# Patient Record
Sex: FEMALE | Race: WHITE | NOT HISPANIC OR LATINO | Employment: OTHER | URBAN - METROPOLITAN AREA
[De-identification: names, ages, dates, MRNs, and addresses within clinical notes are randomized per-mention and may not be internally consistent; named-entity substitution may affect disease eponyms.]

---

## 2017-01-03 ENCOUNTER — OFFICE VISIT - HEALTHEAST (OUTPATIENT)
Dept: PHYSICAL THERAPY | Facility: REHABILITATION | Age: 72
End: 2017-01-03

## 2017-01-03 ENCOUNTER — COMMUNICATION - HEALTHEAST (OUTPATIENT)
Dept: FAMILY MEDICINE | Facility: CLINIC | Age: 72
End: 2017-01-03

## 2017-01-03 DIAGNOSIS — M79.18 MUSCLE PAIN, MYOFASCIAL: ICD-10-CM

## 2017-01-03 DIAGNOSIS — M79.671 RIGHT FOOT PAIN: ICD-10-CM

## 2017-01-04 ENCOUNTER — COMMUNICATION - HEALTHEAST (OUTPATIENT)
Dept: CARDIOLOGY | Facility: CLINIC | Age: 72
End: 2017-01-04

## 2017-01-04 DIAGNOSIS — I10 ESSENTIAL HYPERTENSION, BENIGN: ICD-10-CM

## 2017-01-04 DIAGNOSIS — I42.9 CARDIOMYOPATHY (H): ICD-10-CM

## 2017-01-06 ENCOUNTER — OFFICE VISIT - HEALTHEAST (OUTPATIENT)
Dept: FAMILY MEDICINE | Facility: CLINIC | Age: 72
End: 2017-01-06

## 2017-01-06 DIAGNOSIS — M75.81 RIGHT ROTATOR CUFF TENDONITIS: ICD-10-CM

## 2017-01-06 ASSESSMENT — MIFFLIN-ST. JEOR: SCORE: 1376.32

## 2017-01-09 ENCOUNTER — OFFICE VISIT - HEALTHEAST (OUTPATIENT)
Dept: PHYSICAL THERAPY | Facility: REHABILITATION | Age: 72
End: 2017-01-09

## 2017-01-09 DIAGNOSIS — M79.18 MUSCLE PAIN, MYOFASCIAL: ICD-10-CM

## 2017-01-09 DIAGNOSIS — M79.671 RIGHT FOOT PAIN: ICD-10-CM

## 2017-01-16 ENCOUNTER — OFFICE VISIT - HEALTHEAST (OUTPATIENT)
Dept: PHYSICAL THERAPY | Facility: REHABILITATION | Age: 72
End: 2017-01-16

## 2017-01-16 DIAGNOSIS — M79.18 MUSCLE PAIN, MYOFASCIAL: ICD-10-CM

## 2017-01-16 DIAGNOSIS — M79.671 RIGHT FOOT PAIN: ICD-10-CM

## 2017-01-18 ENCOUNTER — COMMUNICATION - HEALTHEAST (OUTPATIENT)
Dept: FAMILY MEDICINE | Facility: CLINIC | Age: 72
End: 2017-01-18

## 2017-01-18 DIAGNOSIS — G43.909 MIGRAINE: ICD-10-CM

## 2017-01-20 ENCOUNTER — AMBULATORY - HEALTHEAST (OUTPATIENT)
Dept: FAMILY MEDICINE | Facility: CLINIC | Age: 72
End: 2017-01-20

## 2017-01-20 DIAGNOSIS — G43.909 MIGRAINE: ICD-10-CM

## 2017-01-24 ENCOUNTER — OFFICE VISIT - HEALTHEAST (OUTPATIENT)
Dept: PHYSICAL THERAPY | Facility: REHABILITATION | Age: 72
End: 2017-01-24

## 2017-01-24 DIAGNOSIS — M79.671 RIGHT FOOT PAIN: ICD-10-CM

## 2017-01-24 DIAGNOSIS — M79.18 MUSCLE PAIN, MYOFASCIAL: ICD-10-CM

## 2017-01-30 ENCOUNTER — OFFICE VISIT - HEALTHEAST (OUTPATIENT)
Dept: PHYSICAL THERAPY | Facility: REHABILITATION | Age: 72
End: 2017-01-30

## 2017-01-30 DIAGNOSIS — M79.671 RIGHT FOOT PAIN: ICD-10-CM

## 2017-01-30 DIAGNOSIS — M79.18 MUSCLE PAIN, MYOFASCIAL: ICD-10-CM

## 2017-02-20 ENCOUNTER — AMBULATORY - HEALTHEAST (OUTPATIENT)
Dept: CARE COORDINATION | Facility: CLINIC | Age: 72
End: 2017-02-20

## 2017-03-03 ENCOUNTER — COMMUNICATION - HEALTHEAST (OUTPATIENT)
Dept: FAMILY MEDICINE | Facility: CLINIC | Age: 72
End: 2017-03-03

## 2017-03-03 DIAGNOSIS — R21 RASH: ICD-10-CM

## 2017-03-06 ENCOUNTER — OFFICE VISIT - HEALTHEAST (OUTPATIENT)
Dept: FAMILY MEDICINE | Facility: CLINIC | Age: 72
End: 2017-03-06

## 2017-03-06 DIAGNOSIS — M79.7 FIBROMYALGIA: ICD-10-CM

## 2017-03-06 DIAGNOSIS — S81.802S LEG WOUND, LEFT, SEQUELA: ICD-10-CM

## 2017-03-06 DIAGNOSIS — I42.8 OTHER PRIMARY CARDIOMYOPATHIES: ICD-10-CM

## 2017-03-06 DIAGNOSIS — I87.2 VENOUS (PERIPHERAL) INSUFFICIENCY: ICD-10-CM

## 2017-03-06 DIAGNOSIS — I10 ESSENTIAL HYPERTENSION WITH GOAL BLOOD PRESSURE LESS THAN 130/80: ICD-10-CM

## 2017-03-06 DIAGNOSIS — F34.1 DYSTHYMIC DISORDER: ICD-10-CM

## 2017-03-06 DIAGNOSIS — R53.83 TIRED: ICD-10-CM

## 2017-03-06 DIAGNOSIS — G43.909 MIGRAINE: ICD-10-CM

## 2017-03-06 ASSESSMENT — MIFFLIN-ST. JEOR: SCORE: 1371.79

## 2017-03-09 ENCOUNTER — RECORDS - HEALTHEAST (OUTPATIENT)
Dept: ADMINISTRATIVE | Facility: OTHER | Age: 72
End: 2017-03-09

## 2017-03-11 ENCOUNTER — COMMUNICATION - HEALTHEAST (OUTPATIENT)
Dept: FAMILY MEDICINE | Facility: CLINIC | Age: 72
End: 2017-03-11

## 2017-03-13 ENCOUNTER — AMBULATORY - HEALTHEAST (OUTPATIENT)
Dept: FAMILY MEDICINE | Facility: CLINIC | Age: 72
End: 2017-03-13

## 2017-03-13 DIAGNOSIS — G43.909 MIGRAINE: ICD-10-CM

## 2017-03-28 ENCOUNTER — COMMUNICATION - HEALTHEAST (OUTPATIENT)
Dept: FAMILY MEDICINE | Facility: CLINIC | Age: 72
End: 2017-03-28

## 2017-03-28 DIAGNOSIS — F34.1 DYSTHYMIC DISORDER: ICD-10-CM

## 2017-04-25 ENCOUNTER — RECORDS - HEALTHEAST (OUTPATIENT)
Dept: ADMINISTRATIVE | Facility: OTHER | Age: 72
End: 2017-04-25

## 2017-05-06 ENCOUNTER — COMMUNICATION - HEALTHEAST (OUTPATIENT)
Dept: FAMILY MEDICINE | Facility: CLINIC | Age: 72
End: 2017-05-06

## 2017-05-06 DIAGNOSIS — I10 ESSENTIAL HYPERTENSION: ICD-10-CM

## 2017-05-09 ENCOUNTER — OFFICE VISIT - HEALTHEAST (OUTPATIENT)
Dept: FAMILY MEDICINE | Facility: CLINIC | Age: 72
End: 2017-05-09

## 2017-05-09 DIAGNOSIS — M94.9 DISORDER OF BONE AND CARTILAGE: ICD-10-CM

## 2017-05-09 DIAGNOSIS — I83.813 VARICOSE VEINS OF BOTH LOWER EXTREMITIES WITH PAIN: ICD-10-CM

## 2017-05-09 DIAGNOSIS — F34.1 DYSTHYMIC DISORDER: ICD-10-CM

## 2017-05-09 DIAGNOSIS — R06.09 DYSPNEA ON EXERTION: ICD-10-CM

## 2017-05-09 DIAGNOSIS — M89.9 DISORDER OF BONE AND CARTILAGE: ICD-10-CM

## 2017-05-09 DIAGNOSIS — R07.89 CHEST PRESSURE: ICD-10-CM

## 2017-05-24 ENCOUNTER — HOSPITAL ENCOUNTER (OUTPATIENT)
Dept: CARDIOLOGY | Facility: CLINIC | Age: 72
Discharge: HOME | End: 2017-05-24
Attending: FAMILY MEDICINE

## 2017-05-24 ENCOUNTER — HOSPITAL ENCOUNTER (OUTPATIENT)
Dept: NUCLEAR MEDICINE | Facility: CLINIC | Age: 72
Discharge: HOME | End: 2017-05-24
Attending: FAMILY MEDICINE

## 2017-05-24 ENCOUNTER — COMMUNICATION - HEALTHEAST (OUTPATIENT)
Dept: FAMILY MEDICINE | Facility: CLINIC | Age: 72
End: 2017-05-24

## 2017-05-24 DIAGNOSIS — R06.09 OTHER FORMS OF DYSPNEA: ICD-10-CM

## 2017-05-24 DIAGNOSIS — R06.09 DYSPNEA ON EXERTION: ICD-10-CM

## 2017-05-24 DIAGNOSIS — R07.89 CHEST PRESSURE: ICD-10-CM

## 2017-05-24 LAB
CV STRESS CURRENT BP HE: NORMAL
CV STRESS CURRENT HR HE: 101
CV STRESS CURRENT HR HE: 102
CV STRESS CURRENT HR HE: 103
CV STRESS CURRENT HR HE: 106
CV STRESS CURRENT HR HE: 107
CV STRESS CURRENT HR HE: 116
CV STRESS CURRENT HR HE: 117
CV STRESS CURRENT HR HE: 120
CV STRESS CURRENT HR HE: 125
CV STRESS CURRENT HR HE: 128
CV STRESS CURRENT HR HE: 131
CV STRESS CURRENT HR HE: 131
CV STRESS CURRENT HR HE: 133
CV STRESS CURRENT HR HE: 138
CV STRESS CURRENT HR HE: 141
CV STRESS CURRENT HR HE: 142
CV STRESS CURRENT HR HE: 144
CV STRESS CURRENT HR HE: 144
CV STRESS CURRENT HR HE: 90
CV STRESS CURRENT HR HE: 91
CV STRESS CURRENT HR HE: 97
CV STRESS CURRENT HR HE: 99
CV STRESS DEVIATION TIME HE: NORMAL
CV STRESS ECHO PERCENT HR HE: NORMAL
CV STRESS EXERCISE STAGE HE: NORMAL
CV STRESS EXERCISE STAGE REACHED HE: NORMAL
CV STRESS FINAL RESTING BP HE: NORMAL
CV STRESS FINAL RESTING HR HE: 101
CV STRESS MAX HR HE: 144
CV STRESS MAX TREADMILL GRADE HE: 12
CV STRESS MAX TREADMILL SPEED HE: 2.5
CV STRESS PEAK DIA BP HE: NORMAL
CV STRESS PEAK SYS BP HE: NORMAL
CV STRESS PHASE HE: NORMAL
CV STRESS PROTOCOL HE: NORMAL
CV STRESS RESTING PT POSITION HE: NORMAL
CV STRESS RESTING PT POSITION HE: NORMAL
CV STRESS ST DEVIATION AMOUNT HE: NORMAL
CV STRESS ST DEVIATION ELEVATION HE: NORMAL
CV STRESS ST EVELATION AMOUNT HE: NORMAL
CV STRESS TEST TYPE HE: NORMAL
CV STRESS TOTAL STAGE TIME MIN 1 HE: NORMAL
NUC STRESS EJECTION FRACTION: 70 %
STRESS ECHO BASELINE BP: NORMAL
STRESS ECHO BASELINE HR: 92
STRESS ECHO CALCULATED PERCENT HR: 96 %
STRESS ECHO LAST STRESS BP: NORMAL
STRESS ECHO LAST STRESS HR: 142
STRESS ECHO POST ESTIMATED WORKLOAD: 6.4
STRESS ECHO POST EXERCISE DUR MIN: 4
STRESS ECHO POST EXERCISE DUR SEC: 38
STRESS ECHO TARGET HR: 128

## 2017-05-30 ENCOUNTER — COMMUNICATION - HEALTHEAST (OUTPATIENT)
Dept: CARDIOLOGY | Facility: CLINIC | Age: 72
End: 2017-05-30

## 2017-06-12 ENCOUNTER — RECORDS - HEALTHEAST (OUTPATIENT)
Dept: ADMINISTRATIVE | Facility: OTHER | Age: 72
End: 2017-06-12

## 2017-06-12 ENCOUNTER — RECORDS - HEALTHEAST (OUTPATIENT)
Dept: BONE DENSITY | Facility: CLINIC | Age: 72
End: 2017-06-12

## 2017-06-12 DIAGNOSIS — M89.9 DISORDER OF BONE, UNSPECIFIED: ICD-10-CM

## 2017-06-12 DIAGNOSIS — M94.9 DISORDER OF CARTILAGE, UNSPECIFIED: ICD-10-CM

## 2017-06-19 ENCOUNTER — COMMUNICATION - HEALTHEAST (OUTPATIENT)
Dept: FAMILY MEDICINE | Facility: CLINIC | Age: 72
End: 2017-06-19

## 2017-06-19 DIAGNOSIS — M43.10 SPONDYLOLISTHESIS: ICD-10-CM

## 2017-07-18 ENCOUNTER — AMBULATORY - HEALTHEAST (OUTPATIENT)
Dept: LAB | Facility: CLINIC | Age: 72
End: 2017-07-18

## 2017-07-18 ENCOUNTER — AMBULATORY - HEALTHEAST (OUTPATIENT)
Dept: FAMILY MEDICINE | Facility: CLINIC | Age: 72
End: 2017-07-18

## 2017-07-18 ENCOUNTER — COMMUNICATION - HEALTHEAST (OUTPATIENT)
Dept: LAB | Facility: CLINIC | Age: 72
End: 2017-07-18

## 2017-07-18 DIAGNOSIS — E55.9 VITAMIN D DEFICIENCY: ICD-10-CM

## 2017-07-18 DIAGNOSIS — I10 ESSENTIAL HYPERTENSION, MALIGNANT: ICD-10-CM

## 2017-07-18 LAB
CHOLEST SERPL-MCNC: 161 MG/DL
FASTING STATUS PATIENT QL REPORTED: YES
HDLC SERPL-MCNC: 63 MG/DL
LDLC SERPL CALC-MCNC: 82 MG/DL
TRIGL SERPL-MCNC: 78 MG/DL

## 2017-07-19 ENCOUNTER — COMMUNICATION - HEALTHEAST (OUTPATIENT)
Dept: FAMILY MEDICINE | Facility: CLINIC | Age: 72
End: 2017-07-19

## 2017-07-24 ENCOUNTER — OFFICE VISIT - HEALTHEAST (OUTPATIENT)
Dept: FAMILY MEDICINE | Facility: CLINIC | Age: 72
End: 2017-07-24

## 2017-07-24 DIAGNOSIS — I83.93 ASYMPTOMATIC VARICOSE VEINS, BILATERAL: ICD-10-CM

## 2017-07-24 DIAGNOSIS — F34.1 DYSTHYMIC DISORDER: ICD-10-CM

## 2017-07-24 DIAGNOSIS — I42.8 OTHER PRIMARY CARDIOMYOPATHIES: ICD-10-CM

## 2017-07-24 DIAGNOSIS — I87.2 CHRONIC VENOUS INSUFFICIENCY: ICD-10-CM

## 2017-07-24 DIAGNOSIS — G43.909 MIGRAINE: ICD-10-CM

## 2017-07-24 DIAGNOSIS — I10 ESSENTIAL HYPERTENSION WITH GOAL BLOOD PRESSURE LESS THAN 130/80: ICD-10-CM

## 2017-07-24 ASSESSMENT — MIFFLIN-ST. JEOR: SCORE: 1385.4

## 2017-07-28 ENCOUNTER — COMMUNICATION - HEALTHEAST (OUTPATIENT)
Dept: FAMILY MEDICINE | Facility: CLINIC | Age: 72
End: 2017-07-28

## 2017-07-28 DIAGNOSIS — G43.909 MIGRAINE: ICD-10-CM

## 2017-08-22 ENCOUNTER — OFFICE VISIT - HEALTHEAST (OUTPATIENT)
Dept: CARDIOLOGY | Facility: CLINIC | Age: 72
End: 2017-08-22

## 2017-08-22 DIAGNOSIS — R06.09 OTHER FORMS OF DYSPNEA: ICD-10-CM

## 2017-08-22 DIAGNOSIS — R06.09 DYSPNEA ON EXERTION: ICD-10-CM

## 2017-08-22 DIAGNOSIS — R94.39 ABNORMAL NUCLEAR STRESS TEST: ICD-10-CM

## 2017-08-22 ASSESSMENT — MIFFLIN-ST. JEOR: SCORE: 1403.54

## 2017-08-23 ENCOUNTER — OFFICE VISIT - HEALTHEAST (OUTPATIENT)
Dept: FAMILY MEDICINE | Facility: CLINIC | Age: 72
End: 2017-08-23

## 2017-08-23 DIAGNOSIS — R21 LOCALIZED MACULOPAPULAR RASH: ICD-10-CM

## 2017-08-23 DIAGNOSIS — L70.0 OPEN COMEDONE: ICD-10-CM

## 2017-08-23 DIAGNOSIS — R49.9 CHANGE IN VOICE: ICD-10-CM

## 2017-08-23 DIAGNOSIS — L94.0 CIRCUMSCRIBED SCLERODERMA: ICD-10-CM

## 2017-08-23 DIAGNOSIS — Z00.00 ROUTINE GENERAL MEDICAL EXAMINATION AT A HEALTH CARE FACILITY: ICD-10-CM

## 2017-08-23 DIAGNOSIS — M62.81 MUSCLE WEAKNESS OF LOWER EXTREMITY: ICD-10-CM

## 2017-08-23 ASSESSMENT — MIFFLIN-ST. JEOR: SCORE: 1381.99

## 2017-08-24 ENCOUNTER — COMMUNICATION - HEALTHEAST (OUTPATIENT)
Dept: CARDIOLOGY | Facility: CLINIC | Age: 72
End: 2017-08-24

## 2017-08-28 ENCOUNTER — AMBULATORY - HEALTHEAST (OUTPATIENT)
Dept: FAMILY MEDICINE | Facility: CLINIC | Age: 72
End: 2017-08-28

## 2017-08-29 ENCOUNTER — HOSPITAL ENCOUNTER (OUTPATIENT)
Dept: CT IMAGING | Facility: CLINIC | Age: 72
Discharge: HOME | End: 2017-08-29
Attending: INTERNAL MEDICINE

## 2017-08-29 DIAGNOSIS — R06.09 OTHER FORMS OF DYSPNEA: ICD-10-CM

## 2017-08-29 DIAGNOSIS — R94.39 ABNORMAL NUCLEAR STRESS TEST: ICD-10-CM

## 2017-08-29 DIAGNOSIS — R06.09 DYSPNEA ON EXERTION: ICD-10-CM

## 2017-08-29 LAB
BSA FOR ECHO PROCEDURE: 2.01 M2
CV CALCIUM SCORE AGATSTON LM: 0
CV CALCIUM SCORING AGATSON LAD: 14
CV CALCIUM SCORING AGATSTON CX: 0
CV CALCIUM SCORING AGATSTON RCA: 0
CV CALCIUM SCORING AGATSTON TOTAL: 14
LEFT VENTRICLE HEART RATE: 83 BPM

## 2017-08-29 ASSESSMENT — MIFFLIN-ST. JEOR: SCORE: 1378.02

## 2017-08-30 ENCOUNTER — COMMUNICATION - HEALTHEAST (OUTPATIENT)
Dept: FAMILY MEDICINE | Facility: CLINIC | Age: 72
End: 2017-08-30

## 2017-08-30 DIAGNOSIS — G43.909 MIGRAINE: ICD-10-CM

## 2017-09-12 ENCOUNTER — COMMUNICATION - HEALTHEAST (OUTPATIENT)
Dept: CARDIOLOGY | Facility: CLINIC | Age: 72
End: 2017-09-12

## 2017-09-12 ENCOUNTER — COMMUNICATION - HEALTHEAST (OUTPATIENT)
Dept: FAMILY MEDICINE | Facility: CLINIC | Age: 72
End: 2017-09-12

## 2017-09-12 DIAGNOSIS — I42.9 CARDIOMYOPATHY (H): ICD-10-CM

## 2017-09-12 DIAGNOSIS — M43.10 SPONDYLOLISTHESIS: ICD-10-CM

## 2017-09-12 DIAGNOSIS — I10 ESSENTIAL HYPERTENSION, BENIGN: ICD-10-CM

## 2017-09-25 ENCOUNTER — HOSPITAL ENCOUNTER (OUTPATIENT)
Dept: MAMMOGRAPHY | Facility: HOSPITAL | Age: 72
Discharge: HOME | End: 2017-09-25
Attending: FAMILY MEDICINE

## 2017-09-25 DIAGNOSIS — Z12.31 VISIT FOR SCREENING MAMMOGRAM: ICD-10-CM

## 2017-09-26 ENCOUNTER — COMMUNICATION - HEALTHEAST (OUTPATIENT)
Dept: FAMILY MEDICINE | Facility: CLINIC | Age: 72
End: 2017-09-26

## 2017-09-26 DIAGNOSIS — F34.1 DYSTHYMIC DISORDER: ICD-10-CM

## 2017-10-06 ENCOUNTER — COMMUNICATION - HEALTHEAST (OUTPATIENT)
Dept: FAMILY MEDICINE | Facility: CLINIC | Age: 72
End: 2017-10-06

## 2017-10-06 DIAGNOSIS — G43.909 MIGRAINE: ICD-10-CM

## 2017-10-16 ENCOUNTER — COMMUNICATION - HEALTHEAST (OUTPATIENT)
Dept: FAMILY MEDICINE | Facility: CLINIC | Age: 72
End: 2017-10-16

## 2017-10-23 ENCOUNTER — OFFICE VISIT - HEALTHEAST (OUTPATIENT)
Dept: FAMILY MEDICINE | Facility: CLINIC | Age: 72
End: 2017-10-23

## 2017-10-23 DIAGNOSIS — J20.9 ACUTE BRONCHITIS: ICD-10-CM

## 2017-10-23 DIAGNOSIS — J32.9 SINUSITIS: ICD-10-CM

## 2017-10-23 ASSESSMENT — MIFFLIN-ST. JEOR: SCORE: 1382.56

## 2017-10-24 ENCOUNTER — RECORDS - HEALTHEAST (OUTPATIENT)
Dept: ADMINISTRATIVE | Facility: OTHER | Age: 72
End: 2017-10-24

## 2017-11-01 ENCOUNTER — HOSPITAL ENCOUNTER (OUTPATIENT)
Dept: MRI IMAGING | Facility: CLINIC | Age: 72
Discharge: HOME | End: 2017-11-01
Attending: PSYCHIATRY & NEUROLOGY

## 2017-11-01 DIAGNOSIS — G95.9 SPINAL CORD LESION (H): ICD-10-CM

## 2017-11-03 ENCOUNTER — COMMUNICATION - HEALTHEAST (OUTPATIENT)
Dept: FAMILY MEDICINE | Facility: CLINIC | Age: 72
End: 2017-11-03

## 2017-11-04 ENCOUNTER — COMMUNICATION - HEALTHEAST (OUTPATIENT)
Dept: FAMILY MEDICINE | Facility: CLINIC | Age: 72
End: 2017-11-04

## 2017-11-07 ENCOUNTER — HOSPITAL ENCOUNTER (OUTPATIENT)
Dept: MRI IMAGING | Facility: CLINIC | Age: 72
Discharge: HOME | End: 2017-11-07
Attending: PSYCHIATRY & NEUROLOGY

## 2017-11-07 DIAGNOSIS — R93.89 ABNORMAL MRI: ICD-10-CM

## 2017-11-21 ENCOUNTER — COMMUNICATION - HEALTHEAST (OUTPATIENT)
Dept: FAMILY MEDICINE | Facility: CLINIC | Age: 72
End: 2017-11-21

## 2017-11-21 DIAGNOSIS — R49.9 CHANGE IN VOICE: ICD-10-CM

## 2017-12-05 ENCOUNTER — COMMUNICATION - HEALTHEAST (OUTPATIENT)
Dept: SCHEDULING | Facility: CLINIC | Age: 72
End: 2017-12-05

## 2017-12-06 ENCOUNTER — OFFICE VISIT - HEALTHEAST (OUTPATIENT)
Dept: FAMILY MEDICINE | Facility: CLINIC | Age: 72
End: 2017-12-06

## 2017-12-06 DIAGNOSIS — Z23 NEED FOR VACCINATION: ICD-10-CM

## 2017-12-06 DIAGNOSIS — E66.9 OBESITY: ICD-10-CM

## 2017-12-06 DIAGNOSIS — L29.9 ITCHING: ICD-10-CM

## 2017-12-06 ASSESSMENT — MIFFLIN-ST. JEOR: SCORE: 1373.49

## 2017-12-08 ENCOUNTER — COMMUNICATION - HEALTHEAST (OUTPATIENT)
Dept: FAMILY MEDICINE | Facility: CLINIC | Age: 72
End: 2017-12-08

## 2017-12-11 ENCOUNTER — COMMUNICATION - HEALTHEAST (OUTPATIENT)
Dept: FAMILY MEDICINE | Facility: CLINIC | Age: 72
End: 2017-12-11

## 2017-12-11 DIAGNOSIS — F34.1 DYSTHYMIC DISORDER: ICD-10-CM

## 2017-12-11 DIAGNOSIS — M43.10 SPONDYLOLISTHESIS: ICD-10-CM

## 2018-01-05 ENCOUNTER — COMMUNICATION - HEALTHEAST (OUTPATIENT)
Dept: FAMILY MEDICINE | Facility: CLINIC | Age: 73
End: 2018-01-05

## 2018-01-08 ENCOUNTER — COMMUNICATION - HEALTHEAST (OUTPATIENT)
Dept: FAMILY MEDICINE | Facility: CLINIC | Age: 73
End: 2018-01-08

## 2018-01-08 DIAGNOSIS — K21.9 ESOPHAGEAL REFLUX: ICD-10-CM

## 2018-01-15 ENCOUNTER — COMMUNICATION - HEALTHEAST (OUTPATIENT)
Dept: FAMILY MEDICINE | Facility: CLINIC | Age: 73
End: 2018-01-15

## 2018-01-15 DIAGNOSIS — J20.9 ACUTE BRONCHITIS: ICD-10-CM

## 2018-01-29 ENCOUNTER — COMMUNICATION - HEALTHEAST (OUTPATIENT)
Dept: FAMILY MEDICINE | Facility: CLINIC | Age: 73
End: 2018-01-29

## 2018-01-30 ENCOUNTER — OFFICE VISIT - HEALTHEAST (OUTPATIENT)
Dept: FAMILY MEDICINE | Facility: CLINIC | Age: 73
End: 2018-01-30

## 2018-01-30 DIAGNOSIS — J40 BRONCHITIS: ICD-10-CM

## 2018-01-30 ASSESSMENT — MIFFLIN-ST. JEOR: SCORE: 1394.67

## 2018-01-31 ENCOUNTER — COMMUNICATION - HEALTHEAST (OUTPATIENT)
Dept: FAMILY MEDICINE | Facility: CLINIC | Age: 73
End: 2018-01-31

## 2018-01-31 DIAGNOSIS — G43.909 MIGRAINE HEADACHE: ICD-10-CM

## 2018-02-08 ENCOUNTER — COMMUNICATION - HEALTHEAST (OUTPATIENT)
Dept: FAMILY MEDICINE | Facility: CLINIC | Age: 73
End: 2018-02-08

## 2018-02-15 ENCOUNTER — OFFICE VISIT - HEALTHEAST (OUTPATIENT)
Dept: PODIATRY | Age: 73
End: 2018-02-15

## 2018-02-15 ENCOUNTER — COMMUNICATION - HEALTHEAST (OUTPATIENT)
Dept: OTHER | Facility: CLINIC | Age: 73
End: 2018-02-15

## 2018-02-15 DIAGNOSIS — M72.2 PLANTAR FASCIITIS: ICD-10-CM

## 2018-02-15 DIAGNOSIS — M77.50 ANKLE ENTHESOPATHY: ICD-10-CM

## 2018-03-05 ENCOUNTER — RECORDS - HEALTHEAST (OUTPATIENT)
Dept: GENERAL RADIOLOGY | Facility: CLINIC | Age: 73
End: 2018-03-05

## 2018-03-05 ENCOUNTER — OFFICE VISIT - HEALTHEAST (OUTPATIENT)
Dept: FAMILY MEDICINE | Facility: CLINIC | Age: 73
End: 2018-03-05

## 2018-03-05 ENCOUNTER — COMMUNICATION - HEALTHEAST (OUTPATIENT)
Dept: SURGERY | Facility: CLINIC | Age: 73
End: 2018-03-05

## 2018-03-05 DIAGNOSIS — G43.909 MIGRAINE: ICD-10-CM

## 2018-03-05 DIAGNOSIS — S99.911A ANKLE INJURY, RIGHT, INITIAL ENCOUNTER: ICD-10-CM

## 2018-03-05 DIAGNOSIS — S99.911A UNSPECIFIED INJURY OF RIGHT ANKLE, INITIAL ENCOUNTER: ICD-10-CM

## 2018-03-05 DIAGNOSIS — E66.9 OBESITY: ICD-10-CM

## 2018-03-05 DIAGNOSIS — L29.9 ITCHING: ICD-10-CM

## 2018-03-05 DIAGNOSIS — M43.10 SPONDYLOLISTHESIS: ICD-10-CM

## 2018-03-05 ASSESSMENT — MIFFLIN-ST. JEOR: SCORE: 1391.63

## 2018-03-06 ENCOUNTER — AMBULATORY - HEALTHEAST (OUTPATIENT)
Dept: FAMILY MEDICINE | Facility: CLINIC | Age: 73
End: 2018-03-06

## 2018-03-08 ENCOUNTER — COMMUNICATION - HEALTHEAST (OUTPATIENT)
Dept: FAMILY MEDICINE | Facility: CLINIC | Age: 73
End: 2018-03-08

## 2018-03-09 ENCOUNTER — COMMUNICATION - HEALTHEAST (OUTPATIENT)
Dept: FAMILY MEDICINE | Facility: CLINIC | Age: 73
End: 2018-03-09

## 2018-03-09 ENCOUNTER — OFFICE VISIT - HEALTHEAST (OUTPATIENT)
Dept: FAMILY MEDICINE | Facility: CLINIC | Age: 73
End: 2018-03-09

## 2018-03-09 DIAGNOSIS — M25.571 ACUTE RIGHT ANKLE PAIN: ICD-10-CM

## 2018-03-09 DIAGNOSIS — S82.831D OTHER CLOSED FRACTURE OF DISTAL END OF RIGHT FIBULA WITH ROUTINE HEALING, SUBSEQUENT ENCOUNTER: ICD-10-CM

## 2018-03-09 ASSESSMENT — MIFFLIN-ST. JEOR: SCORE: 1391.63

## 2018-03-21 ENCOUNTER — COMMUNICATION - HEALTHEAST (OUTPATIENT)
Dept: SCHEDULING | Facility: CLINIC | Age: 73
End: 2018-03-21

## 2018-04-05 ENCOUNTER — OFFICE VISIT - HEALTHEAST (OUTPATIENT)
Dept: FAMILY MEDICINE | Facility: CLINIC | Age: 73
End: 2018-04-05

## 2018-04-05 DIAGNOSIS — S82.891S: ICD-10-CM

## 2018-04-05 DIAGNOSIS — J20.9 ACUTE BRONCHITIS: ICD-10-CM

## 2018-04-12 ENCOUNTER — RECORDS - HEALTHEAST (OUTPATIENT)
Dept: ADMINISTRATIVE | Facility: OTHER | Age: 73
End: 2018-04-12

## 2018-04-15 ENCOUNTER — COMMUNICATION - HEALTHEAST (OUTPATIENT)
Dept: FAMILY MEDICINE | Facility: CLINIC | Age: 73
End: 2018-04-15

## 2018-04-15 DIAGNOSIS — I10 ESSENTIAL HYPERTENSION: ICD-10-CM

## 2018-04-16 ENCOUNTER — OFFICE VISIT - HEALTHEAST (OUTPATIENT)
Dept: FAMILY MEDICINE | Facility: CLINIC | Age: 73
End: 2018-04-16

## 2018-04-16 ENCOUNTER — RECORDS - HEALTHEAST (OUTPATIENT)
Dept: GENERAL RADIOLOGY | Facility: CLINIC | Age: 73
End: 2018-04-16

## 2018-04-16 DIAGNOSIS — S82.891A CLOSED RIGHT ANKLE FRACTURE: ICD-10-CM

## 2018-04-16 DIAGNOSIS — S82.891A OTHER FRACTURE OF RIGHT LOWER LEG, INITIAL ENCOUNTER FOR CLOSED FRACTURE: ICD-10-CM

## 2018-04-16 ASSESSMENT — MIFFLIN-ST. JEOR: SCORE: 1391.63

## 2018-04-25 ENCOUNTER — OFFICE VISIT - HEALTHEAST (OUTPATIENT)
Dept: SURGERY | Facility: CLINIC | Age: 73
End: 2018-04-25

## 2018-04-25 DIAGNOSIS — M79.7 FIBROMYALGIA: ICD-10-CM

## 2018-04-25 DIAGNOSIS — I10 HYPERTENSION, UNSPECIFIED TYPE: ICD-10-CM

## 2018-04-25 DIAGNOSIS — E66.9 OBESITY (BMI 30-39.9): ICD-10-CM

## 2018-04-25 DIAGNOSIS — G43.909 MIGRAINE: ICD-10-CM

## 2018-04-25 ASSESSMENT — MIFFLIN-ST. JEOR: SCORE: 1357.95

## 2018-05-02 ENCOUNTER — OFFICE VISIT - HEALTHEAST (OUTPATIENT)
Dept: PHYSICAL THERAPY | Facility: REHABILITATION | Age: 73
End: 2018-05-02

## 2018-05-02 DIAGNOSIS — R53.83 DECREASED STAMINA: ICD-10-CM

## 2018-05-02 DIAGNOSIS — M79.7 FIBROMYALGIA: ICD-10-CM

## 2018-05-02 DIAGNOSIS — R53.1 GENERAL WEAKNESS: ICD-10-CM

## 2018-05-09 ENCOUNTER — OFFICE VISIT - HEALTHEAST (OUTPATIENT)
Dept: PHYSICAL THERAPY | Facility: REHABILITATION | Age: 73
End: 2018-05-09

## 2018-05-09 DIAGNOSIS — R53.1 GENERAL WEAKNESS: ICD-10-CM

## 2018-05-09 DIAGNOSIS — R53.83 DECREASED STAMINA: ICD-10-CM

## 2018-05-09 DIAGNOSIS — M79.7 FIBROMYALGIA: ICD-10-CM

## 2018-05-14 ENCOUNTER — OFFICE VISIT - HEALTHEAST (OUTPATIENT)
Dept: PHYSICAL THERAPY | Facility: REHABILITATION | Age: 73
End: 2018-05-14

## 2018-05-14 DIAGNOSIS — M79.7 FIBROMYALGIA: ICD-10-CM

## 2018-05-14 DIAGNOSIS — R53.83 DECREASED STAMINA: ICD-10-CM

## 2018-05-14 DIAGNOSIS — R53.1 GENERAL WEAKNESS: ICD-10-CM

## 2018-05-15 ENCOUNTER — OFFICE VISIT - HEALTHEAST (OUTPATIENT)
Dept: FAMILY MEDICINE | Facility: CLINIC | Age: 73
End: 2018-05-15

## 2018-05-15 DIAGNOSIS — R10.32 LEFT LOWER QUADRANT PAIN: ICD-10-CM

## 2018-05-15 ASSESSMENT — MIFFLIN-ST. JEOR: SCORE: 1348.54

## 2018-05-16 ENCOUNTER — OFFICE VISIT - HEALTHEAST (OUTPATIENT)
Dept: PHYSICAL THERAPY | Facility: REHABILITATION | Age: 73
End: 2018-05-16

## 2018-05-16 ENCOUNTER — COMMUNICATION - HEALTHEAST (OUTPATIENT)
Dept: UROLOGY | Facility: CLINIC | Age: 73
End: 2018-05-16

## 2018-05-16 DIAGNOSIS — M79.7 FIBROMYALGIA: ICD-10-CM

## 2018-05-16 DIAGNOSIS — R53.1 GENERAL WEAKNESS: ICD-10-CM

## 2018-05-16 DIAGNOSIS — R53.83 DECREASED STAMINA: ICD-10-CM

## 2018-05-21 ENCOUNTER — OFFICE VISIT - HEALTHEAST (OUTPATIENT)
Dept: UROLOGY | Facility: CLINIC | Age: 73
End: 2018-05-21

## 2018-05-21 ENCOUNTER — OFFICE VISIT - HEALTHEAST (OUTPATIENT)
Dept: PHYSICAL THERAPY | Facility: REHABILITATION | Age: 73
End: 2018-05-21

## 2018-05-21 DIAGNOSIS — N20.9 URINARY TRACT STONES: ICD-10-CM

## 2018-05-21 DIAGNOSIS — R53.83 DECREASED STAMINA: ICD-10-CM

## 2018-05-21 DIAGNOSIS — N13.2 HYDRONEPHROSIS WITH URINARY OBSTRUCTION DUE TO URETERAL CALCULUS: ICD-10-CM

## 2018-05-21 DIAGNOSIS — R53.1 GENERAL WEAKNESS: ICD-10-CM

## 2018-05-21 DIAGNOSIS — N20.1 CALCULUS OF URETER: ICD-10-CM

## 2018-05-21 DIAGNOSIS — M79.7 FIBROMYALGIA: ICD-10-CM

## 2018-05-21 LAB
ALBUMIN UR-MCNC: NEGATIVE MG/DL
APPEARANCE UR: CLEAR
BILIRUB UR QL STRIP: NEGATIVE
COLOR UR AUTO: YELLOW
GLUCOSE UR STRIP-MCNC: NEGATIVE MG/DL
HGB UR QL STRIP: NEGATIVE
KETONES UR STRIP-MCNC: NEGATIVE MG/DL
LEUKOCYTE ESTERASE UR QL STRIP: ABNORMAL
NITRATE UR QL: NEGATIVE
PH UR STRIP: 7 [PH] (ref 5–8)
SP GR UR STRIP: 1.01 (ref 1–1.03)
UROBILINOGEN UR STRIP-ACNC: ABNORMAL

## 2018-05-23 ENCOUNTER — COMMUNICATION - HEALTHEAST (OUTPATIENT)
Dept: CARDIOLOGY | Facility: CLINIC | Age: 73
End: 2018-05-23

## 2018-05-23 ENCOUNTER — OFFICE VISIT - HEALTHEAST (OUTPATIENT)
Dept: PHYSICAL THERAPY | Facility: REHABILITATION | Age: 73
End: 2018-05-23

## 2018-05-23 DIAGNOSIS — I42.9 CARDIOMYOPATHY (H): ICD-10-CM

## 2018-05-23 DIAGNOSIS — I10 ESSENTIAL HYPERTENSION, BENIGN: ICD-10-CM

## 2018-05-23 DIAGNOSIS — R53.1 GENERAL WEAKNESS: ICD-10-CM

## 2018-05-23 DIAGNOSIS — M79.7 FIBROMYALGIA: ICD-10-CM

## 2018-05-23 DIAGNOSIS — R53.83 DECREASED STAMINA: ICD-10-CM

## 2018-05-24 ENCOUNTER — COMMUNICATION - HEALTHEAST (OUTPATIENT)
Dept: UROLOGY | Facility: CLINIC | Age: 73
End: 2018-05-24

## 2018-05-29 ENCOUNTER — OFFICE VISIT - HEALTHEAST (OUTPATIENT)
Dept: PHYSICAL THERAPY | Facility: REHABILITATION | Age: 73
End: 2018-05-29

## 2018-05-29 DIAGNOSIS — M79.7 FIBROMYALGIA: ICD-10-CM

## 2018-05-29 DIAGNOSIS — R53.83 DECREASED STAMINA: ICD-10-CM

## 2018-05-29 DIAGNOSIS — R53.1 GENERAL WEAKNESS: ICD-10-CM

## 2018-05-31 ENCOUNTER — COMMUNICATION - HEALTHEAST (OUTPATIENT)
Dept: ADMINISTRATIVE | Facility: CLINIC | Age: 73
End: 2018-05-31

## 2018-06-01 ENCOUNTER — OFFICE VISIT - HEALTHEAST (OUTPATIENT)
Dept: PHYSICAL THERAPY | Facility: REHABILITATION | Age: 73
End: 2018-06-01

## 2018-06-01 DIAGNOSIS — R53.1 GENERAL WEAKNESS: ICD-10-CM

## 2018-06-01 DIAGNOSIS — M79.7 FIBROMYALGIA: ICD-10-CM

## 2018-06-01 DIAGNOSIS — R53.83 DECREASED STAMINA: ICD-10-CM

## 2018-06-04 ENCOUNTER — OFFICE VISIT - HEALTHEAST (OUTPATIENT)
Dept: PHYSICAL THERAPY | Facility: REHABILITATION | Age: 73
End: 2018-06-04

## 2018-06-04 ENCOUNTER — HOSPITAL ENCOUNTER (OUTPATIENT)
Dept: CT IMAGING | Facility: CLINIC | Age: 73
Discharge: HOME | End: 2018-06-04
Attending: PHYSICIAN ASSISTANT

## 2018-06-04 ENCOUNTER — OFFICE VISIT - HEALTHEAST (OUTPATIENT)
Dept: UROLOGY | Facility: CLINIC | Age: 73
End: 2018-06-04

## 2018-06-04 DIAGNOSIS — N20.9 URINARY TRACT STONES: ICD-10-CM

## 2018-06-04 DIAGNOSIS — R53.83 DECREASED STAMINA: ICD-10-CM

## 2018-06-04 DIAGNOSIS — N20.1 CALCULUS OF URETER: ICD-10-CM

## 2018-06-04 DIAGNOSIS — M79.7 FIBROMYALGIA: ICD-10-CM

## 2018-06-04 DIAGNOSIS — R53.1 GENERAL WEAKNESS: ICD-10-CM

## 2018-06-04 LAB
ALBUMIN UR-MCNC: NEGATIVE MG/DL
APPEARANCE UR: CLEAR
BILIRUB UR QL STRIP: NEGATIVE
COLOR UR AUTO: YELLOW
GLUCOSE UR STRIP-MCNC: NEGATIVE MG/DL
HGB UR QL STRIP: ABNORMAL
KETONES UR STRIP-MCNC: ABNORMAL MG/DL
LEUKOCYTE ESTERASE UR QL STRIP: NEGATIVE
NITRATE UR QL: NEGATIVE
PH UR STRIP: 5.5 [PH] (ref 5–8)
SP GR UR STRIP: 1.02 (ref 1–1.03)
UROBILINOGEN UR STRIP-ACNC: ABNORMAL

## 2018-06-06 ENCOUNTER — COMMUNICATION - HEALTHEAST (OUTPATIENT)
Dept: CARE COORDINATION | Facility: CLINIC | Age: 73
End: 2018-06-06

## 2018-06-06 ENCOUNTER — OFFICE VISIT - HEALTHEAST (OUTPATIENT)
Dept: SURGERY | Facility: CLINIC | Age: 73
End: 2018-06-06

## 2018-06-06 DIAGNOSIS — E66.9 OBESITY (BMI 30-39.9): ICD-10-CM

## 2018-06-06 DIAGNOSIS — N20.1 CALCULUS OF URETER: ICD-10-CM

## 2018-06-06 DIAGNOSIS — Z71.3 DIETARY COUNSELING: ICD-10-CM

## 2018-06-06 DIAGNOSIS — E55.9 VITAMIN D DEFICIENCY: ICD-10-CM

## 2018-06-06 DIAGNOSIS — I10 ESSENTIAL HYPERTENSION: ICD-10-CM

## 2018-06-06 ASSESSMENT — MIFFLIN-ST. JEOR: SCORE: 1329.82

## 2018-06-15 ENCOUNTER — OFFICE VISIT - HEALTHEAST (OUTPATIENT)
Dept: PHYSICAL THERAPY | Facility: REHABILITATION | Age: 73
End: 2018-06-15

## 2018-06-15 DIAGNOSIS — M79.7 FIBROMYALGIA: ICD-10-CM

## 2018-06-15 DIAGNOSIS — R53.83 DECREASED STAMINA: ICD-10-CM

## 2018-06-15 DIAGNOSIS — R53.1 GENERAL WEAKNESS: ICD-10-CM

## 2018-06-18 ENCOUNTER — HOSPITAL ENCOUNTER (OUTPATIENT)
Dept: CT IMAGING | Facility: CLINIC | Age: 73
Discharge: HOME | End: 2018-06-18
Attending: PHYSICIAN ASSISTANT

## 2018-06-18 ENCOUNTER — OFFICE VISIT - HEALTHEAST (OUTPATIENT)
Dept: UROLOGY | Facility: CLINIC | Age: 73
End: 2018-06-18

## 2018-06-18 ENCOUNTER — COMMUNICATION - HEALTHEAST (OUTPATIENT)
Dept: UROLOGY | Facility: CLINIC | Age: 73
End: 2018-06-18

## 2018-06-18 DIAGNOSIS — N20.9 URINARY TRACT STONES: ICD-10-CM

## 2018-06-18 DIAGNOSIS — N20.1 CALCULUS OF URETER: ICD-10-CM

## 2018-06-20 ENCOUNTER — OFFICE VISIT - HEALTHEAST (OUTPATIENT)
Dept: SURGERY | Facility: CLINIC | Age: 73
End: 2018-06-20

## 2018-06-20 DIAGNOSIS — G43.909 MIGRAINE: ICD-10-CM

## 2018-06-20 DIAGNOSIS — E66.9 OBESITY (BMI 30-39.9): ICD-10-CM

## 2018-06-20 ASSESSMENT — MIFFLIN-ST. JEOR: SCORE: 1334.81

## 2018-06-22 LAB
1ST CONSTITUENT:: NORMAL
2ND CONSTITUENT:: NORMAL
SOURCE: NORMAL

## 2018-07-03 ENCOUNTER — COMMUNICATION - HEALTHEAST (OUTPATIENT)
Dept: FAMILY MEDICINE | Facility: CLINIC | Age: 73
End: 2018-07-03

## 2018-07-19 ENCOUNTER — OFFICE VISIT - HEALTHEAST (OUTPATIENT)
Dept: FAMILY MEDICINE | Facility: CLINIC | Age: 73
End: 2018-07-19

## 2018-07-19 DIAGNOSIS — R35.0 FREQUENT URINATION: ICD-10-CM

## 2018-07-19 DIAGNOSIS — G43.909 MIGRAINE: ICD-10-CM

## 2018-07-19 DIAGNOSIS — R41.3 MEMORY LOSS: ICD-10-CM

## 2018-07-19 LAB
ALBUMIN UR-MCNC: NEGATIVE MG/DL
APPEARANCE UR: CLEAR
BILIRUB UR QL STRIP: NEGATIVE
COLOR UR AUTO: YELLOW
GLUCOSE UR STRIP-MCNC: NEGATIVE MG/DL
HGB UR QL STRIP: NEGATIVE
KETONES UR STRIP-MCNC: NEGATIVE MG/DL
LEUKOCYTE ESTERASE UR QL STRIP: ABNORMAL
NITRATE UR QL: NEGATIVE
PH UR STRIP: 6 [PH] (ref 5–8)
SP GR UR STRIP: 1.02 (ref 1–1.03)
UROBILINOGEN UR STRIP-ACNC: ABNORMAL

## 2018-07-19 ASSESSMENT — MIFFLIN-ST. JEOR: SCORE: 1343.43

## 2018-07-20 ENCOUNTER — COMMUNICATION - HEALTHEAST (OUTPATIENT)
Dept: FAMILY MEDICINE | Facility: CLINIC | Age: 73
End: 2018-07-20

## 2018-07-20 LAB — BACTERIA SPEC CULT: NO GROWTH

## 2018-08-09 ENCOUNTER — OFFICE VISIT - HEALTHEAST (OUTPATIENT)
Dept: CARDIOLOGY | Facility: CLINIC | Age: 73
End: 2018-08-09

## 2018-08-09 DIAGNOSIS — I25.10 CORONARY ARTERY DISEASE INVOLVING NATIVE CORONARY ARTERY OF NATIVE HEART WITHOUT ANGINA PECTORIS: ICD-10-CM

## 2018-08-09 ASSESSMENT — MIFFLIN-ST. JEOR: SCORE: 1334.36

## 2018-09-04 ENCOUNTER — OFFICE VISIT - HEALTHEAST (OUTPATIENT)
Dept: SURGERY | Facility: CLINIC | Age: 73
End: 2018-09-04

## 2018-09-04 DIAGNOSIS — I10 ESSENTIAL HYPERTENSION: ICD-10-CM

## 2018-09-04 DIAGNOSIS — E66.9 OBESITY (BMI 30-39.9): ICD-10-CM

## 2018-09-04 ASSESSMENT — MIFFLIN-ST. JEOR: SCORE: 1347.97

## 2018-10-11 ENCOUNTER — COMMUNICATION - HEALTHEAST (OUTPATIENT)
Dept: FAMILY MEDICINE | Facility: CLINIC | Age: 73
End: 2018-10-11

## 2018-10-11 DIAGNOSIS — F34.1 DYSTHYMIC DISORDER: ICD-10-CM

## 2018-10-17 ENCOUNTER — HOSPITAL ENCOUNTER (OUTPATIENT)
Dept: MAMMOGRAPHY | Facility: CLINIC | Age: 73
Discharge: HOME | End: 2018-10-17
Attending: FAMILY MEDICINE

## 2018-10-17 ENCOUNTER — COMMUNICATION - HEALTHEAST (OUTPATIENT)
Dept: CARDIOLOGY | Facility: CLINIC | Age: 73
End: 2018-10-17

## 2018-10-17 DIAGNOSIS — Z12.31 VISIT FOR SCREENING MAMMOGRAM: ICD-10-CM

## 2018-10-17 DIAGNOSIS — I42.9 CARDIOMYOPATHY (H): ICD-10-CM

## 2018-10-17 DIAGNOSIS — I10 ESSENTIAL HYPERTENSION, BENIGN: ICD-10-CM

## 2018-11-06 ENCOUNTER — OFFICE VISIT - HEALTHEAST (OUTPATIENT)
Dept: SURGERY | Facility: CLINIC | Age: 73
End: 2018-11-06

## 2018-11-06 DIAGNOSIS — I10 ESSENTIAL HYPERTENSION: ICD-10-CM

## 2018-11-06 DIAGNOSIS — E66.9 OBESITY (BMI 30-39.9): ICD-10-CM

## 2018-11-06 ASSESSMENT — MIFFLIN-ST. JEOR: SCORE: 1314.86

## 2018-11-19 ENCOUNTER — OFFICE VISIT - HEALTHEAST (OUTPATIENT)
Dept: FAMILY MEDICINE | Facility: CLINIC | Age: 73
End: 2018-11-19

## 2018-11-19 DIAGNOSIS — J06.9 UPPER RESPIRATORY TRACT INFECTION, UNSPECIFIED TYPE: ICD-10-CM

## 2018-11-19 DIAGNOSIS — N39.0 URINARY TRACT INFECTION: ICD-10-CM

## 2018-11-19 LAB
ALBUMIN UR-MCNC: NEGATIVE MG/DL
APPEARANCE UR: CLEAR
BACTERIA #/AREA URNS HPF: ABNORMAL HPF
BILIRUB UR QL STRIP: ABNORMAL
CAOX CRY #/AREA URNS HPF: PRESENT /[HPF]
COLOR UR AUTO: YELLOW
GLUCOSE UR STRIP-MCNC: NEGATIVE MG/DL
HGB UR QL STRIP: ABNORMAL
KETONES UR STRIP-MCNC: ABNORMAL MG/DL
LEUKOCYTE ESTERASE UR QL STRIP: NEGATIVE
NITRATE UR QL: NEGATIVE
PH UR STRIP: 5.5 [PH] (ref 5–8)
RBC #/AREA URNS AUTO: ABNORMAL HPF
SP GR UR STRIP: >=1.03 (ref 1–1.03)
SQUAMOUS #/AREA URNS AUTO: ABNORMAL LPF
UROBILINOGEN UR STRIP-ACNC: ABNORMAL
WBC #/AREA URNS AUTO: ABNORMAL HPF

## 2018-11-24 ENCOUNTER — COMMUNICATION - HEALTHEAST (OUTPATIENT)
Dept: FAMILY MEDICINE | Facility: CLINIC | Age: 73
End: 2018-11-24

## 2018-11-24 DIAGNOSIS — J06.9 UPPER RESPIRATORY TRACT INFECTION, UNSPECIFIED TYPE: ICD-10-CM

## 2018-12-03 ENCOUNTER — COMMUNICATION - HEALTHEAST (OUTPATIENT)
Dept: FAMILY MEDICINE | Facility: CLINIC | Age: 73
End: 2018-12-03

## 2018-12-03 DIAGNOSIS — K21.9 ESOPHAGEAL REFLUX: ICD-10-CM

## 2018-12-10 ENCOUNTER — OFFICE VISIT - HEALTHEAST (OUTPATIENT)
Dept: FAMILY MEDICINE | Facility: CLINIC | Age: 73
End: 2018-12-10

## 2018-12-10 DIAGNOSIS — J01.00 ACUTE NON-RECURRENT MAXILLARY SINUSITIS: ICD-10-CM

## 2018-12-10 DIAGNOSIS — Z23 INFLUENZA VACCINE NEEDED: ICD-10-CM

## 2018-12-10 ASSESSMENT — MIFFLIN-ST. JEOR: SCORE: 1311.23

## 2019-01-04 ENCOUNTER — OFFICE VISIT - HEALTHEAST (OUTPATIENT)
Dept: FAMILY MEDICINE | Facility: CLINIC | Age: 74
End: 2019-01-04

## 2019-01-04 DIAGNOSIS — N95.2 ATROPHIC VAGINITIS: ICD-10-CM

## 2019-01-04 DIAGNOSIS — M89.9 DISORDER OF BONE AND CARTILAGE: ICD-10-CM

## 2019-01-04 DIAGNOSIS — I10 ESSENTIAL HYPERTENSION: ICD-10-CM

## 2019-01-04 DIAGNOSIS — M79.7 FIBROMYALGIA: ICD-10-CM

## 2019-01-04 DIAGNOSIS — Z00.00 MEDICARE ANNUAL WELLNESS VISIT, SUBSEQUENT: ICD-10-CM

## 2019-01-04 DIAGNOSIS — I42.9 SECONDARY CARDIOMYOPATHY (H): ICD-10-CM

## 2019-01-04 DIAGNOSIS — R53.83 FATIGUE, UNSPECIFIED TYPE: ICD-10-CM

## 2019-01-04 DIAGNOSIS — J20.9 ACUTE BRONCHITIS: ICD-10-CM

## 2019-01-04 DIAGNOSIS — F34.1 DYSTHYMIC DISORDER: ICD-10-CM

## 2019-01-04 DIAGNOSIS — M94.9 DISORDER OF BONE AND CARTILAGE: ICD-10-CM

## 2019-01-04 LAB
ANION GAP SERPL CALCULATED.3IONS-SCNC: 12 MMOL/L (ref 5–18)
BUN SERPL-MCNC: 16 MG/DL (ref 8–28)
CALCIUM SERPL-MCNC: 9.6 MG/DL (ref 8.5–10.5)
CHLORIDE BLD-SCNC: 108 MMOL/L (ref 98–107)
CO2 SERPL-SCNC: 23 MMOL/L (ref 22–31)
CREAT SERPL-MCNC: 0.81 MG/DL (ref 0.6–1.1)
GFR SERPL CREATININE-BSD FRML MDRD: >60 ML/MIN/1.73M2
GLUCOSE BLD-MCNC: 97 MG/DL (ref 70–125)
POTASSIUM BLD-SCNC: 4.1 MMOL/L (ref 3.5–5)
SODIUM SERPL-SCNC: 143 MMOL/L (ref 136–145)
TSH SERPL DL<=0.005 MIU/L-ACNC: 0.92 UIU/ML (ref 0.3–5)

## 2019-01-04 ASSESSMENT — MIFFLIN-ST. JEOR: SCORE: 1307.83

## 2019-01-05 LAB — 25(OH)D3 SERPL-MCNC: 30.4 NG/ML (ref 30–80)

## 2019-01-07 ENCOUNTER — COMMUNICATION - HEALTHEAST (OUTPATIENT)
Dept: FAMILY MEDICINE | Facility: CLINIC | Age: 74
End: 2019-01-07

## 2019-02-12 ENCOUNTER — OFFICE VISIT - HEALTHEAST (OUTPATIENT)
Dept: SURGERY | Facility: CLINIC | Age: 74
End: 2019-02-12

## 2019-02-12 DIAGNOSIS — E66.9 OBESITY (BMI 30-39.9): ICD-10-CM

## 2019-02-12 DIAGNOSIS — G43.909 MIGRAINE WITHOUT STATUS MIGRAINOSUS, NOT INTRACTABLE, UNSPECIFIED MIGRAINE TYPE: ICD-10-CM

## 2019-02-12 ASSESSMENT — MIFFLIN-ST. JEOR: SCORE: 1287.87

## 2019-02-18 ENCOUNTER — COMMUNICATION - HEALTHEAST (OUTPATIENT)
Dept: FAMILY MEDICINE | Facility: CLINIC | Age: 74
End: 2019-02-18

## 2019-02-26 ENCOUNTER — OFFICE VISIT - HEALTHEAST (OUTPATIENT)
Dept: FAMILY MEDICINE | Facility: CLINIC | Age: 74
End: 2019-02-26

## 2019-02-26 DIAGNOSIS — R05.9 COUGH: ICD-10-CM

## 2019-02-26 DIAGNOSIS — H93.13 TINNITUS OF BOTH EARS: ICD-10-CM

## 2019-02-26 ASSESSMENT — MIFFLIN-ST. JEOR: SCORE: 1287.87

## 2019-02-27 ENCOUNTER — COMMUNICATION - HEALTHEAST (OUTPATIENT)
Dept: FAMILY MEDICINE | Facility: CLINIC | Age: 74
End: 2019-02-27

## 2019-02-27 DIAGNOSIS — N95.2 ATROPHIC VAGINITIS: ICD-10-CM

## 2019-03-26 ENCOUNTER — OFFICE VISIT - HEALTHEAST (OUTPATIENT)
Dept: AUDIOLOGY | Facility: CLINIC | Age: 74
End: 2019-03-26

## 2019-03-26 ENCOUNTER — OFFICE VISIT - HEALTHEAST (OUTPATIENT)
Dept: OTOLARYNGOLOGY | Facility: CLINIC | Age: 74
End: 2019-03-26

## 2019-03-26 DIAGNOSIS — H90.3 SENSORINEURAL HEARING LOSS, BILATERAL: ICD-10-CM

## 2019-03-26 DIAGNOSIS — H93.13 TINNITUS, BILATERAL: ICD-10-CM

## 2019-03-26 DIAGNOSIS — H93.13 TINNITUS OF BOTH EARS: ICD-10-CM

## 2019-03-26 DIAGNOSIS — H90.3 SENSORINEURAL HEARING LOSS (SNHL) OF BOTH EARS: ICD-10-CM

## 2019-03-27 ENCOUNTER — COMMUNICATION - HEALTHEAST (OUTPATIENT)
Dept: FAMILY MEDICINE | Facility: CLINIC | Age: 74
End: 2019-03-27

## 2019-03-27 DIAGNOSIS — R05.9 COUGH: ICD-10-CM

## 2019-04-11 ENCOUNTER — RECORDS - HEALTHEAST (OUTPATIENT)
Dept: ADMINISTRATIVE | Facility: OTHER | Age: 74
End: 2019-04-11

## 2019-04-29 ENCOUNTER — RECORDS - HEALTHEAST (OUTPATIENT)
Dept: ADMINISTRATIVE | Facility: OTHER | Age: 74
End: 2019-04-29

## 2019-05-01 ENCOUNTER — COMMUNICATION - HEALTHEAST (OUTPATIENT)
Dept: FAMILY MEDICINE | Facility: CLINIC | Age: 74
End: 2019-05-01

## 2019-05-01 DIAGNOSIS — I10 ESSENTIAL HYPERTENSION: ICD-10-CM

## 2019-05-02 ENCOUNTER — COMMUNICATION - HEALTHEAST (OUTPATIENT)
Dept: FAMILY MEDICINE | Facility: CLINIC | Age: 74
End: 2019-05-02

## 2019-05-02 DIAGNOSIS — I10 ESSENTIAL HYPERTENSION: ICD-10-CM

## 2019-05-02 DIAGNOSIS — M43.10 SPONDYLOLISTHESIS: ICD-10-CM

## 2019-05-02 DIAGNOSIS — G43.909 MIGRAINE: ICD-10-CM

## 2019-06-02 ENCOUNTER — COMMUNICATION - HEALTHEAST (OUTPATIENT)
Dept: FAMILY MEDICINE | Facility: CLINIC | Age: 74
End: 2019-06-02

## 2019-06-02 DIAGNOSIS — I42.9 CARDIOMYOPATHY (H): ICD-10-CM

## 2019-06-02 DIAGNOSIS — E66.9 OBESITY (BMI 30-39.9): ICD-10-CM

## 2019-06-02 DIAGNOSIS — G43.909 MIGRAINE HEADACHE: ICD-10-CM

## 2019-06-02 DIAGNOSIS — I10 ESSENTIAL HYPERTENSION, BENIGN: ICD-10-CM

## 2019-07-13 ENCOUNTER — OFFICE VISIT - HEALTHEAST (OUTPATIENT)
Dept: FAMILY MEDICINE | Facility: CLINIC | Age: 74
End: 2019-07-13

## 2019-07-13 DIAGNOSIS — H11.31 NON-TRAUMATIC SUBCONJUNCTIVAL HEMORRHAGE OF RIGHT EYE: ICD-10-CM

## 2019-07-15 ENCOUNTER — OFFICE VISIT - HEALTHEAST (OUTPATIENT)
Dept: FAMILY MEDICINE | Facility: CLINIC | Age: 74
End: 2019-07-15

## 2019-07-15 DIAGNOSIS — I10 ESSENTIAL HYPERTENSION: ICD-10-CM

## 2019-07-15 DIAGNOSIS — G43.909 MIGRAINE: ICD-10-CM

## 2019-07-15 ASSESSMENT — MIFFLIN-ST. JEOR: SCORE: 1301.48

## 2019-07-16 ENCOUNTER — AMBULATORY - HEALTHEAST (OUTPATIENT)
Dept: FAMILY MEDICINE | Facility: CLINIC | Age: 74
End: 2019-07-16

## 2019-08-02 ENCOUNTER — COMMUNICATION - HEALTHEAST (OUTPATIENT)
Dept: FAMILY MEDICINE | Facility: CLINIC | Age: 74
End: 2019-08-02

## 2019-08-02 DIAGNOSIS — M43.10 SPONDYLOLISTHESIS: ICD-10-CM

## 2019-08-06 ENCOUNTER — OFFICE VISIT - HEALTHEAST (OUTPATIENT)
Dept: SURGERY | Facility: CLINIC | Age: 74
End: 2019-08-06

## 2019-08-06 DIAGNOSIS — I10 ESSENTIAL HYPERTENSION: ICD-10-CM

## 2019-08-06 DIAGNOSIS — E66.9 OBESITY (BMI 30-39.9): ICD-10-CM

## 2019-08-06 ASSESSMENT — MIFFLIN-ST. JEOR: SCORE: 1291.95

## 2019-09-19 ENCOUNTER — COMMUNICATION - HEALTHEAST (OUTPATIENT)
Dept: FAMILY MEDICINE | Facility: CLINIC | Age: 74
End: 2019-09-19

## 2019-09-19 DIAGNOSIS — N95.2 ATROPHIC VAGINITIS: ICD-10-CM

## 2019-09-26 ENCOUNTER — OFFICE VISIT - HEALTHEAST (OUTPATIENT)
Dept: FAMILY MEDICINE | Facility: CLINIC | Age: 74
End: 2019-09-26

## 2019-09-26 DIAGNOSIS — G43.909 MIGRAINE: ICD-10-CM

## 2019-09-26 DIAGNOSIS — M54.2 NECK PAIN ON RIGHT SIDE: ICD-10-CM

## 2019-09-26 DIAGNOSIS — Z23 NEEDS FLU SHOT: ICD-10-CM

## 2019-09-26 ASSESSMENT — MIFFLIN-ST. JEOR: SCORE: 1301.48

## 2019-10-03 ENCOUNTER — OFFICE VISIT - HEALTHEAST (OUTPATIENT)
Dept: PHYSICAL MEDICINE AND REHAB | Facility: REHABILITATION | Age: 74
End: 2019-10-03

## 2019-10-03 DIAGNOSIS — M79.18 CERVICAL MYOFASCIAL PAIN SYNDROME: ICD-10-CM

## 2019-10-03 DIAGNOSIS — R29.898 WEAKNESS OF RIGHT UPPER EXTREMITY: ICD-10-CM

## 2019-10-03 DIAGNOSIS — M48.02 FORAMINAL STENOSIS OF CERVICAL REGION: ICD-10-CM

## 2019-10-03 DIAGNOSIS — M54.12 RIGHT CERVICAL RADICULOPATHY: ICD-10-CM

## 2019-10-04 ENCOUNTER — OFFICE VISIT - HEALTHEAST (OUTPATIENT)
Dept: CARDIOLOGY | Facility: CLINIC | Age: 74
End: 2019-10-04

## 2019-10-04 DIAGNOSIS — I42.9 SECONDARY CARDIOMYOPATHY (H): ICD-10-CM

## 2019-10-04 ASSESSMENT — MIFFLIN-ST. JEOR: SCORE: 1317.23

## 2019-10-07 ENCOUNTER — COMMUNICATION - HEALTHEAST (OUTPATIENT)
Dept: FAMILY MEDICINE | Facility: CLINIC | Age: 74
End: 2019-10-07

## 2019-10-11 ENCOUNTER — OFFICE VISIT - HEALTHEAST (OUTPATIENT)
Dept: FAMILY MEDICINE | Facility: CLINIC | Age: 74
End: 2019-10-11

## 2019-10-11 DIAGNOSIS — Z12.31 VISIT FOR SCREENING MAMMOGRAM: ICD-10-CM

## 2019-10-11 DIAGNOSIS — I10 ESSENTIAL HYPERTENSION: ICD-10-CM

## 2019-10-11 DIAGNOSIS — E78.5 HYPERLIPIDEMIA, UNSPECIFIED HYPERLIPIDEMIA TYPE: ICD-10-CM

## 2019-10-11 LAB
ANION GAP SERPL CALCULATED.3IONS-SCNC: 6 MMOL/L (ref 5–18)
BUN SERPL-MCNC: 18 MG/DL (ref 8–28)
CALCIUM SERPL-MCNC: 9.2 MG/DL (ref 8.5–10.5)
CHLORIDE BLD-SCNC: 107 MMOL/L (ref 98–107)
CHOLEST SERPL-MCNC: 169 MG/DL
CO2 SERPL-SCNC: 29 MMOL/L (ref 22–31)
CREAT SERPL-MCNC: 0.8 MG/DL (ref 0.6–1.1)
FASTING STATUS PATIENT QL REPORTED: NORMAL
GFR SERPL CREATININE-BSD FRML MDRD: >60 ML/MIN/1.73M2
GLUCOSE BLD-MCNC: 93 MG/DL (ref 70–125)
HDLC SERPL-MCNC: 63 MG/DL
LDLC SERPL CALC-MCNC: 86 MG/DL
POTASSIUM BLD-SCNC: 3.7 MMOL/L (ref 3.5–5)
SODIUM SERPL-SCNC: 142 MMOL/L (ref 136–145)
TRIGL SERPL-MCNC: 98 MG/DL

## 2019-10-11 ASSESSMENT — MIFFLIN-ST. JEOR: SCORE: 1298.36

## 2019-10-15 ENCOUNTER — HOSPITAL ENCOUNTER (OUTPATIENT)
Dept: CARDIOLOGY | Facility: CLINIC | Age: 74
Discharge: HOME | End: 2019-10-15
Attending: INTERNAL MEDICINE

## 2019-10-15 ENCOUNTER — HOSPITAL ENCOUNTER (OUTPATIENT)
Dept: MRI IMAGING | Facility: CLINIC | Age: 74
Discharge: HOME | End: 2019-10-15
Attending: INTERNAL MEDICINE

## 2019-10-15 DIAGNOSIS — M54.12 RIGHT CERVICAL RADICULOPATHY: ICD-10-CM

## 2019-10-15 DIAGNOSIS — R29.898 WEAKNESS OF RIGHT UPPER EXTREMITY: ICD-10-CM

## 2019-10-15 DIAGNOSIS — M48.02 FORAMINAL STENOSIS OF CERVICAL REGION: ICD-10-CM

## 2019-10-15 LAB
AORTIC ROOT: 2.9 CM
BSA FOR ECHO PROCEDURE: 1.92 M2
CV BLOOD PRESSURE: ABNORMAL %
CV ECHO HEIGHT: 62 IN
CV ECHO WEIGHT: 186 LBS
DOP CALC LVOT AREA: 2.54 CM2
DOP CALC LVOT DIAMETER: 1.8 CM
DOP CALC LVOT PEAK VEL: 85.7 CM/S
DOP CALC LVOT STROKE VOLUME: 37.6 CM3
DOP CALCLVOT PEAK VEL VTI: 14.8 CM
ECHO EJECTION FRACTION ESTIMATED: 50 %
EJECTION FRACTION: 63 % (ref 55–75)
FRACTIONAL SHORTENING: 38.6 % (ref 28–44)
INTERVENTRICULAR SEPTUM IN END DIASTOLE: 0.81 CM (ref 0.6–0.9)
IVS/PW RATIO: 0.8
LA AREA 1: 19 CM2
LEFT ATRIUM LENGTH: 5.31 CM
LEFT ATRIUM SIZE: 4.2 CM
LEFT ATRIUM TO AORTIC ROOT RATIO: 1.45 NO UNITS
LEFT VENTRICLE CARDIAC INDEX: 1.7 L/MIN/M2
LEFT VENTRICLE CARDIAC OUTPUT: 3.2 L/MIN
LEFT VENTRICLE DIASTOLIC VOLUME INDEX: 38 CM3/M2 (ref 29–61)
LEFT VENTRICLE DIASTOLIC VOLUME: 73 CM3 (ref 46–106)
LEFT VENTRICLE HEART RATE: 86 BPM
LEFT VENTRICLE MASS INDEX: 80.5 G/M2
LEFT VENTRICLE SYSTOLIC VOLUME INDEX: 14.1 CM3/M2 (ref 8–24)
LEFT VENTRICLE SYSTOLIC VOLUME: 27 CM3 (ref 14–42)
LEFT VENTRICULAR INTERNAL DIMENSION IN DIASTOLE: 5.02 CM (ref 3.8–5.2)
LEFT VENTRICULAR INTERNAL DIMENSION IN SYSTOLE: 3.08 CM (ref 2.2–3.5)
LEFT VENTRICULAR MASS: 154.7 G
LEFT VENTRICULAR OUTFLOW TRACT MEAN GRADIENT: 2 MMHG
LEFT VENTRICULAR OUTFLOW TRACT MEAN VELOCITY: 60.3 CM/S
LEFT VENTRICULAR OUTFLOW TRACT PEAK GRADIENT: 3 MMHG
LEFT VENTRICULAR POSTERIOR WALL IN END DIASTOLE: 0.95 CM (ref 0.6–0.9)
LV STROKE VOLUME INDEX: 19.6 ML/M2
MITRAL VALVE E/A RATIO: 0.6
MV AVERAGE E/E' RATIO: 11.6 CM/S
MV DECELERATION TIME: 278 MS
MV E'TISSUE VEL-LAT: 5.75 CM/S
MV E'TISSUE VEL-MED: 4.09 CM/S
MV LATERAL E/E' RATIO: 10
MV MEDIAL E/E' RATIO: 14
MV PEAK A VELOCITY: 101 CM/S
MV PEAK E VELOCITY: 57.3 CM/S
NUC REST DIASTOLIC VOLUME INDEX: 2981 LBS
NUC REST SYSTOLIC VOLUME INDEX: 62 IN
TRICUSPID REGURGITATION PEAK PRESSURE GRADIENT: 26.6 MMHG
TRICUSPID VALVE ANULAR PLANE SYSTOLIC EXCURSION: 2.4 CM
TRICUSPID VALVE PEAK REGURGITANT VELOCITY: 258 CM/S

## 2019-10-15 ASSESSMENT — MIFFLIN-ST. JEOR: SCORE: 1298.36

## 2019-10-16 ENCOUNTER — AMBULATORY - HEALTHEAST (OUTPATIENT)
Dept: PHYSICAL MEDICINE AND REHAB | Facility: CLINIC | Age: 74
End: 2019-10-16

## 2019-10-16 ENCOUNTER — COMMUNICATION - HEALTHEAST (OUTPATIENT)
Dept: PHYSICAL MEDICINE AND REHAB | Facility: CLINIC | Age: 74
End: 2019-10-16

## 2019-10-16 DIAGNOSIS — R29.898 WEAKNESS OF RIGHT UPPER EXTREMITY: ICD-10-CM

## 2019-10-16 DIAGNOSIS — M54.12 RIGHT CERVICAL RADICULOPATHY: ICD-10-CM

## 2019-10-16 DIAGNOSIS — M48.02 FORAMINAL STENOSIS OF CERVICAL REGION: ICD-10-CM

## 2019-10-28 ENCOUNTER — COMMUNICATION - HEALTHEAST (OUTPATIENT)
Dept: FAMILY MEDICINE | Facility: CLINIC | Age: 74
End: 2019-10-28

## 2019-10-28 DIAGNOSIS — F34.1 DYSTHYMIC DISORDER: ICD-10-CM

## 2019-11-21 ENCOUNTER — RECORDS - HEALTHEAST (OUTPATIENT)
Dept: GENERAL RADIOLOGY | Facility: CLINIC | Age: 74
End: 2019-11-21

## 2019-11-21 ENCOUNTER — OFFICE VISIT - HEALTHEAST (OUTPATIENT)
Dept: PHYSICAL MEDICINE AND REHAB | Facility: REHABILITATION | Age: 74
End: 2019-11-21

## 2019-11-21 DIAGNOSIS — G89.29 OTHER CHRONIC PAIN: ICD-10-CM

## 2019-11-21 DIAGNOSIS — M79.18 CERVICAL MYOFASCIAL PAIN SYNDROME: ICD-10-CM

## 2019-11-21 DIAGNOSIS — M25.511 PAIN IN RIGHT SHOULDER: ICD-10-CM

## 2019-11-21 DIAGNOSIS — G89.29 CHRONIC PAIN OF BOTH SHOULDERS: ICD-10-CM

## 2019-11-21 DIAGNOSIS — M54.12 CERVICAL RADICULOPATHY: ICD-10-CM

## 2019-11-21 DIAGNOSIS — M48.02 FORAMINAL STENOSIS OF CERVICAL REGION: ICD-10-CM

## 2019-11-21 DIAGNOSIS — M25.511 CHRONIC PAIN OF BOTH SHOULDERS: ICD-10-CM

## 2019-11-21 DIAGNOSIS — M25.512 CHRONIC PAIN OF BOTH SHOULDERS: ICD-10-CM

## 2019-11-21 DIAGNOSIS — M25.512 PAIN IN LEFT SHOULDER: ICD-10-CM

## 2019-11-21 DIAGNOSIS — R29.898 BILATERAL ARM WEAKNESS: ICD-10-CM

## 2019-11-22 ENCOUNTER — COMMUNICATION - HEALTHEAST (OUTPATIENT)
Dept: PHYSICAL MEDICINE AND REHAB | Facility: CLINIC | Age: 74
End: 2019-11-22

## 2019-12-02 ENCOUNTER — COMMUNICATION - HEALTHEAST (OUTPATIENT)
Dept: FAMILY MEDICINE | Facility: CLINIC | Age: 74
End: 2019-12-02

## 2019-12-02 DIAGNOSIS — I42.9 CARDIOMYOPATHY (H): ICD-10-CM

## 2019-12-02 DIAGNOSIS — I10 ESSENTIAL HYPERTENSION, BENIGN: ICD-10-CM

## 2019-12-05 ENCOUNTER — HOSPITAL ENCOUNTER (OUTPATIENT)
Dept: MAMMOGRAPHY | Facility: CLINIC | Age: 74
Discharge: HOME | End: 2019-12-05
Attending: NURSE PRACTITIONER

## 2019-12-05 DIAGNOSIS — Z12.31 VISIT FOR SCREENING MAMMOGRAM: ICD-10-CM

## 2019-12-06 ENCOUNTER — COMMUNICATION - HEALTHEAST (OUTPATIENT)
Dept: FAMILY MEDICINE | Facility: CLINIC | Age: 74
End: 2019-12-06

## 2019-12-09 ENCOUNTER — RECORDS - HEALTHEAST (OUTPATIENT)
Dept: ADMINISTRATIVE | Facility: OTHER | Age: 74
End: 2019-12-09

## 2019-12-11 ENCOUNTER — HOSPITAL ENCOUNTER (OUTPATIENT)
Dept: PHYSICAL MEDICINE AND REHAB | Facility: CLINIC | Age: 74
Discharge: HOME | End: 2019-12-11
Attending: PHYSICAL MEDICINE & REHABILITATION

## 2019-12-11 ENCOUNTER — HOSPITAL ENCOUNTER (OUTPATIENT)
Dept: PHYSICAL MEDICINE AND REHAB | Facility: CLINIC | Age: 74
Discharge: HOME | End: 2019-12-11
Attending: PAIN MEDICINE

## 2019-12-11 DIAGNOSIS — R29.898 BILATERAL ARM WEAKNESS: ICD-10-CM

## 2019-12-11 DIAGNOSIS — M54.12 RIGHT CERVICAL RADICULOPATHY: ICD-10-CM

## 2019-12-11 DIAGNOSIS — M48.02 FORAMINAL STENOSIS OF CERVICAL REGION: ICD-10-CM

## 2019-12-11 DIAGNOSIS — R29.898 WEAKNESS OF RIGHT UPPER EXTREMITY: ICD-10-CM

## 2019-12-11 DIAGNOSIS — M54.12 CERVICAL RADICULOPATHY: ICD-10-CM

## 2019-12-11 DIAGNOSIS — M79.18 CERVICAL MYOFASCIAL PAIN SYNDROME: ICD-10-CM

## 2019-12-11 ASSESSMENT — MIFFLIN-ST. JEOR: SCORE: 1315.08

## 2019-12-12 ENCOUNTER — COMMUNICATION - HEALTHEAST (OUTPATIENT)
Dept: PHYSICAL MEDICINE AND REHAB | Facility: CLINIC | Age: 74
End: 2019-12-12

## 2020-01-01 ENCOUNTER — COMMUNICATION - HEALTHEAST (OUTPATIENT)
Dept: FAMILY MEDICINE | Facility: CLINIC | Age: 75
End: 2020-01-01

## 2020-01-01 DIAGNOSIS — K21.9 ESOPHAGEAL REFLUX: ICD-10-CM

## 2020-01-02 ENCOUNTER — OFFICE VISIT - HEALTHEAST (OUTPATIENT)
Dept: PHYSICAL MEDICINE AND REHAB | Facility: REHABILITATION | Age: 75
End: 2020-01-02

## 2020-01-02 DIAGNOSIS — M54.12 CERVICAL RADICULOPATHY: ICD-10-CM

## 2020-01-02 DIAGNOSIS — R29.898 BILATERAL ARM WEAKNESS: ICD-10-CM

## 2020-01-02 DIAGNOSIS — M79.18 CERVICAL MYOFASCIAL PAIN SYNDROME: ICD-10-CM

## 2020-01-02 DIAGNOSIS — M48.02 FORAMINAL STENOSIS OF CERVICAL REGION: ICD-10-CM

## 2020-01-02 ASSESSMENT — MIFFLIN-ST. JEOR: SCORE: 1315.08

## 2020-01-27 ENCOUNTER — OFFICE VISIT - HEALTHEAST (OUTPATIENT)
Dept: FAMILY MEDICINE | Facility: CLINIC | Age: 75
End: 2020-01-27

## 2020-01-27 ENCOUNTER — AMBULATORY - HEALTHEAST (OUTPATIENT)
Dept: FAMILY MEDICINE | Facility: CLINIC | Age: 75
End: 2020-01-27

## 2020-01-27 DIAGNOSIS — F34.1 DYSTHYMIC DISORDER: ICD-10-CM

## 2020-01-27 DIAGNOSIS — Z76.0 ENCOUNTER FOR MEDICATION REFILL: ICD-10-CM

## 2020-01-27 RX ORDER — ALBUTEROL SULFATE 90 UG/1
2 AEROSOL, METERED RESPIRATORY (INHALATION) EVERY 6 HOURS PRN
Qty: 18 G | Refills: 1 | Status: SHIPPED | OUTPATIENT
Start: 2020-01-27 | End: 2021-11-18

## 2020-01-27 ASSESSMENT — ANXIETY QUESTIONNAIRES
IF YOU CHECKED OFF ANY PROBLEMS ON THIS QUESTIONNAIRE, HOW DIFFICULT HAVE THESE PROBLEMS MADE IT FOR YOU TO DO YOUR WORK, TAKE CARE OF THINGS AT HOME, OR GET ALONG WITH OTHER PEOPLE: SOMEWHAT DIFFICULT
4. TROUBLE RELAXING: NOT AT ALL
6. BECOMING EASILY ANNOYED OR IRRITABLE: SEVERAL DAYS
GAD7 TOTAL SCORE: 6
7. FEELING AFRAID AS IF SOMETHING AWFUL MIGHT HAPPEN: SEVERAL DAYS
5. BEING SO RESTLESS THAT IT IS HARD TO SIT STILL: NOT AT ALL
2. NOT BEING ABLE TO STOP OR CONTROL WORRYING: SEVERAL DAYS
3. WORRYING TOO MUCH ABOUT DIFFERENT THINGS: SEVERAL DAYS
1. FEELING NERVOUS, ANXIOUS, OR ON EDGE: MORE THAN HALF THE DAYS

## 2020-01-27 ASSESSMENT — PATIENT HEALTH QUESTIONNAIRE - PHQ9: SUM OF ALL RESPONSES TO PHQ QUESTIONS 1-9: 6

## 2020-01-27 ASSESSMENT — MIFFLIN-ST. JEOR: SCORE: 1307.15

## 2020-01-31 ENCOUNTER — COMMUNICATION - HEALTHEAST (OUTPATIENT)
Dept: SURGERY | Facility: CLINIC | Age: 75
End: 2020-01-31

## 2020-01-31 DIAGNOSIS — E66.9 OBESITY (BMI 30-39.9): ICD-10-CM

## 2020-02-11 ENCOUNTER — OFFICE VISIT - HEALTHEAST (OUTPATIENT)
Dept: SURGERY | Facility: CLINIC | Age: 75
End: 2020-02-11

## 2020-02-11 DIAGNOSIS — E66.9 OBESITY (BMI 30-39.9): ICD-10-CM

## 2020-02-11 DIAGNOSIS — G43.909 MIGRAINE WITHOUT STATUS MIGRAINOSUS, NOT INTRACTABLE, UNSPECIFIED MIGRAINE TYPE: ICD-10-CM

## 2020-02-11 DIAGNOSIS — I10 ESSENTIAL HYPERTENSION: ICD-10-CM

## 2020-02-11 ASSESSMENT — MIFFLIN-ST. JEOR: SCORE: 1287.87

## 2020-02-17 ENCOUNTER — COMMUNICATION - HEALTHEAST (OUTPATIENT)
Dept: SURGERY | Facility: CLINIC | Age: 75
End: 2020-02-17

## 2020-02-17 DIAGNOSIS — E66.9 OBESITY (BMI 30-39.9): ICD-10-CM

## 2020-05-16 ENCOUNTER — COMMUNICATION - HEALTHEAST (OUTPATIENT)
Dept: FAMILY MEDICINE | Facility: CLINIC | Age: 75
End: 2020-05-16

## 2020-05-16 DIAGNOSIS — F34.1 DYSTHYMIC DISORDER: ICD-10-CM

## 2020-05-29 ENCOUNTER — COMMUNICATION - HEALTHEAST (OUTPATIENT)
Dept: FAMILY MEDICINE | Facility: CLINIC | Age: 75
End: 2020-05-29

## 2020-05-29 DIAGNOSIS — I87.2 CHRONIC VENOUS INSUFFICIENCY: ICD-10-CM

## 2020-06-16 ENCOUNTER — OFFICE VISIT - HEALTHEAST (OUTPATIENT)
Dept: SURGERY | Facility: CLINIC | Age: 75
End: 2020-06-16

## 2020-06-16 DIAGNOSIS — E66.9 OBESITY (BMI 30-39.9): ICD-10-CM

## 2020-06-16 DIAGNOSIS — G43.909 MIGRAINE WITHOUT STATUS MIGRAINOSUS, NOT INTRACTABLE, UNSPECIFIED MIGRAINE TYPE: ICD-10-CM

## 2020-06-17 ENCOUNTER — COMMUNICATION - HEALTHEAST (OUTPATIENT)
Dept: FAMILY MEDICINE | Facility: CLINIC | Age: 75
End: 2020-06-17

## 2020-06-17 DIAGNOSIS — R05.9 COUGH: ICD-10-CM

## 2020-06-29 ENCOUNTER — COMMUNICATION - HEALTHEAST (OUTPATIENT)
Dept: FAMILY MEDICINE | Facility: CLINIC | Age: 75
End: 2020-06-29

## 2020-06-29 DIAGNOSIS — I10 ESSENTIAL HYPERTENSION: ICD-10-CM

## 2020-06-29 RX ORDER — METOPROLOL SUCCINATE 50 MG/1
TABLET, EXTENDED RELEASE ORAL
Qty: 90 TABLET | Refills: 2 | Status: SHIPPED | OUTPATIENT
Start: 2020-06-29 | End: 2021-08-03

## 2020-07-15 ENCOUNTER — COMMUNICATION - HEALTHEAST (OUTPATIENT)
Dept: FAMILY MEDICINE | Facility: CLINIC | Age: 75
End: 2020-07-15

## 2020-07-15 DIAGNOSIS — G43.909 MIGRAINE HEADACHE: ICD-10-CM

## 2020-07-15 RX ORDER — CEFUROXIME AXETIL 250 MG/1
TABLET ORAL
Qty: 2 SYRINGE | Refills: 4 | Status: SHIPPED | OUTPATIENT
Start: 2020-07-15 | End: 2021-11-23

## 2020-07-23 ENCOUNTER — OFFICE VISIT - HEALTHEAST (OUTPATIENT)
Dept: FAMILY MEDICINE | Facility: CLINIC | Age: 75
End: 2020-07-23

## 2020-07-23 DIAGNOSIS — M43.10 SPONDYLOLISTHESIS: ICD-10-CM

## 2020-07-23 DIAGNOSIS — I10 ESSENTIAL HYPERTENSION: ICD-10-CM

## 2020-07-23 DIAGNOSIS — G43.909 MIGRAINE WITHOUT STATUS MIGRAINOSUS, NOT INTRACTABLE, UNSPECIFIED MIGRAINE TYPE: ICD-10-CM

## 2020-07-23 DIAGNOSIS — Z13.6 SCREENING FOR CARDIOVASCULAR CONDITION: ICD-10-CM

## 2020-07-23 DIAGNOSIS — R53.83 FATIGUE, UNSPECIFIED TYPE: ICD-10-CM

## 2020-07-23 DIAGNOSIS — Z00.00 MEDICARE ANNUAL WELLNESS VISIT, SUBSEQUENT: ICD-10-CM

## 2020-07-23 DIAGNOSIS — M79.7 FIBROMYALGIA: ICD-10-CM

## 2020-07-23 LAB
ALBUMIN SERPL-MCNC: 3.7 G/DL (ref 3.5–5)
ALP SERPL-CCNC: 101 U/L (ref 45–120)
ALT SERPL W P-5'-P-CCNC: 15 U/L (ref 0–45)
ANION GAP SERPL CALCULATED.3IONS-SCNC: 10 MMOL/L (ref 5–18)
AST SERPL W P-5'-P-CCNC: 13 U/L (ref 0–40)
BILIRUB SERPL-MCNC: 0.5 MG/DL (ref 0–1)
BUN SERPL-MCNC: 20 MG/DL (ref 8–28)
CALCIUM SERPL-MCNC: 9 MG/DL (ref 8.5–10.5)
CHLORIDE BLD-SCNC: 106 MMOL/L (ref 98–107)
CHOLEST SERPL-MCNC: 156 MG/DL
CO2 SERPL-SCNC: 24 MMOL/L (ref 22–31)
CREAT SERPL-MCNC: 0.78 MG/DL (ref 0.6–1.1)
ERYTHROCYTE [DISTWIDTH] IN BLOOD BY AUTOMATED COUNT: 12.1 % (ref 11–14.5)
FASTING STATUS PATIENT QL REPORTED: YES
GFR SERPL CREATININE-BSD FRML MDRD: >60 ML/MIN/1.73M2
GLUCOSE BLD-MCNC: 98 MG/DL (ref 70–125)
HCT VFR BLD AUTO: 39.4 % (ref 35–47)
HDLC SERPL-MCNC: 59 MG/DL
HGB BLD-MCNC: 13.4 G/DL (ref 12–16)
LDLC SERPL CALC-MCNC: 82 MG/DL
MCH RBC QN AUTO: 30.5 PG (ref 27–34)
MCHC RBC AUTO-ENTMCNC: 34 G/DL (ref 32–36)
MCV RBC AUTO: 90 FL (ref 80–100)
PLATELET # BLD AUTO: 205 THOU/UL (ref 140–440)
PMV BLD AUTO: 8.5 FL (ref 7–10)
POTASSIUM BLD-SCNC: 3.5 MMOL/L (ref 3.5–5)
PROT SERPL-MCNC: 6.6 G/DL (ref 6–8)
RBC # BLD AUTO: 4.39 MILL/UL (ref 3.8–5.4)
SODIUM SERPL-SCNC: 140 MMOL/L (ref 136–145)
TRIGL SERPL-MCNC: 77 MG/DL
TSH SERPL DL<=0.005 MIU/L-ACNC: 0.93 UIU/ML (ref 0.3–5)
WBC: 7.3 THOU/UL (ref 4–11)

## 2020-07-23 RX ORDER — GABAPENTIN 300 MG/1
600 CAPSULE ORAL AT BEDTIME
Qty: 270 CAPSULE | Refills: 3 | Status: SHIPPED | OUTPATIENT
Start: 2020-07-23 | End: 2021-08-25

## 2020-07-23 ASSESSMENT — MIFFLIN-ST. JEOR: SCORE: 1301.76

## 2020-08-13 ENCOUNTER — COMMUNICATION - HEALTHEAST (OUTPATIENT)
Dept: FAMILY MEDICINE | Facility: CLINIC | Age: 75
End: 2020-08-13

## 2020-08-13 DIAGNOSIS — I87.2 CHRONIC VENOUS INSUFFICIENCY: ICD-10-CM

## 2020-08-13 RX ORDER — FUROSEMIDE 20 MG
20-40 TABLET ORAL
Qty: 60 TABLET | Refills: 11 | Status: SHIPPED | OUTPATIENT
Start: 2020-08-13 | End: 2021-08-25

## 2020-08-20 ENCOUNTER — COMMUNICATION - HEALTHEAST (OUTPATIENT)
Dept: FAMILY MEDICINE | Facility: CLINIC | Age: 75
End: 2020-08-20

## 2020-08-20 DIAGNOSIS — F34.1 DYSTHYMIC DISORDER: ICD-10-CM

## 2020-08-26 ENCOUNTER — COMMUNICATION - HEALTHEAST (OUTPATIENT)
Dept: FAMILY MEDICINE | Facility: CLINIC | Age: 75
End: 2020-08-26

## 2020-08-26 DIAGNOSIS — G43.909 MIGRAINE: ICD-10-CM

## 2020-09-09 ENCOUNTER — OFFICE VISIT - HEALTHEAST (OUTPATIENT)
Dept: SURGERY | Facility: CLINIC | Age: 75
End: 2020-09-09

## 2020-09-09 DIAGNOSIS — E66.9 OBESITY (BMI 30-39.9): ICD-10-CM

## 2020-09-09 DIAGNOSIS — G43.909 MIGRAINE WITHOUT STATUS MIGRAINOSUS, NOT INTRACTABLE, UNSPECIFIED MIGRAINE TYPE: ICD-10-CM

## 2020-09-09 NOTE — ASSESSMENT & PLAN NOTE
Patient is not sure if these decreased in frequency since starting the topamax. Her dose could be increased. She will discuss with her primary MD. Often these improve with weight loss.

## 2020-09-09 NOTE — ASSESSMENT & PLAN NOTE
Patient was congratulated on her success thus far. Healthy habits to assist with further weight loss were discussed. Written information was given. She will concentrate on getting adequate protein per meal. She will limit her nut intake to 3 TBSP per day. She will try to get out walking more. She will continue to take the topamax.

## 2020-09-10 ENCOUNTER — OFFICE VISIT - HEALTHEAST (OUTPATIENT)
Dept: FAMILY MEDICINE | Facility: CLINIC | Age: 75
End: 2020-09-10

## 2020-09-10 DIAGNOSIS — G43.909 MIGRAINE WITHOUT STATUS MIGRAINOSUS, NOT INTRACTABLE, UNSPECIFIED MIGRAINE TYPE: ICD-10-CM

## 2020-09-10 DIAGNOSIS — T14.8XXA BLISTER: ICD-10-CM

## 2020-09-10 DIAGNOSIS — Z23 NEEDS FLU SHOT: ICD-10-CM

## 2020-09-10 ASSESSMENT — ANXIETY QUESTIONNAIRES
1. FEELING NERVOUS, ANXIOUS, OR ON EDGE: SEVERAL DAYS
IF YOU CHECKED OFF ANY PROBLEMS ON THIS QUESTIONNAIRE, HOW DIFFICULT HAVE THESE PROBLEMS MADE IT FOR YOU TO DO YOUR WORK, TAKE CARE OF THINGS AT HOME, OR GET ALONG WITH OTHER PEOPLE: SOMEWHAT DIFFICULT
6. BECOMING EASILY ANNOYED OR IRRITABLE: NOT AT ALL
GAD7 TOTAL SCORE: 5
4. TROUBLE RELAXING: SEVERAL DAYS
2. NOT BEING ABLE TO STOP OR CONTROL WORRYING: NOT AT ALL
3. WORRYING TOO MUCH ABOUT DIFFERENT THINGS: SEVERAL DAYS
5. BEING SO RESTLESS THAT IT IS HARD TO SIT STILL: NOT AT ALL
7. FEELING AFRAID AS IF SOMETHING AWFUL MIGHT HAPPEN: MORE THAN HALF THE DAYS

## 2020-09-10 ASSESSMENT — PATIENT HEALTH QUESTIONNAIRE - PHQ9: SUM OF ALL RESPONSES TO PHQ QUESTIONS 1-9: 6

## 2020-09-10 ASSESSMENT — MIFFLIN-ST. JEOR: SCORE: 1287.87

## 2020-12-08 ENCOUNTER — OFFICE VISIT - HEALTHEAST (OUTPATIENT)
Dept: SURGERY | Facility: CLINIC | Age: 75
End: 2020-12-08

## 2020-12-08 DIAGNOSIS — E66.9 OBESITY (BMI 30-39.9): ICD-10-CM

## 2020-12-08 DIAGNOSIS — I10 ESSENTIAL HYPERTENSION: ICD-10-CM

## 2020-12-08 NOTE — ASSESSMENT & PLAN NOTE
Patient was congratulated on her success thus far. Healthy habits to assist with further weight loss were discussed. Written information was given. She will continue to work on getting adequate protein to help with hunger control She will continue to take the topamax.

## 2020-12-27 ENCOUNTER — COMMUNICATION - HEALTHEAST (OUTPATIENT)
Dept: FAMILY MEDICINE | Facility: CLINIC | Age: 75
End: 2020-12-27

## 2020-12-27 DIAGNOSIS — N95.2 ATROPHIC VAGINITIS: ICD-10-CM

## 2020-12-28 RX ORDER — TRIAMCINOLONE ACETONIDE 1 MG/G
OINTMENT TOPICAL
Qty: 30 G | Refills: 2 | Status: SHIPPED | OUTPATIENT
Start: 2020-12-28 | End: 2022-01-07

## 2020-12-30 ENCOUNTER — COMMUNICATION - HEALTHEAST (OUTPATIENT)
Dept: FAMILY MEDICINE | Facility: CLINIC | Age: 75
End: 2020-12-30

## 2020-12-30 DIAGNOSIS — I42.9 CARDIOMYOPATHY (H): ICD-10-CM

## 2020-12-30 DIAGNOSIS — I10 ESSENTIAL HYPERTENSION, BENIGN: ICD-10-CM

## 2021-01-20 ENCOUNTER — COMMUNICATION - HEALTHEAST (OUTPATIENT)
Dept: FAMILY MEDICINE | Facility: CLINIC | Age: 76
End: 2021-01-20

## 2021-01-28 ENCOUNTER — COMMUNICATION - HEALTHEAST (OUTPATIENT)
Dept: FAMILY MEDICINE | Facility: CLINIC | Age: 76
End: 2021-01-28

## 2021-02-02 ENCOUNTER — OFFICE VISIT - HEALTHEAST (OUTPATIENT)
Dept: CARDIOLOGY | Facility: CLINIC | Age: 76
End: 2021-02-02

## 2021-02-02 DIAGNOSIS — I10 ESSENTIAL HYPERTENSION, BENIGN: ICD-10-CM

## 2021-02-02 DIAGNOSIS — I42.9 CARDIOMYOPATHY (H): ICD-10-CM

## 2021-02-02 RX ORDER — LOSARTAN POTASSIUM 25 MG/1
25 TABLET ORAL DAILY
Qty: 90 TABLET | Refills: 2 | Status: SHIPPED | OUTPATIENT
Start: 2021-02-02 | End: 2022-02-07

## 2021-02-02 ASSESSMENT — MIFFLIN-ST. JEOR: SCORE: 1315.08

## 2021-02-17 ENCOUNTER — HOSPITAL ENCOUNTER (OUTPATIENT)
Dept: MAMMOGRAPHY | Facility: CLINIC | Age: 76
Discharge: HOME | End: 2021-02-17
Attending: NURSE PRACTITIONER

## 2021-02-17 DIAGNOSIS — Z12.31 VISIT FOR SCREENING MAMMOGRAM: ICD-10-CM

## 2021-02-22 ENCOUNTER — OFFICE VISIT - HEALTHEAST (OUTPATIENT)
Dept: FAMILY MEDICINE | Facility: CLINIC | Age: 76
End: 2021-02-22

## 2021-02-22 DIAGNOSIS — R53.82 CHRONIC FATIGUE: ICD-10-CM

## 2021-02-22 DIAGNOSIS — F34.1 DYSTHYMIC DISORDER: ICD-10-CM

## 2021-02-22 DIAGNOSIS — I10 ESSENTIAL HYPERTENSION: ICD-10-CM

## 2021-02-22 DIAGNOSIS — Z23 NEED FOR VACCINATION: ICD-10-CM

## 2021-02-22 DIAGNOSIS — I42.8 OTHER CARDIOMYOPATHY (H): ICD-10-CM

## 2021-02-22 DIAGNOSIS — M79.7 FIBROMYALGIA: ICD-10-CM

## 2021-02-22 DIAGNOSIS — G43.909 MIGRAINE WITHOUT STATUS MIGRAINOSUS, NOT INTRACTABLE, UNSPECIFIED MIGRAINE TYPE: ICD-10-CM

## 2021-02-22 LAB
ANION GAP SERPL CALCULATED.3IONS-SCNC: 5 MMOL/L (ref 5–18)
BUN SERPL-MCNC: 17 MG/DL (ref 8–28)
CALCIUM SERPL-MCNC: 9 MG/DL (ref 8.5–10.5)
CHLORIDE BLD-SCNC: 109 MMOL/L (ref 98–107)
CO2 SERPL-SCNC: 27 MMOL/L (ref 22–31)
CREAT SERPL-MCNC: 0.81 MG/DL (ref 0.6–1.1)
GFR SERPL CREATININE-BSD FRML MDRD: >60 ML/MIN/1.73M2
GLUCOSE BLD-MCNC: 94 MG/DL (ref 70–125)
POTASSIUM BLD-SCNC: 3.8 MMOL/L (ref 3.5–5)
SODIUM SERPL-SCNC: 141 MMOL/L (ref 136–145)

## 2021-02-22 ASSESSMENT — ANXIETY QUESTIONNAIRES
2. NOT BEING ABLE TO STOP OR CONTROL WORRYING: NEARLY EVERY DAY
GAD7 TOTAL SCORE: 13
4. TROUBLE RELAXING: SEVERAL DAYS
IF YOU CHECKED OFF ANY PROBLEMS ON THIS QUESTIONNAIRE, HOW DIFFICULT HAVE THESE PROBLEMS MADE IT FOR YOU TO DO YOUR WORK, TAKE CARE OF THINGS AT HOME, OR GET ALONG WITH OTHER PEOPLE: EXTREMELY DIFFICULT
5. BEING SO RESTLESS THAT IT IS HARD TO SIT STILL: NOT AT ALL
1. FEELING NERVOUS, ANXIOUS, OR ON EDGE: NEARLY EVERY DAY
7. FEELING AFRAID AS IF SOMETHING AWFUL MIGHT HAPPEN: NEARLY EVERY DAY
3. WORRYING TOO MUCH ABOUT DIFFERENT THINGS: NEARLY EVERY DAY
6. BECOMING EASILY ANNOYED OR IRRITABLE: NOT AT ALL

## 2021-02-22 ASSESSMENT — PATIENT HEALTH QUESTIONNAIRE - PHQ9: SUM OF ALL RESPONSES TO PHQ QUESTIONS 1-9: 9

## 2021-02-22 ASSESSMENT — MIFFLIN-ST. JEOR: SCORE: 1303.46

## 2021-02-23 ENCOUNTER — HOSPITAL ENCOUNTER (OUTPATIENT)
Dept: ULTRASOUND IMAGING | Facility: CLINIC | Age: 76
Discharge: HOME | End: 2021-02-23
Attending: NURSE PRACTITIONER

## 2021-02-23 DIAGNOSIS — N64.89 BREAST ASYMMETRY: ICD-10-CM

## 2021-02-23 LAB
25(OH)D3 SERPL-MCNC: 31.2 NG/ML (ref 30–80)
25(OH)D3 SERPL-MCNC: 31.2 NG/ML (ref 30–80)

## 2021-02-24 ENCOUNTER — COMMUNICATION - HEALTHEAST (OUTPATIENT)
Dept: FAMILY MEDICINE | Facility: CLINIC | Age: 76
End: 2021-02-24

## 2021-02-25 ENCOUNTER — COMMUNICATION - HEALTHEAST (OUTPATIENT)
Dept: SURGERY | Facility: CLINIC | Age: 76
End: 2021-02-25

## 2021-02-25 DIAGNOSIS — E66.9 OBESITY (BMI 30-39.9): ICD-10-CM

## 2021-03-01 ENCOUNTER — COMMUNICATION - HEALTHEAST (OUTPATIENT)
Dept: FAMILY MEDICINE | Facility: CLINIC | Age: 76
End: 2021-03-01

## 2021-03-01 DIAGNOSIS — K21.9 ESOPHAGEAL REFLUX: ICD-10-CM

## 2021-03-05 ENCOUNTER — COMMUNICATION - HEALTHEAST (OUTPATIENT)
Dept: SURGERY | Facility: CLINIC | Age: 76
End: 2021-03-05

## 2021-03-09 ENCOUNTER — AMBULATORY - HEALTHEAST (OUTPATIENT)
Dept: NURSING | Facility: CLINIC | Age: 76
End: 2021-03-09

## 2021-03-15 ENCOUNTER — OFFICE VISIT - HEALTHEAST (OUTPATIENT)
Dept: FAMILY MEDICINE | Facility: CLINIC | Age: 76
End: 2021-03-15

## 2021-03-15 DIAGNOSIS — G47.01 INSOMNIA DUE TO MEDICAL CONDITION: ICD-10-CM

## 2021-03-15 DIAGNOSIS — M79.7 FIBROMYALGIA: ICD-10-CM

## 2021-03-15 DIAGNOSIS — F41.9 ANXIETY: ICD-10-CM

## 2021-03-15 DIAGNOSIS — F33.1 MODERATE EPISODE OF RECURRENT MAJOR DEPRESSIVE DISORDER (H): ICD-10-CM

## 2021-03-15 ASSESSMENT — ANXIETY QUESTIONNAIRES
6. BECOMING EASILY ANNOYED OR IRRITABLE: SEVERAL DAYS
IF YOU CHECKED OFF ANY PROBLEMS ON THIS QUESTIONNAIRE, HOW DIFFICULT HAVE THESE PROBLEMS MADE IT FOR YOU TO DO YOUR WORK, TAKE CARE OF THINGS AT HOME, OR GET ALONG WITH OTHER PEOPLE: SOMEWHAT DIFFICULT
4. TROUBLE RELAXING: MORE THAN HALF THE DAYS
2. NOT BEING ABLE TO STOP OR CONTROL WORRYING: NEARLY EVERY DAY
1. FEELING NERVOUS, ANXIOUS, OR ON EDGE: MORE THAN HALF THE DAYS
7. FEELING AFRAID AS IF SOMETHING AWFUL MIGHT HAPPEN: SEVERAL DAYS
3. WORRYING TOO MUCH ABOUT DIFFERENT THINGS: NEARLY EVERY DAY
GAD7 TOTAL SCORE: 13
5. BEING SO RESTLESS THAT IT IS HARD TO SIT STILL: SEVERAL DAYS

## 2021-03-15 ASSESSMENT — PATIENT HEALTH QUESTIONNAIRE - PHQ9: SUM OF ALL RESPONSES TO PHQ QUESTIONS 1-9: 13

## 2021-03-17 ENCOUNTER — COMMUNICATION - HEALTHEAST (OUTPATIENT)
Dept: FAMILY MEDICINE | Facility: CLINIC | Age: 76
End: 2021-03-17

## 2021-03-17 DIAGNOSIS — F34.1 DYSTHYMIC DISORDER: ICD-10-CM

## 2021-03-17 DIAGNOSIS — M79.7 FIBROMYALGIA: ICD-10-CM

## 2021-03-30 ENCOUNTER — AMBULATORY - HEALTHEAST (OUTPATIENT)
Dept: NURSING | Facility: CLINIC | Age: 76
End: 2021-03-30

## 2021-03-30 ENCOUNTER — COMMUNICATION - HEALTHEAST (OUTPATIENT)
Dept: CARDIOLOGY | Facility: CLINIC | Age: 76
End: 2021-03-30

## 2021-03-30 ENCOUNTER — COMMUNICATION - HEALTHEAST (OUTPATIENT)
Dept: FAMILY MEDICINE | Facility: CLINIC | Age: 76
End: 2021-03-30

## 2021-03-30 DIAGNOSIS — F34.1 DYSTHYMIC DISORDER: ICD-10-CM

## 2021-03-31 RX ORDER — VENLAFAXINE HYDROCHLORIDE 75 MG/1
225 CAPSULE, EXTENDED RELEASE ORAL DAILY
Qty: 270 CAPSULE | Refills: 1 | Status: SHIPPED | OUTPATIENT
Start: 2021-03-31 | End: 2021-10-14

## 2021-04-06 ENCOUNTER — OFFICE VISIT - HEALTHEAST (OUTPATIENT)
Dept: SURGERY | Facility: CLINIC | Age: 76
End: 2021-04-06

## 2021-04-06 DIAGNOSIS — G43.909 MIGRAINE WITHOUT STATUS MIGRAINOSUS, NOT INTRACTABLE, UNSPECIFIED MIGRAINE TYPE: ICD-10-CM

## 2021-04-06 DIAGNOSIS — I10 ESSENTIAL HYPERTENSION: ICD-10-CM

## 2021-04-06 DIAGNOSIS — E66.9 OBESITY (BMI 30-39.9): ICD-10-CM

## 2021-04-06 RX ORDER — TOPIRAMATE 100 MG/1
TABLET, FILM COATED ORAL
Qty: 135 TABLET | Refills: 1 | Status: SHIPPED | OUTPATIENT
Start: 2021-04-06 | End: 2021-10-28

## 2021-04-06 ASSESSMENT — MIFFLIN-ST. JEOR: SCORE: 1287.87

## 2021-04-06 NOTE — ASSESSMENT & PLAN NOTE
Patient was congratulated on her success thus far. Healthy habits to assist with further weight loss were discussed. Written information was given. She will have her son show her some exercises to build her muscle. She declined a referral to PT. She will continue to take the topamax and we will add 50 mg in the am.

## 2021-04-09 ENCOUNTER — COMMUNICATION - HEALTHEAST (OUTPATIENT)
Dept: FAMILY MEDICINE | Facility: CLINIC | Age: 76
End: 2021-04-09

## 2021-04-09 DIAGNOSIS — G47.01 INSOMNIA DUE TO MEDICAL CONDITION: ICD-10-CM

## 2021-04-09 DIAGNOSIS — M79.7 FIBROMYALGIA: ICD-10-CM

## 2021-04-22 ENCOUNTER — COMMUNICATION - HEALTHEAST (OUTPATIENT)
Dept: FAMILY MEDICINE | Facility: CLINIC | Age: 76
End: 2021-04-22

## 2021-04-22 DIAGNOSIS — G47.01 INSOMNIA DUE TO MEDICAL CONDITION: ICD-10-CM

## 2021-04-22 RX ORDER — TRAZODONE HYDROCHLORIDE 50 MG/1
50 TABLET, FILM COATED ORAL AT BEDTIME
Qty: 30 TABLET | Refills: 5 | Status: SHIPPED | OUTPATIENT
Start: 2021-04-22 | End: 2021-08-04 | Stop reason: DRUGHIGH

## 2021-05-01 ENCOUNTER — HEALTH MAINTENANCE LETTER (OUTPATIENT)
Age: 76
End: 2021-05-01

## 2021-05-24 ENCOUNTER — RECORDS - HEALTHEAST (OUTPATIENT)
Dept: ADMINISTRATIVE | Facility: CLINIC | Age: 76
End: 2021-05-24

## 2021-05-24 ENCOUNTER — OFFICE VISIT - HEALTHEAST (OUTPATIENT)
Dept: RHEUMATOLOGY | Facility: CLINIC | Age: 76
End: 2021-05-24

## 2021-05-24 DIAGNOSIS — M25.50 POLYARTHRALGIA: ICD-10-CM

## 2021-05-25 ENCOUNTER — RECORDS - HEALTHEAST (OUTPATIENT)
Dept: ADMINISTRATIVE | Facility: CLINIC | Age: 76
End: 2021-05-25

## 2021-05-27 ENCOUNTER — RECORDS - HEALTHEAST (OUTPATIENT)
Dept: ADMINISTRATIVE | Facility: CLINIC | Age: 76
End: 2021-05-27

## 2021-05-27 ENCOUNTER — COMMUNICATION - HEALTHEAST (OUTPATIENT)
Dept: FAMILY MEDICINE | Facility: CLINIC | Age: 76
End: 2021-05-27

## 2021-05-27 DIAGNOSIS — M79.7 FIBROMYALGIA: ICD-10-CM

## 2021-05-27 RX ORDER — DULOXETIN HYDROCHLORIDE 20 MG/1
CAPSULE, DELAYED RELEASE ORAL
Qty: 60 CAPSULE | Refills: 2 | Status: SHIPPED | OUTPATIENT
Start: 2021-05-27 | End: 2021-08-24

## 2021-05-27 ASSESSMENT — PATIENT HEALTH QUESTIONNAIRE - PHQ9
SUM OF ALL RESPONSES TO PHQ QUESTIONS 1-9: 6
SUM OF ALL RESPONSES TO PHQ QUESTIONS 1-9: 13
SUM OF ALL RESPONSES TO PHQ QUESTIONS 1-9: 9
SUM OF ALL RESPONSES TO PHQ QUESTIONS 1-9: 6

## 2021-05-27 NOTE — TELEPHONE ENCOUNTER
Refill Approved    Rx renewed per Medication Renewal Policy. Medication was last renewed on 2/26/19.    Ov: 2/26/19    Sheeba Lang, [facility] Triage/Med Refill 3/28/2019     Requested Prescriptions   Pending Prescriptions Disp Refills     fluticasone propionate (FLOVENT HFA) 110 mcg/actuation inhaler 3 Inhaler 0     Sig: Inhale 2 puffs 2 (two) times a day.    Asthma Medications Refill Protocol Passed - 3/27/2019  9:33 PM       Passed - PCP or prescribing provider visit in last year    Last office visit with prescriber/PCP: 7/19/2018 Elba Monge MD OR same dept: 2/26/2019 Skyler Wiggins CNP OR same specialty: 2/26/2019 Skyler Wiggins CNP  Last physical: 8/23/2017 Last MTM visit: Visit date not found    Next appt within 3 mo: Visit date not found Next physical within 3 mo: Visit date not found  Prescriber OR PCP: Elba Monge MD  Last diagnosis associated with med order: 1. Cough  - fluticasone propionate (FLOVENT HFA) 110 mcg/actuation inhaler; Inhale 2 puffs 2 (two) times a day.  Dispense: 3 Inhaler; Refill: 0    If protocol passes may refill for 6 months if within 3 months of last provider visit (or a total of 9 months).

## 2021-05-27 NOTE — PROGRESS NOTES
Katy Hong is a 73 y.o. female seen in consultation at the request of Dr. Monge for tinnitus. Tinnitus is non-pulsatile in nature.  The patient had noted this for 12 months.  It is bilateral.  Patient has not noticed hearing loss.  Denies otologic history of infections or surgeries. Denies vertigo.  Noise exposure: no.  Family history of hearing loss: father hearing loss later in life.      ALLERGY:    Allergies   Allergen Reactions     Fluoxetine Cough and Rash     Pt thinks it was cough but not totally sure.     Latex Rash     Levothyroxine Rash     Lisinopril Cough and Rash     Sulfa (Sulfonamide Antibiotics) Itching and Rash       MEDICATIONS:     Current Outpatient Medications on File Prior to Visit   Medication Sig Dispense Refill     albuterol (VENTOLIN HFA) 90 mcg/actuation inhaler Inhale 2 puffs every 6 (six) hours as needed for wheezing. 18 g 5     aspirin 81 MG EC tablet Take 81 mg by mouth daily.       calcium carbonate (OS-MONIQUE) 500 mg calcium (1,250 mg) chewable tablet Chew 2 tablets daily.        cholecalciferol, vitamin D3, (VITAMIN D3) 2,000 unit Tab Take 1 tablet by mouth daily.        codeine-guaiFENesin (CODEINE-GUAIFENESIN)  mg/5 mL liquid Take 5 mL by mouth 3 (three) times a day as needed. 236 mL 0     fluticasone (FLONASE) 50 mcg/actuation nasal spray 1 spray into each nostril daily. 16 g 0     fluticasone (FLOVENT HFA) 110 mcg/actuation inhaler Inhale 2 puffs 2 (two) times a day. 1 Inhaler 2     furosemide (LASIX) 20 MG tablet Take 1 tablet (20 mg total) by mouth daily. (Patient taking differently: Take 20-40 mg by mouth every other day as needed. ) 90 tablet 1     gabapentin (NEURONTIN) 300 MG capsule Take 1 capsule (300 mg total) by mouth at bedtime. (Patient taking differently: Take 600 mg by mouth at bedtime as needed.       ) 30 capsule 0     HYDROcodone-acetaminophen 5-325 mg per tablet Take 1 tablet by mouth every 6 (six) hours as needed for pain. 60 tablet 0     losartan  (COZAAR) 25 MG tablet TAKE 1 TABLET (25 MG TOTAL) BY MOUTH DAILY. 90 tablet 1     omeprazole (PRILOSEC) 20 MG capsule TAKE 1 CAPSULE BY MOUTH 2 TIMES A DAY. (Patient taking differently: TAKE 1 CAPSULE BY MOUTH EVERY DAY) 180 capsule 3     SUMAtriptan succinate 6 mg/0.5 mL PnIj INJECT 1 INJECTION AS DIRECTED IMMEDIATELY AT ONSET OF MIGRAINE HEADACHE 2 Syringe 3     topiramate (TOPAMAX) 100 MG tablet 1 tab with dinner every day 90 tablet 1     triamcinolone (KENALOG) 0.1 % ointment APPLY TO AFFECTED AREA TWICE A DAY 30 g 1     venlafaxine (EFFEXOR) 50 MG tablet TAKE ONE TABLET BY MOUTH THREE TIMES DAILY 270 tablet 3     No current facility-administered medications on file prior to visit.        Past Medical/Surgical History, Family History and Social History reviewed in detail and documented separately in the medical record.    Complete Review of Systems:  A 10-point review was performed.  Pertinent positives are noted in the HPI and on a separate scanned document in the chart.    EXAM:  There were no vitals filed for this visit.    Nurse documentation reviewed  and documented separately.    General Appearance: Pleasant, alert, appropriate appearance for age. No acute distress    Head Exam: Normal. Normocephalic, atraumatic.    Eye Exam: Normal external eye, conjunctiva, lids, cornea. Extra-ocular movements are intact.    Left external ear: normal  Left otoscopic exam: Normal EAC. Normal TM     Right external ear: normal  Right otoscopic exam: Normal EAC. Normal TM    Nose Exam: Normal external nose. Septum midline. Nasal mucosa normal.  Inferior turbinates normal.    OroPharynx Exam: Dental hygiene adequate. Normal tongue. Normal buccal mucosa. Normal palate.  Normal pharynx. Normal tonsils.    Neck Exam: Supple, no masses or nodes. Trachea and larynx midline.    Thyroid Exam: No tenderness, nodules or enlargement.    Salivary Glands: nontender without masses    Neuro: Alert and oriented times 3, CN 2-12 grossly  intact, no nystagmus, PERRL, EOMI, normal speech and gait    Chest/Respiratory Exam: Normal chest wall motion and respiratory effort. No audible stridor or wheezing.    Cardiovascular Exam: Regular rate and rhythm.  No cyanosis, clubbing or edema.    Pulses: carotid pulses normal    Mood:  Calm, appropriate    Skin:  No conspicuous lesions, rash    ASSESSMENT:  1. Sensorineural hearing loss (SNHL) of both ears    2. Tinnitus, bilateral        PLAN: Findings, assessment, and management options were discussed.  Discussed pathophysiology, masking techniques and triggers for tinnitus.  We discussed current guidelines in regards to approach to tinnitus.  Unfortunately there are not many satisfying answers but working on triggers may be helpful.

## 2021-05-28 ENCOUNTER — RECORDS - HEALTHEAST (OUTPATIENT)
Dept: ADMINISTRATIVE | Facility: CLINIC | Age: 76
End: 2021-05-28

## 2021-05-28 ASSESSMENT — ANXIETY QUESTIONNAIRES
GAD7 TOTAL SCORE: 6
GAD7 TOTAL SCORE: 13
GAD7 TOTAL SCORE: 13
GAD7 TOTAL SCORE: 5

## 2021-05-28 NOTE — TELEPHONE ENCOUNTER
RN cannot approve Refill Request    RN can NOT refill this medication pt stated takes 600 mg at bedtime.as needed    Kianna Dorantes, [facility] Triage/Med Refill 5/3/2019    Requested Prescriptions   Pending Prescriptions Disp Refills     gabapentin (NEURONTIN) 300 MG capsule [Pharmacy Med Name: GABAPENTIN 300 MG CAPSULE] 450 capsule 3     Sig: TAKE 2 CAPSULES BY MOUTH IN THE MORNING AND 3 CAPSULES IN THE EVENING       Gabapentin/Levetiracetam/Tiagabine Refill Protocol  Passed - 5/2/2019  4:15 PM        Passed - PCP or prescribing provider visit in past 12 months or next 3 months     Last office visit with prescriber/PCP: 7/19/2018 Elba Monge MD OR same dept: 2/26/2019 Skyler Wiggins CNP OR same specialty: 2/26/2019 Skyler Wiggins CNP  Last physical: 8/23/2017 Last MTM visit: Visit date not found   Next visit within 3 mo: Visit date not found  Next physical within 3 mo: Visit date not found  Prescriber OR PCP: Elba Monge MD  Last diagnosis associated with med order: 1. Spondylolisthesis  - gabapentin (NEURONTIN) 300 MG capsule [Pharmacy Med Name: GABAPENTIN 300 MG CAPSULE]; TAKE 2 CAPSULES BY MOUTH IN THE MORNING AND 3 CAPSULES IN THE EVENING  Dispense: 450 capsule; Refill: 3    If protocol passes may refill for 12 months if within 3 months of last provider visit (or a total of 15 months).

## 2021-05-28 NOTE — TELEPHONE ENCOUNTER
metoprolol succinate (TOPROL-XL) 50 MG 24 hr tablet [[id number]]     Electronically signed by: Kianna Dorantes RN on 04/17/18 1441 Status: Discontinued   Ordering user: Kianna Dorantes RN 04/17/18 1441 Ordering provider: Elba Monge MD   Authorized by: Elba Monge MD   Frequency:  04/17/18 - 01/04/19  Discontinued by: Syeda Kidd MD 01/04/19 6594 [Therapy completed]     Kianna Dorantes RN [facility] Triage/Medication Refill

## 2021-05-28 NOTE — TELEPHONE ENCOUNTER
metoprolol succinate (TOPROL-XL) 50 MG 24 hr tablet [[id number]]     Electronically signed by: Kianna Dorantes RN on 04/17/18 1441 Status: Discontinued   Ordering user: Kianna Dorantes RN 04/17/18 1441 Ordering provider: Elba Monge MD   Authorized by: Elba Monge MD   Frequency:  04/17/18 - 01/04/19  Discontinued by: Syeda Kidd MD 01/04/19 3961 [Therapy completed]     Kianna Dorantes RN [facility] Triage/Medication Refill

## 2021-05-29 ENCOUNTER — RECORDS - HEALTHEAST (OUTPATIENT)
Dept: ADMINISTRATIVE | Facility: CLINIC | Age: 76
End: 2021-05-29

## 2021-05-29 NOTE — TELEPHONE ENCOUNTER
Refill Approved    Rx renewed per Medication Renewal Policy. Medication was last renewed on 10/18/18.2/2/18.11/6/18    Kianna Dorantes, [facility] [place] Connection Triage/Med Refill 6/3/2019     Requested Prescriptions   Pending Prescriptions Disp Refills     SUMAtriptan succinate 6 mg/0.5 mL PnIj [Pharmacy Med Name: SUMATRIPTAN 6 MG/0.5 ML INJECT] 2 Syringe 2     Sig: INJECT 1 INJECTION AS DIRECTED IMMEDIATELY AT ONSET OF MIGRAINE HEADACHE       Triptans Refill Protocol Passed - 6/2/2019 12:03 PM        Passed - PCP or prescribing provider visit in past 12 months       Last office visit with prescriber/PCP: 7/19/2018 Elba Monge MD OR same dept: 2/26/2019 Skyler Wiggins CNP OR same specialty: 2/26/2019 Skyler Wiggins CNP  Last physical: 8/23/2017 Last MTM visit: Visit date not found   Next visit within 3 mo: Visit date not found  Next physical within 3 mo: Visit date not found  Prescriber OR PCP: Elba Monge MD  Last diagnosis associated with med order: 1. Migraine headache  - SUMAtriptan succinate 6 mg/0.5 mL PnIj [Pharmacy Med Name: SUMATRIPTAN 6 MG/0.5 ML INJECT]; INJECT 1 INJECTION AS DIRECTED IMMEDIATELY AT ONSET OF MIGRAINE HEADACHE  Dispense: 2 Syringe; Refill: 2    2. Obesity (BMI 30-39.9)  - topiramate (TOPAMAX) 100 MG tablet [Pharmacy Med Name: TOPIRAMATE 100 MG TABLET]; TAKE ONE TABLET BY MOUTH DAILY WITH DINNER  Dispense: 90 tablet; Refill: 1    3. Cardiomyopathy (H)  - losartan (COZAAR) 25 MG tablet [Pharmacy Med Name: LOSARTAN POTASSIUM 25 MG TAB]; TAKE 1 TABLET (25 MG TOTAL) BY MOUTH DAILY.  Dispense: 90 tablet; Refill: 1    4. Essential hypertension, benign  - losartan (COZAAR) 25 MG tablet [Pharmacy Med Name: LOSARTAN POTASSIUM 25 MG TAB]; TAKE 1 TABLET (25 MG TOTAL) BY MOUTH DAILY.  Dispense: 90 tablet; Refill: 1    If protocol passes may refill for 12 months if within 3 months of last provider visit (or a total of 15 months).             topiramate (TOPAMAX) 100 MG tablet [Pharmacy Med Name:  TOPIRAMATE 100 MG TABLET] 90 tablet 1     Sig: TAKE ONE TABLET BY MOUTH DAILY WITH DINNER       Topiramate Refill Protocol  Passed - 6/2/2019 12:03 PM        Passed - Bicarbonate/Electrolyte panel in last 12 months     Sodium   Date Value Ref Range Status   01/04/2019 143 136 - 145 mmol/L Final     Potassium   Date Value Ref Range Status   01/04/2019 4.1 3.5 - 5.0 mmol/L Final     Chloride   Date Value Ref Range Status   01/04/2019 108 (H) 98 - 107 mmol/L Final     CO2   Date Value Ref Range Status   01/04/2019 23 22 - 31 mmol/L Final                Passed - PCP or prescribing provider visit in last 12 months or next 3 months     Last office visit with prescriber/PCP: 7/19/2018 Elba Monge MD OR same dept: 2/26/2019 Skyler Wiggins CNP OR same specialty: 2/26/2019 Skyler Wiggins CNP  Last physical: 8/23/2017 Last MTM visit: Visit date not found   Next visit within 3 mo: Visit date not found  Next physical within 3 mo: Visit date not found  Prescriber OR PCP: Elba Monge MD  Last diagnosis associated with med order: 1. Migraine headache  - SUMAtriptan succinate 6 mg/0.5 mL PnIj [Pharmacy Med Name: SUMATRIPTAN 6 MG/0.5 ML INJECT]; INJECT 1 INJECTION AS DIRECTED IMMEDIATELY AT ONSET OF MIGRAINE HEADACHE  Dispense: 2 Syringe; Refill: 2    2. Obesity (BMI 30-39.9)  - topiramate (TOPAMAX) 100 MG tablet [Pharmacy Med Name: TOPIRAMATE 100 MG TABLET]; TAKE ONE TABLET BY MOUTH DAILY WITH DINNER  Dispense: 90 tablet; Refill: 1    3. Cardiomyopathy (H)  - losartan (COZAAR) 25 MG tablet [Pharmacy Med Name: LOSARTAN POTASSIUM 25 MG TAB]; TAKE 1 TABLET (25 MG TOTAL) BY MOUTH DAILY.  Dispense: 90 tablet; Refill: 1    4. Essential hypertension, benign  - losartan (COZAAR) 25 MG tablet [Pharmacy Med Name: LOSARTAN POTASSIUM 25 MG TAB]; TAKE 1 TABLET (25 MG TOTAL) BY MOUTH DAILY.  Dispense: 90 tablet; Refill: 1    If protocol passes may refill for 12 months if within 3 months of last provider visit (or a total of 15  months).             Passed - Serum creatinine in last 12 months     Creatinine   Date Value Ref Range Status   01/04/2019 0.81 0.60 - 1.10 mg/dL Final             losartan (COZAAR) 25 MG tablet [Pharmacy Med Name: LOSARTAN POTASSIUM 25 MG TAB] 90 tablet 1     Sig: TAKE 1 TABLET (25 MG TOTAL) BY MOUTH DAILY.       Angiotensin Receptor Blocker Protocol Passed - 6/2/2019 12:03 PM        Passed - PCP or prescribing provider visit in past 12 months       Last office visit with prescriber/PCP: 7/19/2018 Elba Monge MD OR same dept: 2/26/2019 Skyler Wiggins CNP OR same specialty: 2/26/2019 Skyler Wiggins CNP  Last physical: 8/23/2017 Last MTM visit: Visit date not found   Next visit within 3 mo: Visit date not found  Next physical within 3 mo: Visit date not found  Prescriber OR PCP: Elba Monge MD  Last diagnosis associated with med order: 1. Migraine headache  - SUMAtriptan succinate 6 mg/0.5 mL PnIj [Pharmacy Med Name: SUMATRIPTAN 6 MG/0.5 ML INJECT]; INJECT 1 INJECTION AS DIRECTED IMMEDIATELY AT ONSET OF MIGRAINE HEADACHE  Dispense: 2 Syringe; Refill: 2    2. Obesity (BMI 30-39.9)  - topiramate (TOPAMAX) 100 MG tablet [Pharmacy Med Name: TOPIRAMATE 100 MG TABLET]; TAKE ONE TABLET BY MOUTH DAILY WITH DINNER  Dispense: 90 tablet; Refill: 1    3. Cardiomyopathy (H)  - losartan (COZAAR) 25 MG tablet [Pharmacy Med Name: LOSARTAN POTASSIUM 25 MG TAB]; TAKE 1 TABLET (25 MG TOTAL) BY MOUTH DAILY.  Dispense: 90 tablet; Refill: 1    4. Essential hypertension, benign  - losartan (COZAAR) 25 MG tablet [Pharmacy Med Name: LOSARTAN POTASSIUM 25 MG TAB]; TAKE 1 TABLET (25 MG TOTAL) BY MOUTH DAILY.  Dispense: 90 tablet; Refill: 1    If protocol passes may refill for 12 months if within 3 months of last provider visit (or a total of 15 months).             Passed - Serum potassium within the past 12 months     Lab Results   Component Value Date    Potassium 4.1 01/04/2019             Passed - Blood pressure filed in  past 12 months     BP Readings from Last 1 Encounters:   02/26/19 104/70             Passed - Serum creatinine within the past 12 months     Creatinine   Date Value Ref Range Status   01/04/2019 0.81 0.60 - 1.10 mg/dL Final

## 2021-05-30 ENCOUNTER — RECORDS - HEALTHEAST (OUTPATIENT)
Dept: ADMINISTRATIVE | Facility: CLINIC | Age: 76
End: 2021-05-30

## 2021-05-30 VITALS — WEIGHT: 199 LBS | HEIGHT: 63 IN | BODY MASS INDEX: 35.26 KG/M2

## 2021-05-30 VITALS — BODY MASS INDEX: 35.44 KG/M2 | WEIGHT: 200 LBS | HEIGHT: 63 IN

## 2021-05-30 NOTE — PROGRESS NOTES
PROGRESS NOTE       SUBJECTIVE:  Katy Hong is a 73 y.o. female   Chief Complaint   Patient presents with     Medication Refill     med check ,      Headache     pt was told to stop BP meds  1.5 months ago ?      Patient states that the pharmacist called her and told her to stop the metoprolol about a month and a half ago.  It was not clear to her why she should do this but she did stop as she was directed.  She was not experiencing any problems with her medication and her blood pressure was under good control.  Now she has been having headaches since she stopped the medication and she thinks that is because of the blood pressure being elevated.  Her blood pressure now is in the 140s over 90s.  This patient has a history of true migraines and the metoprolol was indicated for management of this condition.  I explained to her that being off the metoprolol probably did cause her migraines to return.  She is requesting a refill of her Vicodin which she does use for migraines if the shot does not work.    Otherwise patient states that she has been doing fine.    Patient Active Problem List   Diagnosis     Osteopenia     Insomnia     Vitamin D Deficiency     Lower Back Pain     Fibromyalgia     Essential Hypertension     Seborrheic Keratosis     Obesity     Lichen Sclerosus Et Atrophicus     Depression With Anxiety     Migraine Headache     Esophageal Reflux     Joint Pain, Localized In The Knee     Varicose Veins     Shortness Of Breath     Nonvenomous Insect Bite Of Shoulder     Cellulitis     Mammogram - Abnormal     Chronic Cutaneous Ulcer Venous Stasis     Chronic venous insufficiency     Foot pain (Soft Tissue)     Female stress incontinence     Calculus of ureter     Hydronephrosis with urinary obstruction due to ureteral calculus     Secondary cardiomyopathy (H)       Current Outpatient Medications   Medication Sig Dispense Refill     albuterol (VENTOLIN HFA) 90 mcg/actuation inhaler Inhale 2 puffs every  6 (six) hours as needed for wheezing. 18 g 5     aspirin 81 MG EC tablet Take 81 mg by mouth daily.       calcium carbonate (OS-MONIQUE) 500 mg calcium (1,250 mg) chewable tablet Chew 2 tablets daily.        cholecalciferol, vitamin D3, (VITAMIN D3) 2,000 unit Tab Take 1 tablet by mouth daily.        fluticasone (FLONASE) 50 mcg/actuation nasal spray 1 spray into each nostril daily. 16 g 0     fluticasone propionate (FLOVENT HFA) 110 mcg/actuation inhaler Inhale 2 puffs 2 (two) times a day. 3 Inhaler 1     furosemide (LASIX) 20 MG tablet Take 1 tablet (20 mg total) by mouth daily. (Patient taking differently: Take 20-40 mg by mouth every other day as needed. ) 90 tablet 1     gabapentin (NEURONTIN) 300 MG capsule Take 2 capsules (600 mg total) by mouth at bedtime. 180 capsule 0     HYDROcodone-acetaminophen 5-325 mg per tablet Take 1 tablet by mouth every 6 (six) hours as needed for pain. 60 tablet 0     losartan (COZAAR) 25 MG tablet TAKE 1 TABLET (25 MG TOTAL) BY MOUTH DAILY. 90 tablet 1     omeprazole (PRILOSEC) 20 MG capsule TAKE 1 CAPSULE BY MOUTH 2 TIMES A DAY. (Patient taking differently: TAKE 1 CAPSULE BY MOUTH EVERY DAY) 180 capsule 3     SUMAtriptan succinate 6 mg/0.5 mL PnIj INJECT 1 INJECTION AS DIRECTED IMMEDIATELY AT ONSET OF MIGRAINE HEADACHE 2 Syringe 4     topiramate (TOPAMAX) 100 MG tablet TAKE ONE TABLET BY MOUTH DAILY WITH DINNER 90 tablet 1     venlafaxine (EFFEXOR) 50 MG tablet TAKE ONE TABLET BY MOUTH THREE TIMES DAILY 270 tablet 3     metoprolol succinate (TOPROL-XL) 50 MG 24 hr tablet Take 1 tablet (50 mg total) by mouth daily. 90 tablet 3     triamcinolone (KENALOG) 0.1 % ointment APPLY TO AFFECTED AREA TWICE A DAY 30 g 1     No current facility-administered medications for this visit.        Social History     Tobacco Use   Smoking Status Never Smoker   Smokeless Tobacco Never Used       REVIEW OF SYSTEMS:  Patient denies fever, chills, dizziness, visual change, ear pain, cough, chest pain,  shortness of breath, abdominal pain, extremity pain or swelling, rash,  depression or anxiety.    POSITIVES: Migraine headache as discussed above.    OBJECTIVE:       Vitals:    07/15/19 0941   BP: 138/82   Pulse: 94   SpO2: 97%     Weight: 187 lb (84.8 kg)    Wt Readings from Last 3 Encounters:   07/15/19 187 lb (84.8 kg)   07/13/19 198 lb 2 oz (89.9 kg)   02/26/19 184 lb (83.5 kg)     Body mass index is 34.2 kg/m .        Physical Exam:  GENERAL APPEARANCE: A&A, NAD, well hydrated, well nourished  SKIN:  Normal skin turgor, no lesions/rashes   EARS: TM's normal, gray with nl light reflex  OROPHARYNX: without erythema, no post nasal drainage or thrush  NECK: Supple, without lymphadenopathy, no thyroid mass  CV: RRR, no M/G/R   LUNGS: CTAB, normal respiratory effort  EXTREMITY: Extremities normal, atraumatic, no swelling  NEURO: no gross deficits   PSYCHIATRIC:  Mood appropriate, memory intact        ASSESSMENT/PLAN:     1. Essential hypertension  Blood pressure is elevated secondary to stopping metoprolol.  I explained that this blood pressure is not immediately dangerous to her, but we do want her pressure below 120/80.  I recommend she restart the metoprolol 50 mg daily.  Patient agrees with this plan.  - metoprolol succinate (TOPROL-XL) 50 MG 24 hr tablet; Take 1 tablet (50 mg total) by mouth daily.  Dispense: 90 tablet; Refill: 3    2. Migraine  I refilled the Vicodin for use if her other medications are ineffective.  I suspect she will have fewer headaches once she gets back on the beta-blocker.  - HYDROcodone-acetaminophen 5-325 mg per tablet; Take 1 tablet by mouth every 6 (six) hours as needed for pain.  Dispense: 60 tablet; Refill: 0      There are no Patient Instructions on file for this visit.  Medications Discontinued During This Encounter   Medication Reason     codeine-guaiFENesin (CODEINE-GUAIFENESIN)  mg/5 mL liquid Therapy completed     gabapentin (NEURONTIN) 300 MG capsule Duplicate order      metoprolol succinate (TOPROL-XL) 50 MG 24 hr tablet Reorder     HYDROcodone-acetaminophen 5-325 mg per tablet Reorder     Return in about 3 months (around 10/15/2019) for Recheck, hypertension.    I spent a total of 30 minutes face to face with the patient.  Over 50% of the time spent counseling and educating the patient about all of the above.      Elba Monge MD

## 2021-05-31 VITALS — BODY MASS INDEX: 36.5 KG/M2 | HEIGHT: 63 IN | WEIGHT: 206 LBS

## 2021-05-31 VITALS — WEIGHT: 203 LBS | HEIGHT: 62 IN | BODY MASS INDEX: 37.36 KG/M2

## 2021-05-31 VITALS — WEIGHT: 202 LBS | BODY MASS INDEX: 35.79 KG/M2 | HEIGHT: 63 IN

## 2021-05-31 VITALS — HEIGHT: 63 IN | BODY MASS INDEX: 35.97 KG/M2 | WEIGHT: 203 LBS

## 2021-05-31 VITALS — HEIGHT: 62 IN | WEIGHT: 202 LBS | BODY MASS INDEX: 37.17 KG/M2

## 2021-05-31 VITALS — BODY MASS INDEX: 37.54 KG/M2 | WEIGHT: 204 LBS | HEIGHT: 62 IN

## 2021-05-31 VITALS — HEIGHT: 62 IN | WEIGHT: 206.67 LBS | BODY MASS INDEX: 38.03 KG/M2

## 2021-05-31 NOTE — TELEPHONE ENCOUNTER
Refill Approved    Rx renewed per Medication Renewal Policy. Medication was last renewed on 5/7/19.    Kianna Dorantes, [facility] Triage/Med Refill 8/2/2019     Requested Prescriptions   Pending Prescriptions Disp Refills     gabapentin (NEURONTIN) 300 MG capsule [Pharmacy Med Name: GABAPENTIN 300 MG CAPSULE] 180 capsule 0     Sig: TAKE 2 CAPSULES (600 MG TOTAL) BY MOUTH AT BEDTIME.       Gabapentin/Levetiracetam/Tiagabine Refill Protocol  Passed - 8/2/2019 11:05 AM        Passed - PCP or prescribing provider visit in past 12 months or next 3 months     Last office visit with prescriber/PCP: 2/26/2019 Skyler Wiggins CNP OR same dept: 7/15/2019 Elba Monge MD OR same specialty: 7/15/2019 Elba Monge MD  Last physical: Visit date not found Last MTM visit: Visit date not found   Next visit within 3 mo: Visit date not found  Next physical within 3 mo: Visit date not found  Prescriber OR PCP: Skyler Wiggins CNP  Last diagnosis associated with med order: 1. Spondylolisthesis  - gabapentin (NEURONTIN) 300 MG capsule [Pharmacy Med Name: GABAPENTIN 300 MG CAPSULE]; Take 2 capsules (600 mg total) by mouth at bedtime.  Dispense: 180 capsule; Refill: 0    If protocol passes may refill for 12 months if within 3 months of last provider visit (or a total of 15 months).

## 2021-05-31 NOTE — PROGRESS NOTES
[facility] Non Surgical Follow up Visit   Date of visit: 8/6/2019  Physician: Alexsandra Rossi MD  Primary Care is Elba Monge MD.  Katy Hong   73 y.o.  female    Initial Weight: 201 pounds  Initial BMI: 37.4  Today's Weight:   Wt Readings from Last 1 Encounters:   08/06/19 184 lb 14.4 oz (83.9 kg)     Body mass index is 33.82 kg/m .  Weight: 184 lb 14.4 oz (83.9 kg)       Assessment and Plan   Assessment: Katy is a 73 y.o. year old female who presents for medical weight management.      Plan:    1. Obesity (BMI 30-39.9)  Patient was congratulated on her success thus far. Healthy habits to assist with further weight loss were discussed. Written information was given. She will continue to work on increasing her veggie intake and making sure she is getting adequate protein. I suggested trying whole grain sandwich thins with lunch meat instead of regular bread. She will continue to take the topamax.    2. Essential hypertension  This should improve with healthy habits and weight loss.      Follow up in 3 months with myself.  She declined a visit with dietitian as it is not covered by her insurance.           INTERIM HISTORY  She and her  are back in their house.  She has been able to get outside much more now that the weather is nice.    DIETARY HISTORY  Meals Per Day: 3  Eating Protein First?:  Usually  Food Diary: B:egg and toast and coffee L:fruit and sandwich with lunch meat or jelly on bread D:meat and vegetable, sometimes starch  Snacks Per Day: afternoons  Typical Snack: fruit  Fluid Intake: not enough- better if she is home- she has bladder issues  Portion Control: Yes  Calorie Containing Beverages: premiere protein  Typical Protein Food Choices: Lunch meat, eggs, Premier protein  Choosing Whole Grains: yes  Meals at Restaurant per week:1      Positive Changes Since Last Visit: increased walking, focusing on protein and veggies  Struggling With: fibromyalgia limits her  activity    Knowledgeable in Reading Food Labels: yes  Getting Adequate Protein: usually  Sleeping 7-8 hours/day 7-8  Stress management not discussed    PHYSICAL ACTIVITY PATTERNS:  Cardiovascular: walking outside  Strength Training: none    REVIEW OF SYSTEMS  GENERAL/CONSTITUTIONAL:  Fatigue: yes  HEENT:  Vision changes, glaucoma: no  CARDIOVASCULAR:  Chest Pain with Exertion: no  PULMONARY:  Dyspnea on exertion: yes  NEUROLOGIC:  Paresthesias: no  PSYCHIATRIC:  Moods: stable  MUSCULOSKELETAL/RHEUMATOLOGIC  Arthralgias: yes  Myalgias: yes  ENDOCRINE:  Monitoring Blood Sugars: na  Sugars Well Controlled: na       Patient Profile   Social History     Social History Narrative    5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          Past Medical History   Past Medical History:   Diagnosis Date     Acute Sinusitis     Created by Conversion      Breast cyst      Cardiomyopathy     Created by Conversion      Cellulitis     Created by Conversion      Chronic Cutaneous Ulcer Venous Stasis     Created by Conversion      Contusion With Intact Skin Surface     Created by Conversion      Depression With Anxiety     Created by Conversion      Epidermal Inclusion Cyst     Created by Conversion      Esophageal reflux     Created by Conversion      Essential Hypertension     Created by Conversion      Fibromyalgia     Created by Conversion      Insomnia     Created by Conversion      Joint Pain, Localized In The Knee     Created by Conversion      Lichen Sclerosus Et Atrophicus     Created by Conversion      Limb Pain     Created by Conversion      Lower Back Pain     Created by Conversion      Mammogram - Abnormal     Created by Conversion      Migraine Headache     Created by Conversion      Myocardial infarction (H)     per EKG's since      Nonvenomous Insect Bite Of Shoulder     Created by Conversion      Obesity     Created by Conversion      Open Wound Of The Lip     Created by Conversion      Osteopenia     Created by Conversion       Sebaceous Hyperplasia     Created by Conversion      Seborrheic Keratosis     Created by Conversion      Shortness of breath     Created by Conversion      Varicose Veins     Created by Conversion      Vitamin D Deficiency     Created by Conversion      Patient Active Problem List   Diagnosis     Osteopenia     Insomnia     Vitamin D Deficiency     Lower Back Pain     Fibromyalgia     Essential Hypertension     Seborrheic Keratosis     Obesity     Lichen Sclerosus Et Atrophicus     Depression With Anxiety     Migraine Headache     Esophageal Reflux     Joint Pain, Localized In The Knee     Varicose Veins     Shortness Of Breath     Nonvenomous Insect Bite Of Shoulder     Cellulitis     Mammogram - Abnormal     Chronic Cutaneous Ulcer Venous Stasis     Chronic venous insufficiency     Foot pain (Soft Tissue)     Female stress incontinence     Calculus of ureter     Hydronephrosis with urinary obstruction due to ureteral calculus     Secondary cardiomyopathy (H)     Current Outpatient Medications   Medication Sig     albuterol (VENTOLIN HFA) 90 mcg/actuation inhaler Inhale 2 puffs every 6 (six) hours as needed for wheezing.     aspirin 81 MG EC tablet Take 81 mg by mouth daily.     calcium carbonate (OS-MONIQUE) 500 mg calcium (1,250 mg) chewable tablet Chew 2 tablets daily.      cholecalciferol, vitamin D3, (VITAMIN D3) 2,000 unit Tab Take 1 tablet by mouth daily.      furosemide (LASIX) 20 MG tablet Take 1 tablet (20 mg total) by mouth daily. (Patient taking differently: Take 20-40 mg by mouth every other day as needed. )     gabapentin (NEURONTIN) 300 MG capsule TAKE 2 CAPSULES (600 MG TOTAL) BY MOUTH AT BEDTIME.     HYDROcodone-acetaminophen 5-325 mg per tablet Take 1 tablet by mouth every 6 (six) hours as needed for pain.     losartan (COZAAR) 25 MG tablet TAKE 1 TABLET (25 MG TOTAL) BY MOUTH DAILY.     metoprolol succinate (TOPROL-XL) 50 MG 24 hr tablet Take 1 tablet (50 mg total) by mouth daily.     omeprazole  "(PRILOSEC) 20 MG capsule TAKE 1 CAPSULE BY MOUTH 2 TIMES A DAY. (Patient taking differently: TAKE 1 CAPSULE BY MOUTH EVERY DAY)     SUMAtriptan succinate 6 mg/0.5 mL PnIj INJECT 1 INJECTION AS DIRECTED IMMEDIATELY AT ONSET OF MIGRAINE HEADACHE     topiramate (TOPAMAX) 100 MG tablet TAKE ONE TABLET BY MOUTH DAILY WITH DINNER     triamcinolone (KENALOG) 0.1 % ointment APPLY TO AFFECTED AREA TWICE A DAY     venlafaxine (EFFEXOR) 50 MG tablet TAKE ONE TABLET BY MOUTH THREE TIMES DAILY       Past Surgical History  She has a past surgical history that includes pr total abdom hysterectomy; Breast cyst aspiration; Breast biopsy; Tubal ligation; Appendectomy; Temporomandibular joint surgery (Bilateral); and Cataract extraction, bilateral.     Examination   /82   Pulse 78   Resp 16   Ht 5' 2\" (1.575 m)   Wt 184 lb 14.4 oz (83.9 kg)   LMP 09/29/1993   SpO2 98%   BMI 33.82 kg/m    Height: 5' 2\" (1.575 m) (8/6/2019  3:05 PM)  Weight: 184 lb 14.4 oz (83.9 kg) (8/6/2019  3:05 PM)  BMI (Calculated): 33.8 (8/6/2019  3:05 PM)  SpO2: 98 % (8/6/2019  3:05 PM)    General:  Alert and ambulatory, NAD         Counseling:   We reviewed the important post op bariatric recommendations:  -eating 3 meals daily  -eating protein first, getting >60gm protein daily  -eating slowly, chewing food well  -avoiding/limiting calorie containing beverages  -limiting starchy vegetables and carbohydrates, choosing wheat, not white with breads,   crackers, pastas, bear, bagels, tortillas, rice  -limiting restaurant or cafeteria eating to twice a week or less    We discussed the importance of restorative sleep and stress management in maintaining a healthy weight.  We discussed the National Weight Control Registry healthy weight maintenance strategies and ways to optimize metabolism.  We discussed the importance of physical activity including cardiovascular and strength training in maintaining a healthier weight.    > 15 min spent with patient, > " 50% spent in counseling         LATISHA Rossi MD  [facility].    Much or all of the text in this note was generated through the use of Dragon Dictate voice-to-text software. Errors in spelling or words which seem out of context are unintentional. Sound alike errors, in particular, may have escaped editing.

## 2021-05-31 NOTE — PATIENT INSTRUCTIONS - HE

## 2021-06-01 ENCOUNTER — COMMUNICATION - HEALTHEAST (OUTPATIENT)
Dept: ADMINISTRATIVE | Facility: CLINIC | Age: 76
End: 2021-06-01

## 2021-06-01 ENCOUNTER — RECORDS - HEALTHEAST (OUTPATIENT)
Dept: ADMINISTRATIVE | Facility: CLINIC | Age: 76
End: 2021-06-01

## 2021-06-01 VITALS — BODY MASS INDEX: 37.91 KG/M2 | WEIGHT: 206 LBS | HEIGHT: 62 IN

## 2021-06-01 VITALS — BODY MASS INDEX: 36.64 KG/M2 | WEIGHT: 199.13 LBS | HEIGHT: 62 IN

## 2021-06-01 VITALS — HEIGHT: 62 IN | WEIGHT: 198 LBS | BODY MASS INDEX: 36.44 KG/M2

## 2021-06-01 VITALS — HEIGHT: 62 IN | BODY MASS INDEX: 37.5 KG/M2

## 2021-06-01 VITALS — HEIGHT: 62 IN | BODY MASS INDEX: 36.09 KG/M2 | WEIGHT: 196.1 LBS

## 2021-06-01 VITALS — BODY MASS INDEX: 37.91 KG/M2 | HEIGHT: 62 IN | WEIGHT: 206 LBS

## 2021-06-01 VITALS — HEIGHT: 62 IN | WEIGHT: 196 LBS | BODY MASS INDEX: 36.07 KG/M2

## 2021-06-01 VITALS — HEIGHT: 62 IN | WEIGHT: 201.2 LBS | BODY MASS INDEX: 37.02 KG/M2

## 2021-06-01 VITALS — BODY MASS INDEX: 36.62 KG/M2 | HEIGHT: 62 IN | WEIGHT: 199 LBS

## 2021-06-01 VITALS — HEIGHT: 62 IN | WEIGHT: 195 LBS | BODY MASS INDEX: 35.88 KG/M2

## 2021-06-01 NOTE — PROGRESS NOTES
Chief Complaint   Patient presents with     Neck Pain     Right side of neck. For about a week now. Pain goes from neck down to about her elbow. Constant dull pain. Doesn't remember doing anything to it.      HPI: Patient presents today with right neck pain that radiates down the top of the shoulder and into her arm terminating near her elbow.  She has intermittent numbness and tingling in the fingertips, but this is not new for her.  She has tried ice and heat with no improvement along with over-the-counter naproxen.  The hydrocodone which she has on hand for migraines does provide a mild amount of relief.  No worsening of her headaches.  Atraumatic.  Nothing seemed to have caused it.  It is exacerbated with certain movements of the neck and position changes.  She continues with gabapentin for her lower back pain at bedtime, but it does not seem to be helping much with the neck.    Previous cervical spine imaging performed in 2017 demonstrated:    Cervical spine:  1.  Progression of spondylitic changes as compared to 2005 examination, most conspicuous at the C5-C6 and C6-C7 levels. Resultant moderate spinal canal compromise at C5-C6.  2.  Varying degrees of neural foraminal compromise, high-grade bilaterally at C5-C6 and C6-C7.  3.  Normal caliber and signal intensity of the cervical spinal cord, without focal lesion or abnormal enhancement.    ROS:Review of Systems - negative except for what's listed in the HPI    SH: The Patient's  reports that she has never smoked. She has never used smokeless tobacco. She reports current alcohol use. She reports that she does not use drugs.      FH: The Patient's family history includes Alzheimer's disease in her father; Arthritis in her maternal grandmother and mother; Breast cancer in her cousin; Breast cancer (age of onset: 60) in her maternal aunt; Diabetes in her father; Hypertension in her father, mother, and sister; Obesity in her brother, maternal aunt, and maternal  "uncle; Osteopenia in her sister; Varicose Veins in her brother.     Meds:    Current Outpatient Medications on File Prior to Visit   Medication Sig Dispense Refill     aspirin 81 MG EC tablet Take 81 mg by mouth daily.       calcium carbonate (OS-MONIQUE) 500 mg calcium (1,250 mg) chewable tablet Chew 2 tablets daily.        cholecalciferol, vitamin D3, 5,000 unit Tab Take by mouth.       furosemide (LASIX) 20 MG tablet Take 1 tablet (20 mg total) by mouth daily. (Patient taking differently: Take 20-40 mg by mouth every other day as needed. ) 90 tablet 1     gabapentin (NEURONTIN) 300 MG capsule TAKE 2 CAPSULES (600 MG TOTAL) BY MOUTH AT BEDTIME. 180 capsule 3     losartan (COZAAR) 25 MG tablet TAKE 1 TABLET (25 MG TOTAL) BY MOUTH DAILY. 90 tablet 1     metoprolol succinate (TOPROL-XL) 50 MG 24 hr tablet Take 1 tablet (50 mg total) by mouth daily. 90 tablet 3     omeprazole (PRILOSEC) 20 MG capsule TAKE 1 CAPSULE BY MOUTH 2 TIMES A DAY. (Patient taking differently: TAKE 1 CAPSULE BY MOUTH EVERY DAY) 180 capsule 3     topiramate (TOPAMAX) 100 MG tablet TAKE ONE TABLET BY MOUTH DAILY WITH DINNER 90 tablet 1     triamcinolone (KENALOG) 0.1 % ointment APPLY TO AFFECTED AREA TWICE A DAY 30 g 2     venlafaxine (EFFEXOR) 50 MG tablet TAKE ONE TABLET BY MOUTH THREE TIMES DAILY 270 tablet 3     albuterol (VENTOLIN HFA) 90 mcg/actuation inhaler Inhale 2 puffs every 6 (six) hours as needed for wheezing. 18 g 5     cholecalciferol, vitamin D3, (VITAMIN D3) 2,000 unit Tab Take 1 tablet by mouth daily.              SUMAtriptan succinate 6 mg/0.5 mL PnIj INJECT 1 INJECTION AS DIRECTED IMMEDIATELY AT ONSET OF MIGRAINE HEADACHE 2 Syringe 4     No current facility-administered medications on file prior to visit.        O:  /80   Pulse 68   Temp 98.2  F (36.8  C) (Oral)   Ht 5' 2\" (1.575 m)   Wt 187 lb (84.8 kg)   LMP 09/29/1993   SpO2 98%   BMI 34.20 kg/m      Physical Examination:   General appearance - alert, well " appearing, and in no distress  Mental status - alert, oriented to person, place, and time  Eyes - pupils equal and reactive, extraocular eye movements intact  Mouth - mucous membranes moist, pharynx normal without lesions  Neck - supple, no significant adenopathy  Lymphatics - no palpable lymphadenopathy, no hepatosplenomegaly  Chest - clear to auscultation, no wheezes, rales or rhonchi, symmetric air entry  Heart - normal rate and regular rhythm, S1 and S2 normal, no murmurs noted  Neurological - alert, oriented, normal speech, no focal findings or movement disorder noted, neck supple without rigidity, cranial nerves II through XII intact, motor and sensory grossly normal bilaterally, normal muscle tone, no tremors, strength 5/5  Musculoskeletal -decreased range of motion with rotation of the neck secondary to pain.  Decreased range of motion in all fields with the shoulder secondary to pain.  Negative empty can test.  Discomfort with palpation along the cervical spine supraspinous muscles.  Extremities - peripheral pulses normal, no pedal edema, no clubbing or cyanosis  Skin - normal coloration and turgor, no rashes, no suspicious skin lesions noted      A/P:     Problem List Items Addressed This Visit        ENT/CARD/PULM/ENDO Problems    Migraine Headache    Relevant Medications    HYDROcodone-acetaminophen 5-325 mg per tablet      Other Visit Diagnoses     Neck pain on right side    -  Primary    Relevant Medications    methylPREDNISolone (MEDROL DOSEPACK) 4 mg tablet    Other Relevant Orders    Ambulatory referral to Spine Care    Needs flu shot        Relevant Orders    Influenza High Dose, Seasonal 65+ yrs (Completed)        1. Migraine  Patient asked for refill.  Sent to the pharmacy.  If continuing to need hydrocodone for migraines, will need to come from PCP.    - HYDROcodone-acetaminophen 5-325 mg per tablet; Take 1 tablet by mouth every 6 (six) hours as needed for pain.  Dispense: 60 tablet; Refill:  0    2. Neck pain on right side  Concerns for progression of arthritic changes with impingement.  Trial of oral steroids and referral to spine care.    - methylPREDNISolone (MEDROL DOSEPACK) 4 mg tablet; Take 1 tablet (4 mg total) by mouth daily for 6 days. Follow package directions  Dispense: 21 tablet; Refill: 0  - Ambulatory referral to Spine Care    3. Needs flu shot  - Influenza High Dose, Seasonal 65+ yrs    Total time spent with patient was at least 25 minutes, all of which was spent in counseling and coordination of care regarding their current medical problems.      Skyler Wiggins, CNP

## 2021-06-01 NOTE — TELEPHONE ENCOUNTER
RN cannot approve Refill Request    RN can NOT refill this medication med is not covered by policy/route to provider     . Last office visit: 2/26/2019 Skyler Wiggins CNP Last Physical: Visit date not found Last MTM visit: Visit date not found Last visit same specialty: 7/15/2019 Elba Monge MD.  Next visit within 3 mo: Visit date not found  Next physical within 3 mo: Visit date not found      Kianna Dorantes, [facility] Connection Triage/Med Refill 9/19/2019    Requested Prescriptions   Pending Prescriptions Disp Refills     triamcinolone (KENALOG) 0.1 % ointment [Pharmacy Med Name: TRIAMCINOLONE 0.1% OINTMENT] 30 g 1     Sig: APPLY TO AFFECTED AREA TWICE A DAY       There is no refill protocol information for this order

## 2021-06-01 NOTE — PATIENT INSTRUCTIONS - HE
I sent in a prescription for the Medrol Dosepak steroid.  Start this tomorrow morning.  Hopefully will help with your pain.    I also sent in a prescription for your usual hydrocodone-acetaminophen prescription that Dr. Monge uses.    Lastly, a new referral to the spine clinic has been placed.  If you are not getting better like I am hoping, this is the next step.    Thank you for coming in today!    If you receive a survey from [employer] about your experience today, it would be very helpful if you could fill it out to let us know what went well and what we can improve!    General Information:    Today you had your appointment with Skyler Wiggins NP    My hours are:    Monday : Out of clinic  Tuesday : 8:00AM - 5:00 PM  Wednesday: 8:00AM - 5:00 PM  Thursday: 8:00AM - 5:00 PM  Friday: 8:00AM - 5:00 PM    I am not in the office Mondays. Therefore non-urgent calls and medical messages received on Monday will be addressed when I am back in the office on Tuesday. Urgent matters will be reviewed and addressed by one of my partners in the office as needed.    If lab work was done today as part of your evaluation you will generally be contacted via [employer]hart, mail, or phone with the results within 1-5 days. If there is an alarming result we will contact you by phone. Lab results come back at varying times, I generally wait until all lab results are available before making comments on the results.     If you need refills please contact your pharmacy. They will send a refill request to me to review. Please allow 3-5 business days for us to process all refill requests.     My Clinical Assistant is Yazmin. Please call us at [phone] or send a medical message with any questions or concerns.

## 2021-06-02 ENCOUNTER — RECORDS - HEALTHEAST (OUTPATIENT)
Dept: ADMINISTRATIVE | Facility: CLINIC | Age: 76
End: 2021-06-02

## 2021-06-02 VITALS — WEIGHT: 191 LBS | BODY MASS INDEX: 35.5 KG/M2

## 2021-06-02 VITALS — WEIGHT: 190.9 LBS | HEIGHT: 62 IN | BODY MASS INDEX: 35.13 KG/M2

## 2021-06-02 VITALS — HEIGHT: 62 IN | BODY MASS INDEX: 35.28 KG/M2 | WEIGHT: 191.7 LBS

## 2021-06-02 VITALS — BODY MASS INDEX: 33.86 KG/M2 | HEIGHT: 62 IN | WEIGHT: 184 LBS

## 2021-06-02 VITALS — WEIGHT: 188.4 LBS | BODY MASS INDEX: 34.67 KG/M2 | HEIGHT: 62 IN

## 2021-06-02 NOTE — TELEPHONE ENCOUNTER
Call to pt with provider's results and recommendations. Pt stated understanding. She just started PT today through [facility]. She would like to continue with this. She will call the nurse navigation line if she decides she would like to move forward with the recommended injection. Would need to go over injection requirements with her.

## 2021-06-02 NOTE — PATIENT INSTRUCTIONS - HE
[facility]  [address] [place] [id number] [phone] Fax    [facility] Radiology Locations    Please call [phone] to schedule your image(s) (select option #1 and then #2). There are 3 different locations, see below. You can do walk-in visits for xray only images if you want.     [facility]  [address] [place] [id number]    [address] [address]    [address] [address]      ~Please call Nurse Navigation line [phone] with any questions or concerns about your treatment plan, if symptoms worsen and you would like to be seen urgently, or if you have problems controlling bladder and bowel function.  ~Follow Up Appointment time slots with Teresa Mas, CNP with the [facility], are also available at the [facility] location near [facility] on the first and third THURSDAY afternoons of each month.

## 2021-06-02 NOTE — PROGRESS NOTES
ASSESSMENT: Katy Hong is a 74 y.o. female presents for consultation at the request of HE PCP Elba Monge MD, with past medical history significant for hypertension, cardiomyopathy,, kidney stone, fibromyalgia, depression with anxiety, migraine headache, reflux, cellulitis, chronic venous insufficiency with venous stasis ulcer, female stress incontinence, who presents today for new patient evaluation of :     -Acute significant right cervical radiculopathy nonspecific pattern x2 weeks with weakness on exam.  2017 MRI does show moderate spinal with severe right foraminal stenosis at C5-6 due to disc osteophyte complex and mild to moderate spinal canal and moderate to severe right foraminal stenosis C6-7.    Patient is neurologically intact on exam. No myelopathic or red flag symptoms.      NDI Score: 38    WHO 5: 8       Diagnoses and all orders for this visit:    Right cervical radiculopathy  -     Ambulatory referral to PT/OT  -     MR Cervical Spine Without Contrast; Future; Expected date: 10/03/2019  -     meloxicam (MOBIC) 7.5 MG tablet; Take 1 tablet (7.5 mg total) by mouth 2 (two) times a day as needed for pain. Take with food  Dispense: 30 tablet; Refill: 1    Weakness of right upper extremity  -     Ambulatory referral to PT/OT  -     MR Cervical Spine Without Contrast; Future; Expected date: 10/03/2019    Foraminal stenosis of cervical region  -     Ambulatory referral to PT/OT  -     MR Cervical Spine Without Contrast; Future; Expected date: 10/03/2019    Cervical myofascial pain syndrome  -     Ambulatory referral to PT/OT       PLAN:  Reviewed spine anatomy and disease process. Discussed diagnosis and treatment options with the patient today. A shared decision making model was used. The patient's values and choices were respected. The following represents what was discussed and decided upon by the provider and the patient.     -DIAGNOSTIC TESTS:  Images were personally reviewed and  interpreted and explained to patient today using spine model.   --Consider right upper extremity EMG if symptoms are not improving in the next 4 weeks, patient deferred on this today.  --Ordered updated cervical spine MRI to evaluate acute right cervical radiculopathy, history of chronic cervicalgia.  --Cervical spine MRI 11/1/2017 again shows C5-6 disc osteophyte complex with moderate central canal and severe bilateral foraminal stenosis.  C6-7, mild to moderate spinal canal stenosis with moderate to severe bilateral foraminal stenosis.  No cord signal changes noted.  --Thoracic MRI 2017 shows T10-11 right 8 mm enhancement dorsal lateral margin of the spinal cord.    -PHYSICAL THERAPY: Referral to physical therapy placed to [facility] in [place] per patient request as this is more convenient for her, recommended establishing cervical core strengthening and nerve glide exercises.  Discussed the importance of core strengthening, ROM, stretching exercises with the patient and how each of these entities is important in decreasing pain.  Explained to the patient that the purpose of physical therapy is to teach the patient a home exercise program.  These exercises need to be performed every day in order to decrease pain and prevent future occurrences of pain.  Likened it to brushing one's teeth.      -MEDICATIONS: Prescribed meloxicam 7.5 mg 1 tablet twice daily for pain and inflammation at this time.  Advised patient to not take this medication with ibuprofen, Advil, Aleve, naproxen while taking this medication and to take it with food and full glass water.  -Advised patient to increase gabapentin which she currently takes 300 mg 2 tablets at nighttime to titrate up to 2 tablets 3 times daily as tolerated for radicular pain.  Discussed multiple medication options today with patient. Discussed risks, side effects, and proper use of medications. Patient verbalized understanding.    -INTERVENTIONS: Consider right  cervical JOANNA versus C7-T1 IL JOANNA pending cervical MRI imaging, if symptoms are not improving with PT.  Discussed risks and benefits of injections with patient today.    -PATIENT EDUCATION: 45 minutes of total visit time was spent face to face with the patient today, greater than 50% of total time spent with patient was spent on counseling, education, and coordinating care.   -10 minutes spent outside of visit time, non-face-to-face time, reviewing chart.    -FOLLOW-UP:   Advised patient follow-up after obtaining cervical spine MRI otherwise we can call her with results  and next steps in the plan.    Advised patient to call the [facility] if symptoms worsen or you have problems controlling bladder and bowel function.   ______________________________________________________________________    SUBJECTIVE:   Katy Hong  is a 74 y.o. female who presents today for new patient evaluation of neck pain that is been chronic in nature however typically tolerable, however in the last 2 weeks she has had new pain into the right neck that radiates to the right subscapular and deltoid region and into the forearm with pain and numbness and tingling into the right hand nonspecific global symptoms that is significant currently a 5/10 up to a 10 at its worst, a 4 at its best.  She does report that turning her head is most bothersome for her and its more of a sharp/dull pain.  She does report very mild symptoms on the left more of a dull aching however the right hand and arm is most severe and debilitating.  She does report weakness in her right upper extremity and she is otherwise right-hand dominant.  Patient denies bowel or bladder loss control, denies balance changes or recent trips or falls, denies current low back pain.  Does have a history of chronic low back pain however.    -Treatment to Date: No prior spinal surgery.    Right L4-5 JOANNA in the past with Dr. Sipple.    -Medications:  Gabapentin 300 mg 2 tablets at  nighttime which is long-term for fibromyalgia  Medrol Dosepak with minimal but some benefit.  OTC Aleve with no benefit  Hydrocodone 5/325 mg at nighttime with some benefit recently.    Current Outpatient Medications on File Prior to Visit   Medication Sig Dispense Refill     gabapentin (NEURONTIN) 300 MG capsule TAKE 2 CAPSULES (600 MG TOTAL) BY MOUTH AT BEDTIME. 180 capsule 3     HYDROcodone-acetaminophen 5-325 mg per tablet Take 1 tablet by mouth every 6 (six) hours as needed for pain. 60 tablet 0     albuterol (VENTOLIN HFA) 90 mcg/actuation inhaler Inhale 2 puffs every 6 (six) hours as needed for wheezing. 18 g 5     aspirin 81 MG EC tablet Take 81 mg by mouth daily.       calcium carbonate (OS-MONIQUE) 500 mg calcium (1,250 mg) chewable tablet Chew 2 tablets daily.        cholecalciferol, vitamin D3, 5,000 unit Tab Take by mouth.       furosemide (LASIX) 20 MG tablet Take 1 tablet (20 mg total) by mouth daily. (Patient taking differently: Take 20-40 mg by mouth every other day as needed. ) 90 tablet 1     losartan (COZAAR) 25 MG tablet TAKE 1 TABLET (25 MG TOTAL) BY MOUTH DAILY. 90 tablet 1     [] methylPREDNISolone (MEDROL DOSEPACK) 4 mg tablet Take 1 tablet (4 mg total) by mouth daily for 6 days. Follow package directions 21 tablet 0     metoprolol succinate (TOPROL-XL) 50 MG 24 hr tablet Take 1 tablet (50 mg total) by mouth daily. 90 tablet 3     omeprazole (PRILOSEC) 20 MG capsule TAKE 1 CAPSULE BY MOUTH 2 TIMES A DAY. (Patient taking differently: TAKE 1 CAPSULE BY MOUTH EVERY DAY) 180 capsule 3     SUMAtriptan succinate 6 mg/0.5 mL PnIj INJECT 1 INJECTION AS DIRECTED IMMEDIATELY AT ONSET OF MIGRAINE HEADACHE 2 Syringe 4     topiramate (TOPAMAX) 100 MG tablet TAKE ONE TABLET BY MOUTH DAILY WITH DINNER 90 tablet 1     triamcinolone (KENALOG) 0.1 % ointment APPLY TO AFFECTED AREA TWICE A DAY 30 g 2     venlafaxine (EFFEXOR) 50 MG tablet TAKE ONE TABLET BY MOUTH THREE TIMES DAILY 270 tablet 3     No  current facility-administered medications on file prior to visit.        Allergies   Allergen Reactions     Fluoxetine Cough and Rash     Pt thinks it was cough but not totally sure.     Latex Rash     Levothyroxine Rash     Lisinopril Cough and Rash     Sulfa (Sulfonamide Antibiotics) Itching and Rash       Past Medical History:   Diagnosis Date     Acute Sinusitis     Created by Conversion      Breast cyst      Cardiomyopathy     Created by Conversion      Cellulitis     Created by Conversion      Chronic Cutaneous Ulcer Venous Stasis     Created by Conversion      Contusion With Intact Skin Surface     Created by Conversion      Depression With Anxiety     Created by Conversion      Epidermal Inclusion Cyst     Created by Conversion      Esophageal reflux     Created by Conversion      Essential Hypertension     Created by Conversion      Fibromyalgia     Created by Conversion      Insomnia     Created by Conversion      Joint Pain, Localized In The Knee     Created by Conversion      Lichen Sclerosus Et Atrophicus     Created by Conversion      Limb Pain     Created by Conversion      Lower Back Pain     Created by Conversion      Mammogram - Abnormal     Created by Conversion      Migraine Headache     Created by Conversion      Myocardial infarction (H)     per EKG's since 2015     Nonvenomous Insect Bite Of Shoulder     Created by Conversion      Obesity     Created by Conversion      Open Wound Of The Lip     Created by Conversion      Osteopenia     Created by Conversion      Sebaceous Hyperplasia     Created by Conversion      Seborrheic Keratosis     Created by Conversion      Shortness of breath     Created by Conversion      Varicose Veins     Created by Conversion      Vitamin D Deficiency     Created by Conversion         Patient Active Problem List   Diagnosis     Osteopenia     Insomnia     Vitamin D Deficiency     Lower Back Pain     Fibromyalgia     Essential Hypertension     Seborrheic Keratosis      Obesity     Lichen Sclerosus Et Atrophicus     Depression With Anxiety     Migraine Headache     Esophageal Reflux     Joint Pain, Localized In The Knee     Varicose Veins     Shortness Of Breath     Nonvenomous Insect Bite Of Shoulder     Cellulitis     Mammogram - Abnormal     Chronic Cutaneous Ulcer Venous Stasis     Chronic venous insufficiency     Foot pain (Soft Tissue)     Female stress incontinence     Calculus of ureter     Hydronephrosis with urinary obstruction due to ureteral calculus     Secondary cardiomyopathy (H)       Past Surgical History:   Procedure Laterality Date     APPENDECTOMY       BREAST BIOPSY       BREAST CYST ASPIRATION       CATARACT EXTRACTION, BILATERAL       CO TOTAL ABDOM HYSTERECTOMY       still has both ovaries     TEMPOROMANDIBULAR JOINT SURGERY Bilateral      TUBAL LIGATION         Family History   Problem Relation Age of Onset     Hypertension Mother      Arthritis Mother      Alzheimer's disease Father         d. 76     Diabetes Father      Hypertension Father      Breast cancer Maternal Aunt 60     Obesity Maternal Aunt      Hypertension Sister      Osteopenia Sister      Varicose Veins Brother      Obesity Brother      Breast cancer Cousin      Obesity Maternal Uncle      Arthritis Maternal Grandmother        Reviewed past medical, surgical, and family history with patient found on new patient intake packet located in EMR Media tab.     SOCIAL HX: Patient is  and retired.  Patient denies smoking/tobacco use, does drink a glass of wine monthly but denies history being heavy drinker, denies recreational drug use.    ROS: Positive for chronic bladder incontinence, joint pain, muscle pain, muscle fatigue, insomnia, anxiety/depression, shortness of breath, cough, constipation, reflux, headache.  Specifically negative for bowel/bladder dysfunction, balance changes, dizziness, foot drop, fevers, chills, appetite changes, nausea/vomiting, unexplained weight loss.  Otherwise 13 systems reviewed are negative. Please see the patient's intake questionnaire from today for details.    OBJECTIVE:  Wt 185 lb (83.9 kg)   LMP 09/29/1993   BMI 33.84 kg/m      PHYSICAL EXAMINATION:  --CONSTITUTIONAL: Vital signs as above. No acute distress. The patient is well nourished and well groomed.  --PSYCHIATRIC: The patient is awake, alert, oriented to person, place, time and answering questions appropriately with clear speech. Appropriate mood and affect   --HEENT: Sclera are non-injected. Extraocular muscles are intact. Thyroid moves easily upon swallowing.  Moist oral mucosa.  --SKIN: Skin over the face, bilateral upper extremities, and posterior torso is clean, dry, intact without rashes.  --RESPIRATORY: Normal rhythm and effort. No abnormal accessory muscle breathing patterns noted.   --GROSS MOTOR: Easily arises from a seated position.     --CERVICAL SPINE: Inspection reveals no evidence of deformity. Range of motion is not limited in cervical flexion, extension, lateral rotation. No tenderness to palpation cervical spine.  Spurling maneuver negative bilaterally.  --SHOULDERS: Full range of motion bilaterally. Negative empty can.  --UPPER EXTREMITY MOTOR TESTING: Question give way to pain.  Wrist flexion left 5/5, right 4/5  Wrist extension left 5/5, right 4/5  Biceps left 5/5, right 4/5   Triceps left 5/5, right 4/5   Shoulder abduction left 5/5, right 3/5   left 5/5, right 4/5  --NEUROLOGIC: CN III-XII are grossly intact. 2/4 symmetric biceps, brachioradialis, triceps reflexes bilaterally. Sensation to upper extremities is intact.  Negative Meyer's bilaterally.    --VASCULAR: 2/4 radial pulses bilaterally. Warm upper limbs bilaterally. Capillary refill in the upper extremities is less than 1 second.    RESULTS: Prior medical records from [facility] 2014 to current and care everywhere were reviewed today.     Imaging:  Cervical spine Imaging was personally reviewed and interpreted  today. The images were shown to the patient and the findings were explained using a spine model.      MR CERVICAL SPINE W WO CONTRAST  MR THORACIC SPINE W WO CONTRAST  11/1/2017   INDICATION: Bilateral leg and arm weakness x 2 years, T5 level.  TECHNIQUE: Without and with IV contrast.  CONTRAST: 9 mL Gadavist.  COMPARISON: Cervical spine MRI examination dated 06/30/2005.  FINDINGS:   Cervical spine MRI:   Mildly motion degraded examination.  Stable alignment compared to 2005 examination, with reversal of the normal cervical lordosis centered at C4. Degenerative grade 1 anterolisthesis of C2 on C3 and C3 on C4, as well as mild retrolisthesis of C5 on C6. Vertebral body height is preserved,   allowing for degenerative changes. Bone marrow signal intensity is homogeneous and within normal limits. Mild degenerative changes of the craniocervical junction and C1-C2. No abnormal cord signal. No abnormal enhancement. The visualized intracranial contents are unremarkable. Grossly normal paraspinal soft tissues. The vertebral artery flow voids are preserved.  C2-C3: Normal disc height. No herniation. Degenerative ankylosis of the left facet joint, with mild right facet arthropathy. No spinal canal stenosis. No right neural foraminal stenosis. Mild left neural foraminal stenosis.  C3-C4: Mild intervertebral disc height loss, with shallow posterior disc osteophyte complex. Mild right and moderate to severe left facet arthropathy. No spinal canal stenosis. Mild right neural foraminal stenosis. Mild left neural foraminal stenosis.  C4-C5: Mild intervertebral disc height loss, with shallow posterior disc osteophyte complex. Moderate to severe bilateral facet arthropathy. No spinal canal stenosis. Mild to moderate right neural foraminal stenosis. Mild to moderate left neural foraminal stenosis.    C5-C6: Moderate to severe intervertebral disc height loss, with posterior disc osteophyte complex. Moderate bilateral facet arthropathy.  Moderate spinal canal stenosis. Severe right neural foraminal stenosis. Severe left neural foraminal stenosis, progressed.  C6-C7: Moderate to severe intervertebral height loss, progressed, with posterior disc osteophyte complex. Mild to moderate bilateral facet arthropathy. Mild to moderate spinal canal stenosis. Moderate to severe right neural foraminal stenosis, progressed. Moderate to severe left neural foraminal stenosis, progressed.  C7-T1: Normal disc height. No herniation. Mild bilateral facet arthropathy. No spinal canal stenosis. Mild right neural foraminal stenosis. Mild left neural foraminal stenosis.  Thoracic spine:  Mildly motion degraded examination.  Normal vertebral body heights, alignment and marrow signal, allowing for prominent T8 vertebral body hemangioma and type II degenerative endplate marrow changes surrounding the anterior aspect of the T10-T11 intervertebral disc. Mild to moderate degenerative disc disease and facet arthropathy throughout the mid to lower thoracic spine, without resultant significant spinal canal or neural foraminal stenosis.   No focal lesion or abnormal enhancement is demonstrated in the region at the T5 level. 8mm linear focus of enhancement is demonstrated on sagittal postcontrast T1-weighted image #7, the level of the T10 and T11 interspace, correlating to the right dorsolateral margin of the spinal cord. This finding is not characterized by axial images.  Unremarkable paraspinal soft tissues.  IMPRESSION:   CONCLUSION:  Cervical spine:  1.  Progression of spondylitic changes as compared to 2005 examination, most conspicuous at the C5-C6 and C6-C7 levels. Resultant moderate spinal canal compromise at C5-C6.  2.  Varying degrees of neural foraminal compromise, high-grade bilaterally at C5-C6 and C6-C7.  3.  Normal caliber and signal intensity of the cervical spinal cord, without focal lesion or abnormal enhancement.  Thoracic spine:  1.  No focal lesion or abnormal  enhancement is demonstrated in the region at the T5 level.   2.  8 mm linear focus of enhancement along the right dorsolateral margin of the spinal cord at the level of T10-T11 on sagittal postcontrast T1-weighted sequence. The finding is not characterized by axial imaging. Differential considerations include normal vascular structure. Patient call back for thin section axial and sagittal T2 and postcontrast T1-weighted imaging through this region is recommended.

## 2021-06-02 NOTE — TELEPHONE ENCOUNTER
----- Message from Teresa Mas CNP sent at 10/16/2019  2:02 PM CDT -----  Please call patient an notify her that I reviewed her cervical MRI, there is chronic nerve compression due to bone spurring at C5-6 bilaterally and moderate to severe right at C6-7.   Recommend trialing C7-T1 IL JOANNA for potential relief initially and PT [facility] as ordered as well.  Order for injection placed as well.

## 2021-06-02 NOTE — TELEPHONE ENCOUNTER
RN cannot approve Refill Request    RN can NOT refill this medication Protocol failed and NO refill given.         Kianna Dorantes, [facility] Triage/Med Refill 10/28/2019    Requested Prescriptions   Pending Prescriptions Disp Refills     venlafaxine (EFFEXOR) 50 MG tablet [Pharmacy Med Name: VENLAFAXINE HCL 50 MG TABLET] 270 tablet 3     Sig: TAKE ONE TABLET BY MOUTH THREE TIMES DAILY       Venlafaxine/Desvenlafaxine Refill Protocol Failed - 10/28/2019  2:25 AM        Failed - LFT or AST or ALT in last year     Albumin   Date Value Ref Range Status   05/15/2018 3.3 (L) 3.5 - 5.0 g/dL Final     Bilirubin, Total   Date Value Ref Range Status   05/15/2018 0.4 0.0 - 1.0 mg/dL Final     Bilirubin, Direct   Date Value Ref Range Status   07/18/2017 0.2 <=0.5 mg/dL Final     Alkaline Phosphatase   Date Value Ref Range Status   05/15/2018 90 45 - 120 U/L Final     AST   Date Value Ref Range Status   05/15/2018 11 0 - 40 U/L Final     ALT   Date Value Ref Range Status   05/15/2018 <9 0 - 45 U/L Final     Protein, Total   Date Value Ref Range Status   05/15/2018 6.5 6.0 - 8.0 g/dL Final                Passed - Fasting lipid cascade in last year     Cholesterol   Date Value Ref Range Status   10/11/2019 169 <=199 mg/dL Final     Triglycerides   Date Value Ref Range Status   10/11/2019 98 <=149 mg/dL Final     HDL Cholesterol   Date Value Ref Range Status   10/11/2019 63 >=50 mg/dL Final     LDL Calculated   Date Value Ref Range Status   10/11/2019 86 <=129 mg/dL Final     Patient Fasting > 8hrs?   Date Value Ref Range Status   10/11/2019 Unknown  Final             Passed - PCP or prescribing provider visit in last year     Last office visit with prescriber/PCP: 7/15/2019 Elba Monge MD OR same dept: 10/11/2019 Leonora Kerr NP OR same specialty: 10/11/2019 Leonora Kerr NP  Last physical: 8/23/2017 Last MTM visit: Visit date not found   Next visit within 3 mo: Visit date not found  Next physical within  3 mo: Visit date not found  Prescriber OR PCP: Elba Monge MD  Last diagnosis associated with med order: 1. Depression With Anxiety  - venlafaxine (EFFEXOR) 50 MG tablet [Pharmacy Med Name: VENLAFAXINE HCL 50 MG TABLET]; TAKE ONE TABLET BY MOUTH THREE TIMES DAILY  Dispense: 270 tablet; Refill: 3    If protocol passes may refill for 12 months if within 3 months of last provider visit (or a total of 15 months).             Passed - Blood Pressure in last year     BP Readings from Last 1 Encounters:   10/15/19 120/80

## 2021-06-03 ENCOUNTER — RECORDS - HEALTHEAST (OUTPATIENT)
Dept: ADMINISTRATIVE | Facility: CLINIC | Age: 76
End: 2021-06-03

## 2021-06-03 ENCOUNTER — AMBULATORY - HEALTHEAST (OUTPATIENT)
Dept: LAB | Facility: CLINIC | Age: 76
End: 2021-06-03

## 2021-06-03 VITALS — WEIGHT: 186.31 LBS | HEIGHT: 62 IN | BODY MASS INDEX: 34.29 KG/M2

## 2021-06-03 VITALS
DIASTOLIC BLOOD PRESSURE: 84 MMHG | BODY MASS INDEX: 34.29 KG/M2 | RESPIRATION RATE: 18 BRPM | TEMPERATURE: 97.7 F | HEART RATE: 81 BPM | OXYGEN SATURATION: 98 % | SYSTOLIC BLOOD PRESSURE: 122 MMHG | WEIGHT: 186.31 LBS | HEIGHT: 62 IN

## 2021-06-03 VITALS — WEIGHT: 185 LBS | BODY MASS INDEX: 33.84 KG/M2

## 2021-06-03 VITALS
WEIGHT: 187 LBS | BODY MASS INDEX: 33.13 KG/M2 | RESPIRATION RATE: 16 BRPM | HEART RATE: 80 BPM | HEIGHT: 63 IN | DIASTOLIC BLOOD PRESSURE: 76 MMHG | SYSTOLIC BLOOD PRESSURE: 124 MMHG

## 2021-06-03 VITALS
HEIGHT: 62 IN | DIASTOLIC BLOOD PRESSURE: 80 MMHG | OXYGEN SATURATION: 98 % | WEIGHT: 187 LBS | BODY MASS INDEX: 34.41 KG/M2 | TEMPERATURE: 98.2 F | SYSTOLIC BLOOD PRESSURE: 146 MMHG | HEART RATE: 68 BPM

## 2021-06-03 VITALS — BODY MASS INDEX: 36.24 KG/M2 | WEIGHT: 198.13 LBS

## 2021-06-03 VITALS — BODY MASS INDEX: 34.41 KG/M2 | WEIGHT: 187 LBS | HEIGHT: 62 IN

## 2021-06-03 VITALS — BODY MASS INDEX: 34.03 KG/M2 | WEIGHT: 184.9 LBS | HEIGHT: 62 IN

## 2021-06-03 DIAGNOSIS — M25.50 POLYARTHRALGIA: ICD-10-CM

## 2021-06-03 LAB
C REACTIVE PROTEIN LHE: 0.2 MG/DL (ref 0–0.8)
ERYTHROCYTE [SEDIMENTATION RATE] IN BLOOD BY WESTERGREN METHOD: 5 MM/HR (ref 0–20)
RHEUMATOID FACT SERPL-ACNC: <15 IU/ML (ref 0–30)

## 2021-06-03 NOTE — TELEPHONE ENCOUNTER
Second attempt made. Able to reach pt. Discussed results and recommendations. Injection requirements reviewed. She will call if she would like to schedule the injection. She is aware she will be scheduled out to allow for insurance authorization.

## 2021-06-03 NOTE — PATIENT INSTRUCTIONS - HE
~Please call Nurse Navigation line [phone] with any questions or concerns about your treatment plan, if symptoms worsen and you would like to be seen urgently, or if you have problems controlling bladder and bowel function.  ~Follow Up Appointment time slots with Teresa Mas CNP with the [facility], are also available at the [facility] location near [facility] on the first and third THURSDAY afternoons of each month.    Discussed the importance of core strengthening, ROM, stretching exercises with the patient and how each of these entities is important in decreasing pain.  Explained to the patient that the purpose of physical therapy is to teach the patient a home exercise program.  These exercises need to be performed every day in order to decrease pain and prevent future occurrences of pain.

## 2021-06-03 NOTE — TELEPHONE ENCOUNTER
----- Message from Teresa Mas CNP sent at 11/21/2019  4:40 PM CST -----  Please call patient and notify her that her shoulder x-rays look normal therefore her pain is more likely related to her cervical spine then the shoulder.  Recommend moving forward with EMG with Dr. Chou.  It is an option at any time to trial the cervical interlaminar epidural steroid injection that was ordered previously, she can call and schedule this with Dr. Lay at any time to see if we can get some relief with her upper extremity pain as well.

## 2021-06-03 NOTE — TELEPHONE ENCOUNTER
Pt called back. She would like to schedule the injection. Transferred her to scheduling to make appt.

## 2021-06-03 NOTE — TELEPHONE ENCOUNTER
Call to pt with provider's results and recommendations. Unable to reach pt and leave message. Will try again later.

## 2021-06-03 NOTE — TELEPHONE ENCOUNTER
Refill Approved    Rx renewed per Medication Renewal Policy. Medication was last renewed on 6/3/19.    Rosamaria Roth, [facility] Connection Triage/Med Refill 12/3/2019     Requested Prescriptions   Pending Prescriptions Disp Refills     losartan (COZAAR) 25 MG tablet [Pharmacy Med Name: LOSARTAN POTASSIUM 25 MG TAB] 90 tablet 1     Sig: TAKE 1 TABLET (25 MG TOTAL) BY MOUTH DAILY.       Angiotensin Receptor Blocker Protocol Passed - 12/2/2019  7:06 PM        Passed - PCP or prescribing provider visit in past 12 months       Last office visit with prescriber/PCP: 7/15/2019 Elba Monge MD OR same dept: 10/11/2019 Leonora Kerr NP OR same specialty: 10/11/2019 Leonora Kerr NP  Last physical: 8/23/2017 Last MTM visit: Visit date not found   Next visit within 3 mo: Visit date not found  Next physical within 3 mo: Visit date not found  Prescriber OR PCP: Elba Monge MD  Last diagnosis associated with med order: 1. Cardiomyopathy (H)  - losartan (COZAAR) 25 MG tablet [Pharmacy Med Name: LOSARTAN POTASSIUM 25 MG TAB]; TAKE 1 TABLET (25 MG TOTAL) BY MOUTH DAILY.  Dispense: 90 tablet; Refill: 1    2. Essential hypertension, benign  - losartan (COZAAR) 25 MG tablet [Pharmacy Med Name: LOSARTAN POTASSIUM 25 MG TAB]; TAKE 1 TABLET (25 MG TOTAL) BY MOUTH DAILY.  Dispense: 90 tablet; Refill: 1    If protocol passes may refill for 12 months if within 3 months of last provider visit (or a total of 15 months).             Passed - Serum potassium within the past 12 months     Lab Results   Component Value Date    Potassium 3.7 10/11/2019             Passed - Blood pressure filed in past 12 months     BP Readings from Last 1 Encounters:   11/21/19 (!) 147/97             Passed - Serum creatinine within the past 12 months     Creatinine   Date Value Ref Range Status   10/11/2019 0.80 0.60 - 1.10 mg/dL Final

## 2021-06-03 NOTE — PROGRESS NOTES
Assessment:     Diagnoses and all orders for this visit:    Cervical radiculopathy  -     EMG; Future; Expected date: 11/21/2019    Foraminal stenosis of cervical region  -     EMG; Future; Expected date: 11/21/2019    Cervical myofascial pain syndrome  -     EMG; Future; Expected date: 11/21/2019    Chronic pain of both shoulders  -     XR Shoulders Bilateral 2 Or More Views; Future; Expected date: 11/21/2019    Bilateral arm weakness  -     EMG; Future; Expected date: 11/21/2019       Katy Hong is a 74 y.o. y.o. female with past medical history significant for hypertension, cardiomyopathy, kidney stone, fibromyalgia, depression with anxiety, migraine headache, reflux, cellulitis, chronic venous insufficiency with venous stasis ulcer, female stress incontinence who presents today for follow-up regarding:     -Initial right now bilateral right greater than left cervical radiculopathy nonspecific pattern with pain paresthesias and perceived upper extremity weakness x2 months, minimal benefit with PT.  MRI does show severe foraminal stenosis bilaterally at C5-6 and moderate left moderate to severe right at C6-7.    -Patient does have ongoing shoulder pain as well however this is more likely radiculopathy, she does have pain with all movements neck and shoulders.  Negative empty can test.    Plan:     A shared decision making plan was used.  The patient's values and choices were respected. Prior medical records from 10/3/19 were reviewed today. The following represents what was discussed and decided upon by the provider and the patient.     -DIAGNOSTIC TESTS: Images were personally reviewed and interpreted.   --Ordered upper extremity EMG to evaluate cervical radiculopathy.  --Cervical spine MRI 10/15/2019 shows severe foraminal stenosis on the right at C5-6, moderate to severe in the right at C6-7.  --Cervical spine MRI 11/1/2017 again shows C5-6 disc osteophyte complex with moderate central canal and  severe bilateral foraminal stenosis.  C6-7, mild to moderate spinal canal stenosis with moderate to severe bilateral foraminal stenosis.  No cord signal changes noted.  --Thoracic MRI 2017 shows T10-11 right 8 mm enhancement dorsal lateral margin of the spinal cord.    -INTERVENTIONS: C7-T1 IL JOANNA injection was ordered 10/16/2019, patient wants to obtain EMG and shoulder x-ray prior to considering injections.  A significant shoulder changes noted we could consider a right shoulder intra-articular joint steroid injection as well.    -MEDICATIONS: No change in medications advised patient to continue gabapentin as well as meloxicam and hydrocodone only as needed for severe breakthrough pain.  Discussed side effects of medications and proper use. Patient verbalized understanding.    -PHYSICAL THERAPY: Encourage patient continue with home exercises on a regular basis at this time as well as further sessions.  Discussed the importance of core strengthening, ROM, stretching exercises with the patient and how each of these entities is important in decreasing pain.  Explained to the patient that the purpose of physical therapy is to teach the patient a home exercise program.  These exercises need to be performed every day in order to decrease pain and prevent future occurrences of pain.        -PATIENT EDUCATION: 20 minutes of total visit time was spent face to face with the patient today, 60 % of the visit was spent on counseling, education, and coordinating care.   -5 minutes spent outside of visit time, non-face-to-face time, reviewing chart.    -FOLLOW UP: Follow-up for EMG Dr. Chou.  Advised to contact clinic if symptoms worsen or change.    Subjective:     Katy Hong is a 74 y.o. female who presents today for follow-up regarding ongoing neck pain initially started on the right however currently symptoms are relatively equal bilaterally, only minimally worse on the right that radiates to bilateral shoulders  and biceps that is a burning sensation with associated numbness and tingling that is intermittent in bilateral hands as well as progressive upper extremity weakness per patient.  Currently her pain is constant 5/10, 10 at its worst, a 4 at its best more bothersome with reaching overhead with ADLs or lifting anything.  The pain is constant without movement however as well.  Patient denies recent trips or falls or balance changes, denies bowel or bladder loss control.  Does have a history of chronic low back pain however that is tolerable.    -Treatment to Date: No prior spinal surgery.     Right L4-5 JOANNA in the past with Dr. Sipple.     -Medications:  Gabapentin 300 mg 2 tablets at nighttime which is long-term for fibromyalgia  Medrol Dosepak with minimal but some benefit.  OTC Aleve with no benefit  Hydrocodone 5/325 mg at nighttime with some benefit recently.    Patient Active Problem List   Diagnosis     Osteopenia     Insomnia     Vitamin D Deficiency     Lower Back Pain     Fibromyalgia     Essential Hypertension     Seborrheic Keratosis     Obesity     Lichen Sclerosus Et Atrophicus     Depression With Anxiety     Migraine Headache     Esophageal Reflux     Joint Pain, Localized In The Knee     Varicose Veins     Shortness Of Breath     Nonvenomous Insect Bite Of Shoulder     Cellulitis     Mammogram - Abnormal     Chronic Cutaneous Ulcer Venous Stasis     Chronic venous insufficiency     Foot pain (Soft Tissue)     Female stress incontinence     Calculus of ureter     Hydronephrosis with urinary obstruction due to ureteral calculus     Secondary cardiomyopathy (H)       Current Outpatient Medications on File Prior to Visit   Medication Sig Dispense Refill     gabapentin (NEURONTIN) 300 MG capsule TAKE 2 CAPSULES (600 MG TOTAL) BY MOUTH AT BEDTIME. (Patient taking differently: Take 600 mg by mouth 2 (two) times a day.       ) 180 capsule 3     HYDROcodone-acetaminophen 5-325 mg per tablet Take 1 tablet by mouth  every 6 (six) hours as needed for pain. 60 tablet 0     meloxicam (MOBIC) 7.5 MG tablet Take 1 tablet (7.5 mg total) by mouth 2 (two) times a day as needed for pain. Take with food 30 tablet 1     albuterol (VENTOLIN HFA) 90 mcg/actuation inhaler Inhale 2 puffs every 6 (six) hours as needed for wheezing. 18 g 5     aspirin 81 MG EC tablet Take 81 mg by mouth daily.       calcium carbonate (OS-MONIQUE) 500 mg calcium (1,250 mg) chewable tablet Chew 2 tablets daily.        cholecalciferol, vitamin D3, 5,000 unit Tab Take by mouth.       furosemide (LASIX) 20 MG tablet Take 1 tablet (20 mg total) by mouth daily. (Patient taking differently: Take 20-40 mg by mouth every other day as needed. ) 90 tablet 1     losartan (COZAAR) 25 MG tablet TAKE 1 TABLET (25 MG TOTAL) BY MOUTH DAILY. 90 tablet 1     metoprolol succinate (TOPROL-XL) 50 MG 24 hr tablet Take 1 tablet (50 mg total) by mouth daily. 90 tablet 3     omeprazole (PRILOSEC) 20 MG capsule TAKE 1 CAPSULE BY MOUTH 2 TIMES A DAY. (Patient taking differently: TAKE 1 CAPSULE BY MOUTH EVERY DAY) 180 capsule 3     SUMAtriptan succinate 6 mg/0.5 mL PnIj INJECT 1 INJECTION AS DIRECTED IMMEDIATELY AT ONSET OF MIGRAINE HEADACHE 2 Syringe 4     topiramate (TOPAMAX) 100 MG tablet TAKE ONE TABLET BY MOUTH DAILY WITH DINNER 90 tablet 1     triamcinolone (KENALOG) 0.1 % ointment APPLY TO AFFECTED AREA TWICE A DAY 30 g 2     venlafaxine (EFFEXOR) 50 MG tablet Take 1 tablet (50 mg total) by mouth 4 (four) times a day. 360 tablet 1     No current facility-administered medications on file prior to visit.        Allergies   Allergen Reactions     Fluoxetine Cough and Rash     Pt thinks it was cough but not totally sure.     Latex Rash     Levothyroxine Rash     Lisinopril Cough and Rash     Sulfa (Sulfonamide Antibiotics) Itching and Rash       Past Medical History:   Diagnosis Date     Acute Sinusitis     Created by Conversion      Breast cyst      Cardiomyopathy     Created by Conversion       Cellulitis     Created by Conversion      Chronic Cutaneous Ulcer Venous Stasis     Created by Conversion      Contusion With Intact Skin Surface     Created by Conversion      Depression With Anxiety     Created by Conversion      Epidermal Inclusion Cyst     Created by Conversion      Esophageal reflux     Created by Conversion      Essential Hypertension     Created by Conversion      Fibromyalgia     Created by Conversion      Insomnia     Created by Conversion      Joint Pain, Localized In The Knee     Created by Conversion      Lichen Sclerosus Et Atrophicus     Created by Conversion      Limb Pain     Created by Conversion      Lower Back Pain     Created by Conversion      Mammogram - Abnormal     Created by Conversion      Migraine Headache     Created by Conversion      Myocardial infarction (H)     per EKG's since 2015     Nonvenomous Insect Bite Of Shoulder     Created by Conversion      Obesity     Created by Conversion      Open Wound Of The Lip     Created by Conversion      Osteopenia     Created by Conversion      Sebaceous Hyperplasia     Created by Conversion      Seborrheic Keratosis     Created by Conversion      Shortness of breath     Created by Conversion      Varicose Veins     Created by Conversion      Vitamin D Deficiency     Created by Conversion         Review of Systems  ROS: Positive for numbness and tingling, weakness, headache, increased pain at nighttime, difficulty with hand skills.  Specifically negative for bowel/bladder dysfunction, balance changes, headache, dizziness, foot drop, fevers, chills, appetite changes, nausea/vomiting, unexplained weight loss. Otherwise 13 systems reviewed are negative. Please see the patient's intake questionnaire from today for details.    Reviewed Social, Family, Past Medical and Past Surgical history with patient, no significant changes noted since prior visit.     Objective:     BP (!) 147/97 (Patient Site: Right Arm, Patient Position:  Sitting)   Pulse 87   Wt 189 lb (85.7 kg)   LMP 09/29/1993   BMI 34.57 kg/m      PHYSICAL EXAMINATION:   --CONSTITUTIONAL: Vital signs as above. No acute distress. The patient is well nourished and well groomed.  --PSYCHIATRIC:  The patient is awake, alert, oriented to person, place, time and answering questions appropriately with clear speech. Appropriate mood and affect   --HEENT: Sclera are non-injected. Extraocular muscles are intact.   --SKIN: Skin over the face, bilateral upper extremities, and posterior torso is clean, dry, intact without rashes.  --RESPIRATORY: Normal rhythm and effort. No abnormal accessory muscle breathing patterns noted.   --GROSS MOTOR: Easily arises from a seated position.   --CERVICAL SPINE: Inspection reveals no evidence of deformity. Range of motion is not limited in cervical flexion, extension, lateral rotation.  Generalized tenderness to palpation cervical spine worse at C4-5 C5-6 C6-7 as well as bilateral shoulders and trapezius region.  --SHOULDERS: Full range of motion bilaterally: abduction, flexion, cross chest movement: Increased pain with all movements however. Negative empty can.  --UPPER EXTREMITY MOTOR TESTING:  Wrist flexion left 5/5, right 5/5  Wrist extension left 5/5, right 5/5  Biceps left 5/5, right 4/5   Triceps left 5/5, right 4/5   Shoulder abduction left 5/5, right 4/5   left 5/5, right 5/5  --NEUROLOGIC: CN III-XII are grossly intact. 2/4 symmetric biceps, brachioradialis, triceps reflexes bilaterally. Sensation to upper extremities is intact. Negative Meyer's bilaterally.   --VASCULAR: Warm upper limbs bilaterally.     Imaging of the cervical spine: Cervical spine imaging was reviewed today. The images were shown to the patient and the findings were explained using a spine model.      MR CERVICAL SPINE WO CONTRAST  LOCATION: [facility]  DATE/TIME: 10/15/2019   INDICATION: Right cervical radiculopathy with weakness on exam.  COMPARISON:  None.  TECHNIQUE: Without IV contrast.  FINDINGS:   Cervical lordosis is straightened. Moderate loss of disc height at C5-C6 and C6-C7. Small osteophytes and degenerative endplate changes at these levels and also at C3-C4 and C4-C5. Marrow signal and spinal cord signal otherwise normal. No extraspinal abnormality.  Craniovertebral junction and C1-C2: Normal.  C2-C3: Mild facet hypertrophy in the left side. Spinal canal and neural foramina are patent.   C3-C4: Shallow annular bulge. Mild facet hypertrophy on the left side. Spinal canal is patent. Neural foramina are mildly to moderately narrowed on the left and mildly narrowed on the right.   C4-C5: Shallow disc bulge and osteophytes. Spinal canal is patent. The neural foramina are mild to moderately narrowed on the left and mildly narrowed on the right.   C5-C6: Shallow disc bulge and osteophytes. Spinal canal is patent. The neural foramina are severely narrowed bilaterally.   C6-C7: Bulging disc and osteophytes. Spinal canal is patent. The neural foramina are moderately narrowed on the left and moderately to severely narrowed on the right.   C7-T1: Spinal canal neuroforamina patent.  IMPRESSION:   1.  Degenerative disc disease most pronounced in the mid and lower cervical spine.   2.  No central spinal stenosis.  3.  No cord signal changes.  4.  Chronic/osteophyte complexes cause severe neural foraminal stenosis on the right side at C5-C6 and moderate to severe foraminal stenosis on the right at C6-C7. This may be affecting the C6 or C7 nerve roots.  5.  There is less significant foraminal stenosis as described above.      MR CERVICAL SPINE W WO CONTRAST  MR THORACIC SPINE W WO CONTRAST  11/1/2017   INDICATION: Bilateral leg and arm weakness x 2 years, T5 level.  TECHNIQUE: Without and with IV contrast.  CONTRAST: 9 mL Gadavist.  COMPARISON: Cervical spine MRI examination dated 06/30/2005.  FINDINGS:   Cervical spine MRI:   Mildly motion degraded  examination.  Stable alignment compared to 2005 examination, with reversal of the normal cervical lordosis centered at C4. Degenerative grade 1 anterolisthesis of C2 on C3 and C3 on C4, as well as mild retrolisthesis of C5 on C6. Vertebral body height is preserved,   allowing for degenerative changes. Bone marrow signal intensity is homogeneous and within normal limits. Mild degenerative changes of the craniocervical junction and C1-C2. No abnormal cord signal. No abnormal enhancement. The visualized intracranial contents are unremarkable. Grossly normal paraspinal soft tissues. The vertebral artery flow voids are preserved.  C2-C3: Normal disc height. No herniation. Degenerative ankylosis of the left facet joint, with mild right facet arthropathy. No spinal canal stenosis. No right neural foraminal stenosis. Mild left neural foraminal stenosis.  C3-C4: Mild intervertebral disc height loss, with shallow posterior disc osteophyte complex. Mild right and moderate to severe left facet arthropathy. No spinal canal stenosis. Mild right neural foraminal stenosis. Mild left neural foraminal stenosis.  C4-C5: Mild intervertebral disc height loss, with shallow posterior disc osteophyte complex. Moderate to severe bilateral facet arthropathy. No spinal canal stenosis. Mild to moderate right neural foraminal stenosis. Mild to moderate left neural foraminal stenosis.    C5-C6: Moderate to severe intervertebral disc height loss, with posterior disc osteophyte complex. Moderate bilateral facet arthropathy. Moderate spinal canal stenosis. Severe right neural foraminal stenosis. Severe left neural foraminal stenosis, progressed.  C6-C7: Moderate to severe intervertebral height loss, progressed, with posterior disc osteophyte complex. Mild to moderate bilateral facet arthropathy. Mild to moderate spinal canal stenosis. Moderate to severe right neural foraminal stenosis, progressed. Moderate to severe left neural foraminal stenosis,  progressed.  C7-T1: Normal disc height. No herniation. Mild bilateral facet arthropathy. No spinal canal stenosis. Mild right neural foraminal stenosis. Mild left neural foraminal stenosis.  Thoracic spine:  Mildly motion degraded examination.  Normal vertebral body heights, alignment and marrow signal, allowing for prominent T8 vertebral body hemangioma and type II degenerative endplate marrow changes surrounding the anterior aspect of the T10-T11 intervertebral disc. Mild to moderate degenerative disc disease and facet arthropathy throughout the mid to lower thoracic spine, without resultant significant spinal canal or neural foraminal stenosis.   No focal lesion or abnormal enhancement is demonstrated in the region at the T5 level. 8mm linear focus of enhancement is demonstrated on sagittal postcontrast T1-weighted image #7, the level of the T10 and T11 interspace, correlating to the right dorsolateral margin of the spinal cord. This finding is not characterized by axial images.  Unremarkable paraspinal soft tissues.  IMPRESSION:   CONCLUSION:  Cervical spine:  1.  Progression of spondylitic changes as compared to 2005 examination, most conspicuous at the C5-C6 and C6-C7 levels. Resultant moderate spinal canal compromise at C5-C6.  2.  Varying degrees of neural foraminal compromise, high-grade bilaterally at C5-C6 and C6-C7.  3.  Normal caliber and signal intensity of the cervical spinal cord, without focal lesion or abnormal enhancement.  Thoracic spine:  1.  No focal lesion or abnormal enhancement is demonstrated in the region at the T5 level.   2.  8 mm linear focus of enhancement along the right dorsolateral margin of the spinal cord at the level of T10-T11 on sagittal postcontrast T1-weighted sequence. The finding is not characterized by axial imaging. Differential considerations include normal vascular structure. Patient call back for thin section axial and sagittal T2 and postcontrast T1-weighted imaging  through this region is recommended.

## 2021-06-04 VITALS
DIASTOLIC BLOOD PRESSURE: 86 MMHG | HEIGHT: 62 IN | BODY MASS INDEX: 34.96 KG/M2 | HEART RATE: 72 BPM | WEIGHT: 190 LBS | SYSTOLIC BLOOD PRESSURE: 142 MMHG

## 2021-06-04 VITALS
HEART RATE: 83 BPM | WEIGHT: 187.06 LBS | BODY MASS INDEX: 34.42 KG/M2 | HEIGHT: 62 IN | DIASTOLIC BLOOD PRESSURE: 64 MMHG | SYSTOLIC BLOOD PRESSURE: 122 MMHG | OXYGEN SATURATION: 97 %

## 2021-06-04 VITALS
SYSTOLIC BLOOD PRESSURE: 128 MMHG | DIASTOLIC BLOOD PRESSURE: 84 MMHG | OXYGEN SATURATION: 97 % | WEIGHT: 184 LBS | HEIGHT: 62 IN | BODY MASS INDEX: 33.86 KG/M2 | HEART RATE: 77 BPM

## 2021-06-04 VITALS
BODY MASS INDEX: 33.86 KG/M2 | HEART RATE: 96 BPM | SYSTOLIC BLOOD PRESSURE: 112 MMHG | OXYGEN SATURATION: 99 % | HEIGHT: 62 IN | WEIGHT: 184 LBS | DIASTOLIC BLOOD PRESSURE: 68 MMHG

## 2021-06-04 VITALS
WEIGHT: 188.25 LBS | TEMPERATURE: 97.7 F | OXYGEN SATURATION: 98 % | DIASTOLIC BLOOD PRESSURE: 80 MMHG | RESPIRATION RATE: 18 BRPM | HEIGHT: 62 IN | SYSTOLIC BLOOD PRESSURE: 126 MMHG | HEART RATE: 78 BPM | BODY MASS INDEX: 34.64 KG/M2

## 2021-06-04 VITALS — HEIGHT: 62 IN | BODY MASS INDEX: 34.21 KG/M2

## 2021-06-04 VITALS
DIASTOLIC BLOOD PRESSURE: 97 MMHG | WEIGHT: 189 LBS | BODY MASS INDEX: 34.57 KG/M2 | SYSTOLIC BLOOD PRESSURE: 147 MMHG | HEART RATE: 87 BPM

## 2021-06-04 VITALS — WEIGHT: 190 LBS | HEIGHT: 62 IN | BODY MASS INDEX: 34.96 KG/M2

## 2021-06-04 VITALS — BODY MASS INDEX: 33.65 KG/M2 | HEIGHT: 62 IN

## 2021-06-04 LAB
ANA SER QL: 0.2 U
CCP AB SER IA-ACNC: <0.5 U/ML

## 2021-06-04 NOTE — PATIENT INSTRUCTIONS - HE
~Please call Nurse Navigation line [phone] with any questions or concerns about your treatment plan, if symptoms worsen and you would like to be seen urgently, or if you have problems controlling bladder and bowel function.  ~Follow Up Appointment time slots with Teresa Mas CNP with [facility], are also available at the [facility] location near [facility] on the first and third THURSDAY afternoons of each month.    Discussed the importance of core strengthening, ROM, stretching exercises with the patient and how each of these entities is important in decreasing pain.  Explained to the patient that the purpose of physical therapy is to teach the patient a home exercise program.  These exercises need to be performed every day in order to decrease pain and prevent future occurrences of pain.

## 2021-06-04 NOTE — PROGRESS NOTES
Patient presents at the request of Teresa Mas for bilateral upper extremity EMG.  She has cervical spine and bilateral arm pain to the upper arms with intermittent numbness and tingling mostly to digits 4 and 5 right is worse than left.  She is right-handed.  No muscle atrophy or weakness on exam.    EMG/NCS  results: Please see scanned full report    Comment NCS: Abnormal study  1.  Borderline/mildly prolonged bilateral ulnar SNAP distal peak latencies with normal amplitudes.  2.  Prolonged peak latency right median versus ulnar sensory studies to digit 4.  Normal right median SNAP, bilateral median and ulnar transcarpal studies, bilateral median and ulnar CMAPs.    Comment EMG: Normal study  1.  Normal needle EMG bilateral upper extremities.    Interpretation: Abnormal study: There is electrodiagnostic evidence of:    1.  Electrodiagnostic findings are consistent with a very mild bilateral non-localizable ulnar neuropathy.  This is characterized by mildly prolonged distal peak latencies of the ulnar sensory studies with normal mixed studies and motor studies.     2.  There is no electrodiagnostic evidence of cervical radiculopathy or median neuropathy in the bilateral upper extremities.    The testing was completed in its entirety by the physician.       It was our pleasure caring for your patient today, if there any questions or concerns please do not hesitate to contact us.

## 2021-06-04 NOTE — TELEPHONE ENCOUNTER
Refill Approved    Rx renewed per Medication Renewal Policy. Medication was last renewed on 12/5/18.    Kianna Dorantes, [facility] Triage/Med Refill 1/3/2020     Requested Prescriptions   Pending Prescriptions Disp Refills     omeprazole (PRILOSEC) 20 MG capsule [Pharmacy Med Name: OMEPRAZOLE DR 20 MG CAPSULE] 180 capsule 3     Sig: TAKE 1 CAPSULE BY MOUTH 2 TIMES A DAY.       GI Medications Refill Protocol Passed - 1/1/2020  1:05 PM        Passed - PCP or prescribing provider visit in last 12 or next 3 months.     Last office visit with prescriber/PCP: 7/15/2019 Elba Monge MD OR same dept: 10/11/2019 Leonora Kerr NP OR same specialty: 10/11/2019 Leonora Kerr NP  Last physical: 8/23/2017 Last MTM visit: Visit date not found   Next visit within 3 mo: Visit date not found  Next physical within 3 mo: Visit date not found  Prescriber OR PCP: Elba Monge MD  Last diagnosis associated with med order: 1. Esophageal reflux  - omeprazole (PRILOSEC) 20 MG capsule [Pharmacy Med Name: OMEPRAZOLE DR 20 MG CAPSULE]; TAKE 1 CAPSULE BY MOUTH 2 TIMES A DAY.  Dispense: 180 capsule; Refill: 3    If protocol passes may refill for 12 months if within 3 months of last provider visit (or a total of 15 months).

## 2021-06-04 NOTE — TELEPHONE ENCOUNTER
Second attempt to reach patient. Results given and explained. Explained provider would like her to return for a follow up 2 weeks post injection. Dues to holidays and patient/provider schedules and location where patient wants to be seen, patient is scheduled 3 weeks post injection.

## 2021-06-04 NOTE — PROGRESS NOTES
Assessment:     Diagnoses and all orders for this visit:    Cervical radiculopathy    Bilateral arm weakness    Foraminal stenosis of cervical region    Cervical myofascial pain syndrome       Katy Hong is a 74 y.o. y.o. female with past medical history significant for hypertension, cardiomyopathy, kidney stone, fibromyalgia, depression with anxiety, migraine headache, reflux, cellulitis, chronic venous insufficiency with venous stasis ulcer, female stress incontinence who presents today for follow-up regarding:     -Cervical radiculopathy bilateral nonspecific pattern with upper extremity paresthesias x3 to 4 months, minimal relief with physical therapy, 40% relief with C7-T1 IL JOANNA.  MRI does show severe bilateral foraminal stenosis C5-6 and moderate left, moderate to severe right foraminal stenosis C6-7.    Plan:     A shared decision making plan was used.  The patient's values and choices were respected. Prior medical records from 11/21/19 were reviewed today. The following represents what was discussed and decided upon by the provider and the patient.     -DIAGNOSTIC TESTS: Images were personally reviewed and interpreted.   --Bilateral upper extremity EMG 12/11/2019 shows mild ulnar neuropathy, no cervical radiculopathy or median neuropathy noted.   --Cervical spine MRI 10/15/2019 shows severe foraminal stenosis on the right at C5-6, moderate to severe in the right at C6-7.  --Cervical spine MRI 11/1/2017 again shows C5-6 disc osteophyte complex with moderate central canal and severe bilateral foraminal stenosis.  C6-7, mild to moderate spinal canal stenosis with moderate to severe bilateral foraminal stenosis.  No cord signal changes noted.  --Thoracic MRI 2017 shows T10-11 right 8 mm enhancement dorsal lateral margin of the spinal cord.    -INTERVENTIONS: Discussed with patient that we can repeat C7-T1 interlaminar epidural steroid injection down the road if symptoms are worsening at any  time.  -Did discuss that if symptoms progress she would be a surgical candidate however she is not interested in surgery and wants to avoid this if at all possible.  No cervical radiculopathy noted on bilateral upper extremity EMG and no severe central stenosis noted, moderate spinal stenosis noted however C5-6.    -MEDICATIONS: Encourage patient to continue gabapentin which she is currently only taking at nighttime as it does make her sedated, continue Aleve as needed, she found no benefit with meloxicam previously.  Discussed side effects of medications and proper use. Patient verbalized understanding.    -PHYSICAL THERAPY: Encourage patient continue with home exercises from prior physical therapy sessions which she is stating she does.  Discussed the importance of core strengthening, ROM, stretching exercises with the patient and how each of these entities is important in decreasing pain.  Explained to the patient that the purpose of physical therapy is to teach the patient a home exercise program.  These exercises need to be performed every day in order to decrease pain and prevent future occurrences of pain.        -PATIENT EDUCATION: 20 minutes of total visit time was spent face to face with the patient today, 60 % of the visit was spent on counseling, education, and coordinating care.   -5 minutes spent outside of visit time, non-face-to-face time, reviewing chart.    -FOLLOW UP: Follow-up as needed  Advised to contact clinic if symptoms worsen or change.    Subjective:     Katy Hong is a 74 y.o. female who presents today for follow-up regarding post C7-T1 interlaminar epidural steroid injection which she received overall 40% relief of her neck and upper extremity pain.  She does still have constant pain currently a 2/10 that is much more tolerable in the cervical spine generalized rating to bilateral shoulders and bilateral upper extremities with numbness and tingling in the second third fingers  bilaterally, her pain does get up to 6/10 typically worse with reaching, lifting heavy objects or turning her head.  She does report that if she is careful on how she moves her pain can be very manageable.  Patient does still feel weakness in her upper extremities that is generalized, denies any recent trips or falls or balance changes, denies bowel or bladder loss control.     -Treatment to Date: No prior spinal surgery.     Right L4-5 JOANNA in the past with Dr. Sipple.     -Medications:  Gabapentin 300 mg 2 tablets at nighttime which is long-term for fibromyalgia  Medrol Dosepak with minimal but some benefit.  OTC Aleve with no benefit  Hydrocodone 5/325 mg at nighttime with some benefit recently.    Patient Active Problem List   Diagnosis     Osteopenia     Insomnia     Vitamin D Deficiency     Lower Back Pain     Fibromyalgia     Essential Hypertension     Seborrheic Keratosis     Obesity     Lichen Sclerosus Et Atrophicus     Depression With Anxiety     Migraine Headache     Esophageal Reflux     Joint Pain, Localized In The Knee     Varicose Veins     Shortness Of Breath     Nonvenomous Insect Bite Of Shoulder     Cellulitis     Mammogram - Abnormal     Chronic Cutaneous Ulcer Venous Stasis     Chronic venous insufficiency     Foot pain (Soft Tissue)     Female stress incontinence     Calculus of ureter     Hydronephrosis with urinary obstruction due to ureteral calculus     Secondary cardiomyopathy (H)       Current Outpatient Medications on File Prior to Visit   Medication Sig Dispense Refill     albuterol (VENTOLIN HFA) 90 mcg/actuation inhaler Inhale 2 puffs every 6 (six) hours as needed for wheezing. 18 g 5     aspirin 81 MG EC tablet Take 81 mg by mouth daily.       calcium carbonate (OS-MONIQUE) 500 mg calcium (1,250 mg) chewable tablet Chew 2 tablets daily.        cholecalciferol, vitamin D3, 5,000 unit Tab Take by mouth.       furosemide (LASIX) 20 MG tablet Take 1 tablet (20 mg total) by mouth daily.  (Patient taking differently: Take 20-40 mg by mouth every other day as needed. ) 90 tablet 1     gabapentin (NEURONTIN) 300 MG capsule TAKE 2 CAPSULES (600 MG TOTAL) BY MOUTH AT BEDTIME. (Patient taking differently: Take 600 mg by mouth 3 (three) times a day. three times a day 1/2/2020) 180 capsule 3     losartan (COZAAR) 25 MG tablet Take 1 tablet (25 mg total) by mouth daily. 90 tablet 3     meloxicam (MOBIC) 7.5 MG tablet Take 1 tablet (7.5 mg total) by mouth 2 (two) times a day as needed for pain. Take with food 30 tablet 1     metoprolol succinate (TOPROL-XL) 50 MG 24 hr tablet Take 1 tablet (50 mg total) by mouth daily. 90 tablet 3     omeprazole (PRILOSEC) 20 MG capsule TAKE 1 CAPSULE BY MOUTH 2 TIMES A DAY. (Patient taking differently: TAKE 1 CAPSULE BY MOUTH EVERY DAY) 180 capsule 3     SUMAtriptan succinate 6 mg/0.5 mL PnIj INJECT 1 INJECTION AS DIRECTED IMMEDIATELY AT ONSET OF MIGRAINE HEADACHE 2 Syringe 4     topiramate (TOPAMAX) 100 MG tablet TAKE ONE TABLET BY MOUTH DAILY WITH DINNER 90 tablet 1     triamcinolone (KENALOG) 0.1 % ointment APPLY TO AFFECTED AREA TWICE A DAY 30 g 2     venlafaxine (EFFEXOR) 50 MG tablet Take 1 tablet (50 mg total) by mouth 4 (four) times a day. 360 tablet 1     HYDROcodone-acetaminophen 5-325 mg per tablet Take 1 tablet by mouth every 6 (six) hours as needed for pain. 60 tablet 0     No current facility-administered medications on file prior to visit.        Allergies   Allergen Reactions     Fluoxetine Cough and Rash     Pt thinks it was cough but not totally sure.     Latex Rash     Levothyroxine Rash     Lisinopril Cough and Rash     Sulfa (Sulfonamide Antibiotics) Itching and Rash       Past Medical History:   Diagnosis Date     Acute Sinusitis     Created by Conversion      Breast cyst      Cardiomyopathy     Created by Conversion      Cellulitis     Created by Conversion      Chronic Cutaneous Ulcer Venous Stasis     Created by Conversion      Contusion With Intact  "Skin Surface     Created by Conversion      Depression With Anxiety     Created by Conversion      Epidermal Inclusion Cyst     Created by Conversion      Esophageal reflux     Created by Conversion      Essential Hypertension     Created by Conversion      Fibromyalgia     Created by Conversion      Insomnia     Created by Conversion      Joint Pain, Localized In The Knee     Created by Conversion      Lichen Sclerosus Et Atrophicus     Created by Conversion      Limb Pain     Created by Conversion      Lower Back Pain     Created by Conversion      Mammogram - Abnormal     Created by Conversion      Migraine Headache     Created by Conversion      Myocardial infarction (H)     per EKG's since 2015     Nonvenomous Insect Bite Of Shoulder     Created by Conversion      Obesity     Created by Conversion      Open Wound Of The Lip     Created by Conversion      Osteopenia     Created by Conversion      Sebaceous Hyperplasia     Created by Conversion      Seborrheic Keratosis     Created by Conversion      Shortness of breath     Created by Conversion      Varicose Veins     Created by Conversion      Vitamin D Deficiency     Created by Conversion         Review of Systems  ROS: Positive for numbness and tingling, upper extremity weakness.  Specifically negative for bowel/bladder dysfunction, balance changes, headache, dizziness, foot drop, fevers, chills, appetite changes, nausea/vomiting, unexplained weight loss. Otherwise 13 systems reviewed are negative. Please see the patient's intake questionnaire from today for details.    Reviewed Social, Family, Past Medical and Past Surgical history with patient, no significant changes noted since prior visit.     Objective:     /86 (Patient Site: Right Arm, Patient Position: Sitting, Cuff Size: Adult Regular)   Pulse 72   Ht 5' 2\" (1.575 m)   Wt 190 lb (86.2 kg)   LMP 09/29/1993   BMI 34.75 kg/m      PHYSICAL EXAMINATION:   --CONSTITUTIONAL: Vital signs as above. " No acute distress. The patient is well nourished and well groomed.  --PSYCHIATRIC:  The patient is awake, alert, oriented to person, place, time and answering questions appropriately with clear speech. Appropriate mood and affect   --HEENT: Sclera are non-injected. Extraocular muscles are intact.   --SKIN: Skin over the face, bilateral upper extremities, and posterior torso is clean, dry, intact without rashes.  --RESPIRATORY: Normal rhythm and effort. No abnormal accessory muscle breathing patterns noted.   --GROSS MOTOR: Easily arises from a seated position.   --CERVICAL SPINE: Inspection reveals no evidence of deformity.   --NEUROLOGIC: CN III-XII are grossly intact. 2/4 symmetric biceps, brachioradialis, triceps reflexes bilaterally. Sensation to upper extremities is intact. Negative Meyer's bilaterally.   --VASCULAR: Warm upper limbs bilaterally.     Imaging of the cervical spine: Cervical spine imaging was reviewed today. The images were shown to the patient and the findings were explained using a spine model.      MR CERVICAL SPINE WO CONTRAST  LOCATION: [facility]  DATE/TIME: 10/15/2019   INDICATION: Right cervical radiculopathy with weakness on exam.  COMPARISON: None.  TECHNIQUE: Without IV contrast.  FINDINGS:   Cervical lordosis is straightened. Moderate loss of disc height at C5-C6 and C6-C7. Small osteophytes and degenerative endplate changes at these levels and also at C3-C4 and C4-C5. Marrow signal and spinal cord signal otherwise normal. No extraspinal abnormality.  Craniovertebral junction and C1-C2: Normal.  C2-C3: Mild facet hypertrophy in the left side. Spinal canal and neural foramina are patent.   C3-C4: Shallow annular bulge. Mild facet hypertrophy on the left side. Spinal canal is patent. Neural foramina are mildly to moderately narrowed on the left and mildly narrowed on the right.   C4-C5: Shallow disc bulge and osteophytes. Spinal canal is patent. The neural foramina are mild to  moderately narrowed on the left and mildly narrowed on the right.   C5-C6: Shallow disc bulge and osteophytes. Spinal canal is patent. The neural foramina are severely narrowed bilaterally.   C6-C7: Bulging disc and osteophytes. Spinal canal is patent. The neural foramina are moderately narrowed on the left and moderately to severely narrowed on the right.   C7-T1: Spinal canal neuroforamina patent.  IMPRESSION:   1.  Degenerative disc disease most pronounced in the mid and lower cervical spine.   2.  No central spinal stenosis.  3.  No cord signal changes.  4.  Chronic/osteophyte complexes cause severe neural foraminal stenosis on the right side at C5-C6 and moderate to severe foraminal stenosis on the right at C6-C7. This may be affecting the C6 or C7 nerve roots.  5.  There is less significant foraminal stenosis as described above.        MR CERVICAL SPINE W WO CONTRAST  MR THORACIC SPINE W WO CONTRAST  11/1/2017   INDICATION: Bilateral leg and arm weakness x 2 years, T5 level.  TECHNIQUE: Without and with IV contrast.  CONTRAST: 9 mL Gadavist.  COMPARISON: Cervical spine MRI examination dated 06/30/2005.  FINDINGS:   Cervical spine MRI:   Mildly motion degraded examination.  Stable alignment compared to 2005 examination, with reversal of the normal cervical lordosis centered at C4. Degenerative grade 1 anterolisthesis of C2 on C3 and C3 on C4, as well as mild retrolisthesis of C5 on C6. Vertebral body height is preserved,   allowing for degenerative changes. Bone marrow signal intensity is homogeneous and within normal limits. Mild degenerative changes of the craniocervical junction and C1-C2. No abnormal cord signal. No abnormal enhancement. The visualized intracranial contents are unremarkable. Grossly normal paraspinal soft tissues. The vertebral artery flow voids are preserved.  C2-C3: Normal disc height. No herniation. Degenerative ankylosis of the left facet joint, with mild right facet arthropathy. No  spinal canal stenosis. No right neural foraminal stenosis. Mild left neural foraminal stenosis.  C3-C4: Mild intervertebral disc height loss, with shallow posterior disc osteophyte complex. Mild right and moderate to severe left facet arthropathy. No spinal canal stenosis. Mild right neural foraminal stenosis. Mild left neural foraminal stenosis.  C4-C5: Mild intervertebral disc height loss, with shallow posterior disc osteophyte complex. Moderate to severe bilateral facet arthropathy. No spinal canal stenosis. Mild to moderate right neural foraminal stenosis. Mild to moderate left neural foraminal stenosis.    C5-C6: Moderate to severe intervertebral disc height loss, with posterior disc osteophyte complex. Moderate bilateral facet arthropathy. Moderate spinal canal stenosis. Severe right neural foraminal stenosis. Severe left neural foraminal stenosis, progressed.  C6-C7: Moderate to severe intervertebral height loss, progressed, with posterior disc osteophyte complex. Mild to moderate bilateral facet arthropathy. Mild to moderate spinal canal stenosis. Moderate to severe right neural foraminal stenosis, progressed. Moderate to severe left neural foraminal stenosis, progressed.  C7-T1: Normal disc height. No herniation. Mild bilateral facet arthropathy. No spinal canal stenosis. Mild right neural foraminal stenosis. Mild left neural foraminal stenosis.  Thoracic spine:  Mildly motion degraded examination.  Normal vertebral body heights, alignment and marrow signal, allowing for prominent T8 vertebral body hemangioma and type II degenerative endplate marrow changes surrounding the anterior aspect of the T10-T11 intervertebral disc. Mild to moderate degenerative disc disease and facet arthropathy throughout the mid to lower thoracic spine, without resultant significant spinal canal or neural foraminal stenosis.   No focal lesion or abnormal enhancement is demonstrated in the region at the T5 level. 8mm linear focus  of enhancement is demonstrated on sagittal postcontrast T1-weighted image #7, the level of the T10 and T11 interspace, correlating to the right dorsolateral margin of the spinal cord. This finding is not characterized by axial images.  Unremarkable paraspinal soft tissues.  IMPRESSION:   CONCLUSION:  Cervical spine:  1.  Progression of spondylitic changes as compared to 2005 examination, most conspicuous at the C5-C6 and C6-C7 levels. Resultant moderate spinal canal compromise at C5-C6.  2.  Varying degrees of neural foraminal compromise, high-grade bilaterally at C5-C6 and C6-C7.  3.  Normal caliber and signal intensity of the cervical spinal cord, without focal lesion or abnormal enhancement.  Thoracic spine:  1.  No focal lesion or abnormal enhancement is demonstrated in the region at the T5 level.   2.  8 mm linear focus of enhancement along the right dorsolateral margin of the spinal cord at the level of T10-T11 on sagittal postcontrast T1-weighted sequence. The finding is not characterized by axial imaging. Differential considerations include normal vascular structure. Patient call back for thin section axial and sagittal T2 and postcontrast T1-weighted imaging through this region is recommended.

## 2021-06-04 NOTE — PATIENT INSTRUCTIONS - HE
Please follow up two weeks post procedure with Teresa to evaluate y our plan of care.       DISCHARGE INSTRUCTIONS    During office hours (8:00 a.m.- 4:00 p.m.) questions or concerns may be answered  by calling [facility] Navigation Nurses at  [phone].  Messages received after hours will be returned the following business day.      In the case of an emergency, please dial 911 or seek assistance at the nearest Emergency Room/Urgent Care facility.     All Patients:    ? You may experience an increase in your symptoms for the first 2 days (It may take anywhere between 2 days- 2 weeks for the steroid to have maximum effect).    ? You may use ice on the injection site, as frequently as 20 minutes each hour if needed.    ? You may take your pain medicine.    ? You may continue taking your regular medication after your injection. If you have had a Medial Branch Block you may resume pain medication once your pain diary is completed.    ? You may shower. No swimming, tub bath or hot tub for 48 hours.  You may remove your bandaid/bandage as soon as you are home.    ? You may resume light activities, as tolerated.    ? Resume your usual diet as tolerated.    ? It is strongly advised that you do not drive for 1-3 hours post injection.      POSSIBLE STEROID SIDE EFFECTS (If steroid/cortisone was used for your procedure)    -If you experience these symptoms, it should only last for a short period      Swelling of the legs                Skin redness (flushing)       Mouth (oral) irritation     Blood sugar (glucose) levels              Sweats                      Mood changes    Headache    Sleeplessness         POSSIBLE PROCEDURE SIDE EFFECTS  -Call the Spine Center if you are concerned    Increased Pain             Increased numbness/tingling        Nausea/Vomiting            Bruising/bleeding at site        Redness or swelling                                                Difficulty walking        Weakness              Fever greater than 100.5    *In the event of a severe headache after an epidural steroid injection that is relieved by lying down, please call the [facility] to speak with a clinical staff member*

## 2021-06-04 NOTE — TELEPHONE ENCOUNTER
----- Message from Teresa Mas CNP sent at 12/12/2019  9:45 AM CST -----  Regarding: EMG results  Please call patient and notify her that her upper extremity EMG result shows very mild bilateral ulnar neuropathy, no concerning findings from the cervical spine.  Recommend following up 2 weeks post cervical spine injection which she had completed yesterday I believe.  Thanks, Teresa

## 2021-06-05 ENCOUNTER — RECORDS - HEALTHEAST (OUTPATIENT)
Dept: PHYSICAL MEDICINE AND REHAB | Facility: CLINIC | Age: 76
End: 2021-06-05

## 2021-06-05 ENCOUNTER — RECORDS - HEALTHEAST (OUTPATIENT)
Dept: FAMILY MEDICINE | Facility: CLINIC | Age: 76
End: 2021-06-05

## 2021-06-05 ENCOUNTER — RECORDS - HEALTHEAST (OUTPATIENT)
Dept: SURGERY | Facility: CLINIC | Age: 76
End: 2021-06-05

## 2021-06-05 VITALS — WEIGHT: 184 LBS | HEIGHT: 62 IN | BODY MASS INDEX: 33.86 KG/M2

## 2021-06-05 VITALS
BODY MASS INDEX: 34.96 KG/M2 | HEART RATE: 84 BPM | HEIGHT: 62 IN | RESPIRATION RATE: 14 BRPM | DIASTOLIC BLOOD PRESSURE: 80 MMHG | WEIGHT: 190 LBS | SYSTOLIC BLOOD PRESSURE: 130 MMHG

## 2021-06-05 VITALS
WEIGHT: 187.44 LBS | BODY MASS INDEX: 34.49 KG/M2 | TEMPERATURE: 97.9 F | HEART RATE: 90 BPM | RESPIRATION RATE: 18 BRPM | OXYGEN SATURATION: 96 % | HEIGHT: 62 IN | SYSTOLIC BLOOD PRESSURE: 126 MMHG | DIASTOLIC BLOOD PRESSURE: 78 MMHG

## 2021-06-05 DIAGNOSIS — D24.9 BENIGN NEOPLASM OF BREAST: ICD-10-CM

## 2021-06-05 DIAGNOSIS — N63.0 LUMP OR MASS IN BREAST: ICD-10-CM

## 2021-06-05 NOTE — PROGRESS NOTES
1. Depression With Anxiety  PHQ9 Depression Screen    PHQ9 Depression Screen   2. Encounter for medication refill  albuterol (VENTOLIN HFA) 90 mcg/actuation inhaler     PHQ-9:6  MARGARETTE-7: 6    ASSESSMENT/PLAN:     The following are part of a depression follow up plan for the patient:  coping support assessment and emotional support assessment  The following high BMI interventions were performed this visit: encouragement to exercise and weight monitoring    1. Depression With Anxiety    - PHQ9 Depression Screen  - PHQ9 Depression Screen  -She is doing well on the current dose of Effexor, no medication changes were made today  -She is not seeing a mental health counselor at this time, she feels she is coping well  -depression action plan updated today    2. Encounter for medication refill    - albuterol (VENTOLIN HFA) 90 mcg/actuation inhaler; Inhale 2 puffs every 6 (six) hours as needed for wheezing.  Dispense: 18 g; Refill: 1  -She has continued to use the inhaler during the cold winter months and when she has a viral illness or when she tries to walk outside.      There are no Patient Instructions on file for this visit.  Medications Discontinued During This Encounter   Medication Reason     HYDROcodone-acetaminophen 5-325 mg per tablet Therapy completed     albuterol (VENTOLIN HFA) 90 mcg/actuation inhaler Reorder     Return in about 3 months (around 4/27/2020) for hyperlipidemia, hypertension, annual physical.        Leonora Kerr NP          SUBJECTIVE:  Katy Hong is a 74 y.o. female who presents for follow-up for anxiety and depression.  Current medication: Effexor 50 mg 4 times a day.  Current stressors include weight gain, low energy and her son recently had to put his dog to sleep which she states was part of the family.  She is sleeping well but she continues to struggle with weight gain and is seeing her gastric bypass specialist in February for follow-up to discuss her weight and fatigue  concerns.  Current coping strategies include working on her greeting cards and volunteering at [employer] when she feels down in the dumps.  She is not seeing a therapist at this time, she has no thoughts self-harm or plans for suicide.     She has been using an albuterol inhaler since she was diagnosed with bronchitis previously.  She does have a history of seasonal allergies and finds that she does wheeze at times when she is exercising.  She has not been exercising recently but she plans on doing so in the upcoming spring months.  She would like a refill of her albuterol to have on hand as needed in case she becomes ill in the next few months.   Chief Complaint   Patient presents with     Depression     Anxiety         Patient Active Problem List   Diagnosis     Osteopenia     Insomnia     Vitamin D Deficiency     Lower Back Pain     Fibromyalgia     Essential Hypertension     Seborrheic Keratosis     Obesity     Lichen Sclerosus Et Atrophicus     Depression With Anxiety     Migraine Headache     Esophageal Reflux     Joint Pain, Localized In The Knee     Varicose Veins     Shortness Of Breath     Nonvenomous Insect Bite Of Shoulder     Cellulitis     Mammogram - Abnormal     Chronic Cutaneous Ulcer Venous Stasis     Chronic venous insufficiency     Foot pain (Soft Tissue)     Female stress incontinence     Calculus of ureter     Hydronephrosis with urinary obstruction due to ureteral calculus     Secondary cardiomyopathy (H)       Current Outpatient Medications   Medication Sig Dispense Refill     albuterol (VENTOLIN HFA) 90 mcg/actuation inhaler Inhale 2 puffs every 6 (six) hours as needed for wheezing. 18 g 1     aspirin 81 MG EC tablet Take 81 mg by mouth daily.       calcium carbonate (OS-MONIQUE) 500 mg calcium (1,250 mg) chewable tablet Chew 2 tablets daily.        cholecalciferol, vitamin D3, 5,000 unit Tab Take by mouth.       furosemide (LASIX) 20 MG tablet Take 1 tablet (20 mg total) by mouth daily. (Patient  taking differently: Take 20-40 mg by mouth every other day as needed. ) 90 tablet 1     gabapentin (NEURONTIN) 300 MG capsule TAKE 2 CAPSULES (600 MG TOTAL) BY MOUTH AT BEDTIME. (Patient taking differently: Take 600 mg by mouth 3 (three) times a day. three times a day 1/2/2020) 180 capsule 3     losartan (COZAAR) 25 MG tablet Take 1 tablet (25 mg total) by mouth daily. 90 tablet 3     meloxicam (MOBIC) 7.5 MG tablet Take 1 tablet (7.5 mg total) by mouth 2 (two) times a day as needed for pain. Take with food 30 tablet 1     metoprolol succinate (TOPROL-XL) 50 MG 24 hr tablet Take 1 tablet (50 mg total) by mouth daily. 90 tablet 3     omeprazole (PRILOSEC) 20 MG capsule TAKE 1 CAPSULE BY MOUTH 2 TIMES A DAY. 180 capsule 2     SUMAtriptan succinate 6 mg/0.5 mL PnIj INJECT 1 INJECTION AS DIRECTED IMMEDIATELY AT ONSET OF MIGRAINE HEADACHE 2 Syringe 4     topiramate (TOPAMAX) 100 MG tablet TAKE ONE TABLET BY MOUTH DAILY WITH DINNER 90 tablet 1     triamcinolone (KENALOG) 0.1 % ointment APPLY TO AFFECTED AREA TWICE A DAY 30 g 2     venlafaxine (EFFEXOR) 50 MG tablet Take 1 tablet (50 mg total) by mouth 4 (four) times a day. 360 tablet 1     No current facility-administered medications for this visit.        Social History     Tobacco Use   Smoking Status Never Smoker   Smokeless Tobacco Never Used       REVIEW OF SYSTEMS: Denies mood swings, excessive sadness, crying episodes,  insomnia, anhedonia, irritability, feelings of extreme energy, suicidal/homicidal ideations.          TOBACCO USE:  Social History     Tobacco Use   Smoking Status Never Smoker   Smokeless Tobacco Never Used     Social History     Socioeconomic History     Marital status:      Spouse name: Anjel     Number of children: 2     Years of education: Not on file     Highest education level: Not on file   Occupational History     Occupation: Retired   Social Needs     Financial resource strain: Not on file     Food insecurity:     Worry: Not on file      Inability: Not on file     Transportation needs:     Medical: Not on file     Non-medical: Not on file   Tobacco Use     Smoking status: Never Smoker     Smokeless tobacco: Never Used   Substance and Sexual Activity     Alcohol use: Yes     Alcohol/week: 0.0 standard drinks     Comment: occasional     Drug use: No     Sexual activity: Not on file   Lifestyle     Physical activity:     Days per week: Not on file     Minutes per session: Not on file     Stress: Not on file   Relationships     Social connections:     Talks on phone: Not on file     Gets together: Not on file     Attends Baptism service: Not on file     Active member of club or organization: Not on file     Attends meetings of clubs or organizations: Not on file     Relationship status: Not on file     Intimate partner violence:     Fear of current or ex partner: Not on file     Emotionally abused: Not on file     Physically abused: Not on file     Forced sexual activity: Not on file   Other Topics Concern     Not on file   Social History Narrative    5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           OBJECTIVE:            Vitals:    20 1021   BP: 126/80   Pulse: 78   Resp: 18   Temp: 97.7  F (36.5  C)   SpO2: 98%     Weight: 188 lb 4 oz (85.4 kg)    Wt Readings from Last 3 Encounters:   20 188 lb 4 oz (85.4 kg)   20 190 lb (86.2 kg)   19 190 lb (86.2 kg)     Body mass index is 34.43 kg/m .        Physical Exam:  GENERAL APPEARANCE: A&A, NAD, well hydrated, well nourished  CV: RRR, no M/G/R   LUNGS: CTAB, normal respiratory effort  SKIN:  Normal skin turgor, no lesions/rashes, warm and dry   PSYCHIATRIC;  Mood appropriate, memory intact, good eye contact, engaged in conversation

## 2021-06-06 NOTE — PROGRESS NOTES
[facility] Follow up Visit   Date of visit: 2/11/2020  Physician: Alexsandra Rossi MD  Primary Care is Leonora Kerr, ARLENE.  Katy Hong   74 y.o.  female    Initial Weight: 201#  Initial BMI: 37.4  Today's Weight:   Wt Readings from Last 1 Encounters:   02/11/20 184 lb (83.5 kg)     Body mass index is 33.65 kg/m .  Weight: 184 lb (83.5 kg)       Assessment and Plan   Assessment: Katy is a 74 y.o. year old female who presents for medical weight management.      Plan:    1. Obesity (BMI 30-39.9)  Patient was congratulated on her success thus far. Healthy habits to assist with further weight loss were discussed. Written information was given. She will continue to eat bread sparingly. She will try to move as much as possible. She will continue to take the topamax.    2. Essential hypertension  This may improve with healthy habits and weight loss.    3. Migraine without status migrainosus, not intractable, unspecified migraine type  This may improve with healthy habits and weight loss. Topamax may help prevent these.      Follow up in 5-6 months with myself.           INTERIM HISTORY  She is doing well. She tends to hibernate a bit during the winter. She has tried to decrease her bread.     DIETARY HISTORY  Meals Per Day: 3  Eating Protein First?: usually  Food Diary: B:eggs and occasional toast L:fruit and cheese and sometimes premiere protein D:meat and fruit and vegetable  Snacks Per Day: 0-2  Typical Snack: occasional fruit or string cheese  Fluid Intake: working on it, has incontinence issues  Portion Control: yes  Calorie Containing Beverages: premiere protein, 1-2 sodas over the holidays  Typical Protein Food Choices: protein shake, meat, egg  Choosing Whole Grains: yes  Meals at Restaurant per week:0-1    Positive Changes Since Last Visit: less bread  Struggling With: exercise    Knowledgeable in Reading Food Labels: yes  Getting Adequate Protein: sometimes  Sleeping 7-8  hours/day 8-9 hours  Stress management makes cards for [employer]  Or reads    PHYSICAL ACTIVITY PATTERNS:  Cardiovascular: not much due to weather, sometimes will goes to stores  Strength Training: PT exercises    REVIEW OF SYSTEMS  GENERAL/CONSTITUTIONAL:  Fatigue: yes  HEENT:  Vision changes, glaucoma: no  CARDIOVASCULAR:  Chest Pain with Exertion: no  PULMONARY:  Dyspnea on exertion: sometimes  NEUROLOGIC:  Paresthesias: no  PSYCHIATRIC:  Moods: stable       Patient Profile   Social History     Social History Narrative    5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          Past Medical History   Past Medical History:   Diagnosis Date     Acute Sinusitis     Created by Conversion      Breast cyst      Cardiomyopathy     Created by Conversion      Cellulitis     Created by Conversion      Chronic Cutaneous Ulcer Venous Stasis     Created by Conversion      Contusion With Intact Skin Surface     Created by Conversion      Depression With Anxiety     Created by Conversion      Epidermal Inclusion Cyst     Created by Conversion      Esophageal reflux     Created by Conversion      Essential Hypertension     Created by Conversion      Fibromyalgia     Created by Conversion      Insomnia     Created by Conversion      Joint Pain, Localized In The Knee     Created by Conversion      Lichen Sclerosus Et Atrophicus     Created by Conversion      Limb Pain     Created by Conversion      Lower Back Pain     Created by Conversion      Mammogram - Abnormal     Created by Conversion      Migraine Headache     Created by Conversion      Myocardial infarction (H)     per EKG's since      Nonvenomous Insect Bite Of Shoulder     Created by Conversion      Obesity     Created by Conversion      Open Wound Of The Lip     Created by Conversion      Osteopenia     Created by Conversion      Sebaceous Hyperplasia     Created by Conversion      Seborrheic Keratosis     Created by Conversion      Shortness of breath     Created by Conversion       Varicose Veins     Created by Conversion      Vitamin D Deficiency     Created by Conversion      Patient Active Problem List   Diagnosis     Osteopenia     Insomnia     Vitamin D Deficiency     Lower Back Pain     Fibromyalgia     Essential Hypertension     Seborrheic Keratosis     Obesity     Lichen Sclerosus Et Atrophicus     Depression With Anxiety     Migraine Headache     Esophageal Reflux     Joint Pain, Localized In The Knee     Varicose Veins     Shortness Of Breath     Nonvenomous Insect Bite Of Shoulder     Cellulitis     Mammogram - Abnormal     Chronic Cutaneous Ulcer Venous Stasis     Chronic venous insufficiency     Foot pain (Soft Tissue)     Female stress incontinence     Calculus of ureter     Hydronephrosis with urinary obstruction due to ureteral calculus     Secondary cardiomyopathy (H)     Current Outpatient Medications   Medication Sig Note     albuterol (VENTOLIN HFA) 90 mcg/actuation inhaler Inhale 2 puffs every 6 (six) hours as needed for wheezing.      aspirin 81 MG EC tablet Take 81 mg by mouth daily.      calcium carbonate (OS-MONIQUE) 500 mg calcium (1,250 mg) chewable tablet Chew 2 tablets daily.       cholecalciferol, vitamin D3, 5,000 unit Tab Take by mouth.      furosemide (LASIX) 20 MG tablet Take 1 tablet (20 mg total) by mouth daily. (Patient taking differently: Take 20-40 mg by mouth every other day as needed. )      gabapentin (NEURONTIN) 300 MG capsule TAKE 2 CAPSULES (600 MG TOTAL) BY MOUTH AT BEDTIME. (Patient taking differently: Take 600 mg by mouth 3 (three) times a day. three times a day 1/2/2020) 11/21/2019: Increased to 2 cap two times a day      losartan (COZAAR) 25 MG tablet Take 1 tablet (25 mg total) by mouth daily.      meloxicam (MOBIC) 7.5 MG tablet Take 1 tablet (7.5 mg total) by mouth 2 (two) times a day as needed for pain. Take with food      metoprolol succinate (TOPROL-XL) 50 MG 24 hr tablet Take 1 tablet (50 mg total) by mouth daily.      omeprazole (PRILOSEC)  "20 MG capsule TAKE 1 CAPSULE BY MOUTH 2 TIMES A DAY.      SUMAtriptan succinate 6 mg/0.5 mL PnIj INJECT 1 INJECTION AS DIRECTED IMMEDIATELY AT ONSET OF MIGRAINE HEADACHE      topiramate (TOPAMAX) 100 MG tablet TAKE ONE TABLET BY MOUTH DAILY WITH DINNER      triamcinolone (KENALOG) 0.1 % ointment APPLY TO AFFECTED AREA TWICE A DAY      venlafaxine (EFFEXOR) 50 MG tablet Take 1 tablet (50 mg total) by mouth 4 (four) times a day.        Past Surgical History  She has a past surgical history that includes pr total abdom hysterectomy; Breast cyst aspiration; Breast biopsy; Tubal ligation; Appendectomy; Temporomandibular joint surgery (Bilateral); and Cataract extraction, bilateral.     Examination   /84   Pulse 77   Ht 5' 2\" (1.575 m)   Wt 184 lb (83.5 kg)   LMP 09/29/1993   SpO2 97%   BMI 33.65 kg/m    Height: 5' 2\" (1.575 m) (2/11/2020  2:20 PM)  Weight: 184 lb (83.5 kg) (2/11/2020  2:20 PM)  BMI (Calculated): 33.6 (2/11/2020  2:20 PM)  SpO2: 97 % (2/11/2020  2:20 PM)    General:  Alert and ambulatory, NAD  Pscyh/Mood: stable         Counseling:   We reviewed the important post op bariatric recommendations:  -eating 3 meals daily  -eating protein first, getting >60gm protein daily  -eating slowly, chewing food well  -avoiding/limiting calorie containing beverages  -limiting starchy vegetables and carbohydrates, choosing wheat, not white with breads,   crackers, pastas, bear, bagels, tortillas, rice  -limiting restaurant or cafeteria eating to twice a week or less    We discussed the importance of restorative sleep and stress management in maintaining a healthy weight.  We discussed the National Weight Control Registry healthy weight maintenance strategies and ways to optimize metabolism.  We discussed the importance of physical activity including cardiovascular and strength training in maintaining a healthier weight.    > 25 min spent with patient, > 50% spent in counseling         LATISHA Rossi " MD  [facility].    Much or all of the text in this note was generated through the use of Dragon Dictate voice-to-text software. Errors in spelling or words which seem out of context are unintentional. Sound alike errors, in particular, may have escaped editing.

## 2021-06-06 NOTE — TELEPHONE ENCOUNTER
Pt called in requesting a refill of her Topamax.  Her and  discussed at her visit last week but pt thought she had a refill waiting but didn't.    Tita Franco RN, CBN  [facility]  P [phone]  F [phone]

## 2021-06-07 ENCOUNTER — OFFICE VISIT - HEALTHEAST (OUTPATIENT)
Dept: FAMILY MEDICINE | Facility: CLINIC | Age: 76
End: 2021-06-07

## 2021-06-07 DIAGNOSIS — M79.7 FIBROMYALGIA: ICD-10-CM

## 2021-06-07 DIAGNOSIS — K21.9 GASTROESOPHAGEAL REFLUX DISEASE WITHOUT ESOPHAGITIS: ICD-10-CM

## 2021-06-07 DIAGNOSIS — G47.01 INSOMNIA DUE TO MEDICAL CONDITION: ICD-10-CM

## 2021-06-07 RX ORDER — FAMOTIDINE 20 MG/1
20 TABLET, FILM COATED ORAL 2 TIMES DAILY
Qty: 60 TABLET | Refills: 5 | Status: SHIPPED | OUTPATIENT
Start: 2021-06-07 | End: 2021-11-23

## 2021-06-07 ASSESSMENT — MIFFLIN-ST. JEOR: SCORE: 1315.08

## 2021-06-08 NOTE — TELEPHONE ENCOUNTER
RN cannot approve Refill Request    RN can NOT refill this medication pt reports is taking 20-40 mg every 48 hours as needed.       Kianna Dorantes, [facility] Triage/Med Refill 6/2/2020    Requested Prescriptions   Pending Prescriptions Disp Refills     furosemide (LASIX) 20 MG tablet 60 tablet 0     Sig: Take 1-2 tablets (20-40 mg total) by mouth every other day as needed.       Diuretics/Combination Diuretics Refill Protocol  Passed - 5/29/2020  4:24 PM        Passed - Visit with PCP or prescribing provider visit in past 12 months     Last office visit with prescriber/PCP: 1/27/2020 Leonora Kerr NP OR same dept: 1/27/2020 Leonora Kerr NP OR same specialty: 1/27/2020 Leonora Kerr NP  Last physical: Visit date not found Last MTM visit: Visit date not found   Next visit within 3 mo: Visit date not found  Next physical within 3 mo: Visit date not found  Prescriber OR PCP: Leonora Kerr NP  Last diagnosis associated with med order: There are no diagnoses linked to this encounter.  If protocol passes may refill for 12 months if within 3 months of last provider visit (or a total of 15 months).             Passed - Serum Potassium in past 12 months      Lab Results   Component Value Date    Potassium 3.7 10/11/2019             Passed - Serum Sodium in past 12 months      Lab Results   Component Value Date    Sodium 142 10/11/2019             Passed - Blood pressure on file in past 12 months     BP Readings from Last 1 Encounters:   02/11/20 128/84             Passed - Serum Creatinine in past 12 months      Creatinine   Date Value Ref Range Status   10/11/2019 0.80 0.60 - 1.10 mg/dL Final

## 2021-06-08 NOTE — TELEPHONE ENCOUNTER
fluticasone propionate (FLOVENT HFA) 110 mcg/actuation inhaler [[id number]]     Electronically signed by: Sheeba Lang RN on 03/28/19 1415  Status: Discontinued    Ordering user: Sheeba Lang RN 03/28/19 1415  Ordering provider: Elba Monge MD    Authorized by: Elba Monge MD    Frequency: BID 03/28/19 - 08/06/19  Released by: Sheeba Lang RN 03/28/19 1415

## 2021-06-08 NOTE — PROGRESS NOTES
"Katy Hong is a 74 y.o. female who is being evaluated via a billable telephone visit.      The patient has been notified of following:     \"This telephone visit will be conducted via a call between you and your physician/provider. We have found that certain health care needs can be provided without the need for a physical exam.  This service lets us provide the care you need with a short phone conversation.  If a prescription is necessary we can send it directly to your pharmacy.  If lab work is needed we can place an order for that and you can then stop by our lab to have the test done at a later time.    Telephone visits are billed at different rates depending on your insurance coverage. During this emergency period, for some insurers they may be billed the same as an in-person visit.  Please reach out to your insurance provider with any questions.    If during the course of the call the physician/provider feels a telephone visit is not appropriate, you will not be charged for this service.\"    Patient has given verbal consent to a Telephone visit? Yes    What phone number would you like to be contacted at? [phone]    Patient would like to receive their AVS by AVS Preference: [employer].    Additional provider notes:     [facility] Surgical Follow up Visit   Date of visit: 6/16/2020  Physician: Alexsandra Rossi MD  Primary Care is Leonora Kerr, NP.  Katy Hong   74 y.o.  female    Initial Weight: 201#  Initial BMI: 37.4  Today's Weight:   Wt Readings from Last 1 Encounters:   02/11/20 184 lb (83.5 kg)     Body mass index is 33.65 kg/m .  No data recorded     Assessment and Plan   Assessment: Katy is a 74 y.o. year old female who presents for medical weight management.      Plan:    1. Obesity (BMI 30-39.9)  Patient was congratulated on her success thus far. Healthy habits to assist with further weight loss were discussed. Written information was given. She will work " on getting adequate protein with breakfast. She will increase her activity as tolerated. She will continue to take the topamax.    2. Migraine without status migrainosus, not intractable, unspecified migraine type  This may improve with healthy habits and weight loss. She will continue topamax      Follow up in 3 months with myself           INTERIM HISTORY  Patient has gained some weight with Covid Pandemic. She has been pretty isolated and has not been as active. She is doing some eating out of boredom. She is taking the topamax and thinks that it is helpful.    DIETARY HISTORY  Meals Per Day: 3  Eating Protein First?: sometimes  Food Diary: B:toast or fruit or cereal L:fruit or protein drink or cheese stick D:meat and starch and vegetable  Snacks Per Day: more problematic in the past 3 months  Typical Snack: dried cranberries or fruit or ocaisonally chips  Fluid Intake: not enough (bladder issues)  Portion Control: yes  Calorie Containing Beverages: protein drink, 12 oz soda lasts 2 days  Typical Protein Food Choices: meat  Choosing Whole Grains: sometimes  Meals at Restaurant per week:0-2      Positive Changes Since Last Visit: fruit and vegetables  Struggling With: boredom eating, not getting enough protein    Knowledgeable in Reading Food Labels: yes  Getting Adequate Protein: sometimes  Sleeping 7-8 hours/day sometimes  Stress management sewing face masks. Making cards    PHYSICAL ACTIVITY PATTERNS:  Cardiovascular: ADLs  Strength Training: PT exercises    REVIEW OF SYSTEMS  GENERAL/CONSTITUTIONAL:  Fatigue: sometimes  HEENT:  Vision changes, glaucoma: no  CARDIOVASCULAR:  Chest Pain with Exertion: no  PSYCHIATRIC:  Moods: stable  ENDOCRINE:  Monitoring Blood Sugars: na  Sugars Well Controlled: na       Patient Profile   Social History     Social History Narrative    5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          Past Medical History   Past Medical History:   Diagnosis Date     Acute Sinusitis     Created by Conversion       Breast cyst      Cardiomyopathy     Created by Conversion      Cellulitis     Created by Conversion      Chronic Cutaneous Ulcer Venous Stasis     Created by Conversion      Contusion With Intact Skin Surface     Created by Conversion      Depression With Anxiety     Created by Conversion      Epidermal Inclusion Cyst     Created by Conversion      Esophageal reflux     Created by Conversion      Essential Hypertension     Created by Conversion      Fibromyalgia     Created by Conversion      Insomnia     Created by Conversion      Joint Pain, Localized In The Knee     Created by Conversion      Lichen Sclerosus Et Atrophicus     Created by Conversion      Limb Pain     Created by Conversion      Lower Back Pain     Created by Conversion      Mammogram - Abnormal     Created by Conversion      Migraine Headache     Created by Conversion      Myocardial infarction (H)     per EKG's since 2015     Nonvenomous Insect Bite Of Shoulder     Created by Conversion      Obesity     Created by Conversion      Open Wound Of The Lip     Created by Conversion      Osteopenia     Created by Conversion      Sebaceous Hyperplasia     Created by Conversion      Seborrheic Keratosis     Created by Conversion      Shortness of breath     Created by Conversion      Varicose Veins     Created by Conversion      Vitamin D Deficiency     Created by Conversion      Patient Active Problem List   Diagnosis     Osteopenia     Insomnia     Vitamin D Deficiency     Lower Back Pain     Fibromyalgia     Essential Hypertension     Seborrheic Keratosis     Obesity     Lichen Sclerosus Et Atrophicus     Depression With Anxiety     Migraine Headache     Esophageal Reflux     Joint Pain, Localized In The Knee     Varicose Veins     Shortness Of Breath     Nonvenomous Insect Bite Of Shoulder     Cellulitis     Mammogram - Abnormal     Chronic Cutaneous Ulcer Venous Stasis     Chronic venous insufficiency     Foot pain (Soft Tissue)     Female  stress incontinence     Calculus of ureter     Hydronephrosis with urinary obstruction due to ureteral calculus     Secondary cardiomyopathy (H)     Obesity (BMI 30-39.9)     Current Outpatient Medications   Medication Sig Note     albuterol (VENTOLIN HFA) 90 mcg/actuation inhaler Inhale 2 puffs every 6 (six) hours as needed for wheezing.      aspirin 81 MG EC tablet Take 81 mg by mouth daily.      calcium carbonate (OS-MONIQUE) 500 mg calcium (1,250 mg) chewable tablet Chew 2 tablets daily.       cholecalciferol, vitamin D3, 5,000 unit Tab Take by mouth.      furosemide (LASIX) 20 MG tablet Take 1-2 tablets (20-40 mg total) by mouth every other day as needed.      gabapentin (NEURONTIN) 300 MG capsule TAKE 2 CAPSULES (600 MG TOTAL) BY MOUTH AT BEDTIME. (Patient taking differently: Take 600 mg by mouth 3 (three) times a day. three times a day 1/2/2020) 11/21/2019: Increased to 2 cap two times a day      losartan (COZAAR) 25 MG tablet Take 1 tablet (25 mg total) by mouth daily.      meloxicam (MOBIC) 7.5 MG tablet Take 1 tablet (7.5 mg total) by mouth 2 (two) times a day as needed for pain. Take with food      metoprolol succinate (TOPROL-XL) 50 MG 24 hr tablet Take 1 tablet (50 mg total) by mouth daily.      omeprazole (PRILOSEC) 20 MG capsule TAKE 1 CAPSULE BY MOUTH 2 TIMES A DAY.      SUMAtriptan succinate 6 mg/0.5 mL PnIj INJECT 1 INJECTION AS DIRECTED IMMEDIATELY AT ONSET OF MIGRAINE HEADACHE      topiramate (TOPAMAX) 100 MG tablet TAKE ONE TABLET BY MOUTH DAILY WITH DINNER      triamcinolone (KENALOG) 0.1 % ointment APPLY TO AFFECTED AREA TWICE A DAY      venlafaxine (EFFEXOR) 50 MG tablet TAKE 1 TABLET (50 MG TOTAL) BY MOUTH 4 (FOUR) TIMES A DAY.        Past Surgical History  She has a past surgical history that includes pr total abdom hysterectomy; Breast cyst aspiration; Breast biopsy; Tubal ligation; Appendectomy; Temporomandibular joint surgery (Bilateral); and Cataract extraction, bilateral.     Examination  "  Ht 5' 2\" (1.575 m)   LMP 09/29/1993   Breastfeeding No   BMI 33.65 kg/m    Height: 5' 2\" (1.575 m) (6/16/2020  1:47 PM)  Weight: 184 lb (83.5 kg) (2/11/2020  2:20 PM)  BMI (Calculated): 33.6 (2/11/2020  2:20 PM)  SpO2: 97 % (2/11/2020  2:20 PM)    General:  Alert and ambulatory, NAD  Pscyh/Mood: stable         Counseling:   We reviewed the important post op bariatric recommendations:  -eating 3 meals daily  -eating protein first, getting >60gm protein daily  -eating slowly, chewing food well  -avoiding/limiting calorie containing beverages  -limiting starchy vegetables and carbohydrates, choosing wheat, not white with breads,   crackers, pastas, bear, bagels, tortillas, rice  -limiting restaurant or cafeteria eating to twice a week or less    We discussed the importance of restorative sleep and stress management in maintaining a healthy weight.  We discussed the National Weight Control Registry healthy weight maintenance strategies and ways to optimize metabolism.  We discussed the importance of physical activity including cardiovascular and strength training in maintaining a healthier weight.            LATISHA Rossi MD  [facility].    Much or all of the text in this note was generated through the use of Dragon Dictate voice-to-text software. Errors in spelling or words which seem out of context are unintentional. Sound alike errors, in particular, may have escaped editing.          No images are attached to the encounter.    Phone call duration: 13 minutes    Casper Brito"

## 2021-06-08 NOTE — PROGRESS NOTES
Joint Aspiration/Injection  Date/Time: 1/6/2017 9:20 AM  Performed by: SHEEBA WALLER  Authorized by: SHEEBA WALLER   Indications: pain   Body area: shoulder  Joint: right shoulder  Local anesthesia used: no    Anesthesia:  Local anesthesia used: no  Sedation:  Patient sedated: no    Preparation: Patient was prepped and draped in the usual sterile fashion.  Needle size: 22 G  Ultrasound guidance: no  Approach: posterior  Triamcinolone amount: 40 mg  Bupivacaine 0.25% amount: 3 mL  Patient tolerance: Patient tolerated the procedure well with no immediate complications        ASSESSMENT/PLAN:       1. Right rotator cuff tendonitis  -injected today without difficulty. Return to clinic for repeat injection no sooner than 3 months. If not getting a lot better consider PT vs MRI of the neck.   - triamcinolone acetonide 40 mg/mL injection 40 mg (KENALOG-40); Inject 1 mL (40 mg total) into the joint once.  - bupivacaine 0.25 % (2.5 mg/mL) injection 7.5 mg (MARCAINE); Inject 3 mL (7.5 mg total) into the joint once.        Sheeba Waller MD      PROGRESS NOTE   1/6/2017    SUBJECTIVE:  Katy Hong is a 71 y.o. female  who presents for right shoulder pain.     She has had pain for many years, is always there, is not worsening. She does have some numbness down the back of her arm at times, but the pain is in her sholder. The numbness moves around. Sometimes withmovements the pain can worsen. She has done PT before. She did have an MRI done showing some mild tendonitis, but otherwise nothing remarkable.       Chief Complaint   Patient presents with     Injections     right shoulder pain, wanting cortisone injection          Patient Active Problem List   Diagnosis     Osteopenia     Insomnia     Limb Pain     Epidermal Inclusion Cyst     Sebaceous Hyperplasia     Vitamin D Deficiency     Lower Back Pain     Fibromyalgia     Open Wound Of The Lip     Contusion With Intact Skin Surface      Essential Hypertension     Seborrheic Keratosis     Obesity     Lichen Sclerosus Et Atrophicus     Depression With Anxiety     Migraine Headache     Cardiomyopathy     Acute Sinusitis     Esophageal Reflux     Joint Pain, Localized In The Knee     Varicose Veins     Shortness Of Breath     Nonvenomous Insect Bite Of Shoulder     Cellulitis     Mammogram - Abnormal     Chronic Cutaneous Ulcer Venous Stasis     Chronic venous insufficiency     Foot pain (Soft Tissue)     Female stress incontinence       Current Outpatient Prescriptions   Medication Sig Dispense Refill     aspirin 81 MG EC tablet Take 81 mg by mouth daily.       benzonatate (TESSALON) 200 MG capsule TAKE 1 CAPSULE (200 MG TOTAL) BY MOUTH 3 (THREE) TIMES A DAY AS NEEDED FOR COUGH. 30 capsule 0     calcium carbonate (OS-MONIQUE) 500 mg calcium (1,250 mg) chewable tablet Chew 2 tablets daily.        cholecalciferol, vitamin D3, (VITAMIN D3) 2,000 unit Tab Take 1 tablet by mouth daily.        clobetasol (TEMOVATE) 0.05 % Gel Apply to red rash daily until gone but no longer than two weeks 30 each 1     codeine-guaiFENesin (GUAIFENESIN AC)  mg/5 mL liquid Take 5 mL by mouth every 4 (four) hours as needed for cough. 120 mL 0     furosemide (LASIX) 20 MG tablet Take 1 tablet (20 mg total) by mouth every other day. 90 tablet 1     gabapentin (NEURONTIN) 300 MG capsule TAKE TWO CAPSULES BY MOUTH TWICE DAILY 360 capsule 1     guaiFENesin ER (MUCINEX) 600 mg 12 hr tablet Take 1 tablet (600 mg total) by mouth 2 (two) times a day. 60 tablet 2     HYDROcodone-acetaminophen 5-325 mg per tablet Take 1 tablet by mouth every 6 (six) hours as needed for pain. 60 tablet 0     losartan (COZAAR) 25 MG tablet Take 1 tablet (25 mg total) by mouth daily. 90 tablet 2     metoprolol succinate (TOPROL-XL) 50 MG 24 hr tablet TAKE ONE TABLET BY MOUTH ONE TIME DAILY 90 tablet 1     nortriptyline (PAMELOR) 10 MG capsule Take up to 4 capsules qhs 120 capsule 3     nortriptyline  "(PAMELOR) 50 MG capsule TAKE 1CAPSULE BY MOUTH NIGHTLY AT BEDTIME 180 capsule 1     nystatin (MYCOSTATIN) ointment Apply topically 2 (two) times a day.       omeprazole (PRILOSEC) 20 MG capsule Take 1 capsule (20 mg total) by mouth 2 (two) times a day. 180 capsule 2     SUMAtriptan succinate (IMITREX STATDOSE) 6 mg/0.5 mL kit INJECT1 INJECTION AS DIRECTED IMMEDIATELY AT ONSET OF MIGRAINE HEADACHE 2 each 3     SUMAtriptan succinate 6 mg/0.5 mL PnIj Inject 1 injection as directed immediately at onset of migraine headache 2 mL 5     triamcinolone (KENALOG) 0.1 % ointment Apply topically 2 (two) times a day.       venlafaxine (EFFEXOR) 50 MG tablet TAKE ONE TABLET BY MOUTH THREE TIMES DAILY 270 tablet 1     zolpidem (AMBIEN CR) 12.5 MG CR tablet Take 12.5 mg by mouth bedtime as needed for sleep.       No current facility-administered medications for this visit.        History   Smoking Status     Never Smoker   Smokeless Tobacco     Never Used           OBJECTIVE:        No results found for this or any previous visit (from the past 240 hour(s)).    Vitals:    01/06/17 0905   BP: 128/70   Pulse: 82   Weight: 200 lb (90.7 kg)   Height: 5' 3\" (1.6 m)     Weight: 200 lb (90.7 kg)        Physical Exam:  GENERAL APPEARANCE: A&A, NAD, well hydrated, well nourished  SKIN:  Normal skin turgor, no lesions/rashes   Right shoulder: Abduction limited to 90 degrees, FF to 115, pos lift off testing. 4+/5 int/ext rotation  EXTREMITY: no edema   NEURO: no gross deficits     Shoulder Injection Procedure Note    Pre-operative Diagnosis: right shoulder tendonitis    Post-operative Diagnosis: same    Indications: pain    Anesthesia: not required    Procedure Details   Verbal consent was obtained for the procedure. The shoulder was prepped with iodine and the skin was anesthetized. Using a 22 gauge needle the glenohumeral joint is injected with 3 mL 0.25% bupivacaine and 1 mL of triamcinolone (KENALOG) 40mg/ml under the posterior aspect of " the acromion. The injection site was cleansed with topical isopropyl alcohol and a dressing was applied.      Complications:  None; patient tolerated the procedure well.

## 2021-06-08 NOTE — PATIENT INSTRUCTIONS - HE

## 2021-06-08 NOTE — TELEPHONE ENCOUNTER
RN cannot approve Refill Request    RN can NOT refill this medication Protocol failed and NO refill given.       Kianna Dorantes, [facility] Triage/Med Refill 5/19/2020    Requested Prescriptions   Pending Prescriptions Disp Refills     venlafaxine (EFFEXOR) 50 MG tablet [Pharmacy Med Name: VENLAFAXINE HCL 50 MG TABLET] 360 tablet 3     Sig: TAKE 1 TABLET (50 MG TOTAL) BY MOUTH 4 (FOUR) TIMES A DAY.       Venlafaxine/Desvenlafaxine Refill Protocol Failed - 5/16/2020  9:29 AM        Failed - LFT or AST or ALT in last year     Albumin   Date Value Ref Range Status   05/15/2018 3.3 (L) 3.5 - 5.0 g/dL Final     Bilirubin, Total   Date Value Ref Range Status   05/15/2018 0.4 0.0 - 1.0 mg/dL Final     Bilirubin, Direct   Date Value Ref Range Status   07/18/2017 0.2 <=0.5 mg/dL Final     Alkaline Phosphatase   Date Value Ref Range Status   05/15/2018 90 45 - 120 U/L Final     AST   Date Value Ref Range Status   05/15/2018 11 0 - 40 U/L Final     ALT   Date Value Ref Range Status   05/15/2018 <9 0 - 45 U/L Final     Protein, Total   Date Value Ref Range Status   05/15/2018 6.5 6.0 - 8.0 g/dL Final                Passed - Fasting lipid cascade in last year     Cholesterol   Date Value Ref Range Status   10/11/2019 169 <=199 mg/dL Final     Triglycerides   Date Value Ref Range Status   10/11/2019 98 <=149 mg/dL Final     HDL Cholesterol   Date Value Ref Range Status   10/11/2019 63 >=50 mg/dL Final     LDL Calculated   Date Value Ref Range Status   10/11/2019 86 <=129 mg/dL Final     Patient Fasting > 8hrs?   Date Value Ref Range Status   10/11/2019 Unknown  Final             Passed - PCP or prescribing provider visit in last year     Last office visit with prescriber/PCP: 7/15/2019 Elab Monge MD OR same dept: 1/27/2020 Leonora Kerr NP OR same specialty: 1/27/2020 Leonora Kerr NP  Last physical: 8/23/2017 Last MTM visit: Visit date not found   Next visit within 3 mo: Visit date not found  Next  physical within 3 mo: Visit date not found  Prescriber OR PCP: Elba Monge MD  Last diagnosis associated with med order: 1. Depression With Anxiety  - venlafaxine (EFFEXOR) 50 MG tablet [Pharmacy Med Name: VENLAFAXINE HCL 50 MG TABLET]; Take 1 tablet (50 mg total) by mouth 4 (four) times a day.  Dispense: 360 tablet; Refill: 1    If protocol passes may refill for 12 months if within 3 months of last provider visit (or a total of 15 months).             Passed - Blood Pressure in last year     BP Readings from Last 1 Encounters:   02/11/20 128/84

## 2021-06-08 NOTE — TELEPHONE ENCOUNTER
Please call the patient and have her make an appointment with Leonora irizarry when she returns from leave.

## 2021-06-09 NOTE — TELEPHONE ENCOUNTER
Refill Approved    Rx renewed per Medication Renewal Policy. Medication was last renewed on 6/38/.    Kianna Dorantes, [facility] Triage/Med Refill 7/15/2020     Requested Prescriptions   Pending Prescriptions Disp Refills     SUMAtriptan succinate 6 mg/0.5 mL PnIj [Pharmacy Med Name: SUMATRIPTAN 6 MG/0.5 ML INJECT] 2 Syringe 4     Sig: INJECT 1 INJECTION AS DIRECTED IMMEDIATELY AT ONSET OF MIGRAINE HEADACHE       Triptans Refill Protocol Passed - 7/15/2020  4:13 PM        Passed - PCP or prescribing provider visit in past 12 months       Last office visit with prescriber/PCP: 7/15/2019 Elba Monge MD OR same dept: 1/27/2020 Leonora Kerr NP OR same specialty: 1/27/2020 Leonora Kerr NP  Last physical: 8/23/2017 Last MTM visit: Visit date not found   Next visit within 3 mo: Visit date not found  Next physical within 3 mo: Visit date not found  Prescriber OR PCP: Elba Monge MD  Last diagnosis associated with med order: 1. Migraine headache  - SUMAtriptan succinate 6 mg/0.5 mL PnIj [Pharmacy Med Name: SUMATRIPTAN 6 MG/0.5 ML INJECT]; INJECT 1 INJECTION AS DIRECTED IMMEDIATELY AT ONSET OF MIGRAINE HEADACHE  Dispense: 2 Syringe; Refill: 4    If protocol passes may refill for 12 months if within 3 months of last provider visit (or a total of 15 months).

## 2021-06-09 NOTE — TELEPHONE ENCOUNTER
Refill Approved    Rx renewed per Medication Renewal Policy. Medication was last renewed on 07/15/2019.  Last office visit was 01/27/2020 with PCP.    Elba Nicole, [facility] Triage/Med Refill 6/29/2020     Requested Prescriptions   Pending Prescriptions Disp Refills     metoprolol succinate (TOPROL-XL) 50 MG 24 hr tablet [Pharmacy Med Name: METOPROLOL SUCC ER 50 MG TAB] 90 tablet 3     Sig: TAKE 1 TABLET BY MOUTH EVERY DAY       Beta-Blockers Refill Protocol Passed - 6/29/2020 12:34 AM        Passed - PCP or prescribing provider visit in past 12 months or next 3 months     Last office visit with prescriber/PCP: 7/15/2019 Elba Monge MD OR same dept: 1/27/2020 Leonora Kerr NP OR same specialty: 1/27/2020 Leonora Kerr NP  Last physical: 8/23/2017 Last MTM visit: Visit date not found   Next visit within 3 mo: Visit date not found  Next physical within 3 mo: Visit date not found  Prescriber OR PCP: Elba Monge MD  Last diagnosis associated with med order: 1. Essential hypertension  - metoprolol succinate (TOPROL-XL) 50 MG 24 hr tablet [Pharmacy Med Name: METOPROLOL SUCC ER 50 MG TAB]; TAKE 1 TABLET BY MOUTH EVERY DAY  Dispense: 90 tablet; Refill: 3    If protocol passes may refill for 12 months if within 3 months of last provider visit (or a total of 15 months).             Passed - Blood pressure filed in past 12 months     BP Readings from Last 1 Encounters:   02/11/20 128/84

## 2021-06-09 NOTE — PROGRESS NOTES
"Katy Hong is a 71-year-old with a history of cardiomyopathy hypertension anxiety/depression with chronic venous stasis with right leg that has been persistent over several years.  She has seen dermatology for a number of years as well as to wound care clinic.  No one seems to have had a great handle on how to care for this.  She is currently doing cleansing with hydrogen peroxide and then putting cream on it to moisturize her skin.  This seems to be doing about as well as anything.    Her migraines are overall stable she believes her breathing is overall stable she is doing some walking and exercising although not consistently.  Her energy remains down and I encouraged her on doing some fasting labs as she is here today nonfasting.  We reviewed heart disease prevention and cancer detection.    Objective:  Visit Vitals     /70     Pulse 82     Resp 16     Ht 5' 3\" (1.6 m)     Wt 199 lb (90.3 kg)     LMP 09/29/1993     Breastfeeding No     BMI 35.25 kg/m2     Neck supple without lymphadenopathy thyromegaly or bruits lungs are clear heart with a regular rate and rhythm without murmur rub gallop normal S1-S2 lower extremities she has trace edema right greater than left with an anterior shin skin lesion group that is about 5 x 5 cm none of these look particularly deep there is more just some superficial crusting with minimal redness and no tenderness  Her neurological examinations nonfocal    Labs are ordered    Assessment: Left leg wound with chronic venous stasis cardiomyopathy depression/anxiety hypertension migraine headaches, tiredness probably multifactorial    Plan: I encouraged her on diet exercise and weight loss we been discussed bariatrics and she will consider that continue with current wound care follow-up for fasting labs she is given referral to dermatology and will try for Dr. Chase and see if he has a different approach and she will follow-up here otherwise in 6 months or as " needed

## 2021-06-09 NOTE — PROGRESS NOTES
Assessment and Plan:     1. Medicare annual wellness visit, subsequent    2. Essential hypertension     Labs were drawn as noted. She will continue losartan and metoprolol. We reviewed dietary recommendations, including low salt and high fiber diet, and  recommendations for regular exercise/activity. We discussed limiting saturated and trans fats in her diet and using more of the good fats such as olive oil, canola oil, flax seed and peanut oil, etc.   - Comprehensive Metabolic Panel    3. Spondylolisthesis     She will continue the gabapentin and otc tylenol prn.  - gabapentin (NEURONTIN) 300 MG capsule; Take 2 capsules (600 mg total) by mouth at bedtime.  Dispense: 270 capsule; Refill: 3    4. Fibromyalgia      She will continue the gabapentin for her back pain and . We stressed the importance of good sleep and regular exercise and she will follow up with     5. Fatigue, unspecified type     We will check labs.  - HM2(CBC w/o Differential)  - Thyroid [place]    6. Migraine without status migrainosus, not intractable, unspecified migraine type     We reviewed treatment options and she will continue the topamax. We discussed a trial of nurtec and we reviewed potential side effects at length, including nausea, rash and dyspnea, and she will call or return to clinic with any significant difficulties. We discussed the potential need for PA and she will have the pharmacy contact us. We will continue the imitrex SQ prn in the meantime.    - rimegepant 75 mg TbDL; Take 1 tablet by mouth as needed (Migraine).  Dispense: 6 tablet; Refill: 5    7. Screening for cardiovascular condition  - Lipid [place] FASTING    The patient's current medical problems were reviewed.    I have had an Advance Directives discussion with the patient.  The following health maintenance schedule was reviewed with the patient and provided in printed form in the after visit summary:   Health Maintenance   Topic Date Due     HEPATITIS C SCREENING   1945     ZOSTER VACCINES (2 of 3) 11/17/2009     DXA SCAN  06/12/2019     MEDICARE ANNUAL WELLNESS VISIT  01/04/2020     FALL RISK ASSESSMENT  01/04/2020     INFLUENZA VACCINE RULE BASED (1) 08/01/2020     MAMMOGRAM  12/05/2021     TD 18+ HE  10/17/2023     ADVANCE CARE PLANNING  01/04/2024     COLORECTAL CANCER SCREENING  04/22/2024     LIPID  10/11/2024     DEPRESSION ACTION PLAN  Completed     PNEUMOCOCCAL IMMUNIZATION 65+ LOW/MEDIUM RISK  Completed     HEPATITIS B VACCINES  Aged Out        Subjective:   Chief Complaint: Katy Hong is an 74 y.o. female here for an Annual Wellness visit.   HPI:      Fasting today? Yes  Hypertension     Patient is also here for follow-up of elevated blood pressure. Associated signs and symptoms: chronic dyspnea, peripheral edema and tiredness/fatigue. Patient denies chest pain, exertional chest pressure/discomfort, near-syncope and palpitations. The patient exercises intermittently. Weight trend: fluctuating a bit.            Review of Systems:    Fatigue, ongoing  Some SWEENEY, chronic swelling   Migraines 3-4x monthly  imitrex injections  No relief with oral imitrex or maxalt - wonders about nurtec      Please see above.  The rest of the review of systems are negative for all systems.    Patient Care Team:  Leonora Kerr NP as PCP - General (Nurse Practitioner)  Leonora Kerr NP as Assigned PCP     Patient Active Problem List   Diagnosis     Osteopenia     Insomnia     Vitamin D Deficiency     Lower Back Pain     Fibromyalgia     Essential Hypertension     Seborrheic Keratosis     Obesity     Lichen Sclerosus Et Atrophicus     Depression With Anxiety     Migraine Headache     Esophageal Reflux     Joint Pain, Localized In The Knee     Varicose Veins     Shortness Of Breath     Nonvenomous Insect Bite Of Shoulder     Cellulitis     Mammogram - Abnormal     Chronic Cutaneous Ulcer Venous Stasis     Chronic venous insufficiency     Foot pain (Soft Tissue)      Female stress incontinence     Calculus of ureter     Hydronephrosis with urinary obstruction due to ureteral calculus     Secondary cardiomyopathy (H)     Obesity (BMI 30-39.9)     Past Medical History:   Diagnosis Date     Acute Sinusitis     Created by Conversion      Breast cyst      Cardiomyopathy     Created by Conversion      Cellulitis     Created by Conversion      Chronic Cutaneous Ulcer Venous Stasis     Created by Conversion      Contusion With Intact Skin Surface     Created by Conversion      Depression With Anxiety     Created by Conversion      Epidermal Inclusion Cyst     Created by Conversion      Esophageal reflux     Created by Conversion      Essential Hypertension     Created by Conversion      Fibromyalgia     Created by Conversion      Insomnia     Created by Conversion      Joint Pain, Localized In The Knee     Created by Conversion      Lichen Sclerosus Et Atrophicus     Created by Conversion      Limb Pain     Created by Conversion      Lower Back Pain     Created by Conversion      Mammogram - Abnormal     Created by Conversion      Migraine Headache     Created by Conversion      Myocardial infarction (H)     per EKG's since 2015     Nonvenomous Insect Bite Of Shoulder     Created by Conversion      Obesity     Created by Conversion      Open Wound Of The Lip     Created by Conversion      Osteopenia     Created by Conversion      Sebaceous Hyperplasia     Created by Conversion      Seborrheic Keratosis     Created by Conversion      Shortness of breath     Created by Conversion      Varicose Veins     Created by Conversion      Vitamin D Deficiency     Created by Conversion       Past Surgical History:   Procedure Laterality Date     APPENDECTOMY       BREAST BIOPSY       BREAST CYST ASPIRATION       CATARACT EXTRACTION, BILATERAL       IN TOTAL ABDOM HYSTERECTOMY       still has both ovaries     TEMPOROMANDIBULAR JOINT SURGERY Bilateral      TUBAL LIGATION        Family History    Problem Relation Age of Onset     Hypertension Mother      Arthritis Mother      Alzheimer's disease Father         d. 76     Diabetes Father      Hypertension Father      Breast cancer Maternal Aunt 60     Obesity Maternal Aunt      Hypertension Sister      Osteopenia Sister      Varicose Veins Brother      Obesity Brother      Breast cancer Cousin      Obesity Maternal Uncle      Arthritis Maternal Grandmother       Social History     Socioeconomic History     Marital status:      Spouse name: Anjel     Number of children: 2     Years of education: Not on file     Highest education level: Not on file   Occupational History     Occupation: Retired   Social Needs     Financial resource strain: Not on file     Food insecurity     Worry: Not on file     Inability: Not on file     Transportation needs     Medical: Not on file     Non-medical: Not on file   Tobacco Use     Smoking status: Never Smoker     Smokeless tobacco: Never Used   Substance and Sexual Activity     Alcohol use: Yes     Alcohol/week: 0.0 standard drinks     Comment: occasional     Drug use: No     Sexual activity: Not on file   Lifestyle     Physical activity     Days per week: Not on file     Minutes per session: Not on file     Stress: Not on file   Relationships     Social connections     Talks on phone: Not on file     Gets together: Not on file     Attends Yazidism service: Not on file     Active member of club or organization: Not on file     Attends meetings of clubs or organizations: Not on file     Relationship status: Not on file     Intimate partner violence     Fear of current or ex partner: Not on file     Emotionally abused: Not on file     Physically abused: Not on file     Forced sexual activity: Not on file   Other Topics Concern     Not on file   Social History Narrative    7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        Current Outpatient Medications   Medication Sig Dispense Refill     albuterol (VENTOLIN HFA) 90 mcg/actuation inhaler  "Inhale 2 puffs every 6 (six) hours as needed for wheezing. 18 g 1     aspirin 81 MG EC tablet Take 81 mg by mouth daily.       calcium carbonate (OS-MONIQUE) 500 mg calcium (1,250 mg) chewable tablet Chew 2 tablets daily.        cholecalciferol, vitamin D3, 5,000 unit Tab Take by mouth.       furosemide (LASIX) 20 MG tablet Take 1-2 tablets (20-40 mg total) by mouth every other day as needed. 60 tablet 0     gabapentin (NEURONTIN) 300 MG capsule TAKE 2 CAPSULES (600 MG TOTAL) BY MOUTH AT BEDTIME. (Patient taking differently: Take 600 mg by mouth 3 (three) times a day. three times a day 1/2/2020) 180 capsule 3     losartan (COZAAR) 25 MG tablet Take 1 tablet (25 mg total) by mouth daily. 90 tablet 3     metoprolol succinate (TOPROL-XL) 50 MG 24 hr tablet TAKE 1 TABLET BY MOUTH EVERY DAY 90 tablet 2     omeprazole (PRILOSEC) 20 MG capsule TAKE 1 CAPSULE BY MOUTH 2 TIMES A DAY. 180 capsule 2     SUMAtriptan succinate 6 mg/0.5 mL PnIj INJECT 1 INJECTION AS DIRECTED IMMEDIATELY AT ONSET OF MIGRAINE HEADACHE 2 Syringe 4     topiramate (TOPAMAX) 100 MG tablet TAKE ONE TABLET BY MOUTH DAILY WITH DINNER 90 tablet 1     triamcinolone (KENALOG) 0.1 % ointment APPLY TO AFFECTED AREA TWICE A DAY 30 g 2     venlafaxine (EFFEXOR) 50 MG tablet TAKE 1 TABLET (50 MG TOTAL) BY MOUTH 4 (FOUR) TIMES A DAY. (Patient taking differently: Take 50 mg by mouth 5 (five) times a day. ) 360 tablet 0     meloxicam (MOBIC) 7.5 MG tablet Take 1 tablet (7.5 mg total) by mouth 2 (two) times a day as needed for pain. Take with food 30 tablet 1     No current facility-administered medications for this visit.       Objective:   Vital Signs:   Visit Vitals  /64 (Patient Position: Sitting, Cuff Size: Adult Large)   Pulse 83   Ht 5' 2\" (1.575 m)   Wt 187 lb 1 oz (84.9 kg)   LMP 09/29/1993   SpO2 97%   BMI 34.21 kg/m           VisionScreening:  No exam data present     PHYSICAL EXAM  General: alert, pleasant, and no distress  Head: Normocephalic, without " obvious abnormality, atraumatic  Eyes: conjunctivae and sclerae normal and pupils equal, round, reactive to   light and accomodation  Ears: normal TM's and external ear canals both ears  Nose: no discharge, no sinus tenderness  Throat: lips, mucosa, and tongue normal; teeth and gums normal  Neck: no adenopathy, supple, symmetrical, trachea midline and thyroid normal  Back: symmetric, ROM normal. No CVA tenderness.  Lungs: clear to auscultation bilaterally  Breasts: exam deferred  Heart : regular rate and rhythm and no murmurs  Abdomen:  bowel sounds normal, no masses palpable and soft, non-tender  Pelvic: exam deferered   Extremities: extremities normal, atraumatic, no cyanosis or edema  Pulses: 2+ and symmetric  Skin: Skin color, texture, turgor normal. No rashes or lesions  Lymph nodes: Cervical, supraclavicular, and axillary nodes normal.  Neurologic: Grossly normal          Results for orders placed or performed in visit on 07/23/20   Lipid [place] FASTING   Result Value Ref Range    Cholesterol 156 <=199 mg/dL    Triglycerides 77 <=149 mg/dL    HDL Cholesterol 59 >=50 mg/dL    LDL Calculated 82 <=129 mg/dL    Patient Fasting > 8hrs? Yes    Comprehensive Metabolic Panel   Result Value Ref Range    Sodium 140 136 - 145 mmol/L    Potassium 3.5 3.5 - 5.0 mmol/L    Chloride 106 98 - 107 mmol/L    CO2 24 22 - 31 mmol/L    Anion Gap, Calculation 10 5 - 18 mmol/L    Glucose 98 70 - 125 mg/dL    BUN 20 8 - 28 mg/dL    Creatinine 0.78 0.60 - 1.10 mg/dL    GFR MDRD Af Amer >60 >60 mL/min/1.73m2    GFR MDRD Non Af Amer >60 >60 mL/min/1.73m2    Bilirubin, Total 0.5 0.0 - 1.0 mg/dL    Calcium 9.0 8.5 - 10.5 mg/dL    Protein, Total 6.6 6.0 - 8.0 g/dL    Albumin 3.7 3.5 - 5.0 g/dL    Alkaline Phosphatase 101 45 - 120 U/L    AST 13 0 - 40 U/L    ALT 15 0 - 45 U/L   HM2(CBC w/o Differential)   Result Value Ref Range    WBC 7.3 4.0 - 11.0 thou/uL    RBC 4.39 3.80 - 5.40 mill/uL    Hemoglobin 13.4 12.0 - 16.0 g/dL    Hematocrit  39.4 35.0 - 47.0 %    MCV 90 80 - 100 fL    MCH 30.5 27.0 - 34.0 pg    MCHC 34.0 32.0 - 36.0 g/dL    RDW 12.1 11.0 - 14.5 %    Platelets 205 140 - 440 thou/uL    MPV 8.5 7.0 - 10.0 fL   Thyroid Cascade   Result Value Ref Range    TSH 0.93 0.30 - 5.00 uIU/mL        Assessment Results 7/23/2020   Activities of Daily Living No help needed   Instrumental Activities of Daily Living No help needed   Get Up and Go Score -   Mini Cog Total Score 4   Some recent data might be hidden     A Mini-Cog score of 0-2 suggests the possibility of dementia, score of 3-5 suggests no dementia      Identified Health Risks:     The patient was counseled and encouraged to consider modifying their diet and eating habits. She was provided with information on recommended healthy diet options.  Information on urinary incontinence and treatment options given to patient.  The patient was provided with suggestions to help her develop a healthy emotional lifestyle.   The patient's PHQ-9 score is consistent with mild depression. She was provided with information regarding depression and was advised to schedule a follow up appointment in 6-8 weeks to further address this issue.  Patient's advanced directive was discussed and I am comfortable with the patient's wishes.

## 2021-06-09 NOTE — PROGRESS NOTES
Documentation only encounter to record results of depression screening.  Depression screening completed via [employer] [facility] (formally, [employer]).  See flow sheet for screening.

## 2021-06-10 NOTE — TELEPHONE ENCOUNTER
Refill Approved    Rx renewed per Medication Renewal Policy. Medication was last renewed on 5/19/20, last OV 7/23/20.    Naty Cagle, [facility] Triage/Med Refill 8/23/2020     Requested Prescriptions   Pending Prescriptions Disp Refills     venlafaxine (EFFEXOR) 50 MG tablet [Pharmacy Med Name: VENLAFAXINE HCL 50 MG TABLET] 360 tablet 0     Sig: TAKE 1 TABLET (50 MG TOTAL) BY MOUTH 4 (FOUR) TIMES A DAY. NEEDS TO BE SEEN       Venlafaxine/Desvenlafaxine Refill Protocol Passed - 8/20/2020  1:17 AM        Passed - LFT or AST or ALT in last year     Albumin   Date Value Ref Range Status   07/23/2020 3.7 3.5 - 5.0 g/dL Final     Bilirubin, Total   Date Value Ref Range Status   07/23/2020 0.5 0.0 - 1.0 mg/dL Final     Bilirubin, Direct   Date Value Ref Range Status   07/18/2017 0.2 <=0.5 mg/dL Final     Alkaline Phosphatase   Date Value Ref Range Status   07/23/2020 101 45 - 120 U/L Final     AST   Date Value Ref Range Status   07/23/2020 13 0 - 40 U/L Final     ALT   Date Value Ref Range Status   07/23/2020 15 0 - 45 U/L Final     Protein, Total   Date Value Ref Range Status   07/23/2020 6.6 6.0 - 8.0 g/dL Final                Passed - Fasting lipid cascade in last year     Cholesterol   Date Value Ref Range Status   07/23/2020 156 <=199 mg/dL Final     Triglycerides   Date Value Ref Range Status   07/23/2020 77 <=149 mg/dL Final     HDL Cholesterol   Date Value Ref Range Status   07/23/2020 59 >=50 mg/dL Final     LDL Calculated   Date Value Ref Range Status   07/23/2020 82 <=129 mg/dL Final     Patient Fasting > 8hrs?   Date Value Ref Range Status   07/23/2020 Yes  Final             Passed - PCP or prescribing provider visit in last year     Last office visit with prescriber/PCP: 5/9/2017 Syeda Kidd MD OR same dept: 1/27/2020 Leonora Kerr NP OR same specialty: 1/27/2020 Leonora Kerr NP  Last physical: 1/4/2019 Last MTM visit: Visit date not found   Next visit within 3 mo: Visit  date not found  Next physical within 3 mo: Visit date not found  Prescriber OR PCP: Syeda Kidd MD ([facility])  Last diagnosis associated with med order: 1. Depression With Anxiety  - venlafaxine (EFFEXOR) 50 MG tablet [Pharmacy Med Name: VENLAFAXINE HCL 50 MG TABLET]; TAKE 1 TABLET (50 MG TOTAL) BY MOUTH 4 (FOUR) TIMES A DAY. NEEDS TO BE SEEN  Dispense: 360 tablet; Refill: 0    If protocol passes may refill for 12 months if within 3 months of last provider visit (or a total of 15 months).             Passed - Blood Pressure in last year     BP Readings from Last 1 Encounters:   07/23/20 122/64

## 2021-06-10 NOTE — TELEPHONE ENCOUNTER
Last refill 9/26/2019 #60  Last visit 7/23/20- pt started on Rimegepant 75mg tb for migraine relief.   No CSA

## 2021-06-10 NOTE — TELEPHONE ENCOUNTER
This patient has not established care with me, I am not her PCP, she is a [facility] patient. I do not prescribe opioids for migraines. She needs to establish care with someone that is willing to fill this for her.

## 2021-06-10 NOTE — PROGRESS NOTES
SUBJECTIVE: Katy Hong is a 71 y.o.   female who presents today thinking perhaps she needs an inhaler because she gets short of breath easily with any exertion. She tells me she gets very winded going upstairs and admits she has chest pressure and needs to rest for it to go away. She also complains of chronic fatigue. At one time she had a CPAP but tells me that she had a repeat sleep study and was told she never needed it. She is concerned about her breathing because she is scheduled to go on a mystery trip with her [employer] group on Monday. She believes she'll need to be able to move more easily. I asked her if she ever was a smoker and she tells me no. I ask her if she ever had asthma or other issues with breathing and she tells me no. We discussed that inhalers are for people who need bronchodilators and that what she is describing is not this type of situation. She does have a history of hypertension but not hyperlipidemia. Her last cholesterol labs were done in October 2016 with a total cholesterol of 169 and an LDL of 91. She does have some depression with anxiety and is on venlafaxine 50 mg TID. She was given a MARGARETTE seven and scored one. She scored five on a PHQ nine with a three in category of having little energy.    OBJECTIVE: /80  Pulse 80  LMP 09/29/1993  General:  Obese elderly female in no acute distress  Heart:  Regular rate and rhythm without murmur  Lungs:  Clear bilaterally  Abdomen:  soft  Extremities:  Warm, dry and without edema    ASSESSMENT & PLAN:    1. Dyspnea on exertion  NM Exercise Stress Test   2. Chest pressure  NM Exercise Stress Test   3. Osteopenia  DXA Bone Density Scan    Vitamin D, Total (25-Hydroxy)   4. Depression With Anxiety     5. Varicose veins of both lower extremities with pain  Basic Metabolic Panel      we discuss that it is a possibility that her issue is heart related not long related. I am suggesting we do a stress test which she is willing  to do it seems. I also told her that her last DEXA scan was done in 2014 and should be repeated. She has a history of vitamin D deficiency and so she needs that lab done as well as electrolytes and kidney function as she uses Lasix on a regular basis. I will get back to her on lab results by my chart and only call with grossly abnormal values. I will call her with the stress test results as well. She can follow up accordingly and in no longer than her usual interval every six months If all is well.    Patient Active Problem List   Diagnosis     Osteopenia     Insomnia     Vitamin D Deficiency     Lower Back Pain     Fibromyalgia     Essential Hypertension     Seborrheic Keratosis     Obesity     Lichen Sclerosus Et Atrophicus     Depression With Anxiety     Migraine Headache     Cardiomyopathy     Esophageal Reflux     Joint Pain, Localized In The Knee     Varicose Veins     Shortness Of Breath     Nonvenomous Insect Bite Of Shoulder     Cellulitis     Mammogram - Abnormal     Chronic Cutaneous Ulcer Venous Stasis     Chronic venous insufficiency     Foot pain (Soft Tissue)     Female stress incontinence       Current Outpatient Prescriptions on File Prior to Visit   Medication Sig Dispense Refill     aspirin 81 MG EC tablet Take 81 mg by mouth daily.       calcium carbonate (OS-MONIQUE) 500 mg calcium (1,250 mg) chewable tablet Chew 2 tablets daily.        cholecalciferol, vitamin D3, (VITAMIN D3) 2,000 unit Tab Take 1 tablet by mouth daily.        clobetasol (TEMOVATE) 0.05 % Gel Apply to red rash daily until gone but no longer than two weeks 30 each 1     furosemide (LASIX) 20 MG tablet Take 1 tablet (20 mg total) by mouth every other day. 90 tablet 1     gabapentin (NEURONTIN) 300 MG capsule TAKE TWO CAPSULES BY MOUTH TWICE DAILY 360 capsule 1     HYDROcodone-acetaminophen 5-325 mg per tablet Take 1 tablet by mouth every 6 (six) hours as needed for pain. 60 tablet 0     losartan (COZAAR) 25 MG tablet Take 1 tablet  (25 mg total) by mouth daily. 90 tablet 2     nortriptyline (PAMELOR) 10 MG capsule Take up to 4 capsules qhs 120 capsule 5     nortriptyline (PAMELOR) 50 MG capsule TAKE 1CAPSULE BY MOUTH NIGHTLY AT BEDTIME 180 capsule 1     omeprazole (PRILOSEC) 20 MG capsule Take 1 capsule (20 mg total) by mouth 2 (two) times a day. 180 capsule 2     SUMAtriptan succinate (IMITREX STATDOSE) 6 mg/0.5 mL kit INJECT1 INJECTION AS DIRECTED IMMEDIATELY AT ONSET OF MIGRAINE HEADACHE 2 each 3     SUMAtriptan succinate 6 mg/0.5 mL PnIj Inject 1 injection as directed immediately at onset of migraine headache 2 mL 5     venlafaxine (EFFEXOR) 50 MG tablet TAKE ONE TABLET BY MOUTH THREE TIMES DAILY 270 tablet 1     zolpidem (AMBIEN CR) 12.5 MG CR tablet Take 12.5 mg by mouth bedtime as needed for sleep.       [DISCONTINUED] codeine-guaiFENesin (GUAIFENESIN AC)  mg/5 mL liquid Take 5 mL by mouth every 4 (four) hours as needed for cough. 120 mL 0     [DISCONTINUED] guaiFENesin ER (MUCINEX) 600 mg 12 hr tablet Take 1 tablet (600 mg total) by mouth 2 (two) times a day. 60 tablet 2     metoprolol succinate (TOPROL-XL) 50 MG 24 hr tablet TAKE ONE TABLET BY MOUTH ONE TIME DAILY 90 tablet 3     [DISCONTINUED] benzonatate (TESSALON) 200 MG capsule TAKE 1 CAPSULE (200 MG TOTAL) BY MOUTH 3 (THREE) TIMES A DAY AS NEEDED FOR COUGH. 30 capsule 0     No current facility-administered medications on file prior to visit.

## 2021-06-11 NOTE — PROGRESS NOTES
"Katy Hong is a 75 y.o. female who is being evaluated via a billable telephone visit.      The patient has been notified of following:     \"This telephone visit will be conducted via a call between you and your physician/provider. We have found that certain health care needs can be provided without the need for a physical exam.  This service lets us provide the care you need with a short phone conversation.  If a prescription is necessary we can send it directly to your pharmacy.  If lab work is needed we can place an order for that and you can then stop by our lab to have the test done at a later time.    Telephone visits are billed at different rates depending on your insurance coverage. During this emergency period, for some insurers they may be billed the same as an in-person visit.  Please reach out to your insurance provider with any questions.    If during the course of the call the physician/provider feels a telephone visit is not appropriate, you will not be charged for this service.\"    Patient has given verbal consent to a Telephone visit? Yes    What phone number would you like to be contacted at? [phone]    Patient would like to receive their AVS by AVS Preference: [place].    Additional provider notes:     [facility] Follow up Visit   Date of visit: 9/9/2020  Physician: Alexsandra Rossi MD  Primary Care is Leonora Kerr, NP.  Katy Hong   75 y.o.  female    Initial Weight: 201#  Initial BMI: 37.4  Today's Weight:   Wt Readings from Last 1 Encounters:   07/23/20 187 lb 1 oz (84.9 kg)     Body mass index is 34.21 kg/m .  No data recorded     Assessment and Plan   Assessment: Katy is a 75 y.o. year old female who presents for medical weight management.      Plan:    Obesity (BMI 30-39.9)  Patient was congratulated on her success thus far. Healthy habits to assist with further weight loss were discussed. Written information was given. She will " concentrate on getting adequate protein per meal. She will limit her nut intake to 3 TBSP per day. She will try to get out walking more. She will continue to take the topamax.    Migraine Headache  Patient is not sure if these decreased in frequency since starting the topamax. Her dose could be increased. She will discuss with her primary MD. Often these improve with weight loss.          Follow up in 2-3 months with myself. She declined a dietician visit due to cost.            INTERIM HISTORY  Patient is taking the topamax and is tolerating it well. It helps prevent her from snacking    DIETARY HISTORY  Meals Per Day: 3  Eating Protein First?: sometimes  Food Diary: B:cereal or toast and fruit L:fruit and hard boiled egg or salad D:meat and vegetables, occasional bread  Snacks Per Day: 1  Typical Snack: walnuts  Fluid Intake: 32-48 oz  Portion Control: yes  Calorie Containing Beverages: ice tea, 1 glass every other day  Typical Protein Food Choices: fish, chicken, eggs  Choosing Whole Grains: usually  Meals at Restaurant per week:0-1      Positive Changes Since Last Visit: portion control  Struggling With: exercise (fatigue), snacking    Knowledgeable in Reading Food Labels: not sure  Getting Adequate Protein: no  Sleeping 7-8 hours/day : 6 hours- not sleeping well  Stress management: crafting    PHYSICAL ACTIVITY PATTERNS:  Cardiovascular: some walking  Strength Training: none    REVIEW OF SYSTEMS  GENERAL/CONSTITUTIONAL:  Fatigue: yes  HEENT:  Vision changes, glaucoma: no  CARDIOVASCULAR:  Chest Pain with Exertion: no  PULMONARY:  Dyspnea on exertion: yes  NEUROLOGIC:  Paresthesias: no  PSYCHIATRIC:  Moods: feeling stressed  MUSCULOSKELETAL/RHEUMATOLOGIC  Arthralgias: yes  Myalgias: yes  ENDOCRINE:  Monitoring Blood Sugars: na  Sugars Well Controlled: na       Patient Profile   Social History     Social History Narrative    5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          Past Medical History   Past Medical History:    Diagnosis Date     Acute Sinusitis     Created by Conversion      Breast cyst      Cardiomyopathy     Created by Conversion      Cellulitis     Created by Conversion      Chronic Cutaneous Ulcer Venous Stasis     Created by Conversion      Contusion With Intact Skin Surface     Created by Conversion      Depression With Anxiety     Created by Conversion      Epidermal Inclusion Cyst     Created by Conversion      Esophageal reflux     Created by Conversion      Essential Hypertension     Created by Conversion      Fibromyalgia     Created by Conversion      Insomnia     Created by Conversion      Joint Pain, Localized In The Knee     Created by Conversion      Lichen Sclerosus Et Atrophicus     Created by Conversion      Limb Pain     Created by Conversion      Lower Back Pain     Created by Conversion      Mammogram - Abnormal     Created by Conversion      Migraine Headache     Created by Conversion      Myocardial infarction (H)     per EKG's since 2015     Nonvenomous Insect Bite Of Shoulder     Created by Conversion      Obesity     Created by Conversion      Open Wound Of The Lip     Created by Conversion      Osteopenia     Created by Conversion      Sebaceous Hyperplasia     Created by Conversion      Seborrheic Keratosis     Created by Conversion      Shortness of breath     Created by Conversion      Varicose Veins     Created by Conversion      Vitamin D Deficiency     Created by Conversion      Patient Active Problem List   Diagnosis     Osteopenia     Insomnia     Vitamin D Deficiency     Lower Back Pain     Fibromyalgia     Essential Hypertension     Seborrheic Keratosis     Obesity     Lichen Sclerosus Et Atrophicus     Depression With Anxiety     Migraine Headache     Esophageal Reflux     Joint Pain, Localized In The Knee     Varicose Veins     Shortness Of Breath     Nonvenomous Insect Bite Of Shoulder     Cellulitis     Mammogram - Abnormal     Chronic Cutaneous Ulcer Venous Stasis     Chronic  venous insufficiency     Foot pain (Soft Tissue)     Female stress incontinence     Calculus of ureter     Hydronephrosis with urinary obstruction due to ureteral calculus     Secondary cardiomyopathy (H)     Obesity (BMI 30-39.9)     Current Outpatient Medications   Medication Sig     albuterol (VENTOLIN HFA) 90 mcg/actuation inhaler Inhale 2 puffs every 6 (six) hours as needed for wheezing.     aspirin 81 MG EC tablet Take 81 mg by mouth daily.     calcium carbonate (OS-MONIQUE) 500 mg calcium (1,250 mg) chewable tablet Chew 2 tablets daily.      cholecalciferol, vitamin D3, 5,000 unit Tab Take by mouth.     furosemide (LASIX) 20 MG tablet Take 1-2 tablets (20-40 mg total) by mouth every other day as needed.     gabapentin (NEURONTIN) 300 MG capsule Take 2 capsules (600 mg total) by mouth at bedtime.     HYDROcodone-acetaminophen 5-325 mg per tablet Take 1 tablet by mouth every 6 (six) hours as needed for pain.     losartan (COZAAR) 25 MG tablet Take 1 tablet (25 mg total) by mouth daily.     metoprolol succinate (TOPROL-XL) 50 MG 24 hr tablet TAKE 1 TABLET BY MOUTH EVERY DAY     omeprazole (PRILOSEC) 20 MG capsule TAKE 1 CAPSULE BY MOUTH 2 TIMES A DAY. (Patient taking differently: Take 20 mg by mouth daily before breakfast. )     rimegepant 75 mg TbDL Take 1 tablet by mouth as needed (Migraine).     SUMAtriptan succinate 6 mg/0.5 mL PnIj INJECT 1 INJECTION AS DIRECTED IMMEDIATELY AT ONSET OF MIGRAINE HEADACHE     topiramate (TOPAMAX) 100 MG tablet TAKE ONE TABLET BY MOUTH DAILY WITH DINNER     triamcinolone (KENALOG) 0.1 % ointment APPLY TO AFFECTED AREA TWICE A DAY     venlafaxine (EFFEXOR) 50 MG tablet TAKE 1 TABLET (50 MG TOTAL) BY MOUTH 4 (FOUR) TIMES A DAY. NEEDS TO BE SEEN       Past Surgical History  She has a past surgical history that includes pr total abdom hysterectomy; Breast cyst aspiration; Breast biopsy; Tubal ligation; Appendectomy; Temporomandibular joint surgery (Bilateral); and Cataract extraction,  "bilateral.     Examination   Ht 5' 2\" (1.575 m)   LMP 09/29/1993   Breastfeeding No   BMI 34.21 kg/m    Height: 5' 2\" (1.575 m) (9/9/2020  9:00 AM)  Weight: 187 lb 1 oz (84.9 kg) (7/23/2020  9:08 AM)  BMI (Calculated): 34.2 (7/23/2020  9:08 AM)  SpO2: 97 % (7/23/2020  9:08 AM)    General:  Alert and ambulatory, NAD  Pscyh/Mood: stable         Counseling:   We reviewed the important post op bariatric recommendations:  -eating 3 meals daily  -eating protein first, getting >60gm protein daily  -eating slowly, chewing food well  -avoiding/limiting calorie containing beverages  -limiting starchy vegetables and carbohydrates, choosing wheat, not white with breads,   crackers, pastas, bear, bagels, tortillas, rice  -limiting restaurant or cafeteria eating to twice a week or less    We discussed the importance of restorative sleep and stress management in maintaining a healthy weight.  We discussed the National Weight Control Registry healthy weight maintenance strategies and ways to optimize metabolism.  We discussed the importance of physical activity including cardiovascular and strength training in maintaining a healthier weight.    > 25 min spent with patient, > 50% spent in counseling         LATISHA Rossi MD  [facility].    Much or all of the text in this note was generated through the use of Dragon Dictate voice-to-text software. Errors in spelling or words which seem out of context are unintentional. Sound alike errors, in particular, may have escaped editing.         Phone call duration: 17 minutes    Yuniel Zaragoza LPN      "

## 2021-06-11 NOTE — PATIENT INSTRUCTIONS - HE
LEAN PROTEIN SOURCES      Protein Source Portion Calories Grams of Protein                           Nonfat, plain Greek yogurt    (10 grams sugar or less) 3/4 cup (6 oz)  12-17   Light Yogurt (10 grams sugar or less) 3/4 cup (6 oz)  6-8   Protein Shake 1 shake 110-180 15-30   Skim/1% Milk or lactose-free milk 1 cup ( 8 oz)  8   Plain or light, flavored soymilk 1 cup  7-8   Plain or light, hemp milk 1 cup 110 6   Fat Free or 1% Cottage Cheese 1/2 cup 90 15   Part skim ricotta cheese 1/2 cup 100 14   Part skim or reduced fat cheese slices 1 ounce 65-80 8     Mozzarella String Cheese 1 80 8   Canned tuna, chicken, crab or salmon  (canned in water)  1/2 cup 100 15-20   White fish (broiled, grilled, baked) 3 ounces 100 21   [place]/Tuna (broiled, grilled, baked) 3 ounces 150-180 21   Shrimp, Scallops, Lobster, Crab 3 ounces 100 21   Pork loin, Pork Tenderloin 3 ounces 150 21   Boneless, skinless chicken /turkey breast                          (broiled, grilled, baked) 3 ounces 120 21   [place], [place], [place], and Venison 3 ounces 120 21   Lean cuts of red meat and pork (sirloin,   round, tenderloin, flank, ground 93%-96%) 3 ounces 170 21   Lean or Extra Lean Ground Turkey 1/2 cup 150 20   90-95% Lean [place] Burger 1 shreya 140-180 21   Low-fat casserole with lean meat 3/4 cup 200 17   Luncheon Meats                                                        (turkey, lean ham, roast beef, chicken) 3 ounces 100 21   Egg (boiled, poached, scrambled) 1 Egg 60 7   Egg Substitute 1/2 cup 70 10   Nuts (limit to 1 serving per day)  3 Tbsp. 150 7   Nut [place] (peanut, almond)  Limit to 1 serving or less daily 1 Tbsp. 90 4   Soy Burger (varies) 1  15   Garbanzo, Black, [place] Beans 1/2 cup 110 7   Refried Beans 1/2 cup 100 7   Kidney and Lima beans 1/2 cup 110 7   Tempeh 3 oz 175 18   Vegan crumbles 1/2 cup 100 14   Tofu 1/2 cup 110 14   Chili (beans and extra lean beef or turkey) 1 cup 200 23   Lentil  Stew/Soup 1 cup 150 12   Black Bean Soup 1 cup 175 12

## 2021-06-11 NOTE — PROGRESS NOTES
Chief Complaint   Patient presents with     Medication Management     Needs CSA updated.      Blister     Has a blister on top of right foot that she would like looked at. No issues or pain.        HPI: Patient presents today for an updated CSA and also to discuss a lesion which developed along her right shin.  Patient has a longstanding history of low back and neck pain.  The neck pain will at times lead to headaches and migraines.  She has been evaluated by neurology for this.  She has tried multiple medications for headache prevention.  Although failed to produce significant side effects.  Currently has sumatriptan injections as needed, but has used hydrocodone acetaminophen for management of the neck pain as well which can help with headaches.  No neurological deficits with the headaches.  No recent brain trauma.  She reports a history of brain imaging done years ago, but those records are not readily available for review.    Patient also notes that about a week or so ago along her right ankle/lower shin she developed a raised lesion that she described as a blister.  It ruptured and a fair amount of white discharge was noted.  It is scabbed up now.  No pain.  Afebrile without chills.  No worsening redness surrounding it.  She does not think there is any trauma related to it but thinks that there may have been a bug bite.    ROS:Review of Systems - negative except for what's listed in the HPI    SH: The Patient's  reports that she has never smoked. She has never used smokeless tobacco. She reports current alcohol use. She reports that she does not use drugs.      FH: The Patient's family history includes Alzheimer's disease in her father; Arthritis in her maternal grandmother and mother; Breast cancer in her cousin; Breast cancer (age of onset: 60) in her maternal aunt; Diabetes in her father; Hypertension in her father, mother, and sister; Obesity in her brother, maternal aunt, and maternal uncle; Osteopenia in  "her sister; Varicose Veins in her brother.     Meds:    Current Outpatient Medications on File Prior to Visit   Medication Sig Dispense Refill     albuterol (VENTOLIN HFA) 90 mcg/actuation inhaler Inhale 2 puffs every 6 (six) hours as needed for wheezing. 18 g 1     aspirin 81 MG EC tablet Take 81 mg by mouth daily.       calcium carbonate (OS-MONIQUE) 500 mg calcium (1,250 mg) chewable tablet Chew 2 tablets daily.        cholecalciferol, vitamin D3, 5,000 unit Tab Take by mouth.       furosemide (LASIX) 20 MG tablet Take 1-2 tablets (20-40 mg total) by mouth every other day as needed. (Patient taking differently: Take 20-40 mg by mouth daily. ) 60 tablet 11     gabapentin (NEURONTIN) 300 MG capsule Take 2 capsules (600 mg total) by mouth at bedtime. 270 capsule 3     HYDROcodone-acetaminophen 5-325 mg per tablet Take 1 tablet by mouth every 6 (six) hours as needed for pain. 20 tablet 0     losartan (COZAAR) 25 MG tablet Take 1 tablet (25 mg total) by mouth daily. 90 tablet 3     metoprolol succinate (TOPROL-XL) 50 MG 24 hr tablet TAKE 1 TABLET BY MOUTH EVERY DAY 90 tablet 2     omeprazole (PRILOSEC) 20 MG capsule TAKE 1 CAPSULE BY MOUTH 2 TIMES A DAY. (Patient taking differently: Take 20 mg by mouth daily before breakfast. ) 180 capsule 2     topiramate (TOPAMAX) 100 MG tablet TAKE ONE TABLET BY MOUTH DAILY WITH DINNER 90 tablet 1     triamcinolone (KENALOG) 0.1 % ointment APPLY TO AFFECTED AREA TWICE A DAY 30 g 2     venlafaxine (EFFEXOR) 50 MG tablet TAKE 1 TABLET (50 MG TOTAL) BY MOUTH 4 (FOUR) TIMES A DAY. NEEDS TO BE SEEN 360 tablet 2     rimegepant 75 mg TbDL Take 1 tablet by mouth as needed (Migraine). 6 tablet 5     SUMAtriptan succinate 6 mg/0.5 mL PnIj INJECT 1 INJECTION AS DIRECTED IMMEDIATELY AT ONSET OF MIGRAINE HEADACHE 2 Syringe 4     No current facility-administered medications on file prior to visit.        O:  /68   Pulse 96   Ht 5' 2\" (1.575 m)   Wt 184 lb (83.5 kg)   LMP 09/29/1993   SpO2 " 99%   BMI 33.65 kg/m      Physical Examination:   General appearance - alert, well appearing, and in no distress  Mental status - alert, oriented to person, place, and time  Neurological -upper extremity strength grossly normal bilaterally.  No tremors.  Musculoskeletal -slow sit to stand.  Decreased range of motion in the neck.  Extremities - peripheral pulses normal, no pedal edema, no cyanosis  Skin -along the right ankle there is a small scabbed lesion measuring 3 mm in diameter.  No surrounding erythema.  No central clearing.  No evidence of embedded tick.  Not hot to touch.  No drainage.      A/P:     Problem List Items Addressed This Visit        [facility] Cardiac Problems    Migraine Headache      Other Visit Diagnoses     Blister    -  Primary    Needs flu shot        Relevant Orders    Influenza,Quad,High Dose,PF 65 YR+ (Completed)            1. Blister  Healing.  Keep an eye on it.    2. Migraine without status migrainosus, not intractable, unspecified migraine type  Uses hydrocodone several times a month.  No side effects from this.  Updated CSA. Plan follow up with pcp in 3 months.    3. Needs flu shot  - Influenza,Quad,High Dose,PF 65 YR+        Skyler Wiggins, CNP

## 2021-06-12 NOTE — PROGRESS NOTES
Katy Hong is a 71 y.o. female is here for a  Health Maintenance exam. Patient is overall doing well.       Healthy Habits:   Regular Exercise: Yes  Sunscreen Use: Yes  Healthy Diet: Yes  Dental Visits Regularly: Yes  Seat Belt: Yes  Sexually active: No  Self Breast Exam Monthly:Yes 9/25/2017  Hemoccults: No  Flex Sig: No  Colonoscopy: Yes  Lipid Profile: Yes  Glucose Screen: Yes  Prevention of Osteoporosis: Yes  Last Dexa: Yes  Guns at Home:  No  Domestic Violence:  No    Current Outpatient Medications Include:    Current Outpatient Prescriptions:      aspirin 81 MG EC tablet, Take 81 mg by mouth daily., Disp: , Rfl:      calcium carbonate (OS-MONIQUE) 500 mg calcium (1,250 mg) chewable tablet, Chew 2 tablets daily. , Disp: , Rfl:      cholecalciferol, vitamin D3, 5,000 unit Tab, Take 1 tablet by mouth., Disp: , Rfl:      clobetasol (TEMOVATE) 0.05 % Gel, Apply to red rash daily until gone but no longer than two weeks, Disp: 30 each, Rfl: 1     furosemide (LASIX) 20 MG tablet, Take 1 tablet (20 mg total) by mouth daily., Disp: 90 tablet, Rfl: 1     gabapentin (NEURONTIN) 300 MG capsule, TAKE TWO CAPSULES BY MOUTH TWICE DAILY, Disp: 360 capsule, Rfl: 0     HYDROcodone-acetaminophen 5-325 mg per tablet, Take 1 tablet by mouth every 6 (six) hours as needed for pain., Disp: 60 tablet, Rfl: 0     losartan (COZAAR) 25 MG tablet, Take 1 tablet (25 mg total) by mouth daily., Disp: 90 tablet, Rfl: 2     metoprolol succinate (TOPROL-XL) 50 MG 24 hr tablet, TAKE ONE TABLET BY MOUTH ONE TIME DAILY, Disp: 90 tablet, Rfl: 3     nortriptyline (PAMELOR) 10 MG capsule, Take up to 4 capsules qhs, Disp: 120 capsule, Rfl: 5     nortriptyline (PAMELOR) 50 MG capsule, TAKE 1CAPSULE BY MOUTH NIGHTLY AT BEDTIME, Disp: 180 capsule, Rfl: 1     omeprazole (PRILOSEC) 20 MG capsule, Take 1 capsule (20 mg total) by mouth 2 (two) times a day., Disp: 180 capsule, Rfl: 2     venlafaxine (EFFEXOR) 50 MG tablet, TAKE ONE TABLET BY MOUTH THREE  TIMES DAILY, Disp: 270 tablet, Rfl: 1     cholecalciferol, vitamin D3, (VITAMIN D3) 2,000 unit Tab, Take 1 tablet by mouth daily. , Disp: , Rfl:      fluticasone (FLONASE) 50 mcg/actuation nasal spray, 1 spray into each nostril daily., Disp: 16 g, Rfl: 2    Allergies:    Allergies   Allergen Reactions     Fluoxetine Cough     Pt thinks it was cough but not totally sure.     Latex Rash     Lisinopril Cough     Sulfa (Sulfonamide Antibiotics) Itching and Rash       Past Medical History:   Diagnosis Date     Acute Sinusitis     Created by Conversion      Breast cyst      Cardiomyopathy     Created by Conversion      Cellulitis     Created by Conversion      Chronic Cutaneous Ulcer Venous Stasis     Created by Conversion      Contusion With Intact Skin Surface     Created by Conversion      Depression With Anxiety     Created by Conversion      Epidermal Inclusion Cyst     Created by Conversion      Esophageal reflux     Created by Conversion      Essential Hypertension     Created by Conversion      Fibromyalgia     Created by Conversion      Insomnia     Created by Conversion      Joint Pain, Localized In The Knee     Created by Conversion      Lichen Sclerosus Et Atrophicus     Created by Conversion      Limb Pain     Created by Conversion      Lower Back Pain     Created by Conversion      Mammogram - Abnormal     Created by Conversion      Migraine Headache     Created by Conversion      Myocardial infarction     per EKG's since 2015     Nonvenomous Insect Bite Of Shoulder     Created by Conversion      Obesity     Created by Conversion      Open Wound Of The Lip     Created by Conversion      Osteopenia     Created by Conversion      Sebaceous Hyperplasia     Created by Conversion      Seborrheic Keratosis     Created by Conversion      Shortness of breath     Created by Conversion      Varicose Veins     Created by Conversion      Vitamin D Deficiency     Created by Conversion        Past Surgical History:    Procedure Laterality Date     BREAST BIOPSY       BREAST CYST ASPIRATION       [place] TOTAL ABDOM HYSTERECTOMY       still has both ovaries       OB History    Para Term  AB Living   3 3 3   3   SAB TAB Ectopic Multiple Live Births             # Outcome Date GA Lbr Shahab/2nd Weight Sex Delivery Anes PTL Lv   3 Term            2 Term            1 Term                   Immunization History   Administered Date(s) Administered     DT (pediatric) 1992, 2006     Influenza high dose, seasonal 10/27/2016     Influenza, inj, historic 2008, 2009, 10/29/2010     Influenza, seasonal,quad inj 6-35 mos 10/03/2012     Influenza,seasonal quad, PF 2013     Pneumo Conj 13-V (2010&after) 2015     Pneumo Polysac 23-V 2013     Td, historic 2006     Tdap 10/17/2013     ZOSTER 2009       Family History   Problem Relation Age of Onset     No Medical Problems Mother      d. 90     Alzheimer's disease Father      d. 76     Breast cancer Maternal Aunt 60     Hypertension Sister      Osteopenia Sister      Varicose Veins Brother      Breast cancer Cousin        Social History     Social History     Marital status:      Spouse name: Anjel     Number of children: 2     Years of education: N/A     Occupational History     Not on file.     Social History Main Topics     Smoking status: Never Smoker     Smokeless tobacco: Never Used     Alcohol use 0.0 oz/week      Comment: occasional     Drug use: No     Sexual activity: Not on file     Other Topics Concern     Not on file     Social History Narrative    7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         Last cholesterol:   Lab Results   Component Value Date    CHOL 161 2017    CHOL 169 10/27/2016    CHOL 160 2016     Lab Results   Component Value Date    HDL 63 2017    HDL 61 10/27/2016    HDL 58 2016     Lab Results   Component Value Date    LDLCALC 82 2017    LDLCALC 91 10/27/2016    LDLCALC 85 2016  "    Lab Results   Component Value Date    TRIG 78 07/18/2017    TRIG 87 10/27/2016    TRIG 87 06/07/2016     No components found for: CHOLHDL    Mammogram 9/21/2016  Colonoscopy:  4/22/2014, normal          LMP: Patient's last menstrual period was 09/29/1993.        Review of Systems:   General:  Denies fever, chills, HA, fatigue, myalgias, weight change    Eyes: Denies vision changes   Ears/Nose/Throat: Denies nasal congestion, rhinorrhea, ear pain or discharge, sore throat, swollen glands  Cardiovascular: Denies CP, palpitations  Respiratory:  Denies SOB, cough  Gastrointestinal:  Denies changes in bowel habits, melena, rectal bleeding,  Genitourinary: Denies changes in urine habits/frequency/dysuria, hematuria.  Has genital itching and burning from the lichen sclerosus.  Musculoskeletal:  Denies  joint pain or swelling or erythema, edema.  Skin: Chronic rash right lower leg.  Currently being treated by Dr. Chase.  Has failed many attempts of therapy.  Neurologic: Denies weakness, paresthesia  Psychiatric: Denies mood changes   Endocrine: Denies polyuria, polydipsia, polyphagia  Heme/Lymphatic: Denies problem with bleeding   Allergic/Immunologic: Denies problem     POSITIVES:  #1 patient feels a band of pressure around her entire body just under her breasts.  The band is may be 3 inches wide.  It has been present for a while now.  It is always there.  #2 legs always feel weak for the past couple years.  It seems to be getting worse to the point where she cannot use the tub anymore because she cannot get up out of it safely.  She has to be careful going up steps.   #3 her voice is gravelly.   #4 she is currently undergoing a cardiac workup with a slightly abnormal stress test.  A CT angiogram is planned.      PHYSICAL EXAM:    /82  Pulse 84  Temp 97.8  F (36.6  C)  Ht 5' 2.5\" (1.588 m)  Wt 203 lb (92.1 kg)  LMP 09/29/1993  Breastfeeding? No  BMI 36.54 kg/m2    Wt Readings from Last 3 Encounters: "   08/23/17 203 lb (92.1 kg)   08/22/17 206 lb (93.4 kg)   07/24/17 202 lb (91.6 kg)       Body mass index is 36.54 kg/(m^2).    Well developed, well nourished, no acute distress.  Patient had some difficulty and was very cautious stepping up onto the exam table.  Both her legs are significantly weak  HEENT: normocephalic/atraumatic, PERRLA/EOMI, TMs: Gray, normal light reflex, no nasal discharge.  Oral mucosa: no erythema/exudate  Neck: No LAD/masses/thyromegaly/bruits  Lungs: clear bilaterally  Heart: regular rate and rhythm, no murmurs/gallops/rubs  Breasts: symmetric, no masses/skin changes, nipple discharge, or axillary LAD.  BSE reviewed.  Abdomen: Normal bowel sounds, soft, non-tender, non-distended, no masses, neg Dave's/McBurney's, no rebound/guarding  Genital: Normal external genitalia, no discharge, no lesions,  no adnexal tenderness or fullness.  Thin prep not done.  Patient has thinning of the inner outer labia with a sore at the posterior introital opening.  Rectal: internal exam is deferred.  External exam is normal.  Lymphatics: no supraclavicular/axillary/epitrochlear/inguinal LAD. No edema.  Neuro: A&O x 3, CN II-XII intact, strength 5/5, reflexes symmetric, sensory intact to light touch.  Psych: Behavior appropriate, engaging.  Thought processes congruent, non-tangential.  Musculoskeletal: Marked leg weakness as noted above.  Skin: no rashes.  2 open comedones are appreciated on the mid back.  Patient states that they have been a nuisance and she was wondering if I could remove them.        ASSESSMENT/PLAN: 71 y.o. female physical exam and pap smear.    1. Routine general medical examination at a health care facility      2. Muscle weakness of lower extremity  I am concerned about possible neuromuscular disorder with this patient's symptoms.  Will ask neurology to evaluate and treat.  - Ambulatory referral to Neurology    3. Open comedone  See below    4. Lichen Sclerosus Et Atrophicus  I am also  wondering if the rash on her right lower leg is related at all to lichen sclerosus and the rash in the genitalia.    5. Localized maculopapular rash  She will continue to see Dr. Chase.      6. Change in voice  We discussed how this is usually related to postnasal drip from allergies or else reflux symptoms from her stomach.  She has both.  I recommended she use fluticasone nasal spray twice daily until it freezes.  She will let me know if this is ineffective.        Medications Discontinued During This Encounter   Medication Reason     zolpidem (AMBIEN CR) 12.5 MG CR tablet Ineffective     SUMAtriptan succinate (IMITREX STATDOSE) 6 mg/0.5 mL kit Therapy completed     SUMAtriptan succinate 6 mg/0.5 mL PnIj Therapy completed       Routine health maintenance discussion:  No smoking, limited alcohol (7 or less servings per week), 5 fruits/veg servings per day, 200 minutes of exercise per week.  Daily calcium/vitamin D guidelines, bone health, yearly mammogram after age 39/colon cancer screening beginning at age 50.  Accident avoidance, sun screen.  Patient does not need further Pap smears but should continue to have the ovaries checked yearly.      Will contact her with the results of the labs when available.      PROCEDURE:Curretage    After sterile prep with alcohol 1% lidocaine with epinephrine was instilled under 2, comedones on the back.  Then Betadine was applied and using a sterile sharp curette I was able to empty both simple common arms.  Bandages applied and patient will simply wash with soap and water daily.  MD Elba Cameron MD

## 2021-06-12 NOTE — PROGRESS NOTES
"Katy Hong is a 71-year-old with a history of cardiomyopathy depression/anxiety fibromyalgia and recurrent migraine headaches with varicose veins and chronic lower extremity dermatitis who presents today for follow-up.  She is recently been under more stress as her daughter-in-law was diagnosed as having breast cancer at 48.  It sounds like her daughter-in-law is doing hormone and chemotherapy prior to surgery.  Because of the increased stress she is been having more headaches and is using her Vicodin every other day.  We discussed stress management and I encouraged her and exercise.  We reviewed headache management and we discussed the role of seeing neurology for further evaluation and treatment.  She has been through extensive trials of different prophylactic medications in the past without much success.  She has been through Botox injections in the past without success.    She is overall sleeping well and is only using her sleeping pill once or twice a month  She recently saw her dermatologist and she is currently using support hose furosemide and the cream to help her with her swelling venous stasis and rash.    She denies any chest pains or shortness of breath.  She is scheduled for mammogram in September.    Objective:/76  Pulse 96  Ht 5' 3\" (1.6 m)  Wt 202 lb (91.6 kg)  LMP 09/29/1993  SpO2 96%  Breastfeeding? No  BMI 35.78 kg/m2  Neck supple without lymphadenopathy thyromegaly or bruits lungs are clear heart with a regular rate and rhythm without murmur rub gallop normal S1-S2 lower extremities she has 0 to trace edema with her support hose on skin otherwise appears normal although you can still see an area of skin inflammation beneath the support hose on the right her neurological examinations nonfocal    Recent labs reviewed    Assessment: Cardiomyopathy with depression/anxiety recurrent migraine headaches varicose veins with venous insufficiency hypertension    Plan: We will have her " continue on her current medications we discussed the role of seeing neurology for further evaluation of her migraines if they do not improve.  We reviewed stress management I encouraged her to follow through with mammogram in September and follow-up here otherwise in 6 months or as needed

## 2021-06-12 NOTE — PROGRESS NOTES
CTA done per order.  Rhythm SR 70-80's.  Total metoprolol given 10 mg IV.  Instructed to increase fluids throughout the day.  Interpretation pending.

## 2021-06-13 NOTE — PROGRESS NOTES
PROGRESS NOTE       SUBJECTIVE:  Katy Hong is a 72 y.o. female   Chief Complaint   Patient presents with     Cough     cough on going for 1  1/2 week, achey, low grade temps, little sinus pressure      Bleeding/Bruising     bruising on both legs and arms, pt does wear support stocking , was on bus trip last week    Katy is here today with a cough which she has had for a week and a half.  She feels a little achy with some low-grade fever and sinus pressure.  She was sick last year with similar stuff that lasted all fall, 9 weeks in total.  She is hoping to do better this time.  Just got back from his trip to [facility].  Had a fun time.    Patient has never been known to have reactive airway disease but has never been tested.  She was exposed to her father's smoking for 20 years.  We talked about how sometimes this can add up.  Formal testing can be done with pulmonary function tests with and without albuterol to see reversibility.  Now that she is sick, this is not a good time to test.    She has had her pneumonia shots.  I reviewed testing which is been done within the last year.  She had a chest CT for coronary artery calcification which showed clear lung fields.  Her calcium score was only 14 so this was good.    Patient Active Problem List   Diagnosis     Osteopenia     Insomnia     Vitamin D Deficiency     Lower Back Pain     Fibromyalgia     Essential Hypertension     Seborrheic Keratosis     Obesity     Lichen Sclerosus Et Atrophicus     Depression With Anxiety     Migraine Headache     Cardiomyopathy     Esophageal Reflux     Joint Pain, Localized In The Knee     Varicose Veins     Shortness Of Breath     Nonvenomous Insect Bite Of Shoulder     Cellulitis     Mammogram - Abnormal     Chronic Cutaneous Ulcer Venous Stasis     Chronic venous insufficiency     Foot pain (Soft Tissue)     Female stress incontinence       Current Outpatient Prescriptions   Medication Sig Dispense Refill      aspirin 81 MG EC tablet Take 81 mg by mouth daily.       calcium carbonate (OS-MONIQUE) 500 mg calcium (1,250 mg) chewable tablet Chew 2 tablets daily.        cholecalciferol, vitamin D3, (VITAMIN D3) 2,000 unit Tab Take 1 tablet by mouth daily.        cholecalciferol, vitamin D3, 5,000 unit Tab Take 1 tablet by mouth.       clobetasol (TEMOVATE) 0.05 % Gel Apply to red rash daily until gone but no longer than two weeks 30 each 1     fluticasone (FLONASE) 50 mcg/actuation nasal spray 1 spray into each nostril daily. 16 g 2     furosemide (LASIX) 20 MG tablet Take 1 tablet (20 mg total) by mouth daily. 90 tablet 1     gabapentin (NEURONTIN) 300 MG capsule TAKE TWO CAPSULES BY MOUTH TWICE DAILY 360 capsule 0     HYDROcodone-acetaminophen 5-325 mg per tablet Take 1 tablet by mouth every 6 (six) hours as needed for pain. 60 tablet 0     losartan (COZAAR) 25 MG tablet TAKE 1 TABLET (25 MG TOTAL) BY MOUTH DAILY. 90 tablet 2     metoprolol succinate (TOPROL-XL) 50 MG 24 hr tablet TAKE ONE TABLET BY MOUTH ONE TIME DAILY 90 tablet 3     nortriptyline (PAMELOR) 10 MG capsule TAKE UP TO 4 CAPSULES BY MOUTH AT BEDTIME 120 capsule 5     nortriptyline (PAMELOR) 50 MG capsule TAKE 1 CAPSULE BY MOUTH NIGHTLY AT BEDTIME 180 capsule 1     omeprazole (PRILOSEC) 20 MG capsule Take 1 capsule (20 mg total) by mouth 2 (two) times a day. 180 capsule 2     venlafaxine (EFFEXOR) 50 MG tablet TAKE ONE TABLET BY MOUTH THREE TIMES DAILY 270 tablet 0     albuterol (PROAIR HFA;PROVENTIL HFA;VENTOLIN HFA) 90 mcg/actuation inhaler Inhale 2 puffs every 6 (six) hours as needed for wheezing. 1 each 0     azithromycin (ZITHROMAX Z-KARISSA) 250 MG tablet Take 2 tablets (500 mg) on  Day 1,  followed by 1 tablet (250 mg) once daily on Days 2 through 5. 6 tablet 0     benzonatate (TESSALON PERLES) 100 MG capsule Take 1 capsule (100 mg total) by mouth 3 (three) times a day as needed for cough. 30 capsule 0     No current facility-administered medications for this  visit.        History   Smoking Status     Never Smoker   Smokeless Tobacco     Never Used       REVIEW OF SYSTEMS:  Patient denies  chills, dizziness, headache, visual change, chest pain, abdominal pain, edema.     Positives: Low-grade fever cough and exertional shortness of breath.      OBJECTIVE:       Vitals:    10/23/17 1514   BP: 122/82   Pulse: 91   Temp: 98.1  F (36.7  C)   SpO2: 97%     Weight: 204 lb (92.5 kg)  Wt Readings from Last 3 Encounters:   10/23/17 204 lb (92.5 kg)   08/29/17 203 lb (92.1 kg)   08/23/17 203 lb (92.1 kg)     Body mass index is 37.01 kg/(m^2).        Physical Exam:  GENERAL APPEARANCE: A&A, NAD, well hydrated, well nourished  SKIN:  Normal skin turgor, no lesions/rashes   EARS: TM's normal, gray with nl light reflex  OROPHARYNX: without erythema, no post nasal drainage or thrush  NECK: Supple, without lymphadenopathy, no thyroid mass  CV: RRR, no M/G/R   LUNGS: CTAB, lung sounds are diminished throughout without localized wheezing or crackles  EXTREMITY: no edema, she has bruising somewhat linear across the top of her calves.  This is after her bus trip.  I am wondering if it had something to do with the seat that she was on.  It appears to be resolving and is nontender.  There is no evidence of DVT.  Homans sign is negative.  NEURO: no gross deficits   PSYCHIATRIC:  Mood appropriate, memory intact        ASSESSMENT/PLAN:     1. Acute bronchitis  I instructed her on the proper use of an albuterol inhaler as a trial for her cough.  We talked about potential side effects of feeling jittery and more rapid heart rate.  It may help open her airway and reduce her coughing.  I suggested that she use it 3 times a day and then in the night if she wakes up coughing.  She will let me know how this works for her  - azithromycin (ZITHROMAX Z-KARISSA) 250 MG tablet; Take 2 tablets (500 mg) on  Day 1,  followed by 1 tablet (250 mg) once daily on Days 2 through 5.  Dispense: 6 tablet; Refill: 0  -  albuterol (PROAIR HFA;PROVENTIL HFA;VENTOLIN HFA) 90 mcg/actuation inhaler; Inhale 2 puffs every 6 (six) hours as needed for wheezing.  Dispense: 1 each; Refill: 0  - benzonatate (TESSALON PERLES) 100 MG capsule; Take 1 capsule (100 mg total) by mouth 3 (three) times a day as needed for cough.  Dispense: 30 capsule; Refill: 0    2. Sinusitis      There are no Patient Instructions on file for this visit.  There are no discontinued medications.  No Follow-up on file.    The visit lasted a total of 25 minutes face to face with the patient.  Over 50% of the time spent counseling and educating the patient about all of the above.      Elba Monge MD

## 2021-06-13 NOTE — PROGRESS NOTES
"Katy Hong is a 75 y.o. female who is being evaluated via a billable telephone visit.      The patient has been notified of following:     \"This telephone visit will be conducted via a call between you and your physician/provider. We have found that certain health care needs can be provided without the need for a physical exam.  This service lets us provide the care you need with a short phone conversation.  If a prescription is necessary we can send it directly to your pharmacy.  If lab work is needed we can place an order for that and you can then stop by our lab to have the test done at a later time.    Telephone visits are billed at different rates depending on your insurance coverage. During this emergency period, for some insurers they may be billed the same as an in-person visit.  Please reach out to your insurance provider with any questions.    If during the course of the call the physician/provider feels a telephone visit is not appropriate, you will not be charged for this service.\"    Patient has given verbal consent to a Telephone visit? Yes    What phone number would you like to be contacted at? [phone]    Patient would like to receive their AVS by AVS Preference: [employer].    Additional provider notes:     [facility] Follow up Visit   Date of visit: 12/8/2020  Physician: Alexsandra Rossi MD  Primary Care is Leonora Kerr, NP.  Katy Hong   75 y.o.  female    Initial Weight: 201#  Initial BMI: 37.4  Today's Weight:   Wt Readings from Last 1 Encounters:   09/10/20 184 lb (83.5 kg)     There is no height or weight on file to calculate BMI.  No data recorded     Assessment and Plan   Assessment: Katy is a 75 y.o. year old female who presents for medical weight management.      Plan:    Obesity (BMI 30-39.9)  Patient was congratulated on her success thus far. Healthy habits to assist with further weight loss were discussed. Written information was " given. She will continue to work on getting adequate protein to help with hunger control She will continue to take the topamax.    Essential Hypertension  This may improve with healthy habits and weight loss.      Follow up in 3 months with myself           INTERIM HISTORY  She has been pretty isolated with Covid. She is feeling somewhat anxious. She has noticed she is doing more boredom eating. She is trying not to snack. She is struggling with hunger. She is taking topamax and is tolerating it. She is unsure if she has lost weight.     DIETARY HISTORY  Meals Per Day: 3  Eating Protein First?: working on it  Food Diary: B:cereal or toast or egg and coffee L:fruit and sandwich and cheese sticks, occasional hard boiled egg with fruit D:meat and starch and vegetables most days  Snacks Per Day: trying not to   Typical Snack: nuts- a few Tbsps  Fluid Intake: doing well  Portion Control: yes  Calorie Containing Beverages: hot tea and honey  Typical Protein Food Choices: meat, eggs  Choosing Whole Grains: sometimes  Meals at Restaurant per week:none    Positive Changes Since Last Visit: less eating out, trying to get adequate protein  Struggling With: exercise    Knowledgeable in Reading Food Labels: not sure  Getting Adequate Protein: working on it  Sleeping 7-8 hours/day yes  Stress management : sewing    PHYSICAL ACTIVITY PATTERNS:  Cardiovascular: walking around the house-20 minutes 2 x per day, bad knees, vein surgery  Strength Training: none    REVIEW OF SYSTEMS  GENERAL/CONSTITUTIONAL:  Fatigue: sometimes  HEENT:  Vision changes, glaucoma: no  CARDIOVASCULAR:  Chest Pain with Exertion: no  PULMONARY:  Dyspnea on exertion: sometimes  NEUROLOGIC:  Paresthesias: no  PSYCHIATRIC:  Moods: somewhat anxious  MUSCULOSKELETAL/RHEUMATOLOGIC  Arthralgias: yes  Myalgias: yes  ENDOCRINE:  Monitoring Blood Sugars: na  Sugars Well Controlled: na       Patient Profile   Social History     Social History Narrative    7 y/o   from seizures.          Past Medical History   Past Medical History:   Diagnosis Date     Acute Sinusitis     Created by Conversion      Breast cyst      Cardiomyopathy     Created by Conversion      Cellulitis     Created by Conversion      Chronic Cutaneous Ulcer Venous Stasis     Created by Conversion      Contusion With Intact Skin Surface     Created by Conversion      Depression With Anxiety     Created by Conversion      Epidermal Inclusion Cyst     Created by Conversion      Esophageal reflux     Created by Conversion      Essential Hypertension     Created by Conversion      Fibromyalgia     Created by Conversion      Insomnia     Created by Conversion      Joint Pain, Localized In The Knee     Created by Conversion      Lichen Sclerosus Et Atrophicus     Created by Conversion      Limb Pain     Created by Conversion      Lower Back Pain     Created by Conversion      Mammogram - Abnormal     Created by Conversion      Migraine Headache     Created by Conversion      Myocardial infarction (H)     per EKG's since 2015     Nonvenomous Insect Bite Of Shoulder     Created by Conversion      Obesity     Created by Conversion      Open Wound Of The Lip     Created by Conversion      Osteopenia     Created by Conversion      Sebaceous Hyperplasia     Created by Conversion      Seborrheic Keratosis     Created by Conversion      Shortness of breath     Created by Conversion      Varicose Veins     Created by Conversion      Vitamin D Deficiency     Created by Conversion      Patient Active Problem List   Diagnosis     Osteopenia     Insomnia     Vitamin D Deficiency     Lower Back Pain     Fibromyalgia     Essential Hypertension     Seborrheic Keratosis     Obesity     Lichen Sclerosus Et Atrophicus     Depression With Anxiety     Migraine Headache     Esophageal Reflux     Joint Pain, Localized In The Knee     Varicose Veins     Shortness Of Breath     Nonvenomous Insect Bite Of Shoulder     Cellulitis      Mammogram - Abnormal     Chronic Cutaneous Ulcer Venous Stasis     Chronic venous insufficiency     Foot pain (Soft Tissue)     Female stress incontinence     Calculus of ureter     Hydronephrosis with urinary obstruction due to ureteral calculus     Secondary cardiomyopathy (H)     Obesity (BMI 30-39.9)     Current Outpatient Medications   Medication Sig Note     albuterol (VENTOLIN HFA) 90 mcg/actuation inhaler Inhale 2 puffs every 6 (six) hours as needed for wheezing. 9/10/2020: Prn     aspirin 81 MG EC tablet Take 81 mg by mouth daily.      calcium carbonate (OS-MONIQUE) 500 mg calcium (1,250 mg) chewable tablet Chew 2 tablets daily.       cholecalciferol, vitamin D3, 5,000 unit Tab Take by mouth.      furosemide (LASIX) 20 MG tablet Take 1-2 tablets (20-40 mg total) by mouth every other day as needed. (Patient taking differently: Take 20-40 mg by mouth daily. )      gabapentin (NEURONTIN) 300 MG capsule Take 2 capsules (600 mg total) by mouth at bedtime.      HYDROcodone-acetaminophen 5-325 mg per tablet Take 1 tablet by mouth every 6 (six) hours as needed for pain. 9/10/2020: Prn     losartan (COZAAR) 25 MG tablet Take 1 tablet (25 mg total) by mouth daily.      metoprolol succinate (TOPROL-XL) 50 MG 24 hr tablet TAKE 1 TABLET BY MOUTH EVERY DAY      omeprazole (PRILOSEC) 20 MG capsule TAKE 1 CAPSULE BY MOUTH 2 TIMES A DAY. (Patient taking differently: Take 20 mg by mouth daily before breakfast. )      SUMAtriptan succinate 6 mg/0.5 mL PnIj INJECT 1 INJECTION AS DIRECTED IMMEDIATELY AT ONSET OF MIGRAINE HEADACHE 9/10/2020: Prn     topiramate (TOPAMAX) 100 MG tablet TAKE ONE TABLET BY MOUTH DAILY WITH DINNER      triamcinolone (KENALOG) 0.1 % ointment APPLY TO AFFECTED AREA TWICE A DAY 9/10/2020: Prn     venlafaxine (EFFEXOR) 50 MG tablet TAKE 1 TABLET (50 MG TOTAL) BY MOUTH 4 (FOUR) TIMES A DAY. NEEDS TO BE SEEN 9/10/2020: Prn     rimegepant 75 mg TbDL Take 1 tablet by mouth as needed (Migraine). 9/10/2020: Prn  "      Past Surgical History  She has a past surgical history that includes pr total abdom hysterectomy; Breast cyst aspiration; Breast biopsy; Tubal ligation; Appendectomy; Temporomandibular joint surgery (Bilateral); and Cataract extraction, bilateral.     Examination   LMP 09/29/1993   Height: 5' 2\" (1.575 m) (9/10/2020 11:11 AM)  Weight: 184 lb (83.5 kg) (9/10/2020 11:11 AM)  BMI (Calculated): 33.6 (9/10/2020 11:11 AM)  SpO2: 99 % (9/10/2020 11:11 AM)    General:  Alert, NAD  Pscyh/Mood: stable         Counseling:   We reviewed the important post op bariatric recommendations:  -eating 3 meals daily  -eating protein first, getting >60gm protein daily  -eating slowly, chewing food well  -avoiding/limiting calorie containing beverages  -limiting starchy vegetables and carbohydrates, choosing wheat, not white with breads,   crackers, pastas, bear, bagels, tortillas, rice  -limiting restaurant or cafeteria eating to twice a week or less    We discussed the importance of restorative sleep and stress management in maintaining a healthy weight.  We discussed the National Weight Control Registry healthy weight maintenance strategies and ways to optimize metabolism.  We discussed the importance of physical activity including cardiovascular and strength training in maintaining a healthier weight.             LATISHA Rossi MD  [facility].    Much or all of the text in this note was generated through the use of Dragon Dictate voice-to-text software. Errors in spelling or words which seem out of context are unintentional. Sound alike errors, in particular, may have escaped editing.         Phone call duration: 14 minutes    Yuniel Zaragoza LPN      "

## 2021-06-14 NOTE — TELEPHONE ENCOUNTER
RN cannot approve Refill Request    RN can NOT refill this medication Protocol failed and NO refill given. Last office visit: 7/15/2019 Elba Monge MD Last Physical: 8/23/2017 Last MTM visit: Visit date not found Last visit same specialty: 9/10/2020 Skyler Wiggins CNP.  Next visit within 3 mo: Visit date not found  Next physical within 3 mo: Visit date not found      Naty Cagle, [facility] Triage/Med Refill 12/27/2020    Requested Prescriptions   Pending Prescriptions Disp Refills     triamcinolone (KENALOG) 0.1 % ointment [Pharmacy Med Name: TRIAMCINOLONE 0.1% OINTMENT] 30 g 2     Sig: APPLY TO AFFECTED AREA TWICE A DAY       There is no refill protocol information for this order

## 2021-06-14 NOTE — PROGRESS NOTES
1. Itching  predniSONE (DELTASONE) 10 mg tablet    hydrOXYzine (VISTARIL) 50 MG capsule    Comprehensive Metabolic Panel    HM1(CBC and Differential)    HM1 (CBC with Diff)   2. Need for vaccination  Influenza High Dose, Seasonal 65+ yrs   3. Obesity       Med list reconciled    ASSESSMENT/PLAN:     The following high BMI interventions were performed this visit: encouragement to exercise and weight monitoring    1. Itching    - predniSONE (DELTASONE) 10 mg tablet; Take 1 tablet (10 mg total) by mouth daily. 40 mg daily x 3 days, 30 mg daily x 3 days, 20 mg daily x 3 days, 10 mg daily x 3 days  Dispense: 30 tablet; Refill: 0  - hydrOXYzine (VISTARIL) 50 MG capsule; Take 1 capsule (50 mg total) by mouth 3 (three) times a day as needed for itching.  Dispense: 30 capsule; Refill: 0  - Comprehensive Metabolic Panel  - HM1(CBC and Differential)  - HM1 (CBC with Diff)    2. Need for vaccination    - Influenza High Dose, Seasonal 65+ yrs    3. Obesity      There are no Patient Instructions on file for this visit.  Medications Discontinued During This Encounter   Medication Reason     azithromycin (ZITHROMAX Z-KARISSA) 250 MG tablet Therapy completed     benzonatate (TESSALON PERLES) 100 MG capsule Therapy completed     Instructed patient to return to clinic if itching is not improved in the next 48-72 hours using the Vistaril and prednisone taper.  The visit lasted a total of 25 minutes face to face with the patient.  Over 50% of the time spent counseling and educating the patient about above content.      Leonora Kerr NP          SUBJECTIVE:  Katy Hong is a 72 y.o. female who presents for generalized itching that started over one week ago.  Itching occurs mainly on bilateral arms, lower legs and back of neck.  She states the itching is constant.  She describes it as a hot, stabbing sensation.  Aggravating factors: Heat.  Relieving factors: Nothing yet.  She has been applying topical Benadryl ointment to her  legs arms and neck with little reduction and itching.  She did see her dermatologist, Dr. Chase on November 28 for her psoriasis and was given a prescription for Fluocinonide to use topically on her areas of psoriasis.  She stopped taking this on December 1, 2017 because it was not helping.  She was placed on a special mixture of topical Doxepin 5% spray and Psorent topical given to her by Dr. Ch with yet again minimal reduction in her itching.  Patient is not asthmatic, she does not suffer from seasonal allergies and there are no pets in the home.  She denies any use of new soaps, detergents, hygiene products, recent travel or contact with any new plants.  Patient will be given prednisone burst and Vistaril to use 3 times per day for her itching.  Will check her complete metabolic profile and CBC today.  High-dose influenza vaccination administered today.  Chief Complaint   Patient presents with     Rash     rash all over body sx since last Wednesday. No enviroment or diet changes         Patient Active Problem List   Diagnosis     Osteopenia     Insomnia     Vitamin D Deficiency     Lower Back Pain     Fibromyalgia     Essential Hypertension     Seborrheic Keratosis     Obesity     Lichen Sclerosus Et Atrophicus     Depression With Anxiety     Migraine Headache     Cardiomyopathy     Esophageal Reflux     Joint Pain, Localized In The Knee     Varicose Veins     Shortness Of Breath     Nonvenomous Insect Bite Of Shoulder     Cellulitis     Mammogram - Abnormal     Chronic Cutaneous Ulcer Venous Stasis     Chronic venous insufficiency     Foot pain (Soft Tissue)     Female stress incontinence       Current Outpatient Prescriptions   Medication Sig Dispense Refill     albuterol (PROAIR HFA;PROVENTIL HFA;VENTOLIN HFA) 90 mcg/actuation inhaler Inhale 2 puffs every 6 (six) hours as needed for wheezing. 1 each 0     aspirin 81 MG EC tablet Take 81 mg by mouth daily.       calcium carbonate (OS-MONIQUE) 500 mg  calcium (1,250 mg) chewable tablet Chew 2 tablets daily.        cholecalciferol, vitamin D3, (VITAMIN D3) 2,000 unit Tab Take 1 tablet by mouth daily.        cholecalciferol, vitamin D3, 5,000 unit Tab Take 1 tablet by mouth.       clobetasol (TEMOVATE) 0.05 % Gel Apply to red rash daily until gone but no longer than two weeks 30 each 1     fluocinonide (LIDEX) 0.05 % cream APPLY TO THE RIGHT AND LEFT LOWER LEG ONCE DAILY, THEN APPLY JOBST STOCKINGS.  0     fluticasone (FLONASE) 50 mcg/actuation nasal spray 1 spray into each nostril daily. 16 g 0     furosemide (LASIX) 20 MG tablet Take 1 tablet (20 mg total) by mouth daily. 90 tablet 1     gabapentin (NEURONTIN) 300 MG capsule TAKE TWO CAPSULES BY MOUTH TWICE DAILY 360 capsule 0     HYDROcodone-acetaminophen 5-325 mg per tablet Take 1 tablet by mouth every 6 (six) hours as needed for pain. 60 tablet 0     losartan (COZAAR) 25 MG tablet TAKE 1 TABLET (25 MG TOTAL) BY MOUTH DAILY. 90 tablet 2     metoprolol succinate (TOPROL-XL) 50 MG 24 hr tablet TAKE ONE TABLET BY MOUTH ONE TIME DAILY 90 tablet 3     nortriptyline (PAMELOR) 10 MG capsule TAKE UP TO 4 CAPSULES BY MOUTH AT BEDTIME 120 capsule 5     nortriptyline (PAMELOR) 50 MG capsule TAKE 1 CAPSULE BY MOUTH NIGHTLY AT BEDTIME 180 capsule 1     omeprazole (PRILOSEC) 20 MG capsule Take 1 capsule (20 mg total) by mouth 2 (two) times a day. 180 capsule 2     venlafaxine (EFFEXOR) 50 MG tablet TAKE ONE TABLET BY MOUTH THREE TIMES DAILY 270 tablet 0     hydrOXYzine (VISTARIL) 50 MG capsule Take 1 capsule (50 mg total) by mouth 3 (three) times a day as needed for itching. 30 capsule 0     predniSONE (DELTASONE) 10 mg tablet Take 1 tablet (10 mg total) by mouth daily. 40 mg daily x 3 days, 30 mg daily x 3 days, 20 mg daily x 3 days, 10 mg daily x 3 days 30 tablet 0     No current facility-administered medications for this visit.        History   Smoking Status     Never Smoker   Smokeless Tobacco     Never Used        REVIEW OF SYSTEMS: Denies new soaps, detergents, lotions, makeup, foods, bedding, recent travel, sick contacts, fever or chills.         TOBACCO USE:  History   Smoking Status     Never Smoker   Smokeless Tobacco     Never Used     Social History     Social History     Marital status:      Spouse name: Anjel     Number of children: 2     Years of education: N/A     Occupational History     Not on file.     Social History Main Topics     Smoking status: Never Smoker     Smokeless tobacco: Never Used     Alcohol use 0.0 oz/week      Comment: occasional     Drug use: No     Sexual activity: Not on file     Other Topics Concern     Not on file     Social History Narrative    5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           OBJECTIVE:            Vitals:    17 0918   BP: 110/68   Pulse: 96   Resp: 18   Temp: 97.4  F (36.3  C)   SpO2: 97%     Weight: 202 lb (91.6 kg)  Wt Readings from Last 3 Encounters:   17 202 lb (91.6 kg)   10/23/17 204 lb (92.5 kg)   17 203 lb (92.1 kg)     Body mass index is 36.65 kg/(m^2).        Physical Exam:  GENERAL APPEARANCE: A&A, NAD, well hydrated, well nourished  HEAD: atraumatic, no deformity  NECK: Supple, without lymphadenopathy, no thyroid mass, no JVD, no bruit, neck and neck with several abrasions due to itching, surrounded by moderate erythema  CV: RRR, no M/G/R, no edema  LUNGS: CTAB, normal respiratory effort  ABDOMEN: S&NT, no masses, no organomegaly, BS present x4   SKIN:  Normal skin turgor, scattered, raised, severely erythematous rash scattered on anterior surfaces of bilateral forearms and lower legs, scabbing noted on healed sites, no pustular formation or drainage currently, warm and dry

## 2021-06-14 NOTE — TELEPHONE ENCOUNTER
Refill Approved    Rx renewed per Medication Renewal Policy. Medication was last renewed on 12/3/19, last OV 9/10/20.    Naty Cagle, [facility] Triage/Med Refill 1/2/2021     Requested Prescriptions   Pending Prescriptions Disp Refills     losartan (COZAAR) 25 MG tablet [Pharmacy Med Name: LOSARTAN POTASSIUM 25 MG TAB] 90 tablet 3     Sig: TAKE 1 TABLET BY MOUTH EVERY DAY       Angiotensin Receptor Blocker Protocol Passed - 12/30/2020  6:55 PM        Passed - PCP or prescribing provider visit in past 12 months       Last office visit with prescriber/PCP: 1/27/2020 Leonora Kerr NP OR same dept: 9/10/2020 Skyler Wiggins CNP OR same specialty: 9/10/2020 Skyler Wiggins CNP  Last physical: Visit date not found Last MTM visit: Visit date not found   Next visit within 3 mo: Visit date not found  Next physical within 3 mo: Visit date not found  Prescriber OR PCP: Leonora Kerr NP  Last diagnosis associated with med order: 1. Cardiomyopathy (H)  - losartan (COZAAR) 25 MG tablet [Pharmacy Med Name: LOSARTAN POTASSIUM 25 MG TAB]; TAKE 1 TABLET BY MOUTH EVERY DAY  Dispense: 90 tablet; Refill: 3    2. Essential hypertension, benign  - losartan (COZAAR) 25 MG tablet [Pharmacy Med Name: LOSARTAN POTASSIUM 25 MG TAB]; TAKE 1 TABLET BY MOUTH EVERY DAY  Dispense: 90 tablet; Refill: 3    If protocol passes may refill for 12 months if within 3 months of last provider visit (or a total of 15 months).             Passed - Serum potassium within the past 12 months     Lab Results   Component Value Date    Potassium 3.5 07/23/2020             Passed - Blood pressure filed in past 12 months     BP Readings from Last 1 Encounters:   09/10/20 112/68             Passed - Serum creatinine within the past 12 months     Creatinine   Date Value Ref Range Status   07/23/2020 0.78 0.60 - 1.10 mg/dL Final

## 2021-06-15 NOTE — TELEPHONE ENCOUNTER
Pt requests call today from a nurse.. She wanted to resched her 3/9 phone appt with Dr. Rossi (got a covid vacc appt) and no openings until early April. Just wants to see if any other options.    [phone]  **OK to leave detailed VM

## 2021-06-15 NOTE — PROGRESS NOTES
Katy Hong is a 75 y.o. female who is being evaluated via a billable telephone visit.      What phone number would you like to be contacted at? [phone]  How would you like to obtain your AVS? AVS Preference: [employer].     1. Anxiety  PHQ9 Depression Screen   2. Moderate episode of recurrent major depressive disorder (H)  PHQ9 Depression Screen   3. Insomnia due to medical condition  traZODone (DESYREL) 50 MG tablet   4. Fibromyalgia  DULoxetine (CYMBALTA) 20 MG capsule    Ambulatory referral to Rheumatology     PHQ-9: 13  MARGARETTE-7: 13    Assessment & Plan     Anxiety    - PHQ9 Depression Screen    Moderate episode of recurrent major depressive disorder (H)    - PHQ9 Depression Screen  Not much change noted with her anxiety and depression recently due to her ongoing issues with insomnia and worsening fibromyalgia pain.  I did adjust her Cymbalta dose to twice daily to hopefully reduce some of her depressive symptoms and improve her pain.    Insomnia due to medical condition    - traZODone (DESYREL) 50 MG tablet  Dispense: 30 tablet; Refill: 1  We discussed using the medication as needed and or every night as she sees fit to.  We also discussed avoiding any daytime napping, she is doing this right now along with being sedentary throughout most of the day.  She does perform some stretching exercises and does take walks as she is able to    Fibromyalgia    - DULoxetine (CYMBALTA) 20 MG capsule  Dispense: 60 capsule; Refill: 2  - Ambulatory referral to Rheumatology  Cymbalta dose increased to twice daily, she will continue with the same gabapentin dose that she takes at night as it does cause her to feel drowsy.  Since she has not been back to see her rheumatologist roughly 8 years, I do feel it is time for her to revisit them to discuss additional options for treating her fibromyalgia since her pain has become more persistent and more severe.        27 minutes spent on the date of the encounter doing chart  review, patient visit and documentation        Return in about 2 months (around 5/15/2021) for come see me after you meet with rheumatology to discuss plan for fibro. Insomnia f/u.    Leonora Kerr NP  [facility]    Benjamin Hong is 75 y.o. and presents today for the following health issues: anxiety, depression, insomnia and fibromyalgia  HPI   Fibromyalgia: Continues report fatigue with low energy, feeling foggy brain, achy muscles and joints with headache symptoms almost daily.  She has not seen a rheumatologist in well over 8 years, she states that she never went back because she felt that they could do nothing more for her.  She does feel now that she is getting older that her symptoms are becoming more severe, she does state that she has generalized discomfort constantly every day and describes it as a constant dull ache.  Overexerting herself and going up and down the stairs do tend aggravate her symptoms, she does get some relief with massage and heat packs.  She continues to take Gabapentin 600 mg at , she cannot take this during the day as it causes her to feel drowsy. She has not noticed a significant improvement since starting the Cymbalta 20 mg about 3 weeks ago. She tried going for a walk the other day and barely made it a full block because her legs were weak and her generalized pain became worse with each step. Denies any recent falls or trauma. She does not use an assistive device when ambulating.      Anxiety/depression: She has noticed increased depressive symptoms over the last several months. She continues to take Effexor 225 mg daily.  Her main stressors come from her chronic medical conditions related to her fibromyalgia and migraines. Coping strategies include working on cards and sending them out to others. She denies any thoughts of suicide or plans for self-harm. She is not seeing a therapist.     Insomnia: Ongoing, she typically goes to  bed around 11 PM and will wake at least 3 times during the night.  The first being around 130, the next at 430 and then 630.  She would then get up for the day around 8 AM.  She does have a prescription for trazodone that she uses as needed.  Because she does not sleep well during the night, she is fairly sedentary during the day, she does find herself dozing off and napping while she is sitting in her chair.  She is not very active due to her fibromyalgia pain, she denies excessive caffeine intake.    She did receive her first Covid vaccine on March 9, she is scheduled for her second dose on March 30.                    Review of Systems    Denies mood swings, excessive sadness, crying episodes,  irritability, feelings of extreme energy, suicidal/homicidal ideations.          Objective       Vitals:  No vitals were obtained today due to virtual visit.    Physical Exam    None due for phone visit            Phone call duration: 12 minutes

## 2021-06-15 NOTE — PROGRESS NOTES
PROGRESS NOTE   1/30/2018    SUBJECTIVE:  Katy Hong is a 72 y.o. female  who presents for   Chief Complaint   Patient presents with     Cough     Check productive cough, (clear/gray sputum) x 1 week     Patient comes in today because she has been sick for about the last 10 days or so.  She notes that originally this started as a laryngitis and she really did not think that much of it.  She thought she probably had a virus that would just get better however then she developed a cough and the cough has not gotten any better over these last 9 or 10 days and therefore she comes in for evaluation.  She notes that last year she had a cough for about 9 weeks and she does not want to persist that long.  She was placed on Tessalon Perles last year as well as multiple different antibiotics and nothing seemed to help until it finally resolved.  She does not want to have that happen again this year so she comes in today for evaluation.  She notes that she has had some low-grade fevers at the beginning of this course but now has not been having any fevers.  She has been coughing up some phlegm this kind of a grayish in color and she notes that it has a metallic taste to it.  She denies that she is having any ear pain or any runny nose or stuffy nose at all.  She does seem like she is getting worse but she also does not seem that she is getting any better.    Patient Active Problem List   Diagnosis     Osteopenia     Insomnia     Vitamin D Deficiency     Lower Back Pain     Fibromyalgia     Essential Hypertension     Seborrheic Keratosis     Obesity     Lichen Sclerosus Et Atrophicus     Depression With Anxiety     Migraine Headache     Cardiomyopathy     Esophageal Reflux     Joint Pain, Localized In The Knee     Varicose Veins     Shortness Of Breath     Nonvenomous Insect Bite Of Shoulder     Cellulitis     Mammogram - Abnormal     Chronic Cutaneous Ulcer Venous Stasis     Chronic venous insufficiency     Foot pain  (Soft Tissue)     Female stress incontinence       Current Outpatient Prescriptions   Medication Sig Dispense Refill     aspirin 81 MG EC tablet Take 81 mg by mouth daily.       calcium carbonate (OS-MONIQUE) 500 mg calcium (1,250 mg) chewable tablet Chew 2 tablets daily.        cholecalciferol, vitamin D3, (VITAMIN D3) 2,000 unit Tab Take 1 tablet by mouth daily.        clobetasol (TEMOVATE) 0.05 % Gel Apply to red rash daily until gone but no longer than two weeks 30 each 1     fluticasone (FLONASE) 50 mcg/actuation nasal spray 1 spray into each nostril daily. 16 g 0     furosemide (LASIX) 20 MG tablet Take 1 tablet (20 mg total) by mouth daily. 90 tablet 1     gabapentin (NEURONTIN) 300 MG capsule TAKE TWO CAPSULES BY MOUTH TWICE DAILY 360 capsule 3     HYDROcodone-acetaminophen 5-325 mg per tablet Take 1 tablet by mouth every 6 (six) hours as needed for pain. 60 tablet 0     hydrOXYzine (VISTARIL) 50 MG capsule Take 1 capsule (50 mg total) by mouth 3 (three) times a day as needed for itching. 30 capsule 0     losartan (COZAAR) 25 MG tablet TAKE 1 TABLET (25 MG TOTAL) BY MOUTH DAILY. 90 tablet 2     metoprolol succinate (TOPROL-XL) 50 MG 24 hr tablet TAKE ONE TABLET BY MOUTH ONE TIME DAILY 90 tablet 3     nortriptyline (PAMELOR) 10 MG capsule TAKE UP TO 4 CAPSULES BY MOUTH AT BEDTIME 120 capsule 5     nortriptyline (PAMELOR) 50 MG capsule TAKE 1 CAPSULE BY MOUTH NIGHTLY AT BEDTIME 180 capsule 1     omeprazole (PRILOSEC) 20 MG capsule TAKE 1 CAPSULE BY MOUTH 2 TIMES A DAY. 180 capsule 2     venlafaxine (EFFEXOR) 50 MG tablet TAKE ONE TABLET BY MOUTH THREE TIMES DAILY 270 tablet 3     VENTOLIN HFA 90 mcg/actuation inhaler INHALE 2 PUFFS EVERY 6 (SIX) HOURS AS NEEDED FOR WHEEZING. 18 Inhaler 0     azithromycin (ZITHROMAX Z-KARISSA) 250 MG tablet Take 2 tablets (500 mg) on  Day 1,  followed by 1 tablet (250 mg) once daily on Days 2 through 5. 6 tablet 0     benzonatate (TESSALON PERLES) 100 MG capsule Take 1 capsule (100 mg  total) by mouth 3 (three) times a day as needed for cough. 30 capsule 0     SUMAtriptan succinate 6 mg/0.5 mL PnIj INJECT 1 INJECTION AS DIRECTED IMMEDIATELY AT ONSET OF MIGRAINE HEADACHE 2 Syringe 3     No current facility-administered medications for this visit.        Allergies   Allergen Reactions     Fluoxetine Cough     Pt thinks it was cough but not totally sure.     Latex Rash     Lisinopril Cough     Sulfa (Sulfonamide Antibiotics) Itching and Rash       Past Medical History:   Diagnosis Date     Acute Sinusitis     Created by Conversion      Breast cyst      Cardiomyopathy     Created by Conversion      Cellulitis     Created by Conversion      Chronic Cutaneous Ulcer Venous Stasis     Created by Conversion      Contusion With Intact Skin Surface     Created by Conversion      Depression With Anxiety     Created by Conversion      Epidermal Inclusion Cyst     Created by Conversion      Esophageal reflux     Created by Conversion      Essential Hypertension     Created by Conversion      Fibromyalgia     Created by Conversion      Insomnia     Created by Conversion      Joint Pain, Localized In The Knee     Created by Conversion      Lichen Sclerosus Et Atrophicus     Created by Conversion      Limb Pain     Created by Conversion      Lower Back Pain     Created by Conversion      Mammogram - Abnormal     Created by Conversion      Migraine Headache     Created by Conversion      Myocardial infarction     per EKG's since 2015     Nonvenomous Insect Bite Of Shoulder     Created by Conversion      Obesity     Created by Conversion      Open Wound Of The Lip     Created by Conversion      Osteopenia     Created by Conversion      Sebaceous Hyperplasia     Created by Conversion      Seborrheic Keratosis     Created by Conversion      Shortness of breath     Created by Conversion      Varicose Veins     Created by Conversion      Vitamin D Deficiency     Created by Conversion        Past Surgical History:  "  Procedure Laterality Date     BREAST BIOPSY       BREAST CYST ASPIRATION       MO TOTAL ABDOM HYSTERECTOMY       still has both ovaries       History   Smoking Status     Never Smoker   Smokeless Tobacco     Never Used       OBJECTIVE:     /74 (Patient Site: Right Arm, Patient Position: Sitting, Cuff Size: Adult Large)  Pulse 70 Comment: regular  Temp 97.7  F (36.5  C) (Oral)   Resp 14 Comment: regular  Ht 5' 2.25\" (1.581 m)  Wt 206 lb 10.7 oz (93.7 kg)  LMP 09/29/1993  BMI 37.5 kg/m2    Physical Exam:  GENERAL APPEARANCE: A&A, NAD, well hydrated, well nourished  SKIN:  Normal skin turgor, no lesions/rashes   HEENT: moist mucous membranes, no rhinorrhea, PERRLA, TM's clear bilaterally, Throat without significant erythema or exudate.  NECK: Supple, full ROM, no significant lymphadenopathy or thyromegaly  CV: RRR, no M/G/R   LUNGS: CTAB  EXTREMITY: no swelling noted.  Full range of motion of all 4 extremities.   NEURO: no gross deficits   PSYCHIATRIC;  Mood appropriate, memory intact      ASSESSMENT/PLAN:     Bronchitis [J40]      1. Bronchitis  - azithromycin (ZITHROMAX Z-KARISSA) 250 MG tablet; Take 2 tablets (500 mg) on  Day 1,  followed by 1 tablet (250 mg) once daily on Days 2 through 5.  Dispense: 6 tablet; Refill: 0  - benzonatate (TESSALON PERLES) 100 MG capsule; Take 1 capsule (100 mg total) by mouth 3 (three) times a day as needed for cough.  Dispense: 30 capsule; Refill: 0    Patient overall seems to be doing okay.  I do not see any evidence for a pneumonia based on her exam today but I do think she has a bronchitis.  I am going to go ahead and start her on some Zithromax as this worked well for her in the past.  I also gave her prescription for Tessalon Perles as that seemed to have helped in the past as well.  I would suspect that she would have gradual improvement in her symptoms.  We discussed that Zithromax is a 5 day antibiotic so that she takes 2 pills today then a pill a day for 4 more " days.  The antibiotic works for another 5 days in her system and I would suspect again that she would have gradual improvement in her symptoms but certainly would not expect her to have immediate improvement after taking the first day of medication.  She understood that.  If she has increasing problems or concerns or does not seem to have gradual improvement in her symptoms to let us know.  We otherwise will see her on an as-needed basis.  Certainly if she has worsening of her symptoms she is to let us know as well.  Yesica Osuna MD

## 2021-06-15 NOTE — PROGRESS NOTES
"1. Essential hypertension  Basic Metabolic Panel   2. Other cardiomyopathy (H)     3. Depression With Anxiety  PHQ9 Depression Screen    PHQ9 Depression Screen    venlafaxine (EFFEXOR XR) 75 MG 24 hr capsule    Vitamin D, Total (25-Hydroxy)   4. Fibromyalgia  DULoxetine (CYMBALTA) 20 MG capsule    Vitamin D, Total (25-Hydroxy)   5. Chronic fatigue     6. Migraine without status migrainosus, not intractable, unspecified migraine type     7. Body mass index (BMI) 34.0-34.9, adult   Vitamin D, Total (25-Hydroxy)   8. Need for vaccination       PHQ-9: 9  MARGARETTE-7: 13    Assessment & Plan     Essential hypertension    - Basic Metabolic Panel  Well controlled on current medications    Other cardiomyopathy (H)    We reviewed the notes from her last cardiology appointment from earlier this month, next follow up 2/2022    Depression With Anxiety    - PHQ9 Depression Screen  - PHQ9 Depression Screen  - venlafaxine (EFFEXOR XR) 75 MG 24 hr capsule  Dispense: 90 capsule; Refill: 0, dose adjusted. She was previously taking 5 of the 50 mg immediate release tablets which her originally prescribed by her previous PCP Dr. Damico who is now retired  Changed her to the XR form today  - Vitamin D, Total (25-Hydroxy)    Fibromyalgia    - DULoxetine (CYMBALTA) 20 MG capsule  Dispense: 30 capsule; Refill: 2  - Vitamin D, Total (25-Hydroxy)  She has not followed up with rheumatology in over 8 years, she cannot tell my why she stopped going other than \"they told me I didn't have to go back anymore\"  She continues to struggle with neck pain issues, she has not followed back up with spine care. We reviewed her notes from her last spine care appointment, she continues to refuse surgery and feels that the injections did not help her  She will continue to take the Gabapentin at HS only, causes her to feel drowsy  We discussed the importance of regular exercise and getting adequate amounts of sleep, she is sleeping 8 to 10 hours per night. " "    Chronic fatigue    Ongoing due to fibromyalgia pain and depressive symptoms    Migraine without status migrainosus, not intractable, unspecified migraine type    Well controlled with use of Sumatriptin injections PRN and daily Topamax  I have asked her to stop taking the narcotic medication which was given to her by one of my colleagues while I was out of office  I do not recommend narcotic medication at this time due to her age, risk for falls and risk of dependence. Vicodin removed from med list.     Body mass index (BMI) 34.0-34.9, adult     - Vitamin D, Total (25-Hydroxy)    Need for vaccination    She will check with insurance regarding her coverage for her shingles vaccines  She is on wait list for COVID vaccine    Review of external notes as documented in note  53 minutes spent on the date of the encounter doing chart review, review of outside records, interpretation of tests, patient visit and documentation        BMI:   Estimated body mass index is 34.28 kg/m  as calculated from the following:    Height as of this encounter: 5' 2\" (1.575 m).    Weight as of this encounter: 187 lb 7 oz (85 kg).   The following are part of a depression follow up plan for the patient:  coping support assessment and emotional support assessment  The following high BMI interventions were performed this visit: encouragement to exercise, weight monitoring, exercise promotion: strength training, exercise promotion: stretching and exercise promotion: walking/step counting    Return in about 3 weeks (around 3/15/2021) for anxiety, depression, fibromyalgia/Cymbalta.    Leonora Kerr NP  [facility]   Katy Erwinrichardbronson is 75 y.o. and presents today for the following health issues: med check  HPI hypertension/ nonischemic cardiomyopathy:  Patient is following up with her cardiologist yearly, she recently saw her cardiologist earlier this month.  She was advised to continue her aspirin " and statin daily, continue with same Toprol and losartan doses and recommended that she increase her regular exercise.  She is a non-smoker, she denies any illicit drug use and minimal alcohol intake.  She does have a history of myocardial infarction back in 2015.  No history of stroke.  She states that she is doing some exercise 3 days/week from her videos that she had at home and will go up and down her stairs several times throughout the day.  Current BMI 34.2.    Chronic migraine: Well-controlled on her daily topiramate dose, she does use sumatriptan injections as needed when she has a flareup.  Her last flareup was 4 days ago, when she felt one coming on, she did take her injection, she went to lay down and her migraine was resolved in 2 hours.  She was given a small prescription of Vicodin by one of my colleagues back in August of 2020, we did discuss that use of narcotics for migraines can cause rebound headaches.     Neck pain: She was last seen by spine care in January 2020.  There was some discussion regarding her C7-T1 involvement, they did offer her epidural steroid injection if her symptoms had become more severe.  They also discussed that she would be a great surgical candidate however she was not interested in pursuing surgery and would like to avoid this altogether.  There is no cervical radiculopathy noted on her bilateral upper extremity EMG and no severe central stenosis noted but she did have moderate spinal stenosis in the C5-C6 region.  She was asked to continue on gabapentin which she is taking at nighttime as it does make her feel a bit sedated and she may take Aleve as needed.  She found no benefit with meloxicam.  She was told to follow-up as needed but she has not been back to see in spite care since.    Fibromyalgia: Continues report fatigue with low energy, feeling foggy brain, achy muscles and joints with headache symptoms almost daily.  She has not seen a rheumatologist in well over 8  "years, she states that she never went back because she felt that they could do nothing more for her.  She does feel now that she is getting older that her symptoms are becoming more severe, she does state that she has generalized discomfort constantly every day and describes it as a constant dull ache.  Overexerting herself and going up and down the stairs do tend aggravate her symptoms, she does get some relief with massage and heat packs.  She is rating her generalized pain today as 7 out of 10.    Anxiety/depression: She has noticed increased depressive symptoms over the last several months, she was previously prescribed Effexor 50 mg to take 1-4 times daily.  This was prescribed by her previous PCP Dr. Neil who is now retired.  She is now taking upwards of 5 tablets daily and wonders if there is an extended release dosage that she could take instead.  Her main stressors come from her chronic medical conditions related to her fibromyalgia and migraines, she is undergoing an ultrasound of the breast for some abnormalities that were noted recently which leaves her feeling concerned as well.  She denies any thoughts of suicide or plans for self-harm.             Review of Systems        Objective    /78   Pulse 90   Temp 97.9  F (36.6  C)   Resp 18   Ht 5' 2\" (1.575 m)   Wt 187 lb 7 oz (85 kg)   LMP 09/29/1993   SpO2 96%   Breastfeeding No   BMI 34.28 kg/m    Body mass index is 34.28 kg/m .  Physical Exam    Review of Systems     Denies fever, chills, visual changes,  nasal congestion, rhinorrhea, ear pain, or discharge, sore throat, swollen glands, breast mass, nipple discharge,  abdominal pain, cough, shortness of breath, chest pain, weight change, change in bowel habits, melena, rectal bleeding, dysuria, frequency, urgency, hematuria, polyuria, polydipsia, polyphagia, swelling or erythema, edema, rash,  paresthesias, vaginal discharge or bleeding        Objective:         /78   Pulse 90  " " Temp 97.9  F (36.6  C)   Resp 18   Ht 5' 2\" (1.575 m)   Wt 187 lb 7 oz (85 kg)   LMP 09/29/1993   SpO2 96%   Breastfeeding No   BMI 34.28 kg/m       Physical Exam:  General Appearance: Alert, cooperative, no distress, appears stated age  Head: Normocephalic, without obvious abnormality, atraumatic  Eyes: PERRL, conjunctiva/corneas clear, EOM's intact  Neck: Supple, symmetrical, trachea midline, no adenopathy. Limited ROM with looking upward, pain increased with direct palpation in the C5 to C6 region, no significant swelling present;  thyroid: not enlarged, symmetric, no tenderness/mass/nodules; no carotid bruit or JVD  Lungs: Clear to auscultation bilaterally, respirations unlabored  Heart: Regular rate and rhythm, S1 and S2 normal, no murmur, rub, or gallop  Abdomen: Soft, non-tender, bowel sounds active all four quadrants,  no masses, no organomegaly  Skin: Skin color, texture, turgor normal, no rashes or lesions  Neurologic: Normal, no gross deficits, strength is equal 5/5  Psych: appears down, flat affect, well groomed, engaged in conversation, taking notes during visit, well groomed, memory recall okay- relies on her notes and calendar                        "

## 2021-06-15 NOTE — TELEPHONE ENCOUNTER
Discussed with pt and ended up moving her appt to 4/6/2021.    Tita Franco RN, CBN  [facility]  P [phone]  F [phone]

## 2021-06-15 NOTE — TELEPHONE ENCOUNTER
RN cannot approve Refill Request    RN can NOT refill this medication patient reports taking medication differently. Last office visit: 7/15/2019 Elba Monge MD Last Physical: 8/23/2017 Last MTM visit: Visit date not found Last visit same specialty: 2/22/2021 Leonora Kerr NP.  Next visit within 3 mo: Visit date not found  Next physical within 3 mo: Visit date not found      [place], [facility] [place] Connection Triage/Med Refill 3/1/2021    Requested Prescriptions   Pending Prescriptions Disp Refills     omeprazole (PRILOSEC) 20 MG capsule [Pharmacy Med Name: OMEPRAZOLE DR 20 MG CAPSULE] 180 capsule 3     Sig: TAKE 1 CAPSULE BY MOUTH TWICE DAILY       GI Medications Refill Protocol Passed - 3/1/2021 12:31 AM        Passed - PCP or prescribing provider visit in last 12 or next 3 months.     Last office visit with prescriber/PCP: 7/15/2019 Elba Monge MD OR same dept: 2/22/2021 Leonora Kerr NP OR same specialty: 2/22/2021 Leonora Kerr NP  Last physical: 8/23/2017 Last MTM visit: Visit date not found   Next visit within 3 mo: Visit date not found  Next physical within 3 mo: Visit date not found  Prescriber OR PCP: Elba Monge MD  Last diagnosis associated with med order: 1. Esophageal reflux  - omeprazole (PRILOSEC) 20 MG capsule [Pharmacy Med Name: OMEPRAZOLE DR 20 MG CAPSULE]; TAKE 1 CAPSULE BY MOUTH TWICE DAILY  Dispense: 180 capsule; Refill: 3    If protocol passes may refill for 12 months if within 3 months of last provider visit (or a total of 15 months).

## 2021-06-16 PROBLEM — N20.1 CALCULUS OF URETER: Status: ACTIVE | Noted: 2018-05-21

## 2021-06-16 PROBLEM — N13.2 HYDRONEPHROSIS WITH URINARY OBSTRUCTION DUE TO URETERAL CALCULUS: Status: ACTIVE | Noted: 2018-05-21

## 2021-06-16 PROBLEM — F41.9 ANXIETY: Status: ACTIVE | Noted: 2021-03-15

## 2021-06-16 PROBLEM — I42.8 OTHER CARDIOMYOPATHY (H): Status: ACTIVE | Noted: 2019-01-04

## 2021-06-16 PROBLEM — R53.82 CHRONIC FATIGUE: Status: ACTIVE | Noted: 2021-02-22

## 2021-06-16 PROBLEM — F33.1 MODERATE EPISODE OF RECURRENT MAJOR DEPRESSIVE DISORDER (H): Status: ACTIVE | Noted: 2021-03-15

## 2021-06-16 PROBLEM — E66.9 OBESITY (BMI 30-39.9): Status: ACTIVE | Noted: 2020-06-16

## 2021-06-16 NOTE — PROGRESS NOTES
"DIAGNOSIS: Bilateral peroneus brevis tendinitis and plantar fasciitis    PLAN: I will order custom orthotics for her.  We might repeat physical therapy if symptoms persist.  Return to clinic as needed.  Fifteen minute visit, greater than half our time spent counseling and coordinating medical care.     SUBJECTIVE: The patient returns to the [facility] for follow up of bilateral pain around the fifth metatarsal bases.  She was diagnosed in October 2016 with peroneus brevis tendinitis.  Physical therapy was useful at the time, but symptoms have slowly returned.  She also endorses plantar heel pain.  Worse with increased activities.  Painful in most of her shoes.    PHYSICAL EXAM:  /70  Pulse 94  Resp 16  Ht 5' 2.25\" (1.581 m)  LMP 09/29/1993  SpO2 98%  General: Pleasant 72 y.o. female in no acute distress.  Vascular: DP pulses are diminished. PT pulses are diminished. Pedal hair is diminished. Feet are warm to the touch.  Cardiac: Pulse is regular.  Lymphatic: No edema at the ankles.  Neuro: Sensation in the feet is grossly intact to light touch.  Derm: No open lesions.  Musculoskeletal: Mild tenderness today at the bilateral fifth metatarsal bases and along the course of the peroneus brevis tendons.  No pain with palpation around the plantar fascia today.       "

## 2021-06-16 NOTE — PROGRESS NOTES
PROGRESS NOTE       SUBJECTIVE:  Katy Hong is a 72 y.o. female   Chief Complaint   Patient presents with     Medication Refill     med check      Ankle Pain     on friday pt fell on excelator , ankle pain    Patient was at an airport in [facility] and she tripped on an escalator.  She landed wrong on her right ankle and it has been swollen and a little sore.    Patient has been working very hard to lose weight by cutting down on portions and adjusting the food that she eats.  In spite of all her efforts her weight has remained almost exactly the same.  She is quite discouraged and would like some help with this.      Patient Active Problem List   Diagnosis     Osteopenia     Insomnia     Vitamin D Deficiency     Lower Back Pain     Fibromyalgia     Essential Hypertension     Seborrheic Keratosis     Obesity     Lichen Sclerosus Et Atrophicus     Depression With Anxiety     Migraine Headache     Cardiomyopathy     Esophageal Reflux     Joint Pain, Localized In The Knee     Varicose Veins     Shortness Of Breath     Nonvenomous Insect Bite Of Shoulder     Cellulitis     Mammogram - Abnormal     Chronic Cutaneous Ulcer Venous Stasis     Chronic venous insufficiency     Foot pain (Soft Tissue)     Female stress incontinence       Current Outpatient Prescriptions   Medication Sig Dispense Refill     aspirin 81 MG EC tablet Take 81 mg by mouth daily.       calcium carbonate (OS-MONIQUE) 500 mg calcium (1,250 mg) chewable tablet Chew 2 tablets daily.        cholecalciferol, vitamin D3, (VITAMIN D3) 2,000 unit Tab Take 1 tablet by mouth daily.        fluticasone (FLONASE) 50 mcg/actuation nasal spray 1 spray into each nostril daily. 16 g 0     furosemide (LASIX) 20 MG tablet Take 1 tablet (20 mg total) by mouth daily. 90 tablet 1     gabapentin (NEURONTIN) 300 MG capsule Take 2 capsules by mouth in AM and 3 capsules in  capsule 3     halobetasol (ULTRAVATE) 0.05 % cream AFTER USE OF PSSORENT, APPLY TO LOWER  LEGS DAILY X 3 WEEKS, THEN ONLY ON WED, SAT AND SUN  11     HYDROcodone-acetaminophen 5-325 mg per tablet Take 1 tablet by mouth every 6 (six) hours as needed for pain. 60 tablet 0     hydrOXYzine (VISTARIL) 50 MG capsule Take 1 capsule (50 mg total) by mouth 3 (three) times a day as needed for itching. 30 capsule 0     losartan (COZAAR) 25 MG tablet TAKE 1 TABLET (25 MG TOTAL) BY MOUTH DAILY. 90 tablet 2     metoprolol succinate (TOPROL-XL) 50 MG 24 hr tablet TAKE ONE TABLET BY MOUTH ONE TIME DAILY 90 tablet 3     omeprazole (PRILOSEC) 20 MG capsule TAKE 1 CAPSULE BY MOUTH 2 TIMES A DAY. 180 capsule 2     venlafaxine (EFFEXOR) 50 MG tablet TAKE ONE TABLET BY MOUTH THREE TIMES DAILY 270 tablet 3     VENTOLIN HFA 90 mcg/actuation inhaler INHALE 2 PUFFS EVERY 6 (SIX) HOURS AS NEEDED FOR WHEEZING. 18 Inhaler 0     clobetasol (TEMOVATE) 0.05 % Gel Apply to red rash daily until gone but no longer than two weeks 30 each 1     SUMAtriptan succinate 6 mg/0.5 mL PnIj INJECT 1 INJECTION AS DIRECTED IMMEDIATELY AT ONSET OF MIGRAINE HEADACHE 2 Syringe 3     No current facility-administered medications for this visit.        History   Smoking Status     Never Smoker   Smokeless Tobacco     Never Used       REVIEW OF SYSTEMS:  Patient denies fever, chills, dizziness, headache, visual change, cough, chest pain, shortness of breath, abdominal pain, extremity pain or swelling.          OBJECTIVE:       Vitals:    03/05/18 0905   BP: 138/80   Pulse: 84     Weight: 206 lb (93.4 kg)  Wt Readings from Last 3 Encounters:   03/05/18 206 lb (93.4 kg)   01/30/18 206 lb 10.7 oz (93.7 kg)   12/06/17 202 lb (91.6 kg)     Body mass index is 37.38 kg/(m^2).        Physical Exam:  GENERAL APPEARANCE: A&A, NAD, well hydrated, well nourished  SKIN:  Normal skin turgor, no lesions/rashes   EARS: TM's normal, gray with nl light reflex  OROPHARYNX: without erythema, no post nasal drainage or thrush  NECK: Supple, without lymphadenopathy, no thyroid  mass  CV: RRR, no M/G/R   LUNGS: CTAB, normal respiratory effort  ABDOMEN: S&NT, no masses, no organomegaly   EXTREMITY: Extremities normal, atraumatic, no swelling  NEURO: no gross deficits   PSYCHIATRIC:  Mood appropriate, memory intact        ASSESSMENT/PLAN:     1. Obesity  In reviewing her chart she has been on nortriptyline for quite a long time to prevent headaches.  It has done a very nice job in that she has not had migraines for a long time.  On the other hand it may be causing slow weight gain or at least getting in the way of her attempts to lose weight.  I recommend that she stop it and just use it if she cannot sleep.  We will increase her gabapentin.  Please see below.  - Ambulatory referral to Bariatric Care: Surgical and Non-Surgical    2. Migraine Headache  Patient's use of Vicodin has been very appropriate.  She has used 55 tablets since July.  See controlled substance agreement which is reviewed and signed today.  - HYDROcodone-acetaminophen 5-325 mg per tablet; Take 1 tablet by mouth every 6 (six) hours as needed for pain.  Dispense: 60 tablet; Refill: 0    3. Spondylolisthesis  Will increase the gabapentin to 3 capsules in the p.m. and continue to capsules in a.m.  Hopefully this will control her migraines so that she can stop the nortriptyline.  - gabapentin (NEURONTIN) 300 MG capsule; Take 2 capsules by mouth in AM and 3 capsules in PM  Dispense: 450 capsule; Refill: 3    4. Ankle injury, right, initial encounter  X-ray shows a fracture of the distal lateral malleolus.  I informed patient of this by phone.  We fitted her with a splint and it worked very well to support her ankle.  I would expect her to need to wear this for the next 4-6 weeks.  I instructed her to avoid extra steps and not to go up on her toes.  It is okay to bear weight because this is not a weightbearing bone.  After 6 weeks we can start doing exercises to strengthen her ankle.  If it any point she is not improving she  should be rechecked and she might require further stabilization  - XR Ankles Bilateral 2 Vws; Future    5. Itching      6. Migraine    - HYDROcodone-acetaminophen 5-325 mg per tablet; Take 1 tablet by mouth every 6 (six) hours as needed for pain.  Dispense: 60 tablet; Refill: 0      There are no Patient Instructions on file for this visit.  Medications Discontinued During This Encounter   Medication Reason     benzonatate (TESSALON PERLES) 100 MG capsule Therapy completed     gabapentin (NEURONTIN) 300 MG capsule Reorder     nortriptyline (PAMELOR) 50 MG capsule Therapy completed     nortriptyline (PAMELOR) 10 MG capsule Therapy completed     HYDROcodone-acetaminophen 5-325 mg per tablet Reorder     No Follow-up on file.    The visit lasted a total of 30 minutes face to face with the patient.  Over 50% of the time spent counseling and educating the patient about all of the above.      Elba Monge MD

## 2021-06-16 NOTE — TELEPHONE ENCOUNTER
Reason for Call:  Medication or medication refill:    Do you use a [facility]?  Name of the pharmacy and phone number for the current request: [facility]    Name of the medication requested: TRAZODONE    Other request: NONE    Can we leave a detailed message on this number? Yes    Phone number patient can be reached at: Home number on file [phone] (home)    Best Time: ANYTYIME    Call taken on 4/22/2021 at 9:57 AM by Sunday Arango

## 2021-06-16 NOTE — PROGRESS NOTES
"    Katy Hong is a 75 y.o. female who is being evaluated via a billable telephone visit.      The patient has been notified of following:     \"This telephone visit will be conducted via a call between you and your physician/provider. We have found that certain health care needs can be provided without the need for a physical exam.  This service lets us provide the care you need with a short phone conversation.  If a prescription is necessary we can send it directly to your pharmacy.  If lab work is needed we can place an order for that and you can then stop by our lab to have the test done at a later time.    If during the course of the call the physician/provider feels a telephone visit is not appropriate, you will not be charged for this service.\"     Provider notes:    Katy Hong presents for non surgical weight loss management.    MEDICATIONS were reviewed and updated in the chart    ALLERGIES  Fluoxetine, Latex, Levothyroxine, Lisinopril, and Sulfa (sulfonamide antibiotics)    Patient Profile   Social History     Social History Narrative    5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          Past Medical History   Past Medical History:   Diagnosis Date     Acute Sinusitis     Created by Conversion      Breast cyst      Cardiomyopathy     Created by Conversion      Cellulitis     Created by Conversion      Chronic Cutaneous Ulcer Venous Stasis     Created by Conversion      Contusion With Intact Skin Surface     Created by Conversion      Depression With Anxiety     Created by Conversion      Epidermal Inclusion Cyst     Created by Conversion      Esophageal reflux     Created by Conversion      Essential Hypertension     Created by Conversion      Fibromyalgia     Created by Conversion      Insomnia     Created by Conversion      Joint Pain, Localized In The Knee     Created by Conversion      Lichen Sclerosus Et Atrophicus     Created by Conversion      Limb Pain     Created by Conversion      Lower Back Pain  "    Created by Conversion      Mammogram - Abnormal     Created by Conversion      Migraine Headache     Created by Conversion      Myocardial infarction (H)     per EKG's since 2015     Nonvenomous Insect Bite Of Shoulder     Created by Conversion      Obesity     Created by Conversion      Open Wound Of The Lip     Created by Conversion      Osteopenia     Created by Conversion      Sebaceous Hyperplasia     Created by Conversion      Seborrheic Keratosis     Created by Conversion      Shortness of breath     Created by Conversion      Varicose Veins     Created by Conversion      Vitamin D Deficiency     Created by Conversion      Patient Active Problem List   Diagnosis     Osteopenia     Insomnia     Vitamin D Deficiency     Lower Back Pain     Fibromyalgia     Essential Hypertension     Seborrheic Keratosis     Obesity     Lichen Sclerosus Et Atrophicus     Depression With Anxiety     Migraine Headache     Esophageal Reflux     Joint Pain, Localized In The Knee     Varicose Veins     Nonvenomous Insect Bite Of Shoulder     Cellulitis     Mammogram - Abnormal     Chronic Cutaneous Ulcer Venous Stasis     Chronic venous insufficiency     Foot pain (Soft Tissue)     Female stress incontinence     Calculus of ureter     Hydronephrosis with urinary obstruction due to ureteral calculus     Other cardiomyopathy (H)     Obesity (BMI 30-39.9)     Chronic fatigue     Moderate episode of recurrent major depressive disorder (H)     Anxiety     Current Outpatient Medications   Medication Sig Note     albuterol (VENTOLIN HFA) 90 mcg/actuation inhaler Inhale 2 puffs every 6 (six) hours as needed for wheezing. 9/10/2020: Prn     aspirin 81 MG EC tablet Take 81 mg by mouth daily.      calcium carbonate (OS-MONIQUE) 500 mg calcium (1,250 mg) chewable tablet Chew 2 tablets daily.       cholecalciferol, vitamin D3, 5,000 unit Tab Take by mouth.      DULoxetine (CYMBALTA) 20 MG capsule Take 1 capsule (20 mg total) by mouth 2 (two) times  a day.      furosemide (LASIX) 20 MG tablet Take 1-2 tablets (20-40 mg total) by mouth every other day as needed. (Patient taking differently: Take 20-40 mg by mouth daily. )      gabapentin (NEURONTIN) 300 MG capsule Take 2 capsules (600 mg total) by mouth at bedtime.      losartan (COZAAR) 25 MG tablet Take 1 tablet (25 mg total) by mouth daily.      metoprolol succinate (TOPROL-XL) 50 MG 24 hr tablet TAKE 1 TABLET BY MOUTH EVERY DAY      omeprazole (PRILOSEC) 20 MG capsule TAKE 1 CAPSULE BY MOUTH TWICE DAILY      rimegepant 75 mg TbDL Take 1 tablet by mouth as needed (Migraine). 9/10/2020: Prn     SUMAtriptan succinate 6 mg/0.5 mL PnIj INJECT 1 INJECTION AS DIRECTED IMMEDIATELY AT ONSET OF MIGRAINE HEADACHE 9/10/2020: Prn     topiramate (TOPAMAX) 100 MG tablet TAKE ONE TABLET BY MOUTH DAILY WITH DINNER 4/6/2021: Refill needed     traZODone (DESYREL) 50 MG tablet Take 1 tablet (50 mg total) by mouth at bedtime.      triamcinolone (KENALOG) 0.1 % ointment APPLY TO AFFECTED AREA TWICE A DAY      venlafaxine (EFFEXOR-XR) 75 MG 24 hr capsule TAKE 3 CAPSULES (225 MG TOTAL) BY MOUTH DAILY.        Past Surgical History  She has a past surgical history that includes pr total abdom hysterectomy; Tubal ligation; Appendectomy; Temporomandibular joint surgery (Bilateral); Cataract extraction, bilateral; Hysterectomy; Oophorectomy; Breast biopsy (Left); and Breast cyst aspiration (Left).       INTERIM HISTORY  Patient is taking the topamax and is tolerating it well. She thinks it helps to control her appetite. She is feeling hungry more than previously.    PATIENT REPORTED CURRENT WEIGHT   184    DIETARY HISTORY  MEALS: 30 grams of protein  SNACKS: rare cheese stick or hard boiled egg or raw veggies  NUMBER OF MEALS/WEEK NOT PREPARED AT HOME: rare  CALORIE CONTAINING BEVERAGES/WEEK: none  Water intake: 6-7 cups per day    Positive Changes Since Last Visit: adequate protein, most days she gets her water in  Struggling With:  hunger, frustration    PHYSICAL ACTIVITY PATTERNS:  Cardiovascular: some walking (knees are bothering her)  Strength Training: none    REVIEW OF SYSTEMS  GENERAL/CONSTITUTIONAL:  Fatigue: sometimes  CARDIOVASCULAR:  Chest Pain with Exertion: no  NEUROLOGIC:  Paresthesias: no  PSYCHIATRIC:  Moods: stable  MUSCULOSKELETAL/RHEUMATOLOGIC  Arthralgias: yes  Myalgias: no  ENDOCRINE:  Monitoring Blood Sugars: na  Sugars Well Controlled: na       Patient Profile   Social History     Social History Narrative    5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          Past Medical History   Past Medical History:   Diagnosis Date     Acute Sinusitis     Created by Conversion      Breast cyst      Cardiomyopathy     Created by Conversion      Cellulitis     Created by Conversion      Chronic Cutaneous Ulcer Venous Stasis     Created by Conversion      Contusion With Intact Skin Surface     Created by Conversion      Depression With Anxiety     Created by Conversion      Epidermal Inclusion Cyst     Created by Conversion      Esophageal reflux     Created by Conversion      Essential Hypertension     Created by Conversion      Fibromyalgia     Created by Conversion      Insomnia     Created by Conversion      Joint Pain, Localized In The Knee     Created by Conversion      Lichen Sclerosus Et Atrophicus     Created by Conversion      Limb Pain     Created by Conversion      Lower Back Pain     Created by Conversion      Mammogram - Abnormal     Created by Conversion      Migraine Headache     Created by Conversion      Myocardial infarction (H)     per EKG's since      Nonvenomous Insect Bite Of Shoulder     Created by Conversion      Obesity     Created by Conversion      Open Wound Of The Lip     Created by Conversion      Osteopenia     Created by Conversion      Sebaceous Hyperplasia     Created by Conversion      Seborrheic Keratosis     Created by Conversion      Shortness of breath     Created by Conversion      Varicose Veins     Created  by Conversion      Vitamin D Deficiency     Created by Conversion      Patient Active Problem List   Diagnosis     Osteopenia     Insomnia     Vitamin D Deficiency     Lower Back Pain     Fibromyalgia     Essential Hypertension     Seborrheic Keratosis     Obesity     Lichen Sclerosus Et Atrophicus     Depression With Anxiety     Migraine Headache     Esophageal Reflux     Joint Pain, Localized In The Knee     Varicose Veins     Nonvenomous Insect Bite Of Shoulder     Cellulitis     Mammogram - Abnormal     Chronic Cutaneous Ulcer Venous Stasis     Chronic venous insufficiency     Foot pain (Soft Tissue)     Female stress incontinence     Calculus of ureter     Hydronephrosis with urinary obstruction due to ureteral calculus     Other cardiomyopathy (H)     Obesity (BMI 30-39.9)     Chronic fatigue     Moderate episode of recurrent major depressive disorder (H)     Anxiety     Current Outpatient Medications   Medication Sig Note     albuterol (VENTOLIN HFA) 90 mcg/actuation inhaler Inhale 2 puffs every 6 (six) hours as needed for wheezing. 9/10/2020: Prn     aspirin 81 MG EC tablet Take 81 mg by mouth daily.      calcium carbonate (OS-MONIQUE) 500 mg calcium (1,250 mg) chewable tablet Chew 2 tablets daily.       cholecalciferol, vitamin D3, 5,000 unit Tab Take by mouth.      DULoxetine (CYMBALTA) 20 MG capsule Take 1 capsule (20 mg total) by mouth 2 (two) times a day.      furosemide (LASIX) 20 MG tablet Take 1-2 tablets (20-40 mg total) by mouth every other day as needed. (Patient taking differently: Take 20-40 mg by mouth daily. )      gabapentin (NEURONTIN) 300 MG capsule Take 2 capsules (600 mg total) by mouth at bedtime.      losartan (COZAAR) 25 MG tablet Take 1 tablet (25 mg total) by mouth daily.      metoprolol succinate (TOPROL-XL) 50 MG 24 hr tablet TAKE 1 TABLET BY MOUTH EVERY DAY      omeprazole (PRILOSEC) 20 MG capsule TAKE 1 CAPSULE BY MOUTH TWICE DAILY      rimegepant 75 mg TbDL Take 1 tablet by mouth as  needed (Migraine). 9/10/2020: Prn     SUMAtriptan succinate 6 mg/0.5 mL PnIj INJECT 1 INJECTION AS DIRECTED IMMEDIATELY AT ONSET OF MIGRAINE HEADACHE 9/10/2020: Prn     topiramate (TOPAMAX) 100 MG tablet TAKE ONE TABLET BY MOUTH DAILY WITH DINNER 4/6/2021: Refill needed     traZODone (DESYREL) 50 MG tablet Take 1 tablet (50 mg total) by mouth at bedtime.      triamcinolone (KENALOG) 0.1 % ointment APPLY TO AFFECTED AREA TWICE A DAY      venlafaxine (EFFEXOR-XR) 75 MG 24 hr capsule TAKE 3 CAPSULES (225 MG TOTAL) BY MOUTH DAILY.        Past Surgical History  She has a past surgical history that includes pr total abdom hysterectomy; Tubal ligation; Appendectomy; Temporomandibular joint surgery (Bilateral); Cataract extraction, bilateral; Hysterectomy; Oophorectomy; Breast biopsy (Left); and Breast cyst aspiration (Left).         Counseling:   We reviewed the important healthy habits for weight loss:  -eating 3 meals daily  -eating protein first, getting >60gm protein daily  -eating slowly, chewing food well  -avoiding/limiting calorie containing beverages  -limiting starchy vegetables and carbohydrates, choosing wheat, not white with breads,   crackers, pastas, bear, bagels, tortillas, rice  -limiting restaurant or cafeteria eating to twice a week or less  -drinking adequate water    We discussed the importance of restorative sleep and stress management in maintaining a healthy weight.  We discussed the importance of physical activity including cardiovascular and strength training in maintaining a healthier weight.          Assessment/Plan:    Obesity (BMI 30-39.9)  Patient was congratulated on her success thus far. Healthy habits to assist with further weight loss were discussed. Written information was given. She will have her son show her some exercises to build her muscle. She declined a referral to PT. She will continue to take the topamax and we will add 50 mg in the am.    Migraine Headache  These may decrease  in frequency with topamax and weight loss.        I have reviewed the note as documented above.  This accurately captures the substance of my conversation with the patient.      Phone call contact time    Call Started at: 9:17 am  Call Ended at: 9:28 am      Signature:  LATISHA Rossi MD  [facility]

## 2021-06-16 NOTE — PROGRESS NOTES
"Assessment:        1. Other closed fracture of distal end of right fibula with routine healing, subsequent encounter     2. Acute right ankle pain           Plan:       Xray of the ankle was reviewed. We reviewed natural history and expected course. Her questions were answered. We reviewed rest, ice, compression, elevation (RICE) therapy, and she was given a pneumatic boot to be worn for the next 4-6 weeks. She will follow-up in 4-6 weeks for recheck to determine if the boot can be discontinued.      Subjective:      Katy Hong is a 72 y.o. female who presents with persistent right ankle pain and distal fibula fracture. She has been using a trilok ankle splint since the diagnosis on Monday, but feels like she has too many stairs in her trilevel home to traverse safely with the splint alone.  Onset of the symptoms was 1 week ago. Inciting event: injured while riding an escalator at the airport in [place]. Current symptoms include: ability to bear weight, but with some pain and swelling. Aggravating factors: going up and down stairs and weight bearing. Symptoms have stabilized. Patient has had no prior ankle problems. Evaluation to date: plain films: abnormal non-displaced distal fibular fracture. Treatment to date: elastic supporter which is not very effective.        Objective:      /88  Pulse 92  Ht 5' 2.25\" (1.581 m)  Wt 206 lb (93.4 kg)  LMP 09/29/1993  SpO2 96%  BMI 37.38 kg/m2  Right ankle:   1-2+ tenderness over the lateral malleolus with mild swelling noted.   Left ankle:   normal     Imaging:  Xr Ankles Bilateral 2 Vws Result Date: 3/5/2018  [facility] XR ANKLES BILATERAL 2 VWS 3/5/2018 10:07 AM INDICATION: Unspecified injury of right ankle, initial encounter COMPARISON: None. FINDINGS: Right ankle: Soft tissue swelling. Oblique fracture of the lateral malleolus. Achilles enthesopathy. Calcaneal spur plantar aspect of the calcaneus. Left ankle: Negative for fracture       "

## 2021-06-16 NOTE — TELEPHONE ENCOUNTER
Refill Approved    Rx renewed per Medication Renewal Policy. Medication was last renewed on 2/22/21.    [facility], [facility] Triage/Med Refill 3/31/2021     Requested Prescriptions   Pending Prescriptions Disp Refills     venlafaxine (EFFEXOR-XR) 75 MG 24 hr capsule [Pharmacy Med Name: VENLAFAXINE HCL ER 75 MG CAP] 90 capsule 0     Sig: TAKE 3 CAPSULES (225 MG TOTAL) BY MOUTH DAILY.       Venlafaxine/Desvenlafaxine Refill Protocol Passed - 3/30/2021 11:36 AM        Passed - LFT or AST or ALT in last year     Albumin   Date Value Ref Range Status   07/23/2020 3.7 3.5 - 5.0 g/dL Final     Bilirubin, Total   Date Value Ref Range Status   07/23/2020 0.5 0.0 - 1.0 mg/dL Final     Bilirubin, Direct   Date Value Ref Range Status   07/18/2017 0.2 <=0.5 mg/dL Final     Alkaline Phosphatase   Date Value Ref Range Status   07/23/2020 101 45 - 120 U/L Final     AST   Date Value Ref Range Status   07/23/2020 13 0 - 40 U/L Final     ALT   Date Value Ref Range Status   07/23/2020 15 0 - 45 U/L Final     Protein, Total   Date Value Ref Range Status   07/23/2020 6.6 6.0 - 8.0 g/dL Final                Passed - Fasting lipid cascade in last year     Cholesterol   Date Value Ref Range Status   07/23/2020 156 <=199 mg/dL Final     Triglycerides   Date Value Ref Range Status   07/23/2020 77 <=149 mg/dL Final     HDL Cholesterol   Date Value Ref Range Status   07/23/2020 59 >=50 mg/dL Final     LDL Calculated   Date Value Ref Range Status   07/23/2020 82 <=129 mg/dL Final     Patient Fasting > 8hrs?   Date Value Ref Range Status   07/23/2020 Yes  Final             Passed - PCP or prescribing provider visit in last year     Last office visit with prescriber/PCP: 2/22/2021 Leonora Kerr NP OR same dept: 2/22/2021 Leonora Kerr NP OR same specialty: 2/22/2021 Leonora Kerr NP  Last physical: Visit date not found Last MTM visit: Visit date not found   Next visit within 3 mo: Visit date not found  Next physical  within 3 mo: Visit date not found  Prescriber OR PCP: Leonora Kerr NP  Last diagnosis associated with med order: 1. Depression With Anxiety  - venlafaxine (EFFEXOR-XR) 75 MG 24 hr capsule [Pharmacy Med Name: VENLAFAXINE HCL ER 75 MG CAP]; Take 3 capsules (225 mg total) by mouth daily.  Dispense: 90 capsule; Refill: 0    If protocol passes may refill for 12 months if within 3 months of last provider visit (or a total of 15 months).             Passed - Blood Pressure in last year     BP Readings from Last 1 Encounters:   02/22/21 126/78

## 2021-06-17 NOTE — PROGRESS NOTES
[facility] Daily Progress     Patient Name: Katy Hong  Date: 2018  Visit #: [id number]  Referral Diagnosis:    Referring provider: Alexsandra Rossi,*  Visit Diagnosis:     ICD-10-CM    1. Fibromyalgia M79.7    2. General weakness R53.1    3. Decreased stamina R53.83          Assessment:     Patient is benefitting from skilled physical therapy and is making steady progress toward functional goals.  Patient is appropriate to continue with skilled physical therapy intervention, as indicated by initial plan of care.    Goal Status:  Pt. will be independent with home exercise program in : 12 weeks  Pt will: Able to walk outside or in the mall on even surfaces for 45-60 minutes within 12 weeks  Pt will: Able to ambulate up stairs with more ease as strength improves within 12 weeks  Pt will: Able to sit<>stand with more ease as leg strength improves within 12 weeks  Pt will: Able to get up from a squatting position, like from lower cupboards, with more ease within 12 weeks    Plan / Patient Education:                                     Strengthening for HEP                                Balance, proprioception, endurance, strengtheing      Subjective:     Pain Ratin, does have the usual FM pain    Not much different than when I was here last      Objective:   Barriers to Learning or Achieving Goals: Co-morbidities or other medical factors.  fibromyalgia, HTN, obesity, SOB    Functional limitations are described as occurring with: walking, up stairs, kneeling or squatting, housework, unable to carry laundry up stairs, sit<>stand    Exercises:  Exercise #1: Nu-step   seat 6, arms 9, workload 3    5'  Comment #1: stretches: sitting hamstring, standing calf stretch, SKC, LTR, supine piriformis   hold 30 seconds x 1-3 reps  Exercise #2: recumbent bike   3'  Comment #2: sit to stands in many different chairs     Treatment Today     TREATMENT MINUTES COMMENTS   Evaluation      Self-care/ Home management     Manual therapy     Neuromuscular Re-education     Therapeutic Activity     Therapeutic Exercises 30 See above or flow sheet   Gait training     Modality__________________                Total 30    Blank areas are intentional and mean the treatment did not include these items.       Ning Gotti, PT  5/9/2018

## 2021-06-17 NOTE — PROGRESS NOTES
Katy Hong is a 75 y.o. female who is being evaluated via a billable video visit.      How would you like to obtain your AVS? MyChart.  If dropped from the video visit, the video invitation should be resent by: [email or web address]  Will anyone else be joining your video visit? No      Distant Location (provider location):  [facility]     This appointment was originally made as a video call however we were unable to connect despite multiple attempts at hands this was conducted on the phone call.        Time of call 12 minutes.      Follow-up: Labs at [facility], follow-up in person [place] the next available appointment.      This document was created using a software with less than 100% fidelity, at times resulting in unintended, even erroneous syntax and grammar.  The reader is advised to keep this under consideration while reviewing, interpreting this note.    ASSESSMENT AND PLAN:  Katy Hong 75 y.o. female is seen here on 05/24/21 for evaluation of widespread polyarthralgias in the background of diagnosis of fibromyalgia that was made 30 years ago, she has several segments to suggest an additional osteoarthritis related pain, she is on duloxetine at appropriate dose as well as some gabapentin, she takes trazodone to help her sleep at night, she does get a good night sleep, she has taken Tylenol, and nonsteroidals with limited benefit.  Corticosteroid injections have provided her some relief and at times they have been of shorter durability.  One of the key questions would be if there is evidence of an inflammatory joint disease component.  To this and will meet in person for examination, and further work-up before that.  Diagnoses and all orders for this visit:    Polyarthralgia  -     Antinuclear Antibody (MALDONADO) Cascade; Future; Expected date: 05/28/2021  -     CCP Antibodies; Future; Expected date: 05/28/2021  -     Rheumatoid Factor Quant; Future; Expected  "date: 05/28/2021  -     C-Reactive Protein; Future; Expected date: 05/28/2021  -     Erythrocyte Sedimentation Rate; Future; Expected date: 05/28/2021      HISTORY OF PRESENTING ILLNESS:  Katy Hong, 75 y.o., female is here for evaluation of painful joints.  She reports that this is a pain that troubles her almost all the time this is \"all over\" it hurts in her back, shoulders, knees, hands.  That years ago she was told that she has fibromyalgia.  Over the years she has found the combination of her current regimen provides her some relief.  Most recently duloxetine was added in March and then recently increase the dose to 20 mg twice daily.  She is also on gabapentin 600 mg at nighttime.  She takes trazodone to help her sleep which does work.  She wakes up in the morning fairly rested.  She sees swelling on her hands but has been told that this is \"arthritis\".  She describes no rash.  There is no personal or family history of ulcerative colitis Crohn's disease.  No family history of rheumatoid arthritis or lupus.  Some of the best intervention she has had spine injections, that can last up to 6 months.  She lives with her ,'s son is in [place].  She does not smoke, does not take alcohol..           ALLERGIES:Fluoxetine, Latex, Levothyroxine, Lisinopril, and Sulfa (sulfonamide antibiotics)    PAST MEDICAL/ACTIVE PROBLEMS/MEDICATION/ FAMILY HISTORY/SOCIAL DATA:  The patient has a family history of  Past Medical History:   Diagnosis Date     Acute Sinusitis     Created by Conversion      Breast cyst      Cardiomyopathy     Created by Conversion      Cellulitis     Created by Conversion      Chronic Cutaneous Ulcer Venous Stasis     Created by Conversion      Contusion With Intact Skin Surface     Created by Conversion      Depression With Anxiety     Created by Conversion      Epidermal Inclusion Cyst     Created by Conversion      Esophageal reflux     Created by Conversion      Essential Hypertension  "    Created by Conversion      Fibromyalgia     Created by Conversion      Insomnia     Created by Conversion      Joint Pain, Localized In The Knee     Created by Conversion      Lichen Sclerosus Et Atrophicus     Created by Conversion      Limb Pain     Created by Conversion      Lower Back Pain     Created by Conversion      Mammogram - Abnormal     Created by Conversion      Migraine Headache     Created by Conversion      Myocardial infarction (H)     per EKG's since 2015     Nonvenomous Insect Bite Of Shoulder     Created by Conversion      Obesity     Created by Conversion      Open Wound Of The Lip     Created by Conversion      Osteopenia     Created by Conversion      Sebaceous Hyperplasia     Created by Conversion      Seborrheic Keratosis     Created by Conversion      Shortness of breath     Created by Conversion      Varicose Veins     Created by Conversion      Vitamin D Deficiency     Created by Conversion      Social History     Tobacco Use   Smoking Status Never Smoker   Smokeless Tobacco Never Used     Patient Active Problem List   Diagnosis     Osteopenia     Insomnia     Vitamin D Deficiency     Lower Back Pain     Fibromyalgia     Essential Hypertension     Seborrheic Keratosis     Obesity     Lichen Sclerosus Et Atrophicus     Depression With Anxiety     Migraine Headache     Esophageal Reflux     Joint Pain, Localized In The Knee     Varicose Veins     Nonvenomous Insect Bite Of Shoulder     Cellulitis     Mammogram - Abnormal     Chronic Cutaneous Ulcer Venous Stasis     Chronic venous insufficiency     Foot pain (Soft Tissue)     Female stress incontinence     Calculus of ureter     Hydronephrosis with urinary obstruction due to ureteral calculus     Other cardiomyopathy (H)     Obesity (BMI 30-39.9)     Chronic fatigue     Moderate episode of recurrent major depressive disorder (H)     Anxiety     Current Outpatient Medications   Medication Sig Dispense Refill     albuterol (VENTOLIN HFA)  90 mcg/actuation inhaler Inhale 2 puffs every 6 (six) hours as needed for wheezing. 18 g 1     aspirin 81 MG EC tablet Take 81 mg by mouth daily.       calcium carbonate (OS-MONIQUE) 500 mg calcium (1,250 mg) chewable tablet Chew 2 tablets daily.        cholecalciferol, vitamin D3, 5,000 unit Tab Take by mouth.       DULoxetine (CYMBALTA) 20 MG capsule Take 1 capsule (20 mg total) by mouth 2 (two) times a day. 60 capsule 2     furosemide (LASIX) 20 MG tablet Take 1-2 tablets (20-40 mg total) by mouth every other day as needed. (Patient taking differently: Take 20-40 mg by mouth daily. ) 60 tablet 11     gabapentin (NEURONTIN) 300 MG capsule Take 2 capsules (600 mg total) by mouth at bedtime. 270 capsule 3     losartan (COZAAR) 25 MG tablet Take 1 tablet (25 mg total) by mouth daily. 90 tablet 2     metoprolol succinate (TOPROL-XL) 50 MG 24 hr tablet TAKE 1 TABLET BY MOUTH EVERY DAY 90 tablet 2     omeprazole (PRILOSEC) 20 MG capsule TAKE 1 CAPSULE BY MOUTH TWICE DAILY 180 capsule 3     SUMAtriptan succinate 6 mg/0.5 mL PnIj INJECT 1 INJECTION AS DIRECTED IMMEDIATELY AT ONSET OF MIGRAINE HEADACHE 2 Syringe 4     topiramate (TOPAMAX) 100 MG tablet Take 1/2 tablet in the am and 1 tablet with dinner 135 tablet 1     traZODone (DESYREL) 50 MG tablet Take 1 tablet (50 mg total) by mouth at bedtime. 30 tablet 5     triamcinolone (KENALOG) 0.1 % ointment APPLY TO AFFECTED AREA TWICE A DAY 30 g 2     venlafaxine (EFFEXOR-XR) 75 MG 24 hr capsule TAKE 3 CAPSULES (225 MG TOTAL) BY MOUTH DAILY. 270 capsule 1     rimegepant 75 mg TbDL Take 1 tablet by mouth as needed (Migraine). 6 tablet 5     No current facility-administered medications for this visit.        COMPREHENSIVE EXAMINATION:  There were no vitals filed for this visit.      On the phone she sounded comfortable, alert, oriented, her speech was fluent.  Her memory recall appeared normal.  She did not sound like she was in pain or short of breath.      LAB / IMAGING  DATA:  ALT   Date Value Ref Range Status   07/23/2020 15 0 - 45 U/L Final   05/15/2018 <9 0 - 45 U/L Final   12/06/2017 11 0 - 45 U/L Final     Albumin   Date Value Ref Range Status   07/23/2020 3.7 3.5 - 5.0 g/dL Final   05/15/2018 3.3 (L) 3.5 - 5.0 g/dL Final   12/06/2017 3.3 (L) 3.5 - 5.0 g/dL Final     Creatinine   Date Value Ref Range Status   02/22/2021 0.81 0.60 - 1.10 mg/dL Final   07/23/2020 0.78 0.60 - 1.10 mg/dL Final   10/11/2019 0.80 0.60 - 1.10 mg/dL Final       WBC   Date Value Ref Range Status   07/23/2020 7.3 4.0 - 11.0 thou/uL Final   05/15/2018 8.7 4.0 - 11.0 thou/uL Final   10/31/2014 5.2 4.0 - 11.0 thou/uL Final     Hemoglobin   Date Value Ref Range Status   07/23/2020 13.4 12.0 - 16.0 g/dL Final   05/15/2018 12.4 12.0 - 16.0 g/dL Final   12/06/2017 13.2 12.0 - 16.0 g/dL Final     Platelets   Date Value Ref Range Status   07/23/2020 205 140 - 440 thou/uL Final   05/15/2018 226 140 - 440 thou/uL Final   12/06/2017 261 140 - 440 thou/uL Final       Lab Results   Component Value Date    SEDRATE 14 12/16/2016

## 2021-06-17 NOTE — PROGRESS NOTES
HPI:  Katy Hong is a 72 y.o. year old female with weight related co-morbidities of   Patient Active Problem List   Diagnosis     Osteopenia     Insomnia     Vitamin D Deficiency     Lower Back Pain     Fibromyalgia     Essential Hypertension     Seborrheic Keratosis     Obesity     Lichen Sclerosus Et Atrophicus     Depression With Anxiety     Migraine Headache     Cardiomyopathy     Esophageal Reflux     Joint Pain, Localized In The Knee     Varicose Veins     Shortness Of Breath     Nonvenomous Insect Bite Of Shoulder     Cellulitis     Mammogram - Abnormal     Chronic Cutaneous Ulcer Venous Stasis     Chronic venous insufficiency     Foot pain (Soft Tissue)     Female stress incontinence    who presents for medical bariatric consultation in the setting of weight related co-morbidities.  Her BMI is Body mass index is 37.4 kg/(m^2)..      Assessment: Katy is a 72 y.o. year old female who presents for medical weight management.      Plan:    1. Obesity (BMI 30-39.9)  We discussed healthy habits to assist with weight loss. We discussed medication that may assist with weight loss. With her very limited ability to exercise, she has low muscle mass and low metabolism. She is will trying to try PT for help with safely building her muscle strength given her fibromyalgia. She will try to eat adequate protein daily and decrease her carbohydrate intake. We will start topomax. Careful review of her chart showed a basically normal stress test and echocardiogram in the last couple of years. She may be a candidate for phentermine in the future.    2. Hypertension, unspecified type  Slightly elevated today. Continue current medication    3. Migraine  Continue imitrex prn. Addition of topomax for weight control may also decrease the frequency of migraines        Follow up: Next available with dietician and in 2 months with myself      >60 minutes spent with patient, > 50% spent in counseling          Counseling:  We  discussed [facility] Bariatric Basics including:  -eating 3 meals daily  -eating protein first  -eating slowly, chewing food well  -avoiding/limiting calorie containing beverages  -choosing wheat, not white with breads, crackers, pastas, bear, bagels, tortillas, rice  -limiting carbohydrates in general  -limiting restaurant or cafeteria eating to twice a week or less    We discussed the importance of restorative sleep and stress management in maintaining a healthy weight.    We reviewed medications associated with weight gain.    We discussed insulin resistance and glycemic index as it relates to appetite and weight control.     We discussed the National Weight Control Registry healthy weight maintenance strategies and ways to optimize metabolism.  We discussed the importance of physical activity including cardiovascular and strength training in maintaining a healthier weight and explored viable options.    We discussed medications available for weight loss including phentermine, phendimetrazine, topamax, qsymia, lorcaserin, contrave, diethylproprion, and orlistat. We discussed the risks and benefits of each. We discussed indications, contraindications, potential side effects, and estimated costs of each. Literature was offered.    History Surrouding Consultation:  Struggles with weight started at age 51 something  Her weight at age 19 was 135#  She has had several past supervised and unsupervised weight loss attempts  The most weight lost was: na  Unfortunately there was not durable weight maintenance.  History of bulimia, anorexia, or binge eating disorder? no  If Present has eating disorder been in remission at least 3 years? na    Dietary History  Meals per day: 3  Snacks: rare  Typical Snack: fruit  Who does the grocery shopping?   Who does the cooking? Patient and   A typical meal includes: B: toast or cereal and coffee  L: fruit or veggies, sometimes 1/2 sandwich  D: meat and veggies  Regular  Pop: 1 can once a week  Juice: rare  Caffeine: tea, coffee  Amount of restaurant eating per week: 2  Eating a the table with the TV off? no    Physical Activity Patterns  Current physical activity routine includes: some exercises on exercise ball    Limitations from being physically active on a regular basis includes: pain from fibromyalgia, DJD, low energy        PMH:  Past Medical History:   Diagnosis Date     Acute Sinusitis     Created by Conversion      Breast cyst      Cardiomyopathy     Created by Conversion      Cellulitis     Created by Conversion      Chronic Cutaneous Ulcer Venous Stasis     Created by Conversion      Contusion With Intact Skin Surface     Created by Conversion      Depression With Anxiety     Created by Conversion      Epidermal Inclusion Cyst     Created by Conversion      Esophageal reflux     Created by Conversion      Essential Hypertension     Created by Conversion      Fibromyalgia     Created by Conversion      Insomnia     Created by Conversion      Joint Pain, Localized In The Knee     Created by Conversion      Lichen Sclerosus Et Atrophicus     Created by Conversion      Limb Pain     Created by Conversion      Lower Back Pain     Created by Conversion      Mammogram - Abnormal     Created by Conversion      Migraine Headache     Created by Conversion      Myocardial infarction     per EKG's since 2015     Nonvenomous Insect Bite Of Shoulder     Created by Conversion      Obesity     Created by Conversion      Open Wound Of The Lip     Created by Conversion      Osteopenia     Created by Conversion      Sebaceous Hyperplasia     Created by Conversion      Seborrheic Keratosis     Created by Conversion      Shortness of breath     Created by Conversion      Varicose Veins     Created by Conversion      Vitamin D Deficiency     Created by Conversion        Past Surgical Hx:  Past Surgical History:   Procedure Laterality Date     APPENDECTOMY       BREAST BIOPSY       BREAST CYST  ASPIRATION       CATARACT EXTRACTION, BILATERAL       SD TOTAL ABDOM HYSTERECTOMY       still has both ovaries     TEMPOROMANDIBULAR JOINT SURGERY Bilateral      TUBAL LIGATION         Medication:  Current Outpatient Prescriptions on File Prior to Visit   Medication Sig Dispense Refill     albuterol (VENTOLIN HFA) 90 mcg/actuation inhaler Inhale 2 puffs every 6 (six) hours as needed for wheezing. 18 g 5     aspirin 81 MG EC tablet Take 81 mg by mouth daily.       calcium carbonate (OS-MONIQUE) 500 mg calcium (1,250 mg) chewable tablet Chew 2 tablets daily.        cholecalciferol, vitamin D3, (VITAMIN D3) 2,000 unit Tab Take 1 tablet by mouth daily.        clobetasol (TEMOVATE) 0.05 % Gel Apply to red rash daily until gone but no longer than two weeks 30 each 1     fluticasone (FLONASE) 50 mcg/actuation nasal spray 1 spray into each nostril daily. 16 g 0     furosemide (LASIX) 20 MG tablet Take 1 tablet (20 mg total) by mouth daily. (Patient taking differently: Take 20-40 mg by mouth every other day as needed. ) 90 tablet 1     gabapentin (NEURONTIN) 300 MG capsule Take 2 capsules by mouth in AM and 3 capsules in PM (Patient taking differently: Take 2 capsules by mouth in AM and 4 capsules in PM) 450 capsule 3     halobetasol (ULTRAVATE) 0.05 % cream AFTER USE OF PSSORENT, APPLY TO LOWER LEGS DAILY X 3 WEEKS, THEN ONLY ON WED, SAT AND SUN  11     HYDROcodone-acetaminophen 5-325 mg per tablet Take 1 tablet by mouth every 6 (six) hours as needed for pain. 60 tablet 0     losartan (COZAAR) 25 MG tablet TAKE 1 TABLET (25 MG TOTAL) BY MOUTH DAILY. 90 tablet 2     metoprolol succinate (TOPROL-XL) 50 MG 24 hr tablet TAKE ONE TABLET BY MOUTH ONE TIME DAILY 90 tablet 3     omeprazole (PRILOSEC) 20 MG capsule TAKE 1 CAPSULE BY MOUTH 2 TIMES A DAY. 180 capsule 2     SUMAtriptan succinate 6 mg/0.5 mL PnIj INJECT 1 INJECTION AS DIRECTED IMMEDIATELY AT ONSET OF MIGRAINE HEADACHE 2 Syringe 3     venlafaxine (EFFEXOR) 50 MG tablet TAKE  ONE TABLET BY MOUTH THREE TIMES DAILY 270 tablet 3     [DISCONTINUED] hydrOXYzine (VISTARIL) 50 MG capsule Take 1 capsule (50 mg total) by mouth 3 (three) times a day as needed for itching. 30 capsule 0     No current facility-administered medications on file prior to visit.        Allergies:Fluoxetine; Latex; Lisinopril; and Sulfa (sulfonamide antibiotics)    Family Hx:  Family History   Problem Relation Age of Onset     Hypertension Mother      Arthritis Mother      Alzheimer's disease Father      d. 76     Diabetes Father      Hypertension Father      Breast cancer Maternal Aunt 60     Obesity Maternal Aunt      Hypertension Sister      Osteopenia Sister      Varicose Veins Brother      Obesity Brother      Breast cancer Cousin      Obesity Maternal Uncle      Arthritis Maternal Grandmother        Social Hx:  Social History     Social History     Marital status:      Spouse name: Anjel     Number of children: 2     Years of education: N/A     Occupational History     Not on file.     Social History Main Topics     Smoking status: Never Smoker     Smokeless tobacco: Never Used     Alcohol use 0.0 oz/week      Comment: occasional     Drug use: No     Sexual activity: Not on file     Other Topics Concern     Not on file     Social History Narrative    7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         Tobacco Use Hx:  History   Smoking Status     Never Smoker   Smokeless Tobacco     Never Used       ROS  General  Fatigue: yes  Sleep Quality:poor  HEENT  Hx of glaucoma: no  Vision changes: no  Cardiovascular  Chest Pain with Exertion: no  Palpitations: no  Hx of heart disease: no  Pulmonary  Shortness of breath at rest: no  Shortness of breath with exertion: yes  Snoring: no  Stop-bang score: 4  [place] Score: not done  Gastrointestinal  Heartburn: no  Abdominal pain: no  Psychiatric  Moods Stable: no  Endocrine  Polydipsia: no  Polyuria: no  Neurologic:  Hx of seizures: no  Dermatologic  Rashes: yes      /77  Pulse 73  " Temp 97.7  F (36.5  C)  Resp 16  Ht 5' 1.5\" (1.562 m)  Wt 201 lb 3.2 oz (91.3 kg)  LMP 09/29/1993  SpO2 98%  BMI 37.4 kg/m2  Wt Readings from Last 2 Encounters:   04/25/18 201 lb 3.2 oz (91.3 kg)   04/16/18 206 lb (93.4 kg)     Body mass index is 37.4 kg/(m^2).  Neck circumference/Waist Circumference: Height: 5' 1.5\" (1.562 m) (4/25/2018  1:39 PM)  Weight: 201 lb 3.2 oz (91.3 kg) (4/25/2018  1:39 PM)  BMI (Calculated): 37.4 (4/25/2018  1:39 PM)  SpO2: 98 % (4/25/2018  1:39 PM)  Waist Circumference (In): 44 Inches (4/25/2018  1:39 PM)  Hip Circumference (In): 51 Inches (4/25/2018  1:39 PM)  Neck Circumference (In): 12.5 Inches (4/25/2018  1:39 PM)      Physical Exam:    GEN: Alert and oriented in no acute distress.   HEENT: PERRLA, mucous membranes moist. Airway adequate  NECK: Supple without LAD or thyromegaly. Carotid bruits absent.  LUNGS: CTA without wheezes or crackles, good air movement throughout  CV: RRR no MRG  ABDOMEN: moderate protuberance, BS normal, non tender to palpation, no rebound or guarding, no HSM  SKIN: no rashes, few skin tags, very mild acanthosis nigrans  EXTREMITIES: trace edema, dorsalis pedis palpable    Labs:    Lab Results   Component Value Date    WBC 6.5 12/06/2017    HGB 13.2 12/06/2017    HCT 40.2 12/06/2017    MCV 92 12/06/2017     12/06/2017     Lab Results   Component Value Date    CHOL 161 07/18/2017    CHOL 169 10/27/2016    CHOL 160 06/07/2016     Lab Results   Component Value Date    HDL 63 07/18/2017    HDL 61 10/27/2016    HDL 58 06/07/2016     Lab Results   Component Value Date    LDLCALC 82 07/18/2017    LDLCALC 91 10/27/2016    LDLCALC 85 06/07/2016     Lab Results   Component Value Date    TRIG 78 07/18/2017    TRIG 87 10/27/2016    TRIG 87 06/07/2016     No components found for: CHOLHDL  Lab Results   Component Value Date    ALT 11 12/06/2017    AST 13 12/06/2017    ALKPHOS 120 12/06/2017    BILITOT 0.4 12/06/2017     No results found for: HGBA1C  No " results found for: [id number]

## 2021-06-17 NOTE — PROGRESS NOTES
[facility]   Initial Evaluation        [facility] Certification Request    May 2, 2018      Patient: Katy Hong  MR Number: [id number]  YOB: 1945  Date of Visit: 5/2/2018      Dear Dr. Rossi    Thank you for this referral.   We are seeing Katy Hong for Physical Therapy of fibromyalgia.    Medicare and/or Medicaid requires physician review and approval of the treatment plan. Please review the plan of care and verify that you agree with the therapy plan of care by co-signing this note.      If you have any questions or concerns, please don't hesitate to call.    Sincerely,      Ning Gotti        Physician recommendation:     ___ Follow therapist's recommendation        ___ Modify therapy      *Physician co-signature indicates they certify the need for these services furnished within this plan and while under their care.        Patient Name: Katy Hnog  Date of evaluation: 5/2/2018  Referral Diagnosis: Fibromyalgia  Referring provider: Alexsandra Rossi,*  Visit Diagnosis:     ICD-10-CM    1. Fibromyalgia M79.7    2. General weakness R53.1    3. Decreased stamina R53.83        Assessment:      Pt. is appropriate for skilled PT intervention as outlined in the Plan of Care (POC).  Pt. is a good candidate for skilled PT services to improve pain levels and function.   Patient presents with fibromyalgia pain today and has specific things she hopes to achieve.  She is seeing her MD, and will be seeing a nutritionist to lose weight.  In part of getting healthier she would like to lose weight, be more active, and gain strength so she can do more activities.  Part of her changing nutrition needs is to be more active to help with the increase in protein to help her muscles be more healthy.  Functional limitations are described as occurring with: walking, up stairs, kneeling or squatting, housework, unable to carry laundry up stairs, sit<>stand.  Patient  will benefit from physical therapy to work on limitations, improve stamina and help her be more active with less pain from her FM.      Goals:  Pt. will be independent with home exercise program in : 12 weeks  Pt will: Able to walk outside or in the mall on even surfaces for 45-60 minutes within 12 weeks  Pt will: Able to ambulate up stairs with more ease as strength improves within 12 weeks  Pt will: Able to sit<>stand with more ease as leg strength improves within 12 weeks  Pt will: Able to get up from a squatting position, like from lower cupboards, with more ease within 12 weeks    Patient's expectations/goals are realistic.    Barriers to Learning or Achieving Goals:  Co-morbidities or other medical factors.  fibromyalgia, HTN, obesity, SOB       Plan / Patient Instructions:        Plan of Care:   Authorization / Certification Start Date: 05/02/18  Authorization / Certification End Date: 08/01/18  Authorization / Certification Number of Visits: 12  Communication with: Referral Source  Patient Related Instruction: Nature of Condition;Treatment plan and rationale;Self Care instruction;Basis of treatment;Body mechanics;Posture  Times per Week: 2  Number of Weeks: 12  Number of Visits: 12  Therapeutic Exercise: ROM;Stretching;Strengthening  Neuromuscular Reeducation: kinesio tape;posture;balance/proprioception;core    Plan for next visit: stretches for HEP                                Strengthening for HEP                                Balance, proprioception, endurance, strengtheing     Subjective:         Social information:   Living Situation:single family home, lives with others  and stairs  with railing   Occupation:retired   Equipment Available: None    History of Present Illness:    Katy is a 72 y.o. female who presents to therapy today with complaints of fatigue/FM pain. Date of onset/duration of symptoms is April 2018. Onset was gradual. Patient would like to lose weight so is seeing a doctor and  nutritionist and is in the process of changing her diet so wants to do more exercises so the increase in protein she is adding will make a difference. She was going to [employer] and felt those exercises increased her FM symptoms.   Symptoms are constant. Her main purpose in coming is to be more active, decreasing pain, changing her diet and losing weight.    Pain Ratin}  Pain rating at best: 4  Pain rating at worst: 9  Pain description: aching, sharp, soreness, weakness and stiffness    Functional limitations are described as occurring with:   walking, up stairs, kneeling or squatting, housework, unable to carry laundry up stairs, sit<>stand    Patient reports benefit from:  massage, ice and heat       Objective:      Patient Outcome Measures :    Lower Extremity Functional Scale (_/80): 28   Scores range from 0-80, where a score of 80 represents maximum function. The minimal clinically important difference is a positive change of 9 points.   APTA score: 11    Examination  1. Fibromyalgia     2. General weakness     3. Decreased stamina       Involved side: Bilateral  Posture Observation:      General standing posture is fair.    1 leg stance: L=6 seconds, R=4 seconds  Squatting: mini squat, stopped due to weakness in legs and pain  Gait: not deviations noted.  Pt wearing ankle support on right foot  LE flexibility:  Hamstrings, hips and heel cords good  Shoulder and UE AROM is within normal limits  Bilateral UE strength: 4 throughout  Back: good    Hip/Knee Strength  Date: 18     Hip/Knee Strength (/5) MMT MMT MMT    Right Left Right Left Right Left   Hip Flexion 4- 4       Hip Abduction 4 4       Hip Adduction 4- 4-       Hip Extension         Hip External Rotation         Hip Internal Rotation         Knee Extension 4 4       Knee Flexion 4 4           Exercises:  Exercise #1: Nu-step   seat 6, arms 9, workload 3    5'    Treatment Today     TREATMENT MINUTES COMMENTS   Evaluation 30 weakness    Self-care/ Home management     Manual therapy     Neuromuscular Re-education     Therapeutic Activity     Therapeutic Exercises 25 Edu on DX, POC, many questions  See above for start of exercises   Gait training     Modality__________________                Total 55    Blank areas are intentional and mean the treatment did not include these items.     PT Evaluation Code: (Please list factors)  Patient History/Comorbidities: obesity, HTN, fibromyalgia, SOB  Examination: decrease strength, decrease in functional abilities-see above  Clinical Presentation: evolving  Clinical Decision Making: moderate    Patient History/  Comorbidities Examination  (body structures and functions, activity limitations, and/or participation restrictions) Clinical Presentation Clinical Decision Making (Complexity)   No documented Comorbidities or personal factors 1-2 Elements Stable and/or uncomplicated Low   1-2 documented comorbidities or personal factor 3 Elements Evolving clinical presentation with changing characteristics Moderate   3-4 documented comorbidities or personal factors 4 or more Unstable and unpredictable High            Ning Gotti  5/2/2018  9:32 AM

## 2021-06-17 NOTE — PROGRESS NOTES
She is hopeful to be more active without breathing problems and also would like to get into a size 12 or lower.  Patient here for consult regarding non-surgical weight loss. Initial labs ordered today and patient instructed to do as soon as possible.  Helped to set up future appointments.  Measurements and photos also taken today.  Patient verbalized understanding at this time without any questions.      Ailyn Armstrong RN, CBN  [facility]  [phone]

## 2021-06-17 NOTE — PATIENT INSTRUCTIONS - HE
Patient Instructions by Syeda Kidd MD at 1/4/2019  9:20 AM     Author: Syeda Kidd MD Service: -- Author Type: Physician    Filed: 1/4/2019  9:52 AM Encounter Date: 1/4/2019 Status: Signed    : Syeda Kidd MD (Physician)         Patient Education     Your Health Risk Assessment indicates you feel you are not in good physical health.    A healthy lifestyle helps keep the body fit and the mind alert. It helps protect you from disease, helps you fight disease, and helps prevent chronic disease (disease that doesn't go away) from getting worse. This is important as you get older and begin to notice twinges in muscles and joints and a decline in the strength and stamina you once took for granted. A healthy lifestyle includes good healthcare, good nutrition, weight control, recreation, and regular exercise. Avoid harmful substances and do what you can to keep safe. Another part of a healthy lifestyle is stay mentally active and socially involved.    Good healthcare     Have a wellness visit every year.     If you have new symptoms, let us know right away. Don't wait until the next checkup.     Take medicines exactly as prescribed and keep your medicines in a safe place. Tell us if your medicine causes problems.   Healthy diet and weight control     Eat 3 or 4 small, nutritious, low-fat, high-fiber meals a day. Include a variety of fruits, vegetables, and whole-grain foods.     Make sure you get enough calcium in your diet. Calcium, vitamin D, and exercise help prevent osteoporosis (bone thinning).     If you live alone, try eating with others when you can. That way you get a good meal and have company while you eat it.     Try to keep a healthy weight. If you eat more calories than your body uses for energy, it will be stored as fat and you will gain weight.     Recreation   Recreation is not limited to sports and team events. It includes any activity that provides relaxation,  interest, enjoyment, and exercise. Recreation provides an outlet for physical, mental, and social energy. It can give a sense of worth and achievement. It can help you stay healthy.       Patient Education     Exercise for a Healthier Heart  You may wonder how you can improve the health of your heart. If youre thinking about exercise, youre on the right track. You dont need to become an athlete, but you do need a certain amount of brisk exercise to help strengthen your heart. If you have been diagnosed with a heart condition, your doctor may recommend exercise to help stabilize your condition. To help make exercise a habit, choose safe, fun activities.       Be sure to check with your health care provider before starting an exercise program.    Why exercise?  Exercising regularly offers many healthy rewards. It can help you do all of the following:    Improve your blood cholesterol levels to help prevent further heart trouble    Lower your blood pressure to help prevent a stroke or heart attack    Control diabetes, or reduce your risk of getting this disease    Improve your heart and lung function    Reach and maintain a healthy weight    Make your muscles stronger and more limber so you can stay active    Prevent falls and fractures by slowing the loss of bone mass (osteoporosis)    Manage stress better  Exercise tips  Ease into your routine. Set small goals. Then build on them.  Exercise on most days. Aim for a total of 150 or more minutes of moderate to  vigorous intensity activity each week. Consider 40 minutes, 3 to 4 times a week. For best results, activity should last for 40 minutes on average. It is OK to work up to the 40 minute period over time. Examples of moderate-intensity activity is walking one mile in 15 minutes or 30 to 45 minutes of yard work.  Step up your daily activity level. Along with your exercise program, try being more active throughout the day. Walk instead of drive. Do more household  tasks or yard work.  Choose one or more activities you enjoy. Walking is one of the easiest things you can do. You can also try swimming, riding a bike, or taking an exercise class.  Stop exercising and call your doctor if you:    Have chest pain or feel dizzy or lightheaded    Feel burning, tightness, pressure, or heaviness in your chest, neck, shoulders, back, or arms    Have unusual shortness of breath    Have increased joint or muscle pain    Have palpitations or an irregular heartbeat      3402-7396 [employer]. [address] [place] [address]. All rights reserved. This information is not intended as a substitute for professional medical care. Always follow your healthcare professional's instructions.         Patient Education   Urinary Incontinence, Female (Adult)  Urinary incontinence means loss of control of the bladder. This problem affects many women, especially as they get older. If you have incontinence, you may be embarrassed to ask for help. But know that this problem can be treated.  Types of Incontinence  There are different types of incontinence. Two of the main types are described here. You can have more than one type.    Stress incontinence. With this type, urine leaks when pressure (stress) is put on the bladder. This may happen when you cough, sneeze, or laugh. Stress incontinence most often occurs because the pelvic floor muscles that support the bladder and urethra are weak. This can happen after pregnancy and vaginal childbirth or a hysterectomy. It can also be due to excess body weight or hormone changes.    Urge incontinence (also called overactive bladder). With this type, a sudden urge to urinate is felt often. This may happen even though there may not be much urine in the bladder. The need to urinate often during the night is common. Urge incontinence most often occurs because of bladder spasms. This may be due to bladder irritation or infection. Damage to bladder  nerves or pelvic muscles, constipation, and certain medicines can also lead to urge incontinence.  Treatment of urinary incontinence depends on the cause. Further evaluation is needed to find the type you have. This will likely include an exam and certain tests. Based on the results, you and your healthcare provider can then plan treatment. Until a diagnosis is made, the home care tips below can help relieve symptoms.  Home care    Do pelvic floor muscle exercises, if they are prescribed. The pelvic floor muscles help support the bladder and urethra. Many women find that their symptoms improve when doing special exercises that strengthen these muscles. To do the exercises contract the muscles you would use to stop your stream of urine, but do this when youre not urinating. Hold for 10 seconds, then relax. Repeat 10 to 20 times in a row, at least 3 times a day. Your provider may give you other instructions for how to do the exercises and how often.    Keep a bladder diary. This helps track how often and how much you urinate over a set period of time. Bring this diary with you to your next visit with the provider. The information can help your provider learn more about your bladder problem.    Lose weight, if advised to by your provider. Excess weight puts pressure on the bladder. Your provider can help you create a weight-loss plan thats right for you. This may include exercising more and making certain diet changes.    Don't consume foods and drinks that may irritate the bladder. These can include alcohol and caffeinated drinks.    Quit smoking. Smoking and other tobacco use can lead to chronic cough that strains the pelvic floor muscles. Smoking may also damage the bladder and urethra. Talk with your provider about treatments or methods you can use to quit smoking.    If drinking large amounts of fluid causes you to have symptoms, you may be advised to limit your fluid intake. You may also be advised to drink most  of your fluids during the day and to limit fluids at night.    If youre worried about urine leakage or accidents, you may wear absorbent pads to catch urine. Change the pads often. This helps reduce discomfort. It may also reduce the risk of skin or bladder infections.  Follow-up care  Follow up with your healthcare provider, or as directed. It may take some to find the right treatment for your problem. Your treatment plan may include special therapies or medicines. Certain procedures or surgery may also be options. Be sure to discuss any questions you have with your provider.  When to seek medical advice  Call the healthcare provider right away if any of these occur:    Fever of 100.4 F (38 C) or higher, or as directed by your provider    Bladder pain or fullness    Abdominal swelling    Nausea or vomiting    Back pain    Weakness, dizziness or fainting  Date Last Reviewed: 10/1/2017    1226-0330 [employer]. [address] [place] [address]. All rights reserved. This information is not intended as a substitute for professional medical care. Always follow your healthcare professional's instructions.     Patient Education   Depression and Suicide in Older Adults  Nearly 2 million older Americans have some type of depression. Sadly, some of them even take their own lives. Yet depression among older adults is often ignored. Learn the warning signs. You may help spare a loved one needless pain. You may also save a life.       What Is Depression?  Depression is a mood disorder that affects the way you think and feel. The most common symptom is a feeling of deep sadness. People who are depressed also may seem tired and listless. And nothing seems to give them pleasure. Its normal to grieve or be sad sometimes. But sadness lessens or passes with time. Depression rarely goes away or improves on its own. Other symptoms of depression are:    Sleeping more or less than normal    Eating more or less than  normal    Having headaches, stomachaches, or other pains that dont go away    Feeling nervous, empty, or worthless    Crying a great deal    Thinking or talking about suicide or death    Feeling confused or forgetful  What Causes It?  The causes of depression arent fully known. Certain chemicals in the brain play a role. Depression does run in families. And life stresses can also trigger depression in some people. That may be the case with older adults. They often face great burdens, such as the death of friends or a spouse. They may have failing health. And they are more likely to be alone, lonely, or poor.  How You Can Help  Often, depressed people may not want to ask for help. When they do, they may be ignored. Or, they may receive the wrong treatment. You can help by showing parents and older friends love and support. If they seem depressed, help them find the right treatment. Talk to your doctor. Or contact a local mental health center, social service agency, or hospital. With modern treatment, no one has to suffer from depression.  Resources:    National Sutton of Mental Health  430.894.3247  www.nimh.nih.gov    National [place] on Mental Illness  491.143.5863  www.zee.org    [facility]  764.581.8391  www.[facility].org    National Suicide Hotline  902.416.2251 (800-SUICIDE)      1306-6580 [employer]. [address] [place], [place] [id number]. All rights reserved. This information is not intended as a substitute for professional medical care. Always follow your healthcare professional's instructions.         Patient Education   Preventing Falls in the Home  As you get older, falls are more likely. Thats because your reaction time slows. Your muscles and joints may also get stiffer, making them less flexible. Illness, medications, and vision changes can also affect your balance. A fall could leave you unable to live on your own. To make your home safer, follow these  tips:    Floors    Put nonskid pads under area rugs.    Remove throw rugs.    Replace worn floor coverings.    Tack carpets firmly to each step on carpeted stairs. Put nonskid strips on the edges of uncarpeted stairs.    Keep floors and stairs free of clutter and cords.    Arrange furniture so there are clear pathways.    Clean up any spills right away.    Bathrooms    Install grab bars in the tub or shower.    Apply nonskid strips or put a nonskid rubber mat in the tub or shower.    Sit on a bath chair to bathe.    Use bathmats with nonskid backing.    Lighting    Keep a flashlight in each room.    Put a nightlight along the pathway between the bedroom and the bathroom.    8829-5488 [employer]. [address] [place] [address]. All rights reserved. This information is not intended as a substitute for professional medical care. Always follow your healthcare professional's instructions.           Advance Directive  Patients advance directive was discussed and I am comfortable with the patients wishes.  Patient Education   Personalized Prevention Plan  You are due for the preventive services outlined below.  Your care team is available to assist you in scheduling these services.  If you have already completed any of these items, please share that information with your care team to update in your medical record.  Health Maintenance   Topic Date Due   ? ZOSTER VACCINES (2 of 3) 11/17/2009   ? DEPRESSION FOLLOW UP  06/10/2019   ? DXA SCAN  06/12/2019   ? FALL RISK ASSESSMENT  12/10/2019   ? MAMMOGRAM  10/17/2020   ? TD 18+ HE  10/17/2023   ? ADVANCE DIRECTIVES DISCUSSED WITH PATIENT  01/04/2024   ? COLONOSCOPY  04/22/2024   ? PNEUMOCOCCAL POLYSACCHARIDE VACCINE AGE 65 AND OVER  Completed   ? INFLUENZA VACCINE RULE BASED  Completed   ? PNEUMOCOCCAL CONJUGATE VACCINE FOR ADULTS (PCV13 OR PREVNAR)  Completed

## 2021-06-17 NOTE — PATIENT INSTRUCTIONS - HE
Patient Instructions by Gasper Matute MD at 7/13/2019  1:50 PM     Author: Gasper Matute MD Service: -- Author Type: Physician    Filed: 7/13/2019  3:13 PM Encounter Date: 7/13/2019 Status: Signed    : Gasper Matute MD (Physician)         Patient Education     Subconjunctival Hemorrhage    A subconjunctival hemorrhage is when a blood vessel breaks open in the white of the eye. It causes a bright red patch in the white of the eye. It is similar to a bruise on the skin. This type of hemorrhage is common. It can look quite alarming, but it is usually harmless.  Understanding the conjunctiva  The conjunctiva is the thin layer that covers the inside of the eyelids and the surface of the eye. It has many tiny blood vessels that bring oxygen and nutrients to the eye. The sclera is the white part of the eye that lies beneath the conjunctiva. Sometimes a blood vessel in the conjunctiva breaks open and bleeds. The blood then collects under the conjunctiva and turns part of the eye red. Over several weeks, your body then absorbs the blood.  What causes subconjunctival hemorrhage?  In many cases the cause isnt known. But some health conditions may make it more likely. These include:    Eye injury    Eye surgery    High blood pressure    Inflammation of the conjunctiva    Contact lens use    Diabetes    Arteriosclerosis    Tumor of the conjunctiva    Diseases that affect blood clotting    Violent sneezing, coughing, or vomiting    Certain medicines that can increase bleeding, such as aspirin    Pushing hard during childbirth    Straining during constipation  Symptoms of subconjunctival hemorrhage  The main symptom is a red patch on the eye. You may notice it after waking up in the morning. In most cases just one eye will have a hemorrhage. It usually happens once and then goes away. But some health conditions may cause repeat hemorrhages. You may feel like you have something in your eye, but this is not  common. The hemorrhage shouldnt affect your eyesight or cause any pain. If you do have pain, you may have another type of problem with your eye.  Diagnosing subconjunctival hemorrhage  Your healthcare provider will ask about your health history. You may have a physical exam. This includes a basic eye exam. Your provider will make sure you dont have other causes of red eye that may need other treatment.  You will need to see an eye doctor (ophthalmologist) if you have had an eye injury. This doctor might use a special lighted microscope to look closely at your eye. This helps show the doctor if the injury hurt the eye itself and not just its outer layer.  If this is not your first subconjunctival hemorrhage, your doctor may need to find the cause. For example, you may need blood tests to check for a blood clotting disorder.  Treatment for subconjunctival hemorrhage  In most cases you will not need treatment. The red patch will usually go away on its own in a few weeks. It will turn from red to brown and then yellow. There are no treatments to speed up this process. Your doctor may suggest you use a warm compress and artificial tears eye drops to help relieve some of the redness.  If your subconjunctival hemorrhage was caused by a health condition, that condition will be treated. For example, you may need a blood pressure medicine to treat high blood pressure.  When to call your healthcare provider  Call your healthcare provider right away if you have any of these:    Hemorrhage that doesnt go away in 2 to 3 weeks    Eye pain    Loss of eyesight    Another subconjunctival hemorrhage    Date Last Reviewed: 2/1/2017 2000-2017 [employer]. [address] [place] [address], [place] [address]. All rights reserved. This information is not intended as a substitute for professional medical care. Always follow your healthcare professional's instructions.           Patient Education     Subconjunctival Hemorrhage    A  subconjunctival hemorrhage is a result of a broken blood vessel in the white part of the eye. It is usually painless and may be caused by coughing, sneezing, or vomiting. An injury to the eye can cause this. It can also be a sign of high blood pressure (hypertension) or a bleeding disorder.  This can look frightening. But the presence of the blood is not serious. The blood will be reabsorbed without treatment within 2 to 3 weeks.  Home care  You may continue your usual activities.  Follow-up care  Follow up with your healthcare provider, or as advised.  When to seek medical advice  Contact your healthcare provider right away if any of these occur:    Pain in the eye    Change in vision    The blood does not go away within 3 weeks    Increasing redness or swelling of the eye    Severe headache or dizziness    Signs of bruising or bleeding from other parts of your body  Date Last Reviewed: 8/1/2017 2000-2017 [employer]. [address] [place] [address]. All rights reserved. This information is not intended as a substitute for professional medical care. Always follow your healthcare professional's instructions.

## 2021-06-17 NOTE — PROGRESS NOTES
PROGRESS NOTE       SUBJECTIVE:  Katy Hong is a 72 y.o. female   Chief Complaint   Patient presents with     Follow-up     right ankle injury , doing better still some pain    Date of injury: March 2, 2018, distal fibular fracture.  Patient has been in her walking cast now for 4 weeks.  It certainly is getting significantly better but there are times when it hurts.  The walking cast is quite bulky and in the way.  Especially at night.  She has been bruising her other leg with it.    Patient's been getting headaches not daily maybe 4-5 times per week and she wakes up with it in the back of her head.  She has been experiencing some mild congestion over the sinuses as well.  No fever chills.    Sometimes she notes wheezing with her breathing.  She was prescribed albuterol inhaler last October and it certainly has helped her.  She needs a refill of this.      Patient Active Problem List   Diagnosis     Osteopenia     Insomnia     Vitamin D Deficiency     Lower Back Pain     Fibromyalgia     Essential Hypertension     Seborrheic Keratosis     Obesity     Lichen Sclerosus Et Atrophicus     Depression With Anxiety     Migraine Headache     Cardiomyopathy     Esophageal Reflux     Joint Pain, Localized In The Knee     Varicose Veins     Shortness Of Breath     Nonvenomous Insect Bite Of Shoulder     Cellulitis     Mammogram - Abnormal     Chronic Cutaneous Ulcer Venous Stasis     Chronic venous insufficiency     Foot pain (Soft Tissue)     Female stress incontinence       Current Outpatient Prescriptions   Medication Sig Dispense Refill     albuterol (VENTOLIN HFA) 90 mcg/actuation inhaler Inhale 2 puffs every 6 (six) hours as needed for wheezing. 18 g 5     aspirin 81 MG EC tablet Take 81 mg by mouth daily.       calcium carbonate (OS-MONIQUE) 500 mg calcium (1,250 mg) chewable tablet Chew 2 tablets daily.        cholecalciferol, vitamin D3, (VITAMIN D3) 2,000 unit Tab Take 1 tablet by mouth daily.        clobetasol  (TEMOVATE) 0.05 % Gel Apply to red rash daily until gone but no longer than two weeks 30 each 1     fluticasone (FLONASE) 50 mcg/actuation nasal spray 1 spray into each nostril daily. 16 g 0     furosemide (LASIX) 20 MG tablet Take 1 tablet (20 mg total) by mouth daily. 90 tablet 1     gabapentin (NEURONTIN) 300 MG capsule Take 2 capsules by mouth in AM and 3 capsules in  capsule 3     halobetasol (ULTRAVATE) 0.05 % cream AFTER USE OF PSSORENT, APPLY TO LOWER LEGS DAILY X 3 WEEKS, THEN ONLY ON WED, SAT AND SUN  11     HYDROcodone-acetaminophen 5-325 mg per tablet Take 1 tablet by mouth every 6 (six) hours as needed for pain. 60 tablet 0     hydrOXYzine (VISTARIL) 50 MG capsule Take 1 capsule (50 mg total) by mouth 3 (three) times a day as needed for itching. 30 capsule 0     losartan (COZAAR) 25 MG tablet TAKE 1 TABLET (25 MG TOTAL) BY MOUTH DAILY. 90 tablet 2     metoprolol succinate (TOPROL-XL) 50 MG 24 hr tablet TAKE ONE TABLET BY MOUTH ONE TIME DAILY 90 tablet 3     omeprazole (PRILOSEC) 20 MG capsule TAKE 1 CAPSULE BY MOUTH 2 TIMES A DAY. 180 capsule 2     SUMAtriptan succinate 6 mg/0.5 mL PnIj INJECT 1 INJECTION AS DIRECTED IMMEDIATELY AT ONSET OF MIGRAINE HEADACHE 2 Syringe 3     venlafaxine (EFFEXOR) 50 MG tablet TAKE ONE TABLET BY MOUTH THREE TIMES DAILY 270 tablet 3     No current facility-administered medications for this visit.        History   Smoking Status     Never Smoker   Smokeless Tobacco     Never Used       REVIEW OF SYSTEMS:  Patient denies fever, chills, dizziness,  visual change, cough, chest pain, shortness of breath, abdominal pain, extremity pain or swelling.  Positives: Wheezes, ankle pain.  OBJECTIVE:       Vitals:    04/05/18 1039   BP: 130/88   Pulse: 85   SpO2: 96%     No Data Recorded  Wt Readings from Last 3 Encounters:   03/09/18 206 lb (93.4 kg)   03/05/18 206 lb (93.4 kg)   01/30/18 206 lb 10.7 oz (93.7 kg)     There is no height or weight on file to calculate  BMI.        Physical Exam:  GENERAL APPEARANCE: A&A, NAD, well hydrated, well nourished  SKIN:  Normal skin turgor, no lesions/rashes   EARS: TM's normal, gray with nl light reflex  OROPHARYNX: without erythema, no post nasal drainage or thrush  NECK: Supple, without lymphadenopathy, no thyroid mass  CV: RRR, no M/G/R   LUNGS: Mild wheeze and base, normal respiratory effort  ABDOMEN: S&NT, no masses, no organomegaly   EXTREMITY: Extremities normal, atraumatic, no swelling  NEURO: no gross deficits   PSYCHIATRIC:  Mood appropriate, memory intact        ASSESSMENT/PLAN:     2. Acute bronchitis  I reviewed inhaler use and she may use this 3 times per day and once in the night when symptomatic.  - albuterol (VENTOLIN HFA) 90 mcg/actuation inhaler; Inhale 2 puffs every 6 (six) hours as needed for wheezing.  Dispense: 18 g; Refill: 5    1. Closed malleolar fracture, right, sequela  Date of injury 3/2/2018.  Patient is doing quite well.  I fitted her with a stirrup brace that she can wear at night and during the daytime as long as her activity level is low.  She needs to continue the walking boot if she goes out of the house or is up been doing a lot for another 2 weeks.  She will recheck in 1 more month.  At that time she should be ready for strengthening exercises and possibly PT.          There are no Patient Instructions on file for this visit.  Medications Discontinued During This Encounter   Medication Reason     VENTOLIN HFA 90 mcg/actuation inhaler Reorder     Return in about 4 weeks (around 5/3/2018) for Recheck.    The visit lasted a total of 30 minutes face to face with the patient.  Over 50% of the time spent counseling and educating the patient about all of the above.      Elba Monge MD

## 2021-06-17 NOTE — PROGRESS NOTES
PROGRESS NOTE       SUBJECTIVE:  Katy Hong is a 72 y.o. female   Chief Complaint   Patient presents with     Follow-up     right foot injury      Katy is doing well.  She is not experiencing any discomfort with weightbearing and is very anxious to get rid of her cast boot.  The ankle strap has worked very nicely at night, but does not support her enough for daytime use.  The previous ankle support fits her well and she has no pain with weightbearing when she uses it.    Patient has had some wheezes in her upper airway that were quite embarrassing and noticeable even in Mandaeism.  She is noticed this at home as well.  She has not noticed any nasal drainage or heartburn symptoms.  She takes omeprazole once daily.  She used to take it twice daily.  She denies any shortness of breath.    Patient Active Problem List   Diagnosis     Osteopenia     Insomnia     Vitamin D Deficiency     Lower Back Pain     Fibromyalgia     Essential Hypertension     Seborrheic Keratosis     Obesity     Lichen Sclerosus Et Atrophicus     Depression With Anxiety     Migraine Headache     Cardiomyopathy     Esophageal Reflux     Joint Pain, Localized In The Knee     Varicose Veins     Shortness Of Breath     Nonvenomous Insect Bite Of Shoulder     Cellulitis     Mammogram - Abnormal     Chronic Cutaneous Ulcer Venous Stasis     Chronic venous insufficiency     Foot pain (Soft Tissue)     Female stress incontinence       Current Outpatient Prescriptions   Medication Sig Dispense Refill     albuterol (VENTOLIN HFA) 90 mcg/actuation inhaler Inhale 2 puffs every 6 (six) hours as needed for wheezing. 18 g 5     aspirin 81 MG EC tablet Take 81 mg by mouth daily.       calcium carbonate (OS-MONIQUE) 500 mg calcium (1,250 mg) chewable tablet Chew 2 tablets daily.        cholecalciferol, vitamin D3, (VITAMIN D3) 2,000 unit Tab Take 1 tablet by mouth daily.        clobetasol (TEMOVATE) 0.05 % Gel Apply to red rash daily until gone but no longer  than two weeks 30 each 1     fluticasone (FLONASE) 50 mcg/actuation nasal spray 1 spray into each nostril daily. 16 g 0     furosemide (LASIX) 20 MG tablet Take 1 tablet (20 mg total) by mouth daily. 90 tablet 1     gabapentin (NEURONTIN) 300 MG capsule Take 2 capsules by mouth in AM and 3 capsules in  capsule 3     halobetasol (ULTRAVATE) 0.05 % cream AFTER USE OF PSSORENT, APPLY TO LOWER LEGS DAILY X 3 WEEKS, THEN ONLY ON WED, SAT AND SUN  11     HYDROcodone-acetaminophen 5-325 mg per tablet Take 1 tablet by mouth every 6 (six) hours as needed for pain. 60 tablet 0     hydrOXYzine (VISTARIL) 50 MG capsule Take 1 capsule (50 mg total) by mouth 3 (three) times a day as needed for itching. 30 capsule 0     losartan (COZAAR) 25 MG tablet TAKE 1 TABLET (25 MG TOTAL) BY MOUTH DAILY. 90 tablet 2     metoprolol succinate (TOPROL-XL) 50 MG 24 hr tablet TAKE ONE TABLET BY MOUTH ONE TIME DAILY 90 tablet 3     omeprazole (PRILOSEC) 20 MG capsule TAKE 1 CAPSULE BY MOUTH 2 TIMES A DAY. 180 capsule 2     SUMAtriptan succinate 6 mg/0.5 mL PnIj INJECT 1 INJECTION AS DIRECTED IMMEDIATELY AT ONSET OF MIGRAINE HEADACHE 2 Syringe 3     venlafaxine (EFFEXOR) 50 MG tablet TAKE ONE TABLET BY MOUTH THREE TIMES DAILY 270 tablet 3     No current facility-administered medications for this visit.        History   Smoking Status     Never Smoker   Smokeless Tobacco     Never Used       REVIEW OF SYSTEMS:  Patient denies fever, chills, dizziness, headache, visual change, cough, chest pain, shortness of breath, abdominal pain, extremity pain or swelling.          OBJECTIVE:       Vitals:    04/16/18 0907   BP: 120/72   Pulse: 70     Weight: 206 lb (93.4 kg)  Wt Readings from Last 3 Encounters:   04/16/18 206 lb (93.4 kg)   03/09/18 206 lb (93.4 kg)   03/05/18 206 lb (93.4 kg)     Body mass index is 37.38 kg/(m^2).        Physical Exam:  GENERAL APPEARANCE: A&A, NAD, well hydrated, well nourished  SKIN:  Normal skin turgor, no  "lesions/rashes   NECK: Supple, without lymphadenopathy, no thyroid mass  CV: RRR, no M/G/R   LUNGS: CTAB, normal respiratory effort  Right ankle: No swelling is noted.  There is minimal tenderness on pointed pressure over the lateral malleolus.  The ankle joint appears stable.  She is able to do toe raises (slight discomfort)  and draw circles with her foot.  PSYCHIATRIC:  Mood appropriate, memory intact        ASSESSMENT/PLAN:     1. Closed right ankle fracture  Patient will continue doing circles and drawing the alphabet with her big toe.  Wait another week before doing toe raises and she will begin by doing sets of 10 as tolerated.  Talked about ways of increasing this is long as it is not painful.  She prefers to work on things herself and not do formal physical therapy.  She will wear her soft brace now daytime and the rigid support at night until she is completely pain-free.  I have cautioned her about walking on uneven ground.  - XR Ankle Right 3 or More VWS; Future    My reading of her x-ray does show some bony reaction and now appears \"old\".  She will follow-up as needed.    The visit lasted a total of 20 minutes face to face with the patient.  Over 50% of the time spent counseling and educating the patient about all of the above.      Elba Monge MD      "

## 2021-06-18 NOTE — PROGRESS NOTES
[facility] Daily Progress     Patient Name: Katy Hong  Date: 2018  Visit #: 18-18  Referral Diagnosis:  Fibromyalgia  Referring provider: Alexsandra Rossi,*  Visit Diagnosis:     ICD-10-CM    1. Fibromyalgia M79.7    2. General weakness R53.1    3. Decreased stamina R53.83          Assessment:     Patient is benefitting from skilled physical therapy and is making steady progress toward functional goals.  Patient is appropriate to continue with skilled physical therapy intervention, as indicated by initial plan of care.  Good technique with HEP during review today.    Patient struggled with the medium and large ashly-lifting left leg over.  She was able to do with support from therapist    Goal Status:  Pt. will be independent with home exercise program in : 12 weeks  Pt will: Able to walk outside or in the mall on even surfaces for 45-60 minutes within 12 weeks  Pt will: Able to ambulate up stairs with more ease as strength improves within 12 weeks  Pt will: Able to sit<>stand with more ease as leg strength improves within 12 weeks  Pt will: Able to get up from a squatting position, like from lower cupboards, with more ease within 12 weeks     Maybe can walk 40 minutes.  Still need to work on the endurance level.  She is not where she wants to be  Stairs-knees are more of an issue currently  Getting in and out of a chair may be a little easier    Plan / Patient Education:                                     Strengthening for HEP                                Balance, proprioception, endurance, strengthening    In 1 week: assess floor to stand transfers      Subjective:     Pain Ratin, does have the usual FM pain        Knees are still sore today but getting better  Getting out of a chair is getting easier      Objective:   Barriers to Learning or Achieving Goals: Co-morbidities or other medical factors.  fibromyalgia, HTN, obesity, SOB    Functional limitations are  "described as occurring with: walking, up stairs, kneeling or squatting, housework, unable to carry laundry up stairs, sit<>stand    Below is pt's ex program.  Some or all of the ex may be performed at any given session    Exercises:  Exercise #1: Nu-step   seat 7, arms 9, workload 6    5'  Comment #1: stretches: sitting hamstring, standing calf stretch, SKC, LTR, supine piriformis   hold 30 seconds x 1-3 reps  Exercise #2: recumbent bike   3'  Comment #2: sit to stands in many different chairs   Exercise #3: clamshells   X 20  Comment #3: bridges   10 seconds x 6-12 reps  Exercise #4: isometric hip add with pillow between knees;  hold 10 seconds X 10 reps  work up to 100% effort  Comment #4: UBE  3' forward and 3' backward  Exercise #5: Standing: hip abd, ext, flex, knee flex, mini squats   X 10-20 reps  Comment #5: toe raises:  10-20  Exercise #6: toe tap  8\" step  X 20, no hands   Comment #6: step up  8\" step  X 10 each leg  Exercise #7: step over and then back up the step  8\"  X 10 each  Comment #7: orange tband:  scapular row, shoulder ext, shoulder diagonal.  Work up to 5-20 reps  Exercise #8: TG  level 21, squats with ball between knees  X15  Comment #8: Hurdles: 4 small, 4 medium and 1 large  X 2 leading with each leg     Ambulate outside around building (1050' X 2), up and down inclines, uneven sidewalks;  Standby assist with one rest break      Treatment Today     TREATMENT MINUTES COMMENTS   Evaluation     Self-care/ Home management     Manual therapy     Neuromuscular Re-education     Therapeutic Activity     Therapeutic Exercises 40 See above and flow sheet   Gait training 20 See above   Modality__________________                Total 60    Blank areas are intentional and mean the treatment did not include these items.       Ning Gotti, PT  6/1/2018    "

## 2021-06-18 NOTE — PROGRESS NOTES
"Chief Complaint   Patient presents with     Abdominal Pain     L side naval to hip to groin, dull pain/ pain to her breath away, that lasted over an hour. Rates pain 5 out of 10 on pain scale. She also notes having pains in L leg behind and below the knee last week and wonders if its related       HPI: 72-year-old female with a past medical history of hypertension, migraine headache and anxiety, presents with new onset left lower quadrant abdominal pain.  She noted the pain started approximately 3 hours ago, was sudden in onset, and is moderate in intensity.  She has never had pain like this in the past.  Review of old records from 5/13/2018 reveal normal abdominal contents with right renal cortical lesions.    ROS: No hematuria nausea vomiting or diarrhea.  No fever.    SH:    reports that she has never smoked. She has never used smokeless tobacco. She reports that she drinks alcohol. She reports that she does not use illicit drugs.      FH: The Patient's family history includes Alzheimer's disease in her father; Arthritis in her maternal grandmother and mother; Breast cancer in her cousin; Breast cancer (age of onset: 60) in her maternal aunt; Diabetes in her father; Hypertension in her father, mother, and sister; Obesity in her brother, maternal aunt, and maternal uncle; Osteopenia in her sister; Varicose Veins in her brother.     Meds:  Katy has a current medication list which includes the following prescription(s): albuterol, aspirin, calcium carbonate, cholecalciferol (vitamin d3), clobetasol, fluticasone, furosemide, gabapentin, halobetasol, hydrocodone-acetaminophen, losartan, metoprolol succinate, omeprazole, sumatriptan succinate, topiramate, venlafaxine, and zolpidem.    O:  /68  Pulse 68  Temp 97.7  F (36.5  C)  Resp 16  Ht 5' 1.5\" (1.562 m)  Wt 199 lb 2 oz (90.3 kg)  LMP 09/29/1993  SpO2 94%  BMI 37.02 kg/m2     Lungs--Clear to Auscultation  Heart--Regular rate and rhythm  Abdomen--tender " to palpation in the left lower quadrant with guarding  Skin-Pink and dry     A/P:   1. Left lower quadrant pain  -Cannot exclude nephrolithiasis or diverticulitis although she is afebrile.  Given her pain, abrupt onset and comorbidities recommended further evaluation in the emergency department.  I called the emergency department and discussed the case with the physician on duty.  They will be expecting her.  Total time spent 25 minutes

## 2021-06-18 NOTE — PROGRESS NOTES
[facility] Daily Progress     Patient Name: Katy Hong  Date: 2018  Visit #: 18-18  Referral Diagnosis:  Fibromyalgia  Referring provider: Alexsandra Rossi,*  Visit Diagnosis:     ICD-10-CM    1. Fibromyalgia M79.7    2. General weakness R53.1    3. Decreased stamina R53.83          Assessment:     Patient is benefitting from skilled physical therapy and is making steady progress toward functional goals.  Patient is appropriate to continue with skilled physical therapy intervention, as indicated by initial plan of care.  Good technique with HEP during review today.    Patient struggled with the floor to stand transfers due to weakness in her legs but she felt it was helpful to review so she could get up if there was nothing to push off from  She was not as winded with the walking outside and inclines today    Goal Status:  Pt. will be independent with home exercise program in : 12 weeks  Pt will: Able to walk outside or in the mall on even surfaces for 45-60 minutes within 12 weeks  Pt will: Able to ambulate up stairs with more ease as strength improves within 12 weeks  Pt will: Able to sit<>stand with more ease as leg strength improves within 12 weeks  Pt will: Able to get up from a squatting position, like from lower cupboards, with more ease within 12 weeks     Maybe can walk 40 minutes.  Still need to work on the endurance level.  She is not where she wants to be  Stairs-knees are more of an issue currently  Getting in and out of a chair may be a little easier    Plan / Patient Education:                                     Strengthening for HEP                                Balance, proprioception, endurance, strengthening          Subjective:     Pain Ratin, does have the usual FM pain        Calves were very sore after Thursday.  I did too many squats!!  I feel much better today    Objective:   Barriers to Learning or Achieving Goals: Co-morbidities or other  "medical factors.  fibromyalgia, HTN, obesity, SOB    Functional limitations are described as occurring with: walking, up stairs, kneeling or squatting, housework, unable to carry laundry up stairs, sit<>stand    Below is pt's ex program.  Some or all of the ex may be performed at any given session    Exercises:  Exercise #1: Nu-step   seat 7, arms 9, workload 6    5'  Comment #1: stretches: sitting hamstring, standing calf stretch, SKC, LTR, supine piriformis   hold 30 seconds x 1-3 reps  Exercise #2: recumbent bike   3'  Comment #2: sit to stands in many different chairs   Exercise #3: clamshells   X 20  Comment #3: bridges   10 seconds x 6-12 reps  Exercise #4: isometric hip add with pillow between knees;  hold 10 seconds X 10 reps  work up to 100% effort  Comment #4: UBE  3' forward and 3' backward  Exercise #5: Standing: hip abd, ext, flex, knee flex, mini squats   X 10-20 reps  Comment #5: toe raises:  10-20  Exercise #6: toe tap  8\" step  X 20, no hands   Comment #6: step up  8\" step  X 10 each leg  Exercise #7: step over and then back up the step  8\"  X 10 each  Comment #7: orange tband:  scapular row, shoulder ext, shoulder diagonal.  Work up to 5-20 reps  Exercise #8: TG  level 21, squats with ball between knees  X15  Comment #8: Hurdles: 4 small, 4 medium and 1 large  X 2 leading with each leg     Ambulate outside around building (1050' X 2), up and down inclines, uneven sidewalks;  Standby assist with one rest break      Treatment Today     TREATMENT MINUTES COMMENTS   Evaluation     Self-care/ Home management     Manual therapy     Neuromuscular Re-education 15 Floor to stand transfers; different strategies   Therapeutic Activity     Therapeutic Exercises 25 See above and flow sheet   Gait training 20 See above   Modality__________________                Total 60    Blank areas are intentional and mean the treatment did not include these items.       Ning Gotti, PT  6/4/2018    "

## 2021-06-18 NOTE — PROGRESS NOTES
Assessment/Plan:        Diagnoses and all orders for this visit:    Calculus of ureter  -     Symptom Control While Passing a Stone Education  -     CT Abdomen Pelvis Without Oral Without IV Contrast; Future; Expected date: 6/4/18  -     Patient Stated Goal: Pass my stone    Hydronephrosis with urinary obstruction due to ureteral calculus    Urinary tract stones  -     Urinalysis Macroscopic      Stone Management Plan  [facility] 5/21/2018   Urinary Tract Infection No suspicion of infection   Renal Colic Asymptomatic at this time   Renal Failure No suspicion of renal failure   Current CT date 5/15/2018   Right sided stones? No   R Stone Event No current event   Left sided stones? Yes   L Number of ureteral stones 1   L GSD of ureteral stones 4   L Location of ureteral stone Proximal   L Number of kidney stones  No renal stones   L GSD of kidney stones N/A   L Hydronephrosis Mild   L Stone Event New event   Diagnosis date 5/15/2018   Initial location of primary symptomatic stone Proximal   Initial GSD of primary symptomatic stone 4   L MET Status Initiation   L Current Plan MET   MET 2 week F/U         Subjective:      HPI  Ms. Katy Hong is a 72 y.o.  female presenting to the [facility] following recent ED visit for urolithiasis.    She is a first time unidentified composition stone former who has not required stone clearance procedures. She has not previously participated in stone risk evaluation. She has no identified modifiable stone risk factors. She has no identified non-modifiable stone risk factors.    Primary symptom occurring 1 week ago was acute onset left lower abdominal pain x 1.5 hours. The pain was constant and severe, reaching 10/10 at its worst. No associated alleviating or aggravating factors. No similar pain events in the past. She had no associated symptoms. Evaluation in ED 5/15 was notable for CT reporting an obstructing left UPJ calculus. Labs  were unremarkable for acute infection or renal impairment. She was discharged home with urine strainer and medications.    She has had mild twinges of left abdominal pain in the interval, and is asymptomatic at present. She has been straining her urine but has not captured a stone. She denies symptoms of fever, chills, flank pain, nausea, vomiting, urinary frequency and dysuria.     CT scan is personally reviewed and demonstrates a 4 mm left proximal ureteral stone. There is an adjacent vascular phlebolith, confirmed to be such from prior imaging 2016.    Significant labs from presentation include severe hematuria, no pyuria, negative nitrite, no bacteria, no growth on urine culture, normal WBC, normal creatinine and normal potassium.    Patient has been on topiramate for 10 years.  She went off it for a period of time but is subsequently restarted it.        PLAN    73 yo F on chronic topiramate therapy, first time stone former with obstructing left proximal ureteral stone.    Will proceed with medical expulsive therapy. Risks and benefits were detailed of medical expulsive therapy including probability of stone passage, recurrent renal colic, and requirement of emergency medical and/or surgical care and further imaging. Patient verbalized understanding. Patient agrees with plan as discussed. She will return in 2 weeks with low dose CT scan.    Over the counter symptom control medications of ibuprofen, Dramamine and Tylenol were recommended. SHe has oxycodone available from ED.    Ongoing use of topiramate will be discussed upon resolution of her acute process.    Patient also seen and examined by CARLOS Birch   Review of Systems  A 12 point comprehensive review of systems is negative except for HPI    Past Medical History:   Diagnosis Date     Acute Sinusitis     Created by Conversion      Breast cyst      Cardiomyopathy     Created by Conversion      Cellulitis     Created by Conversion       Chronic Cutaneous Ulcer Venous Stasis     Created by Conversion      Contusion With Intact Skin Surface     Created by Conversion      Depression With Anxiety     Created by Conversion      Epidermal Inclusion Cyst     Created by Conversion      Esophageal reflux     Created by Conversion      Essential Hypertension     Created by Conversion      Fibromyalgia     Created by Conversion      Insomnia     Created by Conversion      Joint Pain, Localized In The Knee     Created by Conversion      Lichen Sclerosus Et Atrophicus     Created by Conversion      Limb Pain     Created by Conversion      Lower Back Pain     Created by Conversion      Mammogram - Abnormal     Created by Conversion      Migraine Headache     Created by Conversion      Myocardial infarction     per EKG's since 2015     Nonvenomous Insect Bite Of Shoulder     Created by Conversion      Obesity     Created by Conversion      Open Wound Of The Lip     Created by Conversion      Osteopenia     Created by Conversion      Sebaceous Hyperplasia     Created by Conversion      Seborrheic Keratosis     Created by Conversion      Shortness of breath     Created by Conversion      Varicose Veins     Created by Conversion      Vitamin D Deficiency     Created by Conversion      Past Surgical History:   Procedure Laterality Date     APPENDECTOMY       BREAST BIOPSY       BREAST CYST ASPIRATION       CATARACT EXTRACTION, BILATERAL       CA TOTAL ABDOM HYSTERECTOMY       still has both ovaries     TEMPOROMANDIBULAR JOINT SURGERY Bilateral      TUBAL LIGATION       Current Outpatient Prescriptions   Medication Sig Dispense Refill     acetaminophen (TYLENOL) 500 MG tablet Take 2 tablets (1,000 mg total) by mouth 4 (four) times a day for 7 days. 56 tablet 0     albuterol (VENTOLIN HFA) 90 mcg/actuation inhaler Inhale 2 puffs every 6 (six) hours as needed for wheezing. 18 g 5     aspirin 81 MG EC tablet Take 81 mg by mouth daily.       calcium carbonate (OS-MONIQUE) 500  mg calcium (1,250 mg) chewable tablet Chew 2 tablets daily.        cholecalciferol, vitamin D3, (VITAMIN D3) 2,000 unit Tab Take 1 tablet by mouth daily.        clobetasol (TEMOVATE) 0.05 % Gel Apply to red rash daily until gone but no longer than two weeks 30 each 1     dimenhyDRINATE (DRAMAMINE) 50 MG tablet Take 1 tablet (50 mg total) by mouth 4 (four) times a day as needed. 28 tablet 0     fluticasone (FLONASE) 50 mcg/actuation nasal spray 1 spray into each nostril daily. 16 g 0     furosemide (LASIX) 20 MG tablet Take 1 tablet (20 mg total) by mouth daily. (Patient taking differently: Take 20-40 mg by mouth every other day as needed. ) 90 tablet 1     gabapentin (NEURONTIN) 300 MG capsule Take 2 capsules by mouth in AM and 3 capsules in PM (Patient taking differently: Take 2 capsules by mouth in AM and 4 capsules in PM) 450 capsule 3     halobetasol (ULTRAVATE) 0.05 % cream AFTER USE OF PSSORENT, APPLY TO LOWER LEGS DAILY X 3 WEEKS, THEN ONLY ON WED, SAT AND SUN  11     ibuprofen (ADVIL,MOTRIN) 200 MG tablet Take 3 tablets (600 mg total) by mouth 4 (four) times a day for 7 days. 84 tablet 0     losartan (COZAAR) 25 MG tablet TAKE 1 TABLET (25 MG TOTAL) BY MOUTH DAILY. 90 tablet 2     metoprolol succinate (TOPROL-XL) 50 MG 24 hr tablet TAKE ONE TABLET BY MOUTH ONE TIME DAILY 90 tablet 3     omeprazole (PRILOSEC) 20 MG capsule TAKE 1 CAPSULE BY MOUTH 2 TIMES A DAY. 180 capsule 2     SUMAtriptan succinate 6 mg/0.5 mL PnIj INJECT 1 INJECTION AS DIRECTED IMMEDIATELY AT ONSET OF MIGRAINE HEADACHE 2 Syringe 3     topiramate (TOPAMAX) 50 MG tablet 1/2 tab with dinner x 1 week then 1 tab with dinner every day 90 tablet 1     venlafaxine (EFFEXOR) 50 MG tablet TAKE ONE TABLET BY MOUTH THREE TIMES DAILY 270 tablet 3     zolpidem (AMBIEN) 5 MG tablet Take 5 mg by mouth at bedtime as needed for sleep.       HYDROcodone-acetaminophen 5-325 mg per tablet Take 1 tablet by mouth every 6 (six) hours as needed for pain. 60 tablet  0     No current facility-administered medications for this visit.      Allergies   Allergen Reactions     Fluoxetine Cough     Pt thinks it was cough but not totally sure.     Latex Rash     Lisinopril Cough     Sulfa (Sulfonamide Antibiotics) Itching and Rash     Social History     Social History     Marital status:      Spouse name: Anjel     Number of children: 2     Years of education: N/A     Occupational History     Retired      Social History Main Topics     Smoking status: Never Smoker     Smokeless tobacco: Never Used     Alcohol use 0.0 oz/week      Comment: occasional     Drug use: No     Sexual activity: Not on file     Other Topics Concern     Not on file     Social History Narrative    7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       Family History   Problem Relation Age of Onset     Hypertension Mother      Arthritis Mother      Alzheimer's disease Father      d. 76     Diabetes Father      Hypertension Father      Breast cancer Maternal Aunt 60     Obesity Maternal Aunt      Hypertension Sister      Osteopenia Sister      Varicose Veins Brother      Obesity Brother      Breast cancer Cousin      Obesity Maternal Uncle      Arthritis Maternal Grandmother      Objective:      Physical Exam  Vitals:    18 0938   BP: (!) 163/94   Pulse: 94   Temp: 97.4  F (36.3  C)     General - well developed, well nourished, appropriate for age. Appears no distress at this time   Heart - regular rate and rhythm, no murmur  Respiratory - normal effort, clear to auscultation, good air entry without adventitious noises  Abdomen - moderately obese soft, non-tender, no hepatosplenomegaly, no masses.   - no flank tenderness, no suprapubic tenderness, kidney and bladder non-palpable  MSK - normal spinal curvature. no spinal tenderness. normal gait. muscular strength intact.  Neurology - cranial nerves II-XII grossly intact, normal sensation, no unsteadiness  Skin - intact, no bruising, no gouty tophi  Psych - oriented to time,  place, and person, normal mood and affect.    Labs  Urinalysis POC (Office):  Nitrite, UA   Date Value Ref Range Status   05/21/2018 Negative Negative Final   05/15/2018 Negative Negative Final       Lab Urinalysis:  Blood, UA   Date Value Ref Range Status   05/21/2018 Negative Negative Final   05/15/2018 Large (!) Negative Final     Nitrite, UA   Date Value Ref Range Status   05/21/2018 Negative Negative Final   05/15/2018 Negative Negative Final     Leukocytes, UA   Date Value Ref Range Status   05/21/2018 Trace (!) Negative Final   05/15/2018 Small (!) Negative Final     pH, UA   Date Value Ref Range Status   05/21/2018 7.0 5.0 - 8.0 Final   05/15/2018 6.0 4.5 - 8.0 Final    and Acute Labs   CBC   WBC   Date Value Ref Range Status   05/15/2018 8.7 4.0 - 11.0 thou/uL Final   12/06/2017 6.5 4.0 - 11.0 thou/uL Final   12/16/2016 5.9 4.0 - 11.0 thou/uL Final   10/31/2014 5.2 4.0 - 11.0 thou/uL Final     Hemoglobin   Date Value Ref Range Status   05/15/2018 12.4 12.0 - 16.0 g/dL Final   12/06/2017 13.2 12.0 - 16.0 g/dL Final   12/16/2016 12.4 12.0 - 16.0 g/dL Final     Platelets   Date Value Ref Range Status   05/15/2018 226 140 - 440 thou/uL Final   12/06/2017 261 140 - 440 thou/uL Final   12/16/2016 233 140 - 440 thou/uL Final   , Renal Panel  [facility]  Creatinine   Date Value Ref Range Status   05/15/2018 0.83 0.60 - 1.10 mg/dL Final   12/06/2017 0.85 0.60 - 1.10 mg/dL Final   07/18/2017 0.77 0.60 - 1.10 mg/dL Final     Potassium   Date Value Ref Range Status   05/15/2018 3.7 3.5 - 5.0 mmol/L Final   12/06/2017 3.8 3.5 - 5.0 mmol/L Final   07/18/2017 3.9 3.5 - 5.0 mmol/L Final     Calcium   Date Value Ref Range Status   05/15/2018 8.9 8.5 - 10.5 mg/dL Final   12/06/2017 9.1 8.5 - 10.5 mg/dL Final   07/18/2017 8.8 8.5 - 10.5 mg/dL Final    and Urine Culture    Culture   Date Value Ref Range Status   05/15/2018 Mixture of urogenital organisms  Final   01/13/2015 No Growth  Final   01/13/2015 No anaerobic growth  Final

## 2021-06-18 NOTE — PROGRESS NOTES
[facility] Surgical Follow up Visit   Date of visit: 6/20/2018  Physician: Alexsandra Rossi MD  Primary Care is Elba Monge MD.  Katy Hong   72 y.o.  female    Initial Weight: 201 pounds  Initial BMI: 37.4  Today's Weight:   Wt Readings from Last 1 Encounters:   06/20/18 196 lb 1.6 oz (89 kg)     Body mass index is 36.45 kg/(m^2).  Initial Weight: 201 lbs  Weight: 196 lb 1.6 oz (89 kg)  Weight loss from initial: 6  % Weight loss: 2.99 %     Assessment and Plan   Assessment: Katy is a 72 y.o. year old female who presents for medical weight management.      Plan:    1. Obesity (BMI 30-39.9)  Patient was congratulated on her success thus far.  Less than 3% of adults to take Topamax can develop kidney stones.  At this point she would like to continue the Topamax I reminded her she is on a fairly low dose.  She does feel it is helping control her appetite.  We reviewed healthy habits for continued weight loss.  At this point she is going to increase her water intake and try to get more exercise.    2. Migraine  The Topamax may also help prevent migraines      Lobe with the dietitian in 1 month and with myself in 2 months         INTERIM HISTORY  Patient developed a kidney stone.  She finally passed it almost a week ago.  This got her off of her routine.  She was initially told that the Topamax caused her kidney stone.  She feels that the Topamax is helping to control her appetite.    DIETARY HISTORY  Meals Per Day: 3  Eating Protein First?:  Yes  Food Diary: B:greek yogurt and fruit and coffee L:lunch meat, fruit or protein shake D:meat and veggies and sometimes starch  Snacks Per Day: 1 or 2  Typical Snack: protein shake  Fluid Intake: decreased because of kidney stone  Portion Control: yes  Calorie Containing Beverages: protein shakes  Typical Protein Food Choices: Protein shake, Greek yogurt, meats  Choosing Whole Grains: sometimes  Meals at Restaurant per week:2  Eating at the  Table: Sometimes  TV is Off During Meals: sometimes  Positive Changes Since Last Visit: Protein first, 3 meals a day, some exercise  Struggling With: Water intake, adequate exercise    Knowledgeable in Reading Food Labels: Yes  Getting Adequate Protein: Working on it  Sleeping 7-8 hours/day no  Stress management discussed    PHYSICAL ACTIVITY PATTERNS:  Cardiovascular: not since she had a kidney stone  Strength Training: leg strengthening at PT 2 x per week (it is now over) She is now doing the same exercises at home    REVIEW OF SYSTEMS  GENERAL/CONSTITUTIONAL:  Fatigue: No  HEENT:  Vision changes, glaucoma: No  CARDIOVASCULAR:  Chest Pain with Exertion: No  PULMONARY:  Dyspnea on exertion: Yes  NEUROLOGIC:  Paresthesias: No  PSYCHIATRIC:  Moods: Stable  MUSCULOSKELETAL/RHEUMATOLOGIC  Arthralgias: Yes  Myalgias: Yes  ENDOCRINE:  Monitoring Blood Sugars: Not applicable  Sugars Well Controlled: Not applicable       Patient Profile   Social History     Social History Narrative    5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          Past Medical History   Past Medical History:   Diagnosis Date     Acute Sinusitis     Created by Conversion      Breast cyst      Cardiomyopathy     Created by Conversion      Cellulitis     Created by Conversion      Chronic Cutaneous Ulcer Venous Stasis     Created by Conversion      Contusion With Intact Skin Surface     Created by Conversion      Depression With Anxiety     Created by Conversion      Epidermal Inclusion Cyst     Created by Conversion      Esophageal reflux     Created by Conversion      Essential Hypertension     Created by Conversion      Fibromyalgia     Created by Conversion      Insomnia     Created by Conversion      Joint Pain, Localized In The Knee     Created by Conversion      Lichen Sclerosus Et Atrophicus     Created by Conversion      Limb Pain     Created by Conversion      Lower Back Pain     Created by Conversion      Mammogram - Abnormal     Created by Conversion       Migraine Headache     Created by Conversion      Myocardial infarction     per EKG's since 2015     Nonvenomous Insect Bite Of Shoulder     Created by Conversion      Obesity     Created by Conversion      Open Wound Of The Lip     Created by Conversion      Osteopenia     Created by Conversion      Sebaceous Hyperplasia     Created by Conversion      Seborrheic Keratosis     Created by Conversion      Shortness of breath     Created by Conversion      Varicose Veins     Created by Conversion      Vitamin D Deficiency     Created by Conversion      Patient Active Problem List   Diagnosis     Osteopenia     Insomnia     Vitamin D Deficiency     Lower Back Pain     Fibromyalgia     Essential Hypertension     Seborrheic Keratosis     Obesity     Lichen Sclerosus Et Atrophicus     Depression With Anxiety     Migraine Headache     Esophageal Reflux     Joint Pain, Localized In The Knee     Varicose Veins     Shortness Of Breath     Nonvenomous Insect Bite Of Shoulder     Cellulitis     Mammogram - Abnormal     Chronic Cutaneous Ulcer Venous Stasis     Chronic venous insufficiency     Foot pain (Soft Tissue)     Female stress incontinence     Calculus of ureter     Hydronephrosis with urinary obstruction due to ureteral calculus     Current Outpatient Prescriptions   Medication Sig Note     albuterol (VENTOLIN HFA) 90 mcg/actuation inhaler Inhale 2 puffs every 6 (six) hours as needed for wheezing.      aspirin 81 MG EC tablet Take 81 mg by mouth daily.      calcium carbonate (OS-MONIQUE) 500 mg calcium (1,250 mg) chewable tablet Chew 2 tablets daily.       cholecalciferol, vitamin D3, (VITAMIN D3) 2,000 unit Tab Take 1 tablet by mouth daily.       clobetasol (TEMOVATE) 0.05 % Gel Apply to red rash daily until gone but no longer than two weeks      fluticasone (FLONASE) 50 mcg/actuation nasal spray 1 spray into each nostril daily.      furosemide (LASIX) 20 MG tablet Take 1 tablet (20 mg total) by mouth daily. (Patient taking  "differently: Take 20-40 mg by mouth every other day as needed. )      gabapentin (NEURONTIN) 300 MG capsule Take 2 capsules by mouth in AM and 3 capsules in PM (Patient taking differently: Take 2 capsules by mouth in AM and 4 capsules in PM)      halobetasol (ULTRAVATE) 0.05 % cream AFTER USE OF PSSORENT, APPLY TO LOWER LEGS DAILY X 3 WEEKS, THEN ONLY ON WED, SAT AND SUN 3/5/2018: Received from: External Pharmacy     HYDROcodone-acetaminophen 5-325 mg per tablet Take 1 tablet by mouth every 6 (six) hours as needed for pain.      losartan (COZAAR) 25 MG tablet TAKE 1 TABLET (25 MG TOTAL) BY MOUTH DAILY.      metoprolol succinate (TOPROL-XL) 50 MG 24 hr tablet TAKE ONE TABLET BY MOUTH ONE TIME DAILY      omeprazole (PRILOSEC) 20 MG capsule TAKE 1 CAPSULE BY MOUTH 2 TIMES A DAY.      SUMAtriptan succinate 6 mg/0.5 mL PnIj INJECT 1 INJECTION AS DIRECTED IMMEDIATELY AT ONSET OF MIGRAINE HEADACHE      topiramate (TOPAMAX) 50 MG tablet 1/2 tab with dinner x 1 week then 1 tab with dinner every day      venlafaxine (EFFEXOR) 50 MG tablet TAKE ONE TABLET BY MOUTH THREE TIMES DAILY      zolpidem (AMBIEN) 5 MG tablet Take 5 mg by mouth at bedtime as needed for sleep.        Past Surgical History  She has a past surgical history that includes pr total abdom hysterectomy; Breast cyst aspiration; Breast biopsy; Tubal ligation; Appendectomy; Temporomandibular joint surgery (Bilateral); and Cataract extraction, bilateral.     Examination   /75 (Patient Site: Right Arm, Patient Position: Sitting, Cuff Size: Adult Regular)  Pulse 70  Ht 5' 1.5\" (1.562 m)  Wt 196 lb 1.6 oz (89 kg)  LMP 09/29/1993  SpO2 98%  Breastfeeding? No  BMI 36.45 kg/m2  Height: 5' 1.5\" (1.562 m) (6/20/2018  1:59 PM)  Initial Weight: 201 lbs (6/6/2018  1:00 PM)  Weight: 196 lb 1.6 oz (89 kg) (6/20/2018  1:59 PM)  Weight loss from initial: 6 (6/6/2018  1:00 PM)  % Weight loss: 2.99 % (6/6/2018  1:00 PM)  BMI (Calculated): 36.5 (6/20/2018  1:59 " PM)  SpO2: 98 % (6/20/2018  1:59 PM)  Waist Circumference (In): 44 Inches (4/25/2018  1:39 PM)  Hip Circumference (In): 51 Inches (4/25/2018  1:39 PM)  Neck Circumference (In): 12.5 Inches (4/25/2018  1:39 PM)  General:  Alert and ambulatory, NAD  HEENT:  No conjunctival pallor, moist mucous Membranes, neck is without LAD  Pulmonary:  Normal respiratory effort, no cough, no audible wheezes/crackles.  CV:  Regular rate and Rhythm, no murmurs  Abdominal: BS normal,soft, NT without rebound or guarding  Pscyh/Mood: stable         Counseling:   We reviewed the important post op bariatric recommendations:  -eating 3 meals daily  -eating protein first, getting >60gm protein daily  -eating slowly, chewing food well  -avoiding/limiting calorie containing beverages  -limiting starchy vegetables and carbohydrates, choosing wheat, not white with breads,   crackers, pastas, bear, bagels, tortillas, rice  -limiting restaurant or cafeteria eating to twice a week or less    We discussed the importance of restorative sleep and stress management in maintaining a healthy weight.  We discussed the National Weight Control Registry healthy weight maintenance strategies and ways to optimize metabolism.  We discussed the importance of physical activity including cardiovascular and strength training in maintaining a healthier weight.    > 25 min spent with patient, > 50% spent in counseling         LATISHA Rossi MD  [facility].

## 2021-06-18 NOTE — PROGRESS NOTES
Assessment/Plan:        Diagnoses and all orders for this visit:    Calculus of ureter  -     Symptom Control While Passing a Stone Education  -     CT Abdomen Pelvis Without Oral Without IV Contrast; Future; Expected date: 6/18/18  -     Patient Stated Goal: Pass my stone    Urinary tract stones  -     Urinalysis Macroscopic      Stone Management Plan  [facility] 5/21/2018 6/4/2018   Urinary Tract Infection No suspicion of infection No suspicion of infection   Renal Colic Asymptomatic at this time Asymptomatic at this time   Renal Failure No suspicion of renal failure No suspicion of renal failure   Current CT date 5/15/2018 6/4/2018   Right sided stones? No No   R Stone Event No current event No current event   Left sided stones? Yes -   L Number of ureteral stones 1 -   L GSD of ureteral stones 4 -   L Location of ureteral stone Proximal -   L Number of kidney stones  No renal stones -   L GSD of kidney stones N/A -   L Hydronephrosis Mild -   L Stone Event New event -   Diagnosis date 5/15/2018 -   Initial location of primary symptomatic stone Proximal -   Initial GSD of primary symptomatic stone 4 -   L MET Status Initiation -   L Current Plan MET -   MET 2 week F/U -             Subjective:      HPI  Ms. Katy Hong is a 72 y.o.  female returning to the [facility] for medical expulsive therapy follow up.     On last encounter, her 4 mm stone was in left proximal ureter with mild hydronephrosis. She has had no unanticipated events.    Symptoms have been well controlled with medication and she is able to carry on normal activities. She is asymptomatic at present. She denies symptoms of fever, chills, flank pain, nausea, vomiting, urinary frequency and dysuria.     New CT scan was personally reviewed and demonstrates progression of stone to left distal ureter with resolution of previous hydronephrosis.     PLAN    73 yo F with progression of left ureteral stone now  within distal ureter, hydronephrosis resolved.     She will continue to attempt to pass stone and will return in 2 weeks with further imaging.    Patient also seen and examined by María Elena Rodríguez PA-C       ROS   Review of systems is negative except for HPI.    Past Medical History:   Diagnosis Date     Acute Sinusitis     Created by Conversion      Breast cyst      Cardiomyopathy     Created by Conversion      Cellulitis     Created by Conversion      Chronic Cutaneous Ulcer Venous Stasis     Created by Conversion      Contusion With Intact Skin Surface     Created by Conversion      Depression With Anxiety     Created by Conversion      Epidermal Inclusion Cyst     Created by Conversion      Esophageal reflux     Created by Conversion      Essential Hypertension     Created by Conversion      Fibromyalgia     Created by Conversion      Insomnia     Created by Conversion      Joint Pain, Localized In The Knee     Created by Conversion      Lichen Sclerosus Et Atrophicus     Created by Conversion      Limb Pain     Created by Conversion      Lower Back Pain     Created by Conversion      Mammogram - Abnormal     Created by Conversion      Migraine Headache     Created by Conversion      Myocardial infarction     per EKG's since 2015     Nonvenomous Insect Bite Of Shoulder     Created by Conversion      Obesity     Created by Conversion      Open Wound Of The Lip     Created by Conversion      Osteopenia     Created by Conversion      Sebaceous Hyperplasia     Created by Conversion      Seborrheic Keratosis     Created by Conversion      Shortness of breath     Created by Conversion      Varicose Veins     Created by Conversion      Vitamin D Deficiency     Created by Conversion        Past Surgical History:   Procedure Laterality Date     APPENDECTOMY       BREAST BIOPSY       BREAST CYST ASPIRATION       CATARACT EXTRACTION, BILATERAL       IL TOTAL ABDOM HYSTERECTOMY       still has both ovaries     TEMPOROMANDIBULAR  JOINT SURGERY Bilateral      TUBAL LIGATION         Current Outpatient Prescriptions   Medication Sig Dispense Refill     albuterol (VENTOLIN HFA) 90 mcg/actuation inhaler Inhale 2 puffs every 6 (six) hours as needed for wheezing. 18 g 5     aspirin 81 MG EC tablet Take 81 mg by mouth daily.       calcium carbonate (OS-MONIQUE) 500 mg calcium (1,250 mg) chewable tablet Chew 2 tablets daily.        cholecalciferol, vitamin D3, (VITAMIN D3) 2,000 unit Tab Take 1 tablet by mouth daily.        clobetasol (TEMOVATE) 0.05 % Gel Apply to red rash daily until gone but no longer than two weeks 30 each 1     fluticasone (FLONASE) 50 mcg/actuation nasal spray 1 spray into each nostril daily. 16 g 0     furosemide (LASIX) 20 MG tablet Take 1 tablet (20 mg total) by mouth daily. (Patient taking differently: Take 20-40 mg by mouth every other day as needed. ) 90 tablet 1     gabapentin (NEURONTIN) 300 MG capsule Take 2 capsules by mouth in AM and 3 capsules in PM (Patient taking differently: Take 2 capsules by mouth in AM and 4 capsules in PM) 450 capsule 3     halobetasol (ULTRAVATE) 0.05 % cream AFTER USE OF PSSORENT, APPLY TO LOWER LEGS DAILY X 3 WEEKS, THEN ONLY ON WED, SAT AND SUN  11     HYDROcodone-acetaminophen 5-325 mg per tablet Take 1 tablet by mouth every 6 (six) hours as needed for pain. 60 tablet 0     losartan (COZAAR) 25 MG tablet TAKE 1 TABLET (25 MG TOTAL) BY MOUTH DAILY. 90 tablet 1     metoprolol succinate (TOPROL-XL) 50 MG 24 hr tablet TAKE ONE TABLET BY MOUTH ONE TIME DAILY 90 tablet 3     omeprazole (PRILOSEC) 20 MG capsule TAKE 1 CAPSULE BY MOUTH 2 TIMES A DAY. 180 capsule 2     SUMAtriptan succinate 6 mg/0.5 mL PnIj INJECT 1 INJECTION AS DIRECTED IMMEDIATELY AT ONSET OF MIGRAINE HEADACHE 2 Syringe 3     topiramate (TOPAMAX) 50 MG tablet 1/2 tab with dinner x 1 week then 1 tab with dinner every day 90 tablet 1     venlafaxine (EFFEXOR) 50 MG tablet TAKE ONE TABLET BY MOUTH THREE TIMES DAILY 270 tablet 3      zolpidem (AMBIEN) 5 MG tablet Take 5 mg by mouth at bedtime as needed for sleep.       No current facility-administered medications for this visit.        Allergies   Allergen Reactions     Fluoxetine Cough     Pt thinks it was cough but not totally sure.     Latex Rash     Lisinopril Cough     Sulfa (Sulfonamide Antibiotics) Itching and Rash       Social History     Social History     Marital status:      Spouse name: Anjel     Number of children: 2     Years of education: N/A     Occupational History     Retired      Social History Main Topics     Smoking status: Never Smoker     Smokeless tobacco: Never Used     Alcohol use 0.0 oz/week      Comment: occasional     Drug use: No     Sexual activity: Not on file     Other Topics Concern     Not on file     Social History Narrative    7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         Family History   Problem Relation Age of Onset     Hypertension Mother      Arthritis Mother      Alzheimer's disease Father      d. 76     Diabetes Father      Hypertension Father      Breast cancer Maternal Aunt 60     Obesity Maternal Aunt      Hypertension Sister      Osteopenia Sister      Varicose Veins Brother      Obesity Brother      Breast cancer Cousin      Obesity Maternal Uncle      Arthritis Maternal Grandmother      Objective:      Physical Exam  There were no vitals filed for this visit.  General - well developed, well nourished, appropriate for age. Appears no distress at this time  Abdomen - soft, non-tender, no hepatosplenomegaly, no masses.   - no flank tenderness, no suprapubic tenderness, kidney and bladder non-palpable  MSK - normal spinal curvature. no spinal tenderness. normal gait. muscular strength intact.  Psych - oriented to time, place, and person, normal mood and affect.      Labs   Urinalysis POC (Office):  Nitrite, UA   Date Value Ref Range Status   2018 Negative Negative Final   05/15/2018 Negative Negative Final       Lab Urinalysis:  Blood, UA   Date  Value Ref Range Status   05/21/2018 Negative Negative Final   05/15/2018 Large (!) Negative Final     Nitrite, UA   Date Value Ref Range Status   05/21/2018 Negative Negative Final   05/15/2018 Negative Negative Final     Leukocytes, UA   Date Value Ref Range Status   05/21/2018 Trace (!) Negative Final   05/15/2018 Small (!) Negative Final     pH, UA   Date Value Ref Range Status   05/21/2018 7.0 5.0 - 8.0 Final   05/15/2018 6.0 4.5 - 8.0 Final

## 2021-06-18 NOTE — PROGRESS NOTES
[facility] Daily Progress     Patient Name: Katy Hong  Date: 2018  Visit #: 3/12  5/2/18-18  Referral Diagnosis:  Fibromyalgia  Referring provider: Alexsandra Rossi,*  Visit Diagnosis:     ICD-10-CM    1. Fibromyalgia M79.7    2. General weakness R53.1    3. Decreased stamina R53.83          Assessment:     Patient is benefitting from skilled physical therapy and is making steady progress toward functional goals.  Patient is appropriate to continue with skilled physical therapy intervention, as indicated by initial plan of care.    Patient did not experience any pain or increase in fatigue levels while in therapy today.  She will be mindful tonight and tomorrow to make sure she did not over-do the exercises and activity level today.  She was able to perform a squat with no increase in knee pain, once shown how to properly squat.  She will work on this at home to improve her leg strength.    Goal Status:  Pt. will be independent with home exercise program in : 12 weeks  Pt will: Able to walk outside or in the mall on even surfaces for 45-60 minutes within 12 weeks  Pt will: Able to ambulate up stairs with more ease as strength improves within 12 weeks  Pt will: Able to sit<>stand with more ease as leg strength improves within 12 weeks  Pt will: Able to get up from a squatting position, like from lower cupboards, with more ease within 12 weeks    Plan / Patient Education:                                     Strengthening for HEP                                Balance, proprioception, endurance, strengthening    In 1-2 weeks: assess floor to stand transfers      Subjective:     Pain Ratin, does have the usual FM pain    I am a little sore but I feel good  No problems with the exercises  Sit to stands are going ok.  No increase in soreness      Objective:   Barriers to Learning or Achieving Goals: Co-morbidities or other medical factors.  fibromyalgia, HTN, obesity,  SOB    Functional limitations are described as occurring with: walking, up stairs, kneeling or squatting, housework, unable to carry laundry up stairs, sit<>stand    Exercises:  Exercise #1: Nu-step   seat 6, arms 9, workload 4    5'  Comment #1: stretches: sitting hamstring, standing calf stretch, SKC, LTR, supine piriformis   hold 30 seconds x 1-3 reps  Exercise #2: recumbent bike   3'  Comment #2: sit to stands in many different chairs   Exercise #3: clamshells   X 20  Comment #3: bridges   10 seconds x 6-12 reps  Exercise #4: isometric hip add with pillow between knees;  hold 10 seconds X 10 reps  work up to 100% effort  Comment #4: UBE  3'  Exercise #5: Standing: hip abd, ext, flex, knee flex, mini squats   X 10-20 reps  Comment #5: toe raises:  10-20    Treatment Today     TREATMENT MINUTES COMMENTS   Evaluation     Self-care/ Home management     Manual therapy     Neuromuscular Re-education     Therapeutic Activity     Therapeutic Exercises 53 See above or flow sheet   Gait training     Modality__________________                Total 53    Blank areas are intentional and mean the treatment did not include these items.       Ning Gotti, PT  5/14/2018

## 2021-06-18 NOTE — PROGRESS NOTES
[facility] Daily Progress     [facility] Discharge Summary  Patient Name: Katy Hong  Date: 7/2/2018  Referral Diagnosis: Fibromyalgia  Referring provider: Alexsandra Rossi,*  Visit Diagnosis:   1. Fibromyalgia     2. General weakness     3. Decreased stamina         Goals:  Pt. will be independent with home exercise program in : 12 weeks  Pt will: Able to walk outside or in the mall on even surfaces for 45-60 minutes within 12 weeks  Pt will: Able to ambulate up stairs with more ease as strength improves within 12 weeks  Pt will: Able to sit<>stand with more ease as leg strength improves within 12 weeks  Pt will: Able to get up from a squatting position, like from lower cupboards, with more ease within 12 weeks    Patient was seen for 10 visits from 5/2/18 to 6/15/18 with 2 missed appointments.  Patient canceled her last two scheduled visits due to her kidney stones.  She will continue to work on her exercises at home.  She feels most of her goals had been met.    Therapy will be discontinued at this time.  The patient will need a new referral to resume.    Thank you for your referral.  Ning EASTMAN Shen  7/2/2018  10:43 AM    Patient Name: Katy Hong  Date: 6/15/2018  Visit #: 10/12  5/2/18-8/1/18  Referral Diagnosis:  Fibromyalgia  Referring provider: Alexsandra Rossi,*  Visit Diagnosis:     ICD-10-CM    1. Fibromyalgia M79.7    2. General weakness R53.1    3. Decreased stamina R53.83          Assessment:     Patient is benefitting from skilled physical therapy and is making steady progress toward functional goals.  Patient is appropriate to continue with skilled physical therapy intervention, as indicated by initial plan of care.  Good technique with HEP during review today.    Due to the kidney stones, she was not able to perform all exercises today with the intensity she had been.  She was able to do many of them but was slow and controlled with all    Goal  Status:  Pt. will be independent with home exercise program in : 12 weeks  Pt will: Able to walk outside or in the mall on even surfaces for 45-60 minutes within 12 weeks  Pt will: Able to ambulate up stairs with more ease as strength improves within 12 weeks  Pt will: Able to sit<>stand with more ease as leg strength improves within 12 weeks  Pt will: Able to get up from a squatting position, like from lower cupboards, with more ease within 12 weeks     Maybe can walk 40 minutes.  Still need to work on the endurance level.  She is not where she wants to be  Stairs-knees are more of an issue currently  Getting in and out of a chair may be a little easier    Plan / Patient Education:          She sees her MD for the kidney stones on Monday.  If she is having surgery she will call and update us with her POC-if she can continue PT or if we need to discharge.     Two visits remain      Subjective:     Pain Ratin, does have the usual FM pain        My kidney stones are really bad.  I am not sure what I can do.  I see the doctor for it Monday and they have to do something for me.  The pain is horrible    Objective:   Barriers to Learning or Achieving Goals: Co-morbidities or other medical factors.  fibromyalgia, HTN, obesity, SOB    Functional limitations are described as occurring with: walking, up stairs, kneeling or squatting, housework, unable to carry laundry up stairs, sit<>stand    Below is pt's ex program.  Some or all of the ex may be performed at any given session    Exercises:  Exercise #1: Nu-step   seat 7, arms 9, workload 6    5'  Comment #1: stretches: sitting hamstring, standing calf stretch, SKC, LTR, supine piriformis   hold 30 seconds x 1-3 reps  Exercise #2: recumbent bike   3'  Comment #2: sit to stands in many different chairs   Exercise #3: clamshells   X 20  Comment #3: bridges   10 seconds x 6-12 reps  Exercise #4: isometric hip add with pillow between knees;  hold 10 seconds X 10 reps  work  "up to 100% effort  Comment #4: UBE  3' forward and 3' backward  Exercise #5: Standing: hip abd, ext, flex, knee flex, mini squats   X 10-20 reps  Comment #5: toe raises:  10-20  Exercise #6: toe tap  8\" step  X 20, no hands   Comment #6: step up  8\" step  X 10 each leg  Exercise #7: step over and then back up the step  8\"  X 10 each  Comment #7: orange tband:  scapular row, shoulder ext, shoulder diagonal.  Work up to 5-20 reps  Exercise #8: TG  level 21, squats with ball between knees  X15  Comment #8: Hurdles: 4 small, 4 medium and 1 large  X 2 leading with each leg     Ambulate outside around building (1050' X 2), up and down inclines, uneven sidewalks;  Standby assist with one rest break      Treatment Today   Pt unable to stay full hour due to pain levels  TREATMENT MINUTES COMMENTS   Evaluation     Self-care/ Home management     Manual therapy     Neuromuscular Re-education     Therapeutic Activity     Therapeutic Exercises 40 See above and flow sheet   Gait training     Modality__________________                Total 40    Blank areas are intentional and mean the treatment did not include these items.       Ning Gotti, PT  6/15/2018    "

## 2021-06-18 NOTE — PROGRESS NOTES
[facility] Daily Progress     Patient Name: Katy Hong  Date: 2018  Visit #: 18-18  Referral Diagnosis:  Fibromyalgia  Referring provider: Alexsandra Rossi,*  Visit Diagnosis:     ICD-10-CM    1. Fibromyalgia M79.7    2. General weakness R53.1    3. Decreased stamina R53.83          Assessment:     Patient is benefitting from skilled physical therapy and is making steady progress toward functional goals.  Patient is appropriate to continue with skilled physical therapy intervention, as indicated by initial plan of care.  Good technique with HEP during review today.    Patient continues to get winded and fatigued and required 4 breaks.  Step up backward onto step was her hardest activity that challenged her the most.    Goal Status:  Pt. will be independent with home exercise program in : 12 weeks  Pt will: Able to walk outside or in the mall on even surfaces for 45-60 minutes within 12 weeks  Pt will: Able to ambulate up stairs with more ease as strength improves within 12 weeks  Pt will: Able to sit<>stand with more ease as leg strength improves within 12 weeks  Pt will: Able to get up from a squatting position, like from lower cupboards, with more ease within 12 weeks   Continues to make progress toward goals    Plan / Patient Education:                                     Strengthening for HEP                                Balance, proprioception, endurance, strengthening    In 1-2 weeks: assess floor to stand transfers      Subjective:     Pain Ratin, does have the usual FM pain    Not sure if the stone passed but I feel better.  I was tired after the last visit but unsure if it was the stone trying to pass or the activities we did in therapy.      Objective:   Barriers to Learning or Achieving Goals: Co-morbidities or other medical factors.  fibromyalgia, HTN, obesity, SOB    Functional limitations are described as occurring with: walking, up stairs, kneeling or  "squatting, housework, unable to carry laundry up stairs, sit<>stand    Exercises:  Exercise #1: Nu-step   seat 7, arms 9, workload 4    5'  Comment #1: stretches: sitting hamstring, standing calf stretch, SKC, LTR, supine piriformis   hold 30 seconds x 1-3 reps  Exercise #2: recumbent bike   3'  Comment #2: sit to stands in many different chairs   Exercise #3: clamshells   X 20  Comment #3: bridges   10 seconds x 6-12 reps  Exercise #4: isometric hip add with pillow between knees;  hold 10 seconds X 10 reps  work up to 100% effort  Comment #4: UBE  3'  Exercise #5: Standing: hip abd, ext, flex, knee flex, mini squats   X 10-20 reps  Comment #5: toe raises:  10-20  Exercise #6: toe tap  8\" step  X 20, no hands   Comment #6: step up  8\" step  X 10 each leg  Exercise #7: step over and then back up the step  8\"  X 10 each     Ambulate outside on sidewalks-uneven, inclines, declines, curbs, amb in grass, up a slight hill and down on the grass.  Stand by A   8 minutes (neuro re edu)    Pt requested to leave early due pain levels trying to pass her stone.    Treatment Today     TREATMENT MINUTES COMMENTS   Evaluation     Self-care/ Home management     Manual therapy     Neuromuscular Re-education     Therapeutic Activity     Therapeutic Exercises 55 See above or flow sheet   Gait training     Modality__________________                Total 55    Blank areas are intentional and mean the treatment did not include these items.       Ning Gotti, PT  5/21/2018    "

## 2021-06-18 NOTE — PROGRESS NOTES
[facility] Daily Progress     Patient Name: Katy Hong  Date: 2018  Visit #: 18-18  Referral Diagnosis:  Fibromyalgia  Referring provider: Alexsandra Rossi,*  Visit Diagnosis:     ICD-10-CM    1. Fibromyalgia M79.7    2. General weakness R53.1    3. Decreased stamina R53.83          Assessment:     Patient is benefitting from skilled physical therapy and is making steady progress toward functional goals.  Patient is appropriate to continue with skilled physical therapy intervention, as indicated by initial plan of care.  Good technique with HEP during review today.    Added scapular exercises today as pt wanted that for her posture and will add in to help with her endurance as she continues to try to be more active.  She feels her endurance is slowly improving but not fast enough for her.    Goal Status:  Pt. will be independent with home exercise program in : 12 weeks  Pt will: Able to walk outside or in the mall on even surfaces for 45-60 minutes within 12 weeks  Pt will: Able to ambulate up stairs with more ease as strength improves within 12 weeks  Pt will: Able to sit<>stand with more ease as leg strength improves within 12 weeks  Pt will: Able to get up from a squatting position, like from lower cupboards, with more ease within 12 weeks   Continues to make progress toward goals    Plan / Patient Education:                                     Strengthening for HEP                                Balance, proprioception, endurance, strengthening    In 1 week: assess floor to stand transfers      Subjective:     Pain Ratin, does have the usual FM pain    Yesterday was not a good day.    Knees are sore today and were yesterday as well.    Objective:   Barriers to Learning or Achieving Goals: Co-morbidities or other medical factors.  fibromyalgia, HTN, obesity, SOB    Functional limitations are described as occurring with: walking, up stairs, kneeling or squatting,  "housework, unable to carry laundry up stairs, sit<>stand    Exercises:  Exercise #1: Nu-step   seat 7, arms 9, workload 5    5'  Comment #1: stretches: sitting hamstring, standing calf stretch, SKC, LTR, supine piriformis   hold 30 seconds x 1-3 reps  Exercise #2: recumbent bike   3'  Comment #2: sit to stands in many different chairs   Exercise #3: clamshells   X 20  Comment #3: bridges   10 seconds x 6-12 reps  Exercise #4: isometric hip add with pillow between knees;  hold 10 seconds X 10 reps  work up to 100% effort  Comment #4: UBE  3' forward and 3' backward  Exercise #5: Standing: hip abd, ext, flex, knee flex, mini squats   X 10-20 reps  Comment #5: toe raises:  10-20  Exercise #6: toe tap  8\" step  X 20, no hands   Comment #6: step up  8\" step  X 10 each leg  Exercise #7: step over and then back up the step  8\"  X 10 each  Comment #7: orange tband:  scapular row, shoulder ext, shoulder diagonal.  Work up to 5-20 reps     Ambulate within clinic (due to humidity outside) for 8 minutes working on endurance-there ex        Treatment Today     TREATMENT MINUTES COMMENTS   Evaluation     Self-care/ Home management     Manual therapy     Neuromuscular Re-education     Therapeutic Activity     Therapeutic Exercises 55 See above or flow sheet  Edu on nutrition, protein, looked good sources up on internet and discussed   Gait training     Modality__________________                Total 55    Blank areas are intentional and mean the treatment did not include these items.       Ning Gotti, PT  5/23/2018    "

## 2021-06-18 NOTE — PROGRESS NOTES
Non-surgical Weight Loss Initial Diet Evaluation     Assessment:  Pt is a 72 y.o. female being seen today for non-surgical RD nutritional evaluation. Today we reviewed current eating habits and level of physical activity, and instructed on the changes that are required for successful weight loss outcomes.    +pt currently waiting to pass kidney stone - first one (restricted on water intake)   +pt presents with  Anjel    Personal Goals: weight loss for energy; pt is feeling tired and uncomfortable   Personal goal weight: 175lbs - weight longest as an adult 165lb     Pt Active Problem List Diagnosis:  Patient Active Problem List   Diagnosis     Osteopenia     Insomnia     Vitamin D Deficiency     Lower Back Pain     Fibromyalgia     Essential Hypertension     Seborrheic Keratosis     Obesity     Lichen Sclerosus Et Atrophicus     Depression With Anxiety     Migraine Headache     Esophageal Reflux     Joint Pain, Localized In The Knee     Varicose Veins     Shortness Of Breath     Nonvenomous Insect Bite Of Shoulder     Cellulitis     Mammogram - Abnormal     Chronic Cutaneous Ulcer Venous Stasis     Chronic venous insufficiency     Foot pain (Soft Tissue)     Female stress incontinence     Calculus of ureter     Hydronephrosis with urinary obstruction due to ureteral calculus     Topamax: developed kidney stones but still taking - feeling appetite suppression     Pt's Initial Weight: 201 lbs  Weight: 195 lb (88.5 kg)  Weight loss from initial: 6  % Weight loss: 2.99 %  BMI: Body mass index is 36.25 kg/(m^2).  IBW: 108 lbs    Estimated RMR (Patterson-St Jeor equation): 1369 calories  Protein requirements (.5grams to .9grams per pound IBW, 20-30% of calories, minimum of 60-80gm per day):   grams     Food allergies, intolerances, Jew customs:  none    Vitamins/Mineral Supplementation: in chart    Biggest struggle with weight loss: pt is unsure     Who does the grocery shopping for your household?    Who prepares your meals at home? Self and   -lives with domingo    Diet Recall/Time: wakes 730am  Breakfast: toast or cereal and coffee   Am Snack: none  Lunch: fruit and coffee ; now with 1-2 eggs  Pm snack:none   Dinner: Pro/Veg-hit and miss ?CHO  HS Snack: ice cream OR pop corn OR cheese and crackers    Typical Snacks: above    Fats used at home: olive oil     Fried Foods: 0-1 times per week    Meals per week away from home: 1X/week     Recommended limiting eating out to no more than 2x/week.  Patient and I reviewed the importance of eating three consistent meals per day; as well as meal timing to be spaced 4-5 hours apart.  Snack choices: 100-150 calories (1-2x/day if physically hungry), incorporating a fruit/vegetable w/ protein source.    Portion Sizes problematic? yes per patient/diet recall  Encouraged slowing meal times down, 20-30 minutes, chewing to applesauce consistency.   To aid in proper portion control and slow meal time down discussed consuming meals off smaller plates, use toddler/children utensils and set utensils down after each bite.    Protein, vegetables/fruits, carbohydrates:   Reviewed lean protein sources today. Recommended consuming 15-20gm protein at 3 meals daily.  The patient and I discussed the importance of including lean/low fat protein at each meal and limiting carbohydrate intake to less than 25% of plate volume.     Beverages (Type/Oz. per day)  Water: 24oz   Coffee: 2-3cups  Tea: iced tea occasional   Milk: none  Regular soda: none  Diet soda: 1X/week  Juice: none  Pineda-Aid/lemonade/etc: none  Alcohol: wine occasional     Discussed the importance of adequate hydration and the goal of 64+ oz of fluid daily.   The patient understands the importance of avoiding all alcoholic and sweetened drinks, and instead choosing 64 oz plain water.    Exercise  PT out and at home    Pt's understands that 45-60 minutes of daily activity is an important part of weight loss success.    Encouraged pt to incorporate upper body strength training exercise, even if its lifting soup cans while watching tv at night, doing push ups/sit-ups, and abdominal work.    PES statement:    1. (NI-1.3)Excessive energy intake related to Food and nutrition related knowledge deficit concerning excessive energy/oral intake as evidenced by Intake of high caloric density foods at meals and/or snacks; large portions; frequent grazing; Estimated intake that exceeds estimated daily energy intake and BMI 36.25    2. (NC-3.3.5) Obese, BMI ?35 related to physical inactivity as evidenced by Infrequent, low-duration and or low intensity physical activity; and Large amounts of sedentary activities; no structured physical activity regimen    Intervention  Discussion:  1. Educated pt on Eat Better, Move More, Live Well: Non-surgical Weight Loss Handout  2. Recommended to consume 15-20gm protein at 3 meals daily.  grams daily total.  Educated pt on food labels: keeping total sugar grams <10  Instructions/Goals:   1. Include 15-20gm protein at each meal.  2. Increase vegetable/fruit intake, by having a vegetable or fruit with each meal daily. Recommended pt to increase vegetable/fruit intake to 4-5 servings daily.  3. Increase fluid intake to 64oz daily: choose plain or calorie/alcohol-free beverages.  4. Incorporate daily structured activity, 45-60 minutes most days of the week  5. Read food labels more consistently: keeping total fat grams <10, total sugar grams <10, fiber >3gm per serving.  6. Practice plate method: 1/2 plate lean/low fat protein source, vegetable/fruit, <25% of plate complex carbohydrates.  7. Practice eating off of smaller plates/bowls, chewing to applesauce consistency, taking 20-30 minutes to eat in a calm/relaxed environment without distractions of tv/email/cell phone.    Handouts Provided:  Eat Better, Move More, Live Well: Non-surgical Weight Loss Handout  Snack Matrix    Monitor/Evaluation:    Pt  will f/u in one month with bariatrician, and f/u in two months with RD.    Plan for next visit with RD:  GOALS:  1) focus on consistency with bfast protein (greek yogurt or protein shake)     Time In: 1:50p  Time Out: 2:30p      ABN signed: Yes

## 2021-06-18 NOTE — LETTER
Letter by Syeda Kidd MD at      Author: Syeda Kidd MD Service: -- Author Type: --    Filed:  Encounter Date: 1/7/2019 Status: (Other)       Katy Hong  [address]  [place] [address]             January 7, 2019         Dear Ms. Hong,    Below are the results from your recent visit:    Resulted Orders   Basic Metabolic Panel   Result Value Ref Range    Sodium 143 136 - 145 mmol/L    Potassium 4.1 3.5 - 5.0 mmol/L    Chloride 108 (H) 98 - 107 mmol/L    CO2 23 22 - 31 mmol/L    Anion Gap, Calculation 12 5 - 18 mmol/L    Glucose 97 70 - 125 mg/dL    Calcium 9.6 8.5 - 10.5 mg/dL    BUN 16 8 - 28 mg/dL    Creatinine 0.81 0.60 - 1.10 mg/dL    GFR MDRD Af Amer >60 >60 mL/min/1.73m2    GFR MDRD Non Af Amer >60 >60 mL/min/1.73m2    Narrative    Fasting Glucose reference range is 70-99 mg/dL per  American Diabetes Association (ADA) guidelines.   Vitamin D, Total (25-Hydroxy)   Result Value Ref Range    Vitamin D, Total (25-Hydroxy) 30.4 30.0 - 80.0 ng/mL    Narrative    Deficiency <10.0 ng/mL  Insufficiency 10.0-29.9 ng/mL  Sufficiency 30.0-80.0 ng/mL  Toxicity (possible) >100.0 ng/mL   Thyroid Cascade   Result Value Ref Range    TSH 0.92 0.30 - 5.00 uIU/mL         Katy,    It is my opinion that your vitamin D level is too low.  I prefer a level between 40 and 60.  I am uncertain how much you are taking on a daily basis at this point.  I think you should speak to Dr. Monge about this.  Other labs look okay.  You should be seen in no longer than 6 months for follow-up med check.    Please call with questions or contact us using [facility].    Sincerely,        Electronically signed by Syeda Kidd MD (Betsy)

## 2021-06-18 NOTE — PROGRESS NOTES
[facility] Daily Progress     Patient Name: Katy Hong  Date: 2018  Visit #: 18-18  Referral Diagnosis:  Fibromyalgia  Referring provider: Alexsandra Rossi,*  Visit Diagnosis:     ICD-10-CM    1. Fibromyalgia M79.7    2. General weakness R53.1    3. Decreased stamina R53.83          Assessment:     Patient is benefitting from skilled physical therapy and is making steady progress toward functional goals.  Patient is appropriate to continue with skilled physical therapy intervention, as indicated by initial plan of care.  Good technique with HEP during review today.    Patient did not experience any increase in symptoms after last visit and performing exercises at home.    Patient got very fatigued after walking outside with the challenges of inclines etc.  Patient will benefit from continued exercise to work on endurance and strength     Goal Status:  Pt. will be independent with home exercise program in : 12 weeks  Pt will: Able to walk outside or in the mall on even surfaces for 45-60 minutes within 12 weeks  Pt will: Able to ambulate up stairs with more ease as strength improves within 12 weeks  Pt will: Able to sit<>stand with more ease as leg strength improves within 12 weeks  Pt will: Able to get up from a squatting position, like from lower cupboards, with more ease within 12 weeks    Plan / Patient Education:                                     Strengthening for HEP                                Balance, proprioception, endurance, strengthening    In 1-2 weeks: assess floor to stand transfers      Subjective:     Pain Ratin, does have the usual FM pain    I was in the ER yesterday-I have a stone that may be passing.  I am unsure what I can do today, but I will do as much as I can.      Objective:   Barriers to Learning or Achieving Goals: Co-morbidities or other medical factors.  fibromyalgia, HTN, obesity, SOB    Functional limitations are described as  occurring with: walking, up stairs, kneeling or squatting, housework, unable to carry laundry up stairs, sit<>stand    Exercises:  Exercise #1: Nu-step   seat 6, arms 9, workload 4    5'  Comment #1: stretches: sitting hamstring, standing calf stretch, SKC, LTR, supine piriformis   hold 30 seconds x 1-3 reps  Exercise #2: recumbent bike   3'  Comment #2: sit to stands in many different chairs   Exercise #3: clamshells   X 20  Comment #3: bridges   10 seconds x 6-12 reps  Exercise #4: isometric hip add with pillow between knees;  hold 10 seconds X 10 reps  work up to 100% effort  Comment #4: UBE  3'  Exercise #5: Standing: hip abd, ext, flex, knee flex, mini squats   X 10-20 reps  Comment #5: toe raises:  10-20     Ambulate outside on sidewalks-uneven, inclines, declines, curbs, amb in grass, up a slight hill and down on the grass.  Stand by A   8 minutes (neuro re edu)    Pt requested to leave early due pain levels trying to pass her stone.    Treatment Today     TREATMENT MINUTES COMMENTS   Evaluation     Self-care/ Home management     Manual therapy     Neuromuscular Re-education 8 See ambulation outside-above   Therapeutic Activity     Therapeutic Exercises 32 See above or flow sheet   Gait training     Modality__________________                Total 40    Blank areas are intentional and mean the treatment did not include these items.       Ning Gotti, PT  5/16/2018

## 2021-06-18 NOTE — PROGRESS NOTES
[facility] Daily Progress     Patient Name: Katy Hong  Date: 2018  Visit #: 18-18  Referral Diagnosis:  Fibromyalgia  Referring provider: Alexsandra Rossi,*  Visit Diagnosis:     ICD-10-CM    1. Fibromyalgia M79.7    2. General weakness R53.1    3. Decreased stamina R53.83          Assessment:     Patient is benefitting from skilled physical therapy and is making steady progress toward functional goals.  Patient is appropriate to continue with skilled physical therapy intervention, as indicated by initial plan of care.  Good technique with HEP during review today.    Knees are still a limiting factor for her activity level.  [place] and feels that may have contributed to the pain.  See goal review  Goal Status:  Pt. will be independent with home exercise program in : 12 weeks  Pt will: Able to walk outside or in the mall on even surfaces for 45-60 minutes within 12 weeks  Pt will: Able to ambulate up stairs with more ease as strength improves within 12 weeks  Pt will: Able to sit<>stand with more ease as leg strength improves within 12 weeks  Pt will: Able to get up from a squatting position, like from lower cupboards, with more ease within 12 weeks     Maybe can walk 40 minutes.  Still need to work on the endurance level.  She is not where she wants to be  Stairs-knees are more of an issue currently  Getting in and out of a chair may be a little easier    Plan / Patient Education:                                     Strengthening for HEP                                Balance, proprioception, endurance, strengthening    In 1 week: assess floor to stand transfers      Subjective:     Pain Ratin, does have the usual FM pain        Knees are still sore today and have been since I was here last.  I think I am just going to feel that pain for awhile    Almost passed out from the heat yesterday    Objective:   Barriers to Learning  "or Achieving Goals: Co-morbidities or other medical factors.  fibromyalgia, HTN, obesity, SOB    Functional limitations are described as occurring with: walking, up stairs, kneeling or squatting, housework, unable to carry laundry up stairs, sit<>stand    Below is pt's ex program.  Some or all of the ex may be performed at any given session    Exercises:  Exercise #1: Nu-step   seat 7, arms 9, workload 6    5'  Comment #1: stretches: sitting hamstring, standing calf stretch, SKC, LTR, supine piriformis   hold 30 seconds x 1-3 reps  Exercise #2: recumbent bike   3'  Comment #2: sit to stands in many different chairs   Exercise #3: clamshells   X 20  Comment #3: bridges   10 seconds x 6-12 reps  Exercise #4: isometric hip add with pillow between knees;  hold 10 seconds X 10 reps  work up to 100% effort  Comment #4: UBE  3' forward and 3' backward  Exercise #5: Standing: hip abd, ext, flex, knee flex, mini squats   X 10-20 reps  Comment #5: toe raises:  10-20  Exercise #6: toe tap  8\" step  X 20, no hands   Comment #6: step up  8\" step  X 10 each leg  Exercise #7: step over and then back up the step  8\"  X 10 each  Comment #7: orange tband:  scapular row, shoulder ext, shoulder diagonal.  Work up to 5-20 reps  Exercise #8: TG  level 20, squats with ball between knees  X15     Ambulate within clinic (due to humidity outside) for 12 minutes working on endurance-there ex      Did not do floor to stand transfers due to knee pain    Treatment Today     TREATMENT MINUTES COMMENTS   Evaluation     Self-care/ Home management     Manual therapy     Neuromuscular Re-education     Therapeutic Activity     Therapeutic Exercises 55 See above or flow sheet  Walking in clinic   Gait training     Modality__________________                Total 55    Blank areas are intentional and mean the treatment did not include these items.       Ning Gotti, PT  5/29/2018    "

## 2021-06-19 NOTE — PROGRESS NOTES
" PROGRESS NOTE       SUBJECTIVE:  Katy Hong is a 72 y.o. female   Chief Complaint   Patient presents with     Headache     more frequent headaches, memory loss      Urinary Frequency     no fever or back ache, some burning    Katy is here today to discuss her headaches and memory issues.  She has had migraine his history since she was young woman.  She has used the injectable triptan's fairly well to control them.  In April of this year she described having increased frequency of headaches in the back of her head.  Her exam was normal and I advised her to use a heated rolled towel in the back of her neck.  She states that that helped for a while but now her headaches are becoming more frequent.  Her vision is getting slightly worse but is okay.  She still uses sumatriptan injections for migraine.  She is getting headaches more days than not.  Mostly in the back of her head.    Patient also describes memory issues which she has shared with me before.  She seems to forget \"normal things\" even in the middle of the sentence she has to stop and think about what she was trying to say.  Sometimes she cannot spell a word.  She has never been lost although she does not have good direction.  She tries to pay attention to details so that she can find her way.  She does have difficulty with names and word finding but always has.    What is different is that she is not sleeping.  She finds that she is tossing and turning and wakes frequently often only sleeping a half hour at a time.  This is been going on for the last 3-4 months.    In February when traveling in [facility] she was on an escalator.  She lost her balance falling backwards and hit the floor.  She does not recall hitting the floor but remembers getting to the top of the escalator.  Her  had assisted her getting up and back on the escalator.  She states her eyesight is getting worse but has not changed.    This patient has a long history of " hypertension which is well controlled.  She takes Lasix 20 mg daily and sometimes 40 mg depending on fluid retention.  She takes metoprolol XL 50 mg daily and losartan 25 mg daily.  She has hydrocodone acetaminophen 5/325 mg that she takes as needed severe headache.  She has been on gabapentin for a very long time.  She currently takes 1500 mg total per day.  She also takes Topamax 50 mg daily prescribed by Dr. Rossi for weight loss.      Patient Active Problem List   Diagnosis     Osteopenia     Insomnia     Vitamin D Deficiency     Lower Back Pain     Fibromyalgia     Essential Hypertension     Seborrheic Keratosis     Obesity     Lichen Sclerosus Et Atrophicus     Depression With Anxiety     Migraine Headache     Esophageal Reflux     Joint Pain, Localized In The Knee     Varicose Veins     Shortness Of Breath     Nonvenomous Insect Bite Of Shoulder     Cellulitis     Mammogram - Abnormal     Chronic Cutaneous Ulcer Venous Stasis     Chronic venous insufficiency     Foot pain (Soft Tissue)     Female stress incontinence     Calculus of ureter     Hydronephrosis with urinary obstruction due to ureteral calculus       Current Outpatient Prescriptions   Medication Sig Dispense Refill     albuterol (VENTOLIN HFA) 90 mcg/actuation inhaler Inhale 2 puffs every 6 (six) hours as needed for wheezing. 18 g 5     aspirin 81 MG EC tablet Take 81 mg by mouth daily.       calcium carbonate (OS-MONIQUE) 500 mg calcium (1,250 mg) chewable tablet Chew 2 tablets daily.        cholecalciferol, vitamin D3, (VITAMIN D3) 2,000 unit Tab Take 1 tablet by mouth daily.        clobetasol (TEMOVATE) 0.05 % Gel Apply to red rash daily until gone but no longer than two weeks 30 each 1     dimenhyDRINATE (DRAMAMINE) 50 MG tablet Take 50 mg by mouth at bedtime as needed.       fluticasone (FLONASE) 50 mcg/actuation nasal spray 1 spray into each nostril daily. 16 g 0     furosemide (LASIX) 20 MG tablet Take 1 tablet (20 mg total) by mouth daily.  (Patient taking differently: Take 20-40 mg by mouth every other day as needed. ) 90 tablet 1     halobetasol (ULTRAVATE) 0.05 % cream AFTER USE OF PSSORENT, APPLY TO LOWER LEGS DAILY X 3 WEEKS, THEN ONLY ON WED, SAT AND SUN  11     HYDROcodone-acetaminophen 5-325 mg per tablet Take 1 tablet by mouth every 6 (six) hours as needed for pain. 60 tablet 0     losartan (COZAAR) 25 MG tablet TAKE 1 TABLET (25 MG TOTAL) BY MOUTH DAILY. 90 tablet 1     metoprolol succinate (TOPROL-XL) 50 MG 24 hr tablet TAKE ONE TABLET BY MOUTH ONE TIME DAILY 90 tablet 3     omeprazole (PRILOSEC) 20 MG capsule TAKE 1 CAPSULE BY MOUTH 2 TIMES A DAY. 180 capsule 2     SUMAtriptan succinate 6 mg/0.5 mL PnIj INJECT 1 INJECTION AS DIRECTED IMMEDIATELY AT ONSET OF MIGRAINE HEADACHE 2 Syringe 3     topiramate (TOPAMAX) 50 MG tablet 1/2 tab with dinner x 1 week then 1 tab with dinner every day 90 tablet 1     venlafaxine (EFFEXOR) 50 MG tablet TAKE ONE TABLET BY MOUTH THREE TIMES DAILY 270 tablet 3     gabapentin (NEURONTIN) 300 MG capsule Take 1 capsule (300 mg total) by mouth at bedtime. 30 capsule 0     No current facility-administered medications for this visit.        History   Smoking Status     Never Smoker   Smokeless Tobacco     Never Used       REVIEW OF SYSTEMS:  Patient denies fever, chills, dizziness, visual change, cough, chest pain, shortness of breath, abdominal pain, extremity pain or swelling.          OBJECTIVE:       Vitals:    07/19/18 1346   BP: 140/90   Pulse:    Temp:      Weight: 198 lb (89.8 kg)  Wt Readings from Last 3 Encounters:   07/19/18 198 lb (89.8 kg)   06/20/18 196 lb 1.6 oz (89 kg)   06/06/18 195 lb (88.5 kg)     Body mass index is 36.81 kg/(m^2).        Physical Exam:  GENERAL APPEARANCE: A&A, NAD, well hydrated, well nourished  SKIN:  Normal skin turgor, no lesions/rashes   EARS: TM's normal, gray with nl light reflex  OROPHARYNX: without erythema, no post nasal drainage or thrush  NECK: Supple, without  lymphadenopathy, no thyroid mass  CV: RRR, no M/G/R   LUNGS: CTAB, normal respiratory effort  EXTREMITY: Extremities normal, atraumatic, no swelling  NEURO: no gross deficits   PSYCHIATRIC:  Mood appropriate, memory intact        ASSESSMENT/PLAN:     1. Frequent urination  Urine tests are normal.  - Urinalysis Macroscopic  - Culture, Urine    2. Migraine  Increased frequency, now with posterior HA after trauma this spring.  - HYDROcodone-acetaminophen 5-325 mg per tablet; Take 1 tablet by mouth every 6 (six) hours as needed for pain.  Dispense: 60 tablet; Refill: 0  - gabapentin (NEURONTIN) 300 MG capsule; Take 1 capsule (300 mg total) by mouth at bedtime.  Dispense: 30 capsule; Refill: 0    3. Memory loss  I would like for her to see Dr Robles, neurology in consultation for follow up headaches and memory loss.  We discussed imaging and will defer to Dr Robles.  In the meantime, I recommended decreasing the Gabapentin to 300 mg at night to see if the memory issue improves.        Patient Instructions   Cut back on Omeprazole to one daily, then may just take as needed.    Stop AM dose of Gabapentin, continue the nighttime 3 at bedtime.  (See if this helps with memory issues.)         Medications Discontinued During This Encounter   Medication Reason     zolpidem (AMBIEN) 5 MG tablet Therapy completed     gabapentin (NEURONTIN) 300 MG capsule      HYDROcodone-acetaminophen 5-325 mg per tablet Reorder         The visit lasted a total of 35 minutes face to face with the patient.  Over 50% of the time spent counseling and educating the patient about all of the above.      Elba Monge MD

## 2021-06-20 NOTE — LETTER
Letter by Skyler Wiggins CNP at      Author: Skyler Wiggins CNP Service: -- Author Type: --    Filed:  Encounter Date: 9/10/2020 Status: (Other)         [facility] FAMILY MEDICINE/OB  09/10/20    Patient: Katy Hong  YOB: 1945  Medical Record Number: [id number]                                                                  Opioid / Opioid Plus Controlled Substance Agreement    I understand that my care provider has prescribed an opioid (narcotic) controlled substance to help manage my condition(s). I am taking this medicine to help me function or work. I know this is strong medicine, and that it can cause serious side effects. Opioid medicine can be sedating, addicting and may cause a dependency on the drug. They can affect my ability to drive or think, and cause depression. They need to be taken exactly as prescribed. Combining opioids with certain medicines or chemicals (such as cocaine, sedatives and tranquilizers, sleeping pills, meth) can be dangerous or even fatal. Also, if I stop opioids suddenly, I may have severe withdrawal symptoms. Last, I understand that opioids do not work for all types of pain nor for all patients. If not helpful, I may be asked to stop them.        The risks, benefits, and side effects of these medicine(s) were explained to me. I agree that:    1. I will take part in other treatments as advised by my care team. This may be psychiatry or counseling, physical therapy, behavioral therapy, group treatment or a referral to a pain clinic. I will reduce or stop my medicine when my care team tells me to do so.  2. I will take my medicines as prescribed. I will not change the dose or schedule unless my care team tells me to. There will be no refills if I run out early.  I may be contactedwithout warning and asked to complete a urine drug test or pill count at any time.   3. I will keep all my appointments, and understand this is part of the monitoring of  opioids. My care team may require an office visit for EVERY opioid/controlled substance refill. If I miss appointments or dont follow instructions, my care team may stop my medicine.  4. I will not ask other providers to prescribe controlled substances, and I will not accept controlled substances from other people. If I need another prescribed controlled substance for a new reason, I will tell my care team within 1 business day.  5. I will use one pharmacy to fill all of my controlled substance prescriptions, and it is up to me to make sure that I do not run out of my medicines on weekends or holidays. If my care team is willing to refill my opioid prescription without a visit, I must request refills only during office hours, refills may take up to 3 days to process, and it may take up to 5 to 7 days for my medicine to be mailed and ready at my pharmacy. Prescriptions will not be mailed anywhere except my pharmacy.        [id number]  Rev 12/18         Registration to scan to EHR                             Page 1 of 2               Controlled Substance Agreement Opioid        [facility] FAMILY MEDICINE/OB  09/10/20  Patient: Katy Hong  YOB: 1945  Medical Record Number: [id number]                                                                  6. I am responsible for my prescriptions. If the medicine/prescription is lost or stolen, it will not be replaced. I also agree not to share controlled substance medicines with anyone.  7. I agree to not use ANY illegal or recreational drugs. This includes marijuana, cocaine, bath salts or other drugs. I agree not to use alcohol unless my care team says I may.          I agree to give urine samples whenever asked. If I dont give a urine sample, the care team may stop my medicine.    8. If I enroll in the [place] Medical Marijuana program, I will tell my care team. I will also sign an agreement to share my medical records with my care team.   9. I  will bring in my list of medicines (or my medicine bottles) each time I come to the clinic.   10. I will tell my care team right away if I become pregnant or have a new medical problem treated outside of my regular clinic.  11. I understand that this medicine can affect my thinking and judgment. It may be unsafe for me to drive, use machinery and do dangerous tasks. I will not do any of these things until I know how the medicine affects me. If my dose changes, I will wait to see how it affects me. I will contact my care team if I have concerns about medicine side effects.    I understand that if I do not follow any of the conditions above, my prescriptions or treatment may be stopped.      I agree that my provider, clinic care team, and pharmacy may work with any city, state or federal law enforcement agency that investigates the misuse, sale, or other diversion of my controlled medicine. I will allow my provider to discuss my care with or share a copy of this agreement with any other treating provider, pharmacy or emergency room where I receive care. I agree to give up (waive) any right of privacy or confidentiality with respect to these consents.     I have read this agreement and have asked questions about anything I did not understand.      ________________________________________________________________________  Patient signature - Date/Time -  Katy Hong                                      ________________________________________________________________________  Witness signature                                                            ________________________________________________________________________  Provider signature - Skyler Wiggins, CNP      [id number]  Rev 12/18         Registration to scan to EHR                         Page 2 of 2                   Controlled Substance Agreement Opioid           Page 1 of 2  Opioid Pain Medicines (also known as Narcotics)  What You Need to Know    What  are opioids?   Opioids are pain medicines that must be prescribed by a doctor.  They are also known as narcotics.    Examples are:     morphine (MS Contin, Orly)    oxycodone (Oxycontin)    oxycodone and acetaminophen (Percocet)    hydrocodone and acetaminophen (Vicodin, Norco)     fentanyl patch (Duragesic)     hydromorphone (Dilaudid)     methadone     What do opioids do well?   Opioids are best for short-term pain after a surgery or injury. They also work well for cancer pain. Unlike other pain medicines, they do not cause liver or kidney failure or ulcers. They may help some people with long-lasting (chronic) pain.     What do opioids NOT do well?   Opioids never get rid of pain entirely, and they do not work well for most patients with chronic pain. Opioids do not reduce swelling, one of the causes of pain. They also dont work well for nerve pain.                           For informational purposes only.  Not to replace the advice of your care provider.  Copyright [facility]. All right reserved. [employer] [id number] 02/18.      Page 2 of 2    Risks and side effects   Talk to your doctor before you start or decide to keep taking one of these medicines. Side effects include:    Lowering your breathing rate enough to cause death    Overdose, including death, especially if taking higher than prescribed doses    Long-term opioid use    Worse depression symptoms; less pleasure in things you usually enjoy    Feeling tired or sluggish    Slower thoughts or cloudy thinking    Being more sensitive to pain over time; pain is harder to control    Trouble sleeping or restless sleep    Changes in hormone levels (for example, less testosterone)    Changes in sex drive or ability to have sex    Constipation    Unsafe driving    Itching and sweating    Feeling dizzy    Nausea, vomiting and dry mouth    What else should I know about opioids?  When someone takes opioids for too long or too often, they become  dependent. This means that if you stop or reduce the medicine too quickly, you will have withdrawal symptoms.    Dependence is not the same as addiction. Addiction is when people keep using a substance that harms their body, their mind or their relations with others. If you have a history of drug or alcohol abuse, taking opioids can cause a relapse.    Over time, opioids dont work as well. Most people will need higher and higher doses. The higher the dose, the more serious the side effects. We dont know the long-term effects of opioids.      Prescribed opioids aren't the best way to manage chronic pain    Other ways to manage pain include:      Ibuprofen or acetaminophen.  You should always try this first.      Treat health problems that may be causing pain.      acupuncture or massage, deep breathing, meditation, visual imagery, aromatherapy.      Use heat or ice at the pain site      Physical therapy and exercise      Stop smoking      See a counselor or therapist                                                  People who have used opioids for a long time may have a lower quality of life, worse depression, higher levels of pain and more visits to doctors.    Never share your opioids with others. Be sure to store opioids in a secure place, locked if possible.Young children can easily swallow them and overdose.     You can overdose on opioids.  Signs of overdose include decrease or loss of consciousness, slowed breathing, trouble waking and blue lips.  If someone is worried about overdose, they should call 911.    If you are at risk for overdose, you may get naloxone (Narcan, a medicine that reverses the effects of opioids.  If you overdose, a friend or family member can give you Narcan while waiting for the ambulance.  They need to know the signs of overdose and how to give Narcan.    While you're taking opioids:    Don't use alcohol or street drugs. Taking them together can cause death.    Don't take any of these  medicines unless your doctor says its okay.  Taking these with opioids can cause death.    Benzodiazepines (such as lorazepam         or diazepam)    Muscle relaxers (such as cyclobenzaprine)    sleeping pills    other opioids    Safe disposal of opioids  Find your area drug take-back program, your pharmacy mail-back program, buy a special disposal bag (such as Deterra) from your pharmacy or flush them down the toilet.  Use the guidelines at:  www.fda.gov/drugs/resourcesforyou

## 2021-06-20 NOTE — LETTER
Letter by Leonora Kerr NP at      Author: Leonora Kerr NP Service: -- Author Type: --    Filed:  Encounter Date: 1/27/2020 Status: (Other)                    My Depression Action Plan  Name: Katy Hong   Date of Birth 1945  Date: 1/27/2020    My Doctor: Leonora Kerr NP   My Clinic: [facility] FAMILY MEDICINE/OB  [address]  [place] [address]  [facility] [address]  [phone]          GREEN    ZONE   Good Control    What it looks like:     Things are going generally well. You have normal ups and downs. You may even feel depressed from time to time, but bad moods usually last less than a day.   What you need to do:  1. Continue to care for yourself (see self care plan)  2. Check your depression survival kit and update it as needed  3. Follow your physicians recommendations including any medication.  4. Do not stop taking medication unless you consult with your physician first.           YELLOW         ZONE Getting Worse    What it looks like:     Depression is starting to interfere with your life.     It may be hard to get out of bed; you may be starting to isolate yourself from others.    Symptoms of depression are starting to last most all day and this has happened for several days.     You may have suicidal thoughts but they are not constant.   What you need to do:     1. Call your care team. Your response to treatment will improve if you keep your care team informed of your progress. Yellow periods are signs an adjustment may need to be made.     2. Continue your self-care.  Just get dressed and ready for the day.  Don't give yourself time to talk yourself out of it.    3. Talk to someone in your support network.    4. Open up your depression Depression Self-Care Plan / Wellness kit.           RED    ZONE Medical Alert - Get Help    What it looks like:     Depression is seriously interfering with your life.     You may experience these or  other symptoms: You cant get out of bed most days, cant work or engage in other necessary activities, you have trouble taking care of basic hygiene, or basic responsibilities, thoughts of suicide or death that will not go away, self-injurious behavior.     What you need to do:  1. Call your care team and request a same-day appointment. If they are not available (weekends or after hours) call your local crisis line, emergency room or 911.            Self-Care Plan / Wellness Kit    Self-Care for Depression  Heres the deal. Your body and mind are really not as separate as most people think.  What you do and think affects how you feel and how you feel influences what you do and think. This means if you do things that people who feel good do, it will help you feel better.  Sometimes this is all it takes.  There is also a place for medication and therapy depending on how severe your depression is, so be sure to consult with your medical provider and/ or Behavioral Health Consultant if your symptoms are worsening or not improving.     In order to better manage my stress, I will:    Exercise  Get some form of exercise, every day. This will help reduce pain and release endorphins, the feel good chemicals in your brain. This is almost as good as taking antidepressants!  This is not the same as joining a gym and then never going! (they count on that by the way?) It can be as simple as just going for a walk or doing some gardening, anything that will get you moving.      Hygiene   Maintain good hygiene (get out of bed in the morning, make your bed, brush your teeth, take a shower, and get dressed like you were going to work, even if you are unemployed).  If your clothes don't fit try to get ones that do.    Diet  Strive to eat foods that are good for me, drink plenty of water, and avoid excessive sugar, caffeine, alcohol, and other mood-altering substances.  Some foods that are helpful in depression are: complex carbohydrates,  B vitamins, flaxseed, fish or fish oil, fresh fruits and vegetables.    Psychotherapy  Agree to participate in Individual Therapy (if recommended).    Medication  If prescribed medications, I agree to take them.  Missing doses can result in serious side effects.  I understand that drinking alcohol, or other illicit drug use, may cause potential side effects.  I will not stop my medication abruptly without first discussing it with my provider.    Staying Connected With Others  Stay in touch with my friends, family members, and my primary care provider/team.    Use your imagination  Be creative.  We all have a creative side; it doesnt matter if its oil painting, sand castles, or mud pies! This will also kick up the endorphins.    Witness Beauty  (AKA stop and smell the roses) Take a look outside, even in mid-winter. Notice colors, textures. Watch the squirrels and birds.     Service to others  Be of service to others.  There is always someone else in need.  By helping others we can get out of ourselves and remember the really important things.  This also provides opportunities for practicing all the other parts of the program.    Humor  Laugh and be silly!  Adjust your TV habits for less news and crime-drama and more comedy.    Control your stress  Try breathing deep, massage therapy, biofeedback, and meditation. Find time to relax each day.     Crisis Text Line  http://www.crisistextline.org    The Crisis Text Line serves anyone, in any type of crisis, providing access to free, 24/7 support and information via the medium people already use and trust:    Here's how it works:  1.  Text 587-624 from anywhere in the [place], anytime, about any type of crisis.  2.  A live, trained Crisis Counselor receives the text and responds quickly.  3.  The volunteer Crisis Counselor will help you move from a 'hot moment to a cool moment'.  My support system    Clinic Contact:  Phone number:    Contact 1:  Phone number:    Contact 2:   Phone number:    Confucianism/:  Phone number:    Therapist:  Phone number:    [facility] center:    Phone number:    Other community support:  Phone number:
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
## 2021-06-21 NOTE — PROGRESS NOTES
Chief Complaint   Patient presents with     Cough     Pt c/o coughing keeping her awake at night, chills and fever x 7 days     Urinary Tract Infection     Pt c/o burning when urination, when she coughs urine leaks out and burns.        HPI: Very pleasant 73-year-old female presents with a day history of cough and congestion with occasional sputum production.  This is in the context of also having mild burning with urination.  She had a past upper respiratory tract infection which was similar, in her view, which resulted in pneumonia.    She has a history of incontinence and has been seen by urology but is not willing to start medication yet.    ROS: No fever vomiting or diarrhea.    SH:    reports that  has never smoked. she has never used smokeless tobacco. She reports that she drinks alcohol. She reports that she does not use drugs.      FH: The Patient's family history includes Alzheimer's disease in her father; Arthritis in her maternal grandmother and mother; Breast cancer in her cousin; Breast cancer (age of onset: 60) in her maternal aunt; Diabetes in her father; Hypertension in her father, mother, and sister; Obesity in her brother, maternal aunt, and maternal uncle; Osteopenia in her sister; Varicose Veins in her brother.     Meds:  Katy has a current medication list which includes the following prescription(s): albuterol, aspirin, calcium carbonate, cholecalciferol (vitamin d3), clobetasol, dimenhydrinate, fluticasone, furosemide, gabapentin, halobetasol, hydrocodone-acetaminophen, losartan, metoprolol succinate, omeprazole, penicillin vk, sumatriptan succinate, topiramate, and venlafaxine.    O:  /64   Pulse 92   Temp 98.2  F (36.8  C)   Resp 16   Wt 191 lb (86.6 kg)   LMP 09/29/1993   SpO2 98%   BMI 35.50 kg/m     Constitutional:    --Vitals as above  --No acute distress  Eyes-  --Sclera noninjected  --Lids and conjunctiva normal  ENT-  --TMs clear  --Sclera noninjected  --Pharynx  erythematous  Neck-  --Neck supple    Lymph-  --No cervical lymphadenopathy  Lungs-  --Clear to Auscultation  Heart-  --Regular rate and rhythm  Skin-  --Pink and dry    UA positive      A/P:   1. Urinary tract infection  Her urine came back positive and patient is currently being treated with Pen-Vee K.  It only is trace blood so will monitor and continue on antibiotics.  Recommend follow-up with urology.  - Urinalysis    2. Upper respiratory tract infection, unspecified type  Given the patient's fragile nature and her past history of prolonged upper respiratory infections we will treat conservatively with Pen-Vee K, rest and return if not improving.  - penicillin VK (PEN VK) 500 MG tablet; Take 1 tablet (500 mg total) by mouth 4 (four) times a day for 10 days.  Dispense: 40 tablet; Refill: 0  - codeine-guaiFENesin (GUAIFENESIN AC)  mg/5 mL liquid; 2 TSP Q 6H PRN cough-advise pt not to drive while taking medication..  Dispense: 120 mL; Refill: 0

## 2021-06-21 NOTE — PROGRESS NOTES
[facility] Non Surgical Follow up Visit   Date of visit: 11/6/2018  Physician: Alexsandra Rossi MD  Primary Care is Elba Monge MD.  Katy Hong   73 y.o.  female    Initial Weight: 201#  Initial BMI: 37.4  Today's Weight:   Wt Readings from Last 1 Encounters:   11/06/18 191 lb 11.2 oz (87 kg)     Body mass index is 35.63 kg/(m^2).  Weight: 191 lb 11.2 oz (87 kg)     Assessment and Plan   Assessment: Katy is a 73 y.o. year old female who presents for medical weight management.      Plan:    1. Obesity, unspecified  Patient was congratulated on her success thus far. Healthy habits to assist with further weight loss were discussed. Written information was given. She will continue to take the topamax. We will try increasing the dose to 100 mg. She has made many healthy changes in her habits    2. Essential hypertension  This should improve with healthy habits and weight loss      Follow up with myself for 3 months.         INTERIM HISTORY  Patient is tolerating the topamax without side effects. Her house is under construction until April. She has a small kitchen but this has been challenging for her.     DIETARY HISTORY  Meals Per Day: 3  Eating Protein First?: yes  Food Diary: B:eggs and piece of toast L:fruit and sandwich with lunch meat or protein shake D:meat and veggies or potatoes  Snacks Per Day: occasionally  Typical Snack: fruit  Fluid Intake: not enough, bladder issues  Portion Control: good  Calorie Containing Beverages: protein shake, soda once a week  Typical Protein Food Choices: lunch meat, eggs, protein shake  Choosing Whole Grains: yes  Meals at Restaurant per week:2  Eating at the Table: not asked  TV is Off During Meals: not asked    Positive Changes Since Last Visit: protein first, some exercise, decreased snacking  Struggling With: water intake (bladder issues), some frustration    Knowledgeable in Reading Food Labels: yes  Getting Adequate Protein: es  Sleeping 7-8  hours/day : 7-8  Stress management : used to eat, now tries to read a book or crossword puzzle    PHYSICAL ACTIVITY PATTERNS:  Cardiovascular: walks on the treadmill a few minutes 2-3 times per week  Strength Training: physical therapy exercise    REVIEW OF SYSTEMS  GENERAL/CONSTITUTIONAL:  Fatigue: yes  HEENT:  Vision changes, glaucoma: no  CARDIOVASCULAR:  Chest Pain with Exertion: no  PULMONARY:  Dyspnea on exertion: yes  NEUROLOGIC:  Paresthesias: no  PSYCHIATRIC:  Moods: a little down  MUSCULOSKELETAL/RHEUMATOLOGIC  Arthralgias: yes  Myalgias: yes  ENDOCRINE:  Monitoring Blood Sugars: na  Sugars Well Controlled: na       Patient Profile   Social History     Social History Narrative    5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          Past Medical History   Past Medical History:   Diagnosis Date     Acute Sinusitis     Created by Conversion      Breast cyst      Cardiomyopathy     Created by Conversion      Cellulitis     Created by Conversion      Chronic Cutaneous Ulcer Venous Stasis     Created by Conversion      Contusion With Intact Skin Surface     Created by Conversion      Depression With Anxiety     Created by Conversion      Epidermal Inclusion Cyst     Created by Conversion      Esophageal reflux     Created by Conversion      Essential Hypertension     Created by Conversion      Fibromyalgia     Created by Conversion      Insomnia     Created by Conversion      Joint Pain, Localized In The Knee     Created by Conversion      Lichen Sclerosus Et Atrophicus     Created by Conversion      Limb Pain     Created by Conversion      Lower Back Pain     Created by Conversion      Mammogram - Abnormal     Created by Conversion      Migraine Headache     Created by Conversion      Myocardial infarction (H)     per EKG's since      Nonvenomous Insect Bite Of Shoulder     Created by Conversion      Obesity     Created by Conversion      Open Wound Of The Lip     Created by Conversion      Osteopenia     Created by  Conversion      Sebaceous Hyperplasia     Created by Conversion      Seborrheic Keratosis     Created by Conversion      Shortness of breath     Created by Conversion      Varicose Veins     Created by Conversion      Vitamin D Deficiency     Created by Conversion      Patient Active Problem List   Diagnosis     Osteopenia     Insomnia     Vitamin D Deficiency     Lower Back Pain     Fibromyalgia     Essential Hypertension     Seborrheic Keratosis     Obesity     Lichen Sclerosus Et Atrophicus     Depression With Anxiety     Migraine Headache     Esophageal Reflux     Joint Pain, Localized In The Knee     Varicose Veins     Shortness Of Breath     Nonvenomous Insect Bite Of Shoulder     Cellulitis     Mammogram - Abnormal     Chronic Cutaneous Ulcer Venous Stasis     Chronic venous insufficiency     Foot pain (Soft Tissue)     Female stress incontinence     Calculus of ureter     Hydronephrosis with urinary obstruction due to ureteral calculus     Current Outpatient Prescriptions   Medication Sig Note     albuterol (VENTOLIN HFA) 90 mcg/actuation inhaler Inhale 2 puffs every 6 (six) hours as needed for wheezing.      aspirin 81 MG EC tablet Take 81 mg by mouth daily.      calcium carbonate (OS-MONIQUE) 500 mg calcium (1,250 mg) chewable tablet Chew 2 tablets daily.       cholecalciferol, vitamin D3, (VITAMIN D3) 2,000 unit Tab Take 1 tablet by mouth daily.       clobetasol (TEMOVATE) 0.05 % Gel Apply to red rash daily until gone but no longer than two weeks      dimenhyDRINATE (DRAMAMINE) 50 MG tablet Take 50 mg by mouth at bedtime as needed.      fluticasone (FLONASE) 50 mcg/actuation nasal spray 1 spray into each nostril daily.      furosemide (LASIX) 20 MG tablet Take 1 tablet (20 mg total) by mouth daily. (Patient taking differently: Take 20-40 mg by mouth every other day as needed. )      gabapentin (NEURONTIN) 300 MG capsule Take 1 capsule (300 mg total) by mouth at bedtime.      halobetasol (ULTRAVATE) 0.05 %  "cream AFTER USE OF PSSORENT, APPLY TO LOWER LEGS DAILY X 3 WEEKS, THEN ONLY ON WED, SAT AND SUN 3/5/2018: Received from: External Pharmacy     HYDROcodone-acetaminophen 5-325 mg per tablet Take 1 tablet by mouth every 6 (six) hours as needed for pain.      losartan (COZAAR) 25 MG tablet TAKE 1 TABLET (25 MG TOTAL) BY MOUTH DAILY.      metoprolol succinate (TOPROL-XL) 50 MG 24 hr tablet TAKE ONE TABLET BY MOUTH ONE TIME DAILY      omeprazole (PRILOSEC) 20 MG capsule TAKE 1 CAPSULE BY MOUTH 2 TIMES A DAY.      SUMAtriptan succinate 6 mg/0.5 mL PnIj INJECT 1 INJECTION AS DIRECTED IMMEDIATELY AT ONSET OF MIGRAINE HEADACHE      topiramate (TOPAMAX) 50 MG tablet 1 tab with dinner every day      venlafaxine (EFFEXOR) 50 MG tablet TAKE ONE TABLET BY MOUTH THREE TIMES DAILY        Past Surgical History  She has a past surgical history that includes pr total abdom hysterectomy; Breast cyst aspiration; Breast biopsy; Tubal ligation; Appendectomy; Temporomandibular joint surgery (Bilateral); and Cataract extraction, bilateral.     Examination   /81 (Patient Site: Right Arm, Patient Position: Sitting, Cuff Size: Adult Regular)  Pulse 67  Ht 5' 1.5\" (1.562 m)  Wt 191 lb 11.2 oz (87 kg)  LMP 09/29/1993  SpO2 98%  Breastfeeding? No  BMI 35.63 kg/m2  Height: 5' 1.5\" (1.562 m) (11/6/2018  9:27 AM)  Initial Weight: 201 lbs (6/6/2018  1:00 PM)  Weight: 191 lb 11.2 oz (87 kg) (11/6/2018  9:27 AM)  Weight loss from initial: 6 (6/6/2018  1:00 PM)  % Weight loss: 2.99 % (6/6/2018  1:00 PM)  BMI (Calculated): 35.6 (11/6/2018  9:27 AM)  SpO2: 98 % (11/6/2018  9:27 AM)  Waist Circumference (In): 44 Inches (4/25/2018  1:39 PM)  Hip Circumference (In): 51 Inches (4/25/2018  1:39 PM)  Neck Circumference (In): 12.5 Inches (4/25/2018  1:39 PM)  General:  Alert and ambulatory, NAD  HEENT:  No conjunctival pallor, moist mucous Membranes, neck is without LAD  Pulmonary:  Normal respiratory effort, no cough, no audible " wheezes/crackles.  CV:  Regular rate and Rhythm, no murmurs  Abdominal: BS normal,soft, NT without rebound or guarding  Pscyh/Mood: stable         Counseling:   We reviewed the important post op bariatric recommendations:  -eating 3 meals daily  -eating protein first, getting >60gm protein daily  -eating slowly, chewing food well  -avoiding/limiting calorie containing beverages  -limiting starchy vegetables and carbohydrates, choosing wheat, not white with breads,   crackers, pastas, bear, bagels, tortillas, rice  -limiting restaurant or cafeteria eating to twice a week or less    We discussed the importance of restorative sleep and stress management in maintaining a healthy weight.  We discussed the National Weight Control Registry healthy weight maintenance strategies and ways to optimize metabolism.  We discussed the importance of physical activity including cardiovascular and strength training in maintaining a healthier weight.    > 25 min spent with patient, > 50% spent in counseling         LATISHA Rossi MD  [facility].    Much or all of the text in this note was generated through the use of Dragon Dictate voice-to-text software. Errors in spelling or words which seem out of context are unintentional. Sound alike errors, in particular, may have escaped editing.

## 2021-06-22 NOTE — PROGRESS NOTES
"Chief Complaint   Patient presents with     Fall     11/29/18 - still has nose, eye, and cheek pain     Sore Throat     sill has a sore throat       HPI: This very pleasant 73-year-old yet fragile female presents today with low-grade fever, bilateral maxillary sinus pain, and rhinorrhea for the past 2 weeks.  She was seen approximately 2 weeks ago and was given Pen-Vee K and felt that she improved but now symptoms are getting worse.    In addition she fell 2 weeks ago at [employer] after mechanical fall tripping on a rug.  She struck her face and broke her glasses.  She is almost completely recovered from that.    ROS: No vomiting diarrhea or rashes.  Minimal cough.    SH:    reports that  has never smoked. she has never used smokeless tobacco. She reports that she drinks alcohol. She reports that she does not use drugs.      FH: The Patient's family history includes Alzheimer's disease in her father; Arthritis in her maternal grandmother and mother; Breast cancer in her cousin; Breast cancer (age of onset: 60) in her maternal aunt; Diabetes in her father; Hypertension in her father, mother, and sister; Obesity in her brother, maternal aunt, and maternal uncle; Osteopenia in her sister; Varicose Veins in her brother.     Meds:  Katy has a current medication list which includes the following prescription(s): albuterol, aspirin, calcium carbonate, cholecalciferol (vitamin d3), fluticasone, furosemide, gabapentin, halobetasol, hydrocodone-acetaminophen, losartan, metoprolol succinate, omeprazole, sumatriptan succinate, topiramate, venlafaxine, amoxicillin-clavulanate, clobetasol, codeine-guaifenesin, and dimenhydrinate.    O:  /88 (Patient Site: Right Arm, Patient Position: Sitting, Cuff Size: Adult Regular)   Pulse 72   Ht 5' 1.5\" (1.562 m)   Wt 190 lb 14.4 oz (86.6 kg)   LMP 09/29/1993   BMI 35.49 kg/m     Constitutional:    --Vitals as above  --No acute distress  Eyes-  --Sclera noninjected  --Lids and " conjunctiva normal  ENT-  --TMs clear  --Sclera noninjected  --Pharynx mildly erythematous with PND  - Maxillary sinus pain bilaterally to palpation  Neck-  --Neck supple    Lymph-  --mild cervical lymphadenopathy  Lungs-  --Clear to Auscultation  Heart-  --Regular rate and rhythm  Skin-  --Pink and dry          A/P:   1. Acute non-recurrent maxillary sinusitis  Recommended increased sinus irrigation, rest and over-the-counter analgesics  - amoxicillin-clavulanate (AUGMENTIN) 875-125 mg per tablet; Take 1 tablet by mouth 2 (two) times a day for 14 days.  Dispense: 28 tablet; Refill: 0    2.  Facial pain  -Most likely sinusitis related and not trauma related given the fact that she has no other signs of ecchymosis.  Over-the-counter analgesics as needed.

## 2021-06-24 NOTE — PROGRESS NOTES
Chief Complaint   Patient presents with     Cough     This started about 1.5 weeks ago but has been going on all winter. Keeping her up at night. Non productive.      Nasal Congestion     Ear Problem     Buzzing and plugged for a while now.      HPI: Patient presents today with complaints of buzzing in her ears present for the past year.  No pain, drainage, or trauma.  She does take a baby aspirin a day and that is it.  She does note she will have occasional dizziness off and on but no severe vertigo symptoms causing her to fall down.  No vision changes.  She does have some mild sinus congestion getting out clear drainage for the past week.  Sometimes she will feel pressure in her ears but not severe.  She does use Q-tips only on the outside of her ears.  Recent testing of her thyroid function and metabolic panel all came back normal.    She also notes that she has been dealing with a persistent cough present for the past several months.  She says that she will get these coughing jags that occur for multiple weeks at a time and she has no identifiable triggers.  No diagnosis of asthma.  Non-smoker.  She does note a little bit of wheezing.  She continues using fluticasone nasal spray daily along with omeprazole daily.  No chest pain or sour taste in the mouth.  Afebrile.  Her albuterol does help a little bit.  She denies hemoptysis.    ROS:Review of Systems - History obtained from the patient  General ROS: negative  Ophthalmic ROS: negative  ENT ROS: positive for - hearing change  Hematological and Lymphatic ROS: negative  Respiratory ROS: positive for - cough  Cardiovascular ROS: negative  Neurological ROS: negative    SH: The Patient's  reports that  has never smoked. she has never used smokeless tobacco. She reports that she drinks alcohol. She reports that she does not use drugs.      FH: The Patient's family history includes Alzheimer's disease in her father; Arthritis in her maternal grandmother and mother;  Breast cancer in her cousin; Breast cancer (age of onset: 60) in her maternal aunt; Diabetes in her father; Hypertension in her father, mother, and sister; Obesity in her brother, maternal aunt, and maternal uncle; Osteopenia in her sister; Varicose Veins in her brother.     Meds:    Current Outpatient Medications on File Prior to Visit   Medication Sig Dispense Refill     albuterol (VENTOLIN HFA) 90 mcg/actuation inhaler Inhale 2 puffs every 6 (six) hours as needed for wheezing. 18 g 5     aspirin 81 MG EC tablet Take 81 mg by mouth daily.       calcium carbonate (OS-MONIQUE) 500 mg calcium (1,250 mg) chewable tablet Chew 2 tablets daily.        cholecalciferol, vitamin D3, (VITAMIN D3) 2,000 unit Tab Take 1 tablet by mouth daily.        fluticasone (FLONASE) 50 mcg/actuation nasal spray 1 spray into each nostril daily. 16 g 0     furosemide (LASIX) 20 MG tablet Take 1 tablet (20 mg total) by mouth daily. (Patient taking differently: Take 20-40 mg by mouth every other day as needed. ) 90 tablet 1     gabapentin (NEURONTIN) 300 MG capsule Take 1 capsule (300 mg total) by mouth at bedtime. (Patient taking differently: Take 600 mg by mouth at bedtime as needed.       ) 30 capsule 0     losartan (COZAAR) 25 MG tablet TAKE 1 TABLET (25 MG TOTAL) BY MOUTH DAILY. 90 tablet 1     omeprazole (PRILOSEC) 20 MG capsule TAKE 1 CAPSULE BY MOUTH 2 TIMES A DAY. (Patient taking differently: TAKE 1 CAPSULE BY MOUTH EVERY DAY) 180 capsule 3     topiramate (TOPAMAX) 100 MG tablet 1 tab with dinner every day 90 tablet 1     triamcinolone (KENALOG) 0.1 % ointment Apply topically 2 (two) times a day. 30 g 0     venlafaxine (EFFEXOR) 50 MG tablet TAKE ONE TABLET BY MOUTH THREE TIMES DAILY 270 tablet 3     HYDROcodone-acetaminophen 5-325 mg per tablet Take 1 tablet by mouth every 6 (six) hours as needed for pain. 60 tablet 0     SUMAtriptan succinate 6 mg/0.5 mL PnIj INJECT 1 INJECTION AS DIRECTED IMMEDIATELY AT ONSET OF MIGRAINE HEADACHE 2  "Syringe 3     No current facility-administered medications on file prior to visit.        O:  /70   Pulse 78   Temp 97.8  F (36.6  C) (Oral)   Ht 5' 2\" (1.575 m)   Wt 184 lb (83.5 kg)   LMP 09/29/1993   SpO2 97%   Breastfeeding? No   BMI 33.65 kg/m      Physical Examination:   General appearance - alert, well appearing, and in no distress  Mental status - alert, oriented to person, place, and time  Eyes - pupils equal and reactive, extraocular eye movements intact  Ears - bilateral TM's and external ear canals normal  Nose - normal and patent, no erythema, discharge or polyps  Mouth - mucous membranes moist, pharynx normal without lesions  Neck - supple, no significant adenopathy  Lymphatics - no palpable lymphadenopathy, no hepatosplenomegaly  Chest - clear to auscultation, no wheezes, rales or rhonchi, symmetric air entry  Heart - normal rate and regular rhythm, S1 and S2 normal, no murmurs noted  Neurological - alert, oriented, normal speech, no focal findings or movement disorder noted, neck supple without rigidity, cranial nerves II through XII intact, motor and sensory grossly normal bilaterally, normal muscle tone, no tremors, strength 5/5  Extremities - peripheral pulses normal, no pedal edema, no clubbing or cyanosis  Skin - normal coloration and turgor, no rashes, no suspicious skin lesions noted      A/P:     Problem List Items Addressed This Visit     None      Visit Diagnoses     Cough    -  Primary    Relevant Medications    codeine-guaiFENesin (CODEINE-GUAIFENESIN)  mg/5 mL liquid    fluticasone (FLOVENT HFA) 110 mcg/actuation inhaler    Tinnitus of both ears        Relevant Orders    Ambulatory referral to ENT            1. Cough  Responded well to cough medicine.  Refill sent to the pharmacy for use at night.  Reminded about risk of alcohol interaction and no driving.  Trial of Flovent sent to the pharmacy as well.  Reminded to rinse and spit after use.  If her symptoms fail to " improve with this, we will send her to pulmonary medicine.      - codeine-guaiFENesin (CODEINE-GUAIFENESIN)  mg/5 mL liquid; Take 5 mL by mouth 3 (three) times a day as needed.  Dispense: 236 mL; Refill: 0  - fluticasone (FLOVENT HFA) 110 mcg/actuation inhaler; Inhale 2 puffs 2 (two) times a day.  Dispense: 1 Inhaler; Refill: 2    2. Tinnitus of both ears  External ear structure normal.  Normal neuro exam.  Suspect Ménière's disease.  Referred to ENT for further testing and possible formal audiology testing.    - Ambulatory referral to ENT        Skyler Wiggins, CNP

## 2021-06-24 NOTE — PATIENT INSTRUCTIONS - HE
I've sent in a prescription for that flovent inhaler that you can take twice a day every day.  Hopefully this will help with your cough.  Rinse and spit after using it. I've also sent in a refill of the cough syrup.    If the cough won't let up despite this, we can connect you with pulmonary medicine.    Let's connect you with ENT to address this ear buzzing issue.    Thank you for coming in today!    If you receive a survey from [employer] about your experience today, it would be very helpful if you could fill it out to let us know what went well and what we can improve!    General Information:    Today you had your appointment with Skyler Wiggins NP    My hours are:    Monday : Out of clinic  Tuesday : 8:00AM - 5:00 PM  Wednesday: 8:00AM - 5:00 PM  Thursday: 8:00AM - 5:00 PM  Friday: 8:00AM - 5:00 PM    I am not in the office Mondays. Therefore non-urgent calls and medical messages received on Monday will be addressed when I am back in the office on Tuesday. Urgent matters will be reviewed and addressed by one of my partners in the office as needed.    If lab work was done today as part of your evaluation you will generally be contacted via [employer], mail, or phone with the results within 1-5 days. If there is an alarming result we will contact you by phone. Lab results come back at varying times, I generally wait until all lab results are available before making comments on the results.     If you need refills please contact your pharmacy. They will send a refill request to me to review. Please allow 3-5 business days for us to process all refill requests.     My Clinical Assistant is Yazmin. Please call us at [phone] or send a medical message with any questions or concerns.

## 2021-06-24 NOTE — TELEPHONE ENCOUNTER
RN cannot approve Refill Request    RN can NOT refill this medication med is not covered by policy/route to provider     . Last office visit: 2/26/2019 Skyler Wiggins CNP Last Physical: Visit date not found Last MTM visit: Visit date not found Last visit same specialty: 2/26/2019 Skyler Wiggins CNP.  Next visit within 3 mo: Visit date not found  Next physical within 3 mo: Visit date not found      Kianna Dorantes, [facility] Triage/Med Refill 2/27/2019    Requested Prescriptions   Pending Prescriptions Disp Refills     triamcinolone (KENALOG) 0.1 % ointment [Pharmacy Med Name: TRIAMCINOLONE 0.1% OINTMENT] 30 g 0     Sig: APPLY TO AFFECTED AREA TWICE A DAY    There is no refill protocol information for this order

## 2021-06-24 NOTE — PROGRESS NOTES
[facility] Follow up Visit   Date of visit: 2/12/2019  Physician: Alexsandra Rossi MD  Primary Care is Elba Monge MD.  Katy Hong   73 y.o.  female    Initial Weight: 201 pounds  Initial BMI: 37.4  Today's Weight:   Wt Readings from Last 1 Encounters:   02/12/19 184 lb (83.5 kg)     Body mass index is 33.65 kg/m .  Weight: 184 lb (83.5 kg)       Assessment and Plan   Assessment: Katy is a 73 y.o. year old female who presents for medical weight management.      Plan:    1. Obesity (BMI 30-39.9)  Patient was congratulated on her success thus far.  Since she feels that the Topamax is helping her and she is not having any side effects she will continue to take this.  She will continue to work on healthy habits.  I plan to find some healthy recipes for her and send them to her.  She is getting a little bored with her food intake.  She declined a visit with the dietitian as it is not covered by her insurance.    2. Migraine without status migrainosus, not intractable, unspecified migraine type  Topamax may help prevent these      Follow-up in 3 months with myself         INTERIM HISTORY  Patient has been a bit housebound with the extremely cold weather and snow.  She is getting a little bored with her food intake.  She is tolerating the Topamax.    DIETARY HISTORY  Meals Per Day: 3  Eating Protein First?:  Sometimes  Food Diary: B:cereal or toast L:sandwich with lunch meat or cheese and butter and lettuce or fruit or protein shake D:meat and veggies, sometimes potato  Snacks Per Day: Occasional  Typical Snack: nuts, popcorn  Fluid Intake: when she is home she gets 64 oz  Portion Control: Yes  Calorie Containing Beverages: occasional bottle of pop, once a week  Typical Protein Food Choices: meat  Choosing Whole Grains: usually  Meals at Restaurant per week:2  Eating at the Table: Usually  TV is Off During Meals: Usually    Positive Changes Since Last Visit: Eating 3 meals a  day, portion control  Struggling With: Exercise, protein at every meal    Knowledgeable in Reading Food Labels: Unsure  Getting Adequate Protein: No  Sleeping 7-8 hours/day usually  Stress management not discussed today    PHYSICAL ACTIVITY PATTERNS:  Cardiovascular: walking the halls of the apartment  Strength Training: None    REVIEW OF SYSTEMS  GENERAL/CONSTITUTIONAL:  Fatigue: No  HEENT:  Vision changes, glaucoma: No  CARDIOVASCULAR:  Chest Pain with Exertion: No  PULMONARY:  Dyspnea on exertion: Sometimes  NEUROLOGIC:  Paresthesias: Occasional  PSYCHIATRIC:  Moods: Stable  MUSCULOSKELETAL/RHEUMATOLOGIC  Arthralgias: yes  Myalgias: Yes  ENDOCRINE:  Monitoring Blood Sugars: Not applicable  Sugars Well Controlled: Not applicable       Patient Profile   Social History     Social History Narrative    7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          Past Medical History   Past Medical History:   Diagnosis Date     Acute Sinusitis     Created by Conversion      Breast cyst      Cardiomyopathy     Created by Conversion      Cellulitis     Created by Conversion      Chronic Cutaneous Ulcer Venous Stasis     Created by Conversion      Contusion With Intact Skin Surface     Created by Conversion      Depression With Anxiety     Created by Conversion      Epidermal Inclusion Cyst     Created by Conversion      Esophageal reflux     Created by Conversion      Essential Hypertension     Created by Conversion      Fibromyalgia     Created by Conversion      Insomnia     Created by Conversion      Joint Pain, Localized In The Knee     Created by Conversion      Lichen Sclerosus Et Atrophicus     Created by Conversion      Limb Pain     Created by Conversion      Lower Back Pain     Created by Conversion      Mammogram - Abnormal     Created by Conversion      Migraine Headache     Created by Conversion      Myocardial infarction (H)     per EKG's since      Nonvenomous Insect Bite Of Shoulder     Created by Conversion      Obesity      Created by Conversion      Open Wound Of The Lip     Created by Conversion      Osteopenia     Created by Conversion      Sebaceous Hyperplasia     Created by Conversion      Seborrheic Keratosis     Created by Conversion      Shortness of breath     Created by Conversion      Varicose Veins     Created by Conversion      Vitamin D Deficiency     Created by Conversion      Patient Active Problem List   Diagnosis     Osteopenia     Insomnia     Vitamin D Deficiency     Lower Back Pain     Fibromyalgia     Essential Hypertension     Seborrheic Keratosis     Obesity     Lichen Sclerosus Et Atrophicus     Depression With Anxiety     Migraine Headache     Esophageal Reflux     Joint Pain, Localized In The Knee     Varicose Veins     Shortness Of Breath     Nonvenomous Insect Bite Of Shoulder     Cellulitis     Mammogram - Abnormal     Chronic Cutaneous Ulcer Venous Stasis     Chronic venous insufficiency     Foot pain (Soft Tissue)     Female stress incontinence     Calculus of ureter     Hydronephrosis with urinary obstruction due to ureteral calculus     Secondary cardiomyopathy (H)     Current Outpatient Medications   Medication Sig     albuterol (VENTOLIN HFA) 90 mcg/actuation inhaler Inhale 2 puffs every 6 (six) hours as needed for wheezing.     aspirin 81 MG EC tablet Take 81 mg by mouth daily.     calcium carbonate (OS-MONIQUE) 500 mg calcium (1,250 mg) chewable tablet Chew 2 tablets daily.      cholecalciferol, vitamin D3, (VITAMIN D3) 2,000 unit Tab Take 1 tablet by mouth daily.      fluticasone (FLONASE) 50 mcg/actuation nasal spray 1 spray into each nostril daily.     furosemide (LASIX) 20 MG tablet Take 1 tablet (20 mg total) by mouth daily. (Patient taking differently: Take 20-40 mg by mouth every other day as needed. )     gabapentin (NEURONTIN) 300 MG capsule Take 1 capsule (300 mg total) by mouth at bedtime. (Patient taking differently: Take 600 mg by mouth at bedtime as needed.       )     losartan (COZAAR)  "25 MG tablet TAKE 1 TABLET (25 MG TOTAL) BY MOUTH DAILY.     omeprazole (PRILOSEC) 20 MG capsule TAKE 1 CAPSULE BY MOUTH 2 TIMES A DAY. (Patient taking differently: TAKE 1 CAPSULE BY MOUTH EVERY DAY)     topiramate (TOPAMAX) 100 MG tablet 1 tab with dinner every day     triamcinolone (KENALOG) 0.1 % ointment Apply topically 2 (two) times a day.     venlafaxine (EFFEXOR) 50 MG tablet TAKE ONE TABLET BY MOUTH THREE TIMES DAILY     HYDROcodone-acetaminophen 5-325 mg per tablet Take 1 tablet by mouth every 6 (six) hours as needed for pain.     SUMAtriptan succinate 6 mg/0.5 mL PnIj INJECT 1 INJECTION AS DIRECTED IMMEDIATELY AT ONSET OF MIGRAINE HEADACHE       Past Surgical History  She has a past surgical history that includes pr total abdom hysterectomy; Breast cyst aspiration; Breast biopsy; Tubal ligation; Appendectomy; Temporomandibular joint surgery (Bilateral); and Cataract extraction, bilateral.     Examination   /60 (Patient Site: Right Arm, Patient Position: Sitting, Cuff Size: Adult Large)   Pulse 82   Ht 5' 2\" (1.575 m)   Wt 184 lb (83.5 kg)   LMP 09/29/1993   SpO2 95%   Breastfeeding? No   BMI 33.65 kg/m    Height: 5' 2\" (1.575 m) (2/12/2019  9:14 AM)  Initial Weight: 201 lbs (6/6/2018  1:00 PM)  Weight: 184 lb (83.5 kg) (2/12/2019  9:14 AM)  Weight loss from initial: 6 (6/6/2018  1:00 PM)  % Weight loss: 2.99 % (6/6/2018  1:00 PM)  BMI (Calculated): 33.6 (2/12/2019  9:14 AM)  SpO2: 95 % (2/12/2019  9:14 AM)  Waist Circumference (In): 44 Inches (4/25/2018  1:39 PM)  Hip Circumference (In): 51 Inches (4/25/2018  1:39 PM)  Neck Circumference (In): 12.5 Inches (4/25/2018  1:39 PM)    General:  Alert and ambulatory, NAD  HEENT:  No conjunctival pallor, moist mucous Membranes, neck is without LAD  Pulmonary:  Normal respiratory effort, no cough, no audible wheezes/crackles.  CV:  Regular rate and Rhythm, no murmurs  Abdominal: BS normal,soft, NT without rebound or guarding  Pscyh/Mood: stable     "     Counseling:   We reviewed the important post op bariatric recommendations:  -eating 3 meals daily  -eating protein first, getting >60gm protein daily  -eating slowly, chewing food well  -avoiding/limiting calorie containing beverages  -limiting starchy vegetables and carbohydrates, choosing wheat, not white with breads,   crackers, pastas, bear, bagels, tortillas, rice  -limiting restaurant or cafeteria eating to twice a week or less    We discussed the importance of restorative sleep and stress management in maintaining a healthy weight.  We discussed the National Weight Control Registry healthy weight maintenance strategies and ways to optimize metabolism.  We discussed the importance of physical activity including cardiovascular and strength training in maintaining a healthier weight.    > 25 min spent with patient, > 50% spent in counseling         LATISHA Rossi MD  [facility].    Much or all of the text in this note was generated through the use of Dragon Dictate voice-to-text software. Errors in spelling or words which seem out of context are unintentional. Sound alike errors, in particular, may have escaped editing.

## 2021-06-25 NOTE — PROGRESS NOTES
Progress Notes by Tianna Moran PT at 1/24/2017  9:00 AM     Author: Tianna Moran PT Service: -- Author Type: Physical Therapist    Filed: 1/24/2017  2:42 PM Encounter Date: 1/24/2017 Status: Signed    : Tianna Moran PT (Physical Therapist)         [facility]   Foot/Ankle Follow up    Patient Name: Katy Hong  Date of evaluation: 1/24/2017  Referral Diagnosis: Ankle enthesopathy  Referring provider: Jose Nicholson DPM   Visit: 5/10  Visit Diagnosis:     ICD-10-CM    1. Muscle pain, myofascial M79.1    2. Right foot pain M79.671        Assessment:    R foot lateral pain that radiates to dorsum of foot. Myofascial restriction to lateral and lateral dorsal R foot at styloid process and slightly medial. Kinesiotape decreases pt's symptoms however does not relieve symptoms. Possible origin of problem is vascular in nature. Pt would benefit from skilled physical therapy in order to address myofascial restriction on R foot and DF weakness.     Pt. is appropriate for skilled PT intervention as outlined in the Plan of Care (POC).  Pt. is a good candidate for skilled PT services to improve pain levels and function.    Goals:  Pt. will demonstrate/verbalize independence in self-management of condition in : 6 weeks  Pt. will be able to walk : 60 minutes;on uneven/inclined surfaces;with less pain;for community mobility;in 6 weeks  Patient will ascend / descend: stairs;with recipricol gait;with less pain;in 6 weeks  No Data Recorded    Barriers to Learning or Achieving Goals:  No Barriers.    Patient's expectations/goals are realistic.       Plan / Patient Instructions:      Plan for next visit: Manual release, standing heel raises, gait training      Subjective:       Pain is 3-4/10. Exercises are consistent.  Had both knees injected yesterday. The tape stayed on really well through the shower and it felt a lot better this week.     Functional limitations are described as occurring  with:   - walking on uneven surfaces   - standing 30 min     Patient reports no benefit from  cold     Objective:      Treatment Today     TREATMENT MINUTES COMMENTS   Evaluation     Self-care/ Home management     Manual therapy 25 Seated MFR to lateral aspect and lateral dorsum of R foot from distal to proximal    Tested: QL stretching in sidelying- no change in R foot symptoms  Lateral R hip stretching -  no change in R foot symptoms   R fibular head glides- no change in foot symptoms.       Neuromuscular Re-education  K tape donned to lateral R foot    Therapeutic Activity     Therapeutic Exercises     Gait training     Modality__________________                Total 25    Blank areas are intentional and mean the treatment did not include these items.     HEP:                Tianna Moran  1/24/2017  11:11 AM

## 2021-06-25 NOTE — PROGRESS NOTES
Progress Notes by Tianna Moran PT at 1/16/2017  9:00 AM     Author: Tianna Moran PT Service: -- Author Type: Physical Therapist    Filed: 1/16/2017  1:27 PM Encounter Date: 1/16/2017 Status: Signed    : Tianna Moran PT (Physical Therapist)         [facility]   Foot/Ankle Follow up    Patient Name: Katy Hong  Date of evaluation: 1/16/2017  Referral Diagnosis: Ankle enthesopathy  Referring provider: Jose Nicholson DPM   Visit: 3/10  Visit Diagnosis:     ICD-10-CM    1. Muscle pain, myofascial M79.1    2. Right foot pain M79.671        Assessment:    R foot lateral pain that radiates to dorsum of foot. Myofascial restriction to lateral and lateral dorsal R foot at styloid process. Pt would benefit from skilled physical therapy in order to address myofascial restriction on R foot and DF weakness.     Pt. is appropriate for skilled PT intervention as outlined in the Plan of Care (POC).  Pt. is a good candidate for skilled PT services to improve pain levels and function.    Goals:  Pt. will demonstrate/verbalize independence in self-management of condition in : 6 weeks  Pt. will be able to walk : 60 minutes;on uneven/inclined surfaces;with less pain;for community mobility;in 6 weeks  Patient will ascend / descend: stairs;with recipricol gait;with less pain;in 6 weeks  No Data Recorded    Barriers to Learning or Achieving Goals:  No Barriers.    Patient's expectations/goals are realistic.       Plan / Patient Instructions:      Plan for next visit: Manual release, standing heel raises, gait training      Subjective:       Pain 5/10 on the outside of the foot on top of it. What you did last time lasted for about 2 days     Knees are painful on and off, but bilaterally; worst at night. - To see MD on the 23rd for injection    Functional limitations are described as occurring with:   - walking on uneven surfaces   - standing 30 min     Patient reports no benefit from  cold      Objective:      Treatment Today     TREATMENT MINUTES COMMENTS   Evaluation     Self-care/ Home management     Manual therapy 25 Seated MFR to lateral aspect and lateral dorsum of R foot from distal to proximal    Neuromuscular Re-education  K tape donned to lateral R foot    Therapeutic Activity     Therapeutic Exercises  Standing DF and PF x 10 reps each   Gait training     Modality__________________                Total 25    Blank areas are intentional and mean the treatment did not include these items.     HEP:                Tianna Moran  1/16/2017  11:11 AM

## 2021-06-25 NOTE — PROGRESS NOTES
Progress Notes by Tianna Moran PT at 1/30/2017  9:00 AM     Author: Tianna Moran PT Service: -- Author Type: Physical Therapist    Filed: 4/19/2017  8:28 AM Encounter Date: 1/30/2017 Status: Addendum    : Tianna Moran PT (Physical Therapist)    Related Notes: Original Note by Tianna Moran PT (Physical Therapist) filed at 1/30/2017  9:51 AM       [facility] Discharge Summary  Patient Name: Katy Hong  Date: 4/19/2017  Referral Diagnosis: Ankle enthesopathy  Referring provider: Jose Nicholson DPM  Visit Diagnosis:   1. Muscle pain, myofascial     2. Right foot pain         Goals:  Pt. will demonstrate/verbalize independence in self-management of condition in : 6 weeks Goal Met  Pt. will be able to walk : 60 minutes;on uneven/inclined surfaces;with less pain;for community mobility;in 6 weeks Goal Met  Patient will ascend / descend: stairs;with recipricol gait;with less pain;in 6 weeks goal Met    Patient was seen for 5 visits from 1/3/17 to 1/30/17 with 0 missed appointments.  The patient met goals and has demonstrated understanding of and independence in the home program for self-care, and progression to next steps.  She will initiate contact if questions or concerns arise.  Patient received a home program See below    Therapy will be discontinued at this time.  The patient will need a new referral to resume.    Thank you for your referral.  Tianna Moran  4/19/2017  8:26 AM  [facility]   Foot/Ankle Follow up    Patient Name: Katy Hong  Date of evaluation: 1/30/2017  Referral Diagnosis: Ankle enthesopathy  Referring provider: Joes Nicholson DPM   Visit: 6/10  Visit Diagnosis:     ICD-10-CM    1. Muscle pain, myofascial M79.1    2. Right foot pain M79.671        Assessment:    R foot lateral pain that radiates to dorsum of foot. Myofascial restriction to lateral and lateral dorsal R foot at styloid process and slightly medial. Episodic pain  when misstepping or quick foot movements. Pt able to amb over uneven surfaces this day without pain. Pt is appropriate for 30 day trial of independence at this time.     Goals:  Pt. will demonstrate/verbalize independence in self-management of condition in : 6 weeks Goal Met  Pt. will be able to walk : 60 minutes;on uneven/inclined surfaces;with less pain;for community mobility;in 6 weeks Goal Met  Patient will ascend / descend: stairs;with recipricol gait;with less pain;in 6 weeks goal Met  No Data Recorded    Barriers to Learning or Achieving Goals:  No Barriers.    Patient's expectations/goals are realistic.       Plan / Patient Instructions:      Plan for next visit: 30 day trial of independence     Subjective:       Pain is 1/10. Tape has stayed on pretty well. Episode on Saturday when I think i stepped wrong and that hurt like a 6/10 aleve helped. For the past few days its been like this.      Functional limitations are described as occurring with:   - walking on uneven surfaces   - standing 30 min     Patient reports no benefit from  cold     Objective:      Treatment Today     TREATMENT MINUTES COMMENTS   Evaluation     Self-care/ Home management     Manual therapy 13 Seated MFR to lateral aspect and lateral dorsum of R foot from distal to proximal    Neuromuscular Re-education 3 K tape donned to lateral R foot    Therapeutic Activity     Therapeutic Exercises 8 With orange theraband x 10 reps each:  - ankle inversion  - ankle eversion  - toe flexion  - toe extension   Gait training 3 amb over 3 pillliows with stocking feet with out UE support forwards and laterally each direction   Modality__________________                Total 27    Blank areas are intentional and mean the treatment did not include these items.     HEP:                Tianna Moran  1/30/2017  11:11 AM

## 2021-06-25 NOTE — TELEPHONE ENCOUNTER
Spoke to pt, scheduled lab appt on 6/3 at [facility] lab and pt already has f/u in person appt on 7/6 with Dr Isaac scheduled

## 2021-06-25 NOTE — PROGRESS NOTES
Progress Notes by Tianna Moran PT at 1/9/2017  9:00 AM     Author: Tianna Moran PT Service: -- Author Type: Physical Therapist    Filed: 1/9/2017  2:36 PM Encounter Date: 1/9/2017 Status: Signed    : Tianna Moran PT (Physical Therapist)         [facility]   Foot/Ankle Follow up    Patient Name: Katy Hong  Date of evaluation: 1/9/2017  Referral Diagnosis: Ankle enthesopathy  Referring provider: Neal Neil MD   Visit: 2/10  Visit Diagnosis:     ICD-10-CM    1. Right foot pain M79.671    2. Muscle pain, myofascial M79.1        Assessment:    R foot lateral pain that radiates to dorsum of foot. Myofascial restriction to lateral and lateral dorsal R foot at styloid process. Pt reports pain change from 6/10 to 3/10 after manual techniques. Pt would benefit from skilled physical therapy in order to address myofascial restriction on R foot and DF weakness.     Pt. is appropriate for skilled PT intervention as outlined in the Plan of Care (POC).  Pt. is a good candidate for skilled PT services to improve pain levels and function.    Goals:  Pt. will demonstrate/verbalize independence in self-management of condition in : 6 weeks  Pt. will be able to walk : 60 minutes;on uneven/inclined surfaces;with less pain;for community mobility;in 6 weeks  Patient will ascend / descend: stairs;with recipricol gait;with less pain;in 6 weeks  No Data Recorded    Barriers to Learning or Achieving Goals:  No Barriers.    Patient's expectations/goals are realistic.       Plan / Patient Instructions:      Plan for next visit: Manual release, standing heel raises, gait training      Subjective:       Pain 5-6/10 on the outside of the foot. Hasn't seen much of a change with the exercises. Bothers most at the end of the day.     Knees are painful on and off, but bilaterally; worst at night. - To see MD on the 23rd for injection    Functional limitations are described as occurring with:   -  walking on uneven surfaces   - standing 30 min     Patient reports no benefit from  cold     Objective:      Treatment Today     TREATMENT MINUTES COMMENTS   Evaluation     Self-care/ Home management     Manual therapy 20 Seated MFR to lateral aspect and lateral dorsum of R foot from distal to proximal    Neuromuscular Re-education     Therapeutic Activity     Therapeutic Exercises 10 Reviewed Exercises with pt  Heel raises x 10 reps with B LE x 2 sets   Toe raises x 10 reps x 3 sets    Both performed with UE support for balance  Seated DF with orange theraband x 10 reps with PT holding theraband   Gait training     Modality__________________                Total 30    Blank areas are intentional and mean the treatment did not include these items.     HEP:                Tianna Moran  1/9/2017  11:11 AM

## 2021-06-25 NOTE — PROGRESS NOTES
Progress Notes by Cyn Gay MD at 8/22/2017  2:10 PM     Author: Cyn Gay MD Service: -- Author Type: Physician    Filed: 8/22/2017  4:09 PM Encounter Date: 8/22/2017 Status: Signed    : Cyn Gay MD (Physician)           Click to link to [facility] HEART CARE NOTE    Thank you, Dr. Neil, for asking us to see Katy Hong at the [facility].      Assessment/Recommendations   Assessment:     This is a 70 y.o. female with history of mild nonischemic cardiomyopathy, mild obstructive sleep apnea, hypertension and dyspnea on exertion here for follow-up.    Due to her dyspnea on exertion she underwent a stress test this year that showed a small area of apical ischemia with preserved left ventricular systolic function.     Plan:  1.    Due to her abnormal stress test and dyspnea we discussed further options and decided to proceed with a CT coronary angiogram with calcium scoring for further evaluation.  2.  Blood pressure is well-controlled.  3.  Recommend increased exercise for better conditioning.  4.  Follow-up one year.       History of Present Illness    Ms. Katy Hong is a 71 y.o. female with history of mild nonischemic cardiomyopathy, mild obstructive sleep apnea, hypertension and dyspnea on exertion here for follow-up.  Her last echocardiogram showed preserved left ventricular systolic function.  She is found to have a mild nonischemic cardiomyopathy in 2010 and underwent CT coronary angiogram at that time that showed no obstructive coronary artery disease with a calcium score of 4.    She has chronic shortness of breath with exertion.  Due to her breathing difficulty her primary ordered a stress test that was done a few months ago and showed a small area of ischemia apical segment although they cannot exclude breast attenuation artifact.  She denies any problems with chest discomfort.  She reports that her  breathing difficulty has remained unchanged for years now.    Lexiscan nuclear stress test 5/24/2017    The exercise nuclear stress test is abnormal.    There is a small area of ischemia in the apical segment(s) of the left ventricle. Cannot exclude breast attenuation.    The left ventricular ejection fraction is 70%.    The patient is at a low risk of future cardiac ischemic events.    The images of 7/26/2010 were unavailable for comparison review.       Physical Examination Review of Systems   Vitals:    08/22/17 1412   BP: 110/60   Pulse: 88   Resp: 18     Body mass index is 36.49 kg/(m^2).  Wt Readings from Last 3 Encounters:   08/22/17 206 lb (93.4 kg)   07/24/17 202 lb (91.6 kg)   03/06/17 199 lb (90.3 kg)       General Appearance:   alert, no apparent distress   HEENT:  no scleral icterus; the mucous membranes are pink and moist                                  Neck: jugular venous pressure normal   Chest: the spine is straight and the chest is symmetric   Lungs:   respirations unlabored; the lungs are clear to auscultation   Cardiovascular:   regular rhythm with normal first and second heart sounds and no murmurs or gallops   Abdomen:  no organomegaly, masses, bruits, or tenderness; bowel sounds are present   Extremities: Mild bilateral lower extremity edema   Skin: no xanthelasma    General: Weight Gain  Eyes: WNL  Ears/Nose/Throat: WNL  Lungs: Shortness of Breath, Wheezing  Heart: Shortness of Breath with activity, Leg Swelling  Stomach: WNL  Bladder: WNL  Muscle/Joints: Muscle Pain  Skin: WNL  Nervous System: Falls  Mental Health: Depression     Blood: Easy Bruising     Medical History  Surgical History Family History Social History   Past Medical History:   Diagnosis Date   ? Acute Sinusitis     Created by Conversion    ? Breast cyst    ? Cardiomyopathy     Created by Conversion    ? Cellulitis     Created by Conversion    ? Chronic Cutaneous Ulcer Venous Stasis     Created by Conversion    ? Contusion  With Intact Skin Surface     Created by Conversion    ? Depression With Anxiety     Created by Conversion    ? Epidermal Inclusion Cyst     Created by Conversion    ? Esophageal reflux     Created by Conversion    ? Essential Hypertension     Created by Conversion    ? Fibromyalgia     Created by Conversion    ? Insomnia     Created by Conversion    ? Joint Pain, Localized In The Knee     Created by Conversion    ? Lichen Sclerosus Et Atrophicus     Created by Conversion    ? Limb Pain     Created by Conversion    ? Lower Back Pain     Created by Conversion    ? Mammogram - Abnormal     Created by Conversion    ? Migraine Headache     Created by Conversion    ? Myocardial infarction     per EKG's since 2015   ? Nonvenomous Insect Bite Of Shoulder     Created by Conversion    ? Obesity     Created by Conversion    ? Open Wound Of The Lip     Created by Conversion    ? Osteopenia     Created by Conversion    ? Sebaceous Hyperplasia     Created by Conversion    ? Seborrheic Keratosis     Created by Conversion    ? Shortness of breath     Created by Conversion    ? Varicose Veins     Created by Conversion    ? Vitamin D Deficiency     Created by Conversion     Past Surgical History:   Procedure Laterality Date   ? BREAST BIOPSY     ? BREAST CYST ASPIRATION     ? NH TOTAL ABDOM HYSTERECTOMY       still has both ovaries    Family History   Problem Relation Age of Onset   ? Breast cancer Maternal Aunt 60    Social History     Social History   ? Marital status:      Spouse name: Anjel   ? Number of children: 2   ? Years of education: N/A     Occupational History   ? Not on file.     Social History Main Topics   ? Smoking status: Never Smoker   ? Smokeless tobacco: Never Used   ? Alcohol use 0.0 oz/week      Comment: occasional   ? Drug use: No   ? Sexual activity: Not on file     Other Topics Concern   ? Not on file     Social History Narrative          Medications  Allergies   Current Outpatient Prescriptions    Medication Sig Dispense Refill   ? aspirin 81 MG EC tablet Take 81 mg by mouth daily.     ? calcium carbonate (OS-MONIQUE) 500 mg calcium (1,250 mg) chewable tablet Chew 2 tablets daily.      ? cholecalciferol, vitamin D3, 5,000 unit Tab Take 1 tablet by mouth.     ? clobetasol (TEMOVATE) 0.05 % Gel Apply to red rash daily until gone but no longer than two weeks 30 each 1   ? furosemide (LASIX) 20 MG tablet Take 1 tablet (20 mg total) by mouth daily. 90 tablet 1   ? gabapentin (NEURONTIN) 300 MG capsule TAKE TWO CAPSULES BY MOUTH TWICE DAILY 360 capsule 0   ? HYDROcodone-acetaminophen 5-325 mg per tablet Take 1 tablet by mouth every 6 (six) hours as needed for pain. 60 tablet 0   ? losartan (COZAAR) 25 MG tablet Take 1 tablet (25 mg total) by mouth daily. 90 tablet 2   ? metoprolol succinate (TOPROL-XL) 50 MG 24 hr tablet TAKE ONE TABLET BY MOUTH ONE TIME DAILY 90 tablet 3   ? nortriptyline (PAMELOR) 10 MG capsule Take up to 4 capsules qhs 120 capsule 5   ? nortriptyline (PAMELOR) 50 MG capsule TAKE 1CAPSULE BY MOUTH NIGHTLY AT BEDTIME 180 capsule 1   ? omeprazole (PRILOSEC) 20 MG capsule Take 1 capsule (20 mg total) by mouth 2 (two) times a day. 180 capsule 2   ? SUMAtriptan succinate 6 mg/0.5 mL PnIj Inject 1 injection as directed immediately at onset of migraine headache 2 mL 5   ? venlafaxine (EFFEXOR) 50 MG tablet TAKE ONE TABLET BY MOUTH THREE TIMES DAILY 270 tablet 1   ? zolpidem (AMBIEN CR) 12.5 MG CR tablet Take 12.5 mg by mouth bedtime as needed for sleep.     ? cholecalciferol, vitamin D3, (VITAMIN D3) 2,000 unit Tab Take 1 tablet by mouth daily.      ? SUMAtriptan succinate (IMITREX STATDOSE) 6 mg/0.5 mL kit INJECT1 INJECTION AS DIRECTED IMMEDIATELY AT ONSET OF MIGRAINE HEADACHE 2 each 3     No current facility-administered medications for this visit.       Allergies   Allergen Reactions   ? Fluoxetine Cough     Pt thinks it was cough but not totally sure.   ? Latex Rash   ? Lisinopril Cough   ? Sulfa  (Sulfonamide Antibiotics) Itching and Rash         Lab Results    Chemistry/lipid CBC Cardiac Enzymes/BNP/TSH/INR   Lab Results   Component Value Date    CHOL 161 07/18/2017    HDL 63 07/18/2017    LDLCALC 82 07/18/2017    TRIG 78 07/18/2017    CREATININE 0.77 07/18/2017    BUN 14 07/18/2017    K 3.9 07/18/2017     07/18/2017     07/18/2017    CO2 25 07/18/2017    Lab Results   Component Value Date    WBC 5.9 12/16/2016    HGB 12.4 12/16/2016    HCT 37.3 12/16/2016    MCV 90 12/16/2016     12/16/2016    Lab Results   Component Value Date    BNP 16 12/16/2016    TSH 1.11 10/27/2016    INR 1.0 12/15/2009

## 2021-06-25 NOTE — TELEPHONE ENCOUNTER
RN cannot approve Refill Request    RN can NOT refill this medication Small supply given last, routing to PCP to advise on. Last office visit: 2/22/2021 Leonora Kerr NP Last Physical: Visit date not found Last MTM visit: Visit date not found Last visit same specialty: 2/22/2021 Leonora Kerr NP.  Next visit within 3 mo: Visit date not found  Next physical within 3 mo: Visit date not found      Andree Arroyo, [facility] Triage/Med Refill 5/27/2021    Requested Prescriptions   Pending Prescriptions Disp Refills     DULoxetine (CYMBALTA) 20 MG capsule [Pharmacy Med Name: DULOXETINE HCL DR 20 MG CAP] 60 capsule 2     Sig: TAKE 1 CAPSULE BY MOUTH TWICE A DAY       Tricyclics/Misc Antidepressant/Antianxiety Meds Refill Protocol Passed - 5/27/2021  7:31 AM        Passed - PCP or prescribing provider visit in last year     Last office visit with prescriber/PCP: 2/22/2021 Leonora Kerr NP OR same dept: 2/22/2021 Leonora Kerr NP OR same specialty: 2/22/2021 Leonora Kerr NP  Last physical: Visit date not found Last MTM visit: Visit date not found   Next visit within 3 mo: Visit date not found  Next physical within 3 mo: Visit date not found  Prescriber OR PCP: Leonora Kerr NP  Last diagnosis associated with med order: 1. Fibromyalgia  - DULoxetine (CYMBALTA) 20 MG capsule [Pharmacy Med Name: DULOXETINE HCL DR 20 MG CAP]; TAKE 1 CAPSULE BY MOUTH TWICE A DAY  Dispense: 60 capsule; Refill: 2    If protocol passes may refill for 12 months if within 3 months of last provider visit (or a total of 15 months).

## 2021-06-25 NOTE — PROGRESS NOTES
Progress Notes by Tianna Moran PT at 1/3/2017 11:00 AM     Author: Tianna Moran PT Service: -- Author Type: Physical Therapist    Filed: 1/3/2017  1:36 PM Encounter Date: 1/3/2017 Status: Signed    : Tianna Moran PT (Physical Therapist)           [facility] Certification Request    January 3, 2017      Patient: Katy Hong  MR Number: [id number]  YOB: 1945  Date of Visit: 1/3/2017      Dear :    Thank you for this referral.   We are seeing Katy Hong for Physical Therapy of R foot.    Medicare and/or Medicaid requires physician review and approval of the treatment plan. Please review the plan of care and verify that you agree with the therapy plan of care by co-signing this note.      Plan of Care  Authorization / Certification Start Date: 01/03/17  Authorization / Certification End Date: 04/03/17  Authorization / Certification Number of Visits: 10  Communication with: Referral Source  Patient Related Instruction: Nature of Condition;Treatment plan and rationale;Self Care instruction;Body mechanics;Posture  Times per Week: 1  Number of Weeks: 10  Number of Visits: 10  Therapeutic Exercise: ROM;Stretching;Strengthening  Neuromuscular Reeducation: kinesio tape;posture;balance/proprioception;TNE;core;postural restoration  Manual Therapy: soft tissue mobilization;myofascial release;joint mobilization;muscle energy;strain counterstrain  Gait Training: as indicated     Goals:  Pt. will demonstrate/verbalize independence in self-management of condition in : 6 weeks  Pt. will be able to walk : 60 minutes;on uneven/inclined surfaces;with less pain;for community mobility;in 6 weeks  Patient will ascend / descend: stairs;with recipricol gait;with less pain;in 6 weeks  No Data Recorded      If you have any questions or concerns, please don't hesitate to call.    Sincerely,      Tianna Moran PT        Physician recommendation:     ___ Follow therapist's  recommendation        ___ Modify therapy      *Physician co-signature indicates they certify the need for these services furnished within this plan and while under their care.      [facility]   Foot/Ankle Initial Evaluation    Patient Name: Katy Hong  Date of evaluation: 1/3/2017  Referral Diagnosis: Ankle enthesopathy  Referring provider: Jose Nicholson DPM  Visit Diagnosis:     ICD-10-CM    1. Right foot pain M79.671    2. Muscle pain, myofascial M79.1        Assessment:    R foot lateral and plantar pain that radiates to dorsum of foot. Myofascial restriction to plantar surface of R>L foot in line with styloid process with pt reporting no tenderness with palpation.   Pt. is appropriate for skilled PT intervention as outlined in the Plan of Care (POC).  Pt. is a good candidate for skilled PT services to improve pain levels and function.    Goals:  Pt. will demonstrate/verbalize independence in self-management of condition in : 6 weeks  Pt. will be able to walk : 60 minutes;on uneven/inclined surfaces;with less pain;for community mobility;in 6 weeks  Patient will ascend / descend: stairs;with recipricol gait;with less pain;in 6 weeks  No Data Recorded    Barriers to Learning or Achieving Goals:  No Barriers.    Patient's expectations/goals are realistic.       Plan / Patient Instructions:      Plan of Care:   Authorization / Certification Start Date: 01/03/17  Authorization / Certification End Date: 04/03/17  Authorization / Certification Number of Visits: 10  Communication with: Referral Source  Patient Related Instruction: Nature of Condition;Treatment plan and rationale;Self Care instruction;Body mechanics;Posture  Times per Week: 1  Number of Weeks: 10  Number of Visits: 10  Therapeutic Exercise: ROM;Stretching;Strengthening  Neuromuscular Reeducation: kinesio tape;posture;balance/proprioception;TNE;core;postural restoration  Manual Therapy: soft tissue mobilization;myofascial  release;joint mobilization;muscle energy;strain counterstrain  Gait Training: as indicated     Plan for next visit: Manual release, standing heel raises, gait training      Subjective:         History of Present Illness:    Katy is a 71 y.o. female who presents to therapy today with complaints of R foot pain at lateral aspect of foot that can travel on top of the R foot to the 1st phillip.   Date of onset 1 year ago+.    - Same spot on the L that the bone pertrudes   Symptoms are intermittent and getting worse.     She denies history of similar symptoms. She describes their previous level of function as not limited    Knees are painful on and off, but bilaterally.     Pain Ratin  Pain rating at best: 0 when NWB  Pain rating at worst: 6 When WB  Pain description: pain and soreness    Functional limitations are described as occurring with:   - walking on uneven surfaces   - standing 30 min     Patient reports no benefit from  cold     Objective:      Note: Items left blank indicates the item was not performed or not indicated at the time of the evaluation.    Patient Outcome Measures :      Lower Extremity Functional Scale (_/80): 61   Scores range from 0-80, where a score of 80 represents maximum function. The minimal clinically important difference is a positive change of 9 points.    Ankle/Foot Examination  1. Right foot pain     2. Muscle pain, myofascial       Precautions: None  Involved Side: Right    Posture Observation:    Standing: L hip posterior, and R lateral lean with trunk, slight ER B,    Sitting: normal    Assistive Device: None  Gait Observation: no major abnormalities noted     Foot/Ankle ROM: Within normal limits unless otherwise indicated      Foot/Ankle Strength:           Ankle/Foot MMT (/5)   Right   Left   Right   Left   Right   Left   Dorsiflexion     4    4       Plantar flexion    3+   3+       Inversion    4    4       Eversion    4    4       Great Toe Extension             Palpation:   "Lateral plantar surface in line with styloid process across plantar surface of foot demonstrates restriction, pt notes pressure with palpation but not pain    Foot/Ankle Special Tests:     Ligament Tests (+/-)  Right  Left  Fracture Tests (+/-)  Right   Left     Anterior Drawer  -  -    [place] Ankle Rule          [place] Tilt   - -     [place] Foot Rule          Impingement Tests (+/-)  Right  Left   Heel Tap \"Bump\"      Impingement sign     Squeeze Test      Impingement sign cluster   1. Ant-lat ankle tenderness.   2. Ant-lat ankle swelling.   3. Pain with forced DF and Eversion.   4. Pain with SL squat.   5. Pain with activities.   6. Ankle instability.    (5 or more is positive)     [place] Test      Achilles Tests (+/-)  RIght   Left  Plantar Fasciitis Tests (+/-)  Right  Left    Kovacs's Calf Squeeze         [place] (NWB) for plantar fasciitis           Arc Sign         [place]lass (WB) for plantar fasciitis   - -       [place] Low Test        Other:          Other:        Other:          Other:        Other:               Treatment Today     TREATMENT MINUTES COMMENTS   Evaluation 25 R foot    Self-care/ Home management     Manual therapy     Neuromuscular Re-education     Therapeutic Activity     Therapeutic Exercises 20 See below; heavy VC for spatial awareness and proper performance of exercises. Pt took print out of exercises with her this day.    Gait training     Modality__________________                Total 45    Blank areas are intentional and mean the treatment did not include these items.     HEP:        PT Evaluation Code: (Please list factors)  Patient History/Comorbidities: Osteopenia, LBP, Stress Incontinence  Examination: 1 element- R foot   Clinical Presentation: Stable   Clinical Decision Making: low     Patient History/  Comorbidities Examination  (body structures and functions, activity limitations, and/or participation restrictions) Clinical Presentation Clinical Decision Making " (Complexity)   No documented Comorbidities or personal factors 1-2 Elements Stable and/or uncomplicated Low   1-2 documented comorbidities or personal factor 3 Elements Evolving clinical presentation with changing characteristics Moderate   3-4 documented comorbidities or personal factors 4 or more Unstable and unpredictable High           Tianna Moran  1/3/2017  11:11 AM

## 2021-06-25 NOTE — TELEPHONE ENCOUNTER
Patient is stating Dr. Isaac told her at their visit that his nurse would be calling her. She is calling to follow up on this.

## 2021-06-26 NOTE — PROGRESS NOTES
1. Insomnia due to medical condition     2. Fibromyalgia     3. Gastroesophageal reflux disease without esophagitis  famotidine (PEPCID) 20 MG tablet         Assessment & Plan     Insomnia due to medical condition    She is currently taking 50 mg of trazodone, she is only getting about 5 hours of sleep on the current dose, I did ask her to increase her dose to 75 mg or 800 mg at at bedtime and take this 1 hour before sleep  Currently, she is taking her medication 3 hours before she goes to bed and she is not feeling sleepy until around 11:30 at night.  I explained to her that the goal of the medication use is to take it 1 hour before bed so she feels sleepy within that 60-minute timeframe    Fibromyalgia    She will continue with the Cymbalta twice daily and the gabapentin dose that she is currently on along with her anti-inflammatory medication  She is scheduled to follow-up with rheumatology for an in person visit on July 6  We reviewed her recent lab work which was negative for any systemic inflammation or rheumatoid arthritis factor  We reviewed her office visit notes from her virtual appointment with rheumatology    Gastroesophageal reflux disease without esophagitis    - famotidine (PEPCID) 20 MG tablet  Dispense: 60 tablet; Refill: 5  She has been taking omeprazole for several years now, she would like to change to something different due to her bone health      Review of external notes as documented in note  28 minutes spent on the date of the encounter doing chart review, review of outside records, review of test results, interpretation of tests, patient visit and documentation        Return in about 29 days (around 7/6/2021) for with rheumatology.    Leonora Kerr NP  [facility]   Katy Hong is 75 y.o. and presents today for the following health issues: Discuss medication, rheumatology follow-up/ fibromyalgia, insomnia  HPI patient was recently  "evaluated by rheumatology at the end of May for her ongoing fibromyalgia symptoms and symptoms that would suggest additional osteoarthritic related pain.  She continues to take duloxetine twice daily along with her gabapentin.  The rheumatologist did order some additional lab work which included an MALDONADO, CCP antibodies, rheumatoid factor, C-reactive protein and sed rate.  All of those results came back normal.  She does use anti-inflammatory medication on an as-needed basis.  She has received steroid injections in the past due to her cervical spine pain, she states that she only got 3 to 5 weeks of relief after that injection so she is not interested in proceeding with any more injections at this time.  She is scheduled to meet with rheumatology again in July for an in person evaluation.  In the meantime, she feels that her pain is fairly well controlled on her current dose of Cymbalta and gabapentin.     Insomnia: She is currently using trazodone 50 mg, she takes the medication around 8 to 8:30 PM, she will not go to bed till about 11:30 PM.  She is able to sleep until 5 AM.  We discussed that the use of the medication should cause her to feel drowsy within 60 minutes of taking the medication.  Her normal bedtime is around 11:30 PM, she does take a nap during the day upwards of 30 minutes at a time.  She does not drink excessive amounts of caffeine.  She has not tried a higher dose on the trazodone medication.    GERD: Currently taking omeprazole, at her last annual exam, it was recommended to her that she stop the medication completely due to the effects that it can have on the bone density.  She would like to try something different in place of the omeprazole to see if she can continue to have control of her reflux symptoms.        Review of Systems        Objective    /72   Pulse 77   Temp 98.1  F (36.7  C)   Resp 18   Ht 5' 2\" (1.575 m)   Wt 190 lb (86.2 kg)   LMP 09/29/1993   SpO2 98%   " "Breastfeeding No   BMI 34.75 kg/m    Body mass index is 34.75 kg/m .  Physical Exam      Review of Systems     Denies fever, chills, visual changes,, nasal congestion, rhinorrhea, ear pain, or discharge, sore throat, swollen glands, breast mass, nipple discharge, breast changes, abdominal pain, cough, shortness of breath, chest pain, weight change, change in bowel habits, melena, rectal bleeding, dysuria, frequency, urgency, hematuria, polyuria, polydipsia, polyphagia,  rash, weakness, paresthesias, vaginal discharge or bleeding or mood changes.       Objective:         /72   Pulse 77   Temp 98.1  F (36.7  C)   Resp 18   Ht 5' 2\" (1.575 m)   Wt 190 lb (86.2 kg)   LMP 09/29/1993   SpO2 98%   Breastfeeding No   BMI 34.75 kg/m       Physical Exam:  General Appearance: Alert, cooperative, no distress, appears stated age  Lungs: Clear to auscultation bilaterally, respirations unlabored  Heart: Regular rate and rhythm, S1 and S2 normal, no murmur, rub, or gallop  Extremities: Mild deformity noted with some fingers on both hands, no swelling present today,  atraumatic, no cyanosis   Skin: Skin color, texture, turgor normal, no rashes or lesions, warm and dry                       "
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
## 2021-06-27 NOTE — PROGRESS NOTES
Progress Notes by Gasper Matute MD at 7/13/2019  1:50 PM     Author: Gasper Matute MD Service: -- Author Type: Physician    Filed: 7/13/2019  5:01 PM Encounter Date: 7/13/2019 Status: Signed    : Gasper Matute MD (Physician)       [facility] Note                                                                                 Date of Visit: 7/13/2019     Chief Complaint   Katy Hong is a(n) 73 y.o. White or  female who presents to [facility] with the following complaint(s):  Eye Problem (red right eye, noticed this morning, no pain or irritation. )       Assessment and Plan   1. Non-traumatic subconjunctival hemorrhage of right eye      Discussed benign nature of subconjunctival hemorrhage and typical timeline for resolution. Discussed indications for evaluation by Ophthalmology, including eye pain, visual disturbances, and recurrent hemorrhage.     Counseled patient regarding assessment and plan for evaluation and treatment. Questions were answered. See AVS for the specific written instructions and educational handout(s) regarding subconjunctival hemorrhage that were provided at the conclusion of the visit.     Discussed signs / symptoms that warrant urgent / emergent medical attention.     Follow up as needed.      History of Present Illness   Primary symptom: Eye redness  Laterality: Right  Onset: Late this morning  Progression: Worsening; initially noted just in the medial corner, has now spread across the eye.   Conjunctival redness: Yes, bright red.   Mattering: No  Drainage: No  Irritation: No  Itching: No  Pain: No  Foreign body sensation: No  Eyelid swelling: No  Eyelid erythema: No  Blurry vision: No  Vision loss: No  Photophobia: No  Fevers: No  Chills: No  URI symptoms: No  Additional symptoms: None  Known trauma: No  Suspected foreign body: No  Exposure to infectious conjunctivitis: No  Contact lens wearer: No  Chronic ophthalmologic conditions: Wears  bifocals. Has had bilateral cataract surgery several years ago.   Home therapies utilized: None  Tobacco user / exposure: No  Additional pertinent history: Takes aspirin 81 mg daily. Does not take any other anticoagulants. No epistaxis, bleeding gums, bruising, or petechiae. No history of similar eye symptoms.      Review of Systems   Review of Systems   All other systems reviewed and are negative.       Physical Exam   Vitals:    07/13/19 1434 07/13/19 1437   BP: 155/85 139/84   Patient Site: Right Arm Left Arm   Patient Position: Sitting Semi-conroy   Cuff Size: Adult Regular Adult Large   Pulse: (!) 102    Resp: 20    Temp: 98.8  F (37.1  C)    TempSrc: Oral    SpO2: 96%    Weight: 198 lb 2 oz (89.9 kg)       Visual Acuity Screening    Right eye Left eye Both eyes   Without correction:      With correction: 20/32 20/40 20/50     Physical Exam   Constitutional: She is oriented to person, place, and time. She appears well-developed and well-nourished.  Non-toxic appearance. She does not appear ill. No distress.   HENT:   Head: Normocephalic and atraumatic.   Right Ear: Tympanic membrane, external ear and ear canal normal.   Left Ear: Tympanic membrane, external ear and ear canal normal.   Nose: No mucosal edema or rhinorrhea. Right sinus exhibits no maxillary sinus tenderness and no frontal sinus tenderness. Left sinus exhibits no maxillary sinus tenderness and no frontal sinus tenderness.   Mouth/Throat: Uvula is midline, oropharynx is clear and moist and mucous membranes are normal. No oral lesions.   Eyes: Pupils are equal, round, and reactive to light. EOM and lids are normal. Right conjunctiva has a hemorrhage. Left conjunctiva has no hemorrhage.       Neck: Neck supple. No edema and no erythema present.   Cardiovascular: Normal rate, regular rhythm, S1 normal and S2 normal. Exam reveals no gallop and no friction rub.   No murmur heard.  Pulmonary/Chest: Effort normal and breath sounds normal. No stridor. She  has no wheezes. She has no rhonchi. She has no rales.   Lymphadenopathy:     She has no cervical adenopathy.   Neurological: She is alert and oriented to person, place, and time. No cranial nerve deficit.   Skin: Skin is warm and dry. No rash noted. She is not diaphoretic. No pallor.   Nursing note and vitals reviewed.       Diagnostic Studies   Laboratory:  N/A  Radiology:  N/A  Electrocardiogram:  N/A     Procedure Note   N/A     Pertinent History   The following portions of the patient's history were reviewed and updated as appropriate: allergies, current medications, past family history, past medical history, past social history, past surgical history and problem list.     Gasper Matute MD  [facility]

## 2021-06-27 NOTE — PROGRESS NOTES
"Progress Notes by Nubia Goldman AuD at 3/26/2019 12:30 PM     Author: Nubia Goldman AuD Service: -- Author Type: Audiologist    Filed: 3/26/2019  2:45 PM Encounter Date: 3/26/2019 Status: Signed    : Nubia Goldman AuD (Audiologist)       Hearing evaluation in conjunction with ENT exam (Dr. Romero)    Summary:  Audiology visit completed. Please see audiogram below or under \"media\" tab for history and results.    Transducer:  Both insert phones and circumaural headphones were used.    Reliability:    Good    Recommendations:  Follow-up with ENT; retest hearing annually (to monitor) or per medical management.  Wear hearing protection consistently in noise to preserve residual hearing sensitivity. Common tinnitus triggers and management strategies were discussed at length.  Katy E Gely is a potential binaural amplification candidate for both hearing loss and tinnitus management, if patient motivation exists and medical clearance is granted.    Rachael Arce, [facility]  [place] Licensed Audiologist [id number]           "

## 2021-06-28 NOTE — PROGRESS NOTES
Progress Notes by Cyn Gay MD at 10/4/2019 10:10 AM     Author: Cyn Gay MD Service: -- Author Type: Physician    Filed: 10/4/2019 11:51 AM Encounter Date: 10/4/2019 Status: Signed    : Cyn Gay MD (Physician)           Click to link to [facility] HEART CARE NOTE    Thank you, Dr. Monge, for asking us to see Katy Hong at the [facility].      Assessment/Recommendations   Assessment:     This is a 70 y.o. female with history of mild nonischemic cardiomyopathy with normalized left ventricular systolic function, mild obstructive sleep apnea, hypertension and dyspnea on exertion here for follow-up.          Plan:  1.  She is due for repeat fasting lipids.  Offered checking them here however she prefers to have been done through primary.  Continue on statin therapy and aspirin.  2.  Blood pressure is well-controlled.  3.  Recommend increasing regular exercise  4.  Repeat echocardiogram to reevaluate left ventricular ejection fraction  Follow-up in 1 year.       History of Present Illness    Ms. Katy Hong is a 74 y.o. female with history of mild nonischemic cardiomyopathy that normalized on a Lexiscan nuclear stress test done in 2017, mild obstructive sleep apnea, hypertension who is here for follow-up.  She underwent a CTA coronary with calcium score in 2017 which showed a calcium score of 14 with no significant stenosis.    She complains of right arm pain.  The pain radiates from her neck down her arm and is felt to be likely nerve pain.  She is undergoing an MRI of her neck to have this further evaluated.  She has chronic dyspnea on exertion and has remained unchanged.  She walks regularly.  She walks about 45 minutes 3 days a week without problems with chest discomfort.       Physical Examination Review of Systems   Vitals:    10/04/19 1010   BP: 124/76   Pulse: 80   Resp: 16     Body mass index is 33.13  kg/m .  Wt Readings from Last 3 Encounters:   10/04/19 187 lb (84.8 kg)   10/03/19 185 lb (83.9 kg)   09/26/19 187 lb (84.8 kg)       General Appearance:   alert, no apparent distress   HEENT:  no scleral icterus; the mucous membranes are pink and moist                                  Neck:  No JVD   Chest: the spine is straight and the chest is symmetric   Lungs:   respirations unlabored; the lungs are clear to auscultation   Cardiovascular:   regular rhythm with normal first and second heart sounds and no murmurs or gallops   Abdomen:  no organomegaly, masses, bruits, or tenderness; bowel sounds are present   Extremities: no cyanosis, clubbing, or edema   Skin: no xanthelasma    General: WNL  Eyes: WNL  Ears/Nose/Throat: WNL  Lungs: WNL  Heart: Arm Pain, Irregular Heartbeat  Stomach: Constipation  Bladder: WNL  Muscle/Joints: Muscle Weakness  Skin: WNL  Nervous System: WNL  Mental Health: Depression, Anxiety     Blood: WNL     Medical History  Surgical History Family History Social History   Past Medical History:   Diagnosis Date   ? Acute Sinusitis     Created by Conversion    ? Breast cyst    ? Cardiomyopathy     Created by Conversion    ? Cellulitis     Created by Conversion    ? Chronic Cutaneous Ulcer Venous Stasis     Created by Conversion    ? Contusion With Intact Skin Surface     Created by Conversion    ? Depression With Anxiety     Created by Conversion    ? Epidermal Inclusion Cyst     Created by Conversion    ? Esophageal reflux     Created by Conversion    ? Essential Hypertension     Created by Conversion    ? Fibromyalgia     Created by Conversion    ? Insomnia     Created by Conversion    ? Joint Pain, Localized In The Knee     Created by Conversion    ? Lichen Sclerosus Et Atrophicus     Created by Conversion    ? Limb Pain     Created by Conversion    ? Lower Back Pain     Created by Conversion    ? Mammogram - Abnormal     Created by Conversion    ? Migraine Headache     Created by Conversion     ? Myocardial infarction (H)     per EKG's since 2015   ? Nonvenomous Insect Bite Of Shoulder     Created by Conversion    ? Obesity     Created by Conversion    ? Open Wound Of The Lip     Created by Conversion    ? Osteopenia     Created by Conversion    ? Sebaceous Hyperplasia     Created by Conversion    ? Seborrheic Keratosis     Created by Conversion    ? Shortness of breath     Created by Conversion    ? Varicose Veins     Created by Conversion    ? Vitamin D Deficiency     Created by Conversion     Past Surgical History:   Procedure Laterality Date   ? APPENDECTOMY     ? BREAST BIOPSY     ? BREAST CYST ASPIRATION     ? CATARACT EXTRACTION, BILATERAL     ? HI TOTAL ABDOM HYSTERECTOMY       still has both ovaries   ? TEMPOROMANDIBULAR JOINT SURGERY Bilateral    ? TUBAL LIGATION      Family History   Problem Relation Age of Onset   ? Hypertension Mother    ? Arthritis Mother    ? Alzheimer's disease Father         d. 76   ? Diabetes Father    ? Hypertension Father    ? Breast cancer Maternal Aunt 60   ? Obesity Maternal Aunt    ? Hypertension Sister    ? Osteopenia Sister    ? Varicose Veins Brother    ? Obesity Brother    ? Breast cancer Cousin    ? Obesity Maternal Uncle    ? Arthritis Maternal Grandmother     Social History     Socioeconomic History   ? Marital status:      Spouse name: Anjel   ? Number of children: 2   ? Years of education: Not on file   ? Highest education level: Not on file   Occupational History   ? Occupation: Retired   Social Needs   ? Financial resource strain: Not on file   ? Food insecurity:     Worry: Not on file     Inability: Not on file   ? Transportation needs:     Medical: Not on file     Non-medical: Not on file   Tobacco Use   ? Smoking status: Never Smoker   ? Smokeless tobacco: Never Used   Substance and Sexual Activity   ? Alcohol use: Yes     Alcohol/week: 0.0 standard drinks     Comment: occasional   ? Drug use: No   ? Sexual activity: Not on file   Lifestyle    ? Physical activity:     Days per week: Not on file     Minutes per session: Not on file   ? Stress: Not on file   Relationships   ? Social connections:     Talks on phone: Not on file     Gets together: Not on file     Attends Jew service: Not on file     Active member of club or organization: Not on file     Attends meetings of clubs or organizations: Not on file     Relationship status: Not on file   ? Intimate partner violence:     Fear of current or ex partner: Not on file     Emotionally abused: Not on file     Physically abused: Not on file     Forced sexual activity: Not on file   Other Topics Concern   ? Not on file   Social History Narrative    5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            Medications  Allergies   Current Outpatient Medications   Medication Sig Dispense Refill   ? albuterol (VENTOLIN HFA) 90 mcg/actuation inhaler Inhale 2 puffs every 6 (six) hours as needed for wheezing. 18 g 5   ? aspirin 81 MG EC tablet Take 81 mg by mouth daily.     ? calcium carbonate (OS-MONIQUE) 500 mg calcium (1,250 mg) chewable tablet Chew 2 tablets daily.      ? cholecalciferol, vitamin D3, 5,000 unit Tab Take by mouth.     ? furosemide (LASIX) 20 MG tablet Take 1 tablet (20 mg total) by mouth daily. (Patient taking differently: Take 20-40 mg by mouth every other day as needed. ) 90 tablet 1   ? gabapentin (NEURONTIN) 300 MG capsule TAKE 2 CAPSULES (600 MG TOTAL) BY MOUTH AT BEDTIME. (Patient taking differently: Take 600 mg by mouth 2 (two) times a day.       ) 180 capsule 3   ? HYDROcodone-acetaminophen 5-325 mg per tablet Take 1 tablet by mouth every 6 (six) hours as needed for pain. 60 tablet 0   ? losartan (COZAAR) 25 MG tablet TAKE 1 TABLET (25 MG TOTAL) BY MOUTH DAILY. 90 tablet 1   ? meloxicam (MOBIC) 7.5 MG tablet Take 1 tablet (7.5 mg total) by mouth 2 (two) times a day as needed for pain. Take with food 30 tablet 1   ? metoprolol succinate (TOPROL-XL) 50 MG 24 hr tablet Take 1 tablet (50 mg total) by mouth  daily. 90 tablet 3   ? omeprazole (PRILOSEC) 20 MG capsule TAKE 1 CAPSULE BY MOUTH 2 TIMES A DAY. (Patient taking differently: TAKE 1 CAPSULE BY MOUTH EVERY DAY) 180 capsule 3   ? SUMAtriptan succinate 6 mg/0.5 mL PnIj INJECT 1 INJECTION AS DIRECTED IMMEDIATELY AT ONSET OF MIGRAINE HEADACHE 2 Syringe 4   ? topiramate (TOPAMAX) 100 MG tablet TAKE ONE TABLET BY MOUTH DAILY WITH DINNER 90 tablet 1   ? triamcinolone (KENALOG) 0.1 % ointment APPLY TO AFFECTED AREA TWICE A DAY 30 g 2   ? venlafaxine (EFFEXOR) 50 MG tablet TAKE ONE TABLET BY MOUTH THREE TIMES DAILY (Patient taking differently: 4 (four) times a day.       ) 270 tablet 3     No current facility-administered medications for this visit.       Allergies   Allergen Reactions   ? Fluoxetine Cough and Rash     Pt thinks it was cough but not totally sure.   ? Latex Rash   ? Levothyroxine Rash   ? Lisinopril Cough and Rash   ? Sulfa (Sulfonamide Antibiotics) Itching and Rash         Lab Results    Chemistry/lipid CBC Cardiac Enzymes/BNP/TSH/INR   Lab Results   Component Value Date    CHOL 161 07/18/2017    HDL 63 07/18/2017    LDLCALC 82 07/18/2017    TRIG 78 07/18/2017    CREATININE 0.81 01/04/2019    BUN 16 01/04/2019    K 4.1 01/04/2019     01/04/2019     (H) 01/04/2019    CO2 23 01/04/2019    Lab Results   Component Value Date    WBC 8.7 05/15/2018    HGB 12.4 05/15/2018    HCT 39.2 05/15/2018    MCV 94 05/15/2018     05/15/2018    Lab Results   Component Value Date    BNP 16 12/16/2016    TSH 0.92 01/04/2019    INR 1.0 12/15/2009          Cyn Gay MD  [facility]        -

## 2021-06-30 NOTE — PROGRESS NOTES
Progress Notes by Cyn Gay MD at 2/2/2021 10:10 AM     Author: Cyn Gay MD Service: -- Author Type: Physician    Filed: 2/2/2021 12:52 PM Encounter Date: 2/2/2021 Status: Signed    : Cyn Gay MD (Physician)           Thank you, Dr. Kerr, for asking us to see Katy Hong at the [facility].      Assessment/Recommendations   Assessment:    1.  Mild nonischemic cardiomyopathy  2.  Mild ANDRADE  3.  Hypertension  4.  Nonobstructive coronary artery disease      Plan:  1.  Continue aspirin and statin therapy  2.  Continue Toprol-XL and losartan  3.  Recommend increasing regular exercise    Follow-up in 1 year.       History of Present Illness    Ms. Katy Hong is a 75 y.o. female with history of mild nonischemic cardiomyopathy, mild obstructive sleep apnea and hypertension who is here for follow-up.  She underwent a CTA coronary with calcium score in 2017 which showed a calcium score of 14 with no significant stenosis.   She has had a tough year due to the Covid pandemic and restrictions.  She has not noted any problems with chest discomfort or breathing difficulty.  She admits to not exercising as much as she should.       Physical Examination Review of Systems   Vitals:    02/02/21 1016   BP: 130/80   Pulse: 84   Resp: 14     Body mass index is 34.75 kg/m .  Wt Readings from Last 3 Encounters:   02/02/21 190 lb (86.2 kg)   09/10/20 184 lb (83.5 kg)   07/23/20 187 lb 1 oz (84.9 kg)       General Appearance:   alert, no apparent distress   HEENT:  no scleral icterus; the mucous membranes are pink and moist                                  Neck: No jvd   Chest: the spine is straight and the chest is symmetric   Lungs:   respirations unlabored; the lungs are clear to auscultation   Cardiovascular:   regular rhythm with normal first and second heart sounds and no murmurs or gallop   Abdomen:  no organomegaly, masses, bruits, or  tenderness; bowel sounds are present   Extremities: no cyanosis, clubbing, or edema   Skin: no xanthelasma    General: WNL  Eyes: Visual Distubance  Ears/Nose/Throat: WNL  Lungs: WNL  Heart: WNL  Stomach: Constipation  Bladder: WNL  Muscle/Joints: Joint Pain  Skin: WNL  Nervous System: WNL  Mental Health: Anxiety     Blood: WNL     Medical History  Surgical History Family History Social History   Past Medical History:   Diagnosis Date   ? Acute Sinusitis     Created by Conversion    ? Breast cyst    ? Cardiomyopathy     Created by Conversion    ? Cellulitis     Created by Conversion    ? Chronic Cutaneous Ulcer Venous Stasis     Created by Conversion    ? Contusion With Intact Skin Surface     Created by Conversion    ? Depression With Anxiety     Created by Conversion    ? Epidermal Inclusion Cyst     Created by Conversion    ? Esophageal reflux     Created by Conversion    ? Essential Hypertension     Created by Conversion    ? Fibromyalgia     Created by Conversion    ? Insomnia     Created by Conversion    ? Joint Pain, Localized In The Knee     Created by Conversion    ? Lichen Sclerosus Et Atrophicus     Created by Conversion    ? Limb Pain     Created by Conversion    ? Lower Back Pain     Created by Conversion    ? Mammogram - Abnormal     Created by Conversion    ? Migraine Headache     Created by Conversion    ? Myocardial infarction (H)     per EKG's since 2015   ? Nonvenomous Insect Bite Of Shoulder     Created by Conversion    ? Obesity     Created by Conversion    ? Open Wound Of The Lip     Created by Conversion    ? Osteopenia     Created by Conversion    ? Sebaceous Hyperplasia     Created by Conversion    ? Seborrheic Keratosis     Created by Conversion    ? Shortness of breath     Created by Conversion    ? Varicose Veins     Created by Conversion    ? Vitamin D Deficiency     Created by Conversion     Past Surgical History:   Procedure Laterality Date   ? APPENDECTOMY     ? BREAST BIOPSY     ?  BREAST CYST ASPIRATION     ? CATARACT EXTRACTION, BILATERAL     ? SC TOTAL ABDOM HYSTERECTOMY       still has both ovaries   ? TEMPOROMANDIBULAR JOINT SURGERY Bilateral    ? TUBAL LIGATION      Family History   Problem Relation Age of Onset   ? Hypertension Mother    ? Arthritis Mother    ? Alzheimer's disease Father         d. 76   ? Diabetes Father    ? Hypertension Father    ? Breast cancer Maternal Aunt 60   ? Obesity Maternal Aunt    ? Hypertension Sister    ? Osteopenia Sister    ? Varicose Veins Brother    ? Obesity Brother    ? Breast cancer Cousin    ? Obesity Maternal Uncle    ? Arthritis Maternal Grandmother     Social History     Socioeconomic History   ? Marital status:      Spouse name: Anjel   ? Number of children: 2   ? Years of education: Not on file   ? Highest education level: Not on file   Occupational History   ? Occupation: Retired   Social Needs   ? Financial resource strain: Not on file   ? Food insecurity     Worry: Not on file     Inability: Not on file   ? Transportation needs     Medical: Not on file     Non-medical: Not on file   Tobacco Use   ? Smoking status: Never Smoker   ? Smokeless tobacco: Never Used   Substance and Sexual Activity   ? Alcohol use: Yes     Alcohol/week: 0.0 standard drinks     Comment: occasional   ? Drug use: No   ? Sexual activity: Not on file   Lifestyle   ? Physical activity     Days per week: Not on file     Minutes per session: Not on file   ? Stress: Not on file   Relationships   ? Social connections     Talks on phone: Not on file     Gets together: Not on file     Attends [employer] service: Not on file     Active member of club or organization: Not on file     Attends meetings of clubs or organizations: Not on file     Relationship status: Not on file   ? Intimate partner violence     Fear of current or ex partner: Not on file     Emotionally abused: Not on file     Physically abused: Not on file     Forced sexual activity: Not on file   Other  Topics Concern   ? Not on file   Social History Narrative    5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            Medications  Allergies   Current Outpatient Medications   Medication Sig Dispense Refill   ? albuterol (VENTOLIN HFA) 90 mcg/actuation inhaler Inhale 2 puffs every 6 (six) hours as needed for wheezing. 18 g 1   ? aspirin 81 MG EC tablet Take 81 mg by mouth daily.     ? calcium carbonate (OS-MONIQUE) 500 mg calcium (1,250 mg) chewable tablet Chew 2 tablets daily.      ? cholecalciferol, vitamin D3, 5,000 unit Tab Take by mouth.     ? furosemide (LASIX) 20 MG tablet Take 1-2 tablets (20-40 mg total) by mouth every other day as needed. (Patient taking differently: Take 20-40 mg by mouth daily. ) 60 tablet 11   ? gabapentin (NEURONTIN) 300 MG capsule Take 2 capsules (600 mg total) by mouth at bedtime. 270 capsule 3   ? HYDROcodone-acetaminophen 5-325 mg per tablet Take 1 tablet by mouth every 6 (six) hours as needed for pain. 20 tablet 0   ? metoprolol succinate (TOPROL-XL) 50 MG 24 hr tablet TAKE 1 TABLET BY MOUTH EVERY DAY 90 tablet 2   ? omeprazole (PRILOSEC) 20 MG capsule TAKE 1 CAPSULE BY MOUTH 2 TIMES A DAY. (Patient taking differently: Take 20 mg by mouth daily before breakfast. ) 180 capsule 2   ? SUMAtriptan succinate 6 mg/0.5 mL PnIj INJECT 1 INJECTION AS DIRECTED IMMEDIATELY AT ONSET OF MIGRAINE HEADACHE 2 Syringe 4   ? topiramate (TOPAMAX) 100 MG tablet TAKE ONE TABLET BY MOUTH DAILY WITH DINNER 90 tablet 1   ? triamcinolone (KENALOG) 0.1 % ointment APPLY TO AFFECTED AREA TWICE A DAY 30 g 2   ? venlafaxine (EFFEXOR) 50 MG tablet TAKE 1 TABLET (50 MG TOTAL) BY MOUTH 4 (FOUR) TIMES A DAY. NEEDS TO BE SEEN 360 tablet 2   ? losartan (COZAAR) 25 MG tablet Take 1 tablet (25 mg total) by mouth daily. 90 tablet 2   ? rimegepant 75 mg TbDL Take 1 tablet by mouth as needed (Migraine). 6 tablet 5     No current facility-administered medications for this visit.       Allergies   Allergen Reactions   ? Fluoxetine Cough and Rash      Pt thinks it was cough but not totally sure.   ? Latex Rash   ? Levothyroxine Rash   ? Lisinopril Cough and Rash   ? Sulfa (Sulfonamide Antibiotics) Itching and Rash         Lab Results    Chemistry/lipid CBC Cardiac Enzymes/BNP/TSH/INR   Lab Results   Component Value Date    CHOL 156 07/23/2020    HDL 59 07/23/2020    LDLCALC 82 07/23/2020    TRIG 77 07/23/2020    CREATININE 0.78 07/23/2020    BUN 20 07/23/2020    K 3.5 07/23/2020     07/23/2020     07/23/2020    CO2 24 07/23/2020    Lab Results   Component Value Date    WBC 7.3 07/23/2020    HGB 13.4 07/23/2020    HCT 39.4 07/23/2020    MCV 90 07/23/2020     07/23/2020    Lab Results   Component Value Date    BNP 16 12/16/2016    TSH 0.93 07/23/2020    INR 1.0 12/15/2009

## 2021-07-03 NOTE — ADDENDUM NOTE
Addendum Note by Amarjit Chery CMA at 12/10/2018  9:45 AM     Author: Amarjit Chery CMA Service: -- Author Type: Certified Medical Assistant    Filed: 12/10/2018 10:40 AM Encounter Date: 12/10/2018 Status: Signed    : Amarjit Chery CMA (Certified Medical Assistant)    Addended by: AMARJIT CHERY on: 12/10/2018 10:40 AM        Modules accepted: Orders

## 2021-07-06 VITALS
SYSTOLIC BLOOD PRESSURE: 110 MMHG | HEIGHT: 62 IN | RESPIRATION RATE: 18 BRPM | OXYGEN SATURATION: 98 % | HEART RATE: 77 BPM | BODY MASS INDEX: 34.96 KG/M2 | WEIGHT: 190 LBS | DIASTOLIC BLOOD PRESSURE: 72 MMHG | TEMPERATURE: 98.1 F

## 2021-07-13 ENCOUNTER — RECORDS - HEALTHEAST (OUTPATIENT)
Dept: ADMINISTRATIVE | Facility: CLINIC | Age: 76
End: 2021-07-13

## 2021-07-21 ENCOUNTER — RECORDS - HEALTHEAST (OUTPATIENT)
Dept: ADMINISTRATIVE | Facility: CLINIC | Age: 76
End: 2021-07-21

## 2021-07-22 ENCOUNTER — RECORDS - HEALTHEAST (OUTPATIENT)
Dept: FAMILY MEDICINE | Facility: CLINIC | Age: 76
End: 2021-07-22

## 2021-07-22 DIAGNOSIS — Z12.31 OTHER SCREENING MAMMOGRAM: ICD-10-CM

## 2021-07-28 ENCOUNTER — OFFICE VISIT (OUTPATIENT)
Dept: SURGERY | Facility: CLINIC | Age: 76
End: 2021-07-28
Payer: COMMERCIAL

## 2021-07-28 VITALS — HEIGHT: 62 IN | WEIGHT: 189.6 LBS | BODY MASS INDEX: 34.89 KG/M2

## 2021-07-28 DIAGNOSIS — K59.00 CONSTIPATION, UNSPECIFIED CONSTIPATION TYPE: ICD-10-CM

## 2021-07-28 DIAGNOSIS — E66.9 OBESITY (BMI 30-39.9): Primary | ICD-10-CM

## 2021-07-28 DIAGNOSIS — G43.909 MIGRAINE WITHOUT STATUS MIGRAINOSUS, NOT INTRACTABLE, UNSPECIFIED MIGRAINE TYPE: ICD-10-CM

## 2021-07-28 PROCEDURE — 99214 OFFICE O/P EST MOD 30 MIN: CPT | Performed by: FAMILY MEDICINE

## 2021-07-28 ASSESSMENT — MIFFLIN-ST. JEOR
SCORE: 1313.27
SCORE: 1313.27

## 2021-07-28 NOTE — PATIENT INSTRUCTIONS

## 2021-07-28 NOTE — LETTER
7/28/2021         RE: Katy Hong  [address] [facility] [address]        Dear Colleague,    Thank you for referring your patient, Katy Hong, to the [facility] [place]. Please see a copy of my visit note below.      Katy presents for nonsurgical weight loss visit.      Assessment and Plan   Assessment: Katy is a 75 year old year old female who presents for medical weight management.      Plan:    1. Obesity (BMI 30-39.9)  Patient was congratulated on her success thus far. Healthy habits to assist with further weight loss were discussed. SHe will work on increasing vegetables and decreasing starch. She will start doing some PT exercises. She will continue the topamax.    2. Constipation  We discussed healthy habits to assist with this. She will try to increase her water and exercise. She will start a stool softener daily and will take miralax or smooth move tea if she goes 2 days without a BM    3. Migraine without status migrainosus, not intractable, unspecified migraine type  This may improve with healthy habits and weight loss. She will continue the topamax    Follow up in 6 months with myself           INTERIM HISTORY  Her topamax dose was increased at her last visit. She is tolerating it without side effects.    DIETARY HISTORY  Meals Per Day: 3  Eating Protein First?: yes  Food Diary: B:eggs and  [place] garcia and wheat toast L:sandwich with lunch meat and fruit and milk D:meat and starch and vegetable  Snacks Per Day: hungry mid afternoon  Typical Snack: nuts or fruit  Fluid Intake: working on it (6 glasses per day)  Portion Control: yes  Calorie Containing Beverages: none  Typical Protein Food Choices: met  Choosing Whole Grains: usually  Meals at Restaurant per week:rare    Positive Changes Since Last Visit: protein first, focusing on increasing water intake  Struggling With: exercise    Knowledgeable in Reading Food Labels: yes  Getting  Adequate Protein: yes  Sleeping 7-8 hours/day yes- using trazodone  Stress management not discussed    PHYSICAL ACTIVITY PATTERNS:  Walking occasionally, tries to move more around more    REVIEW OF SYSTEMS  GENERAL/CONSTITUTIONAL:  Fatigue: no  HEENT:  Vision changes, glaucoma: no  CARDIOVASCULAR:  Chest Pain with Exertion: no  PULMONARY:  Dyspnea on exertion: yes  PSYCHIATRIC:  Moods: stable  MUSCULOSKELETAL/RHEUMATOLOGIC  Arthralgias: yes  Myalgias: yes  ENDOCRINE:  Monitoring Blood Sugars: na  Sugars Well Controlled: na  :  Birth control: menopause       Patient Profile   Social History     Social History Narrative    7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          Past Medical History   Past Medical History:   Diagnosis Date     Abnormal mammogram, unspecified     Created by Conversion      Acute sinusitis, unspecified     Created by Conversion      Asymptomatic varicose veins     Created by Conversion      Breast cyst      Cellulitis and abscess of unspecified site     Created by Conversion      Circumscribed scleroderma     Created by Conversion      Contusion of unspecified site     Created by Conversion      Disorder of bone and cartilage, unspecified     Created by Conversion      Dysthymic disorder     Created by Conversion      Esophageal reflux     Created by Conversion      Insomnia, unspecified     Created by Conversion      Lumbago     Created by Conversion      Migraine, unspecified, without mention of intractable migraine without mention of status migrainosus     Created by Conversion      Myalgia and myositis, unspecified     Created by Conversion      Myocardial infarction (H)     per EKG's since      Obesity, unspecified     Created by Conversion      Open wound of lip, without mention of complication     Created by Conversion      Other primary cardiomyopathies     Created by Conversion      Other seborrheic keratosis     Created by Conversion      Pain in joint, lower leg     Created by Conversion   "    Pain in limb     Created by Conversion      Sebaceous cyst     Created by Conversion      Shortness of breath     Created by Conversion      Shoulder and upper arm, insect bite, nonvenomous, without mention of infection(912.4)     Created by Conversion      Unspecified disease of sebaceous glands     Created by Conversion      Unspecified essential hypertension     Created by Conversion      Unspecified venous (peripheral) insufficiency     Created by Conversion      Unspecified vitamin D deficiency     Created by Conversion      Patient Active Problem List   Diagnosis     Osteopenia     Insomnia     Vitamin D Deficiency     Lower Back Pain     Fibromyalgia     Essential Hypertension     Seborrheic Keratosis     Obesity     Lichen Sclerosus Et Atrophicus     Depression With Anxiety     Migraine Headache     Esophageal Reflux     Joint Pain, Localized In The Knee     Varicose Veins     Nonvenomous Insect Bite Of Shoulder     Cellulitis     Mammogram - Abnormal     Chronic Cutaneous Ulcer Venous Stasis     Chronic venous insufficiency     Foot pain (Soft Tissue)     Female stress incontinence     Calculus of ureter     Hydronephrosis with urinary obstruction due to ureteral calculus     Other cardiomyopathy (H)     Obesity (BMI 30-39.9)     Chronic fatigue     Moderate episode of recurrent major depressive disorder (H)     Anxiety       Past Surgical History  She has a past surgical history that includes TOTAL ABDOM HYSTERECTOMY; tubal ligation; appendectomy; Temporomandibular Joint Surgery (Bilateral); Cataract Extraction, Bilateral; Hysterectomy; Oophorectomy; Biopsy breast (Left); and Breast Cyst Aspiration (Left).     Examination   Ht 1.575 m (5' 2\")   Wt 86 kg (189 lb 9.6 oz)   BMI 34.68 kg/m    General:  Alert and ambulatory, NAD  Pscyh/Mood: stable         Counseling:   We reviewed the important post op bariatric recommendations:  -eating 3 meals daily  -eating protein first, getting >60gm protein " daily  -eating slowly, chewing food well  -avoiding/limiting calorie containing beverages  -limiting starchy vegetables and carbohydrates, choosing wheat, not white with breads,   crackers, pastas, bear, bagels, tortillas, rice  -limiting restaurant or cafeteria eating to twice a week or less    We discussed the importance of restorative sleep and stress management in maintaining a healthy weight.  We discussed the National Weight Control Registry healthy weight maintenance strategies and ways to optimize metabolism.  We discussed the importance of physical activity including cardiovascular and strength training in maintaining a healthier weight.    Total time spent on the date of this encounter doing: chart review, review of test results, patient visit, physical exam, education, counseling, developing plan of care and documenting = 30                Again, thank you for allowing me to participate in the care of your patient.        Sincerely,        LATISHA Rossi MD

## 2021-07-28 NOTE — PROGRESS NOTES
Katy presents for nonsurgical weight loss visit.      Assessment and Plan   Assessment: Katy is a 75 year old year old female who presents for medical weight management.      Plan:    1. Obesity (BMI 30-39.9)  Patient was congratulated on her success thus far. Healthy habits to assist with further weight loss were discussed. SHe will work on increasing vegetables and decreasing starch. She will start doing some PT exercises. She will continue the topamax.    2. Constipation  We discussed healthy habits to assist with this. She will try to increase her water and exercise. She will start a stool softener daily and will take miralax or smooth move tea if she goes 2 days without a BM    3. Migraine without status migrainosus, not intractable, unspecified migraine type  This may improve with healthy habits and weight loss. She will continue the topamax    Follow up in 6 months with myself           INTERIM HISTORY  Her topamax dose was increased at her last visit. She is tolerating it without side effects.    DIETARY HISTORY  Meals Per Day: 3  Eating Protein First?: yes  Food Diary: B:eggs and  [place] garcia and wheat toast L:sandwich with lunch meat and fruit and milk D:meat and starch and vegetable  Snacks Per Day: hungry mid afternoon  Typical Snack: nuts or fruit  Fluid Intake: working on it (6 glasses per day)  Portion Control: yes  Calorie Containing Beverages: none  Typical Protein Food Choices: met  Choosing Whole Grains: usually  Meals at Restaurant per week:rare    Positive Changes Since Last Visit: protein first, focusing on increasing water intake  Struggling With: exercise    Knowledgeable in Reading Food Labels: yes  Getting Adequate Protein: yes  Sleeping 7-8 hours/day yes- using trazodone  Stress management not discussed    PHYSICAL ACTIVITY PATTERNS:  Walking occasionally, tries to move more around more    REVIEW OF SYSTEMS  GENERAL/CONSTITUTIONAL:  Fatigue: no  HEENT:  Vision changes, glaucoma:  no  CARDIOVASCULAR:  Chest Pain with Exertion: no  PULMONARY:  Dyspnea on exertion: yes  PSYCHIATRIC:  Moods: stable  MUSCULOSKELETAL/RHEUMATOLOGIC  Arthralgias: yes  Myalgias: yes  ENDOCRINE:  Monitoring Blood Sugars: na  Sugars Well Controlled: na  :  Birth control: menopause       Patient Profile   Social History     Social History Narrative    7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          Past Medical History   Past Medical History:   Diagnosis Date     Abnormal mammogram, unspecified     Created by Conversion      Acute sinusitis, unspecified     Created by Conversion      Asymptomatic varicose veins     Created by Conversion      Breast cyst      Cellulitis and abscess of unspecified site     Created by Conversion      Circumscribed scleroderma     Created by Conversion      Contusion of unspecified site     Created by Conversion      Disorder of bone and cartilage, unspecified     Created by Conversion      Dysthymic disorder     Created by Conversion      Esophageal reflux     Created by Conversion      Insomnia, unspecified     Created by Conversion      Lumbago     Created by Conversion      Migraine, unspecified, without mention of intractable migraine without mention of status migrainosus     Created by Conversion      Myalgia and myositis, unspecified     Created by Conversion      Myocardial infarction (H)     per EKG's since      Obesity, unspecified     Created by Conversion      Open wound of lip, without mention of complication     Created by Conversion      Other primary cardiomyopathies     Created by Conversion      Other seborrheic keratosis     Created by Conversion      Pain in joint, lower leg     Created by Conversion      Pain in limb     Created by Conversion      Sebaceous cyst     Created by Conversion      Shortness of breath     Created by Conversion      Shoulder and upper arm, insect bite, nonvenomous, without mention of infection(912.4)     Created by Conversion      Unspecified disease  "of sebaceous glands     Created by Conversion      Unspecified essential hypertension     Created by Conversion      Unspecified venous (peripheral) insufficiency     Created by Conversion      Unspecified vitamin D deficiency     Created by Conversion      Patient Active Problem List   Diagnosis     Osteopenia     Insomnia     Vitamin D Deficiency     Lower Back Pain     Fibromyalgia     Essential Hypertension     Seborrheic Keratosis     Obesity     Lichen Sclerosus Et Atrophicus     Depression With Anxiety     Migraine Headache     Esophageal Reflux     Joint Pain, Localized In The Knee     Varicose Veins     Nonvenomous Insect Bite Of Shoulder     Cellulitis     Mammogram - Abnormal     Chronic Cutaneous Ulcer Venous Stasis     Chronic venous insufficiency     Foot pain (Soft Tissue)     Female stress incontinence     Calculus of ureter     Hydronephrosis with urinary obstruction due to ureteral calculus     Other cardiomyopathy (H)     Obesity (BMI 30-39.9)     Chronic fatigue     Moderate episode of recurrent major depressive disorder (H)     Anxiety       Past Surgical History  She has a past surgical history that includes TOTAL ABDOM HYSTERECTOMY; tubal ligation; appendectomy; Temporomandibular Joint Surgery (Bilateral); Cataract Extraction, Bilateral; Hysterectomy; Oophorectomy; Biopsy breast (Left); and Breast Cyst Aspiration (Left).     Examination   Ht 1.575 m (5' 2\")   Wt 86 kg (189 lb 9.6 oz)   BMI 34.68 kg/m    General:  Alert and ambulatory, NAD  Pscyh/Mood: stable         Counseling:   We reviewed the important post op bariatric recommendations:  -eating 3 meals daily  -eating protein first, getting >60gm protein daily  -eating slowly, chewing food well  -avoiding/limiting calorie containing beverages  -limiting starchy vegetables and carbohydrates, choosing wheat, not white with breads,   crackers, pastas, bear, bagels, tortillas, rice  -limiting restaurant or cafeteria eating to twice a week or " less    We discussed the importance of restorative sleep and stress management in maintaining a healthy weight.  We discussed the National Weight Control Registry healthy weight maintenance strategies and ways to optimize metabolism.  We discussed the importance of physical activity including cardiovascular and strength training in maintaining a healthier weight.    Total time spent on the date of this encounter doing: chart review, review of test results, patient visit, physical exam, education, counseling, developing plan of care and documenting = 30

## 2021-07-30 DIAGNOSIS — I10 ESSENTIAL HYPERTENSION: ICD-10-CM

## 2021-08-03 RX ORDER — METOPROLOL SUCCINATE 50 MG/1
TABLET, EXTENDED RELEASE ORAL
Qty: 90 TABLET | Refills: 2 | Status: SHIPPED | OUTPATIENT
Start: 2021-08-03 | End: 2022-06-15

## 2021-08-03 NOTE — TELEPHONE ENCOUNTER
"Last Written Prescription Date:  6/29/20  Last Fill Quantity: 90,  # refills: 2   Last office visit provider:  6/7/21     Requested Prescriptions   Pending Prescriptions Disp Refills     metoprolol succinate ER (TOPROL-XL) 50 MG 24 hr tablet [Pharmacy Med Name: METOPROLOL SUCC ER 50 MG TAB] 90 tablet 2     Sig: TAKE 1 TABLET BY MOUTH EVERY DAY       Beta-Blockers Protocol Passed - 7/30/2021 12:32 AM        Passed - Blood pressure under 140/90 in past 12 months     BP Readings from Last 3 Encounters:   07/06/21 120/74   06/07/21 110/72   02/22/21 126/78                 Passed - Patient is age 6 or older        Passed - Recent (12 mo) or future (30 days) visit within the authorizing provider's specialty     Patient has had an office visit with the authorizing provider or a provider within the authorizing providers department within the previous 12 mos or has a future within next 30 days. See \"Patient Info\" tab in inbasket, or \"Choose Columns\" in Meds & Orders section of the refill encounter.              Passed - Medication is active on med list             Hi Al RN 08/03/21 7:28 AM  "

## 2021-08-04 DIAGNOSIS — G47.01 INSOMNIA DUE TO MEDICAL CONDITION: ICD-10-CM

## 2021-08-04 RX ORDER — TRAZODONE HYDROCHLORIDE 50 MG/1
50 TABLET, FILM COATED ORAL AT BEDTIME
Qty: 30 TABLET | Refills: 5 | Status: CANCELLED | OUTPATIENT
Start: 2021-08-04

## 2021-08-04 RX ORDER — TRAZODONE HYDROCHLORIDE 100 MG/1
100 TABLET ORAL AT BEDTIME
Qty: 90 TABLET | Refills: 1 | Status: SHIPPED | OUTPATIENT
Start: 2021-08-04 | End: 2021-09-22

## 2021-08-06 NOTE — PATIENT INSTRUCTIONS - HE
Patient Instructions by Taryn Arango MD at 7/23/2020  9:00 AM     Author: Taryn Arango MD Service: -- Author Type: Physician    Filed: 7/23/2020  9:24 AM Encounter Date: 7/23/2020 Status: Signed    : Taryn Arango MD (Physician)         Patient Education   Understanding USDA MyPlate  The USDA ([place] Department of Agriculture) has guidelines to help you make healthy food choices. These are called MyPlate. MyPlate shows the food groups that make up healthy meals using the image of a place setting. Before you eat, think about the healthiest choices for what to put onto your plate or into your cup or bowl. To learn more about building a healthy plate, visit www.choosemyplate.gov.       The Food Groups    Fruits: Any fruit or 100% fruit juice counts as part of the Fruit Group. Fruits may be fresh, canned, frozen, or dried, and may be whole, cut-up, or pureed. Make half your plate fruits and vegetables.    Vegetables: Any vegetable or 100% vegetable juice counts as a member of the Vegetable Group. Vegetables may be fresh, frozen, canned, or dried. They can be served raw or cooked and may be whole, cut-up, or mashed. Make half your plate fruits and vegetables.     Grains: All foods made from grains are part of the Grains Group. These include wheat, rice, oats, cornmeal, and barley such as bread, pasta, oatmeal, cereal, tortillas, and grits. Grains should be no more than a quarter of your plate. At least half of your grains should be whole grains.    Protein: This group includes meat, poultry, seafood, beans and peas, eggs, processed soy products (like tofu), nuts (including nut butters), and seeds. Make protein choices no more than a quarter of your plate. Meat and poultry choices should be lean or low fat.    Dairy: All fluid milk products and foods made from milk that contain calcium, like yogurt and cheese are part of the Dairy Group. (Foods that have little calcium, such as cream,  butter, and cream cheese, are not part of the group.) Most dairy choices should be low-fat or fat-free.    Oils: These are fats that are liquid at room temperature. They include canola, corn, olive, soybean, and sunflower oil. Foods that are mainly oil include mayonnaise, certain salad dressings, and soft margarines. You should have only 5 to 7 teaspoons of oils a day. You probably already get this much from the food you eat.  Use [employer] to Help Build Your Meals  The [employer] can help you plan and track your meals and activity. You can look up individual foods to see or compare their nutritional value. You can get guidelines for what and how much you should eat. You can compare your food choices. And you can assess personal physical activities and see ways you can improve. Go to www.[place].gov/[facility]/.    1529-3334 [employer]. [address] [place] [address]. All rights reserved. This information is not intended as a substitute for professional medical care. Always follow your healthcare professional's instructions.           Patient Education   Urinary Incontinence, Female (Adult)  Urinary incontinence means loss of control of the bladder. This problem affects many women, especially as they get older. If you have incontinence, you may be embarrassed to ask for help. But know that this problem can be treated.  Types of Incontinence  There are different types of incontinence. Two of the main types are described here. You can have more than one type.    Stress incontinence. With this type, urine leaks when pressure (stress) is put on the bladder. This may happen when you cough, sneeze, or laugh. Stress incontinence most often occurs because the pelvic floor muscles that support the bladder and urethra are weak. This can happen after pregnancy and vaginal childbirth or a hysterectomy. It can also be due to excess body weight or hormone changes.    Urge incontinence (also  called overactive bladder). With this type, a sudden urge to urinate is felt often. This may happen even though there may not be much urine in the bladder. The need to urinate often during the night is common. Urge incontinence most often occurs because of bladder spasms. This may be due to bladder irritation or infection. Damage to bladder nerves or pelvic muscles, constipation, and certain medicines can also lead to urge incontinence.  Treatment of urinary incontinence depends on the cause. Further evaluation is needed to find the type you have. This will likely include an exam and certain tests. Based on the results, you and your healthcare provider can then plan treatment. Until a diagnosis is made, the home care tips below can help relieve symptoms.  Home care    Do pelvic floor muscle exercises, if they are prescribed. The pelvic floor muscles help support the bladder and urethra. Many women find that their symptoms improve when doing special exercises that strengthen these muscles. To do the exercises contract the muscles you would use to stop your stream of urine, but do this when youre not urinating. Hold for 10 seconds, then relax. Repeat 10 to 20 times in a row, at least 3 times a day. Your provider may give you other instructions for how to do the exercises and how often.    Keep a bladder diary. This helps track how often and how much you urinate over a set period of time. Bring this diary with you to your next visit with the provider. The information can help your provider learn more about your bladder problem.    Lose weight, if advised to by your provider. Excess weight puts pressure on the bladder. Your provider can help you create a weight-loss plan thats right for you. This may include exercising more and making certain diet changes.    Don't consume foods and drinks that may irritate the bladder. These can include alcohol and caffeinated drinks.    Quit smoking. Smoking and other tobacco use can  lead to chronic cough that strains the pelvic floor muscles. Smoking may also damage the bladder and urethra. Talk with your provider about treatments or methods you can use to quit smoking.    If drinking large amounts of fluid causes you to have symptoms, you may be advised to limit your fluid intake. You may also be advised to drink most of your fluids during the day and to limit fluids at night.    If youre worried about urine leakage or accidents, you may wear absorbent pads to catch urine. Change the pads often. This helps reduce discomfort. It may also reduce the risk of skin or bladder infections.  Follow-up care  Follow up with your healthcare provider, or as directed. It may take some to find the right treatment for your problem. Your treatment plan may include special therapies or medicines. Certain procedures or surgery may also be options. Be sure to discuss any questions you have with your provider.  When to seek medical advice  Call the healthcare provider right away if any of these occur:    Fever of 100.4 F (38 C) or higher, or as directed by your provider    Bladder pain or fullness    Abdominal swelling    Nausea or vomiting    Back pain    Weakness, dizziness or fainting  Date Last Reviewed: 10/1/2017    7108-1828 [employer]. [address] [place] [id number]. All rights reserved. This information is not intended as a substitute for professional medical care. Always follow your healthcare professional's instructions.     Patient Education   Your Health Risk Assessment indicates you feel you are not in good emotional health.    Recreation   Recreation is not limited to sports and team events. It includes any activity that provides relaxation, interest, enjoyment, and exercise. Recreation provides an outlet for physical, mental, and social energy. It can give a sense of worth and achievement. It can help you stay healthy.    Mental Exercise and Social Involvement  Mental and  emotional health is as important as physical health. Keep in touch with friends and family. Stay as active as possible. Continue to learn and challenge yourself.   Things you can do to stay mentally active are:    Learn something new, like a foreign language or musical instrument.     Play SCRABBLE or do crossword puzzles. If you cannot find people to play these games with you at home, you can play them with others on your computer through the Internet.     Join a games club--anything from card games to chess or checkers or lawn bowling.     Start a new hobby.     Go back to school.     Volunteer.     Read.     Keep up with world events.       Patient Education   Depression and Suicide in Older Adults  Nearly 2 million older Americans have some type of depression. Sadly, some of them even take their own lives. Yet depression among older adults is often ignored. Learn the warning signs. You may help spare a loved one needless pain. You may also save a life.       What Is Depression?  Depression is a mood disorder that affects the way you think and feel. The most common symptom is a feeling of deep sadness. People who are depressed also may seem tired and listless. And nothing seems to give them pleasure. Its normal to grieve or be sad sometimes. But sadness lessens or passes with time. Depression rarely goes away or improves on its own. Other symptoms of depression are:    Sleeping more or less than normal    Eating more or less than normal    Having headaches, stomachaches, or other pains that dont go away    Feeling nervous, empty, or worthless    Crying a great deal    Thinking or talking about suicide or death    Feeling confused or forgetful  What Causes It?  The causes of depression arent fully known. Certain chemicals in the brain play a role. Depression does run in families. And life stresses can also trigger depression in some people. That may be the case with older adults. They often face great burdens,  such as the death of friends or a spouse. They may have failing health. And they are more likely to be alone, lonely, or poor.  How You Can Help  Often, depressed people may not want to ask for help. When they do, they may be ignored. Or, they may receive the wrong treatment. You can help by showing parents and older friends love and support. If they seem depressed, help them find the right treatment. Talk to your doctor. Or contact a local mental health center, social service agency, or hospital. With modern treatment, no one has to suffer from depression.  Resources:    National [place] of Mental Health  710.849.4149  www.nimh.nih.gov    National [place] on Mental Illness  292.140.4258  www.zee.org    [facility]  309.787.2611  www.[facility].org    National Suicide Hotline  921.170.9858 (800-SUICIDE)      3850-1255 [employer]. [address] [place] [address]. All rights reserved. This information is not intended as a substitute for professional medical care. Always follow your healthcare professional's instructions.           Advance Directive  Patients advance directive was discussed and I am comfortable with the patients wishes.  Patient Education   Personalized Prevention Plan  You are due for the preventive services outlined below.  Your care team is available to assist you in scheduling these services.  If you have already completed any of these items, please share that information with your care team to update in your medical record.  Health Maintenance   Topic Date Due   ? HEPATITIS C SCREENING  1945   ? ZOSTER VACCINES (2 of 3) 11/17/2009   ? DXA SCAN  06/12/2019   ? MEDICARE ANNUAL WELLNESS VISIT  01/04/2020   ? FALL RISK ASSESSMENT  01/04/2020   ? INFLUENZA VACCINE RULE BASED (1) 08/01/2020   ? MAMMOGRAM  12/05/2021   ? TD 18+ HE  10/17/2023   ? ADVANCE CARE PLANNING  01/04/2024   ? COLORECTAL CANCER SCREENING  04/22/2024   ? LIPID  10/11/2024   ? DEPRESSION ACTION  PLAN  Completed   ? PNEUMOCOCCAL IMMUNIZATION 65+ LOW/MEDIUM RISK  Completed   ? HEPATITIS B VACCINES  Aged Out

## 2021-08-24 ENCOUNTER — TELEPHONE (OUTPATIENT)
Dept: FAMILY MEDICINE | Facility: CLINIC | Age: 76
End: 2021-08-24

## 2021-08-24 DIAGNOSIS — I10 ESSENTIAL HYPERTENSION: Primary | ICD-10-CM

## 2021-08-24 DIAGNOSIS — M43.10 SPONDYLOLISTHESIS: ICD-10-CM

## 2021-08-24 DIAGNOSIS — M79.7 FIBROMYALGIA: ICD-10-CM

## 2021-08-24 DIAGNOSIS — I87.2 CHRONIC VENOUS INSUFFICIENCY: ICD-10-CM

## 2021-08-24 NOTE — TELEPHONE ENCOUNTER
Medication: Gabapentin,   Last Date Filled:   Last appointment addressing medication:   Last B/P:  BP Readings from Last 3 Encounters:   07/06/21 120/74   06/07/21 110/72   02/22/21 126/78     Last labs pertaining to refill:    Pend medication and associate diagnosis before routing to Provider for review.       If patient has not been seen in over 1 year, pend 30 day supply and notify patient they are due for an appointment before any further refills.

## 2021-08-24 NOTE — TELEPHONE ENCOUNTER
Reason for call:  Medication   If this is a refill request, has the caller requested the refill from the pharmacy already? Yes  Will the patient be using a [facility]? No  Name of the pharmacy and phone number for the current request: [facility] [phone]    Name of the medication requested: Gabapentin, Furosemide, and Duloxetine    Other request:     Phone number to reach patient:  Cell number on file:    Telephone Information:   Mobile [phone]       Best Time:      Can we leave a detailed message on this number?  YES    Travel screening: Not Applicable

## 2021-08-25 RX ORDER — FUROSEMIDE 20 MG
20-40 TABLET ORAL
Qty: 90 TABLET | Refills: 1 | Status: SHIPPED | OUTPATIENT
Start: 2021-08-25 | End: 2023-04-25

## 2021-08-25 RX ORDER — GABAPENTIN 300 MG/1
600 CAPSULE ORAL AT BEDTIME
Qty: 270 CAPSULE | Refills: 3 | Status: SHIPPED | OUTPATIENT
Start: 2021-08-25 | End: 2021-09-23

## 2021-08-25 RX ORDER — DULOXETIN HYDROCHLORIDE 20 MG/1
CAPSULE, DELAYED RELEASE ORAL
Qty: 180 CAPSULE | Refills: 2 | Status: SHIPPED | OUTPATIENT
Start: 2021-08-25 | End: 2021-09-02 | Stop reason: DRUGHIGH

## 2021-08-25 NOTE — TELEPHONE ENCOUNTER
Medication: Gabapentin 300mg  Last Date Filled: 7/23/20    Medicaiton Furosemide  Last filled 8/13/20    Medication Duloxetine  Last fill 5/27/21      Last appointment addressing medication: 6/7/21  Last B/P:  BP Readings from Last 3 Encounters:   07/06/21 120/74   06/07/21 110/72   02/22/21 126/78     Last labs pertaining to refill:6/3/21

## 2021-09-02 ENCOUNTER — ANCILLARY PROCEDURE (OUTPATIENT)
Dept: GENERAL RADIOLOGY | Facility: CLINIC | Age: 76
End: 2021-09-02
Attending: NURSE PRACTITIONER
Payer: COMMERCIAL

## 2021-09-02 ENCOUNTER — OFFICE VISIT (OUTPATIENT)
Dept: FAMILY MEDICINE | Facility: CLINIC | Age: 76
End: 2021-09-02
Payer: COMMERCIAL

## 2021-09-02 VITALS
DIASTOLIC BLOOD PRESSURE: 70 MMHG | HEART RATE: 74 BPM | OXYGEN SATURATION: 96 % | SYSTOLIC BLOOD PRESSURE: 110 MMHG | WEIGHT: 190 LBS | HEIGHT: 55 IN | TEMPERATURE: 98.3 F | BODY MASS INDEX: 43.97 KG/M2 | RESPIRATION RATE: 18 BRPM

## 2021-09-02 DIAGNOSIS — F33.1 MODERATE EPISODE OF RECURRENT MAJOR DEPRESSIVE DISORDER (H): ICD-10-CM

## 2021-09-02 DIAGNOSIS — G43.911 INTRACTABLE MIGRAINE WITH STATUS MIGRAINOSUS, UNSPECIFIED MIGRAINE TYPE: ICD-10-CM

## 2021-09-02 DIAGNOSIS — G47.00 INSOMNIA, UNSPECIFIED TYPE: ICD-10-CM

## 2021-09-02 DIAGNOSIS — Z00.00 ENCOUNTER FOR ANNUAL WELLNESS EXAM IN MEDICARE PATIENT: Primary | ICD-10-CM

## 2021-09-02 DIAGNOSIS — G89.29 CHRONIC BILATERAL LOW BACK PAIN WITHOUT SCIATICA: ICD-10-CM

## 2021-09-02 DIAGNOSIS — M54.50 CHRONIC BILATERAL LOW BACK PAIN WITHOUT SCIATICA: ICD-10-CM

## 2021-09-02 DIAGNOSIS — E66.01 MORBID OBESITY (H): ICD-10-CM

## 2021-09-02 DIAGNOSIS — Z23 NEED FOR VACCINATION: ICD-10-CM

## 2021-09-02 DIAGNOSIS — M79.7 FIBROMYALGIA: ICD-10-CM

## 2021-09-02 DIAGNOSIS — E55.9 VITAMIN D DEFICIENCY: ICD-10-CM

## 2021-09-02 DIAGNOSIS — I10 ESSENTIAL HYPERTENSION: ICD-10-CM

## 2021-09-02 LAB
ANION GAP SERPL CALCULATED.3IONS-SCNC: 10 MMOL/L (ref 5–18)
BUN SERPL-MCNC: 17 MG/DL (ref 8–28)
CALCIUM SERPL-MCNC: 9.4 MG/DL (ref 8.5–10.5)
CHLORIDE BLD-SCNC: 108 MMOL/L (ref 98–107)
CHOLEST SERPL-MCNC: 166 MG/DL
CO2 SERPL-SCNC: 26 MMOL/L (ref 22–31)
CREAT SERPL-MCNC: 0.84 MG/DL (ref 0.6–1.1)
FASTING STATUS PATIENT QL REPORTED: YES
GFR SERPL CREATININE-BSD FRML MDRD: 69 ML/MIN/1.73M2
GLUCOSE BLD-MCNC: 93 MG/DL (ref 70–125)
HDLC SERPL-MCNC: 63 MG/DL
HGB BLD-MCNC: 12.4 G/DL (ref 11.7–15.7)
LDLC SERPL CALC-MCNC: 85 MG/DL
POTASSIUM BLD-SCNC: 4 MMOL/L (ref 3.5–5)
SODIUM SERPL-SCNC: 144 MMOL/L (ref 136–145)
TRIGL SERPL-MCNC: 89 MG/DL

## 2021-09-02 PROCEDURE — 85018 HEMOGLOBIN: CPT | Performed by: NURSE PRACTITIONER

## 2021-09-02 PROCEDURE — 80048 BASIC METABOLIC PNL TOTAL CA: CPT | Performed by: NURSE PRACTITIONER

## 2021-09-02 PROCEDURE — 80061 LIPID PANEL: CPT | Performed by: NURSE PRACTITIONER

## 2021-09-02 PROCEDURE — 72100 X-RAY EXAM L-S SPINE 2/3 VWS: CPT | Mod: TC | Performed by: RADIOLOGY

## 2021-09-02 PROCEDURE — 36415 COLL VENOUS BLD VENIPUNCTURE: CPT | Performed by: NURSE PRACTITIONER

## 2021-09-02 PROCEDURE — 99214 OFFICE O/P EST MOD 30 MIN: CPT | Mod: 25 | Performed by: NURSE PRACTITIONER

## 2021-09-02 PROCEDURE — 82306 VITAMIN D 25 HYDROXY: CPT | Performed by: NURSE PRACTITIONER

## 2021-09-02 PROCEDURE — 86803 HEPATITIS C AB TEST: CPT | Performed by: NURSE PRACTITIONER

## 2021-09-02 PROCEDURE — 99397 PER PM REEVAL EST PAT 65+ YR: CPT | Performed by: NURSE PRACTITIONER

## 2021-09-02 RX ORDER — DULOXETIN HYDROCHLORIDE 30 MG/1
30 CAPSULE, DELAYED RELEASE ORAL 2 TIMES DAILY
Qty: 180 CAPSULE | Refills: 1 | Status: SHIPPED | OUTPATIENT
Start: 2021-09-02 | End: 2021-12-06

## 2021-09-02 ASSESSMENT — MIFFLIN-ST. JEOR: SCORE: 1156.33

## 2021-09-02 ASSESSMENT — ANXIETY QUESTIONNAIRES
5. BEING SO RESTLESS THAT IT IS HARD TO SIT STILL: SEVERAL DAYS
GAD7 TOTAL SCORE: 9
6. BECOMING EASILY ANNOYED OR IRRITABLE: MORE THAN HALF THE DAYS
7. FEELING AFRAID AS IF SOMETHING AWFUL MIGHT HAPPEN: NOT AT ALL
2. NOT BEING ABLE TO STOP OR CONTROL WORRYING: SEVERAL DAYS
1. FEELING NERVOUS, ANXIOUS, OR ON EDGE: SEVERAL DAYS
3. WORRYING TOO MUCH ABOUT DIFFERENT THINGS: SEVERAL DAYS
IF YOU CHECKED OFF ANY PROBLEMS ON THIS QUESTIONNAIRE, HOW DIFFICULT HAVE THESE PROBLEMS MADE IT FOR YOU TO DO YOUR WORK, TAKE CARE OF THINGS AT HOME, OR GET ALONG WITH OTHER PEOPLE: SOMEWHAT DIFFICULT

## 2021-09-02 ASSESSMENT — PATIENT HEALTH QUESTIONNAIRE - PHQ9
SUM OF ALL RESPONSES TO PHQ QUESTIONS 1-9: 6
5. POOR APPETITE OR OVEREATING: NEARLY EVERY DAY

## 2021-09-02 ASSESSMENT — ACTIVITIES OF DAILY LIVING (ADL): CURRENT_FUNCTION: NO ASSISTANCE NEEDED

## 2021-09-02 NOTE — PATIENT INSTRUCTIONS
Patient Education   Personalized Prevention Plan  You are due for the preventive services outlined below.  Your care team is available to assist you in scheduling these services.  If you have already completed any of these items, please share that information with your care team to update in your medical record.  Health Maintenance Due   Topic Date Due     Hepatitis C Screening  Never done     Flu Vaccine (1) 09/01/2021

## 2021-09-02 NOTE — PROGRESS NOTES
"SUBJECTIVE:   Katy Hong is a 75 year old female who presents for Preventive Visit, med check, address acute concerns.       Patient has been advised of split billing requirements and indicates understanding: Yes   Are you in the first 12 months of your Medicare coverage?  No    Healthy Habits:     In general, how would you rate your overall health?  Good    Frequency of exercise:  2-3 days/week    Duration of exercise:  15-30 minutes    Do you usually eat at least 4 servings of fruit and vegetables a day, include whole grains    & fiber and avoid regularly eating high fat or \"junk\" foods?  Yes    Taking medications regularly:  Yes    Barriers to taking medications:  None    Medication side effects:  None    Ability to successfully perform activities of daily living:  No assistance needed    Home Safety:  No safety concerns identified    Hearing Impairment:  No hearing concerns    In the past 6 months, have you been bothered by leaking of urine? Yes    In general, how would you rate your overall mental or emotional health?  Fair      PHQ-2 Total Score: 2    Additional concerns today:  Yes    Do you feel safe in your environment? Yes    Have you ever done Advance Care Planning? (For example, a Health Directive, POLST, or a discussion with a medical provider or your loved ones about your wishes): Yes, advance care planning is on file.       Fall risk     click delete button to remove this line now  Cognitive Screening   1) Repeat 3 items (Leader, Season, Table)    2) Clock draw: NORMAL  3) 3 item recall: Recalls 3 objects  Results: 3 items recalled: COGNITIVE IMPAIRMENT LESS LIKELY    Mini-CogTM Copyright RAIZA Raymond. Licensed by the author for use in [facility]; reprinted with permission (alonso@.[facility]). All rights reserved.      Do you have sleep apnea, excessive snoring or daytime drowsiness?: yes    Reviewed and updated as needed this visit by clinical staff   Allergies                HPI: " Anxiety/depression/insomnia: Current medication: Duloxetine and venlafaxine.  Mood is stable today.  She is tolerating her medication without difficulty, no reported side effects today.  She does continue to struggle some days with depression but overall, she was doing fairly well on her medications.  She does find that on the days she feels more down, she will go downstairs in her basement and make cards, she has felt a bit more isolated due to the Covid pandemic so she does not venture out much.  She is not seeing a therapist at this time, she denies thoughts of suicide.  She is taking trazodone 100 mg and occasionally 150 mg at night due to difficulty remaining asleep and difficulty with falling asleep.  She is sleeping about 8 hours using the trazodone, she does note on a very rare occasion if she has appointments to go to the next day, she will take 200 mg but this is not happening regularly.     Hypertension: Well-controlled.  Current medication includes metoprolol, losartan and as needed furosemide for lower extremity swelling.  She is not taking statin at this time, she does take a daily aspirin.  She denies any chest pain, shortness of breath, dizziness.  She does not smoke, alcohol use is minimal and denies illicit drug use.  She does exercise at home several days per week doing an exercise tape but she finds that her endurance has been declining.  At times, she will only able to tolerate about 10 to 15 minutes of exercise and on a good day, she can tolerate exercising about 30 minutes per time.    Migraines: She has been experiencing migraines about 4-5 times per month over the last 3 months.  She does find that the Imitrex injection is helpful when her migraines are starting, when she uses the injectable medication, her migraines typically resolve in about 2 to 3 hours.  Additionally to include Topamax daily along with her Effexor and her Cymbalta.  She is up-to-date with vision screening.    Back pain:  "She continues to experience back pain all over but the most prominent area has been in the lumbar region.  She states that the pain is constant, it does radiate upward into the thoracic spine on the left near the bra line.  She does find that turning the wrong way too fast will tend to aggravate her discomfort, she finds heat and ice packs have been helpful.  She does have a specialist that she has followed up with before at the spine clinic, she does plan on making an appointment.  She does continue to take her gabapentin at at bedtime, 600 mg for management of her pain.  She is rating her pain today a 6 out of 10.  She denies any recent falls or trauma, and legs have not given out on her and she denies any bowel or bladder incontinence issues in regards to her back pain.    Bilateral knee pain: She continues to struggle with her bilateral knee pain, she did have an injection into the knees at her last rheumatology appointment in July but she states \"it did not last long \".  She has not been back to see her orthopedist Dr. Garcia at [place], she has not seen him in a few years.  She did have some recent x-rays taken by the rheumatologist which showed mild osteoarthritic changes in the left knee and moderate osteoarthritic changes in the right knee.  She is aware that her weight is contributing to some of her knee pain, her current BMI is 49.4.  She does have an appointment to follow-up with the bariatric clinic again in January to discuss options for weight loss.  She finds that no matter how much she exercises or how well she eats, she is struggling to lose weight again.    Polyarthralgia/primary osteoarthritis involving multiple joints: She is seeing the rheumatologist regularly.  Her next visit is scheduled for October.  She is currently taking Cymbalta 20 mg twice daily which has been beneficial in controlling her polyarthralgia but she does inquire about her dose increased today again.  Her flareups of her " polyarthralgia are moderate in severity.  She find that her pain becomes worse with activity and at times at bedtime.  Her symptoms have been progressive over the last several years.  Her autoimmune work-up turned out to be negative per rheumatology discussion.  There was little to suggest inflammatory joint disease or connective tissue disorder.  At one point, she was told that she did have fibromyalgia but it appears that she has more of a polyarthralgia related to osteoarthritis changes.    Reviewed and updated as needed this visit by Provider                Social History     Tobacco Use     Smoking status: Never Smoker     Smokeless tobacco: Never Used   Substance Use Topics     Alcohol use: Yes     Alcohol/week: 0.0 standard drinks     Comment: Alcoholic Drinks/day: occasional     If you drink alcohol do you typically have >3 drinks per day or >7 drinks per week? Yes      Alcohol Use 9/2/2021   Prescreen: >3 drinks/day or >7 drinks/week? No   No flowsheet data found.          Current providers sharing in care for this patient include:   Patient Care Team:  Leonora Kerr NP as PCP - General (Orthothist)  Leonora Kerr NP as Assigned PCP  Cyn Gay MD as Assigned Heart and Vascular Provider  Gina Isaac MBBS as Assigned Rheumatology Provider    The following health maintenance items are reviewed in Epic and correct as of today:  Health Maintenance Due   Topic Date Due     ANNUAL REVIEW OF HM ORDERS  Never done     HEPATITIS C SCREENING  Never done     MEDICARE ANNUAL WELLNESS VISIT  07/23/2021     FALL RISK ASSESSMENT  07/23/2021     INFLUENZA VACCINE (1) 09/01/2021     PHQ-9  09/15/2021     Lab work is in process          Review of Systems  Constitutional, HEENT, cardiovascular, pulmonary, gi and gu systems are negative, except as otherwise noted.    OBJECTIVE:   There were no vitals taken for this visit. Estimated body mass index is 34.68 kg/m  as calculated from the following:    Height as  "of 7/28/21: 1.575 m (5' 2\").    Weight as of 7/28/21: 86 kg (189 lb 9.6 oz).  Physical Exam  GENERAL APPEARANCE: healthy, alert and no distress  EYES: Eyes grossly normal to inspection, PERRL and conjunctivae and sclerae normal  HENT: ear canals and TM's normal, nose and mouth without ulcers or lesions, oropharynx clear and oral mucous membranes moist  NECK: no adenopathy, no asymmetry, masses, or scars and thyroid normal to palpation  RESP: lungs clear to auscultation - no rales, rhonchi or wheezes  CV: regular rate and rhythm, normal S1 S2, no S3 or S4, no murmur, click or rub, no peripheral edema and peripheral pulses strong  ABDOMEN: soft, nontender, no hepatosplenomegaly, no masses and bowel sounds normal  MS: no musculoskeletal defects are noted and gait is age appropriate without ataxia.  She does have increased lumbar back pain when she hyperextends and bends forward, she does have increased SI joint pain with direct palpation, left greater than right.    SKIN: no suspicious lesions or rashes, warm and dry.  She does have a pea-sized sebaceous cyst that is not currently infected in the center of the upper mid chest.  NEURO: Normal strength and tone, sensory exam grossly normal, mentation intact and speech normal, DTR's intact, face symmetrical, strength 5/5  PSYCH: mentation appears normal and affect normal/bright    Diagnostic Test Results:  Labs reviewed in Epic    ASSESSMENT / PLAN:   (Z00.00) Encounter for annual wellness exam in Medicare patient  (primary encounter diagnosis)  Comment:   Plan: REVIEW OF HEALTH MAINTENANCE PROTOCOL ORDERS,         BASIC METABOLIC PANEL, Hemoglobin, Hepatitis C         antibody, Lipid Profile            (M79.7) Fibromyalgia  Comment:   Plan: DULoxetine (CYMBALTA) 30 MG capsule  We did review the documentation from rheumatology today.  Her next visit is scheduled for October.  She did receive some injections recently into the knees, her x-rays did show moderate arthritis " on the right and mild on the left.  She states the injections did not last long.  Her Cymbalta dose and frequency were adjusted today.  Hopefully, this will make her little bit more comfortable.  I did advise her to follow-up with her orthopedist, Dr. Garcia for her ongoing knee discomfort.  Because her arthritis findings are mild to moderate, I did recommend some physical therapy which she was not interested in today.            (I10) Essential hypertension  Comment:   Plan: Well-controlled on her current medications, metoprolol and losartan.    (F33.1) Moderate episode of recurrent major depressive disorder (H)  Comment:   Plan: Condition stable and is situational based on the time of year, she does not look forward to winter since it does not cause her fibromyalgia pain to flareup more often.  She does not feel that a dose adjustment is necessary at this time for her Effexor.    (G47.00) Insomnia, unspecified type  Comment:   Plan: She does take 100 mg regularly at night, on a rare occasion she will take a tablet and a half or 2 tablets depending on if she has any events coming up the next day.  Overall, she is able to sleep about 8 hours per night.    (G43.911) Intractable migraine with status migrainosus, unspecified migraine type  Comment:   Plan: She is taking Topamax daily along with her Effexor daily.  She does use sumatriptan injections for her flareups.  No additional medication adjustments were made today other than the Cymbalta.    (E55.9) Vitamin D deficiency  Comment:   Plan: Vitamin D Deficiency            (M54.5,  G89.29) Chronic bilateral low back pain without sciatica  Comment:   Plan: XR Lumbar Spine 2/3 Views  She continues to take gabapentin at at bedtime, we did increase her Cymbalta to 30 mg twice daily today.  She has not followed up with spine care in quite some time but she does have the number and plans on reaching out to them to get an appointment to be seen.  We will obtain some updated  "x-ray imaging today for her ongoing chronic lumbar pain that occasionally will extend upward to the left thoracic spine.  We discussed the importance of regular exercise and stretching exercises.  Continue using heat or ice packs to the area.  In regards to her pain control, she will continue to use the Cymbalta twice daily along with her gabapentin at night.  She did inquire about Vicodin today but this is something that I would like to try to avoid for now since she does take multiple medications that can cause sedation which include her trazodone and gabapentin.  Because of her migraine history as well, sometimes the opioid medication can cause rebound headaches based on the frequency of use.            (Z23) Need for vaccination  Comment:   Plan: Due for influenza vaccine, we do not have her shipment in yet today.  She will plan on either getting this at the pharmacy or making an appointment here at the clinic once they are available.  She would like to get her vaccine mid-to-late October however.    (E66.01) Morbid obesity (H)  Comment:   Plan: She is scheduled to follow-up with the bariatric clinic in January for a virtual visit.  She does try exercising 3-4 times per week for 15 to 30 minutes based on what her insurance will allow her to do.  Her weight has been fairly stable over the last year but she has not made any progress in regards to her weight loss.    Patient has been advised of split billing requirements and indicates understanding: Yes  COUNSELING:  Reviewed preventive health counseling, as reflected in patient instructions       Regular exercise       Healthy diet/nutrition       Vision screening       Hearing screening       Dental care       Bladder control       Fall risk prevention       Alcohol Use        Osteoporosis prevention/bone health       Advanced Planning     Estimated body mass index is 34.68 kg/m  as calculated from the following:    Height as of 7/28/21: 1.575 m (5' 2\").    " Weight as of 7/28/21: 86 kg (189 lb 9.6 oz).    Weight management plan: Patient referred to endocrine and/or weight management specialty    She reports that she has never smoked. She has never used smokeless tobacco.      Appropriate preventive services were discussed with this patient, including applicable screening as appropriate for cardiovascular disease, diabetes, osteopenia/osteoporosis, and glaucoma.  As appropriate for age/gender, discussed screening for colorectal cancer, prostate cancer, breast cancer, and cervical cancer. Checklist reviewing preventive services available has been given to the patient.    Reviewed patients plan of care and provided an AVS. The Complex Care Plan (for patients with higher acuity and needing more deliberate coordination of services) for [place] meets the Care Plan requirement. This Care Plan has been established and reviewed with the Patient.    59 minutes spent with chart review, review results, assessment, documentation      Counseling Resources:  ATP IV Guidelines  Pooled Cohorts Equation Calculator  Breast Cancer Risk Calculator  Breast Cancer: Medication to Reduce Risk  FRAX Risk Assessment  ICSI Preventive Guidelines  Dietary Guidelines for Americans, 2010  USDA's MyPlate  ASA Prophylaxis  Lung CA Screening    Leonora Kerr NP  [facility] [place]    Identified Health Risks:

## 2021-09-03 LAB
DEPRECATED CALCIDIOL+CALCIFEROL SERPL-MC: 41 UG/L (ref 30–80)
HCV AB SERPL QL IA: NEGATIVE

## 2021-09-03 ASSESSMENT — ANXIETY QUESTIONNAIRES: GAD7 TOTAL SCORE: 9

## 2021-09-22 DIAGNOSIS — G47.01 INSOMNIA DUE TO MEDICAL CONDITION: ICD-10-CM

## 2021-09-22 RX ORDER — TRAZODONE HYDROCHLORIDE 100 MG/1
100 TABLET ORAL AT BEDTIME
Qty: 90 TABLET | Refills: 1 | Status: SHIPPED | OUTPATIENT
Start: 2021-09-22 | End: 2021-12-06

## 2021-09-22 NOTE — TELEPHONE ENCOUNTER
"Routing refill request to provider for review/approval because:  Dose is different then what was last prescribed. Patient was prescribed last 50 mg tablets, pharmacy is requesting 100 mg.     Last Written Prescription Date:  4/22/21  Last Fill Quantity: 30,  # refills: 5   Last office visit provider:  6/7/21     Requested Prescriptions   Pending Prescriptions Disp Refills     traZODone (DESYREL) 100 MG tablet 90 tablet 1     Sig: Take 1 tablet (100 mg) by mouth At Bedtime       Serotonin Modulators Passed - 9/22/2021 12:45 PM        Passed - Recent (12 mo) or future (30 days) visit within the authorizing provider's specialty     Patient has had an office visit with the authorizing provider or a provider within the authorizing providers department within the previous 12 mos or has a future within next 30 days. See \"Patient Info\" tab in inbasket, or \"Choose Columns\" in Meds & Orders section of the refill encounter.              Passed - Medication is active on med list        Passed - Patient is age 18 or older        Passed - No active pregnancy on record        Passed - No positive pregnancy test in past 12 months             Andree Arroyo RN 09/22/21 2:48 PM  "

## 2021-09-23 ENCOUNTER — OFFICE VISIT (OUTPATIENT)
Dept: PHYSICAL MEDICINE AND REHAB | Facility: CLINIC | Age: 76
End: 2021-09-23
Payer: COMMERCIAL

## 2021-09-23 VITALS
DIASTOLIC BLOOD PRESSURE: 68 MMHG | WEIGHT: 191 LBS | HEART RATE: 96 BPM | OXYGEN SATURATION: 97 % | SYSTOLIC BLOOD PRESSURE: 122 MMHG | BODY MASS INDEX: 49.66 KG/M2

## 2021-09-23 DIAGNOSIS — M54.12 CERVICAL RADICULOPATHY: ICD-10-CM

## 2021-09-23 DIAGNOSIS — M79.7 FIBROMYALGIA: ICD-10-CM

## 2021-09-23 DIAGNOSIS — M54.2 CHRONIC NECK PAIN: ICD-10-CM

## 2021-09-23 DIAGNOSIS — M43.16 SPONDYLOLISTHESIS OF LUMBAR REGION: ICD-10-CM

## 2021-09-23 DIAGNOSIS — G89.29 CHRONIC NECK PAIN: ICD-10-CM

## 2021-09-23 DIAGNOSIS — R29.898 LEFT LEG WEAKNESS: ICD-10-CM

## 2021-09-23 DIAGNOSIS — M54.42 CHRONIC BILATERAL LOW BACK PAIN WITH LEFT-SIDED SCIATICA: Primary | ICD-10-CM

## 2021-09-23 DIAGNOSIS — M51.369 DDD (DEGENERATIVE DISC DISEASE), LUMBAR: ICD-10-CM

## 2021-09-23 DIAGNOSIS — G89.29 CHRONIC BILATERAL LOW BACK PAIN WITH LEFT-SIDED SCIATICA: Primary | ICD-10-CM

## 2021-09-23 DIAGNOSIS — M54.16 LEFT LUMBAR RADICULOPATHY: ICD-10-CM

## 2021-09-23 PROCEDURE — 99214 OFFICE O/P EST MOD 30 MIN: CPT | Performed by: NURSE PRACTITIONER

## 2021-09-23 RX ORDER — GABAPENTIN 300 MG/1
CAPSULE ORAL
Qty: 270 CAPSULE | Refills: 3 | Status: SHIPPED | OUTPATIENT
Start: 2021-09-23 | End: 2022-02-18

## 2021-09-23 ASSESSMENT — PAIN SCALES - GENERAL: PAINLEVEL: SEVERE PAIN (7)

## 2021-09-23 NOTE — LETTER
9/23/2021         RE: Katy Hong  [address] [facility] [address]        Dear Colleague,    Thank you for referring your patient, Katy Hong, to the [facility] [place]. Please see a copy of my visit note below.      Assessment:     Diagnoses and all orders for this visit:  Chronic bilateral low back pain with left-sided sciatica  -     MR Lumbar Spine w/o Contrast; Future  Left lumbar radiculopathy  -     MR Lumbar Spine w/o Contrast; Future  Spondylolisthesis of lumbar region  -     MR Lumbar Spine w/o Contrast; Future  DDD (degenerative disc disease), lumbar  -     MR Lumbar Spine w/o Contrast; Future  Chronic neck pain  Cervical radiculopathy  Left leg weakness  Fibromyalgia  -     gabapentin (NEURONTIN) 300 MG capsule; 1 capsule in the am, 1 capsule in the afternoon, 2 capsules at bedtime     Katy Hong is a 76 year old y.o. female with past medical history significant for hypertension, cardiomyopathy, kidney stone, fibromyalgia, depression with anxiety, migraine headache, reflux, cellulitis, chronic venous insufficiency with venous stasis ulcer, female stress incontinence  who presents today for follow-up regarding:    -Cervical radiculopathy that is more tolerable at this time.    -Significant worsening bilateral low back pain with left lumbar radiculopathy L4 and L5 dermatomal pattern with paresthesias and perceived weakness as well as weakness on exam left EHL as well as dorsi/plantar flexors.     Plan:     A shared decision making plan was used. The patient's values and choices were respected. Prior medical records were reviewed today. The following represents what was discussed and decided upon by the provider and the patient.        -DIAGNOSTIC TESTS: Images were personally reviewed and interpreted.   --Ordered urgent lumbar spine MRI to further evaluate lumbar radiculopathy For intervention  --Consider left lower extremity EMG pending imaging  results.  --Lumbar spine x-ray 9/2/2021 with 2 mm L4-5 spondylolisthesis.  Significant L4-5 and L5-S1 degenerative disc height loss.  Mild left curvature L4-5.  Facet arthropathy greatest at L5-S1.  --Bilateral knee x-ray 7/6/2021 with moderate medial and mild lateral degenerative arthritis with small effusion on the right.  Left mild medial osteoarthritis with small to moderate effusion.  --Bilateral upper extremity EMG 12/11/2019 shows mild ulnar neuropathy, no cervical radiculopathy or median neuropathy noted.    --Cervical spine MRI 10/15/2019 shows severe foraminal stenosis on the right at C5-6, moderate to severe in the right at C6-7.  --Cervical spine MRI 11/1/2017 again shows C5-6 disc osteophyte complex with moderate central canal and severe bilateral foraminal stenosis.  C6-7, mild to moderate spinal canal stenosis with moderate to severe bilateral foraminal stenosis.  No cord signal changes noted.  --Thoracic MRI 2017 shows T10-11 right 8 mm enhancement dorsal lateral margin of the spinal cord.    -INTERVENTIONS: Consider left lumbar JOANNA pending imaging review.  L4-5 and/or L5-S1.    -MEDICATIONS: Increase gabapentin 300 mg to 1 tablet morning in the afternoon 2 at bedtime as tolerated for radicular pain.  Did discuss Medrol Dosepak today however patient defers this option, wants to trial increasing gabapentin first.    Discussed side effects of medications and proper use. Patient verbalized understanding.    -PHYSICAL THERAPY: Consider revisiting physical therapy pending imaging review however currently her pain is severe and debilitating she unlikely would tolerate physical therapy today.  Discussed the importance of core strengthening, ROM, stretching exercises with the patient and how each of these entities is important in decreasing pain.  Explained to the patient that the purpose of physical therapy is to teach the patient a home exercise program.  These exercises need to be performed every day in  order to decrease pain and prevent future occurrences of pain.        -PATIENT EDUCATION:  Total time of 32 minutes spent with the patient, reviewing the chart, placing orders, and documenting.   -Today we also discussed the issues related to the current COVID-19 pandemic, the pros and cons of the current treatment plan, the CDC guidelines such as social distancing, washing the hands, and covering the cough.    -FOLLOW UP: Follow-up for imaging review, did discuss that we would like to see her in clinic to reevaluate her strength as well.  Advised to contact clinic if symptoms worsen or change.    Subjective:     Katy Hong is a 76 year old female who presents today for follow-up regarding chronic neck pain that is tolerable.  Chronic low back pain that has been progressive and worse in the last 1 week that is currently severe at a 7/10 with severe left lower extremity pain into the anterior thigh and anterior shin with some mild pain in the lateral thigh and lateral shin that mostly stops at the ankle.  She does feel perceived weakness in the last 1 week that is new in the left lower extremity as well.  Denies any recent trips or falls.  Denies bowel or bladder loss control.  She does have more mild right anterior shin pain as well but denies any thigh pain into the right lower extremity.  Patient does report that with prolonged walking she feels generalized fatigue in her lower extremities, it does not completely resolve with sitting but does improve with sitting.  Patient denies bowel or bladder loss control, denies saddle anesthesia.    -Treatment to Date: No prior spinal surgery.     Right L4-5 JOANNA in the past with Dr. Sipple.     -Medications:  Gabapentin 300 mg 2 tablets at nighttime which is long-term for fibromyalgia  OTC Aleve with no benefit    Patient Active Problem List   Diagnosis     Osteopenia     Insomnia     Vitamin D Deficiency     Lower Back Pain     Fibromyalgia     Essential  Hypertension     Seborrheic Keratosis     Obesity     Lichen Sclerosus Et Atrophicus     Depression With Anxiety     Migraine Headache     Esophageal Reflux     Joint Pain, Localized In The Knee     Varicose Veins     Nonvenomous Insect Bite Of Shoulder     Cellulitis     Mammogram - Abnormal     Chronic Cutaneous Ulcer Venous Stasis     Chronic venous insufficiency     Foot pain (Soft Tissue)     Female stress incontinence     Calculus of ureter     Hydronephrosis with urinary obstruction due to ureteral calculus     Other cardiomyopathy (H)     Obesity (BMI 30-39.9)     Chronic fatigue     Moderate episode of recurrent major depressive disorder (H)     Anxiety     Morbid obesity (H)       Current Outpatient Medications   Medication     DULoxetine (CYMBALTA) 30 MG capsule     gabapentin (NEURONTIN) 300 MG capsule     SUMAtriptan succinate 6 mg/0.5 mL PnIj     albuterol (VENTOLIN HFA) 90 mcg/actuation inhaler     aspirin 81 MG EC tablet     calcium carbonate (OS-MONIQUE) 500 mg calcium (1,250 mg) chewable tablet     cholecalciferol, vitamin D3, 5,000 unit Tab     famotidine (PEPCID) 20 MG tablet     furosemide (LASIX) 20 MG tablet     losartan (COZAAR) 25 MG tablet     MEDICATION CANNOT BE REORDERED - PLEASE MANUALLY REORDER AND DISCONTINUE THE OLD ORDER     metoprolol succinate ER (TOPROL-XL) 50 MG 24 hr tablet     topiramate (TOPAMAX) 100 MG tablet     traZODone (DESYREL) 100 MG tablet     triamcinolone (KENALOG) 0.1 % ointment     venlafaxine (EFFEXOR-XR) 75 MG 24 hr capsule     No current facility-administered medications for this visit.       Allergies   Allergen Reactions     Fluoxetine Cough and Rash     Pt thinks it was cough but not totally sure.     Latex Rash     Levothyroxine Rash     Lisinopril Cough and Rash     Sulfa (Sulfonamide Antibiotics) [Sulfa Drugs] Itching and Rash       Past Medical History:   Diagnosis Date     Abnormal mammogram, unspecified     Created by Conversion      Acute sinusitis,  unspecified     Created by Conversion      Asymptomatic varicose veins     Created by Conversion      Breast cyst      Cellulitis and abscess of unspecified site     Created by Conversion      Circumscribed scleroderma     Created by Conversion      Contusion of unspecified site     Created by Conversion      Disorder of bone and cartilage, unspecified     Created by Conversion      Dysthymic disorder     Created by Conversion      Esophageal reflux     Created by Conversion      Insomnia, unspecified     Created by Conversion      Lumbago     Created by Conversion      Migraine, unspecified, without mention of intractable migraine without mention of status migrainosus     Created by Conversion      Myalgia and myositis, unspecified     Created by Conversion      Myocardial infarction (H)     per EKG's since 2015     Obesity, unspecified     Created by Conversion      Open wound of lip, without mention of complication     Created by Conversion      Other primary cardiomyopathies     Created by Conversion      Other seborrheic keratosis     Created by Conversion      Pain in joint, lower leg     Created by Conversion      Pain in limb     Created by Conversion      Sebaceous cyst     Created by Conversion      Shortness of breath     Created by Conversion      Shoulder and upper arm, insect bite, nonvenomous, without mention of infection(912.4)     Created by Conversion      Unspecified disease of sebaceous glands     Created by Conversion      Unspecified essential hypertension     Created by Conversion      Unspecified venous (peripheral) insufficiency     Created by Conversion      Unspecified vitamin D deficiency     Created by Conversion         Review of Systems  ROS: Positive for muscle pain, joint pain, muscle fatigue, sciatica, leg swelling, shortness of breath, constipation, reflux.  Specifically negative for bowel/bladder dysfunction, balance changes, headache, dizziness, foot drop, fevers, chills,  appetite changes, nausea/vomiting, unexplained weight loss. Otherwise 13 systems reviewed are negative. Please see the patient's intake questionnaire from today for details.    Reviewed Social, Family, Past Medical and Past Surgical history with patient, no significant changes noted since prior visit.     Objective:     BP (!) 161/81 (BP Location: Right arm, Patient Position: Sitting)   Pulse 96   Wt 191 lb (86.6 kg)   SpO2 97%   BMI 49.66 kg/m      PHYSICAL EXAMINATION:    --CONSTITUTIONAL: Well developed, well nourished, healthy appearing individual.  --PSYCHIATRIC: Appropriate mood and affect. No difficulty interacting due to temper, social withdrawal, or memory issues.  --SKIN: Lumbar region is dry and intact.   --RESPIRATORY: Normal rhythm and effort. No abnormal accessory muscle breathing patterns noted.   --MUSCULOSKELETAL:  Normal lumbar lordosis noted, no lateral shift.  --GROSS MOTOR: Easily arises from a seated position. Gait is non-antalgic  --LUMBAR SPINE:  Inspection reveals no evidence of deformity. Range of motion is limited in lumbar flexion, extension, lateral rotation.  Tenderness palpation midline lumbosacral junction.  Straight leg raising in the seated position is negative to radicular pain on the right, positive on the left. Sciatic notch non-tender.   --SACROILIAC JOINT:  One Finger point test negative.  --LOWER EXTREMITY MOTOR TESTING:  Plantar flexion left 4/5, right 5/5   Dorsiflexion left 4/5, right 5/5   Great toe MTP extension left 4/5, right 5/5  Knee flexion left 5/5, right 5/5  Knee extension left 5/5, right 5/5   Hip flexion left 5/5, right 5/5  Hip abduction left 5/5, right 5/5  Hip adduction left 5/5, right 5/5   --HIPS: Full range of motion bilaterally.   --NEUROLOGIC: Bilateral patellar and achilles reflexes are physiologic and symmetric. Sensation to light touch is intact in the bilateral L4, L5, and S1 dermatomes.    RESULTS:   Imaging: Lumbar spine imaging was reviewed  today. The images were shown to the patient and the findings were explained using a spine model.      XR Lumbar Spine 2/3 Views  Result Date: 9/2/2021  EXAM: XR LUMBAR SPINE 2-3 VIEWS LOCATION: [facility] DATE/TIME: 9/2/2021 9:58 AM INDICATION: Low back pain COMPARISON: None. TECHNIQUE: CR Lumbar Spine.   IMPRESSION: 5 lumbar type vertebral bodies. Normal vertebral body heights. Mild leftward curvature apex L4-L5. 2 mm L4-L5 degenerative anterolisthesis. Severe L4-L5 and L5-S1 interbody degeneration. Mild L5-S1 facet arthropathy.      MR CERVICAL SPINE WO CONTRAST  LOCATION: [facility]  DATE/TIME: 10/15/2019   INDICATION: Right cervical radiculopathy with weakness on exam.  COMPARISON: None.  TECHNIQUE: Without IV contrast.  FINDINGS:   Cervical lordosis is straightened. Moderate loss of disc height at C5-C6 and C6-C7. Small osteophytes and degenerative endplate changes at these levels and also at C3-C4 and C4-C5. Marrow signal and spinal cord signal otherwise normal. No extraspinal abnormality.  Craniovertebral junction and C1-C2: Normal.  C2-C3: Mild facet hypertrophy in the left side. Spinal canal and neural foramina are patent.   C3-C4: Shallow annular bulge. Mild facet hypertrophy on the left side. Spinal canal is patent. Neural foramina are mildly to moderately narrowed on the left and mildly narrowed on the right.   C4-C5: Shallow disc bulge and osteophytes. Spinal canal is patent. The neural foramina are mild to moderately narrowed on the left and mildly narrowed on the right.   C5-C6: Shallow disc bulge and osteophytes. Spinal canal is patent. The neural foramina are severely narrowed bilaterally.   C6-C7: Bulging disc and osteophytes. Spinal canal is patent. The neural foramina are moderately narrowed on the left and moderately to severely narrowed on the right.   C7-T1: Spinal canal neuroforamina patent.  IMPRESSION:   1.  Degenerative disc disease most pronounced in  the mid and lower cervical spine.   2.  No central spinal stenosis.  3.  No cord signal changes.  4.  Chronic/osteophyte complexes cause severe neural foraminal stenosis on the right side at C5-C6 and moderate to severe foraminal stenosis on the right at C6-C7. This may be affecting the C6 or C7 nerve roots.  5.  There is less significant foraminal stenosis as described above.        MR CERVICAL SPINE W WO CONTRAST  MR THORACIC SPINE W WO CONTRAST  11/1/2017   INDICATION: Bilateral leg and arm weakness x 2 years, T5 level.  TECHNIQUE: Without and with IV contrast.  CONTRAST: 9 mL Gadavist.  COMPARISON: Cervical spine MRI examination dated 06/30/2005.  FINDINGS:   Cervical spine MRI:   Mildly motion degraded examination.  Stable alignment compared to 2005 examination, with reversal of the normal cervical lordosis centered at C4. Degenerative grade 1 anterolisthesis of C2 on C3 and C3 on C4, as well as mild retrolisthesis of C5 on C6. Vertebral body height is preserved,   allowing for degenerative changes. Bone marrow signal intensity is homogeneous and within normal limits. Mild degenerative changes of the craniocervical junction and C1-C2. No abnormal cord signal. No abnormal enhancement. The visualized intracranial contents are unremarkable. Grossly normal paraspinal soft tissues. The vertebral artery flow voids are preserved.  C2-C3: Normal disc height. No herniation. Degenerative ankylosis of the left facet joint, with mild right facet arthropathy. No spinal canal stenosis. No right neural foraminal stenosis. Mild left neural foraminal stenosis.  C3-C4: Mild intervertebral disc height loss, with shallow posterior disc osteophyte complex. Mild right and moderate to severe left facet arthropathy. No spinal canal stenosis. Mild right neural foraminal stenosis. Mild left neural foraminal stenosis.  C4-C5: Mild intervertebral disc height loss, with shallow posterior disc osteophyte complex. Moderate to severe  bilateral facet arthropathy. No spinal canal stenosis. Mild to moderate right neural foraminal stenosis. Mild to moderate left neural foraminal stenosis.    C5-C6: Moderate to severe intervertebral disc height loss, with posterior disc osteophyte complex. Moderate bilateral facet arthropathy. Moderate spinal canal stenosis. Severe right neural foraminal stenosis. Severe left neural foraminal stenosis, progressed.  C6-C7: Moderate to severe intervertebral height loss, progressed, with posterior disc osteophyte complex. Mild to moderate bilateral facet arthropathy. Mild to moderate spinal canal stenosis. Moderate to severe right neural foraminal stenosis, progressed. Moderate to severe left neural foraminal stenosis, progressed.  C7-T1: Normal disc height. No herniation. Mild bilateral facet arthropathy. No spinal canal stenosis. Mild right neural foraminal stenosis. Mild left neural foraminal stenosis.  Thoracic spine:  Mildly motion degraded examination.  Normal vertebral body heights, alignment and marrow signal, allowing for prominent T8 vertebral body hemangioma and type II degenerative endplate marrow changes surrounding the anterior aspect of the T10-T11 intervertebral disc. Mild to moderate degenerative disc disease and facet arthropathy throughout the mid to lower thoracic spine, without resultant significant spinal canal or neural foraminal stenosis.   No focal lesion or abnormal enhancement is demonstrated in the region at the T5 level. 8mm linear focus of enhancement is demonstrated on sagittal postcontrast T1-weighted image #7, the level of the T10 and T11 interspace, correlating to the right dorsolateral margin of the spinal cord. This finding is not characterized by axial images.  Unremarkable paraspinal soft tissues.  IMPRESSION:   CONCLUSION:  Cervical spine:  1.  Progression of spondylitic changes as compared to 2005 examination, most conspicuous at the C5-C6 and C6-C7 levels. Resultant moderate  spinal canal compromise at C5-C6.  2.  Varying degrees of neural foraminal compromise, high-grade bilaterally at C5-C6 and C6-C7.  3.  Normal caliber and signal intensity of the cervical spinal cord, without focal lesion or abnormal enhancement.  Thoracic spine:  1.  No focal lesion or abnormal enhancement is demonstrated in the region at the T5 level.   2.  8 mm linear focus of enhancement along the right dorsolateral margin of the spinal cord at the level of T10-T11 on sagittal postcontrast T1-weighted sequence. The finding is not characterized by axial imaging. Differential considerations include normal vascular structure. Patient call back for thin section axial and sagittal T2 and postcontrast T1-weighted imaging through this region is recommended.                         Again, thank you for allowing me to participate in the care of your patient.        Sincerely,        Teresa Mas, CNP

## 2021-09-23 NOTE — PROGRESS NOTES
Assessment:     Diagnoses and all orders for this visit:  Chronic bilateral low back pain with left-sided sciatica  -     MR Lumbar Spine w/o Contrast; Future  Left lumbar radiculopathy  -     MR Lumbar Spine w/o Contrast; Future  Spondylolisthesis of lumbar region  -     MR Lumbar Spine w/o Contrast; Future  DDD (degenerative disc disease), lumbar  -     MR Lumbar Spine w/o Contrast; Future  Chronic neck pain  Cervical radiculopathy  Left leg weakness  Fibromyalgia  -     gabapentin (NEURONTIN) 300 MG capsule; 1 capsule in the am, 1 capsule in the afternoon, 2 capsules at bedtime     Katy Hong is a 76 year old y.o. female with past medical history significant for hypertension, cardiomyopathy, kidney stone, fibromyalgia, depression with anxiety, migraine headache, reflux, cellulitis, chronic venous insufficiency with venous stasis ulcer, female stress incontinence  who presents today for follow-up regarding:    -Cervical radiculopathy that is more tolerable at this time.    -Significant worsening bilateral low back pain with left lumbar radiculopathy L4 and L5 dermatomal pattern with paresthesias and perceived weakness as well as weakness on exam left EHL as well as dorsi/plantar flexors.     Plan:     A shared decision making plan was used. The patient's values and choices were respected. Prior medical records were reviewed today. The following represents what was discussed and decided upon by the provider and the patient.        -DIAGNOSTIC TESTS: Images were personally reviewed and interpreted.   --Ordered urgent lumbar spine MRI to further evaluate lumbar radiculopathy For intervention  --Consider left lower extremity EMG pending imaging results.  --Lumbar spine x-ray 9/2/2021 with 2 mm L4-5 spondylolisthesis.  Significant L4-5 and L5-S1 degenerative disc height loss.  Mild left curvature L4-5.  Facet arthropathy greatest at L5-S1.  --Bilateral knee x-ray 7/6/2021 with moderate medial and mild lateral  degenerative arthritis with small effusion on the right.  Left mild medial osteoarthritis with small to moderate effusion.  --Bilateral upper extremity EMG 12/11/2019 shows mild ulnar neuropathy, no cervical radiculopathy or median neuropathy noted.    --Cervical spine MRI 10/15/2019 shows severe foraminal stenosis on the right at C5-6, moderate to severe in the right at C6-7.  --Cervical spine MRI 11/1/2017 again shows C5-6 disc osteophyte complex with moderate central canal and severe bilateral foraminal stenosis.  C6-7, mild to moderate spinal canal stenosis with moderate to severe bilateral foraminal stenosis.  No cord signal changes noted.  --Thoracic MRI 2017 shows T10-11 right 8 mm enhancement dorsal lateral margin of the spinal cord.    -INTERVENTIONS: Consider left lumbar JOANNA pending imaging review.  L4-5 and/or L5-S1.    -MEDICATIONS: Increase gabapentin 300 mg to 1 tablet morning in the afternoon 2 at bedtime as tolerated for radicular pain.  Did discuss Medrol Dosepak today however patient defers this option, wants to trial increasing gabapentin first.    Discussed side effects of medications and proper use. Patient verbalized understanding.    -PHYSICAL THERAPY: Consider revisiting physical therapy pending imaging review however currently her pain is severe and debilitating she unlikely would tolerate physical therapy today.  Discussed the importance of core strengthening, ROM, stretching exercises with the patient and how each of these entities is important in decreasing pain.  Explained to the patient that the purpose of physical therapy is to teach the patient a home exercise program.  These exercises need to be performed every day in order to decrease pain and prevent future occurrences of pain.        -PATIENT EDUCATION:  Total time of 32 minutes spent with the patient, reviewing the chart, placing orders, and documenting.   -Today we also discussed the issues related to the current COVID-19  pandemic, the pros and cons of the current treatment plan, the CDC guidelines such as social distancing, washing the hands, and covering the cough.    -FOLLOW UP: Follow-up for imaging review, did discuss that we would like to see her in clinic to reevaluate her strength as well.  Advised to contact clinic if symptoms worsen or change.    Subjective:     Katy Hong is a 76 year old female who presents today for follow-up regarding chronic neck pain that is tolerable.  Chronic low back pain that has been progressive and worse in the last 1 week that is currently severe at a 7/10 with severe left lower extremity pain into the anterior thigh and anterior shin with some mild pain in the lateral thigh and lateral shin that mostly stops at the ankle.  She does feel perceived weakness in the last 1 week that is new in the left lower extremity as well.  Denies any recent trips or falls.  Denies bowel or bladder loss control.  She does have more mild right anterior shin pain as well but denies any thigh pain into the right lower extremity.  Patient does report that with prolonged walking she feels generalized fatigue in her lower extremities, it does not completely resolve with sitting but does improve with sitting.  Patient denies bowel or bladder loss control, denies saddle anesthesia.    -Treatment to Date: No prior spinal surgery.     Right L4-5 JOANNA in the past with Dr. Sipple.     -Medications:  Gabapentin 300 mg 2 tablets at nighttime which is long-term for fibromyalgia  OTC Aleve with no benefit    Patient Active Problem List   Diagnosis     Osteopenia     Insomnia     Vitamin D Deficiency     Lower Back Pain     Fibromyalgia     Essential Hypertension     Seborrheic Keratosis     Obesity     Lichen Sclerosus Et Atrophicus     Depression With Anxiety     Migraine Headache     Esophageal Reflux     Joint Pain, Localized In The Knee     Varicose Veins     Nonvenomous Insect Bite Of Shoulder     Cellulitis      Mammogram - Abnormal     Chronic Cutaneous Ulcer Venous Stasis     Chronic venous insufficiency     Foot pain (Soft Tissue)     Female stress incontinence     Calculus of ureter     Hydronephrosis with urinary obstruction due to ureteral calculus     Other cardiomyopathy (H)     Obesity (BMI 30-39.9)     Chronic fatigue     Moderate episode of recurrent major depressive disorder (H)     Anxiety     Morbid obesity (H)       Current Outpatient Medications   Medication     DULoxetine (CYMBALTA) 30 MG capsule     gabapentin (NEURONTIN) 300 MG capsule     SUMAtriptan succinate 6 mg/0.5 mL PnIj     albuterol (VENTOLIN HFA) 90 mcg/actuation inhaler     aspirin 81 MG EC tablet     calcium carbonate (OS-MONIQUE) 500 mg calcium (1,250 mg) chewable tablet     cholecalciferol, vitamin D3, 5,000 unit Tab     famotidine (PEPCID) 20 MG tablet     furosemide (LASIX) 20 MG tablet     losartan (COZAAR) 25 MG tablet     MEDICATION CANNOT BE REORDERED - PLEASE MANUALLY REORDER AND DISCONTINUE THE OLD ORDER     metoprolol succinate ER (TOPROL-XL) 50 MG 24 hr tablet     topiramate (TOPAMAX) 100 MG tablet     traZODone (DESYREL) 100 MG tablet     triamcinolone (KENALOG) 0.1 % ointment     venlafaxine (EFFEXOR-XR) 75 MG 24 hr capsule     No current facility-administered medications for this visit.       Allergies   Allergen Reactions     Fluoxetine Cough and Rash     Pt thinks it was cough but not totally sure.     Latex Rash     Levothyroxine Rash     Lisinopril Cough and Rash     Sulfa (Sulfonamide Antibiotics) [Sulfa Drugs] Itching and Rash       Past Medical History:   Diagnosis Date     Abnormal mammogram, unspecified     Created by Conversion      Acute sinusitis, unspecified     Created by Conversion      Asymptomatic varicose veins     Created by Conversion      Breast cyst      Cellulitis and abscess of unspecified site     Created by Conversion      Circumscribed scleroderma     Created by Conversion      Contusion of unspecified  site     Created by Conversion      Disorder of bone and cartilage, unspecified     Created by Conversion      Dysthymic disorder     Created by Conversion      Esophageal reflux     Created by Conversion      Insomnia, unspecified     Created by Conversion      Lumbago     Created by Conversion      Migraine, unspecified, without mention of intractable migraine without mention of status migrainosus     Created by Conversion      Myalgia and myositis, unspecified     Created by Conversion      Myocardial infarction (H)     per EKG's since 2015     Obesity, unspecified     Created by Conversion      Open wound of lip, without mention of complication     Created by Conversion      Other primary cardiomyopathies     Created by Conversion      Other seborrheic keratosis     Created by Conversion      Pain in joint, lower leg     Created by Conversion      Pain in limb     Created by Conversion      Sebaceous cyst     Created by Conversion      Shortness of breath     Created by Conversion      Shoulder and upper arm, insect bite, nonvenomous, without mention of infection(912.4)     Created by Conversion      Unspecified disease of sebaceous glands     Created by Conversion      Unspecified essential hypertension     Created by Conversion      Unspecified venous (peripheral) insufficiency     Created by Conversion      Unspecified vitamin D deficiency     Created by Conversion         Review of Systems  ROS: Positive for muscle pain, joint pain, muscle fatigue, sciatica, leg swelling, shortness of breath, constipation, reflux.  Specifically negative for bowel/bladder dysfunction, balance changes, headache, dizziness, foot drop, fevers, chills, appetite changes, nausea/vomiting, unexplained weight loss. Otherwise 13 systems reviewed are negative. Please see the patient's intake questionnaire from today for details.    Reviewed Social, Family, Past Medical and Past Surgical history with patient, no significant changes  noted since prior visit.     Objective:     BP (!) 161/81 (BP Location: Right arm, Patient Position: Sitting)   Pulse 96   Wt 191 lb (86.6 kg)   SpO2 97%   BMI 49.66 kg/m      PHYSICAL EXAMINATION:    --CONSTITUTIONAL: Well developed, well nourished, healthy appearing individual.  --PSYCHIATRIC: Appropriate mood and affect. No difficulty interacting due to temper, social withdrawal, or memory issues.  --SKIN: Lumbar region is dry and intact.   --RESPIRATORY: Normal rhythm and effort. No abnormal accessory muscle breathing patterns noted.   --MUSCULOSKELETAL:  Normal lumbar lordosis noted, no lateral shift.  --GROSS MOTOR: Easily arises from a seated position. Gait is non-antalgic  --LUMBAR SPINE:  Inspection reveals no evidence of deformity. Range of motion is limited in lumbar flexion, extension, lateral rotation.  Tenderness palpation midline lumbosacral junction.  Straight leg raising in the seated position is negative to radicular pain on the right, positive on the left. Sciatic notch non-tender.   --SACROILIAC JOINT:  One Finger point test negative.  --LOWER EXTREMITY MOTOR TESTING:  Plantar flexion left 4/5, right 5/5   Dorsiflexion left 4/5, right 5/5   Great toe MTP extension left 4/5, right 5/5  Knee flexion left 5/5, right 5/5  Knee extension left 5/5, right 5/5   Hip flexion left 5/5, right 5/5  Hip abduction left 5/5, right 5/5  Hip adduction left 5/5, right 5/5   --HIPS: Full range of motion bilaterally.   --NEUROLOGIC: Bilateral patellar and achilles reflexes are physiologic and symmetric. Sensation to light touch is intact in the bilateral L4, L5, and S1 dermatomes.    RESULTS:   Imaging: Lumbar spine imaging was reviewed today. The images were shown to the patient and the findings were explained using a spine model.      XR Lumbar Spine 2/3 Views  Result Date: 9/2/2021  EXAM: XR LUMBAR SPINE 2-3 VIEWS LOCATION: [facility] DATE/TIME: 9/2/2021 9:58 AM INDICATION: Low  back pain COMPARISON: None. TECHNIQUE: CR Lumbar Spine.   IMPRESSION: 5 lumbar type vertebral bodies. Normal vertebral body heights. Mild leftward curvature apex L4-L5. 2 mm L4-L5 degenerative anterolisthesis. Severe L4-L5 and L5-S1 interbody degeneration. Mild L5-S1 facet arthropathy.      MR CERVICAL SPINE WO CONTRAST  LOCATION: [facility]  DATE/TIME: 10/15/2019   INDICATION: Right cervical radiculopathy with weakness on exam.  COMPARISON: None.  TECHNIQUE: Without IV contrast.  FINDINGS:   Cervical lordosis is straightened. Moderate loss of disc height at C5-C6 and C6-C7. Small osteophytes and degenerative endplate changes at these levels and also at C3-C4 and C4-C5. Marrow signal and spinal cord signal otherwise normal. No extraspinal abnormality.  Craniovertebral junction and C1-C2: Normal.  C2-C3: Mild facet hypertrophy in the left side. Spinal canal and neural foramina are patent.   C3-C4: Shallow annular bulge. Mild facet hypertrophy on the left side. Spinal canal is patent. Neural foramina are mildly to moderately narrowed on the left and mildly narrowed on the right.   C4-C5: Shallow disc bulge and osteophytes. Spinal canal is patent. The neural foramina are mild to moderately narrowed on the left and mildly narrowed on the right.   C5-C6: Shallow disc bulge and osteophytes. Spinal canal is patent. The neural foramina are severely narrowed bilaterally.   C6-C7: Bulging disc and osteophytes. Spinal canal is patent. The neural foramina are moderately narrowed on the left and moderately to severely narrowed on the right.   C7-T1: Spinal canal neuroforamina patent.  IMPRESSION:   1.  Degenerative disc disease most pronounced in the mid and lower cervical spine.   2.  No central spinal stenosis.  3.  No cord signal changes.  4.  Chronic/osteophyte complexes cause severe neural foraminal stenosis on the right side at C5-C6 and moderate to severe foraminal stenosis on the right at C6-C7. This may be  affecting the C6 or C7 nerve roots.  5.  There is less significant foraminal stenosis as described above.        MR CERVICAL SPINE W WO CONTRAST  MR THORACIC SPINE W WO CONTRAST  11/1/2017   INDICATION: Bilateral leg and arm weakness x 2 years, T5 level.  TECHNIQUE: Without and with IV contrast.  CONTRAST: 9 mL Gadavist.  COMPARISON: Cervical spine MRI examination dated 06/30/2005.  FINDINGS:   Cervical spine MRI:   Mildly motion degraded examination.  Stable alignment compared to 2005 examination, with reversal of the normal cervical lordosis centered at C4. Degenerative grade 1 anterolisthesis of C2 on C3 and C3 on C4, as well as mild retrolisthesis of C5 on C6. Vertebral body height is preserved,   allowing for degenerative changes. Bone marrow signal intensity is homogeneous and within normal limits. Mild degenerative changes of the craniocervical junction and C1-C2. No abnormal cord signal. No abnormal enhancement. The visualized intracranial contents are unremarkable. Grossly normal paraspinal soft tissues. The vertebral artery flow voids are preserved.  C2-C3: Normal disc height. No herniation. Degenerative ankylosis of the left facet joint, with mild right facet arthropathy. No spinal canal stenosis. No right neural foraminal stenosis. Mild left neural foraminal stenosis.  C3-C4: Mild intervertebral disc height loss, with shallow posterior disc osteophyte complex. Mild right and moderate to severe left facet arthropathy. No spinal canal stenosis. Mild right neural foraminal stenosis. Mild left neural foraminal stenosis.  C4-C5: Mild intervertebral disc height loss, with shallow posterior disc osteophyte complex. Moderate to severe bilateral facet arthropathy. No spinal canal stenosis. Mild to moderate right neural foraminal stenosis. Mild to moderate left neural foraminal stenosis.    C5-C6: Moderate to severe intervertebral disc height loss, with posterior disc osteophyte complex. Moderate bilateral facet  arthropathy. Moderate spinal canal stenosis. Severe right neural foraminal stenosis. Severe left neural foraminal stenosis, progressed.  C6-C7: Moderate to severe intervertebral height loss, progressed, with posterior disc osteophyte complex. Mild to moderate bilateral facet arthropathy. Mild to moderate spinal canal stenosis. Moderate to severe right neural foraminal stenosis, progressed. Moderate to severe left neural foraminal stenosis, progressed.  C7-T1: Normal disc height. No herniation. Mild bilateral facet arthropathy. No spinal canal stenosis. Mild right neural foraminal stenosis. Mild left neural foraminal stenosis.  Thoracic spine:  Mildly motion degraded examination.  Normal vertebral body heights, alignment and marrow signal, allowing for prominent T8 vertebral body hemangioma and type II degenerative endplate marrow changes surrounding the anterior aspect of the T10-T11 intervertebral disc. Mild to moderate degenerative disc disease and facet arthropathy throughout the mid to lower thoracic spine, without resultant significant spinal canal or neural foraminal stenosis.   No focal lesion or abnormal enhancement is demonstrated in the region at the T5 level. 8mm linear focus of enhancement is demonstrated on sagittal postcontrast T1-weighted image #7, the level of the T10 and T11 interspace, correlating to the right dorsolateral margin of the spinal cord. This finding is not characterized by axial images.  Unremarkable paraspinal soft tissues.  IMPRESSION:   CONCLUSION:  Cervical spine:  1.  Progression of spondylitic changes as compared to 2005 examination, most conspicuous at the C5-C6 and C6-C7 levels. Resultant moderate spinal canal compromise at C5-C6.  2.  Varying degrees of neural foraminal compromise, high-grade bilaterally at C5-C6 and C6-C7.  3.  Normal caliber and signal intensity of the cervical spinal cord, without focal lesion or abnormal enhancement.  Thoracic spine:  1.  No focal lesion  or abnormal enhancement is demonstrated in the region at the T5 level.   2.  8 mm linear focus of enhancement along the right dorsolateral margin of the spinal cord at the level of T10-T11 on sagittal postcontrast T1-weighted sequence. The finding is not characterized by axial imaging. Differential considerations include normal vascular structure. Patient call back for thin section axial and sagittal T2 and postcontrast T1-weighted imaging through this region is recommended.

## 2021-09-23 NOTE — PATIENT INSTRUCTIONS
~Please call our [facility] Nurse Navigation line [phone] with any questions or concerns about your treatment plan, if symptoms worsen and you would like to be seen urgently, or if you have problems controlling bladder and bowel function.      Imaging has been ordered. Radiology will call you to schedule. Please call below if you do not hear from them in the next couple of days.   [facility] Radiology Scheduling  Please call [phone] to schedule your image(s) (select option #1). There are 2 different locations, see below.     [place] [id number]      Prescribed Gabapentin today, 300 mg tablets, to be titrated up to 3 tablets 3 times a day as tolerated for your nerve pain. Please follow Gabapentin dosing chart below.    Gabapentin 300mg Dosing Chart    DATE  MORNING AFTERNOON BEDTIME    Day 7 1 0 2    Day 8 1 0 2    Day 9 1 0 2    Day 10 1 1 2    Day 11 1 1 2    Day 12 1 1 2     Continue medication three times daily  Please call if you have any questions regarding how to take your medication

## 2021-09-28 ENCOUNTER — HOSPITAL ENCOUNTER (OUTPATIENT)
Dept: MRI IMAGING | Facility: CLINIC | Age: 76
Discharge: HOME | End: 2021-09-28
Attending: NURSE PRACTITIONER | Admitting: NURSE PRACTITIONER
Payer: COMMERCIAL

## 2021-09-28 DIAGNOSIS — G89.29 CHRONIC BILATERAL LOW BACK PAIN WITH LEFT-SIDED SCIATICA: ICD-10-CM

## 2021-09-28 DIAGNOSIS — M51.369 DDD (DEGENERATIVE DISC DISEASE), LUMBAR: ICD-10-CM

## 2021-09-28 DIAGNOSIS — M54.16 LEFT LUMBAR RADICULOPATHY: ICD-10-CM

## 2021-09-28 DIAGNOSIS — M54.42 CHRONIC BILATERAL LOW BACK PAIN WITH LEFT-SIDED SCIATICA: ICD-10-CM

## 2021-09-28 DIAGNOSIS — M43.16 SPONDYLOLISTHESIS OF LUMBAR REGION: ICD-10-CM

## 2021-09-28 PROCEDURE — 72148 MRI LUMBAR SPINE W/O DYE: CPT

## 2021-09-30 ENCOUNTER — OFFICE VISIT (OUTPATIENT)
Dept: PHYSICAL MEDICINE AND REHAB | Facility: CLINIC | Age: 76
End: 2021-09-30
Payer: COMMERCIAL

## 2021-09-30 VITALS — SYSTOLIC BLOOD PRESSURE: 144 MMHG | DIASTOLIC BLOOD PRESSURE: 74 MMHG | HEART RATE: 88 BPM

## 2021-09-30 DIAGNOSIS — G89.29 CHRONIC NECK PAIN: ICD-10-CM

## 2021-09-30 DIAGNOSIS — M79.7 FIBROMYALGIA: ICD-10-CM

## 2021-09-30 DIAGNOSIS — G89.29 CHRONIC BILATERAL LOW BACK PAIN WITH RIGHT-SIDED SCIATICA: Primary | ICD-10-CM

## 2021-09-30 DIAGNOSIS — M54.16 RIGHT LUMBAR RADICULOPATHY: ICD-10-CM

## 2021-09-30 DIAGNOSIS — M54.41 CHRONIC BILATERAL LOW BACK PAIN WITH RIGHT-SIDED SCIATICA: Primary | ICD-10-CM

## 2021-09-30 DIAGNOSIS — M54.2 CHRONIC NECK PAIN: ICD-10-CM

## 2021-09-30 DIAGNOSIS — M43.16 SPONDYLOLISTHESIS OF LUMBAR REGION: ICD-10-CM

## 2021-09-30 DIAGNOSIS — M51.369 DDD (DEGENERATIVE DISC DISEASE), LUMBAR: ICD-10-CM

## 2021-09-30 PROCEDURE — 99214 OFFICE O/P EST MOD 30 MIN: CPT | Performed by: NURSE PRACTITIONER

## 2021-09-30 ASSESSMENT — PAIN SCALES - GENERAL: PAINLEVEL: MODERATE PAIN (4)

## 2021-09-30 NOTE — PATIENT INSTRUCTIONS
~You have been referred for Physical Therapy to [facility]. They will call you to schedule an appointment.      Scheduling phone number is [phone] for [facility], or [place] location.  If you have not heard from the scheduling office within 2 business days, please call [phone] for ALL other locations.    Discussed the importance of core strengthening, ROM, stretching exercises and how each of these entities is important in decreasing pain and improving long term spine health.  The purpose of physical therapy is to teach you an individualized home exercise program.  These exercises need to be performed every day in order to decrease pain and prevent future occurrences of pain.          Prescribed Gabapentin today, 300 mg tablets, to be titrated up to 3 tablets 3 times a day as tolerated for your nerve pain. Please follow Gabapentin dosing chart below.    Gabapentin 300mg Dosing Chart    DATE  MORNING AFTERNOON BEDTIME    Day 13 1 2 2    Day 14 1 2 2    Day 15 1 2 2    Day 16 2 2 2    Day 17 2 2 2    Day 18 2 2 2    Day 19 2 2 3    Day 20 2 2 3    Day 21 2 2 3     Continue medication, taking 3 capsules three times daily  Please call if you have any questions regarding how to take your medication

## 2021-09-30 NOTE — LETTER
9/30/2021         RE: Katy Hong  [address] [place] [id number]        Dear Colleague,    Thank you for referring your patient, Katy Hong, to the [facility] [place]. Please see a copy of my visit note below.      Assessment:     Diagnoses and all orders for this visit:  Chronic bilateral low back pain with right-sided sciatica  -     Physical Therapy Referral; Future  -     PAIN Transforaminal JOANNA Inj Lumbosacral Right; Future  Right lumbar radiculopathy  -     Physical Therapy Referral; Future  -     PAIN Transforaminal JOANNA Inj Lumbosacral Right; Future  Spondylolisthesis of lumbar region  -     Physical Therapy Referral; Future  DDD (degenerative disc disease), lumbar  -     Physical Therapy Referral; Future  Chronic neck pain  -     Physical Therapy Referral; Future  Fibromyalgia  -     Physical Therapy Referral; Future     Katy Hong is a 76 year old y.o. female with past medical history significant for hypertension, cardiomyopathy, kidney stone, fibromyalgia, depression with anxiety, migraine headache, reflux, cellulitis, chronic venous insufficiency with venous stasis ulcer, female stress incontinence who presents today for follow-up regarding:    -Significant worsening bilateral low back pain with right lumbar radiculopathy L4 and L5 dermatomal pattern with paresthesias, over 75% of her symptoms are on the right at this time.     Plan:     A shared decision making plan was used. The patient's values and choices were respected. Prior medical records were reviewed today. The following represents what was discussed and decided upon by the provider and the patient.        -DIAGNOSTIC TESTS: Images were personally reviewed and interpreted.   --Lumbar spine MRI 9/28/2021 with moderate spinal stenosis at L3-4 and L4-5.  Mild bilateral foraminal stenosis L5-S1 right greater than left with disc bulging greater on the right.  Mild right greater than left  lateral recess stenosis L3-4.  --Lumbar spine x-ray 9/2/2021 with 2 mm L4-5 spondylolisthesis.  Significant L4-5 and L5-S1 degenerative disc height loss.  Mild left curvature L4-5.  Facet arthropathy greatest at L5-S1.  --Bilateral knee x-ray 7/6/2021 with moderate medial and mild lateral degenerative arthritis with small effusion on the right.  Left mild medial osteoarthritis with small to moderate effusion.  --Bilateral upper extremity EMG 12/11/2019 shows mild ulnar neuropathy, no cervical radiculopathy or median neuropathy noted.    --Cervical spine MRI 10/15/2019 shows severe foraminal stenosis on the right at C5-6, moderate to severe in the right at C6-7.  --Cervical spine MRI 11/1/2017 again shows C5-6 disc osteophyte complex with moderate central canal and severe bilateral foraminal stenosis.  C6-7, mild to moderate spinal canal stenosis with moderate to severe bilateral foraminal stenosis.  No cord signal changes noted.  --Thoracic MRI 2017 shows T10-11 right 8 mm enhancement dorsal lateral margin of the spinal cord.    -INTERVENTIONS: Ordered a right L5-S1 transforaminal epidural steroid injection as she does have a disc bulge at this level with right lateral recess and foraminal stenosis.  If no benefit with this injection we could trial a right L4-5 TFESI.    -MEDICATIONS: Advised patient that she can continue increasing gabapentin 300 mg to titrate up to 2 tablets in the morning 2 in the afternoon 3 at bedtime, she does not feel like she would tolerate 3 tablets 3 times daily.  Discussed side effects of medications and proper use. Patient verbalized understanding.    -PHYSICAL THERAPY: Referral to physical therapy placed to [facility] in [facility] as well.  Discussed the importance of core strengthening, ROM, stretching exercises with the patient and how each of these entities is important in decreasing pain.  Explained to the patient that the purpose of physical therapy is to teach the patient a home  exercise program.  These exercises need to be performed every day in order to decrease pain and prevent future occurrences of pain.        -PATIENT EDUCATION:  Total time of 32 minutes spent with the patient, reviewing the chart, placing orders, and documenting.   -Today we also discussed the issues related to the current COVID-19 pandemic, the pros and cons of the current treatment plan, the CDC guidelines such as social distancing, washing the hands, and covering the cough.    -FOLLOW UP: Follow-up for injection with Dr. Lay  Advised to contact clinic if symptoms worsen or change.    Subjective:     Katy Hong is a 76 year old female who presents today for follow-up regarding ongoing significant bilateral low back pain that is greatest on the right with radiation of pain into the right lateral and anterior thigh that is most intense over 75% currently on the right however previously her pain was greater on the left.  Patient currently reports minimal symptoms on her left lower extremity.  She is here to review MRI.  Currently reports her pain is a 4/10, and 8 at its worst.     -Treatment to Date: No prior spinal surgery.     Right L4-5 JOANNA in the past with Dr. Sipple.     -Medications:  Gabapentin 300 mg 2 tablets at nighttime which is long-term for fibromyalgia  OTC Aleve with no benefit    Patient Active Problem List   Diagnosis     Osteopenia     Insomnia     Vitamin D Deficiency     Lower Back Pain     Fibromyalgia     Essential Hypertension     Seborrheic Keratosis     Obesity     Lichen Sclerosus Et Atrophicus     Depression With Anxiety     Migraine Headache     Esophageal Reflux     Joint Pain, Localized In The Knee     Varicose Veins     Nonvenomous Insect Bite Of Shoulder     Cellulitis     Mammogram - Abnormal     Chronic Cutaneous Ulcer Venous Stasis     Chronic venous insufficiency     Foot pain (Soft Tissue)     Female stress incontinence     Calculus of ureter     Hydronephrosis with  urinary obstruction due to ureteral calculus     Other cardiomyopathy (H)     Obesity (BMI 30-39.9)     Chronic fatigue     Moderate episode of recurrent major depressive disorder (H)     Anxiety     Morbid obesity (H)       Current Outpatient Medications   Medication     albuterol (VENTOLIN HFA) 90 mcg/actuation inhaler     aspirin 81 MG EC tablet     calcium carbonate (OS-MONIQUE) 500 mg calcium (1,250 mg) chewable tablet     cholecalciferol, vitamin D3, 5,000 unit Tab     DULoxetine (CYMBALTA) 30 MG capsule     famotidine (PEPCID) 20 MG tablet     furosemide (LASIX) 20 MG tablet     gabapentin (NEURONTIN) 300 MG capsule     losartan (COZAAR) 25 MG tablet     MEDICATION CANNOT BE REORDERED - PLEASE MANUALLY REORDER AND DISCONTINUE THE OLD ORDER     metoprolol succinate ER (TOPROL-XL) 50 MG 24 hr tablet     SUMAtriptan succinate 6 mg/0.5 mL PnIj     topiramate (TOPAMAX) 100 MG tablet     traZODone (DESYREL) 100 MG tablet     triamcinolone (KENALOG) 0.1 % ointment     venlafaxine (EFFEXOR-XR) 75 MG 24 hr capsule     No current facility-administered medications for this visit.       Allergies   Allergen Reactions     Fluoxetine Cough and Rash     Pt thinks it was cough but not totally sure.     Latex Rash     Levothyroxine Rash     Lisinopril Cough and Rash     Sulfa (Sulfonamide Antibiotics) [Sulfa Drugs] Itching and Rash       Past Medical History:   Diagnosis Date     Abnormal mammogram, unspecified     Created by Conversion      Acute sinusitis, unspecified     Created by Conversion      Asymptomatic varicose veins     Created by Conversion      Breast cyst      Cellulitis and abscess of unspecified site     Created by Conversion      Circumscribed scleroderma     Created by Conversion      Contusion of unspecified site     Created by Conversion      Disorder of bone and cartilage, unspecified     Created by Conversion      Dysthymic disorder     Created by Conversion      Esophageal reflux     Created by Conversion       Insomnia, unspecified     Created by Conversion      Lumbago     Created by Conversion      Migraine, unspecified, without mention of intractable migraine without mention of status migrainosus     Created by Conversion      Myalgia and myositis, unspecified     Created by Conversion      Myocardial infarction (H)     per EKG's since 2015     Obesity, unspecified     Created by Conversion      Open wound of lip, without mention of complication     Created by Conversion      Other primary cardiomyopathies     Created by Conversion      Other seborrheic keratosis     Created by Conversion      Pain in joint, lower leg     Created by Conversion      Pain in limb     Created by Conversion      Sebaceous cyst     Created by Conversion      Shortness of breath     Created by Conversion      Shoulder and upper arm, insect bite, nonvenomous, without mention of infection(912.4)     Created by Conversion      Unspecified disease of sebaceous glands     Created by Conversion      Unspecified essential hypertension     Created by Conversion      Unspecified venous (peripheral) insufficiency     Created by Conversion      Unspecified vitamin D deficiency     Created by Conversion         Review of Systems  ROS:  Specifically negative for bowel/bladder dysfunction, balance changes, headache, dizziness, foot drop, fevers, chills, appetite changes, nausea/vomiting, unexplained weight loss. Otherwise 13 systems reviewed are negative. Please see the patient's intake questionnaire from today for details.    Reviewed Social, Family, Past Medical and Past Surgical history with patient, no significant changes noted since prior visit.     Objective:     BP (!) 144/74 (BP Location: Left arm, Patient Position: Sitting, Cuff Size: Adult Regular)   Pulse 88     PHYSICAL EXAMINATION:    --CONSTITUTIONAL: Well developed, well nourished, healthy appearing individual.  --PSYCHIATRIC: Appropriate mood and affect. No difficulty interacting due  to temper, social withdrawal, or memory issues.  --SKIN: Lumbar region is dry and intact.   --RESPIRATORY: Normal rhythm and effort. No abnormal accessory muscle breathing patterns noted.   --MUSCULOSKELETAL:  Normal lumbar lordosis noted, no lateral shift.  --GROSS MOTOR: Easily arises from a seated position. Gait is non-antalgic  --LUMBAR SPINE:  Inspection reveals no evidence of deformity.       RESULTS:   Imaging: Lumbar spine imaging was reviewed today. The images were shown to the patient and the findings were explained using a spine model.      MR Lumbar Spine w/o Contrast  Result Date: 9/28/2021  EXAM: MR LUMBAR SPINE W/O CONTRAST LOCATION: [facility] DATE/TIME: 9/28/2021 9:19 AM INDICATION: Low back pain. COMPARISON: None. TECHNIQUE: Routine Lumbar Spine MRI without IV contrast. FINDINGS: Nomenclature is based on 5 lumbar type vertebral bodies. Lumbar spine lordosis is exaggerated. The lumbar spinal canal is narrowed on a developmental basis. Lumbar vertebral body heights are maintained. Grade 1 retrolisthesis of L1 on L2, L2 on L3, and L3 on L4. Grade 1 anterolisthesis of L4 on L5 and grade 1 retrolisthesis of L5 on S1. Normal distal spinal cord and cauda equina with conus medullaris at L1. There is a stone in the lumen of the gallbladder. Unremarkable visualized bony pelvis. T12-L1: Disc desiccation and mild loss of disc space height. No focal disc herniation. Normal facets. No spinal canal stenosis or neural foraminal narrowing. L1-L2: Disc desiccation and mild loss of disc space height. Minimal circumferential disc bulge. Normal facets. No spinal canal stenosis or neural foraminal narrowing. L2-L3: Disc desiccation and mild loss of disc space height. Mild circumferential disc bulge with superimposed shallow left paracentral disc protrusion. Mild narrowing of the left lateral recess. Normal facets. No spinal canal stenosis or neural foraminal  narrowing. L3-L4: Disc desiccation  and mild loss of disc space height. Mild circumferential disc osteophyte complex. Mild facet arthropathy. Moderate spinal canal stenosis. Mild right and left lateral recess narrowing. No neural foraminal stenosis. L4-L5: Disc desiccation and moderate loss of disc space height. Mild circumferential disc osteophyte complex. Ligamentum flavum thickening. Moderate degenerative facet changes. Moderate spinal canal stenosis. No neural foraminal stenosis. L5-S1: Disc desiccation and advanced loss of disc space height. Mild circumferential disc osteophyte complex. Moderate left and mild right facet arthropathy. No spinal canal stenosis. Mild bilateral neural foraminal stenosis.   IMPRESSION: 1.  Mild lumbar spondylitic changes superimposed on developmental narrowing of the lumbar spinal canal. There is moderate spinal canal stenosis at L3-L4 and L4-L5. 2.  At L5-S1, there is mild bilateral neural foraminal stenosis.      XR Lumbar Spine 2/3 Views  Result Date: 9/2/2021  EXAM: XR LUMBAR SPINE 2-3 VIEWS LOCATION: [facility] DATE/TIME: 9/2/2021 9:58 AM INDICATION: Low back pain COMPARISON: None. TECHNIQUE: CR Lumbar Spine.   IMPRESSION: 5 lumbar type vertebral bodies. Normal vertebral body heights. Mild leftward curvature apex L4-L5. 2 mm L4-L5 degenerative anterolisthesis. Severe L4-L5 and L5-S1 interbody degeneration. Mild L5-S1 facet arthropathy.        MR CERVICAL SPINE WO CONTRAST  LOCATION: [facility]  DATE/TIME: 10/15/2019   INDICATION: Right cervical radiculopathy with weakness on exam.  COMPARISON: None.  TECHNIQUE: Without IV contrast.  FINDINGS:   Cervical lordosis is straightened. Moderate loss of disc height at C5-C6 and C6-C7. Small osteophytes and degenerative endplate changes at these levels and also at C3-C4 and C4-C5. Marrow signal and spinal cord signal otherwise normal. No extraspinal abnormality.  Craniovertebral junction and C1-C2: Normal.  C2-C3: Mild facet hypertrophy  in the left side. Spinal canal and neural foramina are patent.   C3-C4: Shallow annular bulge. Mild facet hypertrophy on the left side. Spinal canal is patent. Neural foramina are mildly to moderately narrowed on the left and mildly narrowed on the right.   C4-C5: Shallow disc bulge and osteophytes. Spinal canal is patent. The neural foramina are mild to moderately narrowed on the left and mildly narrowed on the right.   C5-C6: Shallow disc bulge and osteophytes. Spinal canal is patent. The neural foramina are severely narrowed bilaterally.   C6-C7: Bulging disc and osteophytes. Spinal canal is patent. The neural foramina are moderately narrowed on the left and moderately to severely narrowed on the right.   C7-T1: Spinal canal neuroforamina patent.  IMPRESSION:   1.  Degenerative disc disease most pronounced in the mid and lower cervical spine.   2.  No central spinal stenosis.  3.  No cord signal changes.  4.  Chronic/osteophyte complexes cause severe neural foraminal stenosis on the right side at C5-C6 and moderate to severe foraminal stenosis on the right at C6-C7. This may be affecting the C6 or C7 nerve roots.  5.  There is less significant foraminal stenosis as described above.        MR CERVICAL SPINE W WO CONTRAST  MR THORACIC SPINE W WO CONTRAST  11/1/2017   INDICATION: Bilateral leg and arm weakness x 2 years, T5 level.  TECHNIQUE: Without and with IV contrast.  CONTRAST: 9 mL Gadavist.  COMPARISON: Cervical spine MRI examination dated 06/30/2005.  FINDINGS:   Cervical spine MRI:   Mildly motion degraded examination.  Stable alignment compared to 2005 examination, with reversal of the normal cervical lordosis centered at C4. Degenerative grade 1 anterolisthesis of C2 on C3 and C3 on C4, as well as mild retrolisthesis of C5 on C6. Vertebral body height is preserved,   allowing for degenerative changes. Bone marrow signal intensity is homogeneous and within normal limits. Mild degenerative changes of the  craniocervical junction and C1-C2. No abnormal cord signal. No abnormal enhancement. The visualized intracranial contents are unremarkable. Grossly normal paraspinal soft tissues. The vertebral artery flow voids are preserved.  C2-C3: Normal disc height. No herniation. Degenerative ankylosis of the left facet joint, with mild right facet arthropathy. No spinal canal stenosis. No right neural foraminal stenosis. Mild left neural foraminal stenosis.  C3-C4: Mild intervertebral disc height loss, with shallow posterior disc osteophyte complex. Mild right and moderate to severe left facet arthropathy. No spinal canal stenosis. Mild right neural foraminal stenosis. Mild left neural foraminal stenosis.  C4-C5: Mild intervertebral disc height loss, with shallow posterior disc osteophyte complex. Moderate to severe bilateral facet arthropathy. No spinal canal stenosis. Mild to moderate right neural foraminal stenosis. Mild to moderate left neural foraminal stenosis.    C5-C6: Moderate to severe intervertebral disc height loss, with posterior disc osteophyte complex. Moderate bilateral facet arthropathy. Moderate spinal canal stenosis. Severe right neural foraminal stenosis. Severe left neural foraminal stenosis, progressed.  C6-C7: Moderate to severe intervertebral height loss, progressed, with posterior disc osteophyte complex. Mild to moderate bilateral facet arthropathy. Mild to moderate spinal canal stenosis. Moderate to severe right neural foraminal stenosis, progressed. Moderate to severe left neural foraminal stenosis, progressed.  C7-T1: Normal disc height. No herniation. Mild bilateral facet arthropathy. No spinal canal stenosis. Mild right neural foraminal stenosis. Mild left neural foraminal stenosis.  Thoracic spine:  Mildly motion degraded examination.  Normal vertebral body heights, alignment and marrow signal, allowing for prominent T8 vertebral body hemangioma and type II degenerative endplate marrow changes  surrounding the anterior aspect of the T10-T11 intervertebral disc. Mild to moderate degenerative disc disease and facet arthropathy throughout the mid to lower thoracic spine, without resultant significant spinal canal or neural foraminal stenosis.   No focal lesion or abnormal enhancement is demonstrated in the region at the T5 level. 8mm linear focus of enhancement is demonstrated on sagittal postcontrast T1-weighted image #7, the level of the T10 and T11 interspace, correlating to the right dorsolateral margin of the spinal cord. This finding is not characterized by axial images.  Unremarkable paraspinal soft tissues.  IMPRESSION:   CONCLUSION:  Cervical spine:  1.  Progression of spondylitic changes as compared to 2005 examination, most conspicuous at the C5-C6 and C6-C7 levels. Resultant moderate spinal canal compromise at C5-C6.  2.  Varying degrees of neural foraminal compromise, high-grade bilaterally at C5-C6 and C6-C7.  3.  Normal caliber and signal intensity of the cervical spinal cord, without focal lesion or abnormal enhancement.  Thoracic spine:  1.  No focal lesion or abnormal enhancement is demonstrated in the region at the T5 level.   2.  8 mm linear focus of enhancement along the right dorsolateral margin of the spinal cord at the level of T10-T11 on sagittal postcontrast T1-weighted sequence. The finding is not characterized by axial imaging. Differential considerations include normal vascular structure. Patient call back for thin section axial and sagittal T2 and postcontrast T1-weighted imaging through this region is recommended.                            Again, thank you for allowing me to participate in the care of your patient.        Sincerely,        Teresa Mas, RIC

## 2021-09-30 NOTE — PROGRESS NOTES
Assessment:     Diagnoses and all orders for this visit:  Chronic bilateral low back pain with right-sided sciatica  -     Physical Therapy Referral; Future  -     PAIN Transforaminal JOANNA Inj Lumbosacral Right; Future  Right lumbar radiculopathy  -     Physical Therapy Referral; Future  -     PAIN Transforaminal JOANNA Inj Lumbosacral Right; Future  Spondylolisthesis of lumbar region  -     Physical Therapy Referral; Future  DDD (degenerative disc disease), lumbar  -     Physical Therapy Referral; Future  Chronic neck pain  -     Physical Therapy Referral; Future  Fibromyalgia  -     Physical Therapy Referral; Future     Katy Hong is a 76 year old y.o. female with past medical history significant for hypertension, cardiomyopathy, kidney stone, fibromyalgia, depression with anxiety, migraine headache, reflux, cellulitis, chronic venous insufficiency with venous stasis ulcer, female stress incontinence who presents today for follow-up regarding:    -Significant worsening bilateral low back pain with right lumbar radiculopathy L4 and L5 dermatomal pattern with paresthesias, over 75% of her symptoms are on the right at this time.     Plan:     A shared decision making plan was used. The patient's values and choices were respected. Prior medical records were reviewed today. The following represents what was discussed and decided upon by the provider and the patient.        -DIAGNOSTIC TESTS: Images were personally reviewed and interpreted.   --Lumbar spine MRI 9/28/2021 with moderate spinal stenosis at L3-4 and L4-5.  Mild bilateral foraminal stenosis L5-S1 right greater than left with disc bulging greater on the right.  Mild right greater than left lateral recess stenosis L3-4.  --Lumbar spine x-ray 9/2/2021 with 2 mm L4-5 spondylolisthesis.  Significant L4-5 and L5-S1 degenerative disc height loss.  Mild left curvature L4-5.  Facet arthropathy greatest at L5-S1.  --Bilateral knee x-ray 7/6/2021 with moderate  medial and mild lateral degenerative arthritis with small effusion on the right.  Left mild medial osteoarthritis with small to moderate effusion.  --Bilateral upper extremity EMG 12/11/2019 shows mild ulnar neuropathy, no cervical radiculopathy or median neuropathy noted.    --Cervical spine MRI 10/15/2019 shows severe foraminal stenosis on the right at C5-6, moderate to severe in the right at C6-7.  --Cervical spine MRI 11/1/2017 again shows C5-6 disc osteophyte complex with moderate central canal and severe bilateral foraminal stenosis.  C6-7, mild to moderate spinal canal stenosis with moderate to severe bilateral foraminal stenosis.  No cord signal changes noted.  --Thoracic MRI 2017 shows T10-11 right 8 mm enhancement dorsal lateral margin of the spinal cord.    -INTERVENTIONS: Ordered a right L5-S1 transforaminal epidural steroid injection as she does have a disc bulge at this level with right lateral recess and foraminal stenosis.  If no benefit with this injection we could trial a right L4-5 TFESI.    -MEDICATIONS: Advised patient that she can continue increasing gabapentin 300 mg to titrate up to 2 tablets in the morning 2 in the afternoon 3 at bedtime, she does not feel like she would tolerate 3 tablets 3 times daily.  Discussed side effects of medications and proper use. Patient verbalized understanding.    -PHYSICAL THERAPY: Referral to physical therapy placed to [facility] in [facility] as well.  Discussed the importance of core strengthening, ROM, stretching exercises with the patient and how each of these entities is important in decreasing pain.  Explained to the patient that the purpose of physical therapy is to teach the patient a home exercise program.  These exercises need to be performed every day in order to decrease pain and prevent future occurrences of pain.        -PATIENT EDUCATION:  Total time of 32 minutes spent with the patient, reviewing the chart, placing orders, and documenting.    -Today we also discussed the issues related to the current COVID-19 pandemic, the pros and cons of the current treatment plan, the CDC guidelines such as social distancing, washing the hands, and covering the cough.    -FOLLOW UP: Follow-up for injection with Dr. Lay  Advised to contact clinic if symptoms worsen or change.    Subjective:     Katy Hong is a 76 year old female who presents today for follow-up regarding ongoing significant bilateral low back pain that is greatest on the right with radiation of pain into the right lateral and anterior thigh that is most intense over 75% currently on the right however previously her pain was greater on the left.  Patient currently reports minimal symptoms on her left lower extremity.  She is here to review MRI.  Currently reports her pain is a 4/10, and 8 at its worst.     -Treatment to Date: No prior spinal surgery.     Right L4-5 JOANNA in the past with Dr. Sipple.     -Medications:  Gabapentin 300 mg 2 tablets at nighttime which is long-term for fibromyalgia  OTC Aleve with no benefit    Patient Active Problem List   Diagnosis     Osteopenia     Insomnia     Vitamin D Deficiency     Lower Back Pain     Fibromyalgia     Essential Hypertension     Seborrheic Keratosis     Obesity     Lichen Sclerosus Et Atrophicus     Depression With Anxiety     Migraine Headache     Esophageal Reflux     Joint Pain, Localized In The Knee     Varicose Veins     Nonvenomous Insect Bite Of Shoulder     Cellulitis     Mammogram - Abnormal     Chronic Cutaneous Ulcer Venous Stasis     Chronic venous insufficiency     Foot pain (Soft Tissue)     Female stress incontinence     Calculus of ureter     Hydronephrosis with urinary obstruction due to ureteral calculus     Other cardiomyopathy (H)     Obesity (BMI 30-39.9)     Chronic fatigue     Moderate episode of recurrent major depressive disorder (H)     Anxiety     Morbid obesity (H)       Current Outpatient Medications    Medication     albuterol (VENTOLIN HFA) 90 mcg/actuation inhaler     aspirin 81 MG EC tablet     calcium carbonate (OS-MONIQUE) 500 mg calcium (1,250 mg) chewable tablet     cholecalciferol, vitamin D3, 5,000 unit Tab     DULoxetine (CYMBALTA) 30 MG capsule     famotidine (PEPCID) 20 MG tablet     furosemide (LASIX) 20 MG tablet     gabapentin (NEURONTIN) 300 MG capsule     losartan (COZAAR) 25 MG tablet     MEDICATION CANNOT BE REORDERED - PLEASE MANUALLY REORDER AND DISCONTINUE THE OLD ORDER     metoprolol succinate ER (TOPROL-XL) 50 MG 24 hr tablet     SUMAtriptan succinate 6 mg/0.5 mL PnIj     topiramate (TOPAMAX) 100 MG tablet     traZODone (DESYREL) 100 MG tablet     triamcinolone (KENALOG) 0.1 % ointment     venlafaxine (EFFEXOR-XR) 75 MG 24 hr capsule     No current facility-administered medications for this visit.       Allergies   Allergen Reactions     Fluoxetine Cough and Rash     Pt thinks it was cough but not totally sure.     Latex Rash     Levothyroxine Rash     Lisinopril Cough and Rash     Sulfa (Sulfonamide Antibiotics) [Sulfa Drugs] Itching and Rash       Past Medical History:   Diagnosis Date     Abnormal mammogram, unspecified     Created by Conversion      Acute sinusitis, unspecified     Created by Conversion      Asymptomatic varicose veins     Created by Conversion      Breast cyst      Cellulitis and abscess of unspecified site     Created by Conversion      Circumscribed scleroderma     Created by Conversion      Contusion of unspecified site     Created by Conversion      Disorder of bone and cartilage, unspecified     Created by Conversion      Dysthymic disorder     Created by Conversion      Esophageal reflux     Created by Conversion      Insomnia, unspecified     Created by Conversion      Lumbago     Created by Conversion      Migraine, unspecified, without mention of intractable migraine without mention of status migrainosus     Created by Conversion      Myalgia and myositis,  unspecified     Created by Conversion      Myocardial infarction (H)     per EKG's since 2015     Obesity, unspecified     Created by Conversion      Open wound of lip, without mention of complication     Created by Conversion      Other primary cardiomyopathies     Created by Conversion      Other seborrheic keratosis     Created by Conversion      Pain in joint, lower leg     Created by Conversion      Pain in limb     Created by Conversion      Sebaceous cyst     Created by Conversion      Shortness of breath     Created by Conversion      Shoulder and upper arm, insect bite, nonvenomous, without mention of infection(912.4)     Created by Conversion      Unspecified disease of sebaceous glands     Created by Conversion      Unspecified essential hypertension     Created by Conversion      Unspecified venous (peripheral) insufficiency     Created by Conversion      Unspecified vitamin D deficiency     Created by Conversion         Review of Systems  ROS:  Specifically negative for bowel/bladder dysfunction, balance changes, headache, dizziness, foot drop, fevers, chills, appetite changes, nausea/vomiting, unexplained weight loss. Otherwise 13 systems reviewed are negative. Please see the patient's intake questionnaire from today for details.    Reviewed Social, Family, Past Medical and Past Surgical history with patient, no significant changes noted since prior visit.     Objective:     BP (!) 144/74 (BP Location: Left arm, Patient Position: Sitting, Cuff Size: Adult Regular)   Pulse 88     PHYSICAL EXAMINATION:    --CONSTITUTIONAL: Well developed, well nourished, healthy appearing individual.  --PSYCHIATRIC: Appropriate mood and affect. No difficulty interacting due to temper, social withdrawal, or memory issues.  --SKIN: Lumbar region is dry and intact.   --RESPIRATORY: Normal rhythm and effort. No abnormal accessory muscle breathing patterns noted.   --MUSCULOSKELETAL:  Normal lumbar lordosis noted, no lateral  shift.  --GROSS MOTOR: Easily arises from a seated position. Gait is non-antalgic  --LUMBAR SPINE:  Inspection reveals no evidence of deformity.       RESULTS:   Imaging: Lumbar spine imaging was reviewed today. The images were shown to the patient and the findings were explained using a spine model.      MR Lumbar Spine w/o Contrast  Result Date: 9/28/2021  EXAM: MR LUMBAR SPINE W/O CONTRAST LOCATION: [facility] DATE/TIME: 9/28/2021 9:19 AM INDICATION: Low back pain. COMPARISON: None. TECHNIQUE: Routine Lumbar Spine MRI without IV contrast. FINDINGS: Nomenclature is based on 5 lumbar type vertebral bodies. Lumbar spine lordosis is exaggerated. The lumbar spinal canal is narrowed on a developmental basis. Lumbar vertebral body heights are maintained. Grade 1 retrolisthesis of L1 on L2, L2 on L3, and L3 on L4. Grade 1 anterolisthesis of L4 on L5 and grade 1 retrolisthesis of L5 on S1. Normal distal spinal cord and cauda equina with conus medullaris at L1. There is a stone in the lumen of the gallbladder. Unremarkable visualized bony pelvis. T12-L1: Disc desiccation and mild loss of disc space height. No focal disc herniation. Normal facets. No spinal canal stenosis or neural foraminal narrowing. L1-L2: Disc desiccation and mild loss of disc space height. Minimal circumferential disc bulge. Normal facets. No spinal canal stenosis or neural foraminal narrowing. L2-L3: Disc desiccation and mild loss of disc space height. Mild circumferential disc bulge with superimposed shallow left paracentral disc protrusion. Mild narrowing of the left lateral recess. Normal facets. No spinal canal stenosis or neural foraminal  narrowing. L3-L4: Disc desiccation and mild loss of disc space height. Mild circumferential disc osteophyte complex. Mild facet arthropathy. Moderate spinal canal stenosis. Mild right and left lateral recess narrowing. No neural foraminal stenosis. L4-L5: Disc desiccation and moderate  loss of disc space height. Mild circumferential disc osteophyte complex. Ligamentum flavum thickening. Moderate degenerative facet changes. Moderate spinal canal stenosis. No neural foraminal stenosis. L5-S1: Disc desiccation and advanced loss of disc space height. Mild circumferential disc osteophyte complex. Moderate left and mild right facet arthropathy. No spinal canal stenosis. Mild bilateral neural foraminal stenosis.   IMPRESSION: 1.  Mild lumbar spondylitic changes superimposed on developmental narrowing of the lumbar spinal canal. There is moderate spinal canal stenosis at L3-L4 and L4-L5. 2.  At L5-S1, there is mild bilateral neural foraminal stenosis.      XR Lumbar Spine 2/3 Views  Result Date: 9/2/2021  EXAM: XR LUMBAR SPINE 2-3 VIEWS LOCATION: [facility] DATE/TIME: 9/2/2021 9:58 AM INDICATION: Low back pain COMPARISON: None. TECHNIQUE: CR Lumbar Spine.   IMPRESSION: 5 lumbar type vertebral bodies. Normal vertebral body heights. Mild leftward curvature apex L4-L5. 2 mm L4-L5 degenerative anterolisthesis. Severe L4-L5 and L5-S1 interbody degeneration. Mild L5-S1 facet arthropathy.        MR CERVICAL SPINE WO CONTRAST  LOCATION: [facility]  DATE/TIME: 10/15/2019   INDICATION: Right cervical radiculopathy with weakness on exam.  COMPARISON: None.  TECHNIQUE: Without IV contrast.  FINDINGS:   Cervical lordosis is straightened. Moderate loss of disc height at C5-C6 and C6-C7. Small osteophytes and degenerative endplate changes at these levels and also at C3-C4 and C4-C5. Marrow signal and spinal cord signal otherwise normal. No extraspinal abnormality.  Craniovertebral junction and C1-C2: Normal.  C2-C3: Mild facet hypertrophy in the left side. Spinal canal and neural foramina are patent.   C3-C4: Shallow annular bulge. Mild facet hypertrophy on the left side. Spinal canal is patent. Neural foramina are mildly to moderately narrowed on the left and mildly narrowed on the  right.   C4-C5: Shallow disc bulge and osteophytes. Spinal canal is patent. The neural foramina are mild to moderately narrowed on the left and mildly narrowed on the right.   C5-C6: Shallow disc bulge and osteophytes. Spinal canal is patent. The neural foramina are severely narrowed bilaterally.   C6-C7: Bulging disc and osteophytes. Spinal canal is patent. The neural foramina are moderately narrowed on the left and moderately to severely narrowed on the right.   C7-T1: Spinal canal neuroforamina patent.  IMPRESSION:   1.  Degenerative disc disease most pronounced in the mid and lower cervical spine.   2.  No central spinal stenosis.  3.  No cord signal changes.  4.  Chronic/osteophyte complexes cause severe neural foraminal stenosis on the right side at C5-C6 and moderate to severe foraminal stenosis on the right at C6-C7. This may be affecting the C6 or C7 nerve roots.  5.  There is less significant foraminal stenosis as described above.        MR CERVICAL SPINE W WO CONTRAST  MR THORACIC SPINE W WO CONTRAST  11/1/2017   INDICATION: Bilateral leg and arm weakness x 2 years, T5 level.  TECHNIQUE: Without and with IV contrast.  CONTRAST: 9 mL Gadavist.  COMPARISON: Cervical spine MRI examination dated 06/30/2005.  FINDINGS:   Cervical spine MRI:   Mildly motion degraded examination.  Stable alignment compared to 2005 examination, with reversal of the normal cervical lordosis centered at C4. Degenerative grade 1 anterolisthesis of C2 on C3 and C3 on C4, as well as mild retrolisthesis of C5 on C6. Vertebral body height is preserved,   allowing for degenerative changes. Bone marrow signal intensity is homogeneous and within normal limits. Mild degenerative changes of the craniocervical junction and C1-C2. No abnormal cord signal. No abnormal enhancement. The visualized intracranial contents are unremarkable. Grossly normal paraspinal soft tissues. The vertebral artery flow voids are preserved.  C2-C3: Normal disc  height. No herniation. Degenerative ankylosis of the left facet joint, with mild right facet arthropathy. No spinal canal stenosis. No right neural foraminal stenosis. Mild left neural foraminal stenosis.  C3-C4: Mild intervertebral disc height loss, with shallow posterior disc osteophyte complex. Mild right and moderate to severe left facet arthropathy. No spinal canal stenosis. Mild right neural foraminal stenosis. Mild left neural foraminal stenosis.  C4-C5: Mild intervertebral disc height loss, with shallow posterior disc osteophyte complex. Moderate to severe bilateral facet arthropathy. No spinal canal stenosis. Mild to moderate right neural foraminal stenosis. Mild to moderate left neural foraminal stenosis.    C5-C6: Moderate to severe intervertebral disc height loss, with posterior disc osteophyte complex. Moderate bilateral facet arthropathy. Moderate spinal canal stenosis. Severe right neural foraminal stenosis. Severe left neural foraminal stenosis, progressed.  C6-C7: Moderate to severe intervertebral height loss, progressed, with posterior disc osteophyte complex. Mild to moderate bilateral facet arthropathy. Mild to moderate spinal canal stenosis. Moderate to severe right neural foraminal stenosis, progressed. Moderate to severe left neural foraminal stenosis, progressed.  C7-T1: Normal disc height. No herniation. Mild bilateral facet arthropathy. No spinal canal stenosis. Mild right neural foraminal stenosis. Mild left neural foraminal stenosis.  Thoracic spine:  Mildly motion degraded examination.  Normal vertebral body heights, alignment and marrow signal, allowing for prominent T8 vertebral body hemangioma and type II degenerative endplate marrow changes surrounding the anterior aspect of the T10-T11 intervertebral disc. Mild to moderate degenerative disc disease and facet arthropathy throughout the mid to lower thoracic spine, without resultant significant spinal canal or neural foraminal  stenosis.   No focal lesion or abnormal enhancement is demonstrated in the region at the T5 level. 8mm linear focus of enhancement is demonstrated on sagittal postcontrast T1-weighted image #7, the level of the T10 and T11 interspace, correlating to the right dorsolateral margin of the spinal cord. This finding is not characterized by axial images.  Unremarkable paraspinal soft tissues.  IMPRESSION:   CONCLUSION:  Cervical spine:  1.  Progression of spondylitic changes as compared to 2005 examination, most conspicuous at the C5-C6 and C6-C7 levels. Resultant moderate spinal canal compromise at C5-C6.  2.  Varying degrees of neural foraminal compromise, high-grade bilaterally at C5-C6 and C6-C7.  3.  Normal caliber and signal intensity of the cervical spinal cord, without focal lesion or abnormal enhancement.  Thoracic spine:  1.  No focal lesion or abnormal enhancement is demonstrated in the region at the T5 level.   2.  8 mm linear focus of enhancement along the right dorsolateral margin of the spinal cord at the level of T10-T11 on sagittal postcontrast T1-weighted sequence. The finding is not characterized by axial imaging. Differential considerations include normal vascular structure. Patient call back for thin section axial and sagittal T2 and postcontrast T1-weighted imaging through this region is recommended.

## 2021-10-06 ENCOUNTER — ANCILLARY PROCEDURE (OUTPATIENT)
Dept: PHYSICAL MEDICINE AND REHAB | Facility: CLINIC | Age: 76
End: 2021-10-06
Attending: NURSE PRACTITIONER
Payer: COMMERCIAL

## 2021-10-06 VITALS
HEART RATE: 73 BPM | BODY MASS INDEX: 32.61 KG/M2 | TEMPERATURE: 97.7 F | DIASTOLIC BLOOD PRESSURE: 78 MMHG | RESPIRATION RATE: 16 BRPM | WEIGHT: 191 LBS | OXYGEN SATURATION: 97 % | HEIGHT: 64 IN | SYSTOLIC BLOOD PRESSURE: 136 MMHG

## 2021-10-06 DIAGNOSIS — M54.16 RIGHT LUMBAR RADICULOPATHY: ICD-10-CM

## 2021-10-06 DIAGNOSIS — M54.41 CHRONIC BILATERAL LOW BACK PAIN WITH RIGHT-SIDED SCIATICA: ICD-10-CM

## 2021-10-06 DIAGNOSIS — G89.29 CHRONIC BILATERAL LOW BACK PAIN WITH RIGHT-SIDED SCIATICA: ICD-10-CM

## 2021-10-06 PROCEDURE — 64483 NJX AA&/STRD TFRM EPI L/S 1: CPT | Mod: RT | Performed by: PAIN MEDICINE

## 2021-10-06 PROCEDURE — 99207 PR DROP WITH A PROCEDURE: CPT | Mod: 25 | Performed by: PAIN MEDICINE

## 2021-10-06 RX ORDER — LIDOCAINE HYDROCHLORIDE 10 MG/ML
INJECTION, SOLUTION EPIDURAL; INFILTRATION; INTRACAUDAL; PERINEURAL
Status: COMPLETED | OUTPATIENT
Start: 2021-10-06 | End: 2021-10-06

## 2021-10-06 RX ORDER — DEXAMETHASONE SODIUM PHOSPHATE 10 MG/ML
INJECTION, SOLUTION INTRAMUSCULAR; INTRAVENOUS
Status: COMPLETED | OUTPATIENT
Start: 2021-10-06 | End: 2021-10-06

## 2021-10-06 RX ADMIN — DEXAMETHASONE SODIUM PHOSPHATE 10 MG: 10 INJECTION, SOLUTION INTRAMUSCULAR; INTRAVENOUS at 09:47

## 2021-10-06 RX ADMIN — LIDOCAINE HYDROCHLORIDE 2 ML: 10 INJECTION, SOLUTION EPIDURAL; INFILTRATION; INTRACAUDAL; PERINEURAL at 09:47

## 2021-10-06 ASSESSMENT — PAIN SCALES - GENERAL
PAINLEVEL: EXTREME PAIN (9)
PAINLEVEL: MODERATE PAIN (4)

## 2021-10-06 ASSESSMENT — MIFFLIN-ST. JEOR: SCORE: 1346.37

## 2021-10-06 NOTE — PATIENT INSTRUCTIONS
Follow-up visit with Teresa Mas CNP in 2 weeks to discuss injection outcome and determine care plan going forward.       DISCHARGE INSTRUCTIONS    During office hours (8:00 a.m.- 4:00 p.m.) questions or concerns may be answered  by calling Spine Center Navigation Nurses at  [phone].  Messages received after hours will be returned the following business day.      In the case of an emergency, please dial 911 or seek assistance at the nearest Emergency Room/Urgent Care facility.     All Patients:    ? You may experience an increase in your symptoms for the first 2 days (It may take anywhere between 2 days- 2 weeks for the steroid to have maximum effect).    ? You may use ice on the injection site, as frequently as 20 minutes each hour if needed.    ? You may take your pain medicine.    ? You may continue taking your regular medication after your injection. If you have had a Medial Branch Block you may resume pain medication once your pain diary is completed.    ? You may shower. No swimming, tub bath or hot tub for 48 hours.  You may remove your bandaid/bandage as soon as you are home.    ? You may resume light activities, as tolerated.    ? Resume your usual diet as tolerated.    ? It is strongly advised that you do not drive for 1-3 hours post injection.    ? If you have had oral sedation:  Do not drive for 8 hours post injection.      ? If you have had IV sedation:  Do not drive for 24 hours post injection.  Do not operate hazardous machinery or make important personal/business decisions for 24 hours.      POSSIBLE STEROID SIDE EFFECTS (If steroid/cortisone was used for your procedure)    -If you experience these symptoms, it should only last for a short period      Swelling of the legs                Skin redness (flushing)       Mouth (oral) irritation     Blood sugar (glucose) levels              Sweats                      Mood changes    Headache    Sleeplessness    Weakened immune system for up to  14 days, which could increase the risk of nakul the COVID-19 virus and/or experiencing more severe symptoms of the disease, if exposed.    Decreased effectiveness of the flu vaccine if given within 2 weeks of the steroid.         POSSIBLE PROCEDURE SIDE EFFECTS  -Call the Spine Center if you are concerned    Increased Pain             Increased numbness/tingling        Nausea/Vomiting            Bruising/bleeding at site        Redness or swelling                                                Difficulty walking        Weakness             Fever greater than 100.5    *In the event of a severe headache after an epidural steroid injection that is relieved by lying down, please call the [facility] to speak with a clinical staff member*

## 2021-10-06 NOTE — LETTER
10/6/2021         RE: Katy Hong  [address] [place] [id number]        Dear Colleague,    Thank you for referring your patient, Katy Hong, to the [facility] [place]. Please see a copy of my visit note below.    Patient was here for an injection today.        Again, thank you for allowing me to participate in the care of your patient.        Sincerely,        Sergio Lay, DO

## 2021-10-11 ENCOUNTER — HEALTH MAINTENANCE LETTER (OUTPATIENT)
Age: 76
End: 2021-10-11

## 2021-10-12 NOTE — PROGRESS NOTES
Assessment:     Diagnoses and all orders for this visit:  Chronic bilateral low back pain with bilateral sciatica  -     PAIN Transforaminal JOANNA Inj Lumbosacral Left; Future  Left lumbar radiculopathy  -     PAIN Transforaminal JOANNA Inj Lumbosacral Left; Future  Right lumbar radiculopathy  Spondylolisthesis of lumbar region  DDD (degenerative disc disease), lumbar  -     PAIN Transforaminal JOANNA Inj Lumbosacral Left; Future  Chronic neck pain  Fibromyalgia  Lumbar foraminal stenosis  -     PAIN Transforaminal JOANNA Inj Lumbosacral Left; Future     Katy Hong is a 76 year old y.o. female with past medical history significant for hypertension, cardiomyopathy, kidney stone, fibromyalgia, depression with anxiety, migraine headache, reflux, cellulitis, chronic venous insufficiency with venous stasis ulcer, female stress incontinence who presents today for follow-up regarding:    -Right lumbar radiculopathy with significant relief with recent right L5-S1 TFESI.    -Progressive left lumbar radiculopathy at this time in an L5 dermatomal pattern as well.    Patient denies lower extremity weakness.     Plan:     A shared decision making plan was used. The patient's values and choices were respected. Prior medical records were reviewed today. The following represents what was discussed and decided upon by the provider and the patient.        -DIAGNOSTIC TESTS: Images were personally reviewed and interpreted.   --Lumbar spine MRI 9/28/2021 with moderate spinal stenosis at L3-4 and L4-5.  Mild bilateral foraminal stenosis L5-S1 right greater than left with disc bulging greater on the right.  Mild right greater than left lateral recess stenosis L3-4.  --Lumbar spine x-ray 9/2/2021 with 2 mm L4-5 spondylolisthesis.  Significant L4-5 and L5-S1 degenerative disc height loss.  Mild left curvature L4-5.  Facet arthropathy greatest at L5-S1.  --Bilateral knee x-ray 7/6/2021 with moderate medial and mild lateral degenerative  arthritis with small effusion on the right.  Left mild medial osteoarthritis with small to moderate effusion.  --Bilateral upper extremity EMG 12/11/2019 shows mild ulnar neuropathy, no cervical radiculopathy or median neuropathy noted.    --Cervical spine MRI 10/15/2019 shows severe foraminal stenosis on the right at C5-6, moderate to severe in the right at C6-7.  --Cervical spine MRI 11/1/2017 again shows C5-6 disc osteophyte complex with moderate central canal and severe bilateral foraminal stenosis.  C6-7, mild to moderate spinal canal stenosis with moderate to severe bilateral foraminal stenosis.  No cord signal changes noted.  --Thoracic MRI 2017 shows T10-11 right 8 mm enhancement dorsal lateral margin of the spinal cord.    -INTERVENTIONS: Ordered left L5-S1 transforaminal epidural steroid injection to see if we can get further relief of left lumbar radiculopathy as she did have significant relief with this injection on the right of her right-sided symptoms.  Currently symptoms are much more dramatic on the left now at this time.  Patient does have foraminal stenosis bilateral due to degenerative disc changes at L5-S1.    -MEDICATIONS: No medication changes at this time.  Discussed side effects of medications and proper use. Patient verbalized understanding.    -PHYSICAL THERAPY: Physical therapy referral placed 9/30/2021.  Initial PT eval scheduled 10/27/2021.  Discussed the importance of core strengthening, ROM, stretching exercises with the patient and how each of these entities is important in decreasing pain.  Explained to the patient that the purpose of physical therapy is to teach the patient a home exercise program.  These exercises need to be performed every day in order to decrease pain and prevent future occurrences of pain.        -PATIENT EDUCATION:  Total time of 32 minutes, on the day of service, spent with the patient, reviewing the chart, placing orders, and documenting.   -Today we also  discussed the issues related to the current COVID-19 pandemic, the pros and cons of the current treatment plan, the CDC guidelines such as social distancing, washing the hands, and covering the cough.    -FOLLOW UP: Follow-up for injection with Dr. Lay.  2-week post injection follow-up can be a video visit which patient prefers at this time.  Advised to contact clinic if symptoms worsen or change.    Subjective:     Katy Hong is a 76 year old female who presents today for follow-up regarding 2-week post right L5-S1 TFESI in which she did receive significant relief of right low back pain with right lower extremity pain.  Currently her pain is more intense on the left low back lumbosacral junction rating to the posterior lateral thigh posterior lateral calf and she is hoping for repeat injection on the left now as well.  Currently her pain is a 7/10, 9 at its worst, 5 at its best aggravated with sitting for long periods of time, stairs, does improve with ice.    -Treatment to Date: No prior spinal surgery.     Right L4-5 JOANNA in the past with Dr. Sipple.  Right L5-S1 TFESI 10/6/2021 with significant relief.  Preprocedure pain 9/10, post 4/10.     -Medications:  Gabapentin 300 mg 2 tablets at nighttime which is long-term for fibromyalgia  OTC Aleve with no benefit    Patient Active Problem List   Diagnosis     Osteopenia     Insomnia     Vitamin D Deficiency     Lower Back Pain     Fibromyalgia     Essential Hypertension     Seborrheic Keratosis     Obesity     Lichen Sclerosus Et Atrophicus     Depression With Anxiety     Migraine Headache     Esophageal Reflux     Joint Pain, Localized In The Knee     Varicose Veins     Nonvenomous Insect Bite Of Shoulder     Cellulitis     Mammogram - Abnormal     Chronic Cutaneous Ulcer Venous Stasis     Chronic venous insufficiency     Foot pain (Soft Tissue)     Female stress incontinence     Calculus of ureter     Hydronephrosis with urinary obstruction due to  ureteral calculus     Other cardiomyopathy (H)     Obesity (BMI 30-39.9)     Chronic fatigue     Moderate episode of recurrent major depressive disorder (H)     Anxiety     Morbid obesity (H)       Current Outpatient Medications   Medication     DULoxetine (CYMBALTA) 30 MG capsule     gabapentin (NEURONTIN) 300 MG capsule     albuterol (VENTOLIN HFA) 90 mcg/actuation inhaler     aspirin 81 MG EC tablet     calcium carbonate (OS-MONIQUE) 500 mg calcium (1,250 mg) chewable tablet     cholecalciferol, vitamin D3, 5,000 unit Tab     famotidine (PEPCID) 20 MG tablet     furosemide (LASIX) 20 MG tablet     losartan (COZAAR) 25 MG tablet     metoprolol succinate ER (TOPROL-XL) 50 MG 24 hr tablet     SUMAtriptan succinate 6 mg/0.5 mL PnIj     topiramate (TOPAMAX) 100 MG tablet     traZODone (DESYREL) 100 MG tablet     triamcinolone (KENALOG) 0.1 % ointment     venlafaxine (EFFEXOR-XR) 75 MG 24 hr capsule     No current facility-administered medications for this visit.       Allergies   Allergen Reactions     Fluoxetine Cough and Rash     Pt thinks it was cough but not totally sure.     Latex Rash     Levothyroxine Rash     Lisinopril Cough and Rash     Sulfa (Sulfonamide Antibiotics) [Sulfa Drugs] Itching and Rash       Past Medical History:   Diagnosis Date     Abnormal mammogram, unspecified     Created by Conversion      Acute sinusitis, unspecified     Created by Conversion      Asymptomatic varicose veins     Created by Conversion      Breast cyst      Cellulitis and abscess of unspecified site     Created by Conversion      Circumscribed scleroderma     Created by Conversion      Contusion of unspecified site     Created by Conversion      Disorder of bone and cartilage, unspecified     Created by Conversion      Dysthymic disorder     Created by Conversion      Esophageal reflux     Created by Conversion      Insomnia, unspecified     Created by Conversion      Lumbago     Created by Conversion      Migraine,  unspecified, without mention of intractable migraine without mention of status migrainosus     Created by Conversion      Myalgia and myositis, unspecified     Created by Conversion      Myocardial infarction (H)     per EKG's since 2015     Obesity, unspecified     Created by Conversion      Open wound of lip, without mention of complication     Created by Conversion      Other primary cardiomyopathies     Created by Conversion      Other seborrheic keratosis     Created by Conversion      Pain in joint, lower leg     Created by Conversion      Pain in limb     Created by Conversion      Sebaceous cyst     Created by Conversion      Shortness of breath     Created by Conversion      Shoulder and upper arm, insect bite, nonvenomous, without mention of infection(912.4)     Created by Conversion      Unspecified disease of sebaceous glands     Created by Conversion      Unspecified essential hypertension     Created by Conversion      Unspecified venous (peripheral) insufficiency     Created by Conversion      Unspecified vitamin D deficiency     Created by Conversion         Review of Systems  ROS:  Specifically negative for bowel/bladder dysfunction, balance changes, headache, dizziness, foot drop, fevers, chills, appetite changes, nausea/vomiting, unexplained weight loss. Otherwise 13 systems reviewed are negative. Please see the patient's intake questionnaire from today for details.    Reviewed Social, Family, Past Medical and Past Surgical history with patient, no significant changes noted since prior visit.     Objective:     BP (!) 167/77 (BP Location: Right arm, Patient Position: Sitting)   Pulse 98   SpO2 97%     PHYSICAL EXAMINATION:    --CONSTITUTIONAL: Well developed, well nourished, healthy appearing individual.  --PSYCHIATRIC: Appropriate mood and affect. No difficulty interacting due to temper, social withdrawal, or memory issues.  --SKIN: Lumbar region is dry and intact.   --RESPIRATORY: Normal  rhythm and effort. No abnormal accessory muscle breathing patterns noted.   --MUSCULOSKELETAL:  Normal lumbar lordosis noted, no lateral shift.  --GROSS MOTOR: Easily arises from a seated position. Gait is non-antalgic  --LUMBAR SPINE:  Inspection reveals no evidence of deformity.     RESULTS:   Imaging: Lumbar spine imaging was reviewed today. The images were shown to the patient and the findings were explained using a spine model.      MR Lumbar Spine w/o Contrast  Result Date: 9/28/2021  EXAM: MR LUMBAR SPINE W/O CONTRAST LOCATION: [facility] DATE/TIME: 9/28/2021 9:19 AM INDICATION: Low back pain. COMPARISON: None. TECHNIQUE: Routine Lumbar Spine MRI without IV contrast. FINDINGS: Nomenclature is based on 5 lumbar type vertebral bodies. Lumbar spine lordosis is exaggerated. The lumbar spinal canal is narrowed on a developmental basis. Lumbar vertebral body heights are maintained. Grade 1 retrolisthesis of L1 on L2, L2 on L3, and L3 on L4. Grade 1 anterolisthesis of L4 on L5 and grade 1 retrolisthesis of L5 on S1. Normal distal spinal cord and cauda equina with conus medullaris at L1. There is a stone in the lumen of the gallbladder. Unremarkable visualized bony pelvis. T12-L1: Disc desiccation and mild loss of disc space height. No focal disc herniation. Normal facets. No spinal canal stenosis or neural foraminal narrowing. L1-L2: Disc desiccation and mild loss of disc space height. Minimal circumferential disc bulge. Normal facets. No spinal canal stenosis or neural foraminal narrowing. L2-L3: Disc desiccation and mild loss of disc space height. Mild circumferential disc bulge with superimposed shallow left paracentral disc protrusion. Mild narrowing of the left lateral recess. Normal facets. No spinal canal stenosis or neural foraminal  narrowing. L3-L4: Disc desiccation and mild loss of disc space height. Mild circumferential disc osteophyte complex. Mild facet arthropathy. Moderate  spinal canal stenosis. Mild right and left lateral recess narrowing. No neural foraminal stenosis. L4-L5: Disc desiccation and moderate loss of disc space height. Mild circumferential disc osteophyte complex. Ligamentum flavum thickening. Moderate degenerative facet changes. Moderate spinal canal stenosis. No neural foraminal stenosis. L5-S1: Disc desiccation and advanced loss of disc space height. Mild circumferential disc osteophyte complex. Moderate left and mild right facet arthropathy. No spinal canal stenosis. Mild bilateral neural foraminal stenosis.   IMPRESSION: 1.  Mild lumbar spondylitic changes superimposed on developmental narrowing of the lumbar spinal canal. There is moderate spinal canal stenosis at L3-L4 and L4-L5. 2.  At L5-S1, there is mild bilateral neural foraminal stenosis.        XR Lumbar Spine 2/3 Views  Result Date: 9/2/2021  EXAM: XR LUMBAR SPINE 2-3 VIEWS LOCATION: [facility] DATE/TIME: 9/2/2021 9:58 AM INDICATION: Low back pain COMPARISON: None. TECHNIQUE: CR Lumbar Spine.   IMPRESSION: 5 lumbar type vertebral bodies. Normal vertebral body heights. Mild leftward curvature apex L4-L5. 2 mm L4-L5 degenerative anterolisthesis. Severe L4-L5 and L5-S1 interbody degeneration. Mild L5-S1 facet arthropathy.        MR CERVICAL SPINE WO CONTRAST  LOCATION: [facility]  DATE/TIME: 10/15/2019   INDICATION: Right cervical radiculopathy with weakness on exam.  COMPARISON: None.  TECHNIQUE: Without IV contrast.  FINDINGS:   Cervical lordosis is straightened. Moderate loss of disc height at C5-C6 and C6-C7. Small osteophytes and degenerative endplate changes at these levels and also at C3-C4 and C4-C5. Marrow signal and spinal cord signal otherwise normal. No extraspinal abnormality.  Craniovertebral junction and C1-C2: Normal.  C2-C3: Mild facet hypertrophy in the left side. Spinal canal and neural foramina are patent.   C3-C4: Shallow annular bulge. Mild facet  hypertrophy on the left side. Spinal canal is patent. Neural foramina are mildly to moderately narrowed on the left and mildly narrowed on the right.   C4-C5: Shallow disc bulge and osteophytes. Spinal canal is patent. The neural foramina are mild to moderately narrowed on the left and mildly narrowed on the right.   C5-C6: Shallow disc bulge and osteophytes. Spinal canal is patent. The neural foramina are severely narrowed bilaterally.   C6-C7: Bulging disc and osteophytes. Spinal canal is patent. The neural foramina are moderately narrowed on the left and moderately to severely narrowed on the right.   C7-T1: Spinal canal neuroforamina patent.  IMPRESSION:   1.  Degenerative disc disease most pronounced in the mid and lower cervical spine.   2.  No central spinal stenosis.  3.  No cord signal changes.  4.  Chronic/osteophyte complexes cause severe neural foraminal stenosis on the right side at C5-C6 and moderate to severe foraminal stenosis on the right at C6-C7. This may be affecting the C6 or C7 nerve roots.  5.  There is less significant foraminal stenosis as described above.        MR CERVICAL SPINE W WO CONTRAST  MR THORACIC SPINE W WO CONTRAST  11/1/2017   INDICATION: Bilateral leg and arm weakness x 2 years, T5 level.  TECHNIQUE: Without and with IV contrast.  CONTRAST: 9 mL Gadavist.  COMPARISON: Cervical spine MRI examination dated 06/30/2005.  FINDINGS:   Cervical spine MRI:   Mildly motion degraded examination.  Stable alignment compared to 2005 examination, with reversal of the normal cervical lordosis centered at C4. Degenerative grade 1 anterolisthesis of C2 on C3 and C3 on C4, as well as mild retrolisthesis of C5 on C6. Vertebral body height is preserved,   allowing for degenerative changes. Bone marrow signal intensity is homogeneous and within normal limits. Mild degenerative changes of the craniocervical junction and C1-C2. No abnormal cord signal. No abnormal enhancement. The visualized  intracranial contents are unremarkable. Grossly normal paraspinal soft tissues. The vertebral artery flow voids are preserved.  C2-C3: Normal disc height. No herniation. Degenerative ankylosis of the left facet joint, with mild right facet arthropathy. No spinal canal stenosis. No right neural foraminal stenosis. Mild left neural foraminal stenosis.  C3-C4: Mild intervertebral disc height loss, with shallow posterior disc osteophyte complex. Mild right and moderate to severe left facet arthropathy. No spinal canal stenosis. Mild right neural foraminal stenosis. Mild left neural foraminal stenosis.  C4-C5: Mild intervertebral disc height loss, with shallow posterior disc osteophyte complex. Moderate to severe bilateral facet arthropathy. No spinal canal stenosis. Mild to moderate right neural foraminal stenosis. Mild to moderate left neural foraminal stenosis.    C5-C6: Moderate to severe intervertebral disc height loss, with posterior disc osteophyte complex. Moderate bilateral facet arthropathy. Moderate spinal canal stenosis. Severe right neural foraminal stenosis. Severe left neural foraminal stenosis, progressed.  C6-C7: Moderate to severe intervertebral height loss, progressed, with posterior disc osteophyte complex. Mild to moderate bilateral facet arthropathy. Mild to moderate spinal canal stenosis. Moderate to severe right neural foraminal stenosis, progressed. Moderate to severe left neural foraminal stenosis, progressed.  C7-T1: Normal disc height. No herniation. Mild bilateral facet arthropathy. No spinal canal stenosis. Mild right neural foraminal stenosis. Mild left neural foraminal stenosis.  Thoracic spine:  Mildly motion degraded examination.  Normal vertebral body heights, alignment and marrow signal, allowing for prominent T8 vertebral body hemangioma and type II degenerative endplate marrow changes surrounding the anterior aspect of the T10-T11 intervertebral disc. Mild to moderate degenerative  disc disease and facet arthropathy throughout the mid to lower thoracic spine, without resultant significant spinal canal or neural foraminal stenosis.   No focal lesion or abnormal enhancement is demonstrated in the region at the T5 level. 8mm linear focus of enhancement is demonstrated on sagittal postcontrast T1-weighted image #7, the level of the T10 and T11 interspace, correlating to the right dorsolateral margin of the spinal cord. This finding is not characterized by axial images.  Unremarkable paraspinal soft tissues.  IMPRESSION:   CONCLUSION:  Cervical spine:  1.  Progression of spondylitic changes as compared to 2005 examination, most conspicuous at the C5-C6 and C6-C7 levels. Resultant moderate spinal canal compromise at C5-C6.  2.  Varying degrees of neural foraminal compromise, high-grade bilaterally at C5-C6 and C6-C7.  3.  Normal caliber and signal intensity of the cervical spinal cord, without focal lesion or abnormal enhancement.  Thoracic spine:  1.  No focal lesion or abnormal enhancement is demonstrated in the region at the T5 level.   2.  8 mm linear focus of enhancement along the right dorsolateral margin of the spinal cord at the level of T10-T11 on sagittal postcontrast T1-weighted sequence. The finding is not characterized by axial imaging. Differential considerations include normal vascular structure. Patient call back for thin section axial and sagittal T2 and postcontrast T1-weighted imaging through this region is recommended.

## 2021-10-13 ENCOUNTER — OFFICE VISIT (OUTPATIENT)
Dept: PHYSICAL MEDICINE AND REHAB | Facility: CLINIC | Age: 76
End: 2021-10-13
Payer: COMMERCIAL

## 2021-10-13 VITALS — DIASTOLIC BLOOD PRESSURE: 68 MMHG | SYSTOLIC BLOOD PRESSURE: 132 MMHG | HEART RATE: 98 BPM | OXYGEN SATURATION: 97 %

## 2021-10-13 DIAGNOSIS — G89.29 CHRONIC NECK PAIN: ICD-10-CM

## 2021-10-13 DIAGNOSIS — G89.29 CHRONIC BILATERAL LOW BACK PAIN WITH BILATERAL SCIATICA: Primary | ICD-10-CM

## 2021-10-13 DIAGNOSIS — F34.1 DYSTHYMIC DISORDER: ICD-10-CM

## 2021-10-13 DIAGNOSIS — M54.2 CHRONIC NECK PAIN: ICD-10-CM

## 2021-10-13 DIAGNOSIS — M51.369 DDD (DEGENERATIVE DISC DISEASE), LUMBAR: ICD-10-CM

## 2021-10-13 DIAGNOSIS — M43.16 SPONDYLOLISTHESIS OF LUMBAR REGION: ICD-10-CM

## 2021-10-13 DIAGNOSIS — M54.41 CHRONIC BILATERAL LOW BACK PAIN WITH BILATERAL SCIATICA: Primary | ICD-10-CM

## 2021-10-13 DIAGNOSIS — M79.7 FIBROMYALGIA: ICD-10-CM

## 2021-10-13 DIAGNOSIS — M54.16 LEFT LUMBAR RADICULOPATHY: ICD-10-CM

## 2021-10-13 DIAGNOSIS — M54.16 RIGHT LUMBAR RADICULOPATHY: ICD-10-CM

## 2021-10-13 DIAGNOSIS — M48.061 LUMBAR FORAMINAL STENOSIS: ICD-10-CM

## 2021-10-13 DIAGNOSIS — M54.42 CHRONIC BILATERAL LOW BACK PAIN WITH BILATERAL SCIATICA: Primary | ICD-10-CM

## 2021-10-13 PROCEDURE — 99214 OFFICE O/P EST MOD 30 MIN: CPT | Performed by: NURSE PRACTITIONER

## 2021-10-13 ASSESSMENT — PAIN SCALES - GENERAL: PAINLEVEL: SEVERE PAIN (7)

## 2021-10-13 NOTE — LETTER
10/13/2021         RE: Katy Hong  [address] [place] [id number]        Dear Colleague,    Thank you for referring your patient, Katy Hong, to the [facility] [place]. Please see a copy of my visit note below.      Assessment:     Diagnoses and all orders for this visit:  Chronic bilateral low back pain with bilateral sciatica  -     PAIN Transforaminal JOANNA Inj Lumbosacral Left; Future  Left lumbar radiculopathy  -     PAIN Transforaminal JOANNA Inj Lumbosacral Left; Future  Right lumbar radiculopathy  Spondylolisthesis of lumbar region  DDD (degenerative disc disease), lumbar  -     PAIN Transforaminal JOANNA Inj Lumbosacral Left; Future  Chronic neck pain  Fibromyalgia  Lumbar foraminal stenosis  -     PAIN Transforaminal JOANNA Inj Lumbosacral Left; Future     Katy Hong is a 76 year old y.o. female with past medical history significant for hypertension, cardiomyopathy, kidney stone, fibromyalgia, depression with anxiety, migraine headache, reflux, cellulitis, chronic venous insufficiency with venous stasis ulcer, female stress incontinence who presents today for follow-up regarding:    -Right lumbar radiculopathy with significant relief with recent right L5-S1 TFESI.    -Progressive left lumbar radiculopathy at this time in an L5 dermatomal pattern as well.    Patient denies lower extremity weakness.     Plan:     A shared decision making plan was used. The patient's values and choices were respected. Prior medical records were reviewed today. The following represents what was discussed and decided upon by the provider and the patient.        -DIAGNOSTIC TESTS: Images were personally reviewed and interpreted.   --Lumbar spine MRI 9/28/2021 with moderate spinal stenosis at L3-4 and L4-5.  Mild bilateral foraminal stenosis L5-S1 right greater than left with disc bulging greater on the right.  Mild right greater than left lateral recess stenosis L3-4.  --Lumbar  spine x-ray 9/2/2021 with 2 mm L4-5 spondylolisthesis.  Significant L4-5 and L5-S1 degenerative disc height loss.  Mild left curvature L4-5.  Facet arthropathy greatest at L5-S1.  --Bilateral knee x-ray 7/6/2021 with moderate medial and mild lateral degenerative arthritis with small effusion on the right.  Left mild medial osteoarthritis with small to moderate effusion.  --Bilateral upper extremity EMG 12/11/2019 shows mild ulnar neuropathy, no cervical radiculopathy or median neuropathy noted.    --Cervical spine MRI 10/15/2019 shows severe foraminal stenosis on the right at C5-6, moderate to severe in the right at C6-7.  --Cervical spine MRI 11/1/2017 again shows C5-6 disc osteophyte complex with moderate central canal and severe bilateral foraminal stenosis.  C6-7, mild to moderate spinal canal stenosis with moderate to severe bilateral foraminal stenosis.  No cord signal changes noted.  --Thoracic MRI 2017 shows T10-11 right 8 mm enhancement dorsal lateral margin of the spinal cord.    -INTERVENTIONS: Ordered left L5-S1 transforaminal epidural steroid injection to see if we can get further relief of left lumbar radiculopathy as she did have significant relief with this injection on the right of her right-sided symptoms.  Currently symptoms are much more dramatic on the left now at this time.  Patient does have foraminal stenosis bilateral due to degenerative disc changes at L5-S1.    -MEDICATIONS: No medication changes at this time.  Discussed side effects of medications and proper use. Patient verbalized understanding.    -PHYSICAL THERAPY: Physical therapy referral placed 9/30/2021.  Initial PT eval scheduled 10/27/2021.  Discussed the importance of core strengthening, ROM, stretching exercises with the patient and how each of these entities is important in decreasing pain.  Explained to the patient that the purpose of physical therapy is to teach the patient a home exercise program.  These exercises need to  be performed every day in order to decrease pain and prevent future occurrences of pain.        -PATIENT EDUCATION:  Total time of 32 minutes, on the day of service, spent with the patient, reviewing the chart, placing orders, and documenting.   -Today we also discussed the issues related to the current COVID-19 pandemic, the pros and cons of the current treatment plan, the CDC guidelines such as social distancing, washing the hands, and covering the cough.    -FOLLOW UP: Follow-up for injection with Dr. Lay.  2-week post injection follow-up can be a video visit which patient prefers at this time.  Advised to contact clinic if symptoms worsen or change.    Subjective:     Katy Hnog is a 76 year old female who presents today for follow-up regarding 2-week post right L5-S1 TFESI in which she did receive significant relief of right low back pain with right lower extremity pain.  Currently her pain is more intense on the left low back lumbosacral junction rating to the posterior lateral thigh posterior lateral calf and she is hoping for repeat injection on the left now as well.  Currently her pain is a 7/10, 9 at its worst, 5 at its best aggravated with sitting for long periods of time, stairs, does improve with ice.    -Treatment to Date: No prior spinal surgery.     Right L4-5 JOANNA in the past with Dr. Sipple.  Right L5-S1 TFESI 10/6/2021 with significant relief.  Preprocedure pain 9/10, post 4/10.     -Medications:  Gabapentin 300 mg 2 tablets at nighttime which is long-term for fibromyalgia  OTC Aleve with no benefit    Patient Active Problem List   Diagnosis     Osteopenia     Insomnia     Vitamin D Deficiency     Lower Back Pain     Fibromyalgia     Essential Hypertension     Seborrheic Keratosis     Obesity     Lichen Sclerosus Et Atrophicus     Depression With Anxiety     Migraine Headache     Esophageal Reflux     Joint Pain, Localized In The Knee     Varicose Veins     Nonvenomous Insect Bite Of  Shoulder     Cellulitis     Mammogram - Abnormal     Chronic Cutaneous Ulcer Venous Stasis     Chronic venous insufficiency     Foot pain (Soft Tissue)     Female stress incontinence     Calculus of ureter     Hydronephrosis with urinary obstruction due to ureteral calculus     Other cardiomyopathy (H)     Obesity (BMI 30-39.9)     Chronic fatigue     Moderate episode of recurrent major depressive disorder (H)     Anxiety     Morbid obesity (H)       Current Outpatient Medications   Medication     DULoxetine (CYMBALTA) 30 MG capsule     gabapentin (NEURONTIN) 300 MG capsule     albuterol (VENTOLIN HFA) 90 mcg/actuation inhaler     aspirin 81 MG EC tablet     calcium carbonate (OS-MONIQUE) 500 mg calcium (1,250 mg) chewable tablet     cholecalciferol, vitamin D3, 5,000 unit Tab     famotidine (PEPCID) 20 MG tablet     furosemide (LASIX) 20 MG tablet     losartan (COZAAR) 25 MG tablet     metoprolol succinate ER (TOPROL-XL) 50 MG 24 hr tablet     SUMAtriptan succinate 6 mg/0.5 mL PnIj     topiramate (TOPAMAX) 100 MG tablet     traZODone (DESYREL) 100 MG tablet     triamcinolone (KENALOG) 0.1 % ointment     venlafaxine (EFFEXOR-XR) 75 MG 24 hr capsule     No current facility-administered medications for this visit.       Allergies   Allergen Reactions     Fluoxetine Cough and Rash     Pt thinks it was cough but not totally sure.     Latex Rash     Levothyroxine Rash     Lisinopril Cough and Rash     Sulfa (Sulfonamide Antibiotics) [Sulfa Drugs] Itching and Rash       Past Medical History:   Diagnosis Date     Abnormal mammogram, unspecified     Created by Conversion      Acute sinusitis, unspecified     Created by Conversion      Asymptomatic varicose veins     Created by Conversion      Breast cyst      Cellulitis and abscess of unspecified site     Created by Conversion      Circumscribed scleroderma     Created by Conversion      Contusion of unspecified site     Created by Conversion      Disorder of bone and  cartilage, unspecified     Created by Conversion      Dysthymic disorder     Created by Conversion      Esophageal reflux     Created by Conversion      Insomnia, unspecified     Created by Conversion      Lumbago     Created by Conversion      Migraine, unspecified, without mention of intractable migraine without mention of status migrainosus     Created by Conversion      Myalgia and myositis, unspecified     Created by Conversion      Myocardial infarction (H)     per EKG's since 2015     Obesity, unspecified     Created by Conversion      Open wound of lip, without mention of complication     Created by Conversion      Other primary cardiomyopathies     Created by Conversion      Other seborrheic keratosis     Created by Conversion      Pain in joint, lower leg     Created by Conversion      Pain in limb     Created by Conversion      Sebaceous cyst     Created by Conversion      Shortness of breath     Created by Conversion      Shoulder and upper arm, insect bite, nonvenomous, without mention of infection(912.4)     Created by Conversion      Unspecified disease of sebaceous glands     Created by Conversion      Unspecified essential hypertension     Created by Conversion      Unspecified venous (peripheral) insufficiency     Created by Conversion      Unspecified vitamin D deficiency     Created by Conversion         Review of Systems  ROS:  Specifically negative for bowel/bladder dysfunction, balance changes, headache, dizziness, foot drop, fevers, chills, appetite changes, nausea/vomiting, unexplained weight loss. Otherwise 13 systems reviewed are negative. Please see the patient's intake questionnaire from today for details.    Reviewed Social, Family, Past Medical and Past Surgical history with patient, no significant changes noted since prior visit.     Objective:     BP (!) 167/77 (BP Location: Right arm, Patient Position: Sitting)   Pulse 98   SpO2 97%     PHYSICAL EXAMINATION:    --CONSTITUTIONAL:  Well developed, well nourished, healthy appearing individual.  --PSYCHIATRIC: Appropriate mood and affect. No difficulty interacting due to temper, social withdrawal, or memory issues.  --SKIN: Lumbar region is dry and intact.   --RESPIRATORY: Normal rhythm and effort. No abnormal accessory muscle breathing patterns noted.   --MUSCULOSKELETAL:  Normal lumbar lordosis noted, no lateral shift.  --GROSS MOTOR: Easily arises from a seated position. Gait is non-antalgic  --LUMBAR SPINE:  Inspection reveals no evidence of deformity.     RESULTS:   Imaging: Lumbar spine imaging was reviewed today. The images were shown to the patient and the findings were explained using a spine model.      MR Lumbar Spine w/o Contrast  Result Date: 9/28/2021  EXAM: MR LUMBAR SPINE W/O CONTRAST LOCATION: [facility] [place] DATE/TIME: 9/28/2021 9:19 AM INDICATION: Low back pain. COMPARISON: None. TECHNIQUE: Routine Lumbar Spine MRI without IV contrast. FINDINGS: Nomenclature is based on 5 lumbar type vertebral bodies. Lumbar spine lordosis is exaggerated. The lumbar spinal canal is narrowed on a developmental basis. Lumbar vertebral body heights are maintained. Grade 1 retrolisthesis of L1 on L2, L2 on L3, and L3 on L4. Grade 1 anterolisthesis of L4 on L5 and grade 1 retrolisthesis of L5 on S1. Normal distal spinal cord and cauda equina with conus medullaris at L1. There is a stone in the lumen of the gallbladder. Unremarkable visualized bony pelvis. T12-L1: Disc desiccation and mild loss of disc space height. No focal disc herniation. Normal facets. No spinal canal stenosis or neural foraminal narrowing. L1-L2: Disc desiccation and mild loss of disc space height. Minimal circumferential disc bulge. Normal facets. No spinal canal stenosis or neural foraminal narrowing. L2-L3: Disc desiccation and mild loss of disc space height. Mild circumferential disc bulge with superimposed shallow left paracentral disc protrusion. Mild  narrowing of the left lateral recess. Normal facets. No spinal canal stenosis or neural foraminal  narrowing. L3-L4: Disc desiccation and mild loss of disc space height. Mild circumferential disc osteophyte complex. Mild facet arthropathy. Moderate spinal canal stenosis. Mild right and left lateral recess narrowing. No neural foraminal stenosis. L4-L5: Disc desiccation and moderate loss of disc space height. Mild circumferential disc osteophyte complex. Ligamentum flavum thickening. Moderate degenerative facet changes. Moderate spinal canal stenosis. No neural foraminal stenosis. L5-S1: Disc desiccation and advanced loss of disc space height. Mild circumferential disc osteophyte complex. Moderate left and mild right facet arthropathy. No spinal canal stenosis. Mild bilateral neural foraminal stenosis.   IMPRESSION: 1.  Mild lumbar spondylitic changes superimposed on developmental narrowing of the lumbar spinal canal. There is moderate spinal canal stenosis at L3-L4 and L4-L5. 2.  At L5-S1, there is mild bilateral neural foraminal stenosis.        XR Lumbar Spine 2/3 Views  Result Date: 9/2/2021  EXAM: XR LUMBAR SPINE 2-3 VIEWS LOCATION: [facility] DATE/TIME: 9/2/2021 9:58 AM INDICATION: Low back pain COMPARISON: None. TECHNIQUE: CR Lumbar Spine.   IMPRESSION: 5 lumbar type vertebral bodies. Normal vertebral body heights. Mild leftward curvature apex L4-L5. 2 mm L4-L5 degenerative anterolisthesis. Severe L4-L5 and L5-S1 interbody degeneration. Mild L5-S1 facet arthropathy.        MR CERVICAL SPINE WO CONTRAST  LOCATION: [facility]  DATE/TIME: 10/15/2019   INDICATION: Right cervical radiculopathy with weakness on exam.  COMPARISON: None.  TECHNIQUE: Without IV contrast.  FINDINGS:   Cervical lordosis is straightened. Moderate loss of disc height at C5-C6 and C6-C7. Small osteophytes and degenerative endplate changes at these levels and also at C3-C4 and C4-C5. Marrow signal and  spinal cord signal otherwise normal. No extraspinal abnormality.  Craniovertebral junction and C1-C2: Normal.  C2-C3: Mild facet hypertrophy in the left side. Spinal canal and neural foramina are patent.   C3-C4: Shallow annular bulge. Mild facet hypertrophy on the left side. Spinal canal is patent. Neural foramina are mildly to moderately narrowed on the left and mildly narrowed on the right.   C4-C5: Shallow disc bulge and osteophytes. Spinal canal is patent. The neural foramina are mild to moderately narrowed on the left and mildly narrowed on the right.   C5-C6: Shallow disc bulge and osteophytes. Spinal canal is patent. The neural foramina are severely narrowed bilaterally.   C6-C7: Bulging disc and osteophytes. Spinal canal is patent. The neural foramina are moderately narrowed on the left and moderately to severely narrowed on the right.   C7-T1: Spinal canal neuroforamina patent.  IMPRESSION:   1.  Degenerative disc disease most pronounced in the mid and lower cervical spine.   2.  No central spinal stenosis.  3.  No cord signal changes.  4.  Chronic/osteophyte complexes cause severe neural foraminal stenosis on the right side at C5-C6 and moderate to severe foraminal stenosis on the right at C6-C7. This may be affecting the C6 or C7 nerve roots.  5.  There is less significant foraminal stenosis as described above.        MR CERVICAL SPINE W WO CONTRAST  MR THORACIC SPINE W WO CONTRAST  11/1/2017   INDICATION: Bilateral leg and arm weakness x 2 years, T5 level.  TECHNIQUE: Without and with IV contrast.  CONTRAST: 9 mL Gadavist.  COMPARISON: Cervical spine MRI examination dated 06/30/2005.  FINDINGS:   Cervical spine MRI:   Mildly motion degraded examination.  Stable alignment compared to 2005 examination, with reversal of the normal cervical lordosis centered at C4. Degenerative grade 1 anterolisthesis of C2 on C3 and C3 on C4, as well as mild retrolisthesis of C5 on C6. Vertebral body height is preserved,    allowing for degenerative changes. Bone marrow signal intensity is homogeneous and within normal limits. Mild degenerative changes of the craniocervical junction and C1-C2. No abnormal cord signal. No abnormal enhancement. The visualized intracranial contents are unremarkable. Grossly normal paraspinal soft tissues. The vertebral artery flow voids are preserved.  C2-C3: Normal disc height. No herniation. Degenerative ankylosis of the left facet joint, with mild right facet arthropathy. No spinal canal stenosis. No right neural foraminal stenosis. Mild left neural foraminal stenosis.  C3-C4: Mild intervertebral disc height loss, with shallow posterior disc osteophyte complex. Mild right and moderate to severe left facet arthropathy. No spinal canal stenosis. Mild right neural foraminal stenosis. Mild left neural foraminal stenosis.  C4-C5: Mild intervertebral disc height loss, with shallow posterior disc osteophyte complex. Moderate to severe bilateral facet arthropathy. No spinal canal stenosis. Mild to moderate right neural foraminal stenosis. Mild to moderate left neural foraminal stenosis.    C5-C6: Moderate to severe intervertebral disc height loss, with posterior disc osteophyte complex. Moderate bilateral facet arthropathy. Moderate spinal canal stenosis. Severe right neural foraminal stenosis. Severe left neural foraminal stenosis, progressed.  C6-C7: Moderate to severe intervertebral height loss, progressed, with posterior disc osteophyte complex. Mild to moderate bilateral facet arthropathy. Mild to moderate spinal canal stenosis. Moderate to severe right neural foraminal stenosis, progressed. Moderate to severe left neural foraminal stenosis, progressed.  C7-T1: Normal disc height. No herniation. Mild bilateral facet arthropathy. No spinal canal stenosis. Mild right neural foraminal stenosis. Mild left neural foraminal stenosis.  Thoracic spine:  Mildly motion degraded examination.  Normal vertebral body  heights, alignment and marrow signal, allowing for prominent T8 vertebral body hemangioma and type II degenerative endplate marrow changes surrounding the anterior aspect of the T10-T11 intervertebral disc. Mild to moderate degenerative disc disease and facet arthropathy throughout the mid to lower thoracic spine, without resultant significant spinal canal or neural foraminal stenosis.   No focal lesion or abnormal enhancement is demonstrated in the region at the T5 level. 8mm linear focus of enhancement is demonstrated on sagittal postcontrast T1-weighted image #7, the level of the T10 and T11 interspace, correlating to the right dorsolateral margin of the spinal cord. This finding is not characterized by axial images.  Unremarkable paraspinal soft tissues.  IMPRESSION:   CONCLUSION:  Cervical spine:  1.  Progression of spondylitic changes as compared to 2005 examination, most conspicuous at the C5-C6 and C6-C7 levels. Resultant moderate spinal canal compromise at C5-C6.  2.  Varying degrees of neural foraminal compromise, high-grade bilaterally at C5-C6 and C6-C7.  3.  Normal caliber and signal intensity of the cervical spinal cord, without focal lesion or abnormal enhancement.  Thoracic spine:  1.  No focal lesion or abnormal enhancement is demonstrated in the region at the T5 level.   2.  8 mm linear focus of enhancement along the right dorsolateral margin of the spinal cord at the level of T10-T11 on sagittal postcontrast T1-weighted sequence. The finding is not characterized by axial imaging. Differential considerations include normal vascular structure. Patient call back for thin section axial and sagittal T2 and postcontrast T1-weighted imaging through this region is recommended.                     Again, thank you for allowing me to participate in the care of your patient.        Sincerely,        Teresa Mas, CNP

## 2021-10-14 RX ORDER — VENLAFAXINE HYDROCHLORIDE 75 MG/1
CAPSULE, EXTENDED RELEASE ORAL
Qty: 270 CAPSULE | Refills: 1 | Status: SHIPPED | OUTPATIENT
Start: 2021-10-14 | End: 2022-04-28

## 2021-10-14 NOTE — TELEPHONE ENCOUNTER
"Routing refill request to provider for review/approval because:  Labs not current:  PHQ9    Last Written Prescription Date:  3/31/21  Last Fill Quantity: 270,  # refills: 1   Last office visit provider:  9/2/21     Requested Prescriptions   Pending Prescriptions Disp Refills     venlafaxine (EFFEXOR-XR) 75 MG 24 hr capsule [Pharmacy Med Name: VENLAFAXINE HCL ER 75 MG CAP] 270 capsule 1     Sig: TAKE 3 CAPSULES (225 MG TOTAL) BY MOUTH DAILY.       Serotonin-Norepinephrine Reuptake Inhibitors  Failed - 10/13/2021 11:57 AM        Failed - PHQ-9 score of less than 5 in past 6 months     Please review last PHQ-9 score.           Passed - Blood pressure under 140/90 in past 12 months     BP Readings from Last 3 Encounters:   10/13/21 132/68   10/06/21 136/78   09/30/21 (!) 144/74                 Passed - Medication is active on med list        Passed - Patient is age 18 or older        Passed - No active pregnancy on record        Passed - Normal serum creatinine on file in past 12 months     Recent Labs   Lab Test 09/02/21  0956   CR 0.84       Ok to refill medication if creatinine is low          Passed - No positive pregnancy test in past 12 months        Passed - Recent (6 mo) or future (30 days) visit within the authorizing provider's specialty     Patient had office visit in the last 6 months or has a visit in the next 30 days with authorizing provider or within the authorizing provider's specialty.  See \"Patient Info\" tab in inbasket, or \"Choose Columns\" in Meds & Orders section of the refill encounter.                 Andree Arroyo RN 10/14/21 9:27 AM  "

## 2021-10-20 ENCOUNTER — OFFICE VISIT (OUTPATIENT)
Dept: RHEUMATOLOGY | Facility: CLINIC | Age: 76
End: 2021-10-20
Payer: COMMERCIAL

## 2021-10-20 ENCOUNTER — TELEPHONE (OUTPATIENT)
Dept: RHEUMATOLOGY | Facility: CLINIC | Age: 76
End: 2021-10-20

## 2021-10-20 VITALS
HEART RATE: 88 BPM | DIASTOLIC BLOOD PRESSURE: 80 MMHG | WEIGHT: 194.7 LBS | SYSTOLIC BLOOD PRESSURE: 120 MMHG | BODY MASS INDEX: 33.42 KG/M2

## 2021-10-20 DIAGNOSIS — M17.9 OSTEOARTHRITIS OF KNEE, UNSPECIFIED: Primary | ICD-10-CM

## 2021-10-20 DIAGNOSIS — M15.0 PRIMARY OSTEOARTHRITIS INVOLVING MULTIPLE JOINTS: ICD-10-CM

## 2021-10-20 PROCEDURE — 99213 OFFICE O/P EST LOW 20 MIN: CPT | Performed by: INTERNAL MEDICINE

## 2021-10-20 NOTE — PROGRESS NOTES
Rheumatology follow-up visit note     Katy is a 76 year old female presents today for follow-up.    Katy was seen today for recheck.    Diagnoses and all orders for this visit:    Osteoarthritis of knee, unspecified   -     Discontinue: hylan (SYNVISC ONE) injection 48 mg    Primary osteoarthritis involving multiple joints    Other orders  -     Discontinue: hylan (SYNVISC ONE) injection 48 mg        This patient had osteoarthritis, she wanted to proceed with left knee viscosupplementation and then decided against this, meanwhile we had checked with insurance and they have approved it to be done till Dec 21, she will return prior to that if she so desires.     HPI    Katy Hong is a 76 year old female is here for follow-up of moderately severe flare up of pain. Here for a moderately severe flare up of pain.  Joints affected include multiple joints and both knee(s), worse on the left side, This has gone on for several weeks. Pain is described as sharp. It is worse with activity at times bedtime..  Her symptoms are moderately severe. The symptoms are progressive.  Associated findings include /do not include: swelling, rash.  There is no associated recent fall or trauma.  Recent work-up for polyarthralgias reviewed.  Over-the-counter treatment to date has been without significant relief.    Further historical information, including ROS and limitation in activities as noted in the multidimensional health assessment questionnaire scanned in the EMR and in the assessment and plan section.     ALLERGIES:Fluoxetine, Latex, Levothyroxine, Lisinopril, and Sulfa (sulfonamide antibiotics)      DETAILED EXAMINATION  10/20/21  :    Vitals:    10/20/21 0941   BP: 120/80   Pulse: 88   Weight: 88.3 kg (194 lb 11.2 oz)     Alert oriented. Head including the face is examined for malar rash, heliotropes, scarring, lupus pernio. Eyes examined for redness such as in episcleritis/scleritis, periorbital lesions.   Neck/  Face examined for parotid gland swelling, range of motion of neck.  Left upper and lower and right upper and lower extremities examined for tenderness, swelling, warmth of the appendicular joints, range of motion, edema, rash.  Some of the important findings included: she does not have evidence of synovitis in the palpable joints of the upper extremities.  No significant deformities of the digits.  + [place]ber[place] nodes.  Range of motion of the shoulders show full abduction.  No JLT effusion or warmth of the knees.  she does not have dactylitis of the digits.     Patient Active Problem List    Diagnosis Date Noted     Morbid obesity (H) 09/02/2021     Priority: Medium     Migraine Headache      Priority: Medium     Created by Conversion  Replacement Utility updated for latest IMO load         Moderate episode of recurrent major depressive disorder (H) 03/15/2021     Priority: Medium     Anxiety 03/15/2021     Priority: Medium     Chronic fatigue 02/22/2021     Priority: Medium     Essential Hypertension      Priority: Medium     Created by Conversion  Replacement Utility updated for latest IMO load         Obesity (BMI 30-39.9) 06/16/2020     Priority: Medium     Other cardiomyopathy (H) 01/04/2019     Priority: Medium     Calculus of ureter 05/21/2018     Priority: Medium     Hydronephrosis with urinary obstruction due to ureteral calculus 05/21/2018     Priority: Medium     Fibromyalgia      Priority: Medium     Created by Conversion         Osteopenia      Priority: Medium     Created by Conversion  Replacement Utility updated for latest IMO load         Vitamin D Deficiency      Priority: Medium     Created by Conversion  Replacement Utility updated for latest IMO load         Mammogram - Abnormal      Priority: Medium     Created by Conversion  Replacement Utility updated for latest IMO load         Chronic Cutaneous Ulcer Venous Stasis      Priority: Medium     Created by Conversion  Replacement Utility updated for  latest IMO load         Female stress incontinence 02/28/2015     Priority: Medium     Foot pain (Soft Tissue) 02/26/2015     Priority: Medium     Chronic venous insufficiency 12/14/2014     Priority: Medium     Lower Back Pain      Priority: Medium     Created by Conversion         Depression With Anxiety      Priority: Medium     Created by Conversion         Varicose Veins      Priority: Medium     Created by Conversion         Nonvenomous Insect Bite Of Shoulder      Priority: Medium     Created by Conversion         Cellulitis      Priority: Medium     Created by Conversion         Insomnia      Priority: Medium     Created by Conversion         Seborrheic Keratosis      Priority: Medium     Created by Conversion         Obesity      Priority: Medium     Created by Conversion         Lichen Sclerosus Et Atrophicus      Priority: Medium     Created by Conversion         Esophageal Reflux      Priority: Medium     Created by Conversion         Joint Pain, Localized In The Knee      Priority: Medium     Created by Conversion         Past Surgical History:   Procedure Laterality Date     APPENDECTOMY       BIOPSY BREAST Left      BREAST CYST ASPIRATION Left      C TOTAL ABDOM HYSTERECTOMY       still has both ovaries     CATARACT EXTRACTION, BILATERAL       HYSTERECTOMY       OOPHORECTOMY       TEMPOROMANDIBULAR JOINT SURGERY Bilateral      TUBAL LIGATION        Past Medical History:   Diagnosis Date     Abnormal mammogram, unspecified     Created by Conversion      Acute sinusitis, unspecified     Created by Conversion      Asymptomatic varicose veins     Created by Conversion      Breast cyst      Cellulitis and abscess of unspecified site     Created by Conversion      Circumscribed scleroderma     Created by Conversion      Contusion of unspecified site     Created by Conversion      Disorder of bone and cartilage, unspecified     Created by Conversion      Dysthymic disorder     Created by Conversion       Esophageal reflux     Created by Conversion      Insomnia, unspecified     Created by Conversion      Lumbago     Created by Conversion      Migraine, unspecified, without mention of intractable migraine without mention of status migrainosus     Created by Conversion      Myalgia and myositis, unspecified     Created by Conversion      Myocardial infarction (H)     per EKG's since 2015     Obesity, unspecified     Created by Conversion      Open wound of lip, without mention of complication     Created by Conversion      Other primary cardiomyopathies     Created by Conversion      Other seborrheic keratosis     Created by Conversion      Pain in joint, lower leg     Created by Conversion      Pain in limb     Created by Conversion      Sebaceous cyst     Created by Conversion      Shortness of breath     Created by Conversion      Shoulder and upper arm, insect bite, nonvenomous, without mention of infection(912.4)     Created by Conversion      Unspecified disease of sebaceous glands     Created by Conversion      Unspecified essential hypertension     Created by Conversion      Unspecified venous (peripheral) insufficiency     Created by Conversion      Unspecified vitamin D deficiency     Created by Conversion      Allergies   Allergen Reactions     Fluoxetine Cough and Rash     Pt thinks it was cough but not totally sure.     Latex Rash     Levothyroxine Rash     Lisinopril Cough and Rash     Sulfa (Sulfonamide Antibiotics) [Sulfa Drugs] Itching and Rash     Current Outpatient Medications   Medication Sig Dispense Refill     albuterol (VENTOLIN HFA) 90 mcg/actuation inhaler [ALBUTEROL (VENTOLIN HFA) 90 MCG/ACTUATION INHALER] Inhale 2 puffs every 6 (six) hours as needed for wheezing. 18 g 1     aspirin 81 MG EC tablet [ASPIRIN 81 MG EC TABLET] Take 81 mg by mouth daily.       calcium carbonate (OS-MONIQUE) 500 mg calcium (1,250 mg) chewable tablet [CALCIUM CARBONATE (OS-MONIQUE) 500 MG CALCIUM (1,250 MG) CHEWABLE  TABLET] Chew 2 tablets daily.        cholecalciferol, vitamin D3, 5,000 unit Tab [CHOLECALCIFEROL, VITAMIN D3, 5,000 UNIT TAB] Take by mouth.       DULoxetine (CYMBALTA) 30 MG capsule Take 1 capsule (30 mg) by mouth 2 times daily 180 capsule 1     famotidine (PEPCID) 20 MG tablet [FAMOTIDINE (PEPCID) 20 MG TABLET] Take 1 tablet (20 mg total) by mouth 2 (two) times a day. 60 tablet 5     furosemide (LASIX) 20 MG tablet Take 1-2 tablets (20-40 mg) by mouth every 48 hours as needed (elevated BP, LE swelling) 90 tablet 1     gabapentin (NEURONTIN) 300 MG capsule 1 capsule in the am, 1 capsule in the afternoon, 2 capsules at bedtime 270 capsule 3     losartan (COZAAR) 25 MG tablet [LOSARTAN (COZAAR) 25 MG TABLET] Take 1 tablet (25 mg total) by mouth daily. 90 tablet 2     metoprolol succinate ER (TOPROL-XL) 50 MG 24 hr tablet TAKE 1 TABLET BY MOUTH EVERY DAY 90 tablet 2     SUMAtriptan succinate 6 mg/0.5 mL PnIj [SUMATRIPTAN SUCCINATE 6 MG/0.5 ML PNIJ] INJECT 1 INJECTION AS DIRECTED IMMEDIATELY AT ONSET OF MIGRAINE HEADACHE 2 Syringe 4     topiramate (TOPAMAX) 100 MG tablet [TOPIRAMATE (TOPAMAX) 100 MG TABLET] Take 1/2 tablet in the am and 1 tablet with dinner 135 tablet 1     traZODone (DESYREL) 100 MG tablet Take 1 tablet (100 mg) by mouth At Bedtime 90 tablet 1     triamcinolone (KENALOG) 0.1 % ointment [TRIAMCINOLONE (KENALOG) 0.1 % OINTMENT] APPLY TO AFFECTED AREA TWICE A DAY 30 g 2     venlafaxine (EFFEXOR-XR) 75 MG 24 hr capsule TAKE 3 CAPSULES (225 MG TOTAL) BY MOUTH DAILY. 270 capsule 1     family history includes Alzheimer Disease in her father; Arthritis in her maternal grandmother and mother; Breast Cancer in her cousin; Breast Cancer (age of onset: 60.00) in her maternal aunt; Diabetes in her father; Hypertension in her father, mother, and sister; Obesity in her brother, maternal aunt, and maternal uncle; Osteopenia in her sister; Varicose Veins in her brother.     Social Connections:      Frequency of  Communication with Friends and Family:      Frequency of Social Gatherings with Friends and Family:      Attends Mandaen Services:      Active Member of Clubs or Organizations:      Attends Club or Organization Meetings:      Marital Status:           WBC   Date Value Ref Range Status   07/23/2020 7.3 4.0 - 11.0 thou/uL Final     RBC Count   Date Value Ref Range Status   07/23/2020 4.39 3.80 - 5.40 mill/uL Final     Hemoglobin   Date Value Ref Range Status   09/02/2021 12.4 11.7 - 15.7 g/dL Final     Hematocrit   Date Value Ref Range Status   07/23/2020 39.4 35.0 - 47.0 % Final     MCV   Date Value Ref Range Status   07/23/2020 90 80 - 100 fL Final     MCH   Date Value Ref Range Status   07/23/2020 30.5 27.0 - 34.0 pg Final     Platelet Count   Date Value Ref Range Status   07/23/2020 205 140 - 440 thou/uL Final     AST   Date Value Ref Range Status   07/23/2020 13 0 - 40 U/L Final     ALT   Date Value Ref Range Status   07/23/2020 15 0 - 45 U/L Final     Albumin   Date Value Ref Range Status   07/23/2020 3.7 3.5 - 5.0 g/dL Final     Alkaline Phosphatase   Date Value Ref Range Status   07/23/2020 101 45 - 120 U/L Final     Creatinine   Date Value Ref Range Status   09/02/2021 0.84 0.60 - 1.10 mg/dL Final     GFR Estimate   Date Value Ref Range Status   09/02/2021 69 >60 mL/min/1.73m2 Final     Comment:     As of July 11, 2021, eGFR is calculated by the CKD-EPI creatinine equation, without race adjustment. eGFR can be influenced by muscle mass, exercise, and diet. The reported eGFR is an estimation only and is only applicable if the renal function is stable.   02/22/2021 >60 >60 mL/min/1.73m2 Final     GFR Estimate If Black   Date Value Ref Range Status   02/22/2021 >60 >60 mL/min/1.73m2 Final     Erythrocyte Sedimentation Rate   Date Value Ref Range Status   06/03/2021 5 0 - 20 mm/hr Final     CRP   Date Value Ref Range Status   06/03/2021 0.2 0.0 - 0.8 mg/dL Final

## 2021-10-20 NOTE — TELEPHONE ENCOUNTER
NERISSA for patient that insurance approved Synvisc for 1 time injection through December 2021. Patient will call back if she would like to schedule injection.

## 2021-10-27 ENCOUNTER — HOSPITAL ENCOUNTER (OUTPATIENT)
Dept: PHYSICAL THERAPY | Facility: REHABILITATION | Age: 76
End: 2021-10-27
Attending: NURSE PRACTITIONER
Payer: COMMERCIAL

## 2021-10-27 DIAGNOSIS — M54.16 RIGHT LUMBAR RADICULOPATHY: ICD-10-CM

## 2021-10-27 DIAGNOSIS — M62.81 MUSCLE WEAKNESS (GENERALIZED): ICD-10-CM

## 2021-10-27 DIAGNOSIS — M54.2 CHRONIC NECK PAIN: ICD-10-CM

## 2021-10-27 DIAGNOSIS — M43.16 SPONDYLOLISTHESIS OF LUMBAR REGION: ICD-10-CM

## 2021-10-27 DIAGNOSIS — E66.9 OBESITY (BMI 30-39.9): ICD-10-CM

## 2021-10-27 DIAGNOSIS — M79.7 FIBROMYALGIA: ICD-10-CM

## 2021-10-27 DIAGNOSIS — G89.29 CHRONIC BILATERAL LOW BACK PAIN WITH RIGHT-SIDED SCIATICA: Primary | ICD-10-CM

## 2021-10-27 DIAGNOSIS — M51.369 DDD (DEGENERATIVE DISC DISEASE), LUMBAR: ICD-10-CM

## 2021-10-27 DIAGNOSIS — G89.29 CHRONIC NECK PAIN: ICD-10-CM

## 2021-10-27 DIAGNOSIS — M54.41 CHRONIC BILATERAL LOW BACK PAIN WITH RIGHT-SIDED SCIATICA: Primary | ICD-10-CM

## 2021-10-27 PROCEDURE — 97161 PT EVAL LOW COMPLEX 20 MIN: CPT | Mod: GP | Performed by: PHYSICAL THERAPIST

## 2021-10-27 PROCEDURE — 97110 THERAPEUTIC EXERCISES: CPT | Mod: GP | Performed by: PHYSICAL THERAPIST

## 2021-10-27 NOTE — PROGRESS NOTES
"Assessment:   Pt presents to skilled PT with a chronic history of low back pain and neck pain.  She has pain for many years and tried lumbar injections and recently had success with a R sided lumbar injection and will be having a L side lumbar injection tomorrow.  The pt has decreased cervical and lumbar ROM and mobility with increased pain.  The pt has significant cervical, core, lumbar, and postural weakness.  She will benefit from skilled PT to address these impairments identified in the evaluation.      Exercises:   Date 10/27/21   Exercise    Seated H/S stretch  Bx30\"    Sit to stand  Cues for 5 reps - 2-3 times/day    Scap retract  x5 with 5\" hold    Supine LTR  Bx10    Supine Cervical extension isometric Supine pillow x5 with 5\"  hold    Ab set x5 with 5\" hold    Supine Bridge  Bx10                            "

## 2021-10-27 NOTE — PROGRESS NOTES
[facility]      OUTPATIENT PHYSICAL THERAPY ORTHOPEDIC EVALUATION  PLAN OF TREATMENT FOR OUTPATIENT REHABILITATION  (COMPLETE FOR INITIAL CLAIMS ONLY)  Patient's Last Name, First Name, M.I.  YOB: 1945  Katy Hong    Provider s Name:  [facility]   Medical Record No.  [id number]   Start of Care Date:  10/27/21   Onset Date:  09/01/21   Type:     _X__PT   ___OT   ___SLP Medical Diagnosis:  (P) Chronic bilateral low back pain with right-sided sciatica; Chronic neck pain      PT Diagnosis:  Chronic Neck and Low Back pain and weakness    Visits from SOC:  1      _________________________________________________________________________________  Plan of Treatment/Functional Goals:  (P) joint mobilization, manual therapy, neuromuscular re-education, ROM, strengthening, stretching     (P) Cryotherapy, Electrical stimulation, Hot packs, TENS     Goals  Goal Identifier: (P) 1  Goal Description: (P) Pt will demonstarte readiness for independent sx management and HEP  Target Date: (P) 12/11/21    Goal Identifier: (P) 2  Goal Description: (P) Pt will be able to get in/out of the car or a chair with increased ease and pain <4/10  Target Date: (P) 12/11/21    Goal Identifier: (P) 3  Goal Description: (P) Pt will be able to stand for up to 10 minutes to perfom ADL's, dressing and self care with increased ease and pain <4/10  Target Date: (P) 12/11/21    Goal Identifier: (P) 4  Goal Description: (P) Pt will be able to walk for community mobility with increased ease and stamina and pain <4/10  Target Date: (P) 12/11/21    Therapy Frequency:  (P) 1 time/week  Predicted Duration of Therapy Intervention:  (P) 1x/week for 10 weeks SHAYLA Cabrera, PT                 I CERTIFY THE NEED FOR THESE SERVICES FURNISHED UNDER        THIS PLAN OF TREATMENT AND WHILE UNDER MY CARE     (Physician co-signature of this document indicates review and certification  of the therapy plan).                       Certification Date From:  (P) 10/27/21   Certification Date To:  (P) 01/25/22    Referring Provider:  Teresa Mas CNP    Initial Assessment        See Epic Evaluation Start of Care Date: 10/27/21                10/27/21 1200   General Information   Type of Visit Initial OP Ortho PT Evaluation   Start of Care Date 10/27/21   Referring Physician Teresa Mas CNP   Patient/Family Goals Statement relieve pain    Orders Evaluate and Treat   Date of Order 09/30/21   Certification Required? Yes   Medical Diagnosis Chronic bilateral low back pain with right-sided sciatica; Chronic neck pain    Body Part(s)   Body Part(s) Lumbar Spine/SI;Cervical Spine   Presentation and Etiology   Pertinent history of current problem (include personal factors and/or comorbidities that impact the POC) Pt reports a very chronic h/o low back and neck pain.  This pain has been present for many years, but comes and goes and has been worse the past 2 years.  She had a lumbar injection in early Oct for the R low back and will be having the left side done tomorrow.  Her neck pain is painful at times, but is not constant.  No injuries to neck or low back.  She has done some PT before, but does not do any exercises.  She exercises rarely.  Her legs feel weak and like they could give out.     Impairments A. Pain;D. Decreased ROM;E. Decreased flexibility;F. Decreased strength and endurance;H. Impaired gait   Functional Limitations perform activities of daily living   Symptom Location neck and low back    How/Where did it occur With repetition/overuse;From Degenerative Joint Disease   Onset date of current episode/exacerbation 09/01/21   Chronicity Chronic   Pain rating (0-10 point scale) Best (/10);Worst (/10)   Best (/10) 3   Worst (/10) 10   Pain quality C. Aching   Frequency of pain/symptoms B. Intermittent   Pain/symptoms exacerbated by B. Walking;K. Home tasks;M. Other   Pain exacerbation  comment prolonged standing, in/out of a chair or car   Pain/symptoms eased by G. Heat;K. Other   Pain eased by comment injections    Progression of symptoms since onset: Worsened   Fall Risk Screen   Fall screen completed by PT   Have you fallen 2 or more times in the past year? No   Have you fallen and had an injury in the past year? No   Is patient a fall risk? No   Abuse Screen (yes response referral indicated)   Feels Unsafe at Home or Work/School no   Feels Threatened by Someone no   Does Anyone Try to Keep You From Having Contact with Others or Doing Things Outside Your Home? no   Physical Signs of Abuse Present no   Lumbar Spine/SI Objective Findings   Posture Fair    Flexion ROM Mod+ loss (to knees), LBP    Extension ROM Mod+ loss (just past neutral), LBP   Right Side Bending ROM min+ loss, LBP   Left Side Bending ROM min+ loss, LBP    Lumbar ROM Comment R Rot and L Rot = mod loss, LBP    Hip Screen decreased B hip IR and ER with some hip pain    Hip Flexion (L2) Strength 4-   Hip Abduction Strength 4-   Hip Adduction Strength 4   Knee Flexion Strength 4   Knee Extension (L3) Strength 5   Ankle Dorsiflexion (L4) Strength 5   Ankle Plantar Flexion (S1) Strength 5   Hamstring Flexibility dec B = 50 deg   Hip Flexor Flexibility dec   Quadricep Flexibility dec   SLR -   Crossover SLR -   Segmental Mobility decreased through t-spine to sacrum with increaed pain   Palpation increased tightness and tendenress over B lumbar muscles    Cervical Spine   Posture fair    Cervical Flexion ROM min loss, pulling in neck    Cervical Extension ROM mod-major loss, no pain    Cervical Right Side Bending ROM min+ loss, L stretching and pain    Cervical Left Side Bending ROM Mod+ loss, stretching on R    Cervical Right Rotation ROM min+ loss, L pain    Cervical Left Rotation ROM Min+ loss, stretching    Thoracic Flexion ROM dec   Thoracic Extension ROM dec   Shoulder Abd (C5) Strength R=4+, L=4-/5   Elbow Flexion (C5, C6)  Strength 5   Elbow Extension (C7) Strength 5   Shoulder/Wrist/Hand Strength Comments neck flex, ext, B SB = 3+to 4/5    Segmental Mobility-Cervical decreased PA mobility; L side glide >R restriction    Palpation increased cervical muscle tightness and tenderness    Planned Therapy Interventions   Planned Therapy Interventions joint mobilization;manual therapy;neuromuscular re-education;ROM;strengthening;stretching   Planned Modality Interventions   Planned Modality Interventions Cryotherapy;Electrical stimulation;Hot packs;TENS   Clinical Impression   Criteria for Skilled Therapeutic Interventions Met yes, treatment indicated   PT Diagnosis Chronic Neck and Low Back pain and weakness    Influenced by the following impairments weakness, pain, decreased ROM and mobility    Functional limitations due to impairments standing, walking, ADL's, eytc   Clinical Presentation Stable/Uncomplicated   Clinical Decision Making (Complexity) Low complexity   Therapy Frequency 1 time/week   Predicted Duration of Therapy Intervention (days/wks) 1x/week for 10 weeks PRN   Risk & Benefits of therapy have been explained Yes   Patient, Family & other staff in agreement with plan of care Yes   Clinical Impression Comments Eval Assessment: Pt presents to skilled PT with a chronic history of low back pain and neck pain.  She has pain for many years and tried lumbar injections and recently had success with a R sided lumbar injection and will be having a L side lumbar injection tomorrow.  The pt has decreased cervical and lumbar ROM and mobility with increased pain.  The pt has significant cervical, core, lumbar, and postural weakness.  She will benefit from skilled PT to address these impairments identified in the evaluation   Ortho Goal 1   Goal Identifier 1   Goal Description Pt will demonstarte readiness for independent sx management and HEP   Target Date 12/11/21   Ortho Goal 2   Goal Identifier 2   Goal Description Pt will be able to  get in/out of the car or a chair with increased ease and pain <4/10   Target Date 12/11/21   Ortho Goal 3   Goal Identifier 3   Goal Description Pt will be able to stand for up to 10 minutes to perfom ADL's, dressing and self care with increased ease and pain <4/10   Target Date 12/11/21   Ortho Goal 4   Goal Identifier 4   Goal Description Pt will be able to walk for community mobility with increased ease and stamina and pain <4/10   Target Date 12/11/21   Total Evaluation Time   PT Eval, Low Complexity Minutes (26756) 30   Therapy Certification   Certification date from 10/27/21   Certification date to 01/25/22   Medical Diagnosis Chronic bilateral low back pain with right-sided sciatica; Chronic neck pain        Sheeba Cabrera, PT

## 2021-10-27 NOTE — ADDENDUM NOTE
Encounter addended by: Sheeba Cabrera, PT on: 10/27/2021 3:14 PM   Actions taken: Clinical Note Signed, Document created, Document edited

## 2021-10-28 ENCOUNTER — ANCILLARY PROCEDURE (OUTPATIENT)
Dept: PHYSICAL MEDICINE AND REHAB | Facility: CLINIC | Age: 76
End: 2021-10-28
Attending: NURSE PRACTITIONER
Payer: COMMERCIAL

## 2021-10-28 VITALS
DIASTOLIC BLOOD PRESSURE: 72 MMHG | SYSTOLIC BLOOD PRESSURE: 108 MMHG | HEART RATE: 89 BPM | TEMPERATURE: 97.6 F | OXYGEN SATURATION: 93 %

## 2021-10-28 DIAGNOSIS — G89.29 CHRONIC BILATERAL LOW BACK PAIN WITH BILATERAL SCIATICA: ICD-10-CM

## 2021-10-28 DIAGNOSIS — M48.061 LUMBAR FORAMINAL STENOSIS: ICD-10-CM

## 2021-10-28 DIAGNOSIS — M54.16 LEFT LUMBAR RADICULOPATHY: ICD-10-CM

## 2021-10-28 DIAGNOSIS — K21.9 GASTROESOPHAGEAL REFLUX DISEASE WITHOUT ESOPHAGITIS: ICD-10-CM

## 2021-10-28 DIAGNOSIS — M54.42 CHRONIC BILATERAL LOW BACK PAIN WITH BILATERAL SCIATICA: ICD-10-CM

## 2021-10-28 DIAGNOSIS — M54.41 CHRONIC BILATERAL LOW BACK PAIN WITH BILATERAL SCIATICA: ICD-10-CM

## 2021-10-28 DIAGNOSIS — M51.369 DDD (DEGENERATIVE DISC DISEASE), LUMBAR: ICD-10-CM

## 2021-10-28 PROCEDURE — 64483 NJX AA&/STRD TFRM EPI L/S 1: CPT | Mod: LT | Performed by: PAIN MEDICINE

## 2021-10-28 RX ORDER — TOPIRAMATE 100 MG/1
TABLET, FILM COATED ORAL
Qty: 135 TABLET | Refills: 1 | Status: SHIPPED | OUTPATIENT
Start: 2021-10-28 | End: 2021-12-06

## 2021-10-28 RX ORDER — DEXAMETHASONE SODIUM PHOSPHATE 10 MG/ML
INJECTION, SOLUTION INTRAMUSCULAR; INTRAVENOUS
Status: COMPLETED | OUTPATIENT
Start: 2021-10-28 | End: 2021-10-28

## 2021-10-28 RX ORDER — LIDOCAINE HYDROCHLORIDE 10 MG/ML
INJECTION, SOLUTION EPIDURAL; INFILTRATION; INTRACAUDAL; PERINEURAL
Status: COMPLETED | OUTPATIENT
Start: 2021-10-28 | End: 2021-10-28

## 2021-10-28 RX ADMIN — DEXAMETHASONE SODIUM PHOSPHATE 10 MG: 10 INJECTION, SOLUTION INTRAMUSCULAR; INTRAVENOUS at 09:49

## 2021-10-28 RX ADMIN — LIDOCAINE HYDROCHLORIDE 1 ML: 10 INJECTION, SOLUTION EPIDURAL; INFILTRATION; INTRACAUDAL; PERINEURAL at 09:48

## 2021-10-28 ASSESSMENT — PAIN SCALES - GENERAL
PAINLEVEL: MODERATE PAIN (4)
PAINLEVEL: EXTREME PAIN (8)

## 2021-10-28 NOTE — LETTER
10/28/2021         RE: Katy Hong  [address] [place] [id number]        Dear Colleague,    Thank you for referring your patient, Katy Hong, to the [facility]. Please see a copy of my visit note below.    Patient was here for an injection today.        Again, thank you for allowing me to participate in the care of your patient.        Sincerely,        Sergio Lay, DO

## 2021-10-28 NOTE — PATIENT INSTRUCTIONS
DISCHARGE INSTRUCTIONS    During office hours (8:00 a.m.- 4:00 p.m.) questions or concerns may be answered  by calling [facility] Nurses at  [phone].  Messages received after hours will be returned the following business day.      In the case of an emergency, please dial 911 or seek assistance at the nearest Emergency Room/Urgent Care facility.     All Patients:  ? You may experience an increase in your symptoms for the first 2 days (It may take anywhere between 2 days- 2 weeks for the steroid to have maximum effect).    ? You may use ice on the injection site, as frequently as 20 minutes each hour if needed.    ? You may take your pain medicine.    ? You may continue taking your regular medication.    ? You may shower. No swimming, tub bath or hot tub for 48 hours.  You may remove your bandaid/bandage as soon as you are home.    ? You may resume light activities, as tolerated.    ? Resume your usual diet as tolerated.    ? It is strongly advised that you do not drive for 1-3 hours post injection.    ? If you have had oral sedation:  Do not drive for 8 hours post injection.      ? If you have had IV sedation:  Do not drive for 24 hours post injection.  Do not operate hazardous machinery or make important personal/business decisions for 24 hours.    POSSIBLE STEROID SIDE EFFECTS (If steroid/cortisone was used for your procedure)    -If you experience these symptoms, it should only last for a short period      Swelling of the legs                Skin redness (flushing)       Mouth (oral) irritation     Blood sugar (glucose) levels              Sweats                     Mood changes    Headache    Weakened immune system for up to 14 days, which could increase the risk of nakul the COVID-19 virus and/or experiencing more severe symptoms of the disease, if exposed.         POSSIBLE PROCEDURE SIDE EFFECTS    -Call the [facility] if you are concerned      Increased Pain             Increased  numbness/tingling        Nausea/Vomiting            Bruising/bleeding at site        Redness or swelling                                                Difficulty walking        Weakness            Fever greater than 100.5    *In the event of a severe headache after an epidural steroid injection that is relieved by lying down, please call the [facility] to speak with a clinical staff member*

## 2021-10-29 RX ORDER — FAMOTIDINE 20 MG/1
TABLET, FILM COATED ORAL
Qty: 180 TABLET | Refills: 1 | OUTPATIENT
Start: 2021-10-29

## 2021-11-08 ENCOUNTER — HOSPITAL ENCOUNTER (OUTPATIENT)
Dept: PHYSICAL THERAPY | Facility: REHABILITATION | Age: 76
End: 2021-11-08
Payer: COMMERCIAL

## 2021-11-08 DIAGNOSIS — M43.16 SPONDYLOLISTHESIS OF LUMBAR REGION: ICD-10-CM

## 2021-11-08 DIAGNOSIS — M54.2 CHRONIC NECK PAIN: ICD-10-CM

## 2021-11-08 DIAGNOSIS — M62.81 MUSCLE WEAKNESS (GENERALIZED): ICD-10-CM

## 2021-11-08 DIAGNOSIS — G89.29 CHRONIC BILATERAL LOW BACK PAIN WITH RIGHT-SIDED SCIATICA: Primary | ICD-10-CM

## 2021-11-08 DIAGNOSIS — G89.29 CHRONIC NECK PAIN: ICD-10-CM

## 2021-11-08 DIAGNOSIS — M79.7 FIBROMYALGIA: ICD-10-CM

## 2021-11-08 DIAGNOSIS — M54.41 CHRONIC BILATERAL LOW BACK PAIN WITH RIGHT-SIDED SCIATICA: Primary | ICD-10-CM

## 2021-11-08 DIAGNOSIS — M51.369 DDD (DEGENERATIVE DISC DISEASE), LUMBAR: ICD-10-CM

## 2021-11-08 PROCEDURE — 97110 THERAPEUTIC EXERCISES: CPT | Mod: GP | Performed by: PHYSICAL THERAPIST

## 2021-11-08 NOTE — PROGRESS NOTES
"Outpatient Physical Therapy Daily Progress Note    Patient: Katy Hong  : 1945  Start of Care: 10/27/21   Date of Visit: 2021  Visit: 2    Referring Provider: Teresa Mas CNP    Therapy Diagnosis: Chronic Neck and Low Back pain and weakness      Client Self Report:  Pt reports that she had her lumbar injection the day after the eval and so far it is feeling good.  She now feels more of a \"strain\" over the left low back.  Her neck is mostly stiffness rather than pain.  She has been doing her HEP exercises and they are getting easier.     Objective Measurements:    Good overall mobility with no pain     Assessment:   Pt returns for her first follow-up in skilled PT with a chronic history of low back pain and neck pain.  She has pain for many years and tried lumbar injections and recently had success with a R sided lumbar injection and now also success from her L side lumbar injection.  The pt still has decreased cervical and lumbar ROM and mobility but with decreased pain and instead stiffness noted now.  The pt has significant cervical, core, lumbar, and postural weakness.  She will continue to benefit from skilled PT to address these impairments identified in the evaluation.     Goals:  See daily doc     Plan:  Continue therapy per current plan of care.      Today's Treatment:      Exercises:   Date 11/8/21 10/27/21   Exercise     Nu-step  x5 min RL:5     Seated H/S stretch  B 2x30\"  Bx30\"    Sit to stand  Raised table x10  Cues for 5 reps - 2-3 times/day    Scap retract  x7 with 5\" hold  x5 with 5\" hold    Supine LTR  Bx10  Bx10    Supine Cervical extension isometric x8 with 5\" hold  Supine pillow x5 with 5\"  hold    Ab set Marching 2x5  x5 with 5\" hold    Supine Bridge  Bx10 - cues to increase reps (was doing 5 reps) Bx10    [place] and arrow (reach and roll )  R, L x5 - added to HEP                           Sheeba Cabrera, PT   "

## 2021-11-12 ENCOUNTER — APPOINTMENT (OUTPATIENT)
Dept: CT IMAGING | Facility: CLINIC | Age: 76
End: 2021-11-12
Payer: COMMERCIAL

## 2021-11-12 ENCOUNTER — NURSE TRIAGE (OUTPATIENT)
Dept: NURSING | Facility: CLINIC | Age: 76
End: 2021-11-12
Payer: COMMERCIAL

## 2021-11-12 ENCOUNTER — HOSPITAL ENCOUNTER (EMERGENCY)
Facility: CLINIC | Age: 76
Discharge: HOME | End: 2021-11-12
Attending: EMERGENCY MEDICINE | Admitting: EMERGENCY MEDICINE
Payer: COMMERCIAL

## 2021-11-12 VITALS
HEART RATE: 89 BPM | OXYGEN SATURATION: 96 % | SYSTOLIC BLOOD PRESSURE: 127 MMHG | DIASTOLIC BLOOD PRESSURE: 64 MMHG | BODY MASS INDEX: 32.61 KG/M2 | WEIGHT: 190 LBS | RESPIRATION RATE: 16 BRPM | TEMPERATURE: 97 F

## 2021-11-12 DIAGNOSIS — N20.1 URETEROLITHIASIS: Primary | ICD-10-CM

## 2021-11-12 PROBLEM — S40.269A: Status: ACTIVE | Noted: 2021-11-12
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
## 2021-11-12 PROBLEM — L03.90 CELLULITIS: Status: ACTIVE | Noted: 2021-11-12

## 2021-11-12 LAB
ALBUMIN SERPL-MCNC: 3.5 G/DL (ref 3.5–5)
ALBUMIN UR-MCNC: 30 MG/DL
ALP SERPL-CCNC: 80 U/L (ref 45–120)
ALT SERPL W P-5'-P-CCNC: 15 U/L (ref 0–45)
ANION GAP SERPL CALCULATED.3IONS-SCNC: 8 MMOL/L (ref 5–18)
APPEARANCE UR: ABNORMAL
AST SERPL W P-5'-P-CCNC: 13 U/L (ref 0–40)
BACTERIA #/AREA URNS HPF: ABNORMAL /HPF
BASOPHILS # BLD AUTO: 0 10E3/UL (ref 0–0.2)
BASOPHILS NFR BLD AUTO: 0 %
BILIRUB DIRECT SERPL-MCNC: 0.2 MG/DL
BILIRUB SERPL-MCNC: 0.4 MG/DL (ref 0–1)
BILIRUB UR QL STRIP: NEGATIVE
BUN SERPL-MCNC: 19 MG/DL (ref 8–28)
CALCIUM SERPL-MCNC: 9.2 MG/DL (ref 8.5–10.5)
CHLORIDE BLD-SCNC: 112 MMOL/L (ref 98–107)
CO2 SERPL-SCNC: 23 MMOL/L (ref 22–31)
COLOR UR AUTO: YELLOW
CREAT SERPL-MCNC: 1.02 MG/DL (ref 0.6–1.1)
EOSINOPHIL # BLD AUTO: 0 10E3/UL (ref 0–0.7)
EOSINOPHIL NFR BLD AUTO: 1 %
ERYTHROCYTE [DISTWIDTH] IN BLOOD BY AUTOMATED COUNT: 13.1 % (ref 10–15)
GFR SERPL CREATININE-BSD FRML MDRD: 54 ML/MIN/1.73M2
GLUCOSE BLD-MCNC: 118 MG/DL (ref 70–125)
GLUCOSE UR STRIP-MCNC: NEGATIVE MG/DL
HCT VFR BLD AUTO: 39.3 % (ref 35–47)
HGB BLD-MCNC: 12.2 G/DL (ref 11.7–15.7)
HGB UR QL STRIP: ABNORMAL
IMM GRANULOCYTES # BLD: 0 10E3/UL
IMM GRANULOCYTES NFR BLD: 0 %
KETONES UR STRIP-MCNC: NEGATIVE MG/DL
LEUKOCYTE ESTERASE UR QL STRIP: ABNORMAL
LYMPHOCYTES # BLD AUTO: 1 10E3/UL (ref 0.8–5.3)
LYMPHOCYTES NFR BLD AUTO: 14 %
MCH RBC QN AUTO: 29.8 PG (ref 26.5–33)
MCHC RBC AUTO-ENTMCNC: 31 G/DL (ref 31.5–36.5)
MCV RBC AUTO: 96 FL (ref 78–100)
MONOCYTES # BLD AUTO: 0.4 10E3/UL (ref 0–1.3)
MONOCYTES NFR BLD AUTO: 5 %
MUCOUS THREADS #/AREA URNS LPF: PRESENT /LPF
NEUTROPHILS # BLD AUTO: 5.8 10E3/UL (ref 1.6–8.3)
NEUTROPHILS NFR BLD AUTO: 80 %
NITRATE UR QL: NEGATIVE
NRBC # BLD AUTO: 0 10E3/UL
NRBC BLD AUTO-RTO: 0 /100
PH UR STRIP: 6.5 [PH] (ref 5–7)
PLATELET # BLD AUTO: 183 10E3/UL (ref 150–450)
POTASSIUM BLD-SCNC: 3.2 MMOL/L (ref 3.5–5)
PROT SERPL-MCNC: 6.4 G/DL (ref 6–8)
RBC # BLD AUTO: 4.1 10E6/UL (ref 3.8–5.2)
RBC URINE: >182 /HPF
SODIUM SERPL-SCNC: 143 MMOL/L (ref 136–145)
SP GR UR STRIP: 1.02 (ref 1–1.03)
SQUAMOUS EPITHELIAL: 5 /HPF
UROBILINOGEN UR STRIP-MCNC: <2 MG/DL
WBC # BLD AUTO: 7.2 10E3/UL (ref 4–11)
WBC URINE: 19 /HPF

## 2021-11-12 PROCEDURE — 250N000011 HC RX IP 250 OP 636

## 2021-11-12 PROCEDURE — 99285 EMERGENCY DEPT VISIT HI MDM: CPT | Mod: 25

## 2021-11-12 PROCEDURE — 258N000003 HC RX IP 258 OP 636: Performed by: EMERGENCY MEDICINE

## 2021-11-12 PROCEDURE — 81001 URINALYSIS AUTO W/SCOPE: CPT | Performed by: EMERGENCY MEDICINE

## 2021-11-12 PROCEDURE — 87086 URINE CULTURE/COLONY COUNT: CPT | Performed by: EMERGENCY MEDICINE

## 2021-11-12 PROCEDURE — 96361 HYDRATE IV INFUSION ADD-ON: CPT

## 2021-11-12 PROCEDURE — 36415 COLL VENOUS BLD VENIPUNCTURE: CPT | Performed by: EMERGENCY MEDICINE

## 2021-11-12 PROCEDURE — 85048 AUTOMATED LEUKOCYTE COUNT: CPT | Performed by: EMERGENCY MEDICINE

## 2021-11-12 PROCEDURE — 80048 BASIC METABOLIC PNL TOTAL CA: CPT | Performed by: EMERGENCY MEDICINE

## 2021-11-12 PROCEDURE — 96374 THER/PROPH/DIAG INJ IV PUSH: CPT

## 2021-11-12 PROCEDURE — 74176 CT ABD & PELVIS W/O CONTRAST: CPT

## 2021-11-12 PROCEDURE — 82248 BILIRUBIN DIRECT: CPT | Performed by: EMERGENCY MEDICINE

## 2021-11-12 PROCEDURE — 250N000013 HC RX MED GY IP 250 OP 250 PS 637

## 2021-11-12 RX ORDER — ONDANSETRON 4 MG/1
4 TABLET, ORALLY DISINTEGRATING ORAL EVERY 8 HOURS PRN
Qty: 12 TABLET | Refills: 0 | Status: SHIPPED | OUTPATIENT
Start: 2021-11-12 | End: 2021-11-15

## 2021-11-12 RX ORDER — ACETAMINOPHEN 500 MG
1000 TABLET ORAL EVERY 6 HOURS
Qty: 56 TABLET | Refills: 0 | Status: SHIPPED | OUTPATIENT
Start: 2021-11-12 | End: 2021-11-19

## 2021-11-12 RX ORDER — OXYCODONE HYDROCHLORIDE 5 MG/1
5 TABLET ORAL EVERY 4 HOURS PRN
Qty: 12 TABLET | Refills: 0 | Status: SHIPPED | OUTPATIENT
Start: 2021-11-12 | End: 2021-11-16

## 2021-11-12 RX ORDER — IBUPROFEN 200 MG
400 TABLET ORAL EVERY 6 HOURS
Qty: 56 TABLET | Refills: 0 | Status: SHIPPED | OUTPATIENT
Start: 2021-11-12 | End: 2021-11-19

## 2021-11-12 RX ORDER — ACETAMINOPHEN 325 MG/1
975 TABLET ORAL ONCE
Status: COMPLETED | OUTPATIENT
Start: 2021-11-12 | End: 2021-11-12

## 2021-11-12 RX ORDER — DIMENHYDRINATE 50 MG
50 TABLET ORAL EVERY 6 HOURS PRN
Qty: 28 TABLET | Refills: 0 | Status: SHIPPED | OUTPATIENT
Start: 2021-11-12 | End: 2021-11-19

## 2021-11-12 RX ORDER — KETOROLAC TROMETHAMINE 15 MG/ML
15 INJECTION, SOLUTION INTRAMUSCULAR; INTRAVENOUS ONCE
Status: COMPLETED | OUTPATIENT
Start: 2021-11-12 | End: 2021-11-12

## 2021-11-12 RX ADMIN — SODIUM CHLORIDE 1000 ML: 9 INJECTION, SOLUTION INTRAVENOUS at 07:44

## 2021-11-12 RX ADMIN — ACETAMINOPHEN 975 MG: 325 TABLET ORAL at 06:39

## 2021-11-12 RX ADMIN — KETOROLAC TROMETHAMINE 15 MG: 15 INJECTION, SOLUTION INTRAMUSCULAR; INTRAVENOUS at 06:38

## 2021-11-12 RX ADMIN — Medication 50 MG: at 06:36

## 2021-11-12 ASSESSMENT — ENCOUNTER SYMPTOMS
DIFFICULTY URINATING: 0
SLEEP DISTURBANCE: 1
HEADACHES: 0
SHORTNESS OF BREATH: 1
BACK PAIN: 1
ABDOMINAL PAIN: 0
VOMITING: 0
FEVER: 0
NAUSEA: 1
CHILLS: 0

## 2021-11-12 NOTE — ED PROVIDER NOTES
Emergency Department Encounter     Evaluation Date & Time:   11/12/2021  6:04 AM    CHIEF COMPLAINT:  Flank Pain      Triage Note:Pt awoke at 0300 with left nk pain. Hx of kidney stones. Pain feels similar. Nausea no vomiting        Impression and Plan     ED COURSE & MEDICAL DECISION MAKING:    Left sided flank pain - Nephrolithiasis suspected with hx of previous kidney stones, UA with >182 RBC, pain that radiates to left groin. CT abd/pelvis w/o contrast ordered. Results show 2 adjacent stones at the left ureterovesicular junction measuring up to 0.4 cm with mild left hydronephrosis.  Ddx includes pyelonephritis, however UA not concerning for UTI. Given Tylenol, Dramamine, Toradol with improvement in pain and nausea. Discharge home with follow up at [facility]. Discussed with patient likelihood of stones passing given size <5mm. Tylenol/ibuprofen prescribed for pain with oxycodone for breakthrough pain. Dramamine, zofran prescribed for nausea.    At the conclusion of the encounter I discussed the results of all the tests and the disposition. The questions were answered. The patient or family acknowledged understanding and was agreeable with the care plan.    FINAL IMPRESSION:    ICD-10-CM    1. Ureterolithiasis  N20.1 Adult Urology Referral         Reassessments & Consults       MEDICATIONS GIVEN IN THE EMERGENCY DEPARTMENT:  Medications   acetaminophen (TYLENOL) tablet 975 mg (975 mg Oral Given 11/12/21 0639)   dimenhyDRINATE chew tab 50 mg (50 mg Oral Given 11/12/21 0636)   ketorolac (TORADOL) injection 15 mg (15 mg Intravenous Given 11/12/21 0638)   0.9% sodium chloride BOLUS (0 mLs Intravenous Stopped 11/12/21 0854)       NEW PRESCRIPTIONS STARTED AT TODAY'S ED VISIT:  Discharge Medication List as of 11/12/2021  8:55 AM      START taking these medications    Details   acetaminophen (TYLENOL) 500 MG tablet Take 2 tablets (1,000 mg) by mouth every 6 hours for 7 days, Disp-56 tablet, R-0, Local Print     "  dimenhyDRINATE (DRAMAMINE) 50 MG tablet Take 1 tablet (50 mg) by mouth every 6 hours as needed for other (kidney stone pain management), Disp-28 tablet, R-0, Local Print      ibuprofen (ADVIL/MOTRIN) 200 MG tablet Take 2 tablets (400 mg) by mouth every 6 hours for 7 days, Disp-56 tablet, R-0, Local Print      ondansetron (ZOFRAN-ODT) 4 MG ODT tab Take 1 tablet (4 mg) by mouth every 8 hours as needed for nausea, Disp-12 tablet, R-0, Local Print      oxyCODONE (ROXICODONE) 5 MG tablet Take 1 tablet (5 mg) by mouth every 4 hours as needed for severe pain If pain is not improved with acetaminophen and ibuprofen., Disp-12 tablet, R-0, Local Print             HPI     HPI     Katy Hong is a 76 year old female with a pertinent history of previous kidney stone, lower back pain, obesity, HTN who presents to this ED for evaluation of left flank pain. Woke up with the pain at 3 am this morning. Radiates to her left groin. Pain described as \"hard\" and constant, rated 10 out of 10 in severity. Did not take anything for pain. Started feeling slightly short of breath one hour ago secondary to the pain. Endorses nausea without vomiting. No fever/chills. Denies urinary symptoms including hematuria, dysuria, frequency. States this feels different than her typical lower back pain which is midline and radiates down her legs bilaterally.     REVIEW OF SYSTEMS:  Review of Systems   Constitutional: Negative for chills and fever.   HENT: Negative for congestion.    Eyes: Negative for visual disturbance.   Respiratory: Positive for shortness of breath.    Cardiovascular: Negative for chest pain.   Gastrointestinal: Positive for nausea. Negative for abdominal pain and vomiting.   Genitourinary: Negative for difficulty urinating.   Musculoskeletal: Positive for back pain.   Neurological: Negative for headaches.   Psychiatric/Behavioral: Positive for sleep disturbance.           Medical History     Past Medical History:   Diagnosis " Date     Abnormal mammogram, unspecified      Acute sinusitis, unspecified      Asymptomatic varicose veins      Breast cyst      Cellulitis and abscess of unspecified site      Circumscribed scleroderma      Contusion of unspecified site      Disorder of bone and cartilage, unspecified      Dysthymic disorder      Esophageal reflux      Insomnia, unspecified      Lumbago      Migraine, unspecified, without mention of intractable migraine without mention of status migrainosus      Myalgia and myositis, unspecified      Myocardial infarction (H)      Obesity, unspecified      Open wound of lip, without mention of complication      Other primary cardiomyopathies      Other seborrheic keratosis      Pain in joint, lower leg      Pain in limb      Sebaceous cyst      Shortness of breath      Shoulder and upper arm, insect bite, nonvenomous, without mention of infection(912.4)      Unspecified disease of sebaceous glands      Unspecified essential hypertension      Unspecified venous (peripheral) insufficiency      Unspecified vitamin D deficiency        Past Surgical History:   Procedure Laterality Date     APPENDECTOMY       BIOPSY BREAST Left      BREAST CYST ASPIRATION Left      C TOTAL ABDOM HYSTERECTOMY       still has both ovaries     CATARACT EXTRACTION, BILATERAL       HYSTERECTOMY       OOPHORECTOMY       TEMPOROMANDIBULAR JOINT SURGERY Bilateral      TUBAL LIGATION         Family History   Problem Relation Age of Onset     Hypertension Mother      Arthritis Mother      Alzheimer Disease Father         d. 76     Diabetes Father      Hypertension Father      Breast Cancer Maternal Aunt 60.00     Obesity Maternal Aunt      Hypertension Sister      Osteopenia Sister      Varicose Veins Brother      Obesity Brother      Breast Cancer Cousin      Obesity Maternal Uncle      Arthritis Maternal Grandmother        Social History     Tobacco Use     Smoking status: Never Smoker     Smokeless tobacco: Never Used    Substance Use Topics     Alcohol use: Yes     Alcohol/week: 0.0 standard drinks     Comment: Alcoholic Drinks/day: occasional     Drug use: No       acetaminophen (TYLENOL) 500 MG tablet  dimenhyDRINATE (DRAMAMINE) 50 MG tablet  ibuprofen (ADVIL/MOTRIN) 200 MG tablet  ondansetron (ZOFRAN-ODT) 4 MG ODT tab  oxyCODONE (ROXICODONE) 5 MG tablet  albuterol (VENTOLIN HFA) 90 mcg/actuation inhaler  aspirin 81 MG EC tablet  calcium carbonate (OS-MONIQUE) 500 mg calcium (1,250 mg) chewable tablet  cholecalciferol, vitamin D3, 5,000 unit Tab  DULoxetine (CYMBALTA) 30 MG capsule  famotidine (PEPCID) 20 MG tablet  furosemide (LASIX) 20 MG tablet  gabapentin (NEURONTIN) 300 MG capsule  losartan (COZAAR) 25 MG tablet  metoprolol succinate ER (TOPROL-XL) 50 MG 24 hr tablet  SUMAtriptan succinate 6 mg/0.5 mL PnIj  topiramate (TOPAMAX) 100 MG tablet  traZODone (DESYREL) 100 MG tablet  triamcinolone (KENALOG) 0.1 % ointment  venlafaxine (EFFEXOR-XR) 75 MG 24 hr capsule        Physical Exam     First Vitals:  Patient Vitals for the past 24 hrs:   BP Temp Temp src Pulse Resp SpO2 Weight   11/12/21 0743 127/64 -- -- 89 16 96 % --   11/12/21 0557 (!) 178/90 97  F (36.1  C) Temporal 82 20 96 % 86.2 kg (190 lb)       PHYSICAL EXAM:   Physical Exam  Constitutional:       General: She is in acute distress.      Appearance: Normal appearance.   HENT:      Head: Normocephalic and atraumatic.   Eyes:      Extraocular Movements: Extraocular movements intact.   Cardiovascular:      Rate and Rhythm: Normal rate and regular rhythm.      Heart sounds: Normal heart sounds. No murmur heard.      Pulmonary:      Effort: Pulmonary effort is normal. No respiratory distress.      Breath sounds: Normal breath sounds. No wheezing.   Abdominal:      Tenderness: There is abdominal tenderness (LUQ). There is left CVA tenderness. There is no right CVA tenderness, guarding or rebound.   Musculoskeletal:      Lumbar back: No bony tenderness.   Skin:     General:  Skin is warm and dry.   Neurological:      General: No focal deficit present.      Mental Status: She is alert.   Psychiatric:         Mood and Affect: Mood normal.         Behavior: Behavior normal.           Results     LAB:  All pertinent labs reviewed and interpreted  Labs Ordered and Resulted from Time of ED Arrival to Time of ED Departure   ROUTINE UA WITH MICROSCOPIC REFLEX TO CULTURE - Abnormal       Result Value    Color Urine Yellow      Appearance Urine Turbid (*)     Glucose Urine Negative      Bilirubin Urine Negative      Ketones Urine Negative      Specific Gravity Urine 1.019      Blood Urine >1.0 mg/dL (*)     pH Urine 6.5      Protein Albumin Urine 30  (*)     Urobilinogen Urine <2.0      Nitrite Urine Negative      Leukocyte Esterase Urine 25 Linnea/uL (*)     Bacteria Urine Few (*)     Mucus Urine Present (*)     RBC Urine >182 (*)     WBC Urine 19 (*)     Squamous Epithelials Urine 5 (*)    BASIC METABOLIC PANEL - Abnormal    Sodium 143      Potassium 3.2 (*)     Chloride 112 (*)     Carbon Dioxide (CO2) 23      Anion Gap 8      Urea Nitrogen 19      Creatinine 1.02      Calcium 9.2      Glucose 118      GFR Estimate 54 (*)    CBC WITH PLATELETS AND DIFFERENTIAL - Abnormal    WBC Count 7.2      RBC Count 4.10      Hemoglobin 12.2      Hematocrit 39.3      MCV 96      MCH 29.8      MCHC 31.0 (*)     RDW 13.1      Platelet Count 183      % Neutrophils 80      % Lymphocytes 14      % Monocytes 5      % Eosinophils 1      % Basophils 0      % Immature Granulocytes 0      NRBCs per 100 WBC 0      Absolute Neutrophils 5.8      Absolute Lymphocytes 1.0      Absolute Monocytes 0.4      Absolute Eosinophils 0.0      Absolute Basophils 0.0      Absolute Immature Granulocytes 0.0      Absolute NRBCs 0.0     HEPATIC FUNCTION PANEL - Normal    Bilirubin Total 0.4      Bilirubin Direct 0.2      Protein Total 6.4      Albumin 3.5      Alkaline Phosphatase 80      AST 13      ALT 15     URINE CULTURE        RADIOLOGY:  CT Abdomen Pelvis w/o Contrast   Final Result   IMPRESSION:    1.  There are 2 adjacent stones at the left ureterovesicular junction measuring up to 0.4 cm with mild left hydronephrosis.                    ECG:  n/a    PROCEDURES:  Procedures:  none    [facility] Documentation          I precepted today with Dr. Efra Palmer who agrees with the assessment and plan.     Kate Matamoros MD, PGY-1  [facility]  Emergency Medicine  [facility] EMERGENCY ROOM       Kate Matamoros MD  Resident  11/12/21 9293

## 2021-11-12 NOTE — ED PROVIDER NOTES
EMERGENCY DEPARTMENT ENCOUNTER      NAME: aKty Hong  AGE: 76 year old female  YOB: 1945  MRN: [id number]  EVALUATION DATE & TIME: 2021  6:04 AM    PCP: Leonora Kerr    ED PROVIDER: Efra Palmer D.O.      Chief Complaint   Patient presents with     Flank Pain       FINAL IMPRESSION:  1. Ureterolithiasis        ED COURSE & MEDICAL DECISION MAKIN:11 AM The resident met with the patient to gather history and to perform an initial exam.   6:30 AM I met with the patient, obtained history, performed an initial exam, and discussed options and plan for diagnostics and treatment here in the ED.   8:39 AM Patient was discharged by the resident.            Pertinent Labs & Imaging studies reviewed. (See chart for details)  76 year old female presents to the Emergency Department for evaluation of left-sided flank pain.  Patient had previous kidney stones and this did feel like similar.  There is no evidence of infection today, there is a significant amount of blood in the urine.  CT imaging does show a pair of kidney stones at the UVJ on the left, which would be consistent with her symptoms.  Patient will be referred to [facility], and follow-up as an outpatient for further management.  Her pain was well controlled at time of discharge.  Return precautions were discussed.    At the conclusion of the encounter I discussed the results of all of the tests and the disposition. The questions were answered. The patient or family acknowledged understanding and was agreeable with the care plan.    HPI    Patient information was obtained from: patient    Use of : N/A      Katy Hong is a 76 year old female who presents with left lower-back pain.     Patient awoke this morning at 3:00 AM (~3 hours ago) with left lower-back pain that radiates across the front of her abdomen. Patient describes pain as constant and hard. Reports associated nausea and mild shortness of breath that started an  hour ago which she attributes to the pain. Rates severity of pain as 10/10. Patient denies taking any medication for the pain.     Patient reports a personal medical history of kidney stones of the right kidney. Reports current pain feels similar. She denies hematuria, dysuria, difficulty urinating, vomiting, fever, chills, headache, pain radiating to legs, or any other complaints at this time.       REVIEW OF SYSTEMS  Constitutional:  Denies fever, chills, weight loss or weakness  Eyes:  No pain, discharge, redness  HENT:  Denies sore throat, ear pain, congestion  Respiratory: Positive for mild SOB. No wheeze or cough  Cardiovascular:  No CP, palpitations  GI:  Positive for left-sided abdominal pain and nausea. Denies vomiting or diarrhea  : Positive for left lower-back pain. Denies dysuria, hematuria, difficulty urinating  Musculoskeletal:  Denies any new muscle/joint pain, swelling or loss of function.  Skin:  Denies rash, pallor  Neurologic:  Denies headache, focal weakness or sensory changes  Lymph: Denies swollen nodes    All other systems negative unless noted in HPI.    PAST MEDICAL HISTORY:  Past Medical History:   Diagnosis Date     Abnormal mammogram, unspecified     Created by Conversion      Acute sinusitis, unspecified     Created by Conversion      Asymptomatic varicose veins     Created by Conversion      Breast cyst      Cellulitis and abscess of unspecified site     Created by Conversion      Circumscribed scleroderma     Created by Conversion      Contusion of unspecified site     Created by Conversion      Disorder of bone and cartilage, unspecified     Created by Conversion      Dysthymic disorder     Created by Conversion      Esophageal reflux     Created by Conversion      Insomnia, unspecified     Created by Conversion      Lumbago     Created by Conversion      Migraine, unspecified, without mention of intractable migraine without mention of status migrainosus     Created by Conversion       Myalgia and myositis, unspecified     Created by Conversion      Myocardial infarction (H)     per EKG's since 2015     Obesity, unspecified     Created by Conversion      Open wound of lip, without mention of complication     Created by Conversion      Other primary cardiomyopathies     Created by Conversion      Other seborrheic keratosis     Created by Conversion      Pain in joint, lower leg     Created by Conversion      Pain in limb     Created by Conversion      Sebaceous cyst     Created by Conversion      Shortness of breath     Created by Conversion      Shoulder and upper arm, insect bite, nonvenomous, without mention of infection(912.4)     Created by Conversion      Unspecified disease of sebaceous glands     Created by Conversion      Unspecified essential hypertension     Created by Conversion      Unspecified venous (peripheral) insufficiency     Created by Conversion      Unspecified vitamin D deficiency     Created by Conversion        PAST SURGICAL HISTORY:  Past Surgical History:   Procedure Laterality Date     APPENDECTOMY       BIOPSY BREAST Left      BREAST CYST ASPIRATION Left      C TOTAL ABDOM HYSTERECTOMY       still has both ovaries     CATARACT EXTRACTION, BILATERAL       HYSTERECTOMY       OOPHORECTOMY       TEMPOROMANDIBULAR JOINT SURGERY Bilateral      TUBAL LIGATION           CURRENT MEDICATIONS:    No current facility-administered medications for this encounter.     Current Outpatient Medications   Medication     acetaminophen (TYLENOL) 500 MG tablet     dimenhyDRINATE (DRAMAMINE) 50 MG tablet     ibuprofen (ADVIL/MOTRIN) 200 MG tablet     ondansetron (ZOFRAN-ODT) 4 MG ODT tab     oxyCODONE (ROXICODONE) 5 MG tablet     albuterol (VENTOLIN HFA) 90 mcg/actuation inhaler     aspirin 81 MG EC tablet     calcium carbonate (OS-MONIQUE) 500 mg calcium (1,250 mg) chewable tablet     cholecalciferol, vitamin D3, 5,000 unit Tab     DULoxetine (CYMBALTA) 30 MG capsule     famotidine (PEPCID)  20 MG tablet     furosemide (LASIX) 20 MG tablet     gabapentin (NEURONTIN) 300 MG capsule     losartan (COZAAR) 25 MG tablet     metoprolol succinate ER (TOPROL-XL) 50 MG 24 hr tablet     SUMAtriptan succinate 6 mg/0.5 mL PnIj     topiramate (TOPAMAX) 100 MG tablet     traZODone (DESYREL) 100 MG tablet     triamcinolone (KENALOG) 0.1 % ointment     venlafaxine (EFFEXOR-XR) 75 MG 24 hr capsule         ALLERGIES:  Allergies   Allergen Reactions     Fluoxetine Cough and Rash     Pt thinks it was cough but not totally sure.     Latex Rash     Levothyroxine Rash     Lisinopril Cough and Rash     Sulfa (Sulfonamide Antibiotics) [Sulfa Drugs] Itching and Rash       FAMILY HISTORY:  Family History   Problem Relation Age of Onset     Hypertension Mother      Arthritis Mother      Alzheimer Disease Father         d. 76     Diabetes Father      Hypertension Father      Breast Cancer Maternal Aunt 60.00     Obesity Maternal Aunt      Hypertension Sister      Osteopenia Sister      Varicose Veins Brother      Obesity Brother      Breast Cancer Cousin      Obesity Maternal Uncle      Arthritis Maternal Grandmother        SOCIAL HISTORY:  Social History     Socioeconomic History     Marital status:      Spouse name: Not on file     Number of children: 2     Years of education: Not on file     Highest education level: Not on file   Occupational History     Not on file   Tobacco Use     Smoking status: Never Smoker     Smokeless tobacco: Never Used   Substance and Sexual Activity     Alcohol use: Yes     Alcohol/week: 0.0 standard drinks     Comment: Alcoholic Drinks/day: occasional     Drug use: No     Sexual activity: Not on file   Other Topics Concern     Not on file   Social History Narrative    7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       Social Determinants of Health     Financial Resource Strain: Not on file   Food Insecurity: Not on file   Transportation Needs: Not on file   Physical Activity: Not on file   Stress: Not on file    Social Connections: Not on file   Intimate Partner Violence: Not on file   Housing Stability: Not on file       VITALS:  Patient Vitals for the past 24 hrs:   BP Temp Temp src Pulse Resp SpO2 Weight   11/12/21 0743 127/64 -- -- 89 16 96 % --   11/12/21 0557 (!) 178/90 97  F (36.1  C) Temporal 82 20 96 % 86.2 kg (190 lb)       PHYSICAL EXAM    VITAL SIGNS: /64   Pulse 89   Temp 97  F (36.1  C) (Temporal)   Resp 16   Wt 86.2 kg (190 lb)   SpO2 96%   BMI 32.61 kg/m      General Appearance: Distressed-appearing secondary to pain, well-nourished, no acute distress  Head:  Normocephalic, without obvious abnormality, atraumatic  Eyes:  PERRL, conjunctiva/corneas clear, EOM's intact,  ENT:  Lips, mucosa, and tongue normal, membranes are moist without pallor  Neck:  Normal ROM, symmetrical, trachea midline    Cardio:  Regular rate and rhythm, no murmur, rub or gallop, 2+ pulses symmetric in all extremities  Pulm:  Clear to auscultation bilaterally, respirations unlabored,  Back:  ROM normal, Left CVA tenderness, no spinal tenderness, no paraspinal tenderness  Abdomen:  Soft, no rebound or guarding. LUQ tenderness   Musculoskeletal: Full ROM, no edema, no cyanosis, good ROM of major joints  Integument:  Warm, Dry, No erythema, No rash.    Neurologic:  Alert & oriented.  No focal deficits appreciated.  Ambulatory.  Psychiatric:  Affect normal, Judgment normal, Mood normal.        LABS  Results for orders placed or performed during the hospital encounter of 11/12/21 (from the past 24 hour(s))   UA with Microscopic reflex to Culture    Specimen: Urine, Midstream   Result Value Ref Range    Color Urine Yellow Colorless, Straw, Light Yellow, Yellow    Appearance Urine Turbid (A) Clear    Glucose Urine Negative Negative mg/dL    Bilirubin Urine Negative Negative    Ketones Urine Negative Negative mg/dL    Specific Gravity Urine 1.019 1.001 - 1.030    Blood Urine >1.0 mg/dL (A) Negative    pH Urine 6.5 5.0 - 7.0     Protein Albumin Urine 30  (A) Negative mg/dL    Urobilinogen Urine <2.0 <2.0 mg/dL    Nitrite Urine Negative Negative    Leukocyte Esterase Urine 25 Linnea/uL (A) Negative    Bacteria Urine Few (A) None Seen /HPF    Mucus Urine Present (A) None Seen /LPF    RBC Urine >182 (H) <=2 /HPF    WBC Urine 19 (H) <=5 /HPF    Squamous Epithelials Urine 5 (H) <=1 /HPF    Narrative    Urine Culture ordered based on laboratory criteria   CBC with platelets + differential    Narrative    The following orders were created for panel order CBC with platelets + differential.  Procedure                               Abnormality         Status                     ---------                               -----------         ------                     CBC with platelets and d...[[id number]]  Abnormal            Final result                 Please view results for these tests on the individual orders.   Basic metabolic panel   Result Value Ref Range    Sodium 143 136 - 145 mmol/L    Potassium 3.2 (L) 3.5 - 5.0 mmol/L    Chloride 112 (H) 98 - 107 mmol/L    Carbon Dioxide (CO2) 23 22 - 31 mmol/L    Anion Gap 8 5 - 18 mmol/L    Urea Nitrogen 19 8 - 28 mg/dL    Creatinine 1.02 0.60 - 1.10 mg/dL    Calcium 9.2 8.5 - 10.5 mg/dL    Glucose 118 70 - 125 mg/dL    GFR Estimate 54 (L) >60 mL/min/1.73m2   Hepatic function panel   Result Value Ref Range    Bilirubin Total 0.4 0.0 - 1.0 mg/dL    Bilirubin Direct 0.2 <=0.5 mg/dL    Protein Total 6.4 6.0 - 8.0 g/dL    Albumin 3.5 3.5 - 5.0 g/dL    Alkaline Phosphatase 80 45 - 120 U/L    AST 13 0 - 40 U/L    ALT 15 0 - 45 U/L   CBC with platelets and differential   Result Value Ref Range    WBC Count 7.2 4.0 - 11.0 10e3/uL    RBC Count 4.10 3.80 - 5.20 10e6/uL    Hemoglobin 12.2 11.7 - 15.7 g/dL    Hematocrit 39.3 35.0 - 47.0 %    MCV 96 78 - 100 fL    MCH 29.8 26.5 - 33.0 pg    MCHC 31.0 (L) 31.5 - 36.5 g/dL    RDW 13.1 10.0 - 15.0 %    Platelet Count 183 150 - 450 10e3/uL    % Neutrophils 80 %    %  Lymphocytes 14 %    % Monocytes 5 %    % Eosinophils 1 %    % Basophils 0 %    % Immature Granulocytes 0 %    NRBCs per 100 WBC 0 <1 /100    Absolute Neutrophils 5.8 1.6 - 8.3 10e3/uL    Absolute Lymphocytes 1.0 0.8 - 5.3 10e3/uL    Absolute Monocytes 0.4 0.0 - 1.3 10e3/uL    Absolute Eosinophils 0.0 0.0 - 0.7 10e3/uL    Absolute Basophils 0.0 0.0 - 0.2 10e3/uL    Absolute Immature Granulocytes 0.0 <=0.0 10e3/uL    Absolute NRBCs 0.0 10e3/uL   CT Abdomen Pelvis w/o Contrast    Narrative    EXAM: CT ABDOMEN PELVIS W/O CONTRAST  LOCATION: [facility]  DATE/TIME: 11/12/2021 7:14 AM    INDICATION: Flank pain, kidney stone suspected  COMPARISON: 06/04/2018  TECHNIQUE: CT scan of the abdomen and pelvis was performed without IV contrast. Multiplanar reformats were obtained. Dose reduction techniques were used.  CONTRAST: None.    FINDINGS:   LOWER CHEST: Mild bibasilar atelectasis/scar. A stable benign 0.4 cm subpleural nodule in the right middle lobe. Small hiatal hernia.    HEPATOBILIARY: Normal.    PANCREAS: Normal.    SPLEEN: Normal.    ADRENAL GLANDS: Normal.    KIDNEYS/BLADDER: There are 2 adjacent obstructing stones at the left ureterovesicular junction. The distal stone measures 0.4 x 0.2 x 0.2 cm. The proximal stone measures 0.3 x 0.2 x 0.2 cm. Mild left hydronephrosis and perinephric fat stranding. No   nephrolithiasis.    BOWEL: Diverticulosis of the colon. No acute inflammatory change. No obstruction.     LYMPH NODES: Normal.    VASCULATURE: Mild atherosclerosis.    PELVIC ORGANS: Hysterectomy.    MUSCULOSKELETAL: Small fat-containing umbilical hernia. A hemangioma in T8. Stable small sclerotic lesions in T9 and T10. Degenerative changes of the spine and hips.      Impression    IMPRESSION:   1.  There are 2 adjacent stones at the left ureterovesicular junction measuring up to 0.4 cm with mild left hydronephrosis.           RADIOLOGY  CT Abdomen Pelvis w/o Contrast   Final Result    IMPRESSION:    1.  There are 2 adjacent stones at the left ureterovesicular junction measuring up to 0.4 cm with mild left hydronephrosis.                MEDICATIONS GIVEN IN THE EMERGENCY:  Medications   acetaminophen (TYLENOL) tablet 975 mg (975 mg Oral Given 11/12/21 0639)   dimenhyDRINATE chew tab 50 mg (50 mg Oral Given 11/12/21 0636)   ketorolac (TORADOL) injection 15 mg (15 mg Intravenous Given 11/12/21 0638)   0.9% sodium chloride BOLUS (0 mLs Intravenous Stopped 11/12/21 0854)       NEW PRESCRIPTIONS STARTED AT TODAY'S ER VISIT  Discharge Medication List as of 11/12/2021  8:55 AM      START taking these medications    Details   acetaminophen (TYLENOL) 500 MG tablet Take 2 tablets (1,000 mg) by mouth every 6 hours for 7 days, Disp-56 tablet, R-0, Local Print      dimenhyDRINATE (DRAMAMINE) 50 MG tablet Take 1 tablet (50 mg) by mouth every 6 hours as needed for other (kidney stone pain management), Disp-28 tablet, R-0, Local Print      ibuprofen (ADVIL/MOTRIN) 200 MG tablet Take 2 tablets (400 mg) by mouth every 6 hours for 7 days, Disp-56 tablet, R-0, Local Print      ondansetron (ZOFRAN-ODT) 4 MG ODT tab Take 1 tablet (4 mg) by mouth every 8 hours as needed for nausea, Disp-12 tablet, R-0, Local Print      oxyCODONE (ROXICODONE) 5 MG tablet Take 1 tablet (5 mg) by mouth every 4 hours as needed for severe pain If pain is not improved with acetaminophen and ibuprofen., Disp-12 tablet, R-0, Local Print              Efra Palmer D.O.  Emergency Medicine  [facility] EMERGENCY ROOM  [address] [facility] [address] [phone]  Dept: [phone]     Efra Palmer DO  11/12/21 9743

## 2021-11-12 NOTE — TELEPHONE ENCOUNTER
Pt was dx with a kidney stone   Pain rated 9/10  Pt feels that kidney stone has moved     Per protocol - Go to ED now     Care advice given per protocol and when to call back. Pt verbalized understanding and agrees to plan of care.    Nicole Willoughby RN  [facility] Nurse Advisor  5:16 AM 11/12/2021      COVID 19 Nurse Triage Plan/Patient Instructions    Please be aware that novel coronavirus (COVID-19) may be circulating in the community. If you develop symptoms such as fever, cough, or SOB or if you have concerns about the presence of another infection including coronavirus (COVID-19), please contact your health care provider or visit [email or web address].     Disposition/Instructions    ED Visit recommended. Follow protocol based instructions.     Bring Your Own Device:  Please also bring your smart device(s) (smart phones, tablets, laptops) and their charging cables for your personal use and to communicate with your care team during your visit.    Thank you for taking steps to prevent the spread of this virus.  o Limit your contact with others.  o Wear a simple mask to cover your cough.  o Wash your hands well and often.    Resources    [facility]: About COVID-19: [email or web address]    CDC: What to Do If You're Sick: www.cdc.gov/coronavirus/2019-ncov/about/steps-when-sick.html    CDC: Ending Home Isolation: www.cdc.gov/coronavirus/2019-ncov/hcp/disposition-in-home-patients.html     CDC: Caring for Someone: www.cdc.gov/coronavirus/2019-ncov/if-you-are-sick/care-for-someone.html     [facility]: Interim Guidance for Hospital Discharge to Home: www.health.[email or web address]/diseases/coronavirus/hcp/hospdischarge.pdf    [facility] clinical trials (COVID-19 research studies): [email or web address]     Below are the COVID-19 hotlines at the [place] Department of [facility] ([facility]). Interpreters are available.   o For health questions: Call 354-886-3725 or 1-499.899.6125 (7  a.m. to 7 p.m.)  o For questions about schools and childcare: Call 224-831-4523 or 1-768.460.9571 (7 a.m. to 7 p.m.)                         Reason for Disposition    [1] SEVERE pain (e.g., excruciating, scale 8-10) AND [2] present > 1 hour    Additional Information    Negative: Passed out (i.e., lost consciousness, collapsed and was not responding)    Negative: Shock suspected (e.g., cold/pale/clammy skin, too weak to stand, low BP, rapid pulse)    Negative: Difficult to awaken or acting confused (e.g., disoriented, slurred speech)    Negative: Sounds like a life-threatening emergency to the triager    Negative: Followed a major injury to the back (e.g., MVA, fall > 10 feet or 3 meters, penetrating injury, etc.)    Negative: Back pain or flank pain during pregnancy    Negative: Upper, mid or lower back pain that occurs mainly in the midline    Protocols used: FLANK PAIN-A-AH

## 2021-11-13 LAB — BACTERIA UR CULT: NORMAL

## 2021-11-15 ENCOUNTER — VIRTUAL VISIT (OUTPATIENT)
Dept: UROLOGY | Facility: CLINIC | Age: 76
End: 2021-11-15
Payer: COMMERCIAL

## 2021-11-15 DIAGNOSIS — N20.1 URETEROLITHIASIS: ICD-10-CM

## 2021-11-15 DIAGNOSIS — N20.1 CALCULUS OF URETER: Primary | ICD-10-CM

## 2021-11-15 PROCEDURE — 99204 OFFICE O/P NEW MOD 45 MIN: CPT | Mod: 95 | Performed by: UROLOGY

## 2021-11-15 ASSESSMENT — PAIN SCALES - GENERAL: PAINLEVEL: SEVERE PAIN (7)

## 2021-11-15 NOTE — PROGRESS NOTES
Assessment/Plan:    Assessment & Plan   Katy was seen today for new patient.    Diagnoses and all orders for this visit:    Calculus of ureter  -     Patient Stated Goal: Pass my stone  -     CT Abdomen Pelvis w/o Contrast; Future    Ureterolithiasis  -     Adult Urology Referral        Stone Management Plan  Stone Management 11/15/2021   Urinary Tract Infection No suspicion of infection   Renal Colic Well controlled symptoms   Renal Failure Suspected Renal Failure   Current CT date 11/12/2021   Right sided stones? No   R Stone Event No current event   Left sided stones? Yes   L Number of ureteral stones 2   L GSD of ureteral stones 4   L Location of ureteral stone Distal   L Number of kidney stones  No renal stones   L Hydronephrosis Mild   L Stone Event New event   Diagnosis date 11/12/2021   Initial location of primary symptomatic stone Distal   Initial GSD of primary symptomatic stone 4   L MET Status Initiation   L Current Plan MET   MET 1 week F/U           PLAN    Will proceed with medical expulsive therapy. Risks and benefits were detailed of medical expulsive therapy including probability of stone passage, recurrent renal colic, and requirement of emergency medical and/or surgical care and further imaging. Patient verbalized understanding. Patient agrees with plan as discussed. She will return in 1 weeks with low dose CT scan.    Over the counter symptom control medications of ibuprofen, Dramamine and Tylenol were recommended.    Phone call duration: 12 minutes  17 minutes spent on the date of the encounter doing chart review, history and exam, documentation and further activities per the note    HENRY BELL MD  [facility]    Subjective:     HPI  Ms. Katy Hong is a 76 year old  female who is being evaluated via a billable telephone visit by [facility] [place] new stone episode    She is a remotely recurrent unidentified  composition stone former who has not required stone clearance procedures. She has not previously participated in stone risk evaluation. She has identified modifiable stone risks including:  topiramate therapy. She has no identified non-modifiable stone risk factors.    She presented to ED with severe sudden onset left flank pain. No fever or chills. Pain control incomplete on oxycodone.     CT scan is personally reviewed and demonstrates one irregular or two adjacent 4 mm left distal stones.    Significant labs from presentation include normal findings.    ROS   Review of systems is negative except for HPI    Past Medical History:   Diagnosis Date     Abnormal mammogram, unspecified     Created by Conversion      Acute sinusitis, unspecified     Created by Conversion      Asymptomatic varicose veins     Created by Conversion      Breast cyst      Cellulitis and abscess of unspecified site     Created by Conversion      Circumscribed scleroderma     Created by Conversion      Contusion of unspecified site     Created by Conversion      Disorder of bone and cartilage, unspecified     Created by Conversion      Dysthymic disorder     Created by Conversion      Esophageal reflux     Created by Conversion      Insomnia, unspecified     Created by Conversion      Lumbago     Created by Conversion      Migraine, unspecified, without mention of intractable migraine without mention of status migrainosus     Created by Conversion      Myalgia and myositis, unspecified     Created by Conversion      Myocardial infarction (H)     per EKG's since 2015     Obesity, unspecified     Created by Conversion      Open wound of lip, without mention of complication     Created by Conversion      Other primary cardiomyopathies     Created by Conversion      Other seborrheic keratosis     Created by Conversion      Pain in joint, lower leg     Created by Conversion      Pain in limb     Created by Conversion      Sebaceous cyst     Created  by Conversion      Shortness of breath     Created by Conversion      Shoulder and upper arm, insect bite, nonvenomous, without mention of infection(912.4)     Created by Conversion      Unspecified disease of sebaceous glands     Created by Conversion      Unspecified essential hypertension     Created by Conversion      Unspecified venous (peripheral) insufficiency     Created by Conversion      Unspecified vitamin D deficiency     Created by Conversion        Past Surgical History:   Procedure Laterality Date     APPENDECTOMY       BIOPSY BREAST Left      BREAST CYST ASPIRATION Left      C TOTAL ABDOM HYSTERECTOMY       still has both ovaries     CATARACT EXTRACTION, BILATERAL       HYSTERECTOMY       OOPHORECTOMY       TEMPOROMANDIBULAR JOINT SURGERY Bilateral      TUBAL LIGATION         Current Outpatient Medications   Medication Sig Dispense Refill     acetaminophen (TYLENOL) 500 MG tablet Take 2 tablets (1,000 mg) by mouth every 6 hours for 7 days 56 tablet 0     aspirin 81 MG EC tablet [ASPIRIN 81 MG EC TABLET] Take 81 mg by mouth daily.       calcium carbonate (OS-MONIQUE) 500 mg calcium (1,250 mg) chewable tablet [CALCIUM CARBONATE (OS-MONIQUE) 500 MG CALCIUM (1,250 MG) CHEWABLE TABLET] Chew 2 tablets daily.        cholecalciferol, vitamin D3, 5,000 unit Tab [CHOLECALCIFEROL, VITAMIN D3, 5,000 UNIT TAB] Take by mouth.       dimenhyDRINATE (DRAMAMINE) 50 MG tablet Take 1 tablet (50 mg) by mouth every 6 hours as needed for other (kidney stone pain management) 28 tablet 0     famotidine (PEPCID) 20 MG tablet [FAMOTIDINE (PEPCID) 20 MG TABLET] Take 1 tablet (20 mg total) by mouth 2 (two) times a day. 60 tablet 5     furosemide (LASIX) 20 MG tablet Take 1-2 tablets (20-40 mg) by mouth every 48 hours as needed (elevated BP, LE swelling) 90 tablet 1     gabapentin (NEURONTIN) 300 MG capsule 1 capsule in the am, 1 capsule in the afternoon, 2 capsules at bedtime 270 capsule 3     ibuprofen (ADVIL/MOTRIN) 200 MG tablet Take  2 tablets (400 mg) by mouth every 6 hours for 7 days 56 tablet 0     losartan (COZAAR) 25 MG tablet [LOSARTAN (COZAAR) 25 MG TABLET] Take 1 tablet (25 mg total) by mouth daily. 90 tablet 2     metoprolol succinate ER (TOPROL-XL) 50 MG 24 hr tablet TAKE 1 TABLET BY MOUTH EVERY DAY 90 tablet 2     ondansetron (ZOFRAN-ODT) 4 MG ODT tab Take 1 tablet (4 mg) by mouth every 8 hours as needed for nausea 12 tablet 0     oxyCODONE (ROXICODONE) 5 MG tablet Take 1 tablet (5 mg) by mouth every 4 hours as needed for severe pain If pain is not improved with acetaminophen and ibuprofen. 12 tablet 0     SUMAtriptan succinate 6 mg/0.5 mL PnIj [SUMATRIPTAN SUCCINATE 6 MG/0.5 ML PNIJ] INJECT 1 INJECTION AS DIRECTED IMMEDIATELY AT ONSET OF MIGRAINE HEADACHE 2 Syringe 4     topiramate (TOPAMAX) 100 MG tablet TAKE 1/2 TABLET IN THE AM AND 1 TABLET WITH DINNER (Patient taking differently: Take 1 and 1/2 tablet prn at bedtime.) 135 tablet 1     traZODone (DESYREL) 100 MG tablet Take 1 tablet (100 mg) by mouth At Bedtime 90 tablet 1     triamcinolone (KENALOG) 0.1 % ointment [TRIAMCINOLONE (KENALOG) 0.1 % OINTMENT] APPLY TO AFFECTED AREA TWICE A DAY 30 g 2     venlafaxine (EFFEXOR-XR) 75 MG 24 hr capsule TAKE 3 CAPSULES (225 MG TOTAL) BY MOUTH DAILY. 270 capsule 1     albuterol (VENTOLIN HFA) 90 mcg/actuation inhaler [ALBUTEROL (VENTOLIN HFA) 90 MCG/ACTUATION INHALER] Inhale 2 puffs every 6 (six) hours as needed for wheezing. 18 g 1     DULoxetine (CYMBALTA) 30 MG capsule Take 1 capsule (30 mg) by mouth 2 times daily (Patient taking differently: Take 30 mg by mouth 2 times daily Take 2 tablets BID.) 180 capsule 1       Allergies   Allergen Reactions     Fluoxetine Cough and Rash     Pt thinks it was cough but not totally sure.     Latex Rash     Levothyroxine Rash     Lisinopril Cough and Rash     Sulfa (Sulfonamide Antibiotics) [Sulfa Drugs] Itching and Rash       Social History     Socioeconomic History     Marital status:       Spouse name: Not on file     Number of children: 2     Years of education: Not on file     Highest education level: Not on file   Occupational History     Not on file   Tobacco Use     Smoking status: Never Smoker     Smokeless tobacco: Never Used   Substance and Sexual Activity     Alcohol use: Yes     Alcohol/week: 0.0 standard drinks     Comment: Alcoholic Drinks/day: occasional     Drug use: No     Sexual activity: Not on file   Other Topics Concern     Not on file   Social History Narrative    7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       Social Determinants of Health     Financial Resource Strain: Not on file   Food Insecurity: Not on file   Transportation Needs: Not on file   Physical Activity: Not on file   Stress: Not on file   Social Connections: Not on file   Intimate Partner Violence: Not on file   Housing Stability: Not on file       Family History   Problem Relation Age of Onset     Hypertension Mother      Arthritis Mother      Alzheimer Disease Father         d. 76     Diabetes Father      Hypertension Father      Breast Cancer Maternal Aunt 60.00     Obesity Maternal Aunt      Hypertension Sister      Osteopenia Sister      Varicose Veins Brother      Obesity Brother      Breast Cancer Cousin      Obesity Maternal Uncle      Arthritis Maternal Grandmother        Objective:     No vitals or physical exam obtained due to virtual visit    Labs  Most Recent 3 CBC's:Recent Labs   Lab Test 21  0710 21  0956 20  1012 05/15/18  1737   WBC 7.2  --  7.3 8.7   HGB 12.2 12.4 13.4 12.4   MCV 96  --  90 94     --  205 226     Most Recent 3 BMP's:Recent Labs   Lab Test 21  0710 21  0956 21  1500    144 141   POTASSIUM 3.2* 4.0 3.8   CHLORIDE 112* 108* 109*   CO2 23 26 27   BUN 19 17 17   CR 1.02 0.84 0.81   ANIONGAP 8 10 5   MONIQUE 9.2 9.4 9.0    93 94     Most Recent Urinalysis:Recent Labs   Lab Test 21  0614 18  1444   COLOR Yellow Yellow   APPEARANCE Turbid*  Clear   URINEGLC Negative Negative   URINEBILI Negative Small*   URINEKETONE Negative Trace*   SG 1.019 >=1.030   UBLD >1.0 mg/dL* Trace*   URINEPH 6.5 5.5   PROTEIN 30 * Negative   UROBILINOGEN  --  4.0 E.U./dL*   NITRITE Negative Negative   LEUKEST 25 Linnea/uL* Negative   RBCU >182* 0-2   WBCU 19* 0-5     Acute Labs   Urine Culture    Culture   Date Value Ref Range Status   11/12/2021 50,000-100,000 CFU/mL Mixture of urogenital michaela  Final   07/19/2018 No Growth  Final   05/15/2018 Mixture of urogenital organisms  Final

## 2021-11-15 NOTE — PROGRESS NOTES
Patient is roomed via telephone for a virtual visit.  Patient confirmed she is in the [facility] at the time of this appointment.  Patient understands that this virtual visit is billable and agree to proceed with appointment.

## 2021-11-15 NOTE — PATIENT INSTRUCTIONS
Patient Stated Goal: Pass my stone  Symptom Control While Passing A Stone    The goal of [facility] is to let a smaller kidney stone (less than 4 to 5 mm) pass without intervention if possible. Giving your body a chance to clear the stone may take a few hours up to a few weeks.  Keeping you well-informed, safe and fairly comfortable is important.    Drink to thirst  Do not attempt to  flush out  your stone by drinking too much fluid. This does not work and may increase nausea. Drink enough to satisfy your body s thirst. Eating your normal diet is fine.   Medications (that may be suggested or prescribed)    Ibuprofen (Advil or Motrin) Available over the counter  o Take two (200mg) tablets every six hours until the stone passes.  o Prevents spasm of the ureter.    o Decreases pain.      Dramamine* (drowsy version, non-generic formulation) Available over the counter  o Take 50mg at bedtime  o Decreases spasms of the ureter  o Decreases nausea  o Decreases acute pain  o Decreases recurrence of pain for 24 hours  o Will help you sleep  *This medication will cause increase drowsiness, do not drive or operate machinery for 6 hours.      Narcotics (Percocet, Vicodin, Dilaudid) Take as prescribed for severe pain unrelieved by ibuprofen and Dramamine  o Narcotics have significant side effects and only  cover-up  pain. They have no effect on the cause of pain.  o Common side effects  - Confusion, disorientation and sedation - DO NOT DRIVE OR OPERATE MACHINERY WITHIN 24 HOURS  - Nausea - take Dramamine or Zofran or Haldol to help control  - Constipation  - Sleep disturbances      Ondansetron (Zofran) Take as prescribed  o Reserve for severe nausea  o May cause constipation, start over the counter Miralax if needed      Second Line Anti-Nausea Medication: Adding a different anti-nausea medication maybe helpful for persistent nausea.  The combined effect of different types of anti-nausea medications maybe more  effective than either medication by itself, even in higher doses.  o Compazine: Take as prescribed      Information about kidney stones    Crystals can form if chemicals are too concentrated in your urine. If the crystal grows over time, a stone may form. A stone usually isn t painful while it is still in the kidney.    As the stone begins to leave the kidney, you may experience episodes of flank pain as the kidney stone approaches the entrance to the ureter. Some people feel a vague ache in the side.    Kidney stones may fall into the ureter. Some stones are tiny and pass through without causing symptoms. The ureter is a small tube (approximately 1/8 of an inch wide). A kidney stone can get stuck and block the ureter. If this happens, urine backs up and flows back to the kidney. Back pressure on the kidney can cause:  o Severe flank pain radiating to the groin.  o Severe nausea and vomiting.  o The pain can occur in the lower back, side, groin or all three.      When the stone reaches the lower ureter, this can irritate the bladder and sensations of feeling the urge to urinate frequently and urgently may occur.      Once the stone passes out of your ureter and into your bladder, the symptoms of urgency and frequency will often disappear. Sometimes pain will come back for a short period and will not be as severe as before. The passage of the stone from your bladder and out of your body is usually not a problem. The urethra is at least twice as wide as the normal ureter, so the stone doesn t usually block it.    Strain all urine  If you pass the stone, save it. Place it in the container we have provided and bring it to the [facility] within a week of passing it. Your stone will then be sent for analysis which takes about a month.     Signs and symptoms you might experience    Nausea    Decreased appetite    Urinary frequency    Bloody urine     Chills    Fatigue    When to call [facility] or  go to the Emergency Room    Fever with a temperature greater than 100.1    Severe pain    Persistent nausea/vomiting    If the pain worsens or nausea/vomiting is uncontrolled with medications, STOP eating & drinking. You need to have an empty stomach for 8 hours prior to surgery. Call the [facility] immediately at [phone].           Follow-up    Low dose CT scan with doctor visit 1-2 weeks after initial clinic visit per doctor s instructions    Please cancel the CT scan visit if you pass a stone. Reschedule for a one month follow-up with doctor to discuss stone composition and future prevention.    Preventing future stones    Approximately a month after your stone is sent out for analysis, a prevention visit will occur with your provider, to discuss an individualized plan for prevention of new stones and to discuss managing stones that you may still have. Along with the analysis of the kidney stone, blood and urine tests may be indicated to develop this plan. Knowing the type of kidney stones you make, and why, allows the providers at the [facility] to recommend specific ways to prevent them.    Follow-up visits are an important part of monitoring and preventing future re-occurrences.    The [facility] is available for questions or concerns 24 hours a day at [phone]

## 2021-11-16 ENCOUNTER — TELEPHONE (OUTPATIENT)
Dept: UROLOGY | Facility: CLINIC | Age: 76
End: 2021-11-16
Payer: COMMERCIAL

## 2021-11-17 ENCOUNTER — LAB (OUTPATIENT)
Dept: LAB | Facility: CLINIC | Age: 76
End: 2021-11-17
Payer: COMMERCIAL

## 2021-11-17 ENCOUNTER — TELEPHONE (OUTPATIENT)
Dept: UROLOGY | Facility: CLINIC | Age: 76
End: 2021-11-17
Payer: COMMERCIAL

## 2021-11-17 DIAGNOSIS — N20.1 URETEROLITHIASIS: ICD-10-CM

## 2021-11-17 DIAGNOSIS — N20.1 URETEROLITHIASIS: Primary | ICD-10-CM

## 2021-11-17 PROCEDURE — 82365 CALCULUS SPECTROSCOPY: CPT

## 2021-11-17 NOTE — TELEPHONE ENCOUNTER
Message left for patient to call back to [facility] to check on her symptom control.  Veronica Thomas RN    Patient returned call and states that she passed her stone.  Her description matches approximate size in last ov note.  Will cancel ct and patient will bring stone into lab for analysis.  If results are not back in time for next call on 11/22, will call patient to reschedule.  She will call in interim with any issues or questions.  Veronica Thomas RN

## 2021-11-18 ENCOUNTER — OFFICE VISIT (OUTPATIENT)
Dept: FAMILY MEDICINE | Facility: CLINIC | Age: 76
End: 2021-11-18
Payer: COMMERCIAL

## 2021-11-18 VITALS
WEIGHT: 192.3 LBS | HEART RATE: 103 BPM | SYSTOLIC BLOOD PRESSURE: 136 MMHG | BODY MASS INDEX: 33.01 KG/M2 | DIASTOLIC BLOOD PRESSURE: 70 MMHG | OXYGEN SATURATION: 95 %

## 2021-11-18 DIAGNOSIS — Z23 FLU VACCINE NEED: ICD-10-CM

## 2021-11-18 DIAGNOSIS — Z23 COVID-19 VACCINE ADMINISTERED: ICD-10-CM

## 2021-11-18 DIAGNOSIS — N20.0 CALCULUS OF KIDNEY: Primary | ICD-10-CM

## 2021-11-18 PROCEDURE — 91300 COVID-19,PF,PFIZER (12+ YRS): CPT | Performed by: FAMILY MEDICINE

## 2021-11-18 PROCEDURE — 90662 IIV NO PRSV INCREASED AG IM: CPT | Performed by: FAMILY MEDICINE

## 2021-11-18 PROCEDURE — 0004A COVID-19,PF,PFIZER (12+ YRS): CPT | Performed by: FAMILY MEDICINE

## 2021-11-18 PROCEDURE — 99214 OFFICE O/P EST MOD 30 MIN: CPT | Mod: 25 | Performed by: FAMILY MEDICINE

## 2021-11-18 PROCEDURE — G0008 ADMIN INFLUENZA VIRUS VAC: HCPCS | Performed by: FAMILY MEDICINE

## 2021-11-18 NOTE — PROGRESS NOTES
Assessment / Plan    Katy was seen today for hospital f/u.    Diagnoses and all orders for this visit:    Calculus of kidney    COVID-19 vaccine administered  -     COVID-19,PF,PFIZER (12+ Yrs PURPLE LABEL)    Flu vaccine need  -     CO FLU VACCINE, INCREASED ANTIGEN, PRESV FREE ([id number])    patient with recent passage of kidney stone noting gradual improvement in left side discomfort.  Encouraged continued generous fluid intake. Discussed that stone analysis will help urologist to make recommendations helpful to avoiding future stones.   Patient advised to contact me if pain worsens or if any questions prior to appointment with Dr Pittman.     COVID-19 booster and annual influenza vaccine administered at today's visit.      Patient Instructions   I'm glad to hear that you successfully passed a stone yesterday.  We will await the analysis of the composition of the stone and then consider if there would be reason to initiate medication to help prevent formation of stones in the future.      My expectation regarding the pain, is that it should gradually improve over the next couple of days.  If you note any worsening pain or if it is not improving by Monday, I would ask that you give me a call so that we could consider possible imaging again with CT.      Please continue to try to push fluids.      We will administer COVID-19 booster and influenza vaccine today.      Return in about 4 days (around 11/22/2021) for if pain is not improving .    30 minutes spent on the date of the encounter doing chart review, history and exam, documentation and further activities per the note    Subjective   Katy Hong is a 76 year old year old female who presents with the following concerns:     Kidney stone - patient with recent ER visit on 11/12/21 for flank pain with CT of abd/pelvis showing kidney stones at left UVJ.  Patient reports that she has been using filter when urinating and yesterday had passed a stone with  appearance of two round connected stones.  She did submit this to the lab for analysis.  She has noted some decrease in her flank pain, though continues to have occasional twinges of sharp pain at left flank and side of abdomen. She has only been using oxycodone and acetaminophen for pain control since yesterday.  Has noted some streaks of blood in her urine still today. She had a virtual visit with Dr Pittman from the [facility] a few days ago and he had advised repeat CT if stone had not passed in the coming week.  Patient has a follow-up appointment scheduled with him at the beginning of next week.     Patient Active Problem List   Diagnosis     Osteopenia     Insomnia     Vitamin D Deficiency     Lower Back Pain     Fibromyalgia     Essential Hypertension     Seborrheic Keratosis     Obesity     Lichen Sclerosus Et Atrophicus     Depression With Anxiety     Migraine Headache     Esophageal Reflux     Joint Pain, Localized In The Knee     Varicose Veins     Nonvenomous Insect Bite Of Shoulder     Cellulitis     Mammogram - Abnormal     Chronic Cutaneous Ulcer Venous Stasis     Chronic venous insufficiency     Foot pain (Soft Tissue)     Female stress incontinence     Calculus of ureter     Hydronephrosis with urinary obstruction due to ureteral calculus     Other cardiomyopathy (H)     Obesity (BMI 30-39.9)     Chronic fatigue     Moderate episode of recurrent major depressive disorder (H)     Anxiety     Morbid obesity (H)     Cellulitis     Nonvenomous insect bite of shoulder     Past Surgical History:   Procedure Laterality Date     APPENDECTOMY       BIOPSY BREAST Left      BREAST CYST ASPIRATION Left      C TOTAL ABDOM HYSTERECTOMY       still has both ovaries     CATARACT EXTRACTION, BILATERAL       HYSTERECTOMY       OOPHORECTOMY       TEMPOROMANDIBULAR JOINT SURGERY Bilateral      TUBAL LIGATION         Social History     Tobacco Use     Smoking status: Never Smoker     Smokeless tobacco: Never  Used   Substance Use Topics     Alcohol use: Yes     Alcohol/week: 0.0 standard drinks     Comment: Alcoholic Drinks/day: occasional     Family History   Problem Relation Age of Onset     Hypertension Mother      Arthritis Mother      Alzheimer Disease Father         d. 76     Diabetes Father      Hypertension Father      Breast Cancer Maternal Aunt 60.00     Obesity Maternal Aunt      Hypertension Sister      Osteopenia Sister      Varicose Veins Brother      Obesity Brother      Breast Cancer Cousin      Obesity Maternal Uncle      Arthritis Maternal Grandmother          Current Outpatient Medications   Medication Sig Dispense Refill     acetaminophen (TYLENOL) 500 MG tablet Take 2 tablets (1,000 mg) by mouth every 6 hours for 7 days 56 tablet 0     aspirin 81 MG EC tablet [ASPIRIN 81 MG EC TABLET] Take 81 mg by mouth daily.       calcium carbonate (OS-MONIQUE) 500 mg calcium (1,250 mg) chewable tablet [CALCIUM CARBONATE (OS-MONIQUE) 500 MG CALCIUM (1,250 MG) CHEWABLE TABLET] Chew 2 tablets daily.        cholecalciferol, vitamin D3, 5,000 unit Tab [CHOLECALCIFEROL, VITAMIN D3, 5,000 UNIT TAB] Take by mouth.       dimenhyDRINATE (DRAMAMINE) 50 MG tablet Take 1 tablet (50 mg) by mouth every 6 hours as needed for other (kidney stone pain management) 28 tablet 0     DULoxetine (CYMBALTA) 30 MG capsule Take 1 capsule (30 mg) by mouth 2 times daily (Patient taking differently: Take 30 mg by mouth 2 times daily Takes 4 tablets a day. One in the morning, one in the afternoon, and two at night.) 180 capsule 1     famotidine (PEPCID) 20 MG tablet [FAMOTIDINE (PEPCID) 20 MG TABLET] Take 1 tablet (20 mg total) by mouth 2 (two) times a day. 60 tablet 5     furosemide (LASIX) 20 MG tablet Take 1-2 tablets (20-40 mg) by mouth every 48 hours as needed (elevated BP, LE swelling) 90 tablet 1     gabapentin (NEURONTIN) 300 MG capsule 1 capsule in the am, 1 capsule in the afternoon, 2 capsules at bedtime 270 capsule 3     ibuprofen  (ADVIL/MOTRIN) 200 MG tablet Take 2 tablets (400 mg) by mouth every 6 hours for 7 days 56 tablet 0     losartan (COZAAR) 25 MG tablet [LOSARTAN (COZAAR) 25 MG TABLET] Take 1 tablet (25 mg total) by mouth daily. 90 tablet 2     metoprolol succinate ER (TOPROL-XL) 50 MG 24 hr tablet TAKE 1 TABLET BY MOUTH EVERY DAY 90 tablet 2     SUMAtriptan succinate 6 mg/0.5 mL PnIj [SUMATRIPTAN SUCCINATE 6 MG/0.5 ML PNIJ] INJECT 1 INJECTION AS DIRECTED IMMEDIATELY AT ONSET OF MIGRAINE HEADACHE 2 Syringe 4     topiramate (TOPAMAX) 100 MG tablet TAKE 1/2 TABLET IN THE AM AND 1 TABLET WITH DINNER (Patient taking differently: Take 1 and 1/2 tablet prn at bedtime.) 135 tablet 1     traZODone (DESYREL) 100 MG tablet Take 1 tablet (100 mg) by mouth At Bedtime (Patient taking differently: Take 100 mg by mouth At Bedtime Takes 1.5- 2 pills as needed at bedtime) 90 tablet 1     triamcinolone (KENALOG) 0.1 % ointment [TRIAMCINOLONE (KENALOG) 0.1 % OINTMENT] APPLY TO AFFECTED AREA TWICE A DAY 30 g 2     venlafaxine (EFFEXOR-XR) 75 MG 24 hr capsule TAKE 3 CAPSULES (225 MG TOTAL) BY MOUTH DAILY. 270 capsule 1     Allergies   Allergen Reactions     Fluoxetine Cough and Rash     Pt thinks it was cough but not totally sure.     Latex Rash     Levothyroxine Rash     Lisinopril Cough and Rash     Sulfa (Sulfonamide Antibiotics) [Sulfa Drugs] Itching and Rash       ROS:   Negative except as noted above in HPI.     Objective  /70 (BP Location: Right arm, Patient Position: Sitting, Cuff Size: Adult Regular)   Pulse 103   Wt 87.2 kg (192 lb 4.8 oz)   SpO2 95%   Breastfeeding No   BMI 33.01 kg/m       General: Alert, no acute distress.   Affect: pleasant, cooperative.      Kidney stone analysis: pending     Gregory Guidry MD   Family medicine physician  [facility]

## 2021-11-18 NOTE — PATIENT INSTRUCTIONS
I'm glad to hear that you successfully passed a stone yesterday.  We will await the analysis of the composition of the stone and then consider if there would be reason to initiate medication to help prevent formation of stones in the future.      My expectation regarding the pain, is that it should gradually improve over the next couple of days.  If you note any worsening pain or if it is not improving by Monday, I would ask that you give me a call so that we could consider possible imaging again with CT.      Please continue to try to push fluids.      We will administer COVID-19 booster and influenza vaccine today.

## 2021-11-20 DIAGNOSIS — K21.9 GASTROESOPHAGEAL REFLUX DISEASE WITHOUT ESOPHAGITIS: ICD-10-CM

## 2021-11-20 LAB
APPEARANCE STONE: NORMAL
COMPN STONE: NORMAL
SPECIMEN WT: 56 MG

## 2021-11-22 ENCOUNTER — VIRTUAL VISIT (OUTPATIENT)
Dept: UROLOGY | Facility: CLINIC | Age: 76
End: 2021-11-22
Payer: COMMERCIAL

## 2021-11-22 ENCOUNTER — HOSPITAL ENCOUNTER (OUTPATIENT)
Dept: PHYSICAL THERAPY | Facility: REHABILITATION | Age: 76
End: 2021-11-22
Payer: COMMERCIAL

## 2021-11-22 DIAGNOSIS — M54.2 CHRONIC NECK PAIN: ICD-10-CM

## 2021-11-22 DIAGNOSIS — M54.41 CHRONIC BILATERAL LOW BACK PAIN WITH RIGHT-SIDED SCIATICA: Primary | ICD-10-CM

## 2021-11-22 DIAGNOSIS — G89.29 CHRONIC NECK PAIN: ICD-10-CM

## 2021-11-22 DIAGNOSIS — M62.81 MUSCLE WEAKNESS (GENERALIZED): ICD-10-CM

## 2021-11-22 DIAGNOSIS — M43.16 SPONDYLOLISTHESIS OF LUMBAR REGION: ICD-10-CM

## 2021-11-22 DIAGNOSIS — M79.7 FIBROMYALGIA: ICD-10-CM

## 2021-11-22 DIAGNOSIS — G89.29 CHRONIC BILATERAL LOW BACK PAIN WITH RIGHT-SIDED SCIATICA: Primary | ICD-10-CM

## 2021-11-22 DIAGNOSIS — N20.1 CALCULUS OF URETER: Primary | ICD-10-CM

## 2021-11-22 DIAGNOSIS — M51.369 DDD (DEGENERATIVE DISC DISEASE), LUMBAR: ICD-10-CM

## 2021-11-22 PROCEDURE — 97140 MANUAL THERAPY 1/> REGIONS: CPT | Mod: GP | Performed by: PHYSICAL THERAPIST

## 2021-11-22 PROCEDURE — 99213 OFFICE O/P EST LOW 20 MIN: CPT | Mod: 95 | Performed by: UROLOGY

## 2021-11-22 PROCEDURE — 97110 THERAPEUTIC EXERCISES: CPT | Mod: GP | Performed by: PHYSICAL THERAPIST

## 2021-11-22 ASSESSMENT — PAIN SCALES - GENERAL: PAINLEVEL: MILD PAIN (3)

## 2021-11-22 NOTE — PROGRESS NOTES
"Outpatient Physical Therapy Daily Progress Note    Patient: Katy Hong  : 1945  Start of Care: 10/27/21   Date of Visit: 2021  Visit: 3    Referring Provider: Teresa Mas CNP    Therapy Diagnosis: Chronic Neck and Low Back pain and weakness      Client Self Report:  Pt reports that the past few weeks she has been passing kidney stones and she had a lot of pain and back pain with these.  She is feeling better now.  Overall, her low back is better.  Her pain comes and goes.  Her neck pain has been the worst.  She is feeling very sore and tight.  She has been able to do most of her exercises.    Today's low back pain =3/10.  Today's neck pain =6/10      Objective Measurements:    Good overall mobility with no pain     Assessment:   Pt returns for  follow-up in skilled PT after missing last week due to kidney stones.  Her back pain is slowly getting better, but her neck pain is increased and is currently the most concerning.  She has a chronic history of low back pain and neck pain.  She has pain for many years and tried lumbar injections and recently had success with a R sided lumbar injection and now also success from her L side lumbar injection.  The pt still has decreased cervical and lumbar ROM and mobility but with decreased pain and instead stiffness noted now.  The pt has significant cervical, core, lumbar, and postural weakness.  She will continue to benefit from skilled PT to address these impairments identified in the evaluation.     Goals:  See daily doc     Plan:  Continue therapy per current plan of care.      Today's Treatment:      Exercises:   Date 11/22/21 11/8/21 10/27/21   Exercise      Nu-step  Occupied  x5 min RL:5     Seated H/S stretch  Bx90\"  B 2x30\"  Bx30\"    Sit to stand  Bx15  Raised table x10  Cues for 5 reps - 2-3 times/day    Scap retract   x7 with 5\" hold  x5 with 5\" hold    Supine LTR  Bx10 Bx10  Bx10    Supine Cervical extension isometric x10 with 5\" hold  x8 " "with 5\" hold  Supine pillow x5 with 5\"  hold    Ab set B 2x5 Marching 2x5  x5 with 5\" hold    Supine Bridge  Bx10 Bx10 - cues to increase reps (was doing 5 reps) Bx10    [place] and arrow (reach and roll )  R, L x3 - major cues  R, L x5 - added to HEP                               Sheeba Cabrera, PT   "

## 2021-11-22 NOTE — PROGRESS NOTES
Assessment/Plan:    Assessment & Plan   Katy was seen today for follow up.    Diagnoses and all orders for this visit:    Calculus of ureter        Stone Management Plan  Stone Management 11/15/2021 11/22/2021   Urinary Tract Infection No suspicion of infection No suspicion of infection   Renal Colic Well controlled symptoms Well controlled symptoms   Renal Failure Suspected Renal Failure No suspicion of renal failure   Current CT date 11/12/2021 -   Right sided stones? No -   R Stone Event No current event No current event   Left sided stones? Yes -   L Number of ureteral stones 2 -   L GSD of ureteral stones 4 -   L Location of ureteral stone Distal -   L Number of kidney stones  No renal stones -   L Hydronephrosis Mild -   L Stone Event New event Resolved event   Diagnosis date 11/12/2021 -   Initial location of primary symptomatic stone Distal -   Initial GSD of primary symptomatic stone 4 -   Resolved date - 11/22/2021   L MET Status Initiation Passed   L Current Plan MET -   MET 1 week F/U -             PLAN    Video call duration: 10 minutes  15 minutes spent on the date of the encounter doing chart review, history and exam, documentation and further activities per the note    EHNRY BELL MD  [facility]    HPI  Ms. Katy Hong is a 76 year old  female who is being evaluated via a billable video visit by [facility] [place] for medical expulsive therapy follow up.     On last encounter, her 5 mm stone was in left distal ureter with Mild hydronephrosis. She has had no unanticipated events.    Symptoms have been minimal . She is asymptomatic at present. She denies symptoms of fever, chills, flank pain, nausea, vomiting, urinary frequency and dysuria. She might have a nagging discomfort which is ordersof magnitude less than presenting renal colic.    Imaging was not performed today because she has passed stone and captured for  analysis.     Stone analysis is 90% calcium oxalate and 10% calcium phosphate.    She reports that she is taking topiramate for weight loss and that it has not been terribly effective. As topiramate has well described stone forming effects, it would be helpful if it could be stopped. She will discuss with her prescribing provider.    ROS   Review of systems is negative except for HPI.    Past Medical History:   Diagnosis Date     Abnormal mammogram, unspecified     Created by Conversion      Acute sinusitis, unspecified     Created by Conversion      Asymptomatic varicose veins     Created by Conversion      Breast cyst      Cellulitis and abscess of unspecified site     Created by Conversion      Circumscribed scleroderma     Created by Conversion      Contusion of unspecified site     Created by Conversion      Disorder of bone and cartilage, unspecified     Created by Conversion      Dysthymic disorder     Created by Conversion      Esophageal reflux     Created by Conversion      Insomnia, unspecified     Created by Conversion      Lumbago     Created by Conversion      Migraine, unspecified, without mention of intractable migraine without mention of status migrainosus     Created by Conversion      Myalgia and myositis, unspecified     Created by Conversion      Myocardial infarction (H)     per EKG's since 2015     Obesity, unspecified     Created by Conversion      Open wound of lip, without mention of complication     Created by Conversion      Other primary cardiomyopathies     Created by Conversion      Other seborrheic keratosis     Created by Conversion      Pain in joint, lower leg     Created by Conversion      Pain in limb     Created by Conversion      Sebaceous cyst     Created by Conversion      Shortness of breath     Created by Conversion      Shoulder and upper arm, insect bite, nonvenomous, without mention of infection(912.4)     Created by Conversion      Unspecified disease of sebaceous  glands     Created by Conversion      Unspecified essential hypertension     Created by Conversion      Unspecified venous (peripheral) insufficiency     Created by Conversion      Unspecified vitamin D deficiency     Created by Conversion        Past Surgical History:   Procedure Laterality Date     APPENDECTOMY       BIOPSY BREAST Left      BREAST CYST ASPIRATION Left      C TOTAL ABDOM HYSTERECTOMY       still has both ovaries     CATARACT EXTRACTION, BILATERAL       HYSTERECTOMY       OOPHORECTOMY       TEMPOROMANDIBULAR JOINT SURGERY Bilateral      TUBAL LIGATION         Current Outpatient Medications   Medication Sig Dispense Refill     aspirin 81 MG EC tablet [ASPIRIN 81 MG EC TABLET] Take 81 mg by mouth daily.       calcium carbonate (OS-MONIQUE) 500 mg calcium (1,250 mg) chewable tablet [CALCIUM CARBONATE (OS-MONIQUE) 500 MG CALCIUM (1,250 MG) CHEWABLE TABLET] Chew 2 tablets daily.        cholecalciferol, vitamin D3, 5,000 unit Tab [CHOLECALCIFEROL, VITAMIN D3, 5,000 UNIT TAB] Take by mouth.       DULoxetine (CYMBALTA) 30 MG capsule Take 1 capsule (30 mg) by mouth 2 times daily (Patient taking differently: Take 30 mg by mouth 2 times daily Takes 4 tablets a day. One in the morning, one in the afternoon, and two at night.) 180 capsule 1     famotidine (PEPCID) 20 MG tablet [FAMOTIDINE (PEPCID) 20 MG TABLET] Take 1 tablet (20 mg total) by mouth 2 (two) times a day. 60 tablet 5     furosemide (LASIX) 20 MG tablet Take 1-2 tablets (20-40 mg) by mouth every 48 hours as needed (elevated BP, LE swelling) 90 tablet 1     gabapentin (NEURONTIN) 300 MG capsule 1 capsule in the am, 1 capsule in the afternoon, 2 capsules at bedtime 270 capsule 3     losartan (COZAAR) 25 MG tablet [LOSARTAN (COZAAR) 25 MG TABLET] Take 1 tablet (25 mg total) by mouth daily. 90 tablet 2     metoprolol succinate ER (TOPROL-XL) 50 MG 24 hr tablet TAKE 1 TABLET BY MOUTH EVERY DAY 90 tablet 2     SUMAtriptan succinate 6 mg/0.5 mL PnIj [SUMATRIPTAN  SUCCINATE 6 MG/0.5 ML PNIJ] INJECT 1 INJECTION AS DIRECTED IMMEDIATELY AT ONSET OF MIGRAINE HEADACHE 2 Syringe 4     topiramate (TOPAMAX) 100 MG tablet TAKE 1/2 TABLET IN THE AM AND 1 TABLET WITH DINNER (Patient taking differently: Take 1 and 1/2 tablet prn at bedtime.) 135 tablet 1     traZODone (DESYREL) 100 MG tablet Take 1 tablet (100 mg) by mouth At Bedtime (Patient taking differently: Take 100 mg by mouth At Bedtime Takes 1.5- 2 pills as needed at bedtime) 90 tablet 1     triamcinolone (KENALOG) 0.1 % ointment [TRIAMCINOLONE (KENALOG) 0.1 % OINTMENT] APPLY TO AFFECTED AREA TWICE A DAY 30 g 2     venlafaxine (EFFEXOR-XR) 75 MG 24 hr capsule TAKE 3 CAPSULES (225 MG TOTAL) BY MOUTH DAILY. 270 capsule 1       Allergies   Allergen Reactions     Fluoxetine Cough and Rash     Pt thinks it was cough but not totally sure.     Latex Rash     Levothyroxine Rash     Lisinopril Cough and Rash     Sulfa (Sulfonamide Antibiotics) [Sulfa Drugs] Itching and Rash       Social History     Socioeconomic History     Marital status:      Spouse name: Not on file     Number of children: 2     Years of education: Not on file     Highest education level: Not on file   Occupational History     Not on file   Tobacco Use     Smoking status: Never Smoker     Smokeless tobacco: Never Used   Substance and Sexual Activity     Alcohol use: Yes     Alcohol/week: 0.0 standard drinks     Comment: Alcoholic Drinks/day: occasional     Drug use: No     Sexual activity: Not on file   Other Topics Concern     Not on file   Social History Narrative    5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       Social Determinants of Health     Financial Resource Strain: Not on file   Food Insecurity: Not on file   Transportation Needs: Not on file   Physical Activity: Not on file   Stress: Not on file   Social Connections: Not on file   Intimate Partner Violence: Not on file   Housing Stability: Not on file       Family History   Problem Relation Age of Onset      Hypertension Mother      Arthritis Mother      Alzheimer Disease Father         d. 76     Diabetes Father      Hypertension Father      Breast Cancer Maternal Aunt 60.00     Obesity Maternal Aunt      Hypertension Sister      Osteopenia Sister      Varicose Veins Brother      Obesity Brother      Breast Cancer Cousin      Obesity Maternal Uncle      Arthritis Maternal Grandmother        Objective:     Appears AAO x 3  No vitals obtained due to virtual visit    Labs   Most Recent 3 BMP's:Recent Labs   Lab Test 11/12/21  0710 09/02/21  0956 02/22/21  1500    144 141   POTASSIUM 3.2* 4.0 3.8   CHLORIDE 112* 108* 109*   CO2 23 26 27   BUN 19 17 17   CR 1.02 0.84 0.81   ANIONGAP 8 10 5   MONIQUE 9.2 9.4 9.0    93 94     Most Recent Urinalysis:Recent Labs   Lab Test 11/12/21  0614 11/19/18  1444   COLOR Yellow Yellow   APPEARANCE Turbid* Clear   URINEGLC Negative Negative   URINEBILI Negative Small*   URINEKETONE Negative Trace*   SG 1.019 >=1.030   UBLD >1.0 mg/dL* Trace*   URINEPH 6.5 5.5   PROTEIN 30 * Negative   UROBILINOGEN  --  4.0 E.U./dL*   NITRITE Negative Negative   LEUKEST 25 Lninea/uL* Negative   RBCU >182* 0-2   WBCU 19* 0-5     Acute Labs   Urine Culture    Culture   Date Value Ref Range Status   11/12/2021 50,000-100,000 CFU/mL Mixture of urogenital michaela  Final   07/19/2018 No Growth  Final   05/15/2018 Mixture of urogenital organisms  Final

## 2021-11-22 NOTE — PROGRESS NOTES
Patient is roomed via telephone for a virtual visit.  Patient confirmed she is in the [facility] at the time of this appointment.  Patient understands that this virtual visit is billable and agree to proceed with appointment.

## 2021-11-23 RX ORDER — FAMOTIDINE 20 MG/1
TABLET, FILM COATED ORAL
Qty: 180 TABLET | Refills: 3 | Status: SHIPPED | OUTPATIENT
Start: 2021-11-23 | End: 2023-01-12

## 2021-11-23 NOTE — TELEPHONE ENCOUNTER
"Last Written Prescription Date:  6/7/21  Last Fill Quantity: 60,  # refills: 5   Last office visit provider:  11/18/21     Requested Prescriptions   Pending Prescriptions Disp Refills     famotidine (PEPCID) 20 MG tablet [Pharmacy Med Name: FAMOTIDINE 20 MG TABLET] 180 tablet 1     Sig: TAKE 1 TABLET BY MOUTH TWICE A DAY       H2 Blockers Protocol Passed - 11/20/2021  1:53 PM        Passed - Patient is age 12 or older        Passed - Recent (12 mo) or future (30 days) visit within the authorizing provider's specialty     Patient has had an office visit with the authorizing provider or a provider within the authorizing providers department within the previous 12 mos or has a future within next 30 days. See \"Patient Info\" tab in inbasket, or \"Choose Columns\" in Meds & Orders section of the refill encounter.              Passed - Medication is active on med list             Hi Al RN 11/23/21 9:44 AM  "

## 2021-11-29 ENCOUNTER — HOSPITAL ENCOUNTER (OUTPATIENT)
Dept: PHYSICAL THERAPY | Facility: REHABILITATION | Age: 76
End: 2021-11-29
Payer: COMMERCIAL

## 2021-11-29 DIAGNOSIS — M54.41 CHRONIC BILATERAL LOW BACK PAIN WITH RIGHT-SIDED SCIATICA: Primary | ICD-10-CM

## 2021-11-29 DIAGNOSIS — G89.29 CHRONIC NECK PAIN: ICD-10-CM

## 2021-11-29 DIAGNOSIS — M62.81 MUSCLE WEAKNESS (GENERALIZED): ICD-10-CM

## 2021-11-29 DIAGNOSIS — G89.29 CHRONIC BILATERAL LOW BACK PAIN WITH RIGHT-SIDED SCIATICA: Primary | ICD-10-CM

## 2021-11-29 DIAGNOSIS — M54.2 CHRONIC NECK PAIN: ICD-10-CM

## 2021-11-29 PROCEDURE — 97110 THERAPEUTIC EXERCISES: CPT | Mod: GP | Performed by: PHYSICAL THERAPIST

## 2021-11-29 PROCEDURE — 97140 MANUAL THERAPY 1/> REGIONS: CPT | Mod: GP | Performed by: PHYSICAL THERAPIST

## 2021-11-29 NOTE — PROGRESS NOTES
"Outpatient Physical Therapy Daily Progress Note    Patient: Katy Hong  : 1945  Start of Care: 10/27/21   Date of Visit: 2021  Visit: 4    Referring Provider: Teresa Mas CNP    Therapy Diagnosis: Chronic Neck and Low Back pain and weakness      Client Self Report:  Pt reports that her neck is giving her the most pain.  Her low back is better than last week as she is done dealing with her kidney stones.  She feels some soreness still.  She has been doing her HEP exercises most days.      Today's low back pain =3-4/10.  Today's neck pain =6/10      Objective Measurements:    Good overall mobility with no pain     Moderate tightness and tenderness over B cervical muscles     Assessment:   Pt continues in skilled PT to address her chronic low back and neck pain.  She is feeling some better in her low back compared to last week, but her neck pain remains tight and painful and is likely affected by the cold weather.  She tolerates the exercises well and always notes improvements after MT.  She will continue to benefit from skilled PT to address these impairments identified in the evaluation.      She has a chronic history of low back pain and neck pain.  She has pain for many years and tried lumbar injections and recently had success with a R sided lumbar injection and now also success from her L side lumbar injection.  The pt still has decreased cervical and lumbar ROM and mobility but with decreased pain and instead stiffness noted now.  The pt has significant cervical, core, lumbar, and postural weakness.      Goals:  See daily doc - updated today     Plan:  Continue therapy per current plan of care.      Today's Treatment:      Exercises:   Date 11/29/21 11/22/21 11/8/21 10/27/21   Exercise       Nu-step  x4 min  Occupied  x5 min RL:5     Seated H/S stretch  Standing at step B 2x30\" Bx90\"  B 2x30\"  Bx30\"    Sit to stand  Mini squats x10 with ab sets  Bx15  Raised table x10  Cues for 5 reps " "- 2-3 times/day    Scap retract    x7 with 5\" hold  x5 with 5\" hold    Supine LTR   Bx10 Bx10  Bx10    Supine Cervical extension isometric  x10 with 5\" hold  x8 with 5\" hold  Supine pillow x5 with 5\"  hold    Ab set Standing x5 with 10\" hold  B 2x5 Marching 2x5  x5 with 5\" hold    Supine Bridge   Bx10 Bx10 - cues to increase reps (was doing 5 reps) Bx10    [employer] and arrow (reach and roll )   R, L x3 - major cues  R, L x5 - added to HEP     Standing hip ABD  Bx10 alt - added to HEP       Band rowing  [place] Bx10 - added to HEP                       Sheeba Cabrera, PT   "

## 2021-12-05 NOTE — PROGRESS NOTES
[facility] Non Surgical Follow up Visit   Date of visit: 12/5/2021  Physician: LATISHA Rossi MD, MD  Primary Care is Leonora Kerr.  Katy Hong   76 year old  female     Initial Weight: 201#  Initial BMI: 37.4  Today's Weight:   Wt Readings from Last 1 Encounters:   12/06/21 86.2 kg (190 lb)     Body mass index is 32.61 kg/m .           Assessment and Plan   Assessment: Katy is a 76 year old year old female who presents for medical weight management.      Plan:    1. Obesity (BMI 30-39.9)  Patient was congratulated on her success thus far. Healthy habits to assist with further weight loss were discussed. She will try to cut back on cereal or cut out one piece of toast at breakfast and will add an egg. We discussed the use of metformin for appetite control. Risks and possible side effects discussed. We will check a BMP in about 3 months.     2. Essential hypertension  This may improve with healthy habits and weight loss.    Follow up in 6 months with myself           INTERIM HISTORY  Patient developed kidney stones so she stopped the topamax. She has noticed increased hunger.     DIETARY HISTORY  Meals Per Day: 3  Eating Protein First?: sometimes  Food Diary: B:cereal and toast and coffee L:fruit or sandwich and juice D:meat and side (vegetable)  Snacks Per Day: sometimes  Typical Snack: fruit  Fluid Intake: not enough, incontinence issues  Portion Control: yes  Calorie Containing Beverages: rare pop  Typical Protein Food Choices: meat, deli meat in sandwich  Choosing Whole Grains: yes  Meals at Restaurant per week:0-1    Positive Changes Since Last Visit: rare snacking  Struggling With: water intake, protein with breakfast    Knowledgeable in Reading Food Labels: not sure  Getting Adequate Protein: no  Sleeping 7-8 hours/day yes  Stress management not discussed    PHYSICAL ACTIVITY PATTERNS:  PT exercises, ADLs, stairs in her home    REVIEW OF SYSTEMS  GENERAL/CONSTITUTIONAL:  Fatigue:  yes  HEENT:  Vision changes, glaucoma: no  CARDIOVASCULAR:  Chest Pain with Exertion: no  PULMONARY:  Dyspnea on exertion: yes  NEUROLOGIC:  Paresthesias: no  PSYCHIATRIC:  Moods: stable  MUSCULOSKELETAL/RHEUMATOLOGIC  Arthralgias: yes  Myalgias: yes  ENDOCRINE:  Monitoring Blood Sugars: na  Sugars Well Controlled: na  No personal or family history of medullary thyroid cancer not discussed  :  Birth control: menopause       Patient Profile   Social History     Social History Narrative    5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          Past Medical History   Past Medical History:   Diagnosis Date     Abnormal mammogram, unspecified     Created by Conversion      Acute sinusitis, unspecified     Created by Conversion      Asymptomatic varicose veins     Created by Conversion      Breast cyst      Cellulitis and abscess of unspecified site     Created by Conversion      Circumscribed scleroderma     Created by Conversion      Contusion of unspecified site     Created by Conversion      Disorder of bone and cartilage, unspecified     Created by Conversion      Dysthymic disorder     Created by Conversion      Esophageal reflux     Created by Conversion      Insomnia, unspecified     Created by Conversion      Lumbago     Created by Conversion      Migraine, unspecified, without mention of intractable migraine without mention of status migrainosus     Created by Conversion      Myalgia and myositis, unspecified     Created by Conversion      Myocardial infarction (H)     per EKG's since      Obesity, unspecified     Created by Conversion      Open wound of lip, without mention of complication     Created by Conversion      Other primary cardiomyopathies     Created by Conversion      Other seborrheic keratosis     Created by Conversion      Pain in joint, lower leg     Created by Conversion      Pain in limb     Created by Conversion      Sebaceous cyst     Created by Conversion      Shortness of breath     Created by  "Conversion      Shoulder and upper arm, insect bite, nonvenomous, without mention of infection(912.4)     Created by Conversion      Unspecified disease of sebaceous glands     Created by Conversion      Unspecified essential hypertension     Created by Conversion      Unspecified venous (peripheral) insufficiency     Created by Conversion      Unspecified vitamin D deficiency     Created by Conversion      Patient Active Problem List   Diagnosis     Osteopenia     Insomnia     Vitamin D Deficiency     Lower Back Pain     Fibromyalgia     Essential Hypertension     Seborrheic Keratosis     Obesity     Lichen Sclerosus Et Atrophicus     Depression With Anxiety     Migraine Headache     Esophageal Reflux     Joint Pain, Localized In The Knee     Varicose Veins     Nonvenomous Insect Bite Of Shoulder     Cellulitis     Mammogram - Abnormal     Chronic Cutaneous Ulcer Venous Stasis     Chronic venous insufficiency     Foot pain (Soft Tissue)     Female stress incontinence     Calculus of ureter     Hydronephrosis with urinary obstruction due to ureteral calculus     Other cardiomyopathy (H)     Obesity (BMI 30-39.9)     Chronic fatigue     Moderate episode of recurrent major depressive disorder (H)     Anxiety     Morbid obesity (H)     Cellulitis     Nonvenomous insect bite of shoulder       Past Surgical History  She has a past surgical history that includes TOTAL ABDOM HYSTERECTOMY; tubal ligation; appendectomy; Temporomandibular Joint Surgery (Bilateral); Cataract Extraction, Bilateral; Hysterectomy; Oophorectomy; Biopsy breast (Left); and Breast Cyst Aspiration (Left).     Examination   Ht 1.626 m (5' 4\")   Wt 86.2 kg (190 lb)   BMI 32.61 kg/m    General:  Alert, NAD  Pscyh/Mood: stable         Counseling:   We reviewed the important post op bariatric recommendations:  -eating 3 meals daily  -eating protein first, getting >60gm protein daily  -eating slowly, chewing food well  -avoiding/limiting calorie " containing beverages  -limiting starchy vegetables and carbohydrates, choosing wheat, not white with breads,   crackers, pastas, bear, bagels, tortillas, rice  -limiting restaurant or cafeteria eating to twice a week or less    We discussed the importance of restorative sleep and stress management in maintaining a healthy weight.  We discussed the National Weight Control Registry healthy weight maintenance strategies and ways to optimize metabolism.  We discussed the importance of physical activity including cardiovascular and strength training in maintaining a healthier weight.    Total time spent on the date of this encounter doing: chart review, review of test results, patient visit, physical exam, education, counseling, developing plan of care and documenting = 21 minutes.         LATISHA Rossi MD  [facility]

## 2021-12-06 ENCOUNTER — VIRTUAL VISIT (OUTPATIENT)
Dept: SURGERY | Facility: CLINIC | Age: 76
End: 2021-12-06
Payer: COMMERCIAL

## 2021-12-06 VITALS — HEIGHT: 64 IN | WEIGHT: 190 LBS | BODY MASS INDEX: 32.44 KG/M2

## 2021-12-06 DIAGNOSIS — M79.7 FIBROMYALGIA: ICD-10-CM

## 2021-12-06 DIAGNOSIS — I10 ESSENTIAL HYPERTENSION: ICD-10-CM

## 2021-12-06 DIAGNOSIS — E66.9 OBESITY (BMI 30-39.9): Primary | ICD-10-CM

## 2021-12-06 DIAGNOSIS — G47.01 INSOMNIA DUE TO MEDICAL CONDITION: ICD-10-CM

## 2021-12-06 PROCEDURE — 99443 PR PHYSICIAN TELEPHONE EVALUATION 21-30 MIN: CPT | Performed by: FAMILY MEDICINE

## 2021-12-06 RX ORDER — TRAZODONE HYDROCHLORIDE 100 MG/1
150 TABLET ORAL AT BEDTIME
Qty: 135 TABLET | Refills: 1 | Status: SHIPPED | OUTPATIENT
Start: 2021-12-06 | End: 2022-03-08 | Stop reason: DRUGHIGH

## 2021-12-06 RX ORDER — DULOXETIN HYDROCHLORIDE 30 MG/1
30 CAPSULE, DELAYED RELEASE ORAL 2 TIMES DAILY
Qty: 180 CAPSULE | Refills: 1 | Status: SHIPPED | OUTPATIENT
Start: 2021-12-06 | End: 2022-10-07

## 2021-12-06 RX ORDER — METFORMIN HCL 500 MG
TABLET, EXTENDED RELEASE 24 HR ORAL
Qty: 90 TABLET | Refills: 1 | Status: SHIPPED | OUTPATIENT
Start: 2021-12-06 | End: 2022-01-17

## 2021-12-06 ASSESSMENT — MIFFLIN-ST. JEOR: SCORE: 1341.83

## 2021-12-06 NOTE — LETTER
12/6/2021         RE: Katy Hong  [address] [facility] [address]        Dear Colleague,    Thank you for referring your patient, Katy Hong, to the [facility] AND [facility] [place]. Please see a copy of my visit note below.    Bariatric Care Clinic Non Surgical Follow up Visit   Date of visit: 12/5/2021  Physician: LATISHA Rossi MD, MD  Primary Care is Leonora Kerr.  Katy Hong   76 year old  female     Initial Weight: 201#  Initial BMI: 37.4  Today's Weight:   Wt Readings from Last 1 Encounters:   12/06/21 86.2 kg (190 lb)     Body mass index is 32.61 kg/m .           Assessment and Plan   Assessment: Katy is a 76 year old year old female who presents for medical weight management.      Plan:    1. Obesity (BMI 30-39.9)  Patient was congratulated on her success thus far. Healthy habits to assist with further weight loss were discussed. She will try to cut back on cereal or cut out one piece of toast at breakfast and will add an egg. We discussed the use of metformin for appetite control. Risks and possible side effects discussed. We will check a BMP in about 3 months.     2. Essential hypertension  This may improve with healthy habits and weight loss.    Follow up in 6 months with myself           INTERIM HISTORY  Patient developed kidney stones so she stopped the topamax. She has noticed increased hunger.     DIETARY HISTORY  Meals Per Day: 3  Eating Protein First?: sometimes  Food Diary: B:cereal and toast and coffee L:fruit or sandwich and juice D:meat and side (vegetable)  Snacks Per Day: sometimes  Typical Snack: fruit  Fluid Intake: not enough, incontinence issues  Portion Control: yes  Calorie Containing Beverages: rare pop  Typical Protein Food Choices: meat, deli meat in sandwich  Choosing Whole Grains: yes  Meals at Restaurant per week:0-1    Positive Changes Since Last Visit: rare snacking  Struggling With: water intake, protein  with breakfast    Knowledgeable in Reading Food Labels: not sure  Getting Adequate Protein: no  Sleeping 7-8 hours/day yes  Stress management not discussed    PHYSICAL ACTIVITY PATTERNS:  PT exercises, ADLs, stairs in her home    REVIEW OF SYSTEMS  GENERAL/CONSTITUTIONAL:  Fatigue: yes  HEENT:  Vision changes, glaucoma: no  CARDIOVASCULAR:  Chest Pain with Exertion: no  PULMONARY:  Dyspnea on exertion: yes  NEUROLOGIC:  Paresthesias: no  PSYCHIATRIC:  Moods: stable  MUSCULOSKELETAL/RHEUMATOLOGIC  Arthralgias: yes  Myalgias: yes  ENDOCRINE:  Monitoring Blood Sugars: na  Sugars Well Controlled: na  No personal or family history of medullary thyroid cancer not discussed  :  Birth control: menopause       Patient Profile   Social History     Social History Narrative    5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          Past Medical History   Past Medical History:   Diagnosis Date     Abnormal mammogram, unspecified     Created by Conversion      Acute sinusitis, unspecified     Created by Conversion      Asymptomatic varicose veins     Created by Conversion      Breast cyst      Cellulitis and abscess of unspecified site     Created by Conversion      Circumscribed scleroderma     Created by Conversion      Contusion of unspecified site     Created by Conversion      Disorder of bone and cartilage, unspecified     Created by Conversion      Dysthymic disorder     Created by Conversion      Esophageal reflux     Created by Conversion      Insomnia, unspecified     Created by Conversion      Lumbago     Created by Conversion      Migraine, unspecified, without mention of intractable migraine without mention of status migrainosus     Created by Conversion      Myalgia and myositis, unspecified     Created by Conversion      Myocardial infarction (H)     per EKG's since      Obesity, unspecified     Created by Conversion      Open wound of lip, without mention of complication     Created by Conversion      Other primary  "cardiomyopathies     Created by Conversion      Other seborrheic keratosis     Created by Conversion      Pain in joint, lower leg     Created by Conversion      Pain in limb     Created by Conversion      Sebaceous cyst     Created by Conversion      Shortness of breath     Created by Conversion      Shoulder and upper arm, insect bite, nonvenomous, without mention of infection(912.4)     Created by Conversion      Unspecified disease of sebaceous glands     Created by Conversion      Unspecified essential hypertension     Created by Conversion      Unspecified venous (peripheral) insufficiency     Created by Conversion      Unspecified vitamin D deficiency     Created by Conversion      Patient Active Problem List   Diagnosis     Osteopenia     Insomnia     Vitamin D Deficiency     Lower Back Pain     Fibromyalgia     Essential Hypertension     Seborrheic Keratosis     Obesity     Lichen Sclerosus Et Atrophicus     Depression With Anxiety     Migraine Headache     Esophageal Reflux     Joint Pain, Localized In The Knee     Varicose Veins     Nonvenomous Insect Bite Of Shoulder     Cellulitis     Mammogram - Abnormal     Chronic Cutaneous Ulcer Venous Stasis     Chronic venous insufficiency     Foot pain (Soft Tissue)     Female stress incontinence     Calculus of ureter     Hydronephrosis with urinary obstruction due to ureteral calculus     Other cardiomyopathy (H)     Obesity (BMI 30-39.9)     Chronic fatigue     Moderate episode of recurrent major depressive disorder (H)     Anxiety     Morbid obesity (H)     Cellulitis     Nonvenomous insect bite of shoulder       Past Surgical History  She has a past surgical history that includes TOTAL ABDOM HYSTERECTOMY; tubal ligation; appendectomy; Temporomandibular Joint Surgery (Bilateral); Cataract Extraction, Bilateral; Hysterectomy; Oophorectomy; Biopsy breast (Left); and Breast Cyst Aspiration (Left).     Examination   Ht 1.626 m (5' 4\")   Wt 86.2 kg (190 lb)   " "BMI 32.61 kg/m    General:  Alert, NAD  Pscyh/Mood: stable         Counseling:   We reviewed the important post op bariatric recommendations:  -eating 3 meals daily  -eating protein first, getting >60gm protein daily  -eating slowly, chewing food well  -avoiding/limiting calorie containing beverages  -limiting starchy vegetables and carbohydrates, choosing wheat, not white with breads,   crackers, pastas, bear, bagels, tortillas, rice  -limiting restaurant or cafeteria eating to twice a week or less    We discussed the importance of restorative sleep and stress management in maintaining a healthy weight.  We discussed the National Weight Control Registry healthy weight maintenance strategies and ways to optimize metabolism.  We discussed the importance of physical activity including cardiovascular and strength training in maintaining a healthier weight.    Total time spent on the date of this encounter doing: chart review, review of test results, patient visit, physical exam, education, counseling, developing plan of care and documenting = 21 minutes.         LATISHA Rossi MD  [facility]               The patient has been notified of following:     \"This telephone visit will be conducted via a call between you and your physician/provider. We have found that certain health care needs can be provided without the need for a physical exam.  This service lets us provide the care you need with a short phone conversation.  If a prescription is necessary we can send it directly to your pharmacy.  If lab work is needed we can place an order for that and you can then stop by our lab to have the test done at a later time.    Telephone visits are billed at different rates depending on your insurance coverage. During this emergency period, for some insurers they may be billed the same as an in-person visit.  Please reach out to your insurance provider with any questions.    If during the course of the " "call the physician/provider feels a telephone visit is not appropriate, you will not be charged for this service.\"    Patient has given verbal consent to a Telephone visit? Yes    What phone number would you like to be contacted at? [phone]    Patient would like to receive their AVS by [employer]      Phone call duration: 14 minutes      Again, thank you for allowing me to participate in the care of your patient.        Sincerely,        LATISHA Rossi MD    "

## 2021-12-06 NOTE — PROGRESS NOTES
"  The patient has been notified of following:     \"This telephone visit will be conducted via a call between you and your physician/provider. We have found that certain health care needs can be provided without the need for a physical exam.  This service lets us provide the care you need with a short phone conversation.  If a prescription is necessary we can send it directly to your pharmacy.  If lab work is needed we can place an order for that and you can then stop by our lab to have the test done at a later time.    Telephone visits are billed at different rates depending on your insurance coverage. During this emergency period, for some insurers they may be billed the same as an in-person visit.  Please reach out to your insurance provider with any questions.    If during the course of the call the physician/provider feels a telephone visit is not appropriate, you will not be charged for this service.\"    Patient has given verbal consent to a Telephone visit? Yes    What phone number would you like to be contacted at? [phone]    Patient would like to receive their AVS by [employer]      Phone call duration: 14 minutes  "

## 2021-12-09 ENCOUNTER — OFFICE VISIT (OUTPATIENT)
Dept: RHEUMATOLOGY | Facility: CLINIC | Age: 76
End: 2021-12-09
Payer: COMMERCIAL

## 2021-12-09 ENCOUNTER — HOSPITAL ENCOUNTER (OUTPATIENT)
Dept: PHYSICAL THERAPY | Facility: REHABILITATION | Age: 76
End: 2021-12-09
Payer: COMMERCIAL

## 2021-12-09 VITALS
HEART RATE: 100 BPM | WEIGHT: 190 LBS | SYSTOLIC BLOOD PRESSURE: 138 MMHG | BODY MASS INDEX: 32.61 KG/M2 | DIASTOLIC BLOOD PRESSURE: 74 MMHG

## 2021-12-09 DIAGNOSIS — M15.0 PRIMARY OSTEOARTHRITIS INVOLVING MULTIPLE JOINTS: ICD-10-CM

## 2021-12-09 DIAGNOSIS — M17.0 PRIMARY OSTEOARTHRITIS OF BOTH KNEES: Primary | ICD-10-CM

## 2021-12-09 DIAGNOSIS — M62.81 MUSCLE WEAKNESS (GENERALIZED): ICD-10-CM

## 2021-12-09 DIAGNOSIS — M17.9 OSTEOARTHRITIS OF KNEE, UNSPECIFIED: ICD-10-CM

## 2021-12-09 DIAGNOSIS — G89.29 CHRONIC BILATERAL LOW BACK PAIN WITH RIGHT-SIDED SCIATICA: Primary | ICD-10-CM

## 2021-12-09 DIAGNOSIS — M54.41 CHRONIC BILATERAL LOW BACK PAIN WITH RIGHT-SIDED SCIATICA: Primary | ICD-10-CM

## 2021-12-09 DIAGNOSIS — G89.29 CHRONIC NECK PAIN: ICD-10-CM

## 2021-12-09 DIAGNOSIS — M54.2 CHRONIC NECK PAIN: ICD-10-CM

## 2021-12-09 PROCEDURE — 97110 THERAPEUTIC EXERCISES: CPT | Mod: GP | Performed by: PHYSICAL THERAPIST

## 2021-12-09 PROCEDURE — 97140 MANUAL THERAPY 1/> REGIONS: CPT | Mod: GP | Performed by: PHYSICAL THERAPIST

## 2021-12-09 PROCEDURE — 99214 OFFICE O/P EST MOD 30 MIN: CPT | Mod: 25 | Performed by: INTERNAL MEDICINE

## 2021-12-09 PROCEDURE — 20610 DRAIN/INJ JOINT/BURSA W/O US: CPT | Mod: 50 | Performed by: INTERNAL MEDICINE

## 2021-12-09 NOTE — PROGRESS NOTES
Rheumatology follow-up visit note     Katy is a 76 year old female presents today for follow-up.    Katy was seen today for recheck.    Diagnoses and all orders for this visit:    Primary osteoarthritis of both knees  -     hylan (SYNVISC ONE) injection 48 mg    Primary osteoarthritis involving multiple joints  -     Discontinue: hylan (SYNVISC ONE) injection 48 mg  -     Discontinue: hylan (SYNVISC ONE) injection 48 mg  -     hylan (SYNVISC ONE) injection 48 mg  -     hylan (SYNVISC ONE) injection 48 mg    Osteoarthritis of knee, unspecified  -     Discontinue: hylan (SYNVISC ONE) injection 48 mg  -     Discontinue: hylan (SYNVISC ONE) injection 48 mg  -     hylan (SYNVISC ONE) injection 48 mg  -     hylan (SYNVISC ONE) injection 48 mg        This patient is here for swelling and pain in her knees, she is limited response to corticosteroid injections and wants to avoid surgical intervention, we had discussed the option of viscosupplementation she would like to try, after insurance coverage was approved 48 mg of Synvisc was injected into each knee anterolateral approach.  She tolerated the procedure well.  Follow-up in 4 months or sooner.    Follow up 4 months.    HPI    Katy Hong is a 76 year old female is here for follow-up of   moderately severe flare up of pain. Here for a moderately severe flare up of pain.  Joints affected include multiple joints and both knee(s),  symmetrically painful unsure if there is swelling no history of trauma, This has gone on for several weeks. Pain is described as sharp. It is worse with activity at times bedtime..  Her symptoms are moderately severe. The symptoms are progressive.  Associated findings include /do not include: swelling, rash.  There is no associated recent fall or trauma.  Recent work-up for polyarthralgias reviewed.  Over-the-counter treatment to date has been without significant relief.    Further historical information, including ROS and  limitation in activities as noted in the multidimensional health assessment questionnaire scanned in the EMR and in the assessment and plan section.    DETAILED EXAMINATION  12/09/21  :    Vitals:    12/09/21 1327   BP: 138/74   Pulse: 100   Weight: 86.2 kg (190 lb)     Alert oriented. Head including the face is examined for malar rash, heliotropes, scarring, lupus pernio. Eyes examined for redness such as in episcleritis/scleritis, periorbital lesions.   Neck/ Face examined for parotid gland swelling, range of motion of neck.  Left upper and lower and right upper and lower extremities examined for tenderness, swelling, warmth of the appendicular joints, range of motion, edema, rash.  Some of the important findings included: she does not have evidence of synovitis in the palpable joints of the upper extremities.  No significant deformities of the digits.  + [place] nodes.  Range of motion of the shoulders  show full abduction.   JLT but no effusion or warmth of the knees.  she does not have dactylitis of the digits.     Patient Active Problem List    Diagnosis Date Noted     Cellulitis 11/12/2021     Priority: Medium     Formatting of this note might be different from the original.  Created by Conversion       Nonvenomous insect bite of shoulder 11/12/2021     Priority: Medium     Formatting of this note might be different from the original.  Created by Conversion       Morbid obesity (H) 09/02/2021     Priority: Medium     Migraine Headache      Priority: Medium     Created by Conversion  Replacement Utility updated for latest IMO load         Moderate episode of recurrent major depressive disorder (H) 03/15/2021     Priority: Medium     Anxiety 03/15/2021     Priority: Medium     Chronic fatigue 02/22/2021     Priority: Medium     Essential Hypertension      Priority: Medium     Created by Conversion  Replacement Utility updated for latest IMO load         Obesity (BMI 30-39.9) 06/16/2020     Priority: Medium      Other cardiomyopathy (H) 01/04/2019     Priority: Medium     Calculus of ureter 05/21/2018     Priority: Medium     Hydronephrosis with urinary obstruction due to ureteral calculus 05/21/2018     Priority: Medium     Fibromyalgia      Priority: Medium     Created by Conversion         Osteopenia      Priority: Medium     Created by Conversion  Replacement Utility updated for latest IMO load         Vitamin D Deficiency      Priority: Medium     Created by Conversion  Replacement Utility updated for latest IMO load         Mammogram - Abnormal      Priority: Medium     Created by Conversion  Replacement Utility updated for latest IMO load         Chronic Cutaneous Ulcer Venous Stasis      Priority: Medium     Created by Conversion  Replacement Utility updated for latest IMO load         Female stress incontinence 02/28/2015     Priority: Medium     Foot pain (Soft Tissue) 02/26/2015     Priority: Medium     Chronic venous insufficiency 12/14/2014     Priority: Medium     Lower Back Pain      Priority: Medium     Created by Conversion         Depression With Anxiety      Priority: Medium     Created by Conversion         Varicose Veins      Priority: Medium     Created by Conversion         Nonvenomous Insect Bite Of Shoulder      Priority: Medium     Created by Conversion         Cellulitis      Priority: Medium     Created by Conversion         Insomnia      Priority: Medium     Created by Conversion         Seborrheic Keratosis      Priority: Medium     Created by Conversion         Obesity      Priority: Medium     Created by Conversion         Lichen Sclerosus Et Atrophicus      Priority: Medium     Created by Conversion         Esophageal Reflux      Priority: Medium     Created by Conversion         Joint Pain, Localized In The Knee      Priority: Medium     Created by Conversion         Past Surgical History:   Procedure Laterality Date     APPENDECTOMY       BIOPSY BREAST Left      BREAST CYST ASPIRATION Left       C TOTAL ABDOM HYSTERECTOMY       still has both ovaries     CATARACT EXTRACTION, BILATERAL       HYSTERECTOMY       OOPHORECTOMY       TEMPOROMANDIBULAR JOINT SURGERY Bilateral      TUBAL LIGATION        Past Medical History:   Diagnosis Date     Abnormal mammogram, unspecified     Created by Conversion      Acute sinusitis, unspecified     Created by Conversion      Asymptomatic varicose veins     Created by Conversion      Breast cyst      Cellulitis and abscess of unspecified site     Created by Conversion      Circumscribed scleroderma     Created by Conversion      Contusion of unspecified site     Created by Conversion      Disorder of bone and cartilage, unspecified     Created by Conversion      Dysthymic disorder     Created by Conversion      Esophageal reflux     Created by Conversion      Insomnia, unspecified     Created by Conversion      Lumbago     Created by Conversion      Migraine, unspecified, without mention of intractable migraine without mention of status migrainosus     Created by Conversion      Myalgia and myositis, unspecified     Created by Conversion      Myocardial infarction (H)     per EKG's since 2015     Obesity, unspecified     Created by Conversion      Open wound of lip, without mention of complication     Created by Conversion      Other primary cardiomyopathies     Created by Conversion      Other seborrheic keratosis     Created by Conversion      Pain in joint, lower leg     Created by Conversion      Pain in limb     Created by Conversion      Sebaceous cyst     Created by Conversion      Shortness of breath     Created by Conversion      Shoulder and upper arm, insect bite, nonvenomous, without mention of infection(912.4)     Created by Conversion      Unspecified disease of sebaceous glands     Created by Conversion      Unspecified essential hypertension     Created by Conversion      Unspecified venous (peripheral) insufficiency     Created by Conversion       Unspecified vitamin D deficiency     Created by Conversion      Allergies   Allergen Reactions     Fluoxetine Cough and Rash     Pt thinks it was cough but not totally sure.     Latex Rash     Levothyroxine Rash     Lisinopril Cough and Rash     Sulfa (Sulfonamide Antibiotics) [Sulfa Drugs] Itching and Rash     Current Outpatient Medications   Medication Sig Dispense Refill     aspirin 81 MG EC tablet [ASPIRIN 81 MG EC TABLET] Take 81 mg by mouth daily.       calcium carbonate (OS-MONIQUE) 500 mg calcium (1,250 mg) chewable tablet [CALCIUM CARBONATE (OS-MONIQUE) 500 MG CALCIUM (1,250 MG) CHEWABLE TABLET] Chew 2 tablets daily.        cholecalciferol, vitamin D3, 5,000 unit Tab [CHOLECALCIFEROL, VITAMIN D3, 5,000 UNIT TAB] Take by mouth.       DULoxetine (CYMBALTA) 30 MG capsule Take 1 capsule (30 mg) by mouth 2 times daily 180 capsule 1     famotidine (PEPCID) 20 MG tablet TAKE 1 TABLET BY MOUTH TWICE A  tablet 3     furosemide (LASIX) 20 MG tablet Take 1-2 tablets (20-40 mg) by mouth every 48 hours as needed (elevated BP, LE swelling) 90 tablet 1     gabapentin (NEURONTIN) 300 MG capsule 1 capsule in the am, 1 capsule in the afternoon, 2 capsules at bedtime 270 capsule 3     losartan (COZAAR) 25 MG tablet [LOSARTAN (COZAAR) 25 MG TABLET] Take 1 tablet (25 mg total) by mouth daily. 90 tablet 2     metFORMIN (GLUCOPHAGE-XR) 500 MG 24 hr tablet 1 tablet with dinner daily for 2 weeks then 2 tablets with dinner 90 tablet 1     metoprolol succinate ER (TOPROL-XL) 50 MG 24 hr tablet TAKE 1 TABLET BY MOUTH EVERY DAY 90 tablet 2     SUMAtriptan (IMITREX STATDOSE) 6 MG/0.5ML pen injector kit INJECT 1 INJECTION AS DIRECTED IMMEDIATELY AT ONSET OF MIGRAINE HEADACHE 6 mL 4     traZODone (DESYREL) 100 MG tablet Take 1.5 tablets (150 mg) by mouth At Bedtime Takes 1.5- 2 pills as needed at bedtime 135 tablet 1     triamcinolone (KENALOG) 0.1 % ointment [TRIAMCINOLONE (KENALOG) 0.1 % OINTMENT] APPLY TO AFFECTED AREA TWICE A DAY 30 g  2     venlafaxine (EFFEXOR-XR) 75 MG 24 hr capsule TAKE 3 CAPSULES (225 MG TOTAL) BY MOUTH DAILY. 270 capsule 1     family history includes Alzheimer Disease in her father; Arthritis in her maternal grandmother and mother; Breast Cancer in her cousin; Breast Cancer (age of onset: 60.00) in her maternal aunt; Diabetes in her father; Hypertension in her father, mother, and sister; Obesity in her brother, maternal aunt, and maternal uncle; Osteopenia in her sister; Varicose Veins in her brother.  Social Connections: Not on file          WBC Count   Date Value Ref Range Status   11/12/2021 7.2 4.0 - 11.0 10e3/uL Final     RBC Count   Date Value Ref Range Status   11/12/2021 4.10 3.80 - 5.20 10e6/uL Final     Hemoglobin   Date Value Ref Range Status   11/12/2021 12.2 11.7 - 15.7 g/dL Final     Hematocrit   Date Value Ref Range Status   11/12/2021 39.3 35.0 - 47.0 % Final     MCV   Date Value Ref Range Status   11/12/2021 96 78 - 100 fL Final     MCH   Date Value Ref Range Status   11/12/2021 29.8 26.5 - 33.0 pg Final     Platelet Count   Date Value Ref Range Status   11/12/2021 183 150 - 450 10e3/uL Final     % Lymphocytes   Date Value Ref Range Status   11/12/2021 14 % Final     AST   Date Value Ref Range Status   11/12/2021 13 0 - 40 U/L Final     ALT   Date Value Ref Range Status   11/12/2021 15 0 - 45 U/L Final     Albumin   Date Value Ref Range Status   11/12/2021 3.5 3.5 - 5.0 g/dL Final     Alkaline Phosphatase   Date Value Ref Range Status   11/12/2021 80 45 - 120 U/L Final     Creatinine   Date Value Ref Range Status   11/12/2021 1.02 0.60 - 1.10 mg/dL Final     GFR Estimate   Date Value Ref Range Status   11/12/2021 54 (L) >60 mL/min/1.73m2 Final     Comment:     As of July 11, 2021, eGFR is calculated by the CKD-EPI creatinine equation, without race adjustment. eGFR can be influenced by muscle mass, exercise, and diet. The reported eGFR is an estimation only and is only applicable if the renal function is  stable.   02/22/2021 >60 >60 mL/min/1.73m2 Final     GFR Estimate If Black   Date Value Ref Range Status   02/22/2021 >60 >60 mL/min/1.73m2 Final     Erythrocyte Sedimentation Rate   Date Value Ref Range Status   06/03/2021 5 0 - 20 mm/hr Final     CRP   Date Value Ref Range Status   06/03/2021 0.2 0.0 - 0.8 mg/dL Final

## 2021-12-09 NOTE — PROGRESS NOTES
Outpatient Physical Therapy Discharge Note     Patient: Katy Hong  : 1945    Beginning/End Dates of Reporting Period:  10/27/21 to 21    Referring Provider: Teresa Mas CNP    Therapy Diagnosis: Chronic neck and low back pain and weakness      Client Self Report: Pt reports that her low back is a little more sore today.  Her neck has pain, but it is better than last time.  She has been consistent with her HEP.  She feels like they help.      Objective Measurements:  See note below for most recent objective information.     Goals:  Goal Identifier 1   Goal Description Pt will demonstarte readiness for independent sx management and HEP   Target Date 21   Date Met      Progress (detail required for progress note): Progressing toward      Goal Identifier 2   Goal Description Pt will be able to get in/out of the car or a chair with increased ease and pain <4/10   Target Date 21   Date Met      Progress (detail required for progress note): Progressing toward      Goal Identifier 3   Goal Description Pt will be able to stand for up to 10 minutes to perfom ADL's, dressing and self care with increased ease and pain <4/10   Target Date 21   Date Met      Progress (detail required for progress note): Progressing toward      Goal Identifier 4   Goal Description Pt will be able to walk for community mobility with increased ease and stamina and pain <4/10   Target Date 21   Date Met      Progress (detail required for progress note): Progressing toward        Goal Identifier     Goal Description     Target Date     Date Met      Progress (detail required for progress note):         Plan:  Discharge from therapy.    Reason for Discharge: Patient has not made expected progress due to interrupted treatment attendance.  Patient has failed to schedule further appointments.    Discharge Plan: Patient to continue home program.    Sheeba Cabrera, PT         Outpatient Physical Therapy  "Daily Progress Note    Patient: Katy Hong  : 1945  Start of Care: 10/27/21   Date of Visit: 2021  Visit: 5    Referring Provider: Teresa Mas CNP    Therapy Diagnosis: Chronic Neck and Low Back pain and weakness      Client Self Report:  Pt reports that her low back is a little more sore today.  Her neck has pain, but it is better than last time.  She has been consistent with her HEP.  She feels like they help.      Today's low back pain =5/10.  Today's neck pain =4/10      Objective Measurements:    Good overall mobility with no pain     Moderate tightness and tenderness over B cervical muscles     Assessment:   Pt continues in skilled PT to address her chronic low back and neck pain.  Overall, she is feeling better with less low back and neck pain. .  She tolerates the exercises well and always notes improvements after MT.  She will continue to benefit from skilled PT to address these impairments identified in the evaluation.      She has a chronic history of low back pain and neck pain.  She has pain for many years and tried lumbar injections and recently had success with a R sided lumbar injection and now also success from her L side lumbar injection.  The pt still has decreased cervical and lumbar ROM and mobility but with decreased pain and instead stiffness noted now.  The pt has significant cervical, core, lumbar, and postural weakness.      Goals:  See daily doc - updated today     Plan:  Continue therapy per current plan of care.      Today's Treatment:      Exercises:   Date 12/9/21 11/29/21 11/22/21 11/8/21 10/27/21   Exercise        Nu-step  x4 min  x4 min  Occupied  x5 min RL:5     Seated H/S stretch   Standing at step B 2x30\" Bx90\"  B 2x30\"  Bx30\"    Sit to stand   Mini squats x10 with ab sets  Bx15  Raised table x10  Cues for 5 reps - 2-3 times/day    Scap retract     x7 with 5\" hold  x5 with 5\" hold    Supine LTR  Bx10   Bx10 Bx10  Bx10    Supine Cervical extension " "isometric   x10 with 5\" hold  x8 with 5\" hold  Supine pillow x5 with 5\"  hold    Ab set Marching B x5 Standing x5 with 10\" hold  B 2x5 Marching 2x5  x5 with 5\" hold    Supine Bridge  Bx15  Bx10 Bx10 - cues to increase reps (was doing 5 reps) Bx10    [place] and arrow (reach and roll )    R, L x3 - major cues  R, L x5 - added to HEP     Standing hip ABD  Bx12 Bx10 alt - added to HEP       Band rowing  Orange Bx12 -major cues  Orange Bx10 - added to HEP                         Sheeba Cabrera, PT   "

## 2022-01-05 DIAGNOSIS — N95.2 ATROPHIC VAGINITIS: ICD-10-CM

## 2022-01-07 RX ORDER — TRIAMCINOLONE ACETONIDE 1 MG/G
OINTMENT TOPICAL
Qty: 30 G | Refills: 2 | Status: SHIPPED | OUTPATIENT
Start: 2022-01-07 | End: 2023-01-12

## 2022-01-07 NOTE — TELEPHONE ENCOUNTER
"Routing refill request to provider for review/approval because:  A break in medication      Last Written Prescription Date:  12/28/2020  Last Fill Quantity: 30g,  # refills: 2   Last office visit provider:  11/18/21     Requested Prescriptions   Pending Prescriptions Disp Refills     triamcinolone (KENALOG) 0.1 % external ointment [Pharmacy Med Name: TRIAMCINOLONE 0.1% OINTMENT] 30 g 2     Sig: APPLY TO AFFECTED AREA TWICE A DAY       Topical Steroids and Nonsteroidals Protocol Passed - 1/5/2022 12:03 PM        Passed - Patient is age 6 or older        Passed - Authorizing prescriber's most recent note related to this medication read.     If refill request is for ophthalmic use, please forward request to provider for approval.          Passed - High potency steroid not ordered        Passed - Recent (12 mo) or future (30 days) visit within the authorizing provider's specialty     Patient has had an office visit with the authorizing provider or a provider within the authorizing providers department within the previous 12 mos or has a future within next 30 days. See \"Patient Info\" tab in inbasket, or \"Choose Columns\" in Meds & Orders section of the refill encounter.              Passed - Medication is active on med list             Andree Arroyo RN 01/07/22 1:52 PM  "

## 2022-01-10 NOTE — ADDENDUM NOTE
Encounter addended by: Sheeba Cabrera, PT on: 1/10/2022 3:52 PM   Actions taken: Clinical Note Signed, Episode resolved

## 2022-01-13 DIAGNOSIS — E66.9 OBESITY (BMI 30-39.9): ICD-10-CM

## 2022-01-17 RX ORDER — METFORMIN HCL 500 MG
TABLET, EXTENDED RELEASE 24 HR ORAL
Qty: 90 TABLET | Refills: 1 | Status: SHIPPED | OUTPATIENT
Start: 2022-01-17 | End: 2022-02-07

## 2022-01-18 ENCOUNTER — LAB (OUTPATIENT)
Dept: LAB | Facility: CLINIC | Age: 77
End: 2022-01-18
Payer: COMMERCIAL

## 2022-01-18 DIAGNOSIS — I10 ESSENTIAL HYPERTENSION: ICD-10-CM

## 2022-01-18 LAB
ANION GAP SERPL CALCULATED.3IONS-SCNC: 12 MMOL/L (ref 5–18)
BUN SERPL-MCNC: 12 MG/DL (ref 8–28)
CALCIUM SERPL-MCNC: 9.6 MG/DL (ref 8.5–10.5)
CHLORIDE BLD-SCNC: 105 MMOL/L (ref 98–107)
CO2 SERPL-SCNC: 26 MMOL/L (ref 22–31)
CREAT SERPL-MCNC: 0.84 MG/DL (ref 0.6–1.1)
GFR SERPL CREATININE-BSD FRML MDRD: 72 ML/MIN/1.73M2
GLUCOSE BLD-MCNC: 102 MG/DL (ref 70–125)
POTASSIUM BLD-SCNC: 3.9 MMOL/L (ref 3.5–5)
SODIUM SERPL-SCNC: 143 MMOL/L (ref 136–145)

## 2022-01-18 PROCEDURE — 36415 COLL VENOUS BLD VENIPUNCTURE: CPT

## 2022-01-18 PROCEDURE — 80048 BASIC METABOLIC PNL TOTAL CA: CPT

## 2022-02-06 DIAGNOSIS — I10 ESSENTIAL HYPERTENSION, BENIGN: ICD-10-CM

## 2022-02-06 DIAGNOSIS — E66.9 OBESITY (BMI 30-39.9): ICD-10-CM

## 2022-02-06 DIAGNOSIS — I42.9 CARDIOMYOPATHY (H): ICD-10-CM

## 2022-02-07 RX ORDER — METFORMIN HCL 500 MG
TABLET, EXTENDED RELEASE 24 HR ORAL
Qty: 180 TABLET | Refills: 0 | Status: SHIPPED | OUTPATIENT
Start: 2022-02-07 | End: 2022-06-14

## 2022-02-07 RX ORDER — LOSARTAN POTASSIUM 25 MG/1
TABLET ORAL
Qty: 90 TABLET | Refills: 0 | Status: SHIPPED | OUTPATIENT
Start: 2022-02-07 | End: 2022-06-14

## 2022-02-17 DIAGNOSIS — M79.7 FIBROMYALGIA: ICD-10-CM

## 2022-02-18 RX ORDER — GABAPENTIN 300 MG/1
CAPSULE ORAL
Qty: 360 CAPSULE | Refills: 2 | Status: SHIPPED | OUTPATIENT
Start: 2022-02-18 | End: 2023-04-25

## 2022-02-23 ENCOUNTER — OFFICE VISIT (OUTPATIENT)
Dept: RHEUMATOLOGY | Facility: CLINIC | Age: 77
End: 2022-02-23
Payer: COMMERCIAL

## 2022-02-23 VITALS
DIASTOLIC BLOOD PRESSURE: 80 MMHG | BODY MASS INDEX: 32.61 KG/M2 | SYSTOLIC BLOOD PRESSURE: 138 MMHG | WEIGHT: 190 LBS | HEART RATE: 88 BPM

## 2022-02-23 DIAGNOSIS — M15.0 PRIMARY OSTEOARTHRITIS INVOLVING MULTIPLE JOINTS: Primary | ICD-10-CM

## 2022-02-23 DIAGNOSIS — M25.50 POLYARTHRALGIA: ICD-10-CM

## 2022-02-23 PROCEDURE — 99213 OFFICE O/P EST LOW 20 MIN: CPT | Performed by: INTERNAL MEDICINE

## 2022-02-23 NOTE — PROGRESS NOTES
"      Rheumatology follow-up visit note     Katy is a 76 year old female presents today for follow-up.    Katy was seen today for recheck.    Diagnoses and all orders for this visit:    Primary osteoarthritis involving multiple joints    Polyarthralgia        This patient with osteoarthritis has done well with Synvisc injection into her both knees she feels it was \"like a miracle\".  She has been to continue acetaminophen and duloxetine.  Follow-up here in the next 4 months or sooner if needed.    Follow up in 4 months.    HPI    Katy Hong is a 76 year old female is here for follow-up.  This is for polyarthralgias in association with osteoarthritis most prominently it was neither were troublesome.  She has not had good response to corticosteroid injections at least not a durable 1.  On her previous visit we done Synvisc on each of the knees as she feels it has been a very good change for her after 4 months she continues to have benefit some occasional discomfort with cold weather.  No swelling.  She takes acetaminophen.  She is on duloxetine.   Further historical information, including ROS and limitation in activities as noted in the multidimensional health assessment questionnaire scanned in the EMR and in the assessment and plan section.  She and her  are fully vaccinated for COVID-19 and boosted.      DETAILED EXAMINATION  02/23/22  :    Vitals:    02/23/22 0922   BP: 138/80   Pulse: 88   Weight: 86.2 kg (190 lb)     Alert oriented. Head including the face is examined for malar rash, heliotropes, scarring, lupus pernio. Eyes examined for redness such as in episcleritis/scleritis, periorbital lesions.   Neck/ Face examined for parotid gland swelling, range of motion of neck.  Left upper and lower and right upper and lower extremities examined for tenderness, swelling, warmth of the appendicular joints, range of motion, edema, rash.  Some of the important findings included: she does not have " evidence of synovitis in the palpable joints of the upper extremities.  No significant deformities of the digits.  + [place] nodes.  Range of motion of the shoulders  show full abduction.  No JLT effusion or warmth of the knees.  she does not have dactylitis of the digits.     Patient Active Problem List    Diagnosis Date Noted     Cellulitis 11/12/2021     Priority: Medium     Formatting of this note might be different from the original.  Created by Conversion       Nonvenomous insect bite of shoulder 11/12/2021     Priority: Medium     Formatting of this note might be different from the original.  Created by Conversion       Morbid obesity (H) 09/02/2021     Priority: Medium     Migraine Headache      Priority: Medium     Created by Conversion  Replacement Utility updated for latest IMO load         Moderate episode of recurrent major depressive disorder (H) 03/15/2021     Priority: Medium     Anxiety 03/15/2021     Priority: Medium     Chronic fatigue 02/22/2021     Priority: Medium     Essential Hypertension      Priority: Medium     Created by Conversion  Replacement Utility updated for latest IMO load         Obesity (BMI 30-39.9) 06/16/2020     Priority: Medium     Other cardiomyopathy (H) 01/04/2019     Priority: Medium     Calculus of ureter 05/21/2018     Priority: Medium     Hydronephrosis with urinary obstruction due to ureteral calculus 05/21/2018     Priority: Medium     Fibromyalgia      Priority: Medium     Created by Conversion         Osteopenia      Priority: Medium     Created by Conversion  Replacement Utility updated for latest IMO load         Vitamin D Deficiency      Priority: Medium     Created by Conversion  Replacement Utility updated for latest IMO load         Mammogram - Abnormal      Priority: Medium     Created by Conversion  Replacement Utility updated for latest IMO load         Chronic Cutaneous Ulcer Venous Stasis      Priority: Medium     Created by Conversion  Replacement  Utility updated for latest IMO load         Female stress incontinence 02/28/2015     Priority: Medium     Foot pain (Soft Tissue) 02/26/2015     Priority: Medium     Chronic venous insufficiency 12/14/2014     Priority: Medium     Lower Back Pain      Priority: Medium     Created by Conversion         Depression With Anxiety      Priority: Medium     Created by Conversion         Varicose Veins      Priority: Medium     Created by Conversion         Nonvenomous Insect Bite Of Shoulder      Priority: Medium     Created by Conversion         Cellulitis      Priority: Medium     Created by Conversion         Insomnia      Priority: Medium     Created by Conversion         Seborrheic Keratosis      Priority: Medium     Created by Conversion         Obesity      Priority: Medium     Created by Conversion         Lichen Sclerosus Et Atrophicus      Priority: Medium     Created by Conversion         Esophageal Reflux      Priority: Medium     Created by Conversion         Joint Pain, Localized In The Knee      Priority: Medium     Created by Conversion         Past Surgical History:   Procedure Laterality Date     APPENDECTOMY       BIOPSY BREAST Left      BREAST CYST ASPIRATION Left      CATARACT EXTRACTION, BILATERAL       HYSTERECTOMY       OOPHORECTOMY       TEMPOROMANDIBULAR JOINT SURGERY Bilateral      TUBAL LIGATION       ZZC TOTAL ABDOM HYSTERECTOMY       still has both ovaries      Past Medical History:   Diagnosis Date     Abnormal mammogram, unspecified     Created by Conversion      Acute sinusitis, unspecified     Created by Conversion      Asymptomatic varicose veins     Created by Conversion      Breast cyst      Cellulitis and abscess of unspecified site     Created by Conversion      Circumscribed scleroderma     Created by Conversion      Contusion of unspecified site     Created by Conversion      Disorder of bone and cartilage, unspecified     Created by Conversion      Dysthymic disorder     Created  by Conversion      Esophageal reflux     Created by Conversion      Insomnia, unspecified     Created by Conversion      Lumbago     Created by Conversion      Migraine, unspecified, without mention of intractable migraine without mention of status migrainosus     Created by Conversion      Myalgia and myositis, unspecified     Created by Conversion      Myocardial infarction (H)     per EKG's since 2015     Obesity, unspecified     Created by Conversion      Open wound of lip, without mention of complication     Created by Conversion      Other primary cardiomyopathies     Created by Conversion      Other seborrheic keratosis     Created by Conversion      Pain in joint, lower leg     Created by Conversion      Pain in limb     Created by Conversion      Sebaceous cyst     Created by Conversion      Shortness of breath     Created by Conversion      Shoulder and upper arm, insect bite, nonvenomous, without mention of infection(912.4)     Created by Conversion      Unspecified disease of sebaceous glands     Created by Conversion      Unspecified essential hypertension     Created by Conversion      Unspecified venous (peripheral) insufficiency     Created by Conversion      Unspecified vitamin D deficiency     Created by Conversion      Allergies   Allergen Reactions     Fluoxetine Cough and Rash     Pt thinks it was cough but not totally sure.     Latex Rash     Levothyroxine Rash     Lisinopril Cough and Rash     Sulfa (Sulfonamide Antibiotics) [Sulfa Drugs] Itching and Rash     Current Outpatient Medications   Medication Sig Dispense Refill     aspirin 81 MG EC tablet [ASPIRIN 81 MG EC TABLET] Take 81 mg by mouth daily.       calcium carbonate (OS-MONIQUE) 500 mg calcium (1,250 mg) chewable tablet [CALCIUM CARBONATE (OS-MONIQUE) 500 MG CALCIUM (1,250 MG) CHEWABLE TABLET] Chew 2 tablets daily.        cholecalciferol, vitamin D3, 5,000 unit Tab [CHOLECALCIFEROL, VITAMIN D3, 5,000 UNIT TAB] Take by mouth.       DULoxetine  (CYMBALTA) 30 MG capsule Take 1 capsule (30 mg) by mouth 2 times daily 180 capsule 1     famotidine (PEPCID) 20 MG tablet TAKE 1 TABLET BY MOUTH TWICE A  tablet 3     furosemide (LASIX) 20 MG tablet Take 1-2 tablets (20-40 mg) by mouth every 48 hours as needed (elevated BP, LE swelling) 90 tablet 1     gabapentin (NEURONTIN) 300 MG capsule TAKE 1 CAPSULE BY MOUTH DAILY IN THE MORNING, 1 CAPSULES IN THE AFTERNOON, & 2 CAPSULES AT BEDTIME 360 capsule 2     losartan (COZAAR) 25 MG tablet TAKE 1 TABLET BY MOUTH EVERY DAY 90 tablet 0     metFORMIN (GLUCOPHAGE-XR) 500 MG 24 hr tablet TAKE 1 TABLET BY MOUTH WITH DINNER DAILY FOR 2 WEEKS THEN 2 TABLETS WITH DINNER 180 tablet 0     metoprolol succinate ER (TOPROL-XL) 50 MG 24 hr tablet TAKE 1 TABLET BY MOUTH EVERY DAY 90 tablet 2     SUMAtriptan (IMITREX STATDOSE) 6 MG/0.5ML pen injector kit INJECT 1 INJECTION AS DIRECTED IMMEDIATELY AT ONSET OF MIGRAINE HEADACHE 6 mL 4     traZODone (DESYREL) 100 MG tablet Take 1.5 tablets (150 mg) by mouth At Bedtime Takes 1.5- 2 pills as needed at bedtime 135 tablet 1     triamcinolone (KENALOG) 0.1 % external ointment APPLY TO AFFECTED AREA TWICE A DAY 30 g 2     venlafaxine (EFFEXOR-XR) 75 MG 24 hr capsule TAKE 3 CAPSULES (225 MG TOTAL) BY MOUTH DAILY. 270 capsule 1     family history includes Alzheimer Disease in her father; Arthritis in her maternal grandmother and mother; Breast Cancer in her cousin; Breast Cancer (age of onset: 60.00) in her maternal aunt; Diabetes in her father; Hypertension in her father, mother, and sister; Obesity in her brother, maternal aunt, and maternal uncle; Osteopenia in her sister; Varicose Veins in her brother.  Social Connections: Not on file          WBC Count   Date Value Ref Range Status   11/12/2021 7.2 4.0 - 11.0 10e3/uL Final     RBC Count   Date Value Ref Range Status   11/12/2021 4.10 3.80 - 5.20 10e6/uL Final     Hemoglobin   Date Value Ref Range Status   11/12/2021 12.2 11.7 - 15.7  g/dL Final     Hematocrit   Date Value Ref Range Status   11/12/2021 39.3 35.0 - 47.0 % Final     MCV   Date Value Ref Range Status   11/12/2021 96 78 - 100 fL Final     MCH   Date Value Ref Range Status   11/12/2021 29.8 26.5 - 33.0 pg Final     Platelet Count   Date Value Ref Range Status   11/12/2021 183 150 - 450 10e3/uL Final     % Lymphocytes   Date Value Ref Range Status   11/12/2021 14 % Final     AST   Date Value Ref Range Status   11/12/2021 13 0 - 40 U/L Final     ALT   Date Value Ref Range Status   11/12/2021 15 0 - 45 U/L Final     Albumin   Date Value Ref Range Status   11/12/2021 3.5 3.5 - 5.0 g/dL Final     Alkaline Phosphatase   Date Value Ref Range Status   11/12/2021 80 45 - 120 U/L Final     Creatinine   Date Value Ref Range Status   01/18/2022 0.84 0.60 - 1.10 mg/dL Final     GFR Estimate   Date Value Ref Range Status   01/18/2022 72 >60 mL/min/1.73m2 Final     Comment:     Effective December 21, 2021 eGFRcr in adults is calculated using the 2021 CKD-EPI creatinine equation which includes age and gender (Blake et al., NEJM, DOI: 10.1056/QATLax0126937)   02/22/2021 >60 >60 mL/min/1.73m2 Final     GFR Estimate If Black   Date Value Ref Range Status   02/22/2021 >60 >60 mL/min/1.73m2 Final     Erythrocyte Sedimentation Rate   Date Value Ref Range Status   06/03/2021 5 0 - 20 mm/hr Final     CRP   Date Value Ref Range Status   06/03/2021 0.2 0.0 - 0.8 mg/dL Final

## 2022-03-05 ASSESSMENT — ANXIETY QUESTIONNAIRES
5. BEING SO RESTLESS THAT IT IS HARD TO SIT STILL: SEVERAL DAYS
GAD7 TOTAL SCORE: 15
1. FEELING NERVOUS, ANXIOUS, OR ON EDGE: MORE THAN HALF THE DAYS
7. FEELING AFRAID AS IF SOMETHING AWFUL MIGHT HAPPEN: NEARLY EVERY DAY
6. BECOMING EASILY ANNOYED OR IRRITABLE: NEARLY EVERY DAY
4. TROUBLE RELAXING: NEARLY EVERY DAY
7. FEELING AFRAID AS IF SOMETHING AWFUL MIGHT HAPPEN: NEARLY EVERY DAY
GAD7 TOTAL SCORE: 15
3. WORRYING TOO MUCH ABOUT DIFFERENT THINGS: NOT AT ALL
2. NOT BEING ABLE TO STOP OR CONTROL WORRYING: NEARLY EVERY DAY

## 2022-03-05 ASSESSMENT — PATIENT HEALTH QUESTIONNAIRE - PHQ9: SUM OF ALL RESPONSES TO PHQ QUESTIONS 1-9: 19

## 2022-03-08 ENCOUNTER — OFFICE VISIT (OUTPATIENT)
Dept: FAMILY MEDICINE | Facility: CLINIC | Age: 77
End: 2022-03-08
Payer: COMMERCIAL

## 2022-03-08 VITALS
DIASTOLIC BLOOD PRESSURE: 80 MMHG | WEIGHT: 186 LBS | HEART RATE: 93 BPM | BODY MASS INDEX: 31.93 KG/M2 | SYSTOLIC BLOOD PRESSURE: 110 MMHG | OXYGEN SATURATION: 97 %

## 2022-03-08 DIAGNOSIS — F33.1 MODERATE EPISODE OF RECURRENT MAJOR DEPRESSIVE DISORDER (H): ICD-10-CM

## 2022-03-08 DIAGNOSIS — Z23 NEED FOR VACCINATION: ICD-10-CM

## 2022-03-08 DIAGNOSIS — I42.8 OTHER CARDIOMYOPATHY (H): ICD-10-CM

## 2022-03-08 DIAGNOSIS — I10 ESSENTIAL HYPERTENSION: Primary | ICD-10-CM

## 2022-03-08 DIAGNOSIS — G47.01 INSOMNIA DUE TO MEDICAL CONDITION: ICD-10-CM

## 2022-03-08 DIAGNOSIS — F41.9 ANXIETY: ICD-10-CM

## 2022-03-08 DIAGNOSIS — M25.50 PAIN IN JOINT, MULTIPLE SITES: ICD-10-CM

## 2022-03-08 DIAGNOSIS — B35.1 NAIL FUNGAL INFECTION: ICD-10-CM

## 2022-03-08 PROBLEM — E66.01 MORBID OBESITY (H): Status: RESOLVED | Noted: 2021-09-02 | Resolved: 2022-03-08

## 2022-03-08 PROCEDURE — 99214 OFFICE O/P EST MOD 30 MIN: CPT | Performed by: NURSE PRACTITIONER

## 2022-03-08 RX ORDER — TRAZODONE HYDROCHLORIDE 150 MG/1
300 TABLET ORAL AT BEDTIME
Qty: 180 TABLET | Refills: 3 | Status: SHIPPED | OUTPATIENT
Start: 2022-03-08 | End: 2023-03-01

## 2022-03-08 RX ORDER — TERBINAFINE HYDROCHLORIDE 250 MG/1
250 TABLET ORAL DAILY
Qty: 42 TABLET | Refills: 0 | Status: SHIPPED | OUTPATIENT
Start: 2022-03-08 | End: 2022-04-19

## 2022-03-08 RX ORDER — BUSPIRONE HYDROCHLORIDE 5 MG/1
5 TABLET ORAL 3 TIMES DAILY
Qty: 90 TABLET | Refills: 1 | Status: SHIPPED | OUTPATIENT
Start: 2022-03-08 | End: 2022-04-07 | Stop reason: DRUGHIGH

## 2022-03-08 ASSESSMENT — ANXIETY QUESTIONNAIRES
2. NOT BEING ABLE TO STOP OR CONTROL WORRYING: MORE THAN HALF THE DAYS
5. BEING SO RESTLESS THAT IT IS HARD TO SIT STILL: MORE THAN HALF THE DAYS
1. FEELING NERVOUS, ANXIOUS, OR ON EDGE: NEARLY EVERY DAY
7. FEELING AFRAID AS IF SOMETHING AWFUL MIGHT HAPPEN: MORE THAN HALF THE DAYS
GAD7 TOTAL SCORE: 16
3. WORRYING TOO MUCH ABOUT DIFFERENT THINGS: MORE THAN HALF THE DAYS
4. TROUBLE RELAXING: MORE THAN HALF THE DAYS
7. FEELING AFRAID AS IF SOMETHING AWFUL MIGHT HAPPEN: MORE THAN HALF THE DAYS
6. BECOMING EASILY ANNOYED OR IRRITABLE: NEARLY EVERY DAY

## 2022-03-08 ASSESSMENT — PATIENT HEALTH QUESTIONNAIRE - PHQ9
SUM OF ALL RESPONSES TO PHQ QUESTIONS 1-9: 20
SUM OF ALL RESPONSES TO PHQ QUESTIONS 1-9: 20
10. IF YOU CHECKED OFF ANY PROBLEMS, HOW DIFFICULT HAVE THESE PROBLEMS MADE IT FOR YOU TO DO YOUR WORK, TAKE CARE OF THINGS AT HOME, OR GET ALONG WITH OTHER PEOPLE: VERY DIFFICULT

## 2022-03-09 ASSESSMENT — PATIENT HEALTH QUESTIONNAIRE - PHQ9: SUM OF ALL RESPONSES TO PHQ QUESTIONS 1-9: 20

## 2022-03-09 ASSESSMENT — ANXIETY QUESTIONNAIRES: GAD7 TOTAL SCORE: 16

## 2022-03-30 DIAGNOSIS — F41.9 ANXIETY: ICD-10-CM

## 2022-04-01 ENCOUNTER — TELEPHONE (OUTPATIENT)
Dept: FAMILY MEDICINE | Facility: CLINIC | Age: 77
End: 2022-04-01
Payer: COMMERCIAL

## 2022-04-01 RX ORDER — BUSPIRONE HYDROCHLORIDE 5 MG/1
TABLET ORAL
Qty: 90 TABLET | Refills: 1 | OUTPATIENT
Start: 2022-04-01

## 2022-04-01 NOTE — TELEPHONE ENCOUNTER
Reason for Call:  Other prescription    Detailed comments: Amirah calling from [employer], trying to get lower cost for pt so has to have some clinical questions answered to do so. This needs to be done today for it to be processed    This is on the Sumatriptan    Please call [phone] case id#[id number]      Call taken on 4/1/2022 at 11:13 AM by Sunday Arango

## 2022-04-01 NOTE — TELEPHONE ENCOUNTER
Denied Rx, patient should already have refills on file at the same pharmacy requested. Patient should have 1 more refill on file. 90 tablets is a 1 month supply.          Andree Arroyo RN, BSN Nurse Triage Advisor 9:05 AM 4/1/2022

## 2022-04-06 ASSESSMENT — ANXIETY QUESTIONNAIRES
2. NOT BEING ABLE TO STOP OR CONTROL WORRYING: SEVERAL DAYS
1. FEELING NERVOUS, ANXIOUS, OR ON EDGE: NOT AT ALL
6. BECOMING EASILY ANNOYED OR IRRITABLE: MORE THAN HALF THE DAYS
5. BEING SO RESTLESS THAT IT IS HARD TO SIT STILL: NOT AT ALL
7. FEELING AFRAID AS IF SOMETHING AWFUL MIGHT HAPPEN: NEARLY EVERY DAY
3. WORRYING TOO MUCH ABOUT DIFFERENT THINGS: SEVERAL DAYS
GAD7 TOTAL SCORE: 10
GAD7 TOTAL SCORE: 10
7. FEELING AFRAID AS IF SOMETHING AWFUL MIGHT HAPPEN: NEARLY EVERY DAY
4. TROUBLE RELAXING: NEARLY EVERY DAY

## 2022-04-06 ASSESSMENT — PATIENT HEALTH QUESTIONNAIRE - PHQ9: SUM OF ALL RESPONSES TO PHQ QUESTIONS 1-9: 17

## 2022-04-07 ENCOUNTER — OFFICE VISIT (OUTPATIENT)
Dept: FAMILY MEDICINE | Facility: CLINIC | Age: 77
End: 2022-04-07
Payer: COMMERCIAL

## 2022-04-07 VITALS
SYSTOLIC BLOOD PRESSURE: 120 MMHG | WEIGHT: 187 LBS | BODY MASS INDEX: 32.1 KG/M2 | HEART RATE: 94 BPM | OXYGEN SATURATION: 96 % | DIASTOLIC BLOOD PRESSURE: 88 MMHG

## 2022-04-07 DIAGNOSIS — F33.1 MODERATE EPISODE OF RECURRENT MAJOR DEPRESSIVE DISORDER (H): ICD-10-CM

## 2022-04-07 DIAGNOSIS — G43.909 MIGRAINE WITHOUT STATUS MIGRAINOSUS, NOT INTRACTABLE, UNSPECIFIED MIGRAINE TYPE: ICD-10-CM

## 2022-04-07 DIAGNOSIS — F41.9 ANXIETY: Primary | ICD-10-CM

## 2022-04-07 PROCEDURE — 99214 OFFICE O/P EST MOD 30 MIN: CPT | Performed by: NURSE PRACTITIONER

## 2022-04-07 RX ORDER — BUSPIRONE HYDROCHLORIDE 15 MG/1
15 TABLET ORAL 2 TIMES DAILY
Qty: 60 TABLET | Refills: 0 | Status: SHIPPED | OUTPATIENT
Start: 2022-04-07 | End: 2022-04-28

## 2022-04-07 ASSESSMENT — PATIENT HEALTH QUESTIONNAIRE - PHQ9
SUM OF ALL RESPONSES TO PHQ QUESTIONS 1-9: 17
SUM OF ALL RESPONSES TO PHQ QUESTIONS 1-9: 16
10. IF YOU CHECKED OFF ANY PROBLEMS, HOW DIFFICULT HAVE THESE PROBLEMS MADE IT FOR YOU TO DO YOUR WORK, TAKE CARE OF THINGS AT HOME, OR GET ALONG WITH OTHER PEOPLE: SOMEWHAT DIFFICULT
SUM OF ALL RESPONSES TO PHQ QUESTIONS 1-9: 16

## 2022-04-07 ASSESSMENT — ANXIETY QUESTIONNAIRES
5. BEING SO RESTLESS THAT IT IS HARD TO SIT STILL: NEARLY EVERY DAY
1. FEELING NERVOUS, ANXIOUS, OR ON EDGE: NEARLY EVERY DAY
3. WORRYING TOO MUCH ABOUT DIFFERENT THINGS: MORE THAN HALF THE DAYS
4. TROUBLE RELAXING: NEARLY EVERY DAY
GAD7 TOTAL SCORE: 10
GAD7 TOTAL SCORE: 18
GAD7 TOTAL SCORE: 18
6. BECOMING EASILY ANNOYED OR IRRITABLE: NEARLY EVERY DAY
7. FEELING AFRAID AS IF SOMETHING AWFUL MIGHT HAPPEN: MORE THAN HALF THE DAYS
7. FEELING AFRAID AS IF SOMETHING AWFUL MIGHT HAPPEN: MORE THAN HALF THE DAYS
GAD7 TOTAL SCORE: 18
2. NOT BEING ABLE TO STOP OR CONTROL WORRYING: MORE THAN HALF THE DAYS

## 2022-04-07 NOTE — PROGRESS NOTES
Assessment & Plan     Anxiety    - busPIRone (BUSPAR) 15 MG tablet  Dispense: 60 tablet; Refill: 0  Mood stable but she feels that she could use another adjustment on the buspirone in order to decrease her anxiety symptoms.  PHQ and vincent screening scores reviewed and discussed today  She is on the wait list to start some therapy in [place]  No reports of suicidal ideation today  Follow-up in 3 weeks in person for recheck of mental health status    Moderate episode of recurrent major depressive disorder (H)    Mood stable but she feels that she could use another adjustment on the buspirone in order to decrease her anxiety symptoms.  PHQ and vincent screening scores reviewed and discussed today  She is on the wait list to start some therapy in [place]  No reports of suicidal ideation today  Follow-up in 3 weeks in person for recheck of mental health status    Migraine without status migrainosus, not intractable, unspecified migraine type    She has been using Imitrex injections for her flareups which have given her complete relief but due to the increase in cost in 2022, she is no longer able to afford this medication.  I did encourage her to call her insurance company to see if the Maxalt oral disintegrating tablet would be cheaper for her and we can certainly try that as well.  Since she is not taking any prophylactic medication daily for her migraines, we did discuss reintroducing the topiramate since she was on this several years ago.  She will send me a [employer] message in regards to the Maxalt and if they do approve this Rx through her insurance, I will send a prescription for her and she will follow-up again in 3 weeks to discuss.  If her migraines continue to be a persistent issue, we definitely need to get her back on something prophylactically and/or send her back to neurology to discuss Botox injections.                 Return in about 3 weeks (around 4/28/2022) for Follow up, with me, in person, mental  "health follow up, NEED 40 MINUTE APPT .    Leonora Kerr NP  [facility] IKER Burns is a 76 year old who presents for the following health issues     History of Present Illness       Mental Health Follow-up:  Patient presents to follow-up on Depression & Anxiety.Patient's depression since last visit has been:  Better  The patient is not having other symptoms associated with depression.  Patient's anxiety since last visit has been:  Better  The patient is not having other symptoms associated with anxiety.  Any significant life events: health concerns  Patient is feeling anxious or having panic attacks.  Patient has no concerns about alcohol or drug use.       Today's PHQ-9         PHQ-9 Total Score: 17  PHQ-9 Q9 Thoughts of better off dead/self-harm past 2 weeks :   (P) Not at all    How difficult have these problems made it for you to do your work, take care of things at home, or get along with other people: Somewhat difficult    Today's MARGARETTE-7 Score: 10    Migraines:   Since the patient's last clinic visit, headaches are: worsened  The patient is getting headaches:  Almost daily- worse with rain & snow.  She is not able to do normal daily activities when she has a migraine.  The patient is taking the following rescue/relief medications:  Sumatriptan (Imitrex)   Patient states \"I get total relief\" from the rescue/relief medications.   The patient is taking the following medications to prevent migraines:  Other  In the past 4 weeks, the patient has gone to an Urgent Care or Emergency Room 0 times times due to headaches.    She eats 2-3 servings of fruits and vegetables daily.She consumes 1 sweetened beverage(s) daily.She exercises with enough effort to increase her heart rate 9 or less minutes per day.  She exercises with enough effort to increase her heart rate 3 or less days per week.   She is taking medications regularly.       Depression and Anxiety Follow-Up     Rx: " Buspar 5 mg TID, Cymbalta 30 mg BID, Effexor 225 mg daily    Therapist: none, on a wait list at [facility]    Triggers: migraines have been worse    Stress reduction: exercise tapes, writing out cards     Thoughts of self harm: no    Migraines: She has been using Imitrex injections for relief but due to cost, she is needing a different Rx. Her Imitrex is costing $180 for 4 injections. She is no longer seeing neurology for her migraines. Migraines have gotten worse over the last couple of weeks due to the rainy and cold weather changes. Denies speech changes, vision changes, vomiting. She does get nauseous at times. She is not taking any Rx for prophylaxis. She used to take Topamax years ago but this was stopped and she is not certain as to why.     Social History     Tobacco Use     Smoking status: Never Smoker     Smokeless tobacco: Never Used   Substance Use Topics     Alcohol use: Yes     Alcohol/week: 0.0 standard drinks     Comment: Alcoholic Drinks/day: occasional     Drug use: No     PHQ 3/5/2022 3/8/2022 4/6/2022   PHQ-9 Total Score 19 20 17   Q9: Thoughts of better off dead/self-harm past 2 weeks Not at all Not at all Not at all     MARGARETTE-7 SCORE 3/5/2022 3/8/2022 4/6/2022   Total Score 15 (severe anxiety) 16 (severe anxiety) 10 (moderate anxiety)   Total Score 15 16 10           Review of Systems   CONSTITUTIONAL: NEGATIVE for fever, chills, change in weight  ENT/MOUTH: NEGATIVE for ear, mouth and throat problems  RESP: NEGATIVE for significant cough or SOB  CV: NEGATIVE for chest pain, palpitations or peripheral edema      Objective    There were no vitals taken for this visit.  There is no height or weight on file to calculate BMI.  Physical Exam   GENERAL: healthy, alert and no distress  EYES: Eyes grossly normal to inspection, PERRL and conjunctivae and sclerae normal  RESP: lungs clear to auscultation - no rales, rhonchi or wheezes  CV: regular rate and rhythm, normal S1 S2, no S3 or S4, no murmur, click or  rub  NEURO: Normal strength and tone, mentation intact and speech normal, grossly intact  PSYCH: mentation appears normal, affect normal/bright, good eye contact, pleasant, well groomed

## 2022-04-08 ENCOUNTER — TELEPHONE (OUTPATIENT)
Dept: FAMILY MEDICINE | Facility: CLINIC | Age: 77
End: 2022-04-08

## 2022-04-08 ASSESSMENT — ANXIETY QUESTIONNAIRES: GAD7 TOTAL SCORE: 18

## 2022-04-08 ASSESSMENT — PATIENT HEALTH QUESTIONNAIRE - PHQ9: SUM OF ALL RESPONSES TO PHQ QUESTIONS 1-9: 16

## 2022-04-08 NOTE — TELEPHONE ENCOUNTER
Since I have been seeing the patient, she has always been on the Imitrex injection.  She has never tried anything oral and she has not seen her neurologist in many years who previously was managing her migraines.  She recalls being on topiramate many years ago but she could not remember as to why she stopped the medication, she thinks it may have been because it was no longer effective.  She has gotten the most relief from the Imitrex injections but due to the cost, we did have the discussion about alternative medications.

## 2022-04-08 NOTE — TELEPHONE ENCOUNTER
Reason for Call:  Other prescription    Detailed comments: Kassie calling from [facility] trying to assist pt with tier reduction/cost reduction on the Sumatriptan injection   Needs some medical questions answered about med history, etc.    -has the pt tried and failed sumatriptan tabs, rizatriptan tabs, naratriptan tabs?  -or would they be less effective or inappropriate than the injection?    Please call her at [phone] ok to leave detail message    Phone Number Patient can be reached at: Home number on file [phone] (home)    Best Time: anytime    Can we leave a detailed message on this number? YES    Call taken on 4/8/2022 at 8:58 AM by Sunday Arango

## 2022-04-15 ENCOUNTER — TELEPHONE (OUTPATIENT)
Dept: FAMILY MEDICINE | Facility: CLINIC | Age: 77
End: 2022-04-15
Payer: COMMERCIAL

## 2022-04-15 DIAGNOSIS — G43.909 MIGRAINE WITHOUT STATUS MIGRAINOSUS, NOT INTRACTABLE, UNSPECIFIED MIGRAINE TYPE: Primary | ICD-10-CM

## 2022-04-15 DIAGNOSIS — B35.1 TOENAIL FUNGUS: Primary | ICD-10-CM

## 2022-04-15 RX ORDER — RIZATRIPTAN BENZOATE 10 MG/1
10 TABLET, ORALLY DISINTEGRATING ORAL
Qty: 30 TABLET | Refills: 3 | Status: SHIPPED | OUTPATIENT
Start: 2022-04-15 | End: 2023-02-08

## 2022-04-15 NOTE — TELEPHONE ENCOUNTER
Sumatriptan injections denied.    Approved medication are:  Rizatriptan tablets or oral dissolving tablets  Naratriptan tablets    If one of these alternatives is not appropriate due to adverse effects or would not be beneficial, the provider must provide a letter supporting the need for Sumatriptan. Otherwise, please send alternative.

## 2022-04-15 NOTE — TELEPHONE ENCOUNTER
Contact First & Last Name if other than the patient involved: Pt  Main reason for the request: Drug coverage denial of appeal submitted for pt.  Expecting call back:Provider determined  May leave message: YES  Please contact Patient at Home  Best time to call back: when able if needed  [phone] (home), There is no work phone number on file.  Alternate phone number: n/a  Kalli Bishop

## 2022-04-27 ENCOUNTER — OFFICE VISIT (OUTPATIENT)
Dept: CARDIOLOGY | Facility: CLINIC | Age: 77
End: 2022-04-27
Attending: INTERNAL MEDICINE
Payer: COMMERCIAL

## 2022-04-27 VITALS
SYSTOLIC BLOOD PRESSURE: 142 MMHG | BODY MASS INDEX: 31.88 KG/M2 | DIASTOLIC BLOOD PRESSURE: 92 MMHG | WEIGHT: 185.7 LBS | OXYGEN SATURATION: 97 % | RESPIRATION RATE: 16 BRPM | HEART RATE: 84 BPM

## 2022-04-27 DIAGNOSIS — R00.2 PALPITATIONS: Primary | ICD-10-CM

## 2022-04-27 DIAGNOSIS — I10 ESSENTIAL HYPERTENSION, BENIGN: ICD-10-CM

## 2022-04-27 PROCEDURE — 99214 OFFICE O/P EST MOD 30 MIN: CPT | Performed by: INTERNAL MEDICINE

## 2022-04-27 NOTE — PROGRESS NOTES
"  HEART CARE ENCOUNTER CONSULTATON NOTE      [facility]  [phone]      Assessment/Recommendations   Assessment:    1.  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  Mild ANDRADE  3.  Hypertension  4.  Nonobstructive coronary artery disease  5.  Palpitation      Plan:  1.  Continue aspirin and statin therapy  2.  Continue Toprol-XL and losartan  3.  24-hour Holter monitor  4.  Continue to monitor blood pressure and consider adjusting antihypertensives.     Follow-up in 1 year.     History of Present Illness/Subjective    HPI: Katy Hong is a 76 year old female with history of mild nonischemic cardiomyopathy, mild obstructive sleep apnea and hypertension who is here for follow-up.  She underwent a CTA coronary with calcium score in 2017 which showed a calcium score of 14 with no significant stenosis.  No complaints of chest pain or breathing difficulty.  She feels her heart beating \"hard\" at times she checked her pulse and found it to be skipping beats.  No syncope.       Physical Examination  Review of Systems   Vitals: BP (!) 142/92 (BP Location: Left arm, Patient Position: Sitting, Cuff Size: Adult Large)   Pulse 84   Resp 16   Wt 84.2 kg (185 lb 11.2 oz)   SpO2 97%   BMI 31.88 kg/m    BMI= Body mass index is 31.88 kg/m .  Wt Readings from Last 3 Encounters:   04/27/22 84.2 kg (185 lb 11.2 oz)   04/07/22 84.8 kg (187 lb)   03/08/22 84.4 kg (186 lb)       General Appearance:   no distress, normal body habitus   ENT/Mouth: membranes moist, no oral lesions or bleeding gums.      EYES:  no scleral icterus, normal conjunctivae   Neck: no carotid bruits or thyromegaly   Chest/Lungs:   lungs are clear to auscultation, no rales or wheezing   Cardiovascular:   Regular. Normal first and second heart sounds with no murmurs, rubs, or gallops; the carotid, radial and posterior tibial pulses are intact, no jvd, no edema bilaterally    Abdomen:  no organomegaly, " masses, bruits, or tenderness; bowel sounds are present   Extremities: no cyanosis or clubbing   Skin: no xanthelasma, warm.    Neurologic: normal  bilateral, no tremors     Psychiatric: alert and oriented x3, calm        Please refer above for cardiac ROS details.        Medical History  Surgical History Family History Social History   Past Medical History:   Diagnosis Date     Abnormal mammogram, unspecified     Created by Conversion      Acute sinusitis, unspecified     Created by Conversion      Asymptomatic varicose veins     Created by Conversion      Breast cyst      Cellulitis and abscess of unspecified site     Created by Conversion      Circumscribed scleroderma     Created by Conversion      Contusion of unspecified site     Created by Conversion      Disorder of bone and cartilage, unspecified     Created by Conversion      Dysthymic disorder     Created by Conversion      Esophageal reflux     Created by Conversion      Insomnia, unspecified     Created by Conversion      Lumbago     Created by Conversion      Migraine, unspecified, without mention of intractable migraine without mention of status migrainosus     Created by Conversion      Myalgia and myositis, unspecified     Created by Conversion      Myocardial infarction (H)     per EKG's since 2015     Obesity, unspecified     Created by Conversion      Open wound of lip, without mention of complication     Created by Conversion      Other primary cardiomyopathies     Created by Conversion      Other seborrheic keratosis     Created by Conversion      Pain in joint, lower leg     Created by Conversion      Pain in limb     Created by Conversion      Sebaceous cyst     Created by Conversion      Shortness of breath     Created by Conversion      Shoulder and upper arm, insect bite, nonvenomous, without mention of infection(912.4)     Created by Conversion      Unspecified disease of sebaceous glands     Created by Conversion      Unspecified  essential hypertension     Created by Conversion      Unspecified venous (peripheral) insufficiency     Created by Conversion      Unspecified vitamin D deficiency     Created by Conversion      Past Surgical History:   Procedure Laterality Date     APPENDECTOMY       BIOPSY BREAST Left      BREAST CYST ASPIRATION Left      CATARACT EXTRACTION, BILATERAL       HYSTERECTOMY       OOPHORECTOMY       TEMPOROMANDIBULAR JOINT SURGERY Bilateral      TUBAL LIGATION       ZZC TOTAL ABDOM HYSTERECTOMY       still has both ovaries     Family History   Problem Relation Age of Onset     Hypertension Mother      Arthritis Mother      Alzheimer Disease Father         d. 76     Diabetes Father      Hypertension Father      Breast Cancer Maternal Aunt 60.00     Obesity Maternal Aunt      Hypertension Sister      Osteopenia Sister      Varicose Veins Brother      Obesity Brother      Breast Cancer Cousin      Obesity Maternal Uncle      Arthritis Maternal Grandmother         Social History     Socioeconomic History     Marital status:      Spouse name: Not on file     Number of children: 2     Years of education: Not on file     Highest education level: Not on file   Occupational History     Not on file   Tobacco Use     Smoking status: Never Smoker     Smokeless tobacco: Never Used   Substance and Sexual Activity     Alcohol use: Yes     Alcohol/week: 0.0 standard drinks     Comment: Alcoholic Drinks/day: occasional     Drug use: No     Sexual activity: Not on file   Other Topics Concern     Not on file   Social History Narrative    5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       Social Determinants of Health     Financial Resource Strain: Not on file   Food Insecurity: Not on file   Transportation Needs: Not on file   Physical Activity: Not on file   Stress: Not on file   Social Connections: Not on file   Intimate Partner Violence: Not on file   Housing Stability: Not on file           Medications  Allergies   Current Outpatient  Medications   Medication Sig Dispense Refill     aspirin 81 MG EC tablet [ASPIRIN 81 MG EC TABLET] Take 81 mg by mouth daily.       busPIRone (BUSPAR) 15 MG tablet Take 1 tablet (15 mg) by mouth 2 times daily 60 tablet 0     calcium carbonate (OS-MONIQUE) 500 mg calcium (1,250 mg) chewable tablet [CALCIUM CARBONATE (OS-MONIQUE) 500 MG CALCIUM (1,250 MG) CHEWABLE TABLET] Chew 2 tablets daily.        cholecalciferol, vitamin D3, 5,000 unit Tab [CHOLECALCIFEROL, VITAMIN D3, 5,000 UNIT TAB] Take by mouth.       DULoxetine (CYMBALTA) 30 MG capsule Take 1 capsule (30 mg) by mouth 2 times daily 180 capsule 1     famotidine (PEPCID) 20 MG tablet TAKE 1 TABLET BY MOUTH TWICE A DAY (Patient taking differently: 2 times daily) 180 tablet 3     furosemide (LASIX) 20 MG tablet Take 1-2 tablets (20-40 mg) by mouth every 48 hours as needed (elevated BP, LE swelling) 90 tablet 1     gabapentin (NEURONTIN) 300 MG capsule TAKE 1 CAPSULE BY MOUTH DAILY IN THE MORNING, 1 CAPSULES IN THE AFTERNOON, & 2 CAPSULES AT BEDTIME 360 capsule 2     losartan (COZAAR) 25 MG tablet TAKE 1 TABLET BY MOUTH EVERY DAY 90 tablet 0     metFORMIN (GLUCOPHAGE-XR) 500 MG 24 hr tablet TAKE 1 TABLET BY MOUTH WITH DINNER DAILY FOR 2 WEEKS THEN 2 TABLETS WITH DINNER 180 tablet 0     metoprolol succinate ER (TOPROL-XL) 50 MG 24 hr tablet TAKE 1 TABLET BY MOUTH EVERY DAY 90 tablet 2     rizatriptan (MAXALT-MLT) 10 MG ODT Take 1 tablet (10 mg) by mouth at onset of headache for migraine May repeat in 2 hours. Max 3 tablets/24 hours. 30 tablet 3     traZODone (DESYREL) 150 MG tablet Take 2 tablets (300 mg) by mouth At Bedtime 180 tablet 3     triamcinolone (KENALOG) 0.1 % external ointment APPLY TO AFFECTED AREA TWICE A DAY 30 g 2     venlafaxine (EFFEXOR-XR) 75 MG 24 hr capsule TAKE 3 CAPSULES (225 MG TOTAL) BY MOUTH DAILY. 270 capsule 1     SUMAtriptan (IMITREX STATDOSE) 6 MG/0.5ML pen injector kit INJECT 1 INJECTION AS DIRECTED IMMEDIATELY AT ONSET OF MIGRAINE HEADACHE  (Patient not taking: Reported on 4/27/2022) 6 mL 4       Allergies   Allergen Reactions     Fluoxetine Cough and Rash     Pt thinks it was cough but not totally sure.     Latex Rash     Levothyroxine Rash     Lisinopril Cough and Rash     Sulfa (Sulfonamide Antibiotics) [Sulfa Drugs] Itching and Rash          Lab Results    Chemistry/lipid CBC Cardiac Enzymes/BNP/TSH/INR   Recent Labs   Lab Test 09/02/21  0956   CHOL 166   HDL 63   LDL 85   TRIG 89     Recent Labs   Lab Test 09/02/21  0956 07/23/20  1012 10/11/19  0954   LDL 85 82 86     Recent Labs   Lab Test 01/18/22  1459      POTASSIUM 3.9   CHLORIDE 105   CO2 26      BUN 12   CR 0.84   GFRESTIMATED 72   MONIQUE 9.6     Recent Labs   Lab Test 01/18/22  1459 11/12/21  0710 09/02/21  0956   CR 0.84 1.02 0.84     No results for input(s): A1C in the last 04973 hours.       Recent Labs   Lab Test 11/12/21  0710   WBC 7.2   HGB 12.2   HCT 39.3   MCV 96        Recent Labs   Lab Test 11/12/21  0710 09/02/21  0956 07/23/20  1012   HGB 12.2 12.4 13.4    No results for input(s): TROPONINI in the last 16238 hours.  No results for input(s): BNP, NTBNPI, NTBNP in the last 39990 hours.  Recent Labs   Lab Test 07/23/20  1012   TSH 0.93     No results for input(s): INR in the last 92833 hours.     Cyn Gay MD

## 2022-04-27 NOTE — LETTER
"4/27/2022    Leonora Kerr, NP  [address]  [facility] [address]    RE: Katy Hong       Dear Colleague,     I had the pleasure of seeing Katy Hong in the [facility].    HEART CARE ENCOUNTER CONSULTATON NOTE      [facility]  [phone]      Assessment/Recommendations   Assessment:    1.  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  Mild ANDRADE  3.  Hypertension  4.  Nonobstructive coronary artery disease  5.  Palpitation      Plan:  1.  Continue aspirin and statin therapy  2.  Continue Toprol-XL and losartan  3.  24-hour Holter monitor  4.  Continue to monitor blood pressure and consider adjusting antihypertensives.     Follow-up in 1 year.     History of Present Illness/Subjective    HPI: Katy Hong is a 76 year old female with history of mild nonischemic cardiomyopathy, mild obstructive sleep apnea and hypertension who is here for follow-up.  She underwent a CTA coronary with calcium score in 2017 which showed a calcium score of 14 with no significant stenosis.  No complaints of chest pain or breathing difficulty.  She feels her heart beating \"hard\" at times she checked her pulse and found it to be skipping beats.  No syncope.       Physical Examination  Review of Systems   Vitals: BP (!) 142/92 (BP Location: Left arm, Patient Position: Sitting, Cuff Size: Adult Large)   Pulse 84   Resp 16   Wt 84.2 kg (185 lb 11.2 oz)   SpO2 97%   BMI 31.88 kg/m    BMI= Body mass index is 31.88 kg/m .  Wt Readings from Last 3 Encounters:   04/27/22 84.2 kg (185 lb 11.2 oz)   04/07/22 84.8 kg (187 lb)   03/08/22 84.4 kg (186 lb)       General Appearance:   no distress, normal body habitus   ENT/Mouth: membranes moist, no oral lesions or bleeding gums.      EYES:  no scleral icterus, normal conjunctivae   Neck: no carotid bruits or thyromegaly   Chest/Lungs:   lungs are clear to auscultation, no rales or " wheezing   Cardiovascular:   Regular. Normal first and second heart sounds with no murmurs, rubs, or gallops; the carotid, radial and posterior tibial pulses are intact, no jvd, no edema bilaterally    Abdomen:  no organomegaly, masses, bruits, or tenderness; bowel sounds are present   Extremities: no cyanosis or clubbing   Skin: no xanthelasma, warm.    Neurologic: normal  bilateral, no tremors     Psychiatric: alert and oriented x3, calm        Please refer above for cardiac ROS details.        Medical History  Surgical History Family History Social History   Past Medical History:   Diagnosis Date     Abnormal mammogram, unspecified     Created by Conversion      Acute sinusitis, unspecified     Created by Conversion      Asymptomatic varicose veins     Created by Conversion      Breast cyst      Cellulitis and abscess of unspecified site     Created by Conversion      Circumscribed scleroderma     Created by Conversion      Contusion of unspecified site     Created by Conversion      Disorder of bone and cartilage, unspecified     Created by Conversion      Dysthymic disorder     Created by Conversion      Esophageal reflux     Created by Conversion      Insomnia, unspecified     Created by Conversion      Lumbago     Created by Conversion      Migraine, unspecified, without mention of intractable migraine without mention of status migrainosus     Created by Conversion      Myalgia and myositis, unspecified     Created by Conversion      Myocardial infarction (H)     per EKG's since 2015     Obesity, unspecified     Created by Conversion      Open wound of lip, without mention of complication     Created by Conversion      Other primary cardiomyopathies     Created by Conversion      Other seborrheic keratosis     Created by Conversion      Pain in joint, lower leg     Created by Conversion      Pain in limb     Created by Conversion      Sebaceous cyst     Created by Conversion      Shortness of breath      Created by Conversion      Shoulder and upper arm, insect bite, nonvenomous, without mention of infection(912.4)     Created by Conversion      Unspecified disease of sebaceous glands     Created by Conversion      Unspecified essential hypertension     Created by Conversion      Unspecified venous (peripheral) insufficiency     Created by Conversion      Unspecified vitamin D deficiency     Created by Conversion      Past Surgical History:   Procedure Laterality Date     APPENDECTOMY       BIOPSY BREAST Left      BREAST CYST ASPIRATION Left      CATARACT EXTRACTION, BILATERAL       HYSTERECTOMY       OOPHORECTOMY       TEMPOROMANDIBULAR JOINT SURGERY Bilateral      TUBAL LIGATION       ZZC TOTAL ABDOM HYSTERECTOMY       still has both ovaries     Family History   Problem Relation Age of Onset     Hypertension Mother      Arthritis Mother      Alzheimer Disease Father         d. 76     Diabetes Father      Hypertension Father      Breast Cancer Maternal Aunt 60.00     Obesity Maternal Aunt      Hypertension Sister      Osteopenia Sister      Varicose Veins Brother      Obesity Brother      Breast Cancer Cousin      Obesity Maternal Uncle      Arthritis Maternal Grandmother         Social History     Socioeconomic History     Marital status:      Spouse name: Not on file     Number of children: 2     Years of education: Not on file     Highest education level: Not on file   Occupational History     Not on file   Tobacco Use     Smoking status: Never Smoker     Smokeless tobacco: Never Used   Substance and Sexual Activity     Alcohol use: Yes     Alcohol/week: 0.0 standard drinks     Comment: Alcoholic Drinks/day: occasional     Drug use: No     Sexual activity: Not on file   Other Topics Concern     Not on file   Social History Narrative    5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       Social Determinants of Health     Financial Resource Strain: Not on file   Food Insecurity: Not on file   Transportation Needs: Not on file    Physical Activity: Not on file   Stress: Not on file   Social Connections: Not on file   Intimate Partner Violence: Not on file   Housing Stability: Not on file           Medications  Allergies   Current Outpatient Medications   Medication Sig Dispense Refill     aspirin 81 MG EC tablet [ASPIRIN 81 MG EC TABLET] Take 81 mg by mouth daily.       busPIRone (BUSPAR) 15 MG tablet Take 1 tablet (15 mg) by mouth 2 times daily 60 tablet 0     calcium carbonate (OS-MONIQUE) 500 mg calcium (1,250 mg) chewable tablet [CALCIUM CARBONATE (OS-MONIQUE) 500 MG CALCIUM (1,250 MG) CHEWABLE TABLET] Chew 2 tablets daily.        cholecalciferol, vitamin D3, 5,000 unit Tab [CHOLECALCIFEROL, VITAMIN D3, 5,000 UNIT TAB] Take by mouth.       DULoxetine (CYMBALTA) 30 MG capsule Take 1 capsule (30 mg) by mouth 2 times daily 180 capsule 1     famotidine (PEPCID) 20 MG tablet TAKE 1 TABLET BY MOUTH TWICE A DAY (Patient taking differently: 2 times daily) 180 tablet 3     furosemide (LASIX) 20 MG tablet Take 1-2 tablets (20-40 mg) by mouth every 48 hours as needed (elevated BP, LE swelling) 90 tablet 1     gabapentin (NEURONTIN) 300 MG capsule TAKE 1 CAPSULE BY MOUTH DAILY IN THE MORNING, 1 CAPSULES IN THE AFTERNOON, & 2 CAPSULES AT BEDTIME 360 capsule 2     losartan (COZAAR) 25 MG tablet TAKE 1 TABLET BY MOUTH EVERY DAY 90 tablet 0     metFORMIN (GLUCOPHAGE-XR) 500 MG 24 hr tablet TAKE 1 TABLET BY MOUTH WITH DINNER DAILY FOR 2 WEEKS THEN 2 TABLETS WITH DINNER 180 tablet 0     metoprolol succinate ER (TOPROL-XL) 50 MG 24 hr tablet TAKE 1 TABLET BY MOUTH EVERY DAY 90 tablet 2     rizatriptan (MAXALT-MLT) 10 MG ODT Take 1 tablet (10 mg) by mouth at onset of headache for migraine May repeat in 2 hours. Max 3 tablets/24 hours. 30 tablet 3     traZODone (DESYREL) 150 MG tablet Take 2 tablets (300 mg) by mouth At Bedtime 180 tablet 3     triamcinolone (KENALOG) 0.1 % external ointment APPLY TO AFFECTED AREA TWICE A DAY 30 g 2     venlafaxine (EFFEXOR-XR) 75  MG 24 hr capsule TAKE 3 CAPSULES (225 MG TOTAL) BY MOUTH DAILY. 270 capsule 1     SUMAtriptan (IMITREX STATDOSE) 6 MG/0.5ML pen injector kit INJECT 1 INJECTION AS DIRECTED IMMEDIATELY AT ONSET OF MIGRAINE HEADACHE (Patient not taking: Reported on 4/27/2022) 6 mL 4       Allergies   Allergen Reactions     Fluoxetine Cough and Rash     Pt thinks it was cough but not totally sure.     Latex Rash     Levothyroxine Rash     Lisinopril Cough and Rash     Sulfa (Sulfonamide Antibiotics) [Sulfa Drugs] Itching and Rash          Lab Results    Chemistry/lipid CBC Cardiac Enzymes/BNP/TSH/INR   Recent Labs   Lab Test 09/02/21  0956   CHOL 166   HDL 63   LDL 85   TRIG 89     Recent Labs   Lab Test 09/02/21  0956 07/23/20  1012 10/11/19  0954   LDL 85 82 86     Recent Labs   Lab Test 01/18/22  1459      POTASSIUM 3.9   CHLORIDE 105   CO2 26      BUN 12   CR 0.84   GFRESTIMATED 72   MONIQUE 9.6     Recent Labs   Lab Test 01/18/22  1459 11/12/21  0710 09/02/21  0956   CR 0.84 1.02 0.84     No results for input(s): A1C in the last 08959 hours.       Recent Labs   Lab Test 11/12/21  0710   WBC 7.2   HGB 12.2   HCT 39.3   MCV 96        Recent Labs   Lab Test 11/12/21  0710 09/02/21  0956 07/23/20  1012   HGB 12.2 12.4 13.4    No results for input(s): TROPONINI in the last 74379 hours.  No results for input(s): BNP, NTBNPI, NTBNP in the last 36792 hours.  Recent Labs   Lab Test 07/23/20  1012   TSH 0.93     No results for input(s): INR in the last 72988 hours.     Cyn Gay MD    Thank you for allowing me to participate in the care of your patient.    Sincerely,   Cyn Gay MD   [facility]    cc: Cyn Gay MD  [address]  [address]  [place] [address]

## 2022-04-28 ENCOUNTER — OFFICE VISIT (OUTPATIENT)
Dept: FAMILY MEDICINE | Facility: CLINIC | Age: 77
End: 2022-04-28
Payer: COMMERCIAL

## 2022-04-28 VITALS
DIASTOLIC BLOOD PRESSURE: 84 MMHG | BODY MASS INDEX: 31.58 KG/M2 | OXYGEN SATURATION: 99 % | WEIGHT: 184 LBS | SYSTOLIC BLOOD PRESSURE: 122 MMHG | HEART RATE: 88 BPM

## 2022-04-28 DIAGNOSIS — F41.9 ANXIETY: Primary | ICD-10-CM

## 2022-04-28 DIAGNOSIS — G43.909 MIGRAINE WITHOUT STATUS MIGRAINOSUS, NOT INTRACTABLE, UNSPECIFIED MIGRAINE TYPE: ICD-10-CM

## 2022-04-28 DIAGNOSIS — F33.1 MODERATE EPISODE OF RECURRENT MAJOR DEPRESSIVE DISORDER (H): ICD-10-CM

## 2022-04-28 PROCEDURE — 99214 OFFICE O/P EST MOD 30 MIN: CPT | Performed by: NURSE PRACTITIONER

## 2022-04-28 RX ORDER — VENLAFAXINE HYDROCHLORIDE 75 MG/1
CAPSULE, EXTENDED RELEASE ORAL
Qty: 270 CAPSULE | Refills: 1 | Status: SHIPPED | OUTPATIENT
Start: 2022-04-28 | End: 2022-10-07

## 2022-04-28 RX ORDER — BUSPIRONE HYDROCHLORIDE 15 MG/1
15 TABLET ORAL 2 TIMES DAILY
Qty: 180 TABLET | Refills: 1 | Status: SHIPPED | OUTPATIENT
Start: 2022-04-28 | End: 2022-10-07

## 2022-04-28 ASSESSMENT — ANXIETY QUESTIONNAIRES
5. BEING SO RESTLESS THAT IT IS HARD TO SIT STILL: SEVERAL DAYS
4. TROUBLE RELAXING: SEVERAL DAYS
GAD7 TOTAL SCORE: 10
6. BECOMING EASILY ANNOYED OR IRRITABLE: MORE THAN HALF THE DAYS
GAD7 TOTAL SCORE: 10
1. FEELING NERVOUS, ANXIOUS, OR ON EDGE: SEVERAL DAYS
3. WORRYING TOO MUCH ABOUT DIFFERENT THINGS: MORE THAN HALF THE DAYS
GAD7 TOTAL SCORE: 10
7. FEELING AFRAID AS IF SOMETHING AWFUL MIGHT HAPPEN: SEVERAL DAYS
2. NOT BEING ABLE TO STOP OR CONTROL WORRYING: MORE THAN HALF THE DAYS
7. FEELING AFRAID AS IF SOMETHING AWFUL MIGHT HAPPEN: SEVERAL DAYS

## 2022-04-28 ASSESSMENT — PATIENT HEALTH QUESTIONNAIRE - PHQ9
10. IF YOU CHECKED OFF ANY PROBLEMS, HOW DIFFICULT HAVE THESE PROBLEMS MADE IT FOR YOU TO DO YOUR WORK, TAKE CARE OF THINGS AT HOME, OR GET ALONG WITH OTHER PEOPLE: SOMEWHAT DIFFICULT
SUM OF ALL RESPONSES TO PHQ QUESTIONS 1-9: 11
SUM OF ALL RESPONSES TO PHQ QUESTIONS 1-9: 11

## 2022-04-28 NOTE — PROGRESS NOTES
Assessment & Plan     Anxiety    - venlafaxine (EFFEXOR-XR) 75 MG 24 hr capsule  Dispense: 270 capsule; Refill: 1  - busPIRone (BUSPAR) 15 MG tablet  Dispense: 180 tablet; Refill: 1  We did review and discuss her PHQ and vincent screening scores today.  She continues to remain on a wait list for counseling in [facility].  She is feeling much better since we adjusted her buspirone in regards to the severity of her anxiety, she will continue with her current regimen which includes Cymbalta twice daily, maximum dose of Effexor and BuSpar twice daily.  No concerns for suicidal ideation today, she is compliant with medication use  Follow up in 6 months for physical and med check    Moderate episode of recurrent major depressive disorder (H)    - venlafaxine (EFFEXOR-XR) 75 MG 24 hr capsule  Dispense: 270 capsule; Refill: 1  We did review and discuss her PHQ and vincent screening scores today.  She continues to remain on a wait list for counseling in [facility].  She is feeling much better since we adjusted her buspirone in regards to the severity of her anxiety, she will continue with her current regimen which includes Cymbalta twice daily, maximum dose of Effexor and BuSpar twice daily.  No concerns for suicidal ideation today, she is compliant with medication use   she will continue with her current dose of trazodone for management of her insomnia      Migraine without status migrainosus, not intractable, unspecified migraine type    Her insurance is no longer covering her Imitrex injections so she has been using Maxalt as needed which takes the edge off her migraines but does not resolve the migraine flareup completely when compared to the Imitrex injection.  She is uncertain as to whether or not she has taken Topamax in the past as daily prophylaxis so she will go back and check her med list at home from her previous neurology follow-ups and let me know.  She is certain that she has been on Depakote and amitriptyline in the  past but they were ineffective so those medications were stopped by her neurologist many years ago.                   Return in about 6 months (around 10/28/2022) for Follow up, Routine preventive, with me, in person, fasting labs, med check.    Leonora Kerr NP  [facility]    Benjamin Burns is a 76 year old who presents for the following health issues     HPI     Depression and Anxiety Follow-Up    How are you doing with your depression since your last visit? Improved  Rx: Buspar, Cymbalta, Effexor    How are you doing with your anxiety since your last visit?  Improved     Are you having other symptoms that might be associated with depression or anxiety? Yes:  lack of motivation, always tired, she is sleeping through the night and is using Trazodone at HS    Have you had a significant life event? Grief or Loss Friends that have passes away    Do you have any concerns with your use of alcohol or other drugs? No     Therapy: on wait list still for counseling in [place]    Triggers: no new triggers    Stress reduction: exercise, sending out cards    No thoughts of self harm or SI    No side effects reported today from her medications    Social History     Tobacco Use     Smoking status: Never Smoker     Smokeless tobacco: Never Used   Substance Use Topics     Alcohol use: Yes     Alcohol/week: 0.0 standard drinks     Comment: Alcoholic Drinks/day: occasional     Drug use: No     PHQ 4/6/2022 4/7/2022 4/28/2022   PHQ-9 Total Score 17 16 11   Q9: Thoughts of better off dead/self-harm past 2 weeks Not at all Not at all Not at all     MARGARETTE-7 SCORE 4/6/2022 4/7/2022 4/28/2022   Total Score 10 (moderate anxiety) 18 (severe anxiety) 10 (moderate anxiety)   Total Score 10 18 10         Suicide Assessment Five-step Evaluation and Treatment (SAFE-T)      How many servings of fruits and vegetables do you eat daily?  2-3    On average, how many sweetened beverages do you drink each day  (Examples: soda, juice, sweet tea, etc.  Do NOT count diet or artificially sweetened beverages)?   0    How many days per week do you exercise enough to make your heart beat faster? 3 or less    How many minutes a day do you exercise enough to make your heart beat faster? 20 - 29    How many days per week do you miss taking your medication? 0    Migraine     Since your last clinic visit, how have your headaches changed?  No change Her insurance is no longer covering her Imitrex injections so she is now having to use Maxalt PRN for flare ups    How often are you getting headaches or migraines? weekly     Are you able to do normal daily activities when you have a migraine? No    Are you taking rescue/relief medications? (Select all that apply) Maxalt    How helpful is your rescue/relief medication?  I get some relief    Are you taking any medications to prevent migraines? (Select all that apply)  Neurontin, has been on Depakote and Amitriptyline in the past but was taken off of it by neurology due to it's ineffective properties. She is unsure if she has ever taken Topamax for her migraines.     In the past 4 weeks, how often have you gone to urgent care or the emergency room because of your headaches?  0        Review of Systems   CONSTITUTIONAL: NEGATIVE for fever, chills, change in weight  ENT/MOUTH: NEGATIVE for ear, mouth and throat problems  RESP: NEGATIVE for significant cough or SOB  CV: NEGATIVE for chest pain, palpitations or peripheral edema      Objective    /84 (BP Location: Right arm, Patient Position: Sitting, Cuff Size: Adult Regular)   Pulse 88   Wt 83.5 kg (184 lb)   SpO2 99%   BMI 31.58 kg/m    Body mass index is 31.58 kg/m .  Physical Exam   GENERAL: healthy, alert and no distress  RESP: lungs clear to auscultation - no rales, rhonchi or wheezes  CV: regular rate and rhythm, normal S1 S2, no S3 or S4, no murmur, click or rub  NEURO: Normal strength and tone, mentation intact and speech  normal  PSYCH: mentation appears normal, affect normal/bright                Answers for HPI/ROS submitted by the patient on 4/28/2022  If you checked off any problems, how difficult have these problems made it for you to do your work, take care of things at home, or get along with other people?: Somewhat difficult  PHQ9 TOTAL SCORE: 11

## 2022-04-29 ASSESSMENT — PATIENT HEALTH QUESTIONNAIRE - PHQ9: SUM OF ALL RESPONSES TO PHQ QUESTIONS 1-9: 11

## 2022-04-29 ASSESSMENT — ANXIETY QUESTIONNAIRES: GAD7 TOTAL SCORE: 10

## 2022-05-16 NOTE — PROGRESS NOTES
Bariatric Care Clinic Non Surgical Follow up Visit   Date of visit: 5/16/2022  Physician: LATISHA Rossi MD, MD  Primary Care is Leonora Kerr.  Katy Hong   76 year old  female     Initial Weight: 201#  Initial BMI: 37.4  Today's Weight:   Wt Readings from Last 1 Encounters:   05/17/22 83.5 kg (184 lb)     Body mass index is 31.58 kg/m .           Assessment and Plan   Assessment: Katy is a 76 year old year old female who presents for medical weight management.      Plan:    1. Obesity (BMI 30-39.9)  Patient was congratulated on her success thus far. Healthy habits to assist with further weight loss were discussed. She will try to get outside and move more. She will continue the metformin. She had a normal BMP last fall.     2. Essential hypertension  This may improve with healthy habits and weight loss.    3. Migraine without status migrainosus, not intractable, unspecified migraine type  This may improve with healthy habits and weight loss.    Follow up in 3 months with myself           INTERIM HISTORY  Patient continues to struggle with fatigue and generalized muscle aches with her fibromyalgia. She recently went on vacation.    DIETARY HISTORY  Meals Per Day: 3  Eating Protein First?: usually  Food Diary: B:toast and coffee and egg L:fruit or string cheese or protein drink D:meat and starch and vegetables  Snacks Per Day: 1-2  Typical Snack: cheese stick or walnuts, popcorn  Fluid Intake: working on it  Portion Control: yes  Calorie Containing Beverages: occasional soda  Typical Protein Food Choices: eggs, meat, protein shake  Choosing Whole Grains: yes  Meals at Restaurant per week:0-1    Positive Changes Since Last Visit: portion control, water intake  Struggling With: exercise, fatigue    Knowledgeable in Reading Food Labels: yes  Getting Adequate Protein: working on in  Sleeping 7-8 hours/day sometimes  Stress management not discussed    PHYSICAL ACTIVITY PATTERNS:  Some walking, occasional  exercise video    REVIEW OF SYSTEMS  GENERAL/CONSTITUTIONAL:  Fatigue: down  HEENT:  Vision changes, glaucoma: no  CARDIOVASCULAR:  Chest Pain with Exertion: no  PULMONARY:  Dyspnea on exertion: no  NEUROLOGIC:  Paresthesias: no  PSYCHIATRIC:  Moods: stable  MUSCULOSKELETAL/RHEUMATOLOGIC  Arthralgias: no  Myalgias: yes  ENDOCRINE:  Monitoring Blood Sugars: na  Sugars Well Controlled: na  No personal or family history of medullary thyroid cancer not discussed  :  Birth control: menopause       Patient Profile   Social History     Social History Narrative    5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          Past Medical History   Past Medical History:   Diagnosis Date     Abnormal mammogram, unspecified     Created by Conversion      Acute sinusitis, unspecified     Created by Conversion      Asymptomatic varicose veins     Created by Conversion      Breast cyst      Cellulitis and abscess of unspecified site     Created by Conversion      Circumscribed scleroderma     Created by Conversion      Contusion of unspecified site     Created by Conversion      Disorder of bone and cartilage, unspecified     Created by Conversion      Dysthymic disorder     Created by Conversion      Esophageal reflux     Created by Conversion      Insomnia, unspecified     Created by Conversion      Lumbago     Created by Conversion      Migraine, unspecified, without mention of intractable migraine without mention of status migrainosus     Created by Conversion      Myalgia and myositis, unspecified     Created by Conversion      Myocardial infarction (H)     per EKG's since      Obesity, unspecified     Created by Conversion      Open wound of lip, without mention of complication     Created by Conversion      Other primary cardiomyopathies     Created by Conversion      Other seborrheic keratosis     Created by Conversion      Pain in joint, lower leg     Created by Conversion      Pain in limb     Created by Conversion      Sebaceous cyst      "Created by Conversion      Shortness of breath     Created by Conversion      Shoulder and upper arm, insect bite, nonvenomous, without mention of infection(912.4)     Created by Conversion      Unspecified disease of sebaceous glands     Created by Conversion      Unspecified essential hypertension     Created by Conversion      Unspecified venous (peripheral) insufficiency     Created by Conversion      Unspecified vitamin D deficiency     Created by Conversion      Patient Active Problem List   Diagnosis     Osteopenia     Insomnia     Vitamin D Deficiency     Lower Back Pain     Fibromyalgia     Essential Hypertension     Seborrheic Keratosis     Obesity     Lichen Sclerosus Et Atrophicus     Depression With Anxiety     Migraine Headache     Esophageal Reflux     Joint Pain, Localized In The Knee     Varicose Veins     Nonvenomous Insect Bite Of Shoulder     Cellulitis     Mammogram - Abnormal     Chronic Cutaneous Ulcer Venous Stasis     Chronic venous insufficiency     Foot pain (Soft Tissue)     Female stress incontinence     Calculus of ureter     Hydronephrosis with urinary obstruction due to ureteral calculus     Other cardiomyopathy (H)     Obesity (BMI 30-39.9)     Chronic fatigue     Moderate episode of recurrent major depressive disorder (H)     Anxiety     Cellulitis     Nonvenomous insect bite of shoulder     Pain in joint, multiple sites       Past Surgical History  She has a past surgical history that includes TOTAL ABDOM HYSTERECTOMY; tubal ligation; appendectomy; Temporomandibular Joint Surgery (Bilateral); Cataract Extraction, Bilateral; Hysterectomy; Oophorectomy; Biopsy breast (Left); and Breast Cyst Aspiration (Left).     Examination   Ht 1.626 m (5' 4\")   Wt 83.5 kg (184 lb)   BMI 31.58 kg/m    General:  Alert, NAD  Pscyh/Mood: stable         Counseling:   We reviewed the important post op bariatric recommendations:  -eating 3 meals daily  -eating protein first, getting >60gm protein " "daily  -eating slowly, chewing food well  -avoiding/limiting calorie containing beverages  -limiting starchy vegetables and carbohydrates, choosing wheat, not white with breads,   crackers, pastas, bear, bagels, tortillas, rice  -limiting restaurant or cafeteria eating to twice a week or less    We discussed the importance of restorative sleep and stress management in maintaining a healthy weight.  We discussed the National Weight Control Registry healthy weight maintenance strategies and ways to optimize metabolism.  We discussed the importance of physical activity including cardiovascular and strength training in maintaining a healthier weight.    Total time spent on the date of this encounter doing: chart review, review of test results, patient visit, physical exam, education, counseling, developing plan of care and documenting = 23 minutes.         LATISHA Rossi MD  [facility]           The patient has been notified of following:     \"This telephone visit will be conducted via a call between you and your physician/provider. We have found that certain health care needs can be provided without the need for a physical exam.  This service lets us provide the care you need with a short phone conversation.  If a prescription is necessary we can send it directly to your pharmacy.  If lab work is needed we can place an order for that and you can then stop by our lab to have the test done at a later time.    Telephone visits are billed at different rates depending on your insurance coverage. During this emergency period, for some insurers they may be billed the same as an in-person visit.  Please reach out to your insurance provider with any questions.    If during the course of the call the physician/provider feels a telephone visit is not appropriate, you will not be charged for this service.\"    Patient has given verbal consent to a Telephone visit? Yes    What phone number would you like to " be contacted at? [phone]  Patient would like to receive their AVS by [employer]    Are there any specific questions or needs that you would like addressed at your visit today?     Weight management. Pt has no complaints or concerns at this time. She declined a follow-up at this time.    Casper Brito CMA  [facility]  Surgery [facility]  Weight Management Clinic [address] [address]  Office: [phone]  Fax: [phone]              Phone call duration: 11 minutes

## 2022-05-16 NOTE — PATIENT INSTRUCTIONS

## 2022-05-17 ENCOUNTER — VIRTUAL VISIT (OUTPATIENT)
Dept: SURGERY | Facility: CLINIC | Age: 77
End: 2022-05-17
Payer: COMMERCIAL

## 2022-05-17 VITALS — HEIGHT: 64 IN | BODY MASS INDEX: 31.41 KG/M2 | WEIGHT: 184 LBS

## 2022-05-17 DIAGNOSIS — I10 ESSENTIAL HYPERTENSION: ICD-10-CM

## 2022-05-17 DIAGNOSIS — G43.909 MIGRAINE WITHOUT STATUS MIGRAINOSUS, NOT INTRACTABLE, UNSPECIFIED MIGRAINE TYPE: ICD-10-CM

## 2022-05-17 DIAGNOSIS — E66.9 OBESITY (BMI 30-39.9): Primary | ICD-10-CM

## 2022-05-17 PROCEDURE — 99441 PR PHYSICIAN TELEPHONE EVALUATION 5-10 MIN: CPT | Performed by: FAMILY MEDICINE

## 2022-05-17 NOTE — LETTER
5/17/2022         RE: Katy Hong  [id number] [facility] [address]        Dear Colleague,    Thank you for referring your patient, Katy Hong, to the [facility] [place]. Please see a copy of my visit note below.    Bariatric Care Clinic Non Surgical Follow up Visit   Date of visit: 5/16/2022  Physician: LATISHA Rossi MD, MD  Primary Care is Leonora Kerr.  Katy Hong   76 year old  female     Initial Weight: 201#  Initial BMI: 37.4  Today's Weight:   Wt Readings from Last 1 Encounters:   05/17/22 83.5 kg (184 lb)     Body mass index is 31.58 kg/m .           Assessment and Plan   Assessment: Katy is a 76 year old year old female who presents for medical weight management.      Plan:    1. Obesity (BMI 30-39.9)  Patient was congratulated on her success thus far. Healthy habits to assist with further weight loss were discussed. She will try to get outside and move more. She will continue the metformin. She had a normal BMP last fall.     2. Essential hypertension  This may improve with healthy habits and weight loss.    3. Migraine without status migrainosus, not intractable, unspecified migraine type  This may improve with healthy habits and weight loss.    Follow up in 3 months with myself           INTERIM HISTORY  Patient continues to struggle with fatigue and generalized muscle aches with her fibromyalgia. She recently went on vacation.    DIETARY HISTORY  Meals Per Day: 3  Eating Protein First?: usually  Food Diary: B:toast and coffee and egg L:fruit or string cheese or protein drink D:meat and starch and vegetables  Snacks Per Day: 1-2  Typical Snack: cheese stick or walnuts, popcorn  Fluid Intake: working on it  Portion Control: yes  Calorie Containing Beverages: occasional soda  Typical Protein Food Choices: eggs, meat, protein shake  Choosing Whole Grains: yes  Meals at Restaurant per week:0-1    Positive Changes Since  Last Visit: portion control, water intake  Struggling With: exercise, fatigue    Knowledgeable in Reading Food Labels: yes  Getting Adequate Protein: working on in  Sleeping 7-8 hours/day sometimes  Stress management not discussed    PHYSICAL ACTIVITY PATTERNS:  Some walking, occasional exercise video    REVIEW OF SYSTEMS  GENERAL/CONSTITUTIONAL:  Fatigue: down  HEENT:  Vision changes, glaucoma: no  CARDIOVASCULAR:  Chest Pain with Exertion: no  PULMONARY:  Dyspnea on exertion: no  NEUROLOGIC:  Paresthesias: no  PSYCHIATRIC:  Moods: stable  MUSCULOSKELETAL/RHEUMATOLOGIC  Arthralgias: no  Myalgias: yes  ENDOCRINE:  Monitoring Blood Sugars: na  Sugars Well Controlled: na  No personal or family history of medullary thyroid cancer not discussed  :  Birth control: menopause       Patient Profile   Social History     Social History Narrative    7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          Past Medical History   Past Medical History:   Diagnosis Date     Abnormal mammogram, unspecified     Created by Conversion      Acute sinusitis, unspecified     Created by Conversion      Asymptomatic varicose veins     Created by Conversion      Breast cyst      Cellulitis and abscess of unspecified site     Created by Conversion      Circumscribed scleroderma     Created by Conversion      Contusion of unspecified site     Created by Conversion      Disorder of bone and cartilage, unspecified     Created by Conversion      Dysthymic disorder     Created by Conversion      Esophageal reflux     Created by Conversion      Insomnia, unspecified     Created by Conversion      Lumbago     Created by Conversion      Migraine, unspecified, without mention of intractable migraine without mention of status migrainosus     Created by Conversion      Myalgia and myositis, unspecified     Created by Conversion      Myocardial infarction (H)     per EKG's since      Obesity, unspecified     Created by Conversion      Open wound of lip, without mention  of complication     Created by Conversion      Other primary cardiomyopathies     Created by Conversion      Other seborrheic keratosis     Created by Conversion      Pain in joint, lower leg     Created by Conversion      Pain in limb     Created by Conversion      Sebaceous cyst     Created by Conversion      Shortness of breath     Created by Conversion      Shoulder and upper arm, insect bite, nonvenomous, without mention of infection(912.4)     Created by Conversion      Unspecified disease of sebaceous glands     Created by Conversion      Unspecified essential hypertension     Created by Conversion      Unspecified venous (peripheral) insufficiency     Created by Conversion      Unspecified vitamin D deficiency     Created by Conversion      Patient Active Problem List   Diagnosis     Osteopenia     Insomnia     Vitamin D Deficiency     Lower Back Pain     Fibromyalgia     Essential Hypertension     Seborrheic Keratosis     Obesity     Lichen Sclerosus Et Atrophicus     Depression With Anxiety     Migraine Headache     Esophageal Reflux     Joint Pain, Localized In The Knee     Varicose Veins     Nonvenomous Insect Bite Of Shoulder     Cellulitis     Mammogram - Abnormal     Chronic Cutaneous Ulcer Venous Stasis     Chronic venous insufficiency     Foot pain (Soft Tissue)     Female stress incontinence     Calculus of ureter     Hydronephrosis with urinary obstruction due to ureteral calculus     Other cardiomyopathy (H)     Obesity (BMI 30-39.9)     Chronic fatigue     Moderate episode of recurrent major depressive disorder (H)     Anxiety     Cellulitis     Nonvenomous insect bite of shoulder     Pain in joint, multiple sites       Past Surgical History  She has a past surgical history that includes TOTAL ABDOM HYSTERECTOMY; tubal ligation; appendectomy; Temporomandibular Joint Surgery (Bilateral); Cataract Extraction, Bilateral; Hysterectomy; Oophorectomy; Biopsy breast (Left); and Breast Cyst Aspiration  "(Left).     Examination   Ht 1.626 m (5' 4\")   Wt 83.5 kg (184 lb)   BMI 31.58 kg/m    General:  Alert, NAD  Pscyh/Mood: stable         Counseling:   We reviewed the important post op bariatric recommendations:  -eating 3 meals daily  -eating protein first, getting >60gm protein daily  -eating slowly, chewing food well  -avoiding/limiting calorie containing beverages  -limiting starchy vegetables and carbohydrates, choosing wheat, not white with breads,   crackers, pastas, bear, bagels, tortillas, rice  -limiting restaurant or cafeteria eating to twice a week or less    We discussed the importance of restorative sleep and stress management in maintaining a healthy weight.  We discussed the National Weight Control Registry healthy weight maintenance strategies and ways to optimize metabolism.  We discussed the importance of physical activity including cardiovascular and strength training in maintaining a healthier weight.    Total time spent on the date of this encounter doing: chart review, review of test results, patient visit, physical exam, education, counseling, developing plan of care and documenting = 23 minutes.         LATISHA Rossi MD  [facility]           The patient has been notified of following:     \"This telephone visit will be conducted via a call between you and your physician/provider. We have found that certain health care needs can be provided without the need for a physical exam.  This service lets us provide the care you need with a short phone conversation.  If a prescription is necessary we can send it directly to your pharmacy.  If lab work is needed we can place an order for that and you can then stop by our lab to have the test done at a later time.    Telephone visits are billed at different rates depending on your insurance coverage. During this emergency period, for some insurers they may be billed the same as an in-person visit.  Please reach out to your " "insurance provider with any questions.    If during the course of the call the physician/provider feels a telephone visit is not appropriate, you will not be charged for this service.\"    Patient has given verbal consent to a Telephone visit? Yes    What phone number would you like to be contacted at? [phone]  Patient would like to receive their AVS by [email or web address]    Are there any specific questions or needs that you would like addressed at your visit today?     Weight management. Pt has no complaints or concerns at this time. She declined a follow-up at this time.    Casper Brito  [facility]  Surgery Clinic [facility] [place]  Weight Management Clinic [address] [address]  Office: [phone]  Fax: [phone]              Phone call duration: 11 minutes            Again, thank you for allowing me to participate in the care of your patient.        Sincerely,        LATISHA Rossi MD    "

## 2022-05-18 ENCOUNTER — ANCILLARY PROCEDURE (OUTPATIENT)
Dept: MAMMOGRAPHY | Facility: CLINIC | Age: 77
End: 2022-05-18
Attending: NURSE PRACTITIONER
Payer: COMMERCIAL

## 2022-05-18 DIAGNOSIS — Z12.31 VISIT FOR SCREENING MAMMOGRAM: ICD-10-CM

## 2022-05-18 PROCEDURE — 77067 SCR MAMMO BI INCL CAD: CPT

## 2022-06-01 ENCOUNTER — ANCILLARY PROCEDURE (OUTPATIENT)
Dept: MAMMOGRAPHY | Facility: CLINIC | Age: 77
End: 2022-06-01
Attending: NURSE PRACTITIONER
Payer: COMMERCIAL

## 2022-06-01 DIAGNOSIS — R92.0 MICROCALCIFICATION OF BREAST: ICD-10-CM

## 2022-06-01 PROCEDURE — 77065 DX MAMMO INCL CAD UNI: CPT | Mod: LT

## 2022-06-06 DIAGNOSIS — R92.8 ABNORMAL MAMMOGRAM: Primary | ICD-10-CM

## 2022-06-14 ENCOUNTER — TELEPHONE (OUTPATIENT)
Dept: FAMILY MEDICINE | Facility: CLINIC | Age: 77
End: 2022-06-14

## 2022-06-14 DIAGNOSIS — I10 ESSENTIAL HYPERTENSION: ICD-10-CM

## 2022-06-14 DIAGNOSIS — E66.9 OBESITY (BMI 30-39.9): ICD-10-CM

## 2022-06-14 RX ORDER — METFORMIN HCL 500 MG
TABLET, EXTENDED RELEASE 24 HR ORAL
Qty: 180 TABLET | Refills: 0 | Status: SHIPPED | OUTPATIENT
Start: 2022-06-14 | End: 2022-09-12

## 2022-06-14 NOTE — TELEPHONE ENCOUNTER
"Psych CNP, Alexander contacted. [facility] in [place].  He has noticed a lot of \"brain fog\" with patient and believes it may be her high dose of Effexor in addition to the her Duloxetine medication.   Since this is a selective serotonin reuptake inhibitor, he is asking if we could taper back on the Effexor.      Gene type testing in process to see what she metabolizes specifically. Based on those results, medication change may be more specific- if you'd like to wait on these for recommendation changes. Results should be back next week.     Also, they are working on getting patient enrolled at [facility] for their pain program.    Any recommendation on tapering back on Effexor or waiting until genetic testing is final?    Would like current med list as well be faxed to their office to ensure they have all records correct. Fax [phone].    MARCO ANTONIO was received during phone conversation for confirmation of sharing information.        "

## 2022-06-14 NOTE — TELEPHONE ENCOUNTER
Reason for Call:  Other Alexander from [facility] wby    Detailed comments:  Alexander calling from [facility] on mutual pt, has questions and possible change requests on psychiatric medications      Please call him [phone]    Phone Number Patient can be reached at: Home number on file [phone] (home)    Best Time: n/a    Can we leave a detailed message on this number? Not Applicable    Call taken on 6/14/2022 at 1:33 PM by Sunday Arango

## 2022-06-14 NOTE — TELEPHONE ENCOUNTER
She will need to taper down to 2 capsules daily for 2 weeks then taper to one capsule daily for 2 weeks then stop IF they are wanting her off of this completely.

## 2022-06-14 NOTE — TELEPHONE ENCOUNTER
Left message to call back for: Alexander at [facility]  Information to relay to patient: does Alexander want us to contact patient regarding this taper schedule?

## 2022-06-15 RX ORDER — METOPROLOL SUCCINATE 50 MG/1
TABLET, EXTENDED RELEASE ORAL
Qty: 90 TABLET | Refills: 2 | Status: SHIPPED | OUTPATIENT
Start: 2022-06-15 | End: 2023-03-17

## 2022-06-15 NOTE — TELEPHONE ENCOUNTER
"Last Written Prescription Date:  8/3/21  Last Fill Quantity: 90,  # refills: 2   Last office visit provider:  4/28/22     Requested Prescriptions   Pending Prescriptions Disp Refills     metoprolol succinate ER (TOPROL XL) 50 MG 24 hr tablet [Pharmacy Med Name: METOPROLOL SUCC ER 50 MG TAB] 90 tablet 2     Sig: TAKE 1 TABLET BY MOUTH EVERY DAY       Beta-Blockers Protocol Passed - 6/14/2022  3:19 PM        Passed - Blood pressure under 140/90 in past 12 months     BP Readings from Last 3 Encounters:   04/28/22 122/84   04/27/22 (!) 142/92   04/07/22 120/88                 Passed - Patient is age 6 or older        Passed - Recent (12 mo) or future (30 days) visit within the authorizing provider's specialty     Patient has had an office visit with the authorizing provider or a provider within the authorizing providers department within the previous 12 mos or has a future within next 30 days. See \"Patient Info\" tab in inbasket, or \"Choose Columns\" in Meds & Orders section of the refill encounter.              Passed - Medication is active on med list             Kianna Dorantes RN 06/15/22 6:55 PM  "

## 2022-06-15 NOTE — TELEPHONE ENCOUNTER
Spoke with Alexander. He is asking if a replacement of 50mg or 100mg of Wellbutrin be a good replacement for Effexor?   Could gabapentin be contributing to her low energy?    Okay for return to clinic

## 2022-06-15 NOTE — TELEPHONE ENCOUNTER
Alexander calling back, will wait for call back. Looking complete and correct medication list and has further questions.  He will go over taper with pt.     Please call Alexander

## 2022-06-24 ASSESSMENT — PATIENT HEALTH QUESTIONNAIRE - PHQ9
SUM OF ALL RESPONSES TO PHQ QUESTIONS 1-9: 17
10. IF YOU CHECKED OFF ANY PROBLEMS, HOW DIFFICULT HAVE THESE PROBLEMS MADE IT FOR YOU TO DO YOUR WORK, TAKE CARE OF THINGS AT HOME, OR GET ALONG WITH OTHER PEOPLE: VERY DIFFICULT
SUM OF ALL RESPONSES TO PHQ QUESTIONS 1-9: 17

## 2022-07-01 ENCOUNTER — OFFICE VISIT (OUTPATIENT)
Dept: FAMILY MEDICINE | Facility: CLINIC | Age: 77
End: 2022-07-01
Payer: COMMERCIAL

## 2022-07-01 VITALS
RESPIRATION RATE: 16 BRPM | TEMPERATURE: 98.5 F | OXYGEN SATURATION: 95 % | WEIGHT: 180.9 LBS | BODY MASS INDEX: 33.29 KG/M2 | HEIGHT: 62 IN | SYSTOLIC BLOOD PRESSURE: 120 MMHG | DIASTOLIC BLOOD PRESSURE: 80 MMHG | HEART RATE: 109 BPM

## 2022-07-01 DIAGNOSIS — R22.0 SCALP LUMP: ICD-10-CM

## 2022-07-01 DIAGNOSIS — R92.8 ABNORMAL MAMMOGRAM: Primary | ICD-10-CM

## 2022-07-01 DIAGNOSIS — Z23 NEED FOR VACCINATION: ICD-10-CM

## 2022-07-01 PROCEDURE — 91305 COVID-19,PF,PFIZER (12+ YRS): CPT | Performed by: NURSE PRACTITIONER

## 2022-07-01 PROCEDURE — 99214 OFFICE O/P EST MOD 30 MIN: CPT | Mod: 25 | Performed by: NURSE PRACTITIONER

## 2022-07-01 PROCEDURE — 0054A COVID-19,PF,PFIZER (12+ YRS): CPT | Performed by: NURSE PRACTITIONER

## 2022-07-01 ASSESSMENT — PATIENT HEALTH QUESTIONNAIRE - PHQ9
10. IF YOU CHECKED OFF ANY PROBLEMS, HOW DIFFICULT HAVE THESE PROBLEMS MADE IT FOR YOU TO DO YOUR WORK, TAKE CARE OF THINGS AT HOME, OR GET ALONG WITH OTHER PEOPLE: VERY DIFFICULT
SUM OF ALL RESPONSES TO PHQ QUESTIONS 1-9: 17

## 2022-07-01 ASSESSMENT — PAIN SCALES - GENERAL: PAINLEVEL: NO PAIN (0)

## 2022-07-01 NOTE — PROGRESS NOTES
Assessment & Plan     Abnormal mammogram    We reviewed her last several screening and diagnostic mammograms with focal ultrasound.  Her next diagnostic mammogram with ultrasound is scheduled for November 1 to follow-up on her oval-shaped mass that is abnormal in appearance in the left breast.  They suspect that it is benign at this time but in order to make sure, they are keeping a close eye on this.  She does continue to perform her breast self-exam.  I did not notice any abnormalities today on her bilateral breast exam today in clinic.    Scalp lump    She does have some patches of dry, flaky skin on the scalp most notable in the temporal region near the ears.  I did ask her to go to [employer] or [employer] to buy some T-Gel and use this 1-2 times per week to reduce the amount of flaking and dry patches that she is having on the scalp at this time.    Need for vaccination    - COVID-19,PF,PFIZER (12+ YRS)        Return in about 3 months (around 10/1/2022) for Follow up, Routine preventive, with me, in person, fasting lab work, med check.    Leonora Kerr NP  [facility]    Benjamin Burns is a 76 year old, presenting for the following health issues:  Derm Problem (Bumps on scalp) and Results (Abnormal mammo)      History of Present Illness       Reason for visit:  Breast exam, bumps on scalp- no itching    She eats 4 or more servings of fruits and vegetables daily.She consumes 0 sweetened beverage(s) daily.She exercises with enough effort to increase her heart rate 10 to 19 minutes per day.  She exercises with enough effort to increase her heart rate 3 or less days per week.   She is taking medications regularly.    Today's PHQ-9         PHQ-9 Total Score: 17    PHQ-9 Q9 Thoughts of better off dead/self-harm past 2 weeks :   Not at all    How difficult have these problems made it for you to do your work, take care of things at home, or get along with other people: Very difficult        Abnormal mammogram:  EXAM: MA DIAGNOSTIC DIGITAL LEFT  LOCATION: [facility]  DATE/TIME: 6/1/2022 8:55 AM     INDICATION:  76-year-old female called back from screening for indeterminate calcifications in the left breast.     COMPARISON: Prior mammogram exams, including 5/18/2022, 2/17/2021, 12/5/2019, 10/17/2018, 9/25/2017, 9/21/2016, 11/14/2014..     MAMMOGRAPHIC FINDINGS: Left full-field digital diagnostic mammogram performed in the ML projection as well as dedicated CC and ML magnification views. There are scattered areas of fibroglandular density.. Images evaluated with the assistance of CAD.   Dedicated magnification views demonstrate a 10 x 2 mm grouping of coarse heterogeneous calcifications in the upper outer breast at 2:00 posterior depth. Allowing for differences in technique, these have been present and have changed little since December 2019. Given the relative stability, a 6 month follow-up diagnostic mammogram with magnification views is recommended.                                                                       IMPRESSION:  Probably benign calcifications in the left breast at 2:00 posterior depth for which a 6 month follow-up mammogram with magnification views is recommended to ensure stability.     I personally reviewed these findings and recommendations with the patient at the conclusion of the examination.      ACR BI-RADS Category 3: Probably Benign.    It was recommended to her that she get an updated breast exam so that is why she is here today. She denies any breast changes since her mammogram 6/1/22. She is scheduled for repeat diagnostic mammogram with focal US on 11/1/22          Bumps on scalp  Onset/Duration: 3 months  Description  Location: random areas on scalp  Color: unable to see them so unsure  Border description: irregular border, raised, scaly  Character: raised, flakey  Itching: no  Bleeding:  no  Intensity:  0/10  Progression of Symptoms:   "same and intermittent  Accompanying signs and symptoms:   Bleeding: no  Scaling: YES  Excessive sun exposure/tanning: no  Sunscreen used: no  History:           Any previous history of skin cancer: no  Any family history of melanoma: no  Previous episodes of similar lesion: no  Precipitating or alleviating factors: none, goes away on their own  Therapies tried and outcome: topical antibiotic ointment on scalp, no change        Review of Systems   CONSTITUTIONAL: NEGATIVE for fever, chills, change in weight  ENT/MOUTH: NEGATIVE for ear, mouth and throat problems  RESP: NEGATIVE for significant cough or SOB  CV: NEGATIVE for chest pain, palpitations or peripheral edema      Objective    /80 (BP Location: Right arm, Patient Position: Sitting, Cuff Size: Adult Regular)   Pulse 109   Temp 98.5  F (36.9  C) (Oral)   Resp 16   Ht 1.562 m (5' 1.5\")   Wt 82.1 kg (180 lb 14.4 oz)   SpO2 95%   BMI 33.63 kg/m    Body mass index is 33.63 kg/m .  Physical Exam   GENERAL: healthy, alert and no distress  NECK: no adenopathy, no asymmetry, masses, or scars and thyroid normal to palpation  RESP: lungs clear to auscultation - no rales, rhonchi or wheezes  BREAST: normal without masses, tenderness or nipple discharge and no palpable axillary masses or adenopathy  CV: regular rate and rhythm, normal S1 S2, no S3 or S4, no murmur, click or rub  SKIN: no suspicious lesions or rashes, patches of dry, flaky skin on scalp, most notable in the hair line near ears                    .  ..  "

## 2022-07-25 ENCOUNTER — HOSPITAL ENCOUNTER (EMERGENCY)
Facility: CLINIC | Age: 77
Discharge: HOME | End: 2022-07-25
Attending: EMERGENCY MEDICINE | Admitting: EMERGENCY MEDICINE
Payer: COMMERCIAL

## 2022-07-25 VITALS
TEMPERATURE: 98 F | HEIGHT: 63 IN | HEART RATE: 90 BPM | DIASTOLIC BLOOD PRESSURE: 97 MMHG | SYSTOLIC BLOOD PRESSURE: 165 MMHG | RESPIRATION RATE: 20 BRPM | OXYGEN SATURATION: 97 % | WEIGHT: 175.56 LBS | BODY MASS INDEX: 31.11 KG/M2

## 2022-07-25 DIAGNOSIS — G89.29 OTHER CHRONIC PAIN: ICD-10-CM

## 2022-07-25 DIAGNOSIS — F33.9 RECURRENT MAJOR DEPRESSIVE DISORDER, REMISSION STATUS UNSPECIFIED (H): ICD-10-CM

## 2022-07-25 DIAGNOSIS — R45.851 SUICIDAL IDEATION: ICD-10-CM

## 2022-07-25 DIAGNOSIS — Z63.9 RELATIONSHIP PROBLEMS: ICD-10-CM

## 2022-07-25 LAB — SARS-COV-2 RNA RESP QL NAA+PROBE: NEGATIVE

## 2022-07-25 PROCEDURE — C9803 HOPD COVID-19 SPEC COLLECT: HCPCS

## 2022-07-25 PROCEDURE — 90791 PSYCH DIAGNOSTIC EVALUATION: CPT

## 2022-07-25 PROCEDURE — 250N000013 HC RX MED GY IP 250 OP 250 PS 637: Performed by: NURSE PRACTITIONER

## 2022-07-25 PROCEDURE — 99285 EMERGENCY DEPT VISIT HI MDM: CPT | Mod: 25

## 2022-07-25 PROCEDURE — U0005 INFEC AGEN DETEC AMPLI PROBE: HCPCS | Performed by: EMERGENCY MEDICINE

## 2022-07-25 PROCEDURE — 99204 OFFICE O/P NEW MOD 45 MIN: CPT | Performed by: NURSE PRACTITIONER

## 2022-07-25 RX ORDER — SUMATRIPTAN 50 MG/1
50 TABLET, FILM COATED ORAL
COMMUNITY
End: 2023-02-08

## 2022-07-25 RX ORDER — SUMATRIPTAN 50 MG/1
50 TABLET, FILM COATED ORAL ONCE
Status: COMPLETED | OUTPATIENT
Start: 2022-07-25 | End: 2022-07-25

## 2022-07-25 RX ADMIN — SUMATRIPTAN SUCCINATE 50 MG: 50 TABLET ORAL at 17:29

## 2022-07-25 SDOH — SOCIAL STABILITY - SOCIAL INSECURITY: PROBLEM RELATED TO PRIMARY SUPPORT GROUP, UNSPECIFIED: Z63.9

## 2022-07-25 NOTE — ED TRIAGE NOTES
Pt has been on anitdepressants for 4 yrs, states it getting worse, sees a therapist and scheduled to see a psychologist next September. States she has threatened to hurt herself but wouldn't do it.      Triage Assessment     Row Name 07/25/22 0216       Triage Assessment (Adult)    Airway WDL WDL       Respiratory WDL    Respiratory WDL WDL       Cardiac WDL    Cardiac WDL WDL       Cognitive/Neuro/Behavioral WDL    Cognitive/Neuro/Behavioral WDL WDL

## 2022-07-25 NOTE — ED NOTES
"Pt in the recliner holding her head  In pain due to a migraine asking \"When can I see the psychiatrist and get out of here?\" Pt was told that she is second in line at this time.   "

## 2022-07-25 NOTE — ED PROVIDER NOTES
"  History     Chief Complaint:  Depression       HPI   Katy Hong is a 76 year old female who presents with depression.  Suicidal thoughts but states \"I\"m chicken and wouldn't\"  Contracts for safety while waiting here in the ED. Patient reports increased triggers surrounding her  are exacerbating her depression. She last saw her therapist one week ago. She takes medicine for her symptoms. She believes she will be while in the ER.  She has a past medical history including hypertension, migraines, and fibromyalgia. Patient arrives with her son.     ROS:  Review of Systems  Psychiatric: Depressed mood and flat affect, tearful  Cardiovascular: No chest pain  Neurologic: No headaches, numbness, weakness  Remainder of systems reviewed and negative    Allergies:  Fluoxetine  Latex  Levothyroxine  Lisinopril  Sulfa (Sulfonamide Antibiotics) [Sulfa Drugs]     Medications:    aspirin 81 MG EC tablet  busPIRone (BUSPAR) 15 MG tablet  calcium carbonate (OS-MONIQUE) 500 mg calcium (1,250 mg) chewable tablet  cholecalciferol, vitamin D3, 5,000 unit Tab  DULoxetine (CYMBALTA) 30 MG capsule  famotidine (PEPCID) 20 MG tablet  furosemide (LASIX) 20 MG tablet  gabapentin (NEURONTIN) 300 MG capsule  losartan (COZAAR) 25 MG tablet  metFORMIN (GLUCOPHAGE XR) 500 MG 24 hr tablet  metoprolol succinate ER (TOPROL XL) 50 MG 24 hr tablet  rizatriptan (MAXALT-MLT) 10 MG ODT  traZODone (DESYREL) 150 MG tablet  triamcinolone (KENALOG) 0.1 % external ointment  venlafaxine (EFFEXOR-XR) 75 MG 24 hr capsule        Past Medical History:    Past Medical History:   Diagnosis Date     Abnormal mammogram, unspecified      Acute sinusitis, unspecified      Asymptomatic varicose veins      Breast cyst      Cellulitis and abscess of unspecified site      Circumscribed scleroderma      Contusion of unspecified site      Disorder of bone and cartilage, unspecified      Dysthymic disorder      Esophageal reflux      Insomnia, unspecified      " "Lumbago      Migraine, unspecified, without mention of intractable migraine without mention of status migrainosus      Myalgia and myositis, unspecified      Myocardial infarction (H)      Obesity, unspecified      Open wound of lip, without mention of complication      Other primary cardiomyopathies      Other seborrheic keratosis      Pain in joint, lower leg      Pain in limb      Sebaceous cyst      Shortness of breath      Shoulder and upper arm, insect bite, nonvenomous, without mention of infection(912.4)      Unspecified disease of sebaceous glands      Unspecified essential hypertension      Unspecified venous (peripheral) insufficiency      Unspecified vitamin D deficiency        Past Surgical History:    Past Surgical History:   Procedure Laterality Date     APPENDECTOMY       BIOPSY BREAST Left      BREAST CYST ASPIRATION Left      CATARACT EXTRACTION, BILATERAL       HYSTERECTOMY       OOPHORECTOMY       TEMPOROMANDIBULAR JOINT SURGERY Bilateral      TUBAL LIGATION       ZZC TOTAL ABDOM HYSTERECTOMY       still has both ovaries        Family History:    family history includes Alzheimer Disease in her father; Arthritis in her maternal grandmother and mother; Breast Cancer in her cousin; Breast Cancer (age of onset: 60.00) in her maternal aunt; Diabetes in her father; Hypertension in her father, mother, and sister; Obesity in her brother, maternal aunt, and maternal uncle; Osteopenia in her sister; Varicose Veins in her brother.    Social History:   reports that she has never smoked. She has never used smokeless tobacco. She reports current alcohol use. She reports that she does not use drugs.  PCP: Leonora Kerr     Physical Exam     Patient Vitals for the past 24 hrs:   BP Temp Pulse Resp SpO2 Height Weight   07/25/22 1240 (!) 158/95 97.4  F (36.3  C) 101 20 94 % 1.6 m (5' 3\") 79.8 kg (176 lb)        Physical Exam  General: Patient is alert and tearful.  Depressed mood and flat affect  HEENT: Head " atraumatic    Eyes: pupils equal and reactive. Conjunctiva clear   Nares: patent   Oropharynx: no lesions, uvula midline, no palatal draping, normal voice, no trismus  Neck: Supple without lymphadenopathy, no meningismus  Chest: Heart regular rate and rhythm.   Lungs: Equal clear to auscultation with no wheeze or rales  Abdomen: Soft, non tender, nondistended, normal bowel sounds  Back: No costovertebral angle tenderness, no midline C, T or L spine tenderness  Neuro: Grossly nonfocal, normal speech, strength equal bilaterally, CN 2-12 intact  Extremities: No deformities, equal radial and DP pulses. No clubbing, cyanosis.  No edema  Skin: Warm and dry with no rash.       Emergency Department Course     Laboratory:  Labs Ordered and Resulted from Time of ED Arrival to Time of ED Departure   COVID-19 VIRUS (CORONAVIRUS) BY PCR - Normal       Result Value    SARS CoV2 PCR Negative            Emergency Department Course:    Mental Health Risk Assessment      PSS-3    Date and Time Over the past 2 weeks have you felt down, depressed, or hopeless? Over the past 2 weeks have you had thoughts of killing yourself? Have you ever attempted to kill yourself? When did this last happen? User   07/25/22 1245 yes yes no -- F      C-SSRS ([place])    Date and Time Q1 Wished to be Dead (Past Month) Q2 Suicidal Thoughts (Past Month) Q3 Suicidal Thought Method Q4 Suicidal Intent without Specific Plan Q5 Suicide Intent with Specific Plan Q6 Suicide Behavior (Lifetime) Within the Past 3 Months? RETIRED: Level of Risk per Screen Screening Not Complete User   07/25/22 1245 yes yes no no no no -- -- -- F              Suicide assessment completed by mental health (D.E.C., LCSW, etc.)    Reviewed:  I reviewed nursing notes, vitals and past medical history    Assessments:  1300 I obtained history and examined the patient as noted above.           Interventions:  Medications - No data to display     Disposition:  The patient was transferred  to [facility].     Impression & Plan    CMS Diagnoses: none    Medical Decision Making:  Patient is a 76-year-old female with history of depression who states that her symptoms are getting worse despite her medications and she last saw her therapist 2 weeks ago.  She states her  is the source of her stress and trigger for her.  She has been having suicidal thoughts but no plan to act on them.  She presents here with her son.  She has history of fibromyalgia, high cholesterol, hypertension.  She denies medical symptoms today.  She is medically cleared at this time.  COVID test is negative.  We will plan to send to the [facility] unit for further mental health evaluation.  Patient is agreeable with this plan and all questions and concerns addressed.    Diagnosis:    ICD-10-CM    1. Depression, unspecified depression type  F32.A    2. Suicidal ideation  R45.851         Discharge Medications:  New Prescriptions    No medications on file        7/25/2022   Niecy Cuellar MD Neuner, Maria Bea, MD  07/25/22 1419

## 2022-07-25 NOTE — CONSULTS
"Diagnostic Evaluation Consultation  Crisis Assessment    Patient was assessed: in person  [facility]  Was a release of information signed: Yes. Providers included on the release: Tita Green (therapist) and Alexander Duarte (psychiatrist) at [facility]      Referral Data and Chief Complaint  Katy is a 76 year old, who uses she/her pronouns, and presents to the ED with her son Alexander. Patient is referred to the ED by her mental healthcare providers at [facility]. Patient is presenting to the ED for the following concerns: depression and suicidal statements reported by family.      Informed Consent and Assessment Methods     Patient is her own guardian. Writer met with patient and explained the crisis assessment process, including applicable information disclosures and limits to confidentiality, assessed understanding of the process, and obtained consent to proceed with the assessment. Patient was observed to be able to participate in the assessment as evidenced by verbal understanding and engagement in the assessment process. Assessment methods included conducting a formal interview with patient, review of medical records, collaboration with medical staff, and obtaining relevant collateral information from family and community providers when available..     Over the course of this crisis assessment provided reassurance, offered validation, worked with patient on safety and aftercare planning and facilitated family communication. Patient's response to interventions was positive. Patient was able to provide MH history to this writer and engage in disposition planning.      Summary of Patient Situation     Patient reports a history of depression over the past three years that stems from uncontrolled chronic pain. Patient has diagnoses of fibromyalgia and chronic migraines. Patient states it is \"hard to exist\" and that her  \"does not care\" about her. Patient reports disruptions to sleep and appetite. " "Patient has not slept or eaten since yesterday morning. Patient states that she left the house yesterday due to stress in her marital relationship and sat in her car all night. Patient denies suicidal ideation. However, son reports that patient has made statements about cutting her wrists and that her life is not worth living. Her son believes that patient does not intend to end her life though.    Brief Psychosocial History     Patient lives with her  and reports stress in her marital relationship. Patient states that her  does not offer her the emotional support she needs. Patient identifies her son Alexander as a significant source of support. Patient does not work at present. Patient has chronic pain caused by recurrent migraines and fibromyalgia.     Significant Clinical History    Patient reports history of depression over the past three years. Her son indicates that she has had mental health symptoms over the past three to four months. Patient started working with therapist Tita Green at [facility] six weeks ago and reports that her therapist is helpful. She is also working with psychiatrist Alexander Duarte at [facility]. Patient has a diagnosis of major depressive disorder.      Collateral Information    The following information was received from Alexander Hong whose relationship to the patient is son. Information was obtained in person. Their phone number is [phone] and they last had contact with patient on 07/25/2022.    What happened today: Pt \"took off\" last night and did not come back until early this morning. Pt's family does not know where she was overnight but believes that she may have been sleeping in a parking lot. Alexander called police last night to alert them that Pt took off. Pt made statements to her  yesterday about cutting her wrists and has also stated that her life is not worth living. However, Alexander notes that Pt has said that she would not injure or kill herself.    What is different " "about patient's functioning: Alexander reports that Pt has had mental health symptoms over the past three to four months. About three to four months ago, she \"took off\" and the family did not know where she went. Pt believes that her  does not love her and that he is putting her down. Alexander agrees that there are communication issues in their marriage but has not seen his father put Pt down. Pt is talking about wanting to divorce her .    Concern about alcohol/drug use: No    What do you think the patient needs: Pt is currently seeing therapist Tita Green and psychiatrist Alexander Duarte through [facility]. Time would like to explore IOP options for her.     Has patient made comments about wanting to kill themselves/others:  Yes Pt has made comments about slitting their wrists and that her life is not worth living. Pt has denied intent to follow-through with suicidal statements though per Alexander.    If d/c is recommended, can they take part in safety/aftercare planning: Yes Alexander will assist Pt in following-up with IOP referrals.    Other information: Pt's father had Alzheimer's disease. This writer asked whether Alexander has noted changes in Pt's memory and Alexander said no.     Risk Assessment  ESS-6  1.a. Over the past 2 weeks, have you had thoughts of killing yourself? Yes. Patient denies suicidal ideation but son reports that she has made a comment about slitting her wrists yesterday and that her life is not worth living.  1.b. Have you ever attempted to kill yourself and, if yes, when did this last happen? No   2. Recent or current suicide plan? Yes Patient denies suicidal ideation but son reports that she made a comment about slitting her wrists yesterday.   3. Recent or current intent to act on ideation? No  4. Lifetime psychiatric hospitalization? No  5. Pattern of excessive substance use? No  6. Current irritability, agitation, or aggression? Yes (but caused by chronic migraine)  Scoring note: BOTH 1a and 1b must be yes for it " to score 1 point, if both are not yes it is zero. All others are 1 point per number. If all questions 1a/1b - 6 are no, risk is negligible. If one of 1a/1b is yes, then risk is mild. If either question 2 or 3, but not both, is yes, then risk is automatically moderate regardless of total score. If both 2 and 3 are yes, risk is automatically high regardless of total score.      Score: 2, mild risk      Does the patient have access to lethal means? No     Does the patient engage in non-suicidal self-injurious behavior (NSSI/SIB)? no     Does the patient have thoughts of harming others? No     Is the patient engaging in sexually inappropriate behavior?  no        Current Substance Abuse     Is there recent substance abuse? no     Was a urine drug screen or blood alcohol level obtained: No       Mental Status Exam     Affect: Flat   Appearance: Appropriate    Attention Span/Concentration: Inattentive (due to migraine)  Eye Contact: Avoidant (due to visual difficulties caused by migraine)  Fund of Knowledge: Appropriate    Language /Speech Content: Fluent   Language /Speech Volume: Soft    Language /Speech Rate/Productions: Slow    Recent Memory: Intact   Remote Memory: Intact   Mood: Anxious and Depressed    Orientation to Person: Yes    Orientation to Place: Yes   Orientation to Time of Day: Yes    Orientation to Date: Yes    Situation (Do they understand why they are here?): Yes    Psychomotor Behavior: Normal    Thought Content: Suicidal; Patient denies suicidal ideation but son reports recent suicidal statements.  Thought Form: Intact      History of commitment: No     Mini-Cog was not completed as patient had excruciating headache and was having difficulty with vision due to headache.      Medication    Psychotropic medications: Yes. Pt is currently taking Effexor and Buspar. Patient could not recall dosages but April notes from PCP indicate 75 mg 24 hr capsule and Buspar 15 mg tablet. Medication compliant: Yes.  Recent medication changes: No  Medication changes made in the last two weeks: No       Current Care Team    Primary Care Provider: Leonora Kerr  Psychiatrist: Yes. Name: Alexander Duarte. Location: [facility]). Date of last visit: unknown, within past few weeks. Frequency: next appointment scheduled for early September. Perceived helpfulness: helpful.  Therapist: Yes. Name: Tita Green. Location: [facility]). Date of last visit: last Wednesday. Frequency: weekly. Perceived helpfulness: helpful.  : No     CTSS or ARMHS: No  ACT Team: No  Other: No      Diagnosis    296.32 (F33.1) Major Depressive Disorder, Recurrent Episode, Moderate _   300.00 (F41.9) Unspecified Anxiety Disorder - by history     Clinical Summary and Substantiation of Recommendations    After therapeutic assessment, intervention and aftercare planning by ED care team and [facility] and in consultation with attending provider, the patient's circumstances and mental state were safe for outpatient management. Patient has existing therapy and psychiatry and was given IOP resources. Patient has support from son. The patient was discharged. Close follow-up with a psychiatrist and/or therapist was recommended and community psychiatric resources were provided. Patient is to return to the ED if any urgent or potentially life-threatening concerns arise.       At the time of discharge, the patient's acute suicide risk was determined to be low due to the following factors: denial of suicidal thoughts, not currently under the influence of alcohol or illicit substances, denies experiencing command hallucinations and no immediate access to firearms. Protective factors include: social support, voluntarily seeking mental health support, established relationship community mental health provider(s), expresses desire to engage in treatment, sense of obligation to people/pets and safe/stable housing   Disposition    Recommended disposition: Individual therapy,  medication management, IOP, and potential county services     Reviewed case and recommendations with attending provider. Attending Name: Dr. Belgica Ford     Attending concurs with disposition: Yes       Patient concurs with disposition: Yes       Guardian concurs with disposition: NA      Final disposition: Individual therapy, medication management, and IOP    Outpatient Details (if applicable):   Aftercare plan and appointments placed in the AVS and provided to patient: Yes. Given to patient by attending nurse    Was lethal means counseling provided as a part of aftercare planning? No;       Assessment Details    Patient interview started at: 4:00 PM and completed at: 4:45 PM.     Total duration spent on the patient case in minutes: 1.50 hrs      CPT code(s) utilized: 16716 - Psychotherapy for Crisis - 60 (30-74*) min and 31153 - Psychotherapy for Crisis (Each additional 30 minutes) - 30 min        BRITTANY Upton  DEC - Triage & Transition Services      Aftercare Plan  If I am feeling unsafe or I am in a crisis, I will:   Contact my established care providers   Call the National Suicide Prevention Lifeline: 988  Go to the nearest emergency room   Call 911     Warning signs that I or other people might notice when a crisis is developing for me: making statements about wanting to hurt myself, taking off without letting my family know where I am    Things I am able to do on my own to cope or help me feel better: go on a walk, get outside, eat enough food, drink water    Things that I am able to do with others to cope or help me better: talk with supportive family members    Changes I can make to support my mental health and wellness: continue to attend therapy and psychiatry, follow-through with intensive outpatient program referrals     People in my life that I can ask for help: my son, my therapist, my psychiatrist    Your [facility] has a mental health crisis team you can call 24/7: [facility]   "[phone]     [facility] Services: [phone], [email or web address].    Intensive Outpatient Program Options  [facility] // PH: [phone]    [place] location,     9-12 or 1-4 Monday through Friday    typical program length is 6-8 weeks    [facility] // PH: [phone]    STAT: short-term stabilization program, 10 days    [facility]: 3 times a week for 8 weeks or 2 times a week for 12 weeks    [place] [facility]      Crisis Lines  Crisis Text Line  Text 936216  You will be connected with a trained live crisis counselor to provide support.    Por espanol, texto  DOMINIC a 876978 o texto a 442-AYUDAME en WhatsApp    [employer] (LGBTQ Youth Crisis Line)  2.376.787.0437  text START to 027-031      Community Resources  Fast Tracker  Linking people to mental health and substance use disorder resources  [employer]     [place] Mental Health Warm Line  Peer to peer support  Monday thru Saturday, 12 pm to 10 pm  752.183.0632 or 2.674.875.2060  Text \"Support\" to 69248    National [place] [employer] Mental Illness ([place]ARD[place])  120.021.4747 or 1.888.GERARD[place].HELPS      Mental Health Apps  My3  https://myRunic Gamespp.org/    VirtualHopeBox  https://[employer].org/apps/virtual-hope-box/      Additional Information  Today you were seen by a licensed mental health professional through Triage and Transition services, [facility] ([facility])  for a crisis assessment in the Emergency Department at [facility].  It is recommended that you follow up with your established providers (psychiatrist, mental health therapist, and/or primary care doctor - as relevant) as soon as possible. Coordinators from [facility] will be calling you in the next 24-48 hours to ensure that you have the resources you need.  You can also contact [facility] coordinators directly at [phone]. You may have been scheduled for or offered an appointment with a mental health provider. [facility] maintains " an extensive network of licensed behavioral health providers to connect patients with the services they need.  We do not charge providers a fee to participate in our referral network.  We match patients with providers based on a patient's specific needs, insurance coverage, and location.  Our first effort will be to refer you to a provider within your care system, and will utilize providers outside your care system as needed.

## 2022-07-25 NOTE — ED NOTES
"Rapid Assessment Note    History:   Katy Hong is a 76 year old female who presents with depression.  Suicidal thoughts but states \"I\"m chicken and wouldn't\"  Contracts for safety while waiting here in the ED. Patient reports increased triggers surrounding her  are exacerbating her depression. She last saw her therapist one week ago. She takes medicine for her symptoms. She believes she will be while in the ER.  She has a past medical history including hypertension, migraines, and fibromyalgia. Patient arrives with her son.     Exam:     General:  Alert, interactive  Cardiovascular:  Well perfused  Lungs:  No respiratory distress, no accessory muscle use  Neuro:  Moving all 4 extremities  Skin:  Warm, dry  Psych:  Normal affect    Plan of Care:   I evaluated the patient and developed an initial plan of care. I discussed this plan and explained that I, or one of my partners, would be returning to complete the evaluation.     I, Bryant Molina, am serving as a scribe to document services personally performed by Niecy Cuellar MD, based on my observations and the provider's statements to me.    07/25/2022  [employer]    Portions of this medical record were completed by a scribe. UPON MY REVIEW AND AUTHENTICATION BY ELECTRONIC SIGNATURE, this confirms (a) I performed the applicable clinical services, and (b) the record is accurate.        Niecy Cuellar MD  07/25/22 1258    "

## 2022-07-25 NOTE — PROGRESS NOTES
"Pt presented with Son today for worsening depression despite medications and Therapist, SI with no plan or intent. She denies HI, hallucinations, drug or ETOH use. She is very irritable and tense, assessment was limited, she is upset she was waiting so long in  the ED,we apologized to the  pt and reassured her we are doing our best. When obtaining vitals she got irritated and stated, \" they did that already.\" [facility] and routine was explained to her, she agrees to stay . Her belongings were placed in a locker, search complete and shown around unit. She is upset she has not ate, but refused meal and menu provided to her. She is reporting having a migraine, reported off to oncoming shift.   "

## 2022-07-26 NOTE — ED PROVIDER NOTES
"[facility] - Psychiatric Consultation  [facility] Emergency Department    Katy Hong MRN: [id number]   Age: 76 year old YOB: 1945     History     Chief Complaint   Patient presents with     Depression     HPI  Katy Hong is a 76 year old female with history notable for chronic pain and depression who presents to the ED for suicidal statements in the context of aurguring with . Patient was evaluated by the ED provider, who medically cleared patient to transfer to [facility] for psychiatric assessment, this is reviewed along with all pertinent labs and tests performed.    On examination, patient states she is experiencing a horrible migraine and is seeking discharge home as soon as possible. She states she is not suicidal. She has no intent or plan to act on any thoughts. She states she made the comment yesterday after augment with her  as she perceives him to be unsupportive as it pertains to her chronic pain issues. She states she made the comment to \"tick him off\" and then she left the house for the night.  She identifies chronic depression stemming from the chronic pain. She finds minimal relief of her depressive symptoms with current medication treatment. She is wanting to decrease her medication regime and is working with her outpatient provider to manage her psychiatric medication. She states she sleeps well at night with sleep medication. She is not seeking medication changes, rather interested in additional outpatient therapy, such as PHP or IOP.    Past Medical History  Past Medical History:   Diagnosis Date     Abnormal mammogram, unspecified     Created by Conversion      Acute sinusitis, unspecified     Created by Conversion      Asymptomatic varicose veins     Created by Conversion      Breast cyst      Cellulitis and abscess of unspecified site     Created by Conversion      Circumscribed scleroderma     Created by Conversion      Contusion of unspecified site     " Created by Conversion      Disorder of bone and cartilage, unspecified     Created by Conversion      Dysthymic disorder     Created by Conversion      Esophageal reflux     Created by Conversion      Insomnia, unspecified     Created by Conversion      Lumbago     Created by Conversion      Migraine, unspecified, without mention of intractable migraine without mention of status migrainosus     Created by Conversion      Myalgia and myositis, unspecified     Created by Conversion      Myocardial infarction (H)     per EKG's since 2015     Obesity, unspecified     Created by Conversion      Open wound of lip, without mention of complication     Created by Conversion      Other primary cardiomyopathies     Created by Conversion      Other seborrheic keratosis     Created by Conversion      Pain in joint, lower leg     Created by Conversion      Pain in limb     Created by Conversion      Sebaceous cyst     Created by Conversion      Shortness of breath     Created by Conversion      Shoulder and upper arm, insect bite, nonvenomous, without mention of infection(912.4)     Created by Conversion      Unspecified disease of sebaceous glands     Created by Conversion      Unspecified essential hypertension     Created by Conversion      Unspecified venous (peripheral) insufficiency     Created by Conversion      Unspecified vitamin D deficiency     Created by Conversion      Past Surgical History:   Procedure Laterality Date     APPENDECTOMY       BIOPSY BREAST Left      BREAST CYST ASPIRATION Left      CATARACT EXTRACTION, BILATERAL       HYSTERECTOMY       OOPHORECTOMY       TEMPOROMANDIBULAR JOINT SURGERY Bilateral      TUBAL LIGATION       ZZC TOTAL ABDOM HYSTERECTOMY       still has both ovaries     SUMAtriptan (IMITREX) 50 MG tablet  aspirin 81 MG EC tablet  busPIRone (BUSPAR) 15 MG tablet  calcium carbonate (OS-MONIQUE) 500 mg calcium (1,250 mg) chewable tablet  cholecalciferol, vitamin D3, 5,000 unit Tab  DULoxetine  "(CYMBALTA) 30 MG capsule  famotidine (PEPCID) 20 MG tablet  furosemide (LASIX) 20 MG tablet  gabapentin (NEURONTIN) 300 MG capsule  losartan (COZAAR) 25 MG tablet  metFORMIN (GLUCOPHAGE XR) 500 MG 24 hr tablet  metoprolol succinate ER (TOPROL XL) 50 MG 24 hr tablet  rizatriptan (MAXALT-MLT) 10 MG ODT  traZODone (DESYREL) 150 MG tablet  triamcinolone (KENALOG) 0.1 % external ointment  venlafaxine (EFFEXOR-XR) 75 MG 24 hr capsule      Allergies   Allergen Reactions     Fluoxetine Cough and Rash     Pt thinks it was cough but not totally sure.     Latex Rash     Levothyroxine Rash     Lisinopril Cough and Rash     Sulfa (Sulfonamide Antibiotics) [Sulfa Drugs] Itching and Rash     Family History  Family History   Problem Relation Age of Onset     Hypertension Mother      Arthritis Mother      Alzheimer Disease Father         d. 76     Diabetes Father      Hypertension Father      Breast Cancer Maternal Aunt 60.00     Obesity Maternal Aunt      Hypertension Sister      Osteopenia Sister      Varicose Veins Brother      Obesity Brother      Breast Cancer Cousin      Obesity Maternal Uncle      Arthritis Maternal Grandmother      Social History   Social History     Tobacco Use     Smoking status: Never Smoker     Smokeless tobacco: Never Used   Substance Use Topics     Alcohol use: Yes     Alcohol/week: 0.0 standard drinks     Comment: Alcoholic Drinks/day: occasional     Drug use: No      Past medical history, past surgical history, medications, allergies, family history, and social history were reviewed with the patient. No additional pertinent items.       Review of Systems  A complete review of systems was performed with pertinent positives and negatives noted in the HPI, and all other systems negative.    Physical Examination   BP: (!) 158/95  Pulse: 101  Temp: 97.4  F (36.3  C)  Resp: 20  Height: 160 cm (5' 3\")  Weight: 79.8 kg (176 lb)  SpO2: 94 %    Physical Exam  General: Appears stated age.   Neuro: Alert and " "fully oriented. Extremities appear to demonstrate normal strength on visual inspection.   Integumentary/Skin: no rash visualized, normal color    Psychiatric Examination   Appearance: awake, alert, fatigued and mild distress  Attitude:  guarded  Eye Contact:  fair  Mood:  \"in pain\"  Affect:  intensity is normal  Speech:  clear, coherent  Psychomotor Behavior:  no evidence of tardive dyskinesia, dystonia, or tics  Thought Process:  logical, linear and goal oriented  Associations:  no loose associations  Thought Content:  no evidence of suicidal ideation or homicidal ideation and no evidence of psychotic thought  Insight:  good  Judgement:  intact  Oriented to:  time, person, and place  Attention Span and Concentration:  intact  Recent and Remote Memory:  intact  Language: able to name/identify objects without impairment  Fund of Knowledge: intact with awareness of current and past events    ED Course        Labs Ordered and Resulted from Time of ED Arrival to Time of ED Departure   COVID-19 VIRUS (CORONAVIRUS) BY PCR - Normal       Result Value    SARS CoV2 PCR Negative         Assessments & Plan (with Medical Decision Making)   Patient presenting with suicidal statements in the context of MDD and recent argument with , further complicated by experiencing chronic pain. Patient working closely with PCP to manage psychiatric medications. She is not seeking adjustments. She is not endorsing suicidal thoughts, states her statement was blown out of preportion as she only made it to \"tick\" her  off.    Nursing notes reviewed noting no acute issues.     I have reviewed the assessment completed by the [facility].     After a period of working with the treatment team on the [facility] unit, the patient's mental state improved to allow a safe transition to outpatient care. After counseling on the diagnosis, work-up, and treatment plan, the patient was discharged. Close follow-up with a psychiatrist and/or therapist was " recommended and community psychiatric resources were provided. Patient is to return to the ED if any urgent or potentially life-threatening concerns.     At the time of discharge, the patient's acute suicide risk was determined to be low due to the following factors: Reduction in the intensity of mood/anxiety symptoms that preceded the admission, denial of suicidal thoughts, denies feeling helpless or helpless, not currently under the influence of alcohol or illicit substances, denies experiencing command hallucinations, no immediate access to firearms. The patient's acute risk could be higher if noncompliant with their treatment plan, medications, follow-up appointments or using illicit substances or alcohol. Protective factors include: social supports, children, stable housing.    Preliminary diagnosis:    ICD-10-CM    1. Suicidal ideation  R45.851    2. Other chronic pain  G89.29    3. Relationship problems  Z63.9    4. Recurrent major depressive disorder, remission status unspecified (H)  F33.9         Treatment Plan:  -Discharge home with safety plan in place.  -Referral to [facility]. Patient would be a good candidate for the Senior Treatment Program at the [facility].  -Continue with established therapist.  -Medication change was considered today. Current medications are providing clinical benefit with good tolerability. Thus, no changes were made. Recommended Remeron to augment Effexor if depressive symptoms worsen.      --  NELSON Ding CNP   [facility] EMERGENCY DEPT  [facility] Unit  7/25/2022      Belgica Ford APRN CNP  07/25/22 1934

## 2022-07-26 NOTE — ED NOTES
Patient agreeable to discharge plan. Discharge instructions reviewed with patient including follow-up care plan. Reviewed safety plan and outpatient resources. Denies SI and HI. All belongings that were brought into the hospital have been returned to patient. Escorted off the unit at 1945 accompanied by [facility] staff. Discharged to home via son, Alexander.

## 2022-07-26 NOTE — DISCHARGE INSTRUCTIONS
Continue current medications.  May benefit from Mirtazapine to manage depressive symptoms.   Continue working with your PCP for medication management.    Aftercare Plan  If I am feeling unsafe or I am in a crisis, I will:   Contact my established care providers   Call the National Suicide Prevention Lifeline: 988  Go to the nearest emergency room   Call 911     Warning signs that I or other people might notice when a crisis is developing for me: making statements about wanting to hurt myself, taking off without letting my family know where I am    Things I am able to do on my own to cope or help me feel better: go on a walk, get outside, eat enough food, drink water    Things that I am able to do with others to cope or help me better: talk with supportive family members    Changes I can make to support my mental health and wellness: continue to attend therapy and psychiatry, follow-through with intensive outpatient program referrals     People in my life that I can ask for help: my son, my therapist, my psychiatrist    Your [facility] has a mental health crisis team you can call 24/7: [facility] Crisis  [phone]     [facility] Services: [phone], [email or web address].    Intensive Outpatient Program Options  [facility] IOP // PH: [phone]  [facility],   9-12 or 1-4 Monday through Friday  typical program length is 6-8 weeks    [facility] IOP // PH: [phone]  STAT: short-term stabilization program, 10 days  [facility]: 3 times a week for 8 weeks or 2 times a week for 12 weeks  [facility]      Crisis Lines  Crisis Text Line  Text 492148  You will be connected with a trained live crisis counselor to provide support.    Por espanol, texto  DOMINIC a 832682 o texto a 442-AYUDAME en WhatsApp    [employer] (LGBTQ Youth Crisis Line)  2.181.512.2468  text START to 765-691      Community Resources  Fast Tracker  Linking people to mental health and  "substance use disorder resources  fastiCents.netckCloud Practicen.[employer] Card     [place] Mental Health Warm Line  Peer to peer support  Monday thru Saturday, 12 pm to 10 pm  457.089.7695 or 4.253.173.0996  Text \"Support\" to 62881    National [place] [employer] Mental Illness ([place])  019.601.8062 or 1.888.[place].HELPS      Mental Health Apps  My3  https://Cimetrix.org/    VirtualHopeBox  https://[employer]/apps/virtual-hope-box/      Additional Information  Today you were seen by a licensed mental health professional through Triage and Transition services, [facility] ([facility])  for a crisis assessment in the Emergency Department at [facility].  It is recommended that you follow up with your established providers (psychiatrist, mental health therapist, and/or primary care doctor - as relevant) as soon as possible. Coordinators from [facility] will be calling you in the next 24-48 hours to ensure that you have the resources you need.  You can also contact [facility] coordinators directly at [phone]. You may have been scheduled for or offered an appointment with a mental health provider. [facility] maintains an extensive network of licensed behavioral health providers to connect patients with the services they need.  We do not charge providers a fee to participate in our referral network.  We match patients with providers based on a patient's specific needs, insurance coverage, and location.  Our first effort will be to refer you to a provider within your care system, and will utilize providers outside your care system as needed.      "

## 2022-09-06 DIAGNOSIS — B35.1 NAIL FUNGAL INFECTION: ICD-10-CM

## 2022-09-07 RX ORDER — TERBINAFINE HYDROCHLORIDE 250 MG/1
TABLET ORAL
Qty: 42 TABLET | Refills: 0 | OUTPATIENT
Start: 2022-09-07

## 2022-09-07 NOTE — TELEPHONE ENCOUNTER
Patient already completed one round of this. If she is still having fungal issues of the feet, recommend podiatry consult. I have done what I can on my end.

## 2022-09-07 NOTE — TELEPHONE ENCOUNTER
Outpatient Medication Detail     Disp Refills Start End MARVIN   terbinafine (LAMISIL) 250 MG tablet 42 tablet 0 3/8/2022 2022 --   Sig - Route: Take 1 tablet (250 mg) by mouth daily - Oral   Sent to pharmacy as: Terbinafine HCl 250 MG Oral Tablet (lamISIL)   Class: E-Prescribe   Order: [id number]   E-Prescribing Status: Receipt confirmed by pharmacy (3/8/2022  9:52 AM CST)     Routing refill request to provider for review/approval because:  Drug not on the [facility] refill protocol    script    Last office visit provider:  22     Requested Prescriptions   Pending Prescriptions Disp Refills     terbinafine (LAMISIL) 250 MG tablet [Pharmacy Med Name: TERBINAFINE  MG TABLET] 42 tablet 0     Sig: TAKE 1 TABLET BY MOUTH EVERY DAY       There is no refill protocol information for this order          Hi Al RN 22 9:12 AM

## 2022-09-08 NOTE — TELEPHONE ENCOUNTER
Left message to call back for: Katy  Information to relay to patient: please relay message below.
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
## 2022-09-12 DIAGNOSIS — E66.9 OBESITY (BMI 30-39.9): ICD-10-CM

## 2022-09-12 RX ORDER — METFORMIN HCL 500 MG
TABLET, EXTENDED RELEASE 24 HR ORAL
Qty: 180 TABLET | Refills: 0 | Status: SHIPPED | OUTPATIENT
Start: 2022-09-12 | End: 2022-11-21

## 2022-09-25 ENCOUNTER — HEALTH MAINTENANCE LETTER (OUTPATIENT)
Age: 77
End: 2022-09-25

## 2022-10-04 ASSESSMENT — PATIENT HEALTH QUESTIONNAIRE - PHQ9: SUM OF ALL RESPONSES TO PHQ QUESTIONS 1-9: 15

## 2022-10-07 ENCOUNTER — OFFICE VISIT (OUTPATIENT)
Dept: FAMILY MEDICINE | Facility: CLINIC | Age: 77
End: 2022-10-07
Payer: COMMERCIAL

## 2022-10-07 VITALS
RESPIRATION RATE: 16 BRPM | TEMPERATURE: 98.3 F | DIASTOLIC BLOOD PRESSURE: 62 MMHG | HEIGHT: 62 IN | SYSTOLIC BLOOD PRESSURE: 120 MMHG | WEIGHT: 177.1 LBS | BODY MASS INDEX: 32.59 KG/M2 | HEART RATE: 75 BPM | OXYGEN SATURATION: 95 %

## 2022-10-07 DIAGNOSIS — M79.662 PAIN IN BOTH LOWER LEGS: ICD-10-CM

## 2022-10-07 DIAGNOSIS — F33.1 MODERATE EPISODE OF RECURRENT MAJOR DEPRESSIVE DISORDER (H): ICD-10-CM

## 2022-10-07 DIAGNOSIS — Z23 NEED FOR VACCINATION: ICD-10-CM

## 2022-10-07 DIAGNOSIS — R23.9 RECENT SKIN CHANGES: ICD-10-CM

## 2022-10-07 DIAGNOSIS — M79.7 FIBROMYALGIA: ICD-10-CM

## 2022-10-07 DIAGNOSIS — M79.661 PAIN IN BOTH LOWER LEGS: ICD-10-CM

## 2022-10-07 DIAGNOSIS — G47.00 INSOMNIA, UNSPECIFIED TYPE: ICD-10-CM

## 2022-10-07 DIAGNOSIS — F41.9 ANXIETY: ICD-10-CM

## 2022-10-07 DIAGNOSIS — M47.816 OSTEOARTHRITIS OF LUMBAR SPINE, UNSPECIFIED SPINAL OSTEOARTHRITIS COMPLICATION STATUS: ICD-10-CM

## 2022-10-07 DIAGNOSIS — Z00.00 ENCOUNTER FOR ANNUAL WELLNESS EXAM IN MEDICARE PATIENT: Primary | ICD-10-CM

## 2022-10-07 LAB
ANION GAP SERPL CALCULATED.3IONS-SCNC: 10 MMOL/L (ref 7–15)
BUN SERPL-MCNC: 20 MG/DL (ref 8–23)
CALCIUM SERPL-MCNC: 9.5 MG/DL (ref 8.8–10.2)
CHLORIDE SERPL-SCNC: 105 MMOL/L (ref 98–107)
CHOLEST SERPL-MCNC: 165 MG/DL
CREAT SERPL-MCNC: 0.82 MG/DL (ref 0.51–0.95)
DEPRECATED HCO3 PLAS-SCNC: 26 MMOL/L (ref 22–29)
GFR SERPL CREATININE-BSD FRML MDRD: 74 ML/MIN/1.73M2
GLUCOSE SERPL-MCNC: 99 MG/DL (ref 70–99)
HDLC SERPL-MCNC: 74 MG/DL
HGB BLD-MCNC: 12.9 G/DL (ref 11.7–15.7)
LDLC SERPL CALC-MCNC: 78 MG/DL
NONHDLC SERPL-MCNC: 91 MG/DL
POTASSIUM SERPL-SCNC: 3.9 MMOL/L (ref 3.4–5.3)
SODIUM SERPL-SCNC: 141 MMOL/L (ref 136–145)
TRIGL SERPL-MCNC: 64 MG/DL

## 2022-10-07 PROCEDURE — 80048 BASIC METABOLIC PNL TOTAL CA: CPT | Performed by: NURSE PRACTITIONER

## 2022-10-07 PROCEDURE — G0008 ADMIN INFLUENZA VIRUS VAC: HCPCS | Mod: 59 | Performed by: NURSE PRACTITIONER

## 2022-10-07 PROCEDURE — 80061 LIPID PANEL: CPT | Performed by: NURSE PRACTITIONER

## 2022-10-07 PROCEDURE — 0124A COVID-19,PF,PFIZER BOOSTER BIVALENT: CPT | Performed by: NURSE PRACTITIONER

## 2022-10-07 PROCEDURE — 36415 COLL VENOUS BLD VENIPUNCTURE: CPT | Performed by: NURSE PRACTITIONER

## 2022-10-07 PROCEDURE — 90662 IIV NO PRSV INCREASED AG IM: CPT | Performed by: NURSE PRACTITIONER

## 2022-10-07 PROCEDURE — 91312 COVID-19,PF,PFIZER BOOSTER BIVALENT: CPT | Performed by: NURSE PRACTITIONER

## 2022-10-07 PROCEDURE — 99214 OFFICE O/P EST MOD 30 MIN: CPT | Mod: 25 | Performed by: NURSE PRACTITIONER

## 2022-10-07 PROCEDURE — 85018 HEMOGLOBIN: CPT | Performed by: NURSE PRACTITIONER

## 2022-10-07 PROCEDURE — G0439 PPPS, SUBSEQ VISIT: HCPCS | Performed by: NURSE PRACTITIONER

## 2022-10-07 RX ORDER — DULOXETIN HYDROCHLORIDE 60 MG/1
60 CAPSULE, DELAYED RELEASE ORAL 2 TIMES DAILY
Qty: 180 CAPSULE | Refills: 0 | Status: SHIPPED | OUTPATIENT
Start: 2022-10-07 | End: 2023-01-12

## 2022-10-07 RX ORDER — CALCIUM CARBONATE/VITAMIN D3 500-10/5ML
LIQUID (ML) ORAL
COMMUNITY
End: 2022-10-28

## 2022-10-07 RX ORDER — BUSPIRONE HYDROCHLORIDE 15 MG/1
15 TABLET ORAL 2 TIMES DAILY
Qty: 180 TABLET | Refills: 1 | Status: SHIPPED | OUTPATIENT
Start: 2022-10-07 | End: 2023-03-15

## 2022-10-07 RX ORDER — OXYCODONE HYDROCHLORIDE 5 MG/1
5 TABLET ORAL EVERY 12 HOURS PRN
Qty: 14 TABLET | Refills: 0 | Status: SHIPPED | OUTPATIENT
Start: 2022-10-07 | End: 2022-10-14

## 2022-10-07 RX ORDER — VENLAFAXINE 37.5 MG/1
TABLET ORAL
COMMUNITY
Start: 2022-09-27 | End: 2022-10-28

## 2022-10-07 ASSESSMENT — ANXIETY QUESTIONNAIRES
5. BEING SO RESTLESS THAT IT IS HARD TO SIT STILL: NEARLY EVERY DAY
GAD7 TOTAL SCORE: 15
2. NOT BEING ABLE TO STOP OR CONTROL WORRYING: NOT AT ALL
GAD7 TOTAL SCORE: 15
6. BECOMING EASILY ANNOYED OR IRRITABLE: NEARLY EVERY DAY
7. FEELING AFRAID AS IF SOMETHING AWFUL MIGHT HAPPEN: NOT AT ALL
4. TROUBLE RELAXING: NEARLY EVERY DAY
8. IF YOU CHECKED OFF ANY PROBLEMS, HOW DIFFICULT HAVE THESE MADE IT FOR YOU TO DO YOUR WORK, TAKE CARE OF THINGS AT HOME, OR GET ALONG WITH OTHER PEOPLE?: SOMEWHAT DIFFICULT
GAD7 TOTAL SCORE: 15
3. WORRYING TOO MUCH ABOUT DIFFERENT THINGS: NEARLY EVERY DAY
IF YOU CHECKED OFF ANY PROBLEMS ON THIS QUESTIONNAIRE, HOW DIFFICULT HAVE THESE PROBLEMS MADE IT FOR YOU TO DO YOUR WORK, TAKE CARE OF THINGS AT HOME, OR GET ALONG WITH OTHER PEOPLE: SOMEWHAT DIFFICULT
1. FEELING NERVOUS, ANXIOUS, OR ON EDGE: NEARLY EVERY DAY
7. FEELING AFRAID AS IF SOMETHING AWFUL MIGHT HAPPEN: NOT AT ALL

## 2022-10-07 ASSESSMENT — ENCOUNTER SYMPTOMS
FREQUENCY: 1
JOINT SWELLING: 1
PALPITATIONS: 0
SORE THROAT: 0
WEAKNESS: 1
FEVER: 0
ABDOMINAL PAIN: 0
HEARTBURN: 0
EYE PAIN: 0
DIZZINESS: 0
SHORTNESS OF BREATH: 1
MYALGIAS: 1
CONSTIPATION: 1
ARTHRALGIAS: 1
HEMATURIA: 0
DIARRHEA: 1
NAUSEA: 0
PARESTHESIAS: 1
DYSURIA: 0
NERVOUS/ANXIOUS: 1
COUGH: 1
HEADACHES: 1
BREAST MASS: 0
CHILLS: 1
HEMATOCHEZIA: 0

## 2022-10-07 ASSESSMENT — ACTIVITIES OF DAILY LIVING (ADL)
CURRENT_FUNCTION: SHOPPING REQUIRES ASSISTANCE
CURRENT_FUNCTION: HOUSEWORK REQUIRES ASSISTANCE

## 2022-10-07 ASSESSMENT — PATIENT HEALTH QUESTIONNAIRE - PHQ9
SUM OF ALL RESPONSES TO PHQ QUESTIONS 1-9: 15
10. IF YOU CHECKED OFF ANY PROBLEMS, HOW DIFFICULT HAVE THESE PROBLEMS MADE IT FOR YOU TO DO YOUR WORK, TAKE CARE OF THINGS AT HOME, OR GET ALONG WITH OTHER PEOPLE: VERY DIFFICULT
SUM OF ALL RESPONSES TO PHQ QUESTIONS 1-9: 15

## 2022-10-07 ASSESSMENT — PAIN SCALES - GENERAL: PAINLEVEL: EXTREME PAIN (8)

## 2022-10-07 NOTE — PROGRESS NOTES
"SUBJECTIVE:   Katy is a 77 year old who presents for Preventive Visit, med check, bilateral leg pain, ongoing knee pain bilaterally. Due for flu and COVID vaccines.       Patient has been advised of split billing requirements and indicates understanding: Yes  Are you in the first 12 months of your Medicare coverage?  No    Healthy Habits:     In general, how would you rate your overall health?  Fair    Frequency of exercise:  4-5 days/week    Duration of exercise:  15-30 minutes    Do you usually eat at least 4 servings of fruit and vegetables a day, include whole grains    & fiber and avoid regularly eating high fat or \"junk\" foods?  Yes    Taking medications regularly:  Yes    Medication side effects:  None    Ability to successfully perform activities of daily living:  Shopping requires assistance and housework requires assistance    Home Safety:  No safety concerns identified    Hearing Impairment:  No hearing concerns    In the past 6 months, have you been bothered by leaking of urine? Yes    In general, how would you rate your overall mental or emotional health?  Poor      PHQ-2 Total Score: 3    Additional concerns today:  Yes    Do you feel safe in your environment? Yes    Have you ever done Advance Care Planning? (For example, a Health Directive, POLST, or a discussion with a medical provider or your loved ones about your wishes): Yes, patient states has an Advance Care Planning document and will bring a copy to the clinic.       Fall risk  Fallen 2 or more times in the past year?: No  Any fall with injury in the past year?: No    Cognitive Screening   1) Repeat 3 items (Leader, Season, Table)    2) Clock draw: NORMAL  3) 3 item recall: Recalls 3 objects  Results: 3 items recalled: COGNITIVE IMPAIRMENT LESS LIKELY    Mini-CogTM Copyright RAIZA Raymond. Licensed by the author for use in [employer]; reprinted with permission (alonso@.[facility]). All rights reserved.          Reviewed and updated as needed " this visit by clinical staff   Tobacco  Allergies  Meds                Reviewed and updated as needed this visit by Provider                   Social History     Tobacco Use     Smoking status: Never Smoker     Smokeless tobacco: Never Used   Substance Use Topics     Alcohol use: Yes     Alcohol/week: 0.0 standard drinks     Comment: Alcoholic Drinks/day: occasional     If you drink alcohol do you typically have >3 drinks per day or >7 drinks per week? No    Alcohol Use 10/7/2022   Prescreen: >3 drinks/day or >7 drinks/week? No   Prescreen: >3 drinks/day or >7 drinks/week? -           Insomnia  Onset: several years now, Trazodone 225 mg daily at [facility], her psychiatrist decreased her doing on her Trazodone recently from 300 mg to 225 mg    Description:   Time to fall asleep (sleep latency): 1.5 hours  Middle of night awakening:  YES- 3 times per night  Early morning awakening:  YES    Progression of Symptoms:  worsening and constant    Accompanying Signs & Symptoms:  Daytime sleepiness/napping: YES- 30 minute naps or just to lay down to relax  Excessive snoring/apnea: no   Restless legs: no   Frequent urination: YES- twice per night  Chronic pain:  YES- bilateral lower leg pain    History:  Prior Insomnia: YES    Precipitating factors:   New stressful situation: no   Caffeine intake: YES- small  OTC decongestants: no   Any new medications: YES- magnesium, methylfolate    Alleviating factors:  Self medicating (alcohol, etc.):  No    Therapies Tried and outcome: not much improvement as of yet      Bilateral lower leg and knee pain    Onset: one month    Description:   Location: bilateral calves, low back, knees and hips  Character: Dull ache    Intensity: 8/10    Progression of Symptoms: intermittent, worse in the morning    Accompanying Signs & Symptoms:  Other symptoms: numbness    History:   Previous similar pain: no       Precipitating factors:   Trauma or overuse: no     Alleviating factors:  Improved by:  rest/inactivity, heat, ice, massage, stretching, exercises, deep heating rub and left over Oxycodone that she had left over from Dr. Palmer back in 2021    Therapies Tried and outcome: mild relief    Her last set of knee xrays was taken by her rheumatologist back in July which showed fluid and arthritis in the knees ranging from mild to moderate. Her last set of injections into the knee was at that time.     EXAM: X-RAY KNEES BILATERAL, 1-2 VIEWS  LOCATION: [facility]  DATE/TIME: 7/6/2021 11:00 AM     INDICATION: Pain in unspecified joint Other hypertrophic osteoarthropathy, multiple sites  COMPARISON: None.     IMPRESSION:  RIGHT KNEE: Degenerative arthritis, moderate in the medial compartment and mild in the lateral and patellofemoral compartments. No erosions. No acute fracture. Small effusion.     LEFT KNEE: Degenerative arthritis, mild medial compartment and minimal in the patellofemoral compartment. No erosions. No fracture. Small to moderate effusion. She has not followed back up yet with ortho for further discussion of her knee pain and plan of care. She typically goes to [facility].      MRI of lumbar spine shows narrowing the is moderate in the L3 down to the S1 along with severe degeneration of the tailbone. She has had multiple injections into her low back from spine care.     Depression and Anxiety Follow-Up    How are you doing with your depression since your last visit? No change    How are you doing with your anxiety since your last visit?  Worsened  At times, situational.     Are you having other symptoms that might be associated with depression or anxiety? Yes:  insomnia, chronic pain related to legs and fibromyalgia    Have you had a significant life event? Relationship Concerns and Health Concerns     Do you have any concerns with your use of alcohol or other drugs? No     Rx: Cymbalta, Buspar, Effexor, Trazodone (Alexander Duarte from psych is managing her medications  now, she still sees Tita her therapist which is for family therapy and depression)    Therapist: next appointment is Nov 23, sees every 2 months right now. She is scheduled to see a new specialist at [place] for her persistent history of depression, she is scheduled for Nov 16    Stress reduction: home exercises, making cards, crafts, going to see her therapist every 2 weeks which is family therapy    Social History     Tobacco Use     Smoking status: Never Smoker     Smokeless tobacco: Never Used   Substance Use Topics     Alcohol use: Yes     Alcohol/week: 0.0 standard drinks     Comment: Alcoholic Drinks/day: occasional     Drug use: No     PHQ 6/24/2022 10/4/2022 10/7/2022   PHQ-9 Total Score 17 15 15   Q9: Thoughts of better off dead/self-harm past 2 weeks Not at all Not at all Not at all     MARGARETTE-7 SCORE 4/7/2022 4/28/2022 10/7/2022   Total Score 18 (severe anxiety) 10 (moderate anxiety) 15 (severe anxiety)   Total Score 18 10 15         Current providers sharing in care for this patient include:   Patient Care Team:  Leonora Kerr NP as PCP - General (Orthothist)  Leonora Kerr NP as Assigned PCP  Cyn Gay MD as Assigned Heart and Vascular Provider  Gina Isaac MBBS as Assigned Rheumatology Provider  Leroy Pittman MD as Assigned Surgical Provider    The following health maintenance items are reviewed in Epic and correct as of today:  Health Maintenance   Topic Date Due     COVID-19 Vaccine (5 - Booster for Pfizer series) 08/26/2022     INFLUENZA VACCINE (1) 09/01/2022     MEDICARE ANNUAL WELLNESS VISIT  09/02/2022     ANNUAL REVIEW OF HM ORDERS  09/02/2022     PHQ-9  04/07/2023     FALL RISK ASSESSMENT  10/07/2023     DTAP/TDAP/TD IMMUNIZATION (2 - Td or Tdap) 10/17/2023     LIPID  09/02/2026     ADVANCE CARE PLANNING  09/06/2026     DEXA  06/12/2032     HEPATITIS C SCREENING  Completed     DEPRESSION ACTION PLAN  Completed     Pneumococcal Vaccine: 65+ Years  Completed     ZOSTER  "IMMUNIZATION  Completed     IPV IMMUNIZATION  Aged Out     MENINGITIS IMMUNIZATION  Aged Out     HEPATITIS B IMMUNIZATION  Aged Out         prefers yearly screening  Pertinent mammograms are reviewed under the imaging tab.    Review of Systems   Constitutional: Positive for chills. Negative for fever.   HENT: Negative for congestion, ear pain, hearing loss and sore throat.    Eyes: Positive for visual disturbance. Negative for pain.   Respiratory: Positive for cough and shortness of breath.    Cardiovascular: Negative for chest pain, palpitations and peripheral edema.   Gastrointestinal: Positive for constipation and diarrhea. Negative for abdominal pain, heartburn, hematochezia and nausea.   Breasts:  Positive for tenderness. Negative for breast mass and discharge.   Genitourinary: Positive for frequency and urgency. Negative for dysuria, genital sores, hematuria, pelvic pain, vaginal bleeding and vaginal discharge.   Musculoskeletal: Positive for arthralgias, joint swelling and myalgias.   Skin: Negative for rash.   Neurological: Positive for weakness, headaches and paresthesias. Negative for dizziness.   Psychiatric/Behavioral: Positive for mood changes. The patient is nervous/anxious.          OBJECTIVE:   /62 (BP Location: Left arm, Patient Position: Sitting, Cuff Size: Adult Regular)   Pulse 75   Temp 98.3  F (36.8  C) (Oral)   Resp 16   Ht 1.575 m (5' 2\")   Wt 80.3 kg (177 lb 1.6 oz)   SpO2 95%   BMI 32.39 kg/m   Estimated body mass index is 32.39 kg/m  as calculated from the following:    Height as of this encounter: 1.575 m (5' 2\").    Weight as of this encounter: 80.3 kg (177 lb 1.6 oz).  Physical Exam  GENERAL APPEARANCE: healthy, alert and no distress  EYES: Eyes grossly normal to inspection, PERRL and conjunctivae and sclerae normal  HENT: ear canals and TM's normal, nose and mouth without ulcers or lesions, oropharynx clear and oral mucous membranes moist  NECK: no adenopathy, no " asymmetry, masses, or scars   RESP: lungs clear to auscultation - no rales, rhonchi or wheezes  CV: regular rate and rhythm, normal S1 S2, no S3 or S4, no murmur, click or rub, no peripheral edema and peripheral pulses strong  ABDOMEN: soft, nontender, no hepatosplenomegaly, no masses and bowel sounds normal  MS: Increased low back pain in the bilateral SI joints with direct palpation, she does have significant pain around both of the kneecaps and there is mild to moderate swelling present.  SKIN: no suspicious lesions or rashes, cherry angioma scattered on both of the breasts, they are now purple in color.  There are no ulcerated lesions or moles present that I can see today.  NEURO: Normal strength and tone, sensory exam grossly normal, mentation intact and speech normal  PSYCH: mentation appears normal and affect normal/bright, moderate depression noted, she does have some chronic worry that is present today along with some mild anxiety, she is well-groomed, she is engaged in conversation and overall pleasant, good eye contact    Diagnostic Test Results:  Labs reviewed in Epic    ASSESSMENT / PLAN:   (Z00.00) Encounter for annual wellness exam in Medicare patient  (primary encounter diagnosis)  Comment:   Plan: BASIC METABOLIC PANEL, Hemoglobin, Lipid         Profile, REVIEW OF HEALTH MAINTENANCE PROTOCOL         ORDERS            (F41.9) Anxiety  Comment:   Plan: Anxiety and depression medications are now managed by her psychiatrist, she does have an upcoming appointment as well at [place] next month for her mental health.  She will continue with her current dose of buspirone and Effexor, she denies feeling suicidal or having any plans of self-harm.  She does continue to see her family therapist as well every other week    (F33.1) Moderate episode of recurrent major depressive disorder (H)  Comment:   Plan: We did review and discuss her PHQ and vincent screening scores today which indicate moderate anxiety and  depression.  She is seeing a therapist and a psychiatrist at this time and will be establishing care with a program at the [place] for her mental health as well.  I did double the dose of her Cymbalta for her fibromyalgia and degenerative joint pain issues to see if we get her a bit more comfortable but I did let her know that this medication is also used for depression and that she should let her psychiatrist know the reasoning that I change that dose at her upcoming appointment  We did review the documentation from her ER visit this summer, there was some question as to whether or not she was suicidal at the time but she confirms that she was not    (G47.00) Insomnia, unspecified type  Comment:   Plan: She has been taking trazodone which is managed by her psychiatrist, he did lower her dose from 300 to 225 mg at at bedtime, she is still having difficulty with sleeping, we did review the documentation from her psychiatrist who recommended adding Remeron on so she will talk to him about this at her upcoming appointment    (M79.661,  M79.662) Pain in both lower legs  Comment:   Plan: DULoxetine (CYMBALTA) 60 MG capsule, oxyCODONE         (ROXICODONE) 5 MG tablet        I suspect the pain in her lower extremities is coming from the degenerative changes that were noted on her x-rays of the knees last year.  She will follow-up with orthopedic specialty to discuss options for treating her knees, there was recommendations from her rheumatologist that she would need knee replacement    (M47.816) Osteoarthritis of lumbar spine, unspecified spinal osteoarthritis complication status  Comment:   Plan: DULoxetine (CYMBALTA) 60 MG capsule, oxyCODONE         (ROXICODONE) 5 MG tablet        She does have severe degenerative changes of the tailbone region with moderate changes from the L3 to the L5.  She did have an old prescription at home of oxycodone that she was taking very sparingly when her pain becomes unbearable or interferes  with her sleep.  I did send her 14 tablets, she does have an upcoming appointment to meet with Skyler here at the clinic to establish care since I am leaving my practice.  She will discuss ongoing management of her back pain with him and I did encourage her to follow back up with spine care since she has previously seen them for her injections into the lumbar spine  [facility] reviewed today, she did have 15 tablets prescribed to her in November 2021, she does have roughly 4 tablets left from that prescription that she has been using sparingly for her ongoing back, hip and knee pain.    (M79.7) Fibromyalgia  Comment:   Plan: DULoxetine (CYMBALTA) 60 MG capsule        She does see a rheumatologist for her fibromyalgia but during her last visit in July 2021, x-rays of the knees were taken at that time and she was given additional steroid injections.  Her rheumatologist told her at that time that this would be her last injection and that going forward if she continues to have issues with her knees, she will need to consider surgery with orthopedic specialty.  She typically goes to [facility] so she will schedule an appointment to follow-up with them regarding her knee pain.  For the time being to get her a bit more comfortable, she will double the dose of her Cymbalta and take 60 mg twice daily so we can see if we get her a bit more comfortable    (R23.9) Recent skin changes  Comment:   Plan: Adult Dermatology Referral        Overdue for formal skin exam, continue to perform skin exams at home as well and follow up PRN for any changes    (Z23) Need for vaccination  Comment:   Plan: COVID-19,PF,PFIZER BOOSTER BIVALENT (12+YRS),         INFLUENZA, QUAD, HIGH DOSE, PF, 65YR + (FLUZONE        HD)              Patient has been advised of split billing requirements and indicates understanding: Yes    COUNSELING:  Reviewed preventive health counseling, as reflected in patient instructions       Regular exercise     "   Healthy diet/nutrition       Vision screening       Hearing screening       Dental care       Bladder control       Fall risk prevention       Osteoporosis prevention/bone health       Advanced Planning     Estimated body mass index is 32.39 kg/m  as calculated from the following:    Height as of this encounter: 1.575 m (5' 2\").    Weight as of this encounter: 80.3 kg (177 lb 1.6 oz).    Weight management plan: Discussed healthy diet and exercise guidelines    She reports that she has never smoked. She has never used smokeless tobacco.      Appropriate preventive services were discussed with this patient, including applicable screening as appropriate for cardiovascular disease, diabetes, osteopenia/osteoporosis, and glaucoma.  As appropriate for age/gender, discussed screening for colorectal cancer, prostate cancer, breast cancer, and cervical cancer. Checklist reviewing preventive services available has been given to the patient.    Reviewed patients plan of care and provided an AVS. The Intermediate Care Plan ( asthma action plan, low back pain action plan, and migraine action plan) for Katy meets the Care Plan requirement. This Care Plan has been established and reviewed with the Patient.    Counseling Resources:  ATP IV Guidelines  Pooled Cohorts Equation Calculator  Breast Cancer Risk Calculator  Breast Cancer: Medication to Reduce Risk  FRAX Risk Assessment  ICSI Preventive Guidelines  Dietary Guidelines for Americans, 2010  [employer]'s MyPlate  ASA Prophylaxis  Lung CA Screening    Leonora Kerr NP  [facility]    Identified Health Risks:  Answers for HPI/ROS submitted by the patient on 10/7/2022  If you checked off any problems, how difficult have these problems made it for you to do your work, take care of things at home, or get along with other people?: Very difficult  PHQ9 TOTAL SCORE: 15  MARGARETTE 7 TOTAL SCORE: 15      "

## 2022-10-19 ENCOUNTER — MEDICAL CORRESPONDENCE (OUTPATIENT)
Dept: HEALTH INFORMATION MANAGEMENT | Facility: CLINIC | Age: 77
End: 2022-10-19

## 2022-10-28 ENCOUNTER — OFFICE VISIT (OUTPATIENT)
Dept: FAMILY MEDICINE | Facility: CLINIC | Age: 77
End: 2022-10-28
Payer: COMMERCIAL

## 2022-10-28 VITALS
DIASTOLIC BLOOD PRESSURE: 68 MMHG | OXYGEN SATURATION: 94 % | SYSTOLIC BLOOD PRESSURE: 126 MMHG | WEIGHT: 176.2 LBS | BODY MASS INDEX: 32.23 KG/M2 | HEART RATE: 74 BPM

## 2022-10-28 DIAGNOSIS — M25.569 ARTHRALGIA OF LOWER LEG, UNSPECIFIED LATERALITY: ICD-10-CM

## 2022-10-28 DIAGNOSIS — F33.1 MODERATE EPISODE OF RECURRENT MAJOR DEPRESSIVE DISORDER (H): ICD-10-CM

## 2022-10-28 DIAGNOSIS — I10 ESSENTIAL HYPERTENSION: ICD-10-CM

## 2022-10-28 DIAGNOSIS — E66.9 OBESITY (BMI 30-39.9): ICD-10-CM

## 2022-10-28 DIAGNOSIS — I42.8 OTHER CARDIOMYOPATHY (H): ICD-10-CM

## 2022-10-28 DIAGNOSIS — M79.7 FIBROMYALGIA: Primary | ICD-10-CM

## 2022-10-28 PROBLEM — S40.269A: Status: RESOLVED | Noted: 2021-11-12 | Resolved: 2022-10-28
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
## 2022-10-28 PROBLEM — L03.90 CELLULITIS: Status: RESOLVED | Noted: 2021-11-12 | Resolved: 2022-10-28

## 2022-10-28 PROCEDURE — 99213 OFFICE O/P EST LOW 20 MIN: CPT | Performed by: NURSE PRACTITIONER

## 2022-10-28 ASSESSMENT — PATIENT HEALTH QUESTIONNAIRE - PHQ9
10. IF YOU CHECKED OFF ANY PROBLEMS, HOW DIFFICULT HAVE THESE PROBLEMS MADE IT FOR YOU TO DO YOUR WORK, TAKE CARE OF THINGS AT HOME, OR GET ALONG WITH OTHER PEOPLE: VERY DIFFICULT
SUM OF ALL RESPONSES TO PHQ QUESTIONS 1-9: 20
SUM OF ALL RESPONSES TO PHQ QUESTIONS 1-9: 20

## 2022-10-28 ASSESSMENT — PAIN SCALES - GENERAL: PAINLEVEL: NO PAIN (0)

## 2022-10-28 NOTE — PROGRESS NOTES
Assessment & Plan     ICD-10-CM    1. Fibromyalgia  M79.7       2. Arthralgia of lower leg, unspecified laterality  M25.569       3. Obesity (BMI 30-39.9)  E66.9       4. Other cardiomyopathy (H)  I42.8       5. Essential hypertension  I10       6. Moderate episode of recurrent major depressive disorder (H)  F33.1         Overall stable.  Agree with meeting with orthopedics for chronic knee and hip pain.  No change to antihypertensives.  Keep yearly follow-up with cardiology.  Would benefit from weight loss.  Mood managed by psychiatry.    Reviewed rheumatology note x1, family medicine note x2, metabolic panel x1, hgb x1    Subjective     HPI     Patient presents today for an [facility] care appointment    Leg pain  Will [facility]. Injections aren't working any longer. Steps are getting harder and harder.      HTN  Treated with losartan and metoprolol. Lasix used as needed usually in the summer for LE swelling.     MDD  Tapered off venlafaxine. On buspar and duloxetine. Currently managed by psychiatry.  Trazodone dosing has been weaning off. Sleep is slowly getting better.      Cardiomyopathy  Checks in with Dr. Gay in cardiology yearly.  Holter monitor didn't happen yet.  No worsening LE swelling.  No worsening SWEENEY.  No chest pain    Review of Systems - negative except for what's listed in the HPI      Objective    /68   Pulse 74   Wt 79.9 kg (176 lb 3.2 oz)   SpO2 94%   Breastfeeding No   BMI 32.23 kg/m    Physical Exam   General appearance - alert, well appearing, and in no distress  Mental status - alert, oriented to person, place, and time  Lymphatics - no palpable lymphadenopathy  Chest - clear to auscultation, no wheezes, rales or rhonchi, symmetric air entry  Heart - normal rate and regular rhythm, S1 and S2 normal, no murmurs noted  Neurological -grossly intact  Musculoskeletal -antalgic gait  Extremities - peripheral pulses normal, no peripheral edema  Skin - normal  coloration and turgor.    Skyler Wiggins, CNP    This note has been dictated using voice recognition software. Any grammatical or context distortions are unintentional and inherent to the software.

## 2022-11-10 ENCOUNTER — HOSPITAL ENCOUNTER (EMERGENCY)
Facility: CLINIC | Age: 77
Discharge: HOME | End: 2022-11-11
Attending: EMERGENCY MEDICINE | Admitting: EMERGENCY MEDICINE
Payer: COMMERCIAL

## 2022-11-10 DIAGNOSIS — F33.9 EPISODE OF RECURRENT MAJOR DEPRESSIVE DISORDER, UNSPECIFIED DEPRESSION EPISODE SEVERITY (H): ICD-10-CM

## 2022-11-10 LAB — SARS-COV-2 RNA RESP QL NAA+PROBE: NEGATIVE

## 2022-11-10 PROCEDURE — U0005 INFEC AGEN DETEC AMPLI PROBE: HCPCS | Performed by: EMERGENCY MEDICINE

## 2022-11-10 PROCEDURE — C9803 HOPD COVID-19 SPEC COLLECT: HCPCS | Performed by: EMERGENCY MEDICINE

## 2022-11-10 PROCEDURE — 99284 EMERGENCY DEPT VISIT MOD MDM: CPT | Performed by: EMERGENCY MEDICINE

## 2022-11-10 PROCEDURE — 99285 EMERGENCY DEPT VISIT HI MDM: CPT | Mod: 25 | Performed by: EMERGENCY MEDICINE

## 2022-11-10 PROCEDURE — 90791 PSYCH DIAGNOSTIC EVALUATION: CPT

## 2022-11-11 VITALS
TEMPERATURE: 98.4 F | RESPIRATION RATE: 18 BRPM | HEART RATE: 76 BPM | SYSTOLIC BLOOD PRESSURE: 171 MMHG | DIASTOLIC BLOOD PRESSURE: 87 MMHG | OXYGEN SATURATION: 100 %

## 2022-11-11 ASSESSMENT — ACTIVITIES OF DAILY LIVING (ADL): ADLS_ACUITY_SCORE: 35

## 2022-11-11 NOTE — ED TRIAGE NOTES
Triage Assessment     Row Name 11/10/22 [id number]       Triage Assessment (Adult)    Airway WDL WDL       Respiratory WDL    Respiratory WDL WDL       Skin Circulation/Temperature WDL    Skin Circulation/Temperature WDL WDL       Cardiac WDL    Cardiac WDL WDL       Peripheral/Neurovascular WDL    Peripheral Neurovascular WDL WDL       Cognitive/Neuro/Behavioral WDL    Cognitive/Neuro/Behavioral WDL WDL
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
## 2022-11-11 NOTE — ED PROVIDER NOTES
[facility] EMERGENCY DEPARTMENT ([facility])    11/10/22        History     Chief Complaint   Patient presents with     Depression     The history is provided by the patient and medical records.     Katy Hong is a 77 year old female with history of depression and HTN who presents to the Emergency Department with increased depression. Patient had a counseling session with her  and 2 sons today. She broke down and started to cry during the session and stated she felt overwhelmed, helpless, and hopeless. Due to this her therapist advised her to come here to the ED. Patient reports she has been in a loveless marriage for a number of years. Son reports patient's therapist has said patient has cluster B characteristics. Patient thinks everyone hates her and they are ganging up on her. Patient is not suicidal or homicidal. Patient was seen in [facility] unit in July and was recommended to start IOP, however, they were unable to find a program that would take her or a program that was close enough to her. Patient then started doing family therapy with her  and occasionally her children. Patient reports about a week ago she told her  she was sick of this and could not handle it and either she or her  would have to leave. Patient then moved into a hotel. Son reports during the time  her been away patient is doing her activities of daily living. Patient's  returned to the house today and they decided they could not be together. She reports feeling helpless and hopeless when alone with her . Patient has never attempted suicide. She has a therapist and psychiatrist and is on antidepressants. She was tapered off of Effexor about 2 weeks ago and has now started Buspar.    Past Medical History  Past Medical History:   Diagnosis Date     Abnormal mammogram, unspecified     Created by Conversion      Acute sinusitis, unspecified     Created by Conversion      Asymptomatic  varicose veins     Created by Conversion      Breast cyst      Cellulitis and abscess of unspecified site     Created by Conversion      Circumscribed scleroderma     Created by Conversion      Contusion of unspecified site     Created by Conversion      Disorder of bone and cartilage, unspecified     Created by Conversion      Dysthymic disorder     Created by Conversion      Esophageal reflux     Created by Conversion      Insomnia, unspecified     Created by Conversion      Lumbago     Created by Conversion      Mammogram - Abnormal     Created by Conversion Replacement Utility updated for latest IMO load      Migraine, unspecified, without mention of intractable migraine without mention of status migrainosus     Created by Conversion      Myalgia and myositis, unspecified     Created by Conversion      Myocardial infarction (H)     per EKG's since 2015     Obesity, unspecified     Created by Conversion      Open wound of lip, without mention of complication     Created by Conversion      Other primary cardiomyopathies     Created by Conversion      Other seborrheic keratosis     Created by Conversion      Pain in joint, lower leg     Created by Conversion      Pain in limb     Created by Conversion      Sebaceous cyst     Created by Conversion      Shortness of breath     Created by Conversion      Shoulder and upper arm, insect bite, nonvenomous, without mention of infection(912.4)     Created by Conversion      Unspecified disease of sebaceous glands     Created by Conversion      Unspecified essential hypertension     Created by Conversion      Unspecified venous (peripheral) insufficiency     Created by Conversion      Unspecified vitamin D deficiency     Created by Conversion      Past Surgical History:   Procedure Laterality Date     APPENDECTOMY       BIOPSY BREAST Left      BREAST CYST ASPIRATION Left      CATARACT EXTRACTION, BILATERAL       HYSTERECTOMY       OOPHORECTOMY       TEMPOROMANDIBULAR JOINT  SURGERY Bilateral      TUBAL LIGATION       aspirin 81 MG EC tablet  busPIRone (BUSPAR) 15 MG tablet  calcium carbonate (OS-MONIQUE) 500 mg calcium (1,250 mg) chewable tablet  cholecalciferol, vitamin D3, 5,000 unit Tab  DULoxetine (CYMBALTA) 60 MG capsule  famotidine (PEPCID) 20 MG tablet  furosemide (LASIX) 20 MG tablet  gabapentin (NEURONTIN) 300 MG capsule  Levomefolate Glucosamine (METHYLFOLATE PO)  losartan (COZAAR) 25 MG tablet  metFORMIN (GLUCOPHAGE XR) 500 MG 24 hr tablet  metoprolol succinate ER (TOPROL XL) 50 MG 24 hr tablet  SUMAtriptan (IMITREX) 50 MG tablet  traZODone (DESYREL) 150 MG tablet  rizatriptan (MAXALT-MLT) 10 MG ODT  triamcinolone (KENALOG) 0.1 % external ointment      Allergies   Allergen Reactions     Fluoxetine Cough and Rash     Pt thinks it was cough but not totally sure.     Latex Rash     Levothyroxine Rash     Lisinopril Cough and Rash     Sulfa (Sulfonamide Antibiotics) [Sulfa Drugs] Itching and Rash     Family History  Family History   Problem Relation Age of Onset     Hypertension Mother      Arthritis Mother      Alzheimer Disease Father         d. 76     Diabetes Father      Hypertension Father      Breast Cancer Maternal Aunt 60.00     Obesity Maternal Aunt      Hypertension Sister      Osteopenia Sister      Varicose Veins Brother      Obesity Brother      Breast Cancer Cousin      Obesity Maternal Uncle      Arthritis Maternal Grandmother      Social History   Social History     Tobacco Use     Smoking status: Never     Passive exposure: Past     Smokeless tobacco: Never   Vaping Use     Vaping Use: Never used   Substance Use Topics     Alcohol use: Yes     Alcohol/week: 0.0 standard drinks     Comment: Alcoholic Drinks/day: occasional     Drug use: No      Past medical history, past surgical history, medications, allergies, family history, and social history were reviewed with the patient. No additional pertinent items.       Review of Systems  A complete review of systems was  performed with pertinent positives and negatives noted in the HPI, and all other systems negative.    Physical Exam   BP: (!) 170/91  Pulse: 73  Temp: 98.4  F (36.9  C)  Resp: 16  SpO2: 94 %  Physical Exam  Vitals and nursing note reviewed.   HENT:      Head: Normocephalic and atraumatic.      Nose: No congestion or rhinorrhea.   Eyes:      Extraocular Movements: Extraocular movements intact.   Cardiovascular:      Rate and Rhythm: Normal rate.   Pulmonary:      Effort: Pulmonary effort is normal.   Musculoskeletal:         General: Normal range of motion.      Cervical back: Normal range of motion.   Skin:     General: Skin is warm and dry.   Neurological:      General: No focal deficit present.      Mental Status: She is alert and oriented to person, place, and time.   Psychiatric:         Attention and Perception: Attention and perception normal.         Mood and Affect: Mood is depressed.         Speech: Speech normal.         Behavior: Behavior normal. Behavior is cooperative.         Thought Content: Thought content normal.         Cognition and Memory: Cognition and memory normal.         Judgment: Judgment normal.         ED Course      Procedures       The medical record was reviewed and interpreted.              Results for orders placed or performed during the hospital encounter of 11/10/22   Asymptomatic COVID-19 Virus (Coronavirus) by PCR Nasopharyngeal     Status: Normal    Specimen: Nasopharyngeal; Swab   Result Value Ref Range    SARS CoV2 PCR Negative Negative    Narrative    Testing was performed using the Xpert Xpress SARS-CoV-2 Assay on the   [employer]. Additional information about   this Emergency Use Authorization (EUA) assay can be found via the Lab   Guide. This test should be ordered for the detection of SARS-CoV-2 in   individuals who meet SARS-CoV-2 clinical and/or epidemiological   criteria. Test performance is unknown in asymptomatic patients. This   test is  for in vitro diagnostic use under the FDA EUA for   laboratories certified under CLIA to perform high complexity testing.   This test has not been FDA cleared or approved. A negative result   does not rule out the presence of PCR inhibitors in the specimen or   target RNA in concentration below the limit of detection for the   assay. The possibility of a false negative should be considered if   the patient's recent exposure or clinical presentation suggests   COVID-19. This test was validated by the [facility]. This laboratory is certified under the Clinical Laboratory Improvement Amendments of 1988 (CLIA-88) as qualified to perform high complexity laboratory testing.       Medications - No data to display     Assessments & Plan (with Medical Decision Making)   Kayt Hong is a 77 year old female with history of depression and HTN who presents to the Emergency Department with increased depression.  She denies si/hi/hallucinations.  She has a psychiatrist and therapist.  She stopped effexor after taper 2 weeks ago per recommendation of her psychiatrist and doesn't notice any change.  She was referred to [facility] this past summer but couldn't find a program covered by her insurance.  She was seen by myself and the DEC . She is able to complete ADLs and no si/hi.  We feel she does not need admission but would be best served by [facility].  She agrees with this plan.  She was discharged home with her son. She should also continue working with her current providers.     I have reviewed the nursing notes. I have reviewed the findings, diagnosis, plan and need for follow up with the patient.    Discharge Medication List as of 11/11/2022  1:18 AM          Final diagnoses:   Episode of recurrent major depressive disorder, unspecified depression episode severity (H)     IPinky, am serving as a trained medical scribe to document services personally performed by Monica Phillips MD,  based on the provider's statements to me.      I, Monica Phillips MD, was physically present and have reviewed and verified the accuracy of this note documented by Pinky Ellis.     --  Monica Phillips MD  [facility] EMERGENCY DEPARTMENT  11/10/2022     Monica Phillips MD  11/11/22 0598

## 2022-11-11 NOTE — DISCHARGE INSTRUCTIONS
Aftercare Plan  If I am feeling unsafe or I am in a crisis, I will:   Contact my established care providers   Call the National Suicide Prevention Lifeline: 988  Go to the nearest emergency room   Call 911     Warning signs that I or other people might notice when a crisis is developing for me: Active suicidal or homicidal planning or intent    Things I am able to do on my own or with others to cope or help me feel better: Talk to family, talk to therapist, take medications, seek IOP/PHP programming.     Your Critical access hospital has a mental health crisis team you can call 24/7: [facility]  [phone]    Other things that are important when I'm in crisis: Secure harmful household items     Additional resources and information: [facility] [phone], ext. [phone]      Crisis Lines  Crisis Text Line  Text 458502  You will be connected with a trained live crisis counselor to provide support.    Additional Information  Today you were seen by a licensed mental health professional through Triage and Transition services, [facility] ([facility])  for a crisis assessment in the Emergency Department at [facility].  It is recommended that you follow up with your established providers (psychiatrist, mental health therapist, and/or primary care doctor - as relevant) as soon as possible. Coordinators from [facility] will be calling you in the next 24-48 hours to ensure that you have the resources you need.  You can also contact [facility] coordinators directly at [phone]. You may have been scheduled for or offered an appointment with a mental health provider. [facility] maintains an extensive network of licensed behavioral health providers to connect patients with the services they need.  We do not charge providers a fee to participate in our referral network.  We match patients with providers based on a patient's specific needs, insurance coverage, and location.  Our first effort will be to  refer you to a provider within your care system, and will utilize providers outside your care system as needed.

## 2022-11-16 ENCOUNTER — TELEPHONE (OUTPATIENT)
Dept: BEHAVIORAL HEALTH | Facility: CLINIC | Age: 77
End: 2022-11-16

## 2022-11-16 NOTE — TELEPHONE ENCOUNTER
Patient have a video appointment today at 10am with [facility]. Writer placed a call this morning to check in. Writer got a hold of patient. Patient informed writer that patient have therapist and psychiatrist. Patient does not remember making appointment, and does not want to proceed with assessment today. Writer will red dot appointment today, and inform patient's .

## 2022-11-21 ENCOUNTER — VIRTUAL VISIT (OUTPATIENT)
Dept: SURGERY | Facility: CLINIC | Age: 77
End: 2022-11-21
Payer: COMMERCIAL

## 2022-11-21 VITALS — WEIGHT: 174 LBS | HEIGHT: 62 IN | BODY MASS INDEX: 32.02 KG/M2

## 2022-11-21 DIAGNOSIS — E66.9 OBESITY (BMI 30-39.9): Primary | ICD-10-CM

## 2022-11-21 DIAGNOSIS — I10 ESSENTIAL HYPERTENSION: ICD-10-CM

## 2022-11-21 PROCEDURE — 99442 PR PHYSICIAN TELEPHONE EVALUATION 11-20 MIN: CPT | Performed by: FAMILY MEDICINE

## 2022-11-21 RX ORDER — METFORMIN HCL 500 MG
TABLET, EXTENDED RELEASE 24 HR ORAL
Qty: 180 TABLET | Refills: 0 | Status: SHIPPED | OUTPATIENT
Start: 2022-11-21 | End: 2023-05-22

## 2022-11-21 RX ORDER — VENLAFAXINE 37.5 MG/1
TABLET ORAL
COMMUNITY
Start: 2022-09-27 | End: 2023-04-25

## 2022-11-21 RX ORDER — QUETIAPINE FUMARATE 25 MG/1
TABLET, FILM COATED ORAL
COMMUNITY
Start: 2022-11-15 | End: 2023-05-22

## 2022-11-21 RX ORDER — CALCIUM CARBONATE/VITAMIN D3 500-10/5ML
LIQUID (ML) ORAL
COMMUNITY
End: 2023-02-08

## 2022-11-21 NOTE — LETTER
11/21/2022         RE: Katy Hong  [address] [facility] [address]        Dear Colleague,    Thank you for referring your patient, Katy Hong, to the [facility] [place]. Please see a copy of my visit note below.    Bariatric Care Clinic Non Surgical Follow up Visit   Date of visit: 11/21/2022  Physician: LATISHA Rossi MD, MD  Primary Care is Skyler Wiggins  Katy Hong   77 year old  female     Initial Weight: 201#  Initial BMI: 37.4  Today's Weight:   Wt Readings from Last 1 Encounters:   11/21/22 78.9 kg (174 lb)     Body mass index is 31.83 kg/m .           Assessment and Plan   Assessment: Katy is a 77 year old year old female who presents for medical weight management.      Plan:    1. Obesity (BMI 30-39.9)  Patient was congratulated on her success thus far. Healthy habits to assist with further weight loss were discussed. She try to increase her protein intake. I placed a list of [employer] exercise videos and lean protein sources in her after visit summary. She will continue the metformin.     2. Essential hypertension  This may improve with healthy habits and weight loss.    Follow up in 3 months with myself           INTERIM HISTORY  Patient is taking metformin and feels that it is helping to control her appetite. She has been struggling with her depression. She has been unable to find anyone who can do intensive outpatient behavioral therapy with her insurance. She sees her psychiatrist tomorrow.    DIETARY HISTORY  Meals Per Day: 3  Eating Protein First?: working on it  Food Diary: B:toast and egg L:sandwich with pocket bread and peanut butter and jelly or cheese and fruit D:meat and vegetable and sometimes starch  Snacks Per Day: 1  Typical Snack: nuts  Fluid Intake: she gets 64 oz of water when she is at home  Portion Control: yes  Calorie Containing Beverages: bottle of soda once a week  Typical Protein Food Choices: meat,  cheese, peanut butter  Choosing Whole Grains: yes  Meals at Restaurant per week:0-1    Positive Changes Since Last Visit: no eating out  Struggling With: exercise depression    Knowledgeable in Reading Food Labels: yes  Getting Adequate Protein: working on it  Sleeping 7-8 hours/day yes    PHYSICAL ACTIVITY PATTERNS:  Not much    REVIEW OF SYSTEMS  GENERAL/CONSTITUTIONAL:  Fatigue: yes  HEENT:   glaucoma: no  PULMONARY:  Dyspnea on exertion: sometimes  NEUROLOGIC:  Paresthesias: no  PSYCHIATRIC:  Moods: struggling with her depression  ENDOCRINE:  Monitoring Blood Sugars: no  Sugars Well Controlled: na  :  Birth control: menopause       Patient Profile   Social History     Social History Narrative    7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          Past Medical History   Past Medical History:   Diagnosis Date     Abnormal mammogram, unspecified     Created by Conversion      Acute sinusitis, unspecified     Created by Conversion      Asymptomatic varicose veins     Created by Conversion      Breast cyst      Cellulitis and abscess of unspecified site     Created by Conversion      Circumscribed scleroderma     Created by Conversion      Contusion of unspecified site     Created by Conversion      Disorder of bone and cartilage, unspecified     Created by Conversion      Dysthymic disorder     Created by Conversion      Esophageal reflux     Created by Conversion      Insomnia, unspecified     Created by Conversion      Lumbago     Created by Conversion      Mammogram - Abnormal     Created by Conversion Replacement Utility updated for latest IMO load      Migraine, unspecified, without mention of intractable migraine without mention of status migrainosus     Created by Conversion      Myalgia and myositis, unspecified     Created by Conversion      Myocardial infarction (H)     per EKG's since      Obesity, unspecified     Created by Conversion      Open wound of lip, without mention of complication     Created by Conversion  "     Other primary cardiomyopathies     Created by Conversion      Other seborrheic keratosis     Created by Conversion      Pain in joint, lower leg     Created by Conversion      Pain in limb     Created by Conversion      Sebaceous cyst     Created by Conversion      Shortness of breath     Created by Conversion      Shoulder and upper arm, insect bite, nonvenomous, without mention of infection(912.4)     Created by Conversion      Unspecified disease of sebaceous glands     Created by Conversion      Unspecified essential hypertension     Created by Conversion      Unspecified venous (peripheral) insufficiency     Created by Conversion      Unspecified vitamin D deficiency     Created by Conversion      Patient Active Problem List   Diagnosis     Osteopenia     Insomnia     Vitamin D Deficiency     Lower Back Pain     Fibromyalgia     Essential Hypertension     Seborrheic Keratosis     Lichen Sclerosus Et Atrophicus     Migraine Headache     Esophageal Reflux     Joint Pain, Localized In The Knee     Varicose Veins     Chronic Cutaneous Ulcer Venous Stasis     Chronic venous insufficiency     Foot pain (Soft Tissue)     Female stress incontinence     Calculus of ureter     Hydronephrosis with urinary obstruction due to ureteral calculus     Other cardiomyopathy (H)     Obesity (BMI 30-39.9)     Chronic fatigue     Moderate episode of recurrent major depressive disorder (H)     Anxiety     Pain in joint, multiple sites     Osteoarthritis of lumbar spine, unspecified spinal osteoarthritis complication status     Pain in both lower legs       Past Surgical History  She has a past surgical history that includes appendectomy; Temporomandibular Joint Surgery (Bilateral); Cataract Extraction, Bilateral; Hysterectomy; Oophorectomy; Biopsy breast (Left); Breast Cyst Aspiration (Left); and tubal ligation.     Examination   Ht 1.575 m (5' 2\")   Wt 78.9 kg (174 lb)   BMI 31.83 kg/m    General:  Alert, NAD  Pscyh/Mood: " "sounds stable         Counseling:   We reviewed the important post op bariatric recommendations:  -eating 3 meals daily  -eating protein first, getting >60gm protein daily  -eating slowly, chewing food well  -avoiding/limiting calorie containing beverages  -limiting starchy vegetables and carbohydrates, choosing wheat, not white with breads,   crackers, pastas, bear, bagels, tortillas, rice  -limiting restaurant or cafeteria eating to twice a week or less    We discussed the importance of restorative sleep and stress management in maintaining a healthy weight.  We discussed the National Weight Control Registry healthy weight maintenance strategies and ways to optimize metabolism.  We discussed the importance of physical activity including cardiovascular and strength training in maintaining a healthier weight.    Total time spent on the date of this encounter doing: chart review, review of test results, patient visit, physical exam, education, counseling, developing plan of care and documenting = 29 minutes.         LATISHA Rossi MD  [facility]               The patient has been notified of following:     \"This telephone visit will be conducted via a call between you and your physician/provider. We have found that certain health care needs can be provided without the need for a physical exam.  This service lets us provide the care you need with a short phone conversation.  If a prescription is necessary we can send it directly to your pharmacy.  If lab work is needed we can place an order for that and you can then stop by our lab to have the test done at a later time.    Telephone visits are billed at different rates depending on your insurance coverage. During this emergency period, for some insurers they may be billed the same as an in-person visit.  Please reach out to your insurance provider with any questions.    If during the course of the call the physician/provider feels a telephone " "visit is not appropriate, you will not be charged for this service.\"    Patient has given verbal consent to a Telephone visit? Yes    What phone number would you like to be contacted at? [phone]    Patient would like to receive their AVS by [email or web address]    Distant Location (provider location):  off site      Phone call duration: 12 minutes        Again, thank you for allowing me to participate in the care of your patient.        Sincerely,        LATISHA Rossi MD    "

## 2022-11-21 NOTE — PATIENT INSTRUCTIONS
Here are the GI Track exercise sites:    Yoga-https://www.GI Track.com/user/yogawithadriene    Body strength activities, dance, high intensity interval training- https://www.GI Track.com/user/popsugartvfit    Strength training- https://www.GI Track.com/user/KozakSportsPerform    Walking- https://www.GI Track.com/user/walkathomemedia    Sit and Be Fit- https://www.GI Track.Podotree/channel/UCLgvL3aGzMByecNYtMcyK_g    Low impact cardio- Carlie Roman- https://www.Jimdo/channel/OEtcCZpShqnDckiV7vgfwszX    Cardio Rina Smith- https://www.Jimdo/channel/KSTgIcwf3UUjLRSWI0vKkINk    30 Min low impact cardio- https://www.HackerRankube.com/watch?v=gC_L9qAHVJ8    Body Project- https://www.GI Track.Podotree/channel/DGPmm9S69-QiBhXRfUvhF5EU           LEAN PROTEIN SOURCES      Protein Source Portion Calories Grams of Protein                           Nonfat, plain Greek yogurt    (10 grams sugar or less) 3/4 cup (6 oz)  12-17   Light Yogurt (10 grams sugar or less) 3/4 cup (6 oz)  6-8   Protein Shake 1 shake 110-180 15-30   Skim/1% Milk or lactose-free milk 1 cup ( 8 oz)  8   Plain or light, flavored soymilk 1 cup  7-8   Plain or light, hemp milk 1 cup 110 6   Fat Free or 1% Cottage Cheese 1/2 cup 90 15   Part skim ricotta cheese 1/2 cup 100 14   Part skim or reduced fat cheese slices 1 ounce 65-80 8     Mozzarella String Cheese 1 80 8   Canned tuna, chicken, crab or salmon  (canned in water)  1/2 cup 100 15-20   White fish (broiled, grilled, baked) 3 ounces 100 21   [place]/Tuna (broiled, grilled, baked) 3 ounces 150-180 21   Shrimp, Scallops, Lobster, Crab 3 ounces 100 21   Pork loin, Pork Tenderloin 3 ounces 150 21   Boneless, skinless chicken /turkey breast                          (broiled, grilled, baked) 3 ounces 120 21   [place], [place], [place], and Venison 3 ounces 120 21   Lean cuts of red meat and pork (sirloin,   round, tenderloin, flank, ground 93%-96%) 3 ounces 170 21   Lean or Extra Lean Ground [place]  1/2 cup 150 20   90-95% Lean [place] Burger 1 shreya 140-180 21   Low-fat casserole with lean meat 3/4 cup 200 17   Luncheon Meats                                                        (turkey, lean ham, roast beef, chicken) 3 ounces 100 21   Egg (boiled, poached, scrambled) 1 Egg 60 7   Egg Substitute 1/2 cup 70 10   Nuts (limit to 1 serving per day)  3 Tbsp. 150 7   Nut [place] (peanut, almond)  Limit to 1 serving or less daily 1 Tbsp. 90 4   Soy Burger (varies) 1  15   Garbanzo, Black, [place] Beans 1/2 cup 110 7   Refried Beans 1/2 cup 100 7   Kidney and Lima beans 1/2 cup 110 7   Tempeh 3 oz 175 18   Vegan crumbles 1/2 cup 100 14   Tofu 1/2 cup 110 14   Chili (beans and extra lean beef or turkey) 1 cup 200 23   Lentil Stew/Soup 1 cup 150 12   Black Bean Soup 1 cup 175 12

## 2022-11-21 NOTE — PROGRESS NOTES
Bariatric Care Clinic Non Surgical Follow up Visit   Date of visit: 11/21/2022  Physician: LATISHA Rossi MD, MD  Primary Care is Skyler Wiggins  Katy Hong   77 year old  female     Initial Weight: 201#  Initial BMI: 37.4  Today's Weight:   Wt Readings from Last 1 Encounters:   11/21/22 78.9 kg (174 lb)     Body mass index is 31.83 kg/m .           Assessment and Plan   Assessment: Katy is a 77 year old year old female who presents for medical weight management.      Plan:    1. Obesity (BMI 30-39.9)  Patient was congratulated on her success thus far. Healthy habits to assist with further weight loss were discussed. She try to increase her protein intake. I placed a list of youtube exercise videos and lean protein sources in her after visit summary. She will continue the metformin.     2. Essential hypertension  This may improve with healthy habits and weight loss.    Follow up in 3 months with myself           INTERIM HISTORY  Patient is taking metformin and feels that it is helping to control her appetite. She has been struggling with her depression. She has been unable to find anyone who can do intensive outpatient behavioral therapy with her insurance. She sees her psychiatrist tomorrow.    DIETARY HISTORY  Meals Per Day: 3  Eating Protein First?: working on it  Food Diary: B:toast and egg L:sandwich with pocket bread and peanut butter and jelly or cheese and fruit D:meat and vegetable and sometimes starch  Snacks Per Day: 1  Typical Snack: nuts  Fluid Intake: she gets 64 oz of water when she is at home  Portion Control: yes  Calorie Containing Beverages: bottle of soda once a week  Typical Protein Food Choices: meat, cheese, peanut butter  Choosing Whole Grains: yes  Meals at Restaurant per week:0-1    Positive Changes Since Last Visit: no eating out  Struggling With: exercise depression    Knowledgeable in Reading Food Labels: yes  Getting Adequate Protein: working on it  Sleeping 7-8 hours/day  yes    PHYSICAL ACTIVITY PATTERNS:  Not much    REVIEW OF SYSTEMS  GENERAL/CONSTITUTIONAL:  Fatigue: yes  HEENT:   glaucoma: no  PULMONARY:  Dyspnea on exertion: sometimes  NEUROLOGIC:  Paresthesias: no  PSYCHIATRIC:  Moods: struggling with her depression  ENDOCRINE:  Monitoring Blood Sugars: no  Sugars Well Controlled: na  :  Birth control: menopause       Patient Profile   Social History     Social History Narrative    7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          Past Medical History   Past Medical History:   Diagnosis Date     Abnormal mammogram, unspecified     Created by Conversion      Acute sinusitis, unspecified     Created by Conversion      Asymptomatic varicose veins     Created by Conversion      Breast cyst      Cellulitis and abscess of unspecified site     Created by Conversion      Circumscribed scleroderma     Created by Conversion      Contusion of unspecified site     Created by Conversion      Disorder of bone and cartilage, unspecified     Created by Conversion      Dysthymic disorder     Created by Conversion      Esophageal reflux     Created by Conversion      Insomnia, unspecified     Created by Conversion      Lumbago     Created by Conversion      Mammogram - Abnormal     Created by Conversion Replacement Utility updated for latest IMO load      Migraine, unspecified, without mention of intractable migraine without mention of status migrainosus     Created by Conversion      Myalgia and myositis, unspecified     Created by Conversion      Myocardial infarction (H)     per EKG's since      Obesity, unspecified     Created by Conversion      Open wound of lip, without mention of complication     Created by Conversion      Other primary cardiomyopathies     Created by Conversion      Other seborrheic keratosis     Created by Conversion      Pain in joint, lower leg     Created by Conversion      Pain in limb     Created by Conversion      Sebaceous cyst     Created by Conversion      Shortness of  "breath     Created by Conversion      Shoulder and upper arm, insect bite, nonvenomous, without mention of infection(912.4)     Created by Conversion      Unspecified disease of sebaceous glands     Created by Conversion      Unspecified essential hypertension     Created by Conversion      Unspecified venous (peripheral) insufficiency     Created by Conversion      Unspecified vitamin D deficiency     Created by Conversion      Patient Active Problem List   Diagnosis     Osteopenia     Insomnia     Vitamin D Deficiency     Lower Back Pain     Fibromyalgia     Essential Hypertension     Seborrheic Keratosis     Lichen Sclerosus Et Atrophicus     Migraine Headache     Esophageal Reflux     Joint Pain, Localized In The Knee     Varicose Veins     Chronic Cutaneous Ulcer Venous Stasis     Chronic venous insufficiency     Foot pain (Soft Tissue)     Female stress incontinence     Calculus of ureter     Hydronephrosis with urinary obstruction due to ureteral calculus     Other cardiomyopathy (H)     Obesity (BMI 30-39.9)     Chronic fatigue     Moderate episode of recurrent major depressive disorder (H)     Anxiety     Pain in joint, multiple sites     Osteoarthritis of lumbar spine, unspecified spinal osteoarthritis complication status     Pain in both lower legs       Past Surgical History  She has a past surgical history that includes appendectomy; Temporomandibular Joint Surgery (Bilateral); Cataract Extraction, Bilateral; Hysterectomy; Oophorectomy; Biopsy breast (Left); Breast Cyst Aspiration (Left); and tubal ligation.     Examination   Ht 1.575 m (5' 2\")   Wt 78.9 kg (174 lb)   BMI 31.83 kg/m    General:  Alert, NAD  Pscyh/Mood: sounds stable         Counseling:   We reviewed the important post op bariatric recommendations:  -eating 3 meals daily  -eating protein first, getting >60gm protein daily  -eating slowly, chewing food well  -avoiding/limiting calorie containing beverages  -limiting starchy vegetables " and carbohydrates, choosing wheat, not white with breads,   crackers, pastas, bear, bagels, tortillas, rice  -limiting restaurant or cafeteria eating to twice a week or less    We discussed the importance of restorative sleep and stress management in maintaining a healthy weight.  We discussed the National Weight Control Registry healthy weight maintenance strategies and ways to optimize metabolism.  We discussed the importance of physical activity including cardiovascular and strength training in maintaining a healthier weight.    Total time spent on the date of this encounter doing: chart review, review of test results, patient visit, physical exam, education, counseling, developing plan of care and documenting = 29 minutes.         LATISHA Rossi MD  [facility]

## 2022-11-21 NOTE — PROGRESS NOTES
"  The patient has been notified of following:     \"This telephone visit will be conducted via a call between you and your physician/provider. We have found that certain health care needs can be provided without the need for a physical exam.  This service lets us provide the care you need with a short phone conversation.  If a prescription is necessary we can send it directly to your pharmacy.  If lab work is needed we can place an order for that and you can then stop by our lab to have the test done at a later time.    Telephone visits are billed at different rates depending on your insurance coverage. During this emergency period, for some insurers they may be billed the same as an in-person visit.  Please reach out to your insurance provider with any questions.    If during the course of the call the physician/provider feels a telephone visit is not appropriate, you will not be charged for this service.\"    Patient has given verbal consent to a Telephone visit? Yes    What phone number would you like to be contacted at? [phone]    Patient would like to receive their AVS by [employer]    Distant Location (provider location):  off site      Phone call duration: 12 minutes    "

## 2022-12-01 ENCOUNTER — HOSPITAL ENCOUNTER (OUTPATIENT)
Dept: MAMMOGRAPHY | Facility: CLINIC | Age: 77
Discharge: HOME | End: 2022-12-01
Attending: NURSE PRACTITIONER | Admitting: NURSE PRACTITIONER
Payer: COMMERCIAL

## 2022-12-01 DIAGNOSIS — R92.1 BREAST CALCIFICATION, LEFT: ICD-10-CM

## 2022-12-01 DIAGNOSIS — R92.8 ABNORMAL MAMMOGRAM: ICD-10-CM

## 2022-12-01 PROCEDURE — 77065 DX MAMMO INCL CAD UNI: CPT | Mod: LT

## 2022-12-01 NOTE — PROGRESS NOTES
Radiologist, Dr Marla Schofield, recommends stereotactic guided left breast biopsy.     RN scheduled pt at [facility], [facility] [address]. [phone]. Appt: Thursday 12/8/22, arrival at 0945am for 10:00am appt. RN gave patient the address and phone number to above location.     RN reviewed the procedure with patient and gave patient written pre and post biopsy handouts to take home.     Pt verbalizes understanding of procedure and appt location, date, and time. Calls welcomed.    Gifty Woody, RN, BSN, [facility]  [facility]

## 2022-12-01 NOTE — LETTER
Katy Hong  [address] [facility] [address]            December 1, 2022      Date of Exam: 12/1/22      Dear Katy:    Thank you for your recent visit.    Breast Imaging Result: Based on your recent breast imaging, you have a suspicious area that usually requires a biopsy, at which time a small tissue sample would be taken from your breast.      If you have already made these arrangements, please disregard this letter.    A report of your breast imaging results was sent to: Leonora Kerr    Your breast imaging will become part of your medical file here at [facility] for at least 10 years. You are responsible for informing any new health care team or breast imaging facility of the date and location of this examination.    We appreciate the opportunity to participate in your health care.    Sincerely,  Dr. Marla Schofield  [facility]

## 2022-12-05 ENCOUNTER — TRANSFERRED RECORDS (OUTPATIENT)
Dept: HEALTH INFORMATION MANAGEMENT | Facility: CLINIC | Age: 77
End: 2022-12-05

## 2022-12-08 ENCOUNTER — ANCILLARY PROCEDURE (OUTPATIENT)
Dept: MAMMOGRAPHY | Facility: CLINIC | Age: 77
End: 2022-12-08
Attending: NURSE PRACTITIONER
Payer: COMMERCIAL

## 2022-12-08 DIAGNOSIS — R92.1 BREAST CALCIFICATION, LEFT: ICD-10-CM

## 2022-12-08 PROCEDURE — 19081 BX BREAST 1ST LESION STRTCTC: CPT | Mod: LT

## 2022-12-08 PROCEDURE — 250N000009 HC RX 250: Performed by: NURSE PRACTITIONER

## 2022-12-08 PROCEDURE — 88305 TISSUE EXAM BY PATHOLOGIST: CPT | Mod: TC | Performed by: NURSE PRACTITIONER

## 2022-12-08 RX ORDER — LIDOCAINE HYDROCHLORIDE AND EPINEPHRINE 10; 10 MG/ML; UG/ML
10 INJECTION, SOLUTION INFILTRATION; PERINEURAL ONCE
Status: COMPLETED | OUTPATIENT
Start: 2022-12-08 | End: 2022-12-08

## 2022-12-08 RX ADMIN — LIDOCAINE HYDROCHLORIDE 10 ML: 10 INJECTION, SOLUTION INFILTRATION; PERINEURAL at 10:41

## 2022-12-08 RX ADMIN — LIDOCAINE HYDROCHLORIDE,EPINEPHRINE BITARTRATE 10 ML: 10; .01 INJECTION, SOLUTION INFILTRATION; PERINEURAL at 10:41

## 2022-12-08 NOTE — DISCHARGE INSTRUCTIONS

## 2022-12-12 ENCOUNTER — TELEPHONE (OUTPATIENT)
Dept: MAMMOGRAPHY | Facility: CLINIC | Age: 77
End: 2022-12-12

## 2022-12-12 LAB
PATH REPORT.COMMENTS IMP SPEC: NORMAL
PATH REPORT.FINAL DX SPEC: NORMAL
PATH REPORT.GROSS SPEC: NORMAL
PATH REPORT.MICROSCOPIC SPEC OTHER STN: NORMAL
PATH REPORT.RELEVANT HX SPEC: NORMAL
PHOTO IMAGE: NORMAL

## 2022-12-12 PROCEDURE — 88305 TISSUE EXAM BY PATHOLOGIST: CPT | Mod: 26 | Performed by: PATHOLOGY

## 2022-12-12 NOTE — TELEPHONE ENCOUNTER
At the request of the Breast Center Radiologist, Dr. Schofield,  I phoned patient and discussed the benign breast biopsy results.      Per Dr. Schofield's recommendation, patient was advised that she may return to her routine breast screening schedule.    Patient denies any concerns at her biopsy site. Questions were answered and my phone number given if she has further questions or concerns.  Ordering provider was informed of these results.  Patient verbalized understanding and agrees with the plan of care.       Marya Jorge RN, BSN, PHN, CBCN  Breast Center Imaging Nurse Coordinator   [facility]  [address] [facility] [address]  [place] [address]  [phone]      Detail Level: Zone

## 2023-01-10 DIAGNOSIS — B35.1 NAIL FUNGAL INFECTION: ICD-10-CM

## 2023-01-10 DIAGNOSIS — M79.7 FIBROMYALGIA: ICD-10-CM

## 2023-01-10 DIAGNOSIS — M47.816 OSTEOARTHRITIS OF LUMBAR SPINE, UNSPECIFIED SPINAL OSTEOARTHRITIS COMPLICATION STATUS: ICD-10-CM

## 2023-01-10 DIAGNOSIS — N95.2 ATROPHIC VAGINITIS: ICD-10-CM

## 2023-01-10 DIAGNOSIS — M79.662 PAIN IN BOTH LOWER LEGS: ICD-10-CM

## 2023-01-10 DIAGNOSIS — M79.661 PAIN IN BOTH LOWER LEGS: ICD-10-CM

## 2023-01-10 DIAGNOSIS — K21.9 GASTROESOPHAGEAL REFLUX DISEASE WITHOUT ESOPHAGITIS: ICD-10-CM

## 2023-01-12 RX ORDER — FAMOTIDINE 20 MG/1
20 TABLET, FILM COATED ORAL 2 TIMES DAILY
Qty: 180 TABLET | Refills: 0 | Status: SHIPPED | OUTPATIENT
Start: 2023-01-12 | End: 2023-02-09

## 2023-01-12 RX ORDER — DULOXETIN HYDROCHLORIDE 60 MG/1
CAPSULE, DELAYED RELEASE ORAL
Qty: 180 CAPSULE | Refills: 0 | Status: SHIPPED | OUTPATIENT
Start: 2023-01-12 | End: 2023-03-15

## 2023-01-12 RX ORDER — TRIAMCINOLONE ACETONIDE 1 MG/G
OINTMENT TOPICAL
Qty: 30 G | Refills: 2 | Status: SHIPPED | OUTPATIENT
Start: 2023-01-12 | End: 2024-04-30

## 2023-01-12 RX ORDER — TERBINAFINE HYDROCHLORIDE 250 MG/1
TABLET ORAL
Qty: 42 TABLET | Refills: 0 | OUTPATIENT
Start: 2023-01-12

## 2023-01-12 NOTE — TELEPHONE ENCOUNTER
"Last Written Prescription Date:  1/7/22  Last Fill Quantity: 30 g,  # refills: 2   Last office visit provider:  10/28/22     Requested Prescriptions   Pending Prescriptions Disp Refills     famotidine (PEPCID) 20 MG tablet [Pharmacy Med Name: FAMOTIDINE 20 MG TABLET] 180 tablet 3     Sig: TAKE 1 TABLET BY MOUTH TWICE A DAY       H2 Blockers Protocol Passed - 1/12/2023  7:13 AM        Passed - Patient is age 12 or older        Passed - Recent (12 mo) or future (30 days) visit within the authorizing provider's specialty     Patient has had an office visit with the authorizing provider or a provider within the authorizing providers department within the previous 12 mos or has a future within next 30 days. See \"Patient Info\" tab in inbasket, or \"Choose Columns\" in Meds & Orders section of the refill encounter.              Passed - Medication is active on med list           DULoxetine (CYMBALTA) 60 MG capsule [Pharmacy Med Name: DULOXETINE HCL DR 60 MG CAP] 180 capsule 0     Sig: TAKE 1 CAPSULE BY MOUTH 2 TIMES DAILY.       Serotonin-Norepinephrine Reuptake Inhibitors  Failed - 1/10/2023  3:15 PM        Failed - Blood pressure under 140/90 in past 12 months     BP Readings from Last 3 Encounters:   11/11/22 (!) 171/87   10/28/22 126/68   10/07/22 120/62                 Passed - Recent (12 mo) or future (30 days) visit within the authorizing provider's specialty     Patient has had an office visit with the authorizing provider or a provider within the authorizing providers department within the previous 12 mos or has a future within next 30 days. See \"Patient Info\" tab in [employer], or \"Choose Columns\" in Meds & Orders section of the refill encounter.              Passed - Medication is active on med list        Passed - Patient is age 18 or older        Passed - No active pregnancy on record        Passed - No positive pregnancy test in past 12 months           triamcinolone (KENALOG) 0.1 % external ointment [Pharmacy " "Med Name: TRIAMCINOLONE 0.1% OINTMENT] 30 g 2     Sig: APPLY TO AFFECTED AREA TWICE A DAY       Topical Steroids and Nonsteroidals Protocol Passed - 1/10/2023  3:15 PM        Passed - Patient is age 6 or older        Passed - Authorizing prescriber's most recent note related to this medication read.     If refill request is for ophthalmic use, please forward request to provider for approval.          Passed - High potency steroid not ordered        Passed - Recent (12 mo) or future (30 days) visit within the authorizing provider's specialty     Patient has had an office visit with the authorizing provider or a provider within the authorizing providers department within the previous 12 mos or has a future within next 30 days. See \"Patient Info\" tab in inbasket, or \"Choose Columns\" in Meds & Orders section of the refill encounter.              Passed - Medication is active on med list           terbinafine (LAMISIL) 250 MG tablet [Pharmacy Med Name: TERBINAFINE  MG TABLET] 42 tablet 0     Sig: TAKE 1 TABLET BY MOUTH EVERY DAY       There is no refill protocol information for this order          Hi Al RN 01/12/23 7:14 AM  " Yes - the patient is able to be screened

## 2023-01-12 NOTE — TELEPHONE ENCOUNTER
"Outpatient Medication Detail     Disp Refills Start End MARVIN   terbinafine (LAMISIL) 250 MG tablet 42 tablet 0 3/8/2022 2022 --   Sig - Route: Take 1 tablet (250 mg) by mouth daily - Oral   Sent to pharmacy as: Terbinafine HCl 250 MG Oral Tablet (lamISIL)   Class: E-Prescribe   Order: [id number]   E-Prescribing Status: Receipt confirmed by pharmacy (3/8/2022  9:52 AM CST)     Routing refill request to provider for review/approval because:  Patient reports taking medication differently.   script    Last Written Prescription Date:  21  Last Fill Quantity: 180,  # refills: 3   Last office visit provider:  10/28/22     Last Written Prescription Date:  10/7/22  Last Fill Quantity: 180,  # refills: 0   Last office visit provider:  10/28/22    Requested Prescriptions   Pending Prescriptions Disp Refills     famotidine (PEPCID) 20 MG tablet [Pharmacy Med Name: FAMOTIDINE 20 MG TABLET] 180 tablet 3     Sig: TAKE 1 TABLET BY MOUTH TWICE A DAY       H2 Blockers Protocol Passed - 2023  7:13 AM        Passed - Patient is age 12 or older        Passed - Recent (12 mo) or future (30 days) visit within the authorizing provider's specialty     Patient has had an office visit with the authorizing provider or a provider within the authorizing providers department within the previous 12 mos or has a future within next 30 days. See \"Patient Info\" tab in inbasket, or \"Choose Columns\" in Meds & Orders section of the refill encounter.              Passed - Medication is active on med list           DULoxetine (CYMBALTA) 60 MG capsule [Pharmacy Med Name: DULOXETINE HCL DR 60 MG CAP] 180 capsule 0     Sig: TAKE 1 CAPSULE BY MOUTH 2 TIMES DAILY.       Serotonin-Norepinephrine Reuptake Inhibitors  Failed - 1/10/2023  3:15 PM        Failed - Blood pressure under 140/90 in past 12 months     BP Readings from Last 3 Encounters:   22 (!) 171/87   10/28/22 126/68   10/07/22 120/62                 Passed - Recent (12 mo) " "or future (30 days) visit within the authorizing provider's specialty     Patient has had an office visit with the authorizing provider or a provider within the authorizing providers department within the previous 12 mos or has a future within next 30 days. See \"Patient Info\" tab in inbasket, or \"Choose Columns\" in Meds & Orders section of the refill encounter.              Passed - Medication is active on med list        Passed - Patient is age 18 or older        Passed - No active pregnancy on record        Passed - No positive pregnancy test in past 12 months           terbinafine (LAMISIL) 250 MG tablet [Pharmacy Med Name: TERBINAFINE  MG TABLET] 42 tablet 0     Sig: TAKE 1 TABLET BY MOUTH EVERY DAY       There is no refill protocol information for this order      Signed Prescriptions Disp Refills    triamcinolone (KENALOG) 0.1 % external ointment 30 g 2     Sig: APPLY TO AFFECTED AREA TWICE A DAY       Topical Steroids and Nonsteroidals Protocol Passed - 1/10/2023  3:15 PM        Passed - Patient is age 6 or older        Passed - Authorizing prescriber's most recent note related to this medication read.     If refill request is for ophthalmic use, please forward request to provider for approval.          Passed - High potency steroid not ordered        Passed - Recent (12 mo) or future (30 days) visit within the authorizing provider's specialty     Patient has had an office visit with the authorizing provider or a provider within the authorizing providers department within the previous 12 mos or has a future within next 30 days. See \"Patient Info\" tab in inbasket, or \"Choose Columns\" in Meds & Orders section of the refill encounter.              Passed - Medication is active on med list             Hi Al RN 01/12/23 7:15 AM  "

## 2023-01-24 ENCOUNTER — TRANSFERRED RECORDS (OUTPATIENT)
Dept: HEALTH INFORMATION MANAGEMENT | Facility: CLINIC | Age: 78
End: 2023-01-24

## 2023-02-08 ENCOUNTER — OFFICE VISIT (OUTPATIENT)
Dept: FAMILY MEDICINE | Facility: CLINIC | Age: 78
End: 2023-02-08
Payer: COMMERCIAL

## 2023-02-08 VITALS
SYSTOLIC BLOOD PRESSURE: 120 MMHG | WEIGHT: 180 LBS | HEIGHT: 62 IN | DIASTOLIC BLOOD PRESSURE: 80 MMHG | OXYGEN SATURATION: 97 % | BODY MASS INDEX: 33.13 KG/M2 | RESPIRATION RATE: 16 BRPM | TEMPERATURE: 97.9 F | HEART RATE: 88 BPM

## 2023-02-08 DIAGNOSIS — I42.8 OTHER CARDIOMYOPATHY (H): ICD-10-CM

## 2023-02-08 DIAGNOSIS — E66.9 OBESITY (BMI 30-39.9): ICD-10-CM

## 2023-02-08 DIAGNOSIS — F33.1 MODERATE EPISODE OF RECURRENT MAJOR DEPRESSIVE DISORDER (H): ICD-10-CM

## 2023-02-08 DIAGNOSIS — I87.2 CHRONIC VENOUS INSUFFICIENCY: ICD-10-CM

## 2023-02-08 DIAGNOSIS — M17.10 PRIMARY OSTEOARTHRITIS OF KNEE, UNSPECIFIED LATERALITY: ICD-10-CM

## 2023-02-08 DIAGNOSIS — Z01.818 PREOP GENERAL PHYSICAL EXAM: Primary | ICD-10-CM

## 2023-02-08 DIAGNOSIS — K21.9 GASTROESOPHAGEAL REFLUX DISEASE WITHOUT ESOPHAGITIS: ICD-10-CM

## 2023-02-08 DIAGNOSIS — M79.7 FIBROMYALGIA: ICD-10-CM

## 2023-02-08 DIAGNOSIS — I10 ESSENTIAL HYPERTENSION: ICD-10-CM

## 2023-02-08 LAB
ALBUMIN SERPL BCG-MCNC: 4.1 G/DL (ref 3.5–5.2)
ALP SERPL-CCNC: 80 U/L (ref 35–104)
ALT SERPL W P-5'-P-CCNC: 12 U/L (ref 10–35)
ANION GAP SERPL CALCULATED.3IONS-SCNC: 10 MMOL/L (ref 7–15)
AST SERPL W P-5'-P-CCNC: 18 U/L (ref 10–35)
ATRIAL RATE - MUSE: 81 BPM
BILIRUB SERPL-MCNC: 0.4 MG/DL
BUN SERPL-MCNC: 14.1 MG/DL (ref 8–23)
CALCIUM SERPL-MCNC: 9.5 MG/DL (ref 8.8–10.2)
CHLORIDE SERPL-SCNC: 104 MMOL/L (ref 98–107)
CREAT SERPL-MCNC: 0.95 MG/DL (ref 0.51–0.95)
DEPRECATED HCO3 PLAS-SCNC: 30 MMOL/L (ref 22–29)
DIASTOLIC BLOOD PRESSURE - MUSE: NORMAL MMHG
ERYTHROCYTE [DISTWIDTH] IN BLOOD BY AUTOMATED COUNT: 13.1 % (ref 10–15)
GFR SERPL CREATININE-BSD FRML MDRD: 62 ML/MIN/1.73M2
GLUCOSE SERPL-MCNC: 100 MG/DL (ref 70–99)
HCT VFR BLD AUTO: 39.9 % (ref 35–47)
HGB BLD-MCNC: 12.4 G/DL (ref 11.7–15.7)
INTERPRETATION ECG - MUSE: NORMAL
MCH RBC QN AUTO: 29.7 PG (ref 26.5–33)
MCHC RBC AUTO-ENTMCNC: 31.1 G/DL (ref 31.5–36.5)
MCV RBC AUTO: 96 FL (ref 78–100)
P AXIS - MUSE: 69 DEGREES
PLATELET # BLD AUTO: 197 10E3/UL (ref 150–450)
POTASSIUM SERPL-SCNC: 3.7 MMOL/L (ref 3.4–5.3)
PR INTERVAL - MUSE: 154 MS
PROT SERPL-MCNC: 6.5 G/DL (ref 6.4–8.3)
QRS DURATION - MUSE: 86 MS
QT - MUSE: 424 MS
QTC - MUSE: 492 MS
R AXIS - MUSE: -38 DEGREES
RBC # BLD AUTO: 4.18 10E6/UL (ref 3.8–5.2)
SODIUM SERPL-SCNC: 144 MMOL/L (ref 136–145)
SYSTOLIC BLOOD PRESSURE - MUSE: NORMAL MMHG
T AXIS - MUSE: 39 DEGREES
VENTRICULAR RATE- MUSE: 81 BPM
WBC # BLD AUTO: 4.7 10E3/UL (ref 4–11)

## 2023-02-08 PROCEDURE — 93005 ELECTROCARDIOGRAM TRACING: CPT | Performed by: NURSE PRACTITIONER

## 2023-02-08 PROCEDURE — 93010 ELECTROCARDIOGRAM REPORT: CPT | Performed by: INTERNAL MEDICINE

## 2023-02-08 PROCEDURE — 85027 COMPLETE CBC AUTOMATED: CPT | Performed by: NURSE PRACTITIONER

## 2023-02-08 PROCEDURE — 36415 COLL VENOUS BLD VENIPUNCTURE: CPT | Performed by: NURSE PRACTITIONER

## 2023-02-08 PROCEDURE — 80053 COMPREHEN METABOLIC PANEL: CPT | Performed by: NURSE PRACTITIONER

## 2023-02-08 PROCEDURE — 99214 OFFICE O/P EST MOD 30 MIN: CPT | Performed by: NURSE PRACTITIONER

## 2023-02-08 RX ORDER — CALCIUM CARBONATE 750 MG/1
750 TABLET, CHEWABLE ORAL DAILY
COMMUNITY
End: 2024-05-21

## 2023-02-08 ASSESSMENT — ANXIETY QUESTIONNAIRES
7. FEELING AFRAID AS IF SOMETHING AWFUL MIGHT HAPPEN: MORE THAN HALF THE DAYS
6. BECOMING EASILY ANNOYED OR IRRITABLE: NEARLY EVERY DAY
2. NOT BEING ABLE TO STOP OR CONTROL WORRYING: MORE THAN HALF THE DAYS
3. WORRYING TOO MUCH ABOUT DIFFERENT THINGS: MORE THAN HALF THE DAYS
5. BEING SO RESTLESS THAT IT IS HARD TO SIT STILL: SEVERAL DAYS
1. FEELING NERVOUS, ANXIOUS, OR ON EDGE: MORE THAN HALF THE DAYS
GAD7 TOTAL SCORE: 14
8. IF YOU CHECKED OFF ANY PROBLEMS, HOW DIFFICULT HAVE THESE MADE IT FOR YOU TO DO YOUR WORK, TAKE CARE OF THINGS AT HOME, OR GET ALONG WITH OTHER PEOPLE?: SOMEWHAT DIFFICULT
IF YOU CHECKED OFF ANY PROBLEMS ON THIS QUESTIONNAIRE, HOW DIFFICULT HAVE THESE PROBLEMS MADE IT FOR YOU TO DO YOUR WORK, TAKE CARE OF THINGS AT HOME, OR GET ALONG WITH OTHER PEOPLE: SOMEWHAT DIFFICULT
7. FEELING AFRAID AS IF SOMETHING AWFUL MIGHT HAPPEN: MORE THAN HALF THE DAYS
GAD7 TOTAL SCORE: 14
4. TROUBLE RELAXING: MORE THAN HALF THE DAYS
GAD7 TOTAL SCORE: 14

## 2023-02-08 ASSESSMENT — PATIENT HEALTH QUESTIONNAIRE - PHQ9
SUM OF ALL RESPONSES TO PHQ QUESTIONS 1-9: 11
SUM OF ALL RESPONSES TO PHQ QUESTIONS 1-9: 11
10. IF YOU CHECKED OFF ANY PROBLEMS, HOW DIFFICULT HAVE THESE PROBLEMS MADE IT FOR YOU TO DO YOUR WORK, TAKE CARE OF THINGS AT HOME, OR GET ALONG WITH OTHER PEOPLE: SOMEWHAT DIFFICULT

## 2023-02-08 ASSESSMENT — PAIN SCALES - GENERAL: PAINLEVEL: NO PAIN (0)

## 2023-02-08 NOTE — PATIENT INSTRUCTIONS
I will get your paperwork faxed to your surgeon.    Follow your surgeon's directions regarding eating and drinking prior to surgery.     Medications you can take the morning of surgery with a small sip of water include: metoprolol    No advil, ibuprofen, aleve, naproxen, or aspirin a week before surgery. Tylenol is ok.    Stop all supplements a week before surgery.    Your surgeon will manage any complications after your procedure.

## 2023-02-08 NOTE — PROGRESS NOTES
[facility]  [address]  [place]  [facility] [address]  Phone: [phone]  Fax: [phone]  Primary Provider: Patrizia Wiggins  Pre-op Performing Provider: PATRIZIA WIGGINS    PREOPERATIVE EVALUATION:  Today's date: 2/8/2023    Katy Hong is a 77 year old female who presents for a preoperative evaluation.    Surgical Information:  Surgery/Procedure: Total right knee replacement  Surgery Location: [facility]  Surgeon: Dr. Efra Garcia  Surgery Date: 02/21/23  Time of Surgery: TBD  Where patient plans to recover: At home with family  Fax number for surgical facility: [phone]    Type of Anesthesia Anticipated: General    Assessment & Plan     The proposed surgical procedure is considered INTERMEDIATE risk.    Preop general physical exam  Medically optimized for surgery  - Comprehensive metabolic panel (BMP + Alb, Alk Phos, ALT, AST, Total. Bili, TP); Future  - CBC with platelets; Future  - EKG 12-lead, tracing only  - Comprehensive metabolic panel (BMP + Alb, Alk Phos, ALT, AST, Total. Bili, TP)  - CBC with platelets    Primary osteoarthritis of knee, unspecified laterality  Planned TKA    Other cardiomyopathy (H)  Stable.  Due for follow-up with cardiology in April for her yearly checkup    Moderate episode of recurrent major depressive disorder (H)  Stable    Gastroesophageal reflux disease without esophagitis    Obesity (BMI 30-39.9)    Fibromyalgia  Could potentially complicate postoperative pain management    Essential hypertension  Well-controlled    Chronic venous insufficiency  Discussed that this will likely worsen post surgery    Risks and Recommendations:  The patient has the following additional risks and recommendations for perioperative complications:   - No identified additional risk factors other than previously addressed    Medication Instructions:  No NSAIDs or supplements a week prior.  Take  metoprolol morning of surgery with small sip of water    RECOMMENDATION:  APPROVAL GIVEN to proceed with proposed procedure, without further diagnostic evaluation.    Reviewed cardiology note x1, echocardiogram x1, ECG x1, hemoglobin x1    Subjective     HPI related to upcoming procedure: Patient has been struggling with persistent knee pain.  She followed up with orthopedics and was noted to have bone-on-bone degeneration and TKA was recommended    Preop Questions 2/8/2023   1. Have you ever had a heart attack or stroke? No   2. Have you ever had surgery on your heart or blood vessels, such as a stent placement, a coronary artery bypass, or surgery on an artery in your head, neck, heart, or legs? No   3. Do you have chest pain with activity? No   4. Do you have a history of  heart failure? No   5. Do you currently have a cold, bronchitis or symptoms of other infection? No   6. Do you have a cough, shortness of breath, or wheezing? YES - wheezing with activity-chronic   7. Do you or anyone in your family have previous history of blood clots? No   8. Do you or does anyone in your family have a serious bleeding problem such as prolonged bleeding following surgeries or cuts? No   9. Have you ever had problems with anemia or been told to take iron pills? No   10. Have you had any abnormal blood loss such as black, tarry or bloody stools, or abnormal vaginal bleeding? No   11. Have you ever had a blood transfusion? No   12. Are you willing to have a blood transfusion if it is medically needed before, during, or after your surgery? Yes   13. Have you or any of your relatives ever had problems with anesthesia? No   14. Do you have sleep apnea, excessive snoring or daytime drowsiness? No   15. Do you have any artifical heart valves or other implanted medical devices like a pacemaker, defibrillator, or continuous glucose monitor? No   16. Do you have artificial joints? No   17. Are you allergic to latex? YES:       Health  Care Directive:  Patient does not have a Health Care Directive or Living Will: previously reviewed.    Preoperative Review of :   reviewed - controlled substances reflected in medication list.    Status of Chronic Conditions:  See problem list for active medical problems.  Problems all longstanding and stable, except as noted/documented.  See ROS for pertinent symptoms related to these conditions.      Review of Systems  CONSTITUTIONAL: Positive for general fatigue (chronic) NEGATIVE for fever, chills, change in weight  INTEGUMENTARY/SKIN: NEGATIVE for worrisome rashes, moles or lesions  EYES: NEGATIVE for vision changes or irritation  ENT/MOUTH: NEGATIVE for ear, mouth and throat problems  RESP: NEGATIVE for significant cough or SOB  CV: NEGATIVE for chest pain, palpitations or peripheral edema  GI: NEGATIVE for nausea, abdominal pain, heartburn, or change in bowel habits  : NEGATIVE for frequency, dysuria, or hematuria  MUSCULOSKELETAL: Positive for knee pain   NEURO: NEGATIVE for weakness, dizziness or paresthesias  ENDOCRINE: NEGATIVE for temperature intolerance, skin/hair changes  HEME: NEGATIVE for bleeding problems  PSYCHIATRIC: NEGATIVE for changes in mood or affect    Patient Active Problem List    Diagnosis Date Noted     Osteoarthritis of lumbar spine, unspecified spinal osteoarthritis complication status 10/07/2022     Priority: Medium     Pain in both lower legs 10/07/2022     Priority: Medium     Pain in joint, multiple sites 03/08/2022     Priority: Medium     Migraine Headache      Priority: Medium     Created by Conversion  Replacement Utility updated for latest IMO load         Moderate episode of recurrent major depressive disorder (H) 03/15/2021     Priority: Medium     Anxiety 03/15/2021     Priority: Medium     Chronic fatigue 02/22/2021     Priority: Medium     Essential Hypertension      Priority: Medium     Created by Conversion  Replacement Utility updated for latest IMO load          Obesity (BMI 30-39.9) 06/16/2020     Priority: Medium     Other cardiomyopathy (H) 01/04/2019     Priority: Medium     Calculus of ureter 05/21/2018     Priority: Medium     Hydronephrosis with urinary obstruction due to ureteral calculus 05/21/2018     Priority: Medium     Fibromyalgia      Priority: Medium     Created by Conversion         Osteopenia      Priority: Medium     Created by Conversion  Replacement Utility updated for latest IMO load         Vitamin D Deficiency      Priority: Medium     Created by Conversion  Replacement Utility updated for latest IMO load         Chronic Cutaneous Ulcer Venous Stasis      Priority: Medium     Created by Conversion  Replacement Utility updated for latest IMO load         Female stress incontinence 02/28/2015     Priority: Medium     Foot pain (Soft Tissue) 02/26/2015     Priority: Medium     Chronic venous insufficiency 12/14/2014     Priority: Medium     Lower Back Pain      Priority: Medium     Created by Conversion         Varicose Veins      Priority: Medium     Created by Conversion         Insomnia      Priority: Medium     Created by Conversion         Seborrheic Keratosis      Priority: Medium     Created by Conversion         Lichen Sclerosus Et Atrophicus      Priority: Medium     Created by Conversion         Esophageal Reflux      Priority: Medium     Created by Conversion         Joint Pain, Localized In The Knee      Priority: Medium     Created by Conversion          Past Medical History:   Diagnosis Date     Abnormal mammogram, unspecified     Created by Conversion      Acute sinusitis, unspecified     Created by Conversion      Asymptomatic varicose veins     Created by Conversion      Breast cyst      Cellulitis and abscess of unspecified site     Created by Conversion      Circumscribed scleroderma     Created by Conversion      Contusion of unspecified site     Created by Conversion      Disorder of bone and cartilage, unspecified     Created  by Conversion      Dysthymic disorder     Created by Conversion      Esophageal reflux     Created by Conversion      Insomnia, unspecified     Created by Conversion      Lumbago     Created by Conversion      Mammogram - Abnormal     Created by Conversion Replacement Utility updated for latest IMO load      Migraine, unspecified, without mention of intractable migraine without mention of status migrainosus     Created by Conversion      Myalgia and myositis, unspecified     Created by Conversion      Myocardial infarction (H)     per EKG's since 2015     Obesity, unspecified     Created by Conversion      Open wound of lip, without mention of complication     Created by Conversion      Other primary cardiomyopathies     Created by Conversion      Other seborrheic keratosis     Created by Conversion      Pain in joint, lower leg     Created by Conversion      Pain in limb     Created by Conversion      Sebaceous cyst     Created by Conversion      Shortness of breath     Created by Conversion      Shoulder and upper arm, insect bite, nonvenomous, without mention of infection(912.4)     Created by Conversion      Unspecified disease of sebaceous glands     Created by Conversion      Unspecified essential hypertension     Created by Conversion      Unspecified venous (peripheral) insufficiency     Created by Conversion      Unspecified vitamin D deficiency     Created by Conversion      Past Surgical History:   Procedure Laterality Date     APPENDECTOMY       BIOPSY BREAST Left      BREAST CYST ASPIRATION Left      CATARACT EXTRACTION, BILATERAL       HYSTERECTOMY       OOPHORECTOMY       TEMPOROMANDIBULAR JOINT SURGERY Bilateral      TUBAL LIGATION       Current Outpatient Medications   Medication Sig Dispense Refill     aspirin 81 MG EC tablet [ASPIRIN 81 MG EC TABLET] Take 81 mg by mouth daily.       busPIRone (BUSPAR) 15 MG tablet Take 1 tablet (15 mg) by mouth 2 times daily 180 tablet 1     calcium carbonate 750  MG CHEW Take 750 mg by mouth daily       cholecalciferol, vitamin D3, 5,000 unit Tab [CHOLECALCIFEROL, VITAMIN D3, 5,000 UNIT TAB] Take by mouth.       DULoxetine (CYMBALTA) 60 MG capsule TAKE 1 CAPSULE BY MOUTH 2 TIMES DAILY. 180 capsule 0     famotidine (PEPCID) 20 MG tablet Take 1 tablet (20 mg) by mouth 2 times daily 180 tablet 0     furosemide (LASIX) 20 MG tablet Take 1-2 tablets (20-40 mg) by mouth every 48 hours as needed (elevated BP, LE swelling) 90 tablet 1     gabapentin (NEURONTIN) 300 MG capsule TAKE 1 CAPSULE BY MOUTH DAILY IN THE MORNING, 1 CAPSULES IN THE AFTERNOON, & 2 CAPSULES AT BEDTIME 360 capsule 2     Levomefolate Glucosamine (METHYLFOLATE PO)        losartan (COZAAR) 25 MG tablet TAKE 1 TABLET BY MOUTH EVERY DAY 90 tablet 1     metFORMIN (GLUCOPHAGE XR) 500 MG 24 hr tablet Take 2 tablets daily with a meal 180 tablet 0     metoprolol succinate ER (TOPROL XL) 50 MG 24 hr tablet TAKE 1 TABLET BY MOUTH EVERY DAY 90 tablet 2     QUEtiapine (SEROQUEL) 25 MG tablet TAKE 1-2 TABLETS AS DIRECTED AT BEDTIME       traZODone (DESYREL) 150 MG tablet Take 2 tablets (300 mg) by mouth At Bedtime 180 tablet 3     triamcinolone (KENALOG) 0.1 % external ointment APPLY TO AFFECTED AREA TWICE A DAY 30 g 2     venlafaxine (EFFEXOR) 37.5 MG tablet 1 TABLET AS DIRECTED EVERY MORNING       calcium carbonate (OS-MONIQUE) 500 mg calcium (1,250 mg) chewable tablet Take 2 tablets by mouth daily (Patient not taking: Reported on 2/8/2023)       magnesium oxide 400 MG CAPS  (Patient not taking: Reported on 2/8/2023)       rizatriptan (MAXALT-MLT) 10 MG ODT Take 1 tablet (10 mg) by mouth at onset of headache for migraine May repeat in 2 hours. Max 3 tablets/24 hours. (Patient not taking: Reported on 2/8/2023) 30 tablet 3     SUMAtriptan (IMITREX) 50 MG tablet Take 50 mg by mouth at onset of headache for migraine (Patient not taking: Reported on 2/8/2023)         Allergies   Allergen Reactions     Fluoxetine Cough and Rash     Pt  "thinks it was cough but not totally sure.     Latex Rash     Levothyroxine Rash     Lisinopril Cough and Rash     Sulfa (Sulfonamide Antibiotics) [Sulfa Drugs] Itching and Rash        Social History     Tobacco Use     Smoking status: Never     Passive exposure: Past     Smokeless tobacco: Never   Substance Use Topics     Alcohol use: Yes     Alcohol/week: 0.0 standard drinks     Comment: Alcoholic Drinks/day: occasional     Family History   Problem Relation Age of Onset     Hypertension Mother      Arthritis Mother      Alzheimer Disease Father         d. 76     Diabetes Father      Hypertension Father      Breast Cancer Maternal Aunt 60.00     Obesity Maternal Aunt      Hypertension Sister      Osteopenia Sister      Varicose Veins Brother      Obesity Brother      Breast Cancer Cousin      Obesity Maternal Uncle      Arthritis Maternal Grandmother      History   Drug Use No         Objective     /80 (BP Location: Right arm, Patient Position: Sitting, Cuff Size: Adult Regular)   Pulse 88   Temp 97.9  F (36.6  C) (Oral)   Resp 16   Ht 1.575 m (5' 2\")   Wt 81.6 kg (180 lb)   SpO2 97%   BMI 32.92 kg/m      Physical Exam    GENERAL APPEARANCE: healthy, alert and no distress     EYES: EOMI, PERRL     HENT: ear canals and TM's normal and nose and mouth without ulcers or lesions     NECK: no adenopathy, no asymmetry, masses, or scars and thyroid normal to palpation     RESP: lungs clear to auscultation - no rales, rhonchi or wheezes     CV: regular rates and rhythm, normal S1 S2, no S3 or S4 and no murmur, click or rub     ABDOMEN:  soft, nontender, no HSM or masses and bowel sounds normal     MS: extremities normal- no gross deformities noted, no evidence of inflammation in joints, FROM in all extremities.     SKIN: no suspicious lesions or rashes.  Scattered venous varicosities on the lower extremities     NEURO: Normal strength and tone, sensory exam grossly normal, mentation intact and speech normal     " PSYCH: mentation appears normal. and affect normal/bright     LYMPHATICS: No cervical adenopathy    Recent Labs   Lab Test 10/07/22  1153 01/18/22  1459 11/12/21  0710   HGB 12.9  --  12.2   PLT  --   --  183    143 143   POTASSIUM 3.9 3.9 3.2*   CR 0.82 0.84 1.02        Diagnostics:  Recent Results (from the past 24 hour(s))   EKG 12-lead, tracing only    Collection Time: 02/08/23  9:24 AM   Result Value Ref Range    Systolic Blood Pressure  mmHg    Diastolic Blood Pressure  mmHg    Ventricular Rate 81 BPM    Atrial Rate 81 BPM    WY Interval 154 ms    QRS Duration 86 ms     ms    QTc 492 ms    P Axis 69 degrees    R AXIS -38 degrees    T Axis 39 degrees    Interpretation ECG       Sinus rhythm  Left axis deviation  Anterior infarct (cited on or before 11-AUG-2015)  Possible Inferior infarct , age undetermined  Abnormal ECG  When compared with ECG of 11-AUG-2015 09:16,  QT has lengthened  Confirmed by PATRICIA GUO, BEAU LOC: ([id number]) on 2/8/2023 9:36:11 AM     Comprehensive metabolic panel (BMP + Alb, Alk Phos, ALT, AST, Total. Bili, TP)    Collection Time: 02/08/23  9:37 AM   Result Value Ref Range    Sodium 144 136 - 145 mmol/L    Potassium 3.7 3.4 - 5.3 mmol/L    Chloride 104 98 - 107 mmol/L    Carbon Dioxide (CO2) 30 (H) 22 - 29 mmol/L    Anion Gap 10 7 - 15 mmol/L    Urea Nitrogen 14.1 8.0 - 23.0 mg/dL    Creatinine 0.95 0.51 - 0.95 mg/dL    Calcium 9.5 8.8 - 10.2 mg/dL    Glucose 100 (H) 70 - 99 mg/dL    Alkaline Phosphatase 80 35 - 104 U/L    AST 18 10 - 35 U/L    ALT 12 10 - 35 U/L    Protein Total 6.5 6.4 - 8.3 g/dL    Albumin 4.1 3.5 - 5.2 g/dL    Bilirubin Total 0.4 <=1.2 mg/dL    GFR Estimate 62 >60 mL/min/1.73m2   CBC with platelets    Collection Time: 02/08/23  9:37 AM   Result Value Ref Range    WBC Count 4.7 4.0 - 11.0 10e3/uL    RBC Count 4.18 3.80 - 5.20 10e6/uL    Hemoglobin 12.4 11.7 - 15.7 g/dL    Hematocrit 39.9 35.0 - 47.0 %    MCV 96 78 - 100 fL    MCH 29.7 26.5 - 33.0 pg    MCHC  31.1 (L) 31.5 - 36.5 g/dL    RDW 13.1 10.0 - 15.0 %    Platelet Count 197 150 - 450 10e3/uL          Revised Cardiac Risk Index (RCRI):  The patient has the following serious cardiovascular risks for perioperative complications:   - No serious cardiac risks = 0 points     RCRI Interpretation: 0 points: Class I (very low risk - 0.4% complication rate)     Signed Electronically by: Skyler Wiggins NP  Copy of this evaluation report is provided to requesting physician.    Answers for HPI/ROS submitted by the patient on 2/8/2023  If you checked off any problems, how difficult have these problems made it for you to do your work, take care of things at home, or get along with other people?: Somewhat difficult  PHQ9 TOTAL SCORE: 11  MARGARETTE 7 TOTAL SCORE: 14

## 2023-02-09 NOTE — TELEPHONE ENCOUNTER
"Routing refill request to provider for review/approval because:  Early refill requested.    Last Written Prescription Date:  1/12/23  Last Fill Quantity: 180,  # refills: 0   Last office visit provider:  2/8/23     Requested Prescriptions   Pending Prescriptions Disp Refills     famotidine (PEPCID) 20 MG tablet [Pharmacy Med Name: FAMOTIDINE 20 MG TABLET] 180 tablet 0     Sig: TAKE 1 TABLET BY MOUTH TWICE A DAY       H2 Blockers Protocol Passed - 2/9/2023 11:58 AM        Passed - Patient is age 12 or older        Passed - Recent (12 mo) or future (30 days) visit within the authorizing provider's specialty     Patient has had an office visit with the authorizing provider or a provider within the authorizing providers department within the previous 12 mos or has a future within next 30 days. See \"Patient Info\" tab in inbasket, or \"Choose Columns\" in Meds & Orders section of the refill encounter.              Passed - Medication is active on med list             iH Al RN 02/09/23 11:58 AM  "

## 2023-02-10 RX ORDER — FAMOTIDINE 20 MG/1
20 TABLET, FILM COATED ORAL 2 TIMES DAILY
Qty: 180 TABLET | Refills: 3 | Status: SHIPPED | OUTPATIENT
Start: 2023-02-10 | End: 2024-04-03

## 2023-02-21 ENCOUNTER — TRANSFERRED RECORDS (OUTPATIENT)
Dept: HEALTH INFORMATION MANAGEMENT | Facility: CLINIC | Age: 78
End: 2023-02-21

## 2023-02-27 DIAGNOSIS — G47.01 INSOMNIA DUE TO MEDICAL CONDITION: ICD-10-CM

## 2023-02-27 DIAGNOSIS — M79.7 FIBROMYALGIA: ICD-10-CM

## 2023-02-28 RX ORDER — GABAPENTIN 300 MG/1
CAPSULE ORAL
Qty: 360 CAPSULE | Refills: 2 | OUTPATIENT
Start: 2023-02-28

## 2023-02-28 NOTE — TELEPHONE ENCOUNTER
"Routing refill request to provider for review/approval because:  PCP to review dose     Last Written Prescription Date:  3/8/22  Last Fill Quantity: 180,  # refills: 3   Last office visit provider:  2/8/23    Requested Prescriptions   Pending Prescriptions Disp Refills     traZODone (DESYREL) 150 MG tablet [Pharmacy Med Name: TRAZODONE 150 MG TABLET] 180 tablet 3     Sig: TAKE 2 TABLETS (300 MG) BY MOUTH AT BEDTIME       Serotonin Modulators Passed - 2/27/2023 11:10 AM        Passed - Recent (12 mo) or future (30 days) visit within the authorizing provider's specialty     Patient has had an office visit with the authorizing provider or a provider within the authorizing providers department within the previous 12 mos or has a future within next 30 days. See \"Patient Info\" tab in inbasket, or \"Choose Columns\" in Meds & Orders section of the refill encounter.              Passed - Medication is active on med list        Passed - Patient is age 18 or older        Passed - No active pregnancy on record        Passed - No positive pregnancy test in past 12 months             Kendra Correa RN 02/28/23 3:13 PM  "

## 2023-03-01 RX ORDER — TRAZODONE HYDROCHLORIDE 150 MG/1
300 TABLET ORAL AT BEDTIME
Qty: 180 TABLET | Refills: 3 | Status: SHIPPED | OUTPATIENT
Start: 2023-03-01 | End: 2023-10-10

## 2023-03-06 ENCOUNTER — TRANSFERRED RECORDS (OUTPATIENT)
Dept: HEALTH INFORMATION MANAGEMENT | Facility: CLINIC | Age: 78
End: 2023-03-06

## 2023-03-13 DIAGNOSIS — F41.9 ANXIETY: ICD-10-CM

## 2023-03-13 DIAGNOSIS — I10 ESSENTIAL HYPERTENSION: ICD-10-CM

## 2023-03-13 DIAGNOSIS — M79.662 PAIN IN BOTH LOWER LEGS: ICD-10-CM

## 2023-03-13 DIAGNOSIS — M47.816 OSTEOARTHRITIS OF LUMBAR SPINE, UNSPECIFIED SPINAL OSTEOARTHRITIS COMPLICATION STATUS: ICD-10-CM

## 2023-03-13 DIAGNOSIS — M79.661 PAIN IN BOTH LOWER LEGS: ICD-10-CM

## 2023-03-13 DIAGNOSIS — M79.7 FIBROMYALGIA: ICD-10-CM

## 2023-03-14 NOTE — TELEPHONE ENCOUNTER
"Routing refill request to provider for review/approval because:  Early refill request  Blood pressure failed    Last Written Prescription Date:  1/12/2023  Last Fill Quantity: 180,  # refills: 0   Last office visit provider:  2/8/2023     Requested Prescriptions   Pending Prescriptions Disp Refills     DULoxetine (CYMBALTA) 60 MG capsule [Pharmacy Med Name: DULOXETINE HCL DR 60 MG CAP] 180 capsule 0     Sig: TAKE 1 CAPSULE BY MOUTH TWICE A DAY       Serotonin-Norepinephrine Reuptake Inhibitors  Passed - 3/14/2023  2:12 PM        Passed - Blood pressure under 140/90 in past 12 months     BP Readings from Last 3 Encounters:   02/08/23 120/80   11/11/22 (!) 171/87   10/28/22 126/68                 Passed - Recent (12 mo) or future (30 days) visit within the authorizing provider's specialty     Patient has had an office visit with the authorizing provider or a provider within the authorizing providers department within the previous 12 mos or has a future within next 30 days. See \"Patient Info\" tab in inbasket, or \"Choose Columns\" in Meds & Orders section of the refill encounter.              Passed - Medication is active on med list        Passed - Patient is age 18 or older        Passed - No active pregnancy on record        Passed - No positive pregnancy test in past 12 months             Kelley Bran RN 03/14/23 2:13 PM  "

## 2023-03-15 RX ORDER — DULOXETIN HYDROCHLORIDE 60 MG/1
CAPSULE, DELAYED RELEASE ORAL
Qty: 180 CAPSULE | Refills: 1 | Status: SHIPPED | OUTPATIENT
Start: 2023-03-15 | End: 2023-10-10

## 2023-03-17 RX ORDER — METOPROLOL SUCCINATE 50 MG/1
TABLET, EXTENDED RELEASE ORAL
Qty: 90 TABLET | Refills: 2 | Status: SHIPPED | OUTPATIENT
Start: 2023-03-17 | End: 2023-10-10

## 2023-03-17 RX ORDER — BUSPIRONE HYDROCHLORIDE 15 MG/1
TABLET ORAL
Qty: 180 TABLET | Refills: 1 | Status: SHIPPED | OUTPATIENT
Start: 2023-03-17 | End: 2023-10-10

## 2023-04-17 ENCOUNTER — TRANSFERRED RECORDS (OUTPATIENT)
Dept: HEALTH INFORMATION MANAGEMENT | Facility: CLINIC | Age: 78
End: 2023-04-17
Payer: COMMERCIAL

## 2023-04-25 ENCOUNTER — OFFICE VISIT (OUTPATIENT)
Dept: FAMILY MEDICINE | Facility: CLINIC | Age: 78
End: 2023-04-25
Payer: COMMERCIAL

## 2023-04-25 VITALS
WEIGHT: 183.6 LBS | HEIGHT: 62 IN | TEMPERATURE: 97.9 F | BODY MASS INDEX: 33.79 KG/M2 | SYSTOLIC BLOOD PRESSURE: 128 MMHG | HEART RATE: 76 BPM | DIASTOLIC BLOOD PRESSURE: 86 MMHG | OXYGEN SATURATION: 96 % | RESPIRATION RATE: 16 BRPM

## 2023-04-25 DIAGNOSIS — I10 ESSENTIAL HYPERTENSION, BENIGN: ICD-10-CM

## 2023-04-25 DIAGNOSIS — I42.9 CARDIOMYOPATHY, UNSPECIFIED TYPE (H): ICD-10-CM

## 2023-04-25 DIAGNOSIS — E66.9 OBESITY (BMI 30-39.9): ICD-10-CM

## 2023-04-25 DIAGNOSIS — M79.7 FIBROMYALGIA: ICD-10-CM

## 2023-04-25 DIAGNOSIS — I10 ESSENTIAL HYPERTENSION: ICD-10-CM

## 2023-04-25 PROCEDURE — 99214 OFFICE O/P EST MOD 30 MIN: CPT | Performed by: NURSE PRACTITIONER

## 2023-04-25 RX ORDER — METFORMIN HCL 500 MG
TABLET, EXTENDED RELEASE 24 HR ORAL
Qty: 180 TABLET | Refills: 0 | Status: CANCELLED | OUTPATIENT
Start: 2023-04-25

## 2023-04-25 RX ORDER — LOSARTAN POTASSIUM 25 MG/1
25 TABLET ORAL DAILY
Qty: 90 TABLET | Refills: 1 | Status: CANCELLED | OUTPATIENT
Start: 2023-04-25

## 2023-04-25 RX ORDER — GABAPENTIN 300 MG/1
CAPSULE ORAL
Qty: 360 CAPSULE | Refills: 2 | Status: SHIPPED | OUTPATIENT
Start: 2023-04-25 | End: 2024-05-01

## 2023-04-25 RX ORDER — FUROSEMIDE 20 MG
20-40 TABLET ORAL
Qty: 90 TABLET | Refills: 1 | Status: SHIPPED | OUTPATIENT
Start: 2023-04-25 | End: 2024-03-06

## 2023-04-25 ASSESSMENT — PATIENT HEALTH QUESTIONNAIRE - PHQ9
SUM OF ALL RESPONSES TO PHQ QUESTIONS 1-9: 9
10. IF YOU CHECKED OFF ANY PROBLEMS, HOW DIFFICULT HAVE THESE PROBLEMS MADE IT FOR YOU TO DO YOUR WORK, TAKE CARE OF THINGS AT HOME, OR GET ALONG WITH OTHER PEOPLE: SOMEWHAT DIFFICULT
SUM OF ALL RESPONSES TO PHQ QUESTIONS 1-9: 9

## 2023-04-25 ASSESSMENT — ANXIETY QUESTIONNAIRES
7. FEELING AFRAID AS IF SOMETHING AWFUL MIGHT HAPPEN: SEVERAL DAYS
GAD7 TOTAL SCORE: 10
IF YOU CHECKED OFF ANY PROBLEMS ON THIS QUESTIONNAIRE, HOW DIFFICULT HAVE THESE PROBLEMS MADE IT FOR YOU TO DO YOUR WORK, TAKE CARE OF THINGS AT HOME, OR GET ALONG WITH OTHER PEOPLE: SOMEWHAT DIFFICULT
6. BECOMING EASILY ANNOYED OR IRRITABLE: MORE THAN HALF THE DAYS
3. WORRYING TOO MUCH ABOUT DIFFERENT THINGS: SEVERAL DAYS
4. TROUBLE RELAXING: SEVERAL DAYS
GAD7 TOTAL SCORE: 10
7. FEELING AFRAID AS IF SOMETHING AWFUL MIGHT HAPPEN: SEVERAL DAYS
2. NOT BEING ABLE TO STOP OR CONTROL WORRYING: SEVERAL DAYS
1. FEELING NERVOUS, ANXIOUS, OR ON EDGE: MORE THAN HALF THE DAYS
8. IF YOU CHECKED OFF ANY PROBLEMS, HOW DIFFICULT HAVE THESE MADE IT FOR YOU TO DO YOUR WORK, TAKE CARE OF THINGS AT HOME, OR GET ALONG WITH OTHER PEOPLE?: SOMEWHAT DIFFICULT
GAD7 TOTAL SCORE: 10
5. BEING SO RESTLESS THAT IT IS HARD TO SIT STILL: MORE THAN HALF THE DAYS

## 2023-04-25 ASSESSMENT — PAIN SCALES - GENERAL: PAINLEVEL: NO PAIN (0)

## 2023-04-25 NOTE — PATIENT INSTRUCTIONS
It looks like you are just about due to follow-up with cardiology since it has been a year.  Their contact number is [phone]    If any issues pop up over the summer, let me know!

## 2023-04-25 NOTE — PROGRESS NOTES
"  Assessment & Plan     Essential hypertension  Stable. Managed with losartan 25mg and metoprolol 50mg. BP in clinic today 128/86. No symptoms of chest pain, SOB, or worsened peripheral edema. Only needing lasix PRN in the summer months usually. Continue on current management plan, and follow-up at next routine visit.  - furosemide (LASIX) 20 MG tablet  Dispense: 90 tablet; Refill: 1    Fibromyalgia  Stable. No need for adjustment in dosing and medication.  - gabapentin (NEURONTIN) 300 MG capsule  Dispense: 360 capsule; Refill: 2    Cardiomyopathy, unspecified type (H)  Stable. No new cardiac symptoms per patient report causing concern or need for further workup today.    Depression  Unstable. Mildly worsened per patient report. She denies suicidal ideation. Medication and dosing is managed by Psychiatry. Patient should continue to follow-up with psychiatry for continued management and adjustments in plan of care.        BMI:   Estimated body mass index is 33.58 kg/m  as calculated from the following:    Height as of this encounter: 1.575 m (5' 2\").    Weight as of this encounter: 83.3 kg (183 lb 9.6 oz).   Weight management plan: Discussed healthy diet and exercise guidelines    See Patient Instructions  Patient Instructions   It looks like you are just about due to follow-up with cardiology since it has been a year.  Their contact number is [phone]    If any issues pop up over the summer, let me know!    Sima Mariee, RN, BSN  DNP-FNP Student, [facility]    Agree with above documentation from nurse practitioner student.  Physical assessment also performed by me.  Addendum's made by me are in bold.      Skyler Wiggins NP  [facility]    Benjamin Burns is a 77 year old, presenting for the following health issues:  Recheck Medication (Non fasting- Regular follow up)        4/25/2023    10:07 AM   Additional Questions   Roomed by Smitha Millre CMA     History of Present " Illness       Reason for visit:  Medication check, runny nose    She eats 2-3 servings of fruits and vegetables daily.She exercises with enough effort to increase her heart rate 9 or less minutes per day.  She exercises with enough effort to increase her heart rate 3 or less days per week.   She is taking medications regularly.    Today's PHQ-9         PHQ-9 Total Score: 9    PHQ-9 Q9 Thoughts of better off dead/self-harm past 2 weeks :   Not at all    How difficult have these problems made it for you to do your work, take care of things at home, or get along with other people: Somewhat difficult  Today's MARGARETTE-7 Score: 10     Katy is a 77 year old female with PMH significant for HTN, fibromyalgia, and depression who presents today for a routine physical. She reports that she has been feeling well overall despite a 1 year course of runny nose. She denies any other URI symptoms including itchy/watery eyes, sore throat, cough, sinus congestion, ear pain, or chest congestion. She denies postnasal drip, but reports constant clear nasal discharge without any neurological changes.     BP  Managed at home with daily medication which she takes consistently. She denies chest pain, SOB, or worsening peripheral edema. She has lasix prescribed PRN for edema, but reports she has not needed it through the winter months.     Mood  Patient's mood concerns are managed by psychiatry. She reports that mood has been a bit more labile recently with her son moving away to [place]. She denies any suicidal ideation or concern for hurting herself or others. She will follow-up with psychiatry in the next couple of months, and noted that she will discuss potential medication and dose adjustments at this time.     Review of Systems   CONSTITUTIONAL: NEGATIVE for fever, chills, change in weight  ENT/MOUTH: NEGATIVE for ear, mouth and throat problems POSTIIVE clear rhinorrhea  RESP: NEGATIVE for significant cough or SOB  CV: NEGATIVE for chest  "pain, palpitations POSTIIVE  peripheral edema  GI: NEGATIVE for nausea, abdominal pain, heartburn, or change in bowel habits  MUSCULOSKELETAL: POSITIVE  for myalgia and NEGATIVE for joint stiffness, joint swelling, muscle spasm and muscle weakness       Objective    /86 (BP Location: Right arm, Patient Position: Sitting, Cuff Size: Adult Regular)   Pulse 76   Temp 97.9  F (36.6  C) (Oral)   Resp 16   Ht 1.575 m (5' 2\")   Wt 83.3 kg (183 lb 9.6 oz)   SpO2 96%   BMI 33.58 kg/m    Body mass index is 33.58 kg/m .  Physical Exam   GENERAL: healthy, alert and no distress  HENT: normal cephalic/atraumatic, ear canals and TM's normal, rhinorrhea clear, oropharynx clear and oral mucous membranes moist  NECK: no adenopathy, no asymmetry, masses, or scars and thyroid normal to palpation  RESP: lungs clear to auscultation - no rales, rhonchi or wheezes  CV: regular rate and rhythm, normal S1 S2, no S3 or S4, no murmur, click or rub, no peripheral edema and peripheral pulses strong  ABDOMEN: soft, nontender, no hepatosplenomegaly, no masses and bowel sounds normal  MS: no gross musculoskeletal defects noted, no edema  NEURO: Normal strength and tone, mentation intact and speech normal  PSYCH: mentation appears normal, affect normal/bright              "

## 2023-05-14 NOTE — PROGRESS NOTES
[facility] Non Surgical Follow up Visit   Date of visit: 5/22/2023  Physician: LATISHA Rossi MD, MD  Primary Care is Skyler Wiggins  Katy Hong   77 year old  female     Initial Weight: 201#  Initial BMI: 37.4  Today's Weight:   Wt Readings from Last 1 Encounters:   05/22/23 83 kg (183 lb)     Body mass index is 33.47 kg/m .           Assessment and Plan   Assessment: Katy is a 77 year old year old female who presents for medical weight management.      Plan:    1. Obesity (BMI 30-39.9)  Patient was congratulated on her success thus far. Healthy habits to assist with further weight loss were discussed. She will try to increase her exercise as able and work on getting adequate protein and vegetables. She will continue the metfomrin. We discussed the patient's co-morbid conditions including hypertension and migraine headaches. These likely will improve with healthy habits and weight loss.    2. Essential hypertension  This may improve with healthy habits and weight loss.    3. Migraine without status migrainosus, not intractable, unspecified migraine type  This may improve with healthy habits and weight loss.    Follow up in 3 months with myself           INTERIM HISTORY  Patient is taking metformin and . She had a TKA in February. She is recovering well but her activity is fairly limited. She really struggled with her depression over the winter and was started on Seroquel. She sometimes notices an increased appetite on this. She has noted increased weight.     DIETARY HISTORY  Meals Per Day: 3  Eating Protein First?: sometimes  Food Diary: B:cereal or toast or egg L:tuna or fruit, sometimes 1/2 sandwich D:grilled meat or fish, starch, sometimes vegetable  Snacks Per Day: occasional  Typical Snack: nuts or ceci  Fluid Intake: not enough  Portion Control: working on it  Calorie Containing Beverages: soda once a week or so    Positive Changes Since Last Visit: walks with a group at [facility]  [employer] twice a week  Struggling With: exercise, water intake, stress eating over the winter,     Knowledgeable in Reading Food Labels: yes  Getting Adequate Protein: sometimes  Sleeping 7-8 hours/day yes  Stress management some walking    PHYSICAL ACTIVITY PATTERNS:  PT exercises    REVIEW OF SYSTEMS  GENERAL/CONSTITUTIONAL:  Fatigue: sometimes  CARDIOVASCULAR:  History of heart disease: yes  PULMONARY:  Dyspnea on exertion: sometimes  PSYCHIATRIC:  Moods: slightly improved  MUSCULOSKELETAL/RHEUMATOLOGIC  Arthralgias: yes  Myalgias: yes  ENDOCRINE:  Monitoring Blood Sugars: no  Sugars Well Controlled: na  No personal or family history of medullary thyroid cancer not discussed  :  Birth control: menopause       Patient Profile   Social History     Social History Narrative    5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          Past Medical History   Past Medical History:   Diagnosis Date     Abnormal mammogram, unspecified     Created by Conversion      Acute sinusitis, unspecified     Created by Conversion      Asymptomatic varicose veins     Created by Conversion      Breast cyst      Cellulitis and abscess of unspecified site     Created by Conversion      Circumscribed scleroderma     Created by Conversion      Contusion of unspecified site     Created by Conversion      Disorder of bone and cartilage, unspecified     Created by Conversion      Dysthymic disorder     Created by Conversion      Esophageal reflux     Created by Conversion      Insomnia, unspecified     Created by Conversion      Lumbago     Created by Conversion      Mammogram - Abnormal     Created by Conversion Replacement Utility updated for latest IMO load      Migraine, unspecified, without mention of intractable migraine without mention of status migrainosus     Created by Conversion      Myalgia and myositis, unspecified     Created by Conversion      Myocardial infarction (H)     per EKG's since      Obesity, unspecified     Created by Conversion       "Open wound of lip, without mention of complication     Created by Conversion      Other primary cardiomyopathies     Created by Conversion      Other seborrheic keratosis     Created by Conversion      Pain in joint, lower leg     Created by Conversion      Pain in limb     Created by Conversion      Sebaceous cyst     Created by Conversion      Shortness of breath     Created by Conversion      Shoulder and upper arm, insect bite, nonvenomous, without mention of infection(912.4)     Created by Conversion      Unspecified disease of sebaceous glands     Created by Conversion      Unspecified essential hypertension     Created by Conversion      Unspecified venous (peripheral) insufficiency     Created by Conversion      Unspecified vitamin D deficiency     Created by Conversion      Patient Active Problem List   Diagnosis     Osteopenia     Insomnia     Vitamin D Deficiency     Lower Back Pain     Fibromyalgia     Essential Hypertension     Seborrheic Keratosis     Lichen Sclerosus Et Atrophicus     Migraine Headache     Esophageal Reflux     Joint Pain, Localized In The Knee     Varicose Veins     Chronic Cutaneous Ulcer Venous Stasis     Chronic venous insufficiency     Foot pain (Soft Tissue)     Female stress incontinence     Calculus of ureter     Hydronephrosis with urinary obstruction due to ureteral calculus     Other cardiomyopathy (H)     Obesity (BMI 30-39.9)     Chronic fatigue     Moderate episode of recurrent major depressive disorder (H)     Anxiety     Pain in joint, multiple sites     Osteoarthritis of lumbar spine, unspecified spinal osteoarthritis complication status     Pain in both lower legs       Past Surgical History  She has a past surgical history that includes appendectomy; Temporomandibular Joint Surgery (Bilateral); Cataract Extraction, Bilateral; Hysterectomy; Oophorectomy; Biopsy breast (Left); Breast Cyst Aspiration (Left); and tubal ligation.     Examination   Ht 1.575 m (5' 2\")   " Wt 83 kg (183 lb)   BMI 33.47 kg/m       Wt Readings from Last 4 Encounters:   05/22/23 83 kg (183 lb)   04/25/23 83.3 kg (183 lb 9.6 oz)   02/08/23 81.6 kg (180 lb)   11/21/22 78.9 kg (174 lb)     General:  Alert, NAD  Pscyh/Mood: stable         Counseling:   We reviewed the important post op bariatric recommendations:  -eating 3 meals daily  -eating protein first, getting >60gm protein daily  -eating slowly, chewing food well  -avoiding/limiting calorie containing beverages  -limiting starchy vegetables and carbohydrates, choosing wheat, not white with breads,   crackers, pastas, bear, bagels, tortillas, rice  -limiting restaurant or cafeteria eating to twice a week or less    We discussed the importance of restorative sleep and stress management in maintaining a healthy weight.  We discussed the National Weight Control Registry healthy weight maintenance strategies and ways to optimize metabolism.  We discussed the importance of physical activity including cardiovascular and strength training in maintaining a healthier weight.    Total time spent on the date of this encounter doing: chart review, review of test results, patient visit, physical exam, education, counseling, developing plan of care and documenting = 34 minutes.         LATISHA Rossi MD  [facility]

## 2023-05-17 DIAGNOSIS — I42.9 CARDIOMYOPATHY (H): ICD-10-CM

## 2023-05-17 DIAGNOSIS — I10 ESSENTIAL HYPERTENSION, BENIGN: ICD-10-CM

## 2023-05-17 RX ORDER — LOSARTAN POTASSIUM 25 MG/1
25 TABLET ORAL DAILY
Qty: 90 TABLET | Refills: 0 | Status: SHIPPED | OUTPATIENT
Start: 2023-05-17 | End: 2023-09-13

## 2023-05-22 ENCOUNTER — VIRTUAL VISIT (OUTPATIENT)
Dept: SURGERY | Facility: CLINIC | Age: 78
End: 2023-05-22
Payer: COMMERCIAL

## 2023-05-22 VITALS — HEIGHT: 62 IN | BODY MASS INDEX: 33.68 KG/M2 | WEIGHT: 183 LBS

## 2023-05-22 DIAGNOSIS — I10 ESSENTIAL HYPERTENSION: ICD-10-CM

## 2023-05-22 DIAGNOSIS — G43.909 MIGRAINE WITHOUT STATUS MIGRAINOSUS, NOT INTRACTABLE, UNSPECIFIED MIGRAINE TYPE: ICD-10-CM

## 2023-05-22 DIAGNOSIS — E66.9 OBESITY (BMI 30-39.9): Primary | ICD-10-CM

## 2023-05-22 PROCEDURE — 99442 PR PHYSICIAN TELEPHONE EVALUATION 11-20 MIN: CPT | Mod: 95 | Performed by: FAMILY MEDICINE

## 2023-05-22 RX ORDER — RIZATRIPTAN BENZOATE 10 MG/1
TABLET, ORALLY DISINTEGRATING ORAL
COMMUNITY
Start: 2023-03-07 | End: 2023-06-29

## 2023-05-22 RX ORDER — QUETIAPINE FUMARATE 100 MG/1
100 TABLET, FILM COATED ORAL AT BEDTIME
COMMUNITY
Start: 2023-04-22

## 2023-05-22 RX ORDER — METFORMIN HCL 500 MG
TABLET, EXTENDED RELEASE 24 HR ORAL
Qty: 180 TABLET | Refills: 0 | Status: SHIPPED | OUTPATIENT
Start: 2023-05-22 | End: 2023-09-26

## 2023-05-22 NOTE — LETTER
5/22/2023         RE: Ktay Hong  [address] [facility] [address]        Dear Colleague,    Thank you for referring your patient, Katy Hong, to the [facility] [place]. Please see a copy of my visit note below.    Bariatric Care Clinic Non Surgical Follow up Visit   Date of visit: 5/22/2023  Physician: LATISHA Rossi MD, MD  Primary Care is Skyler Wiggins  Katy Hong   77 year old  female     Initial Weight: 201#  Initial BMI: 37.4  Today's Weight:   Wt Readings from Last 1 Encounters:   05/22/23 83 kg (183 lb)     Body mass index is 33.47 kg/m .           Assessment and Plan   Assessment: Katy is a 77 year old year old female who presents for medical weight management.      Plan:    1. Obesity (BMI 30-39.9)  Patient was congratulated on her success thus far. Healthy habits to assist with further weight loss were discussed. She will try to increase her exercise as able and work on getting adequate protein and vegetables. She will continue the metfomrin. We discussed the patient's co-morbid conditions including hypertension and migraine headaches. These likely will improve with healthy habits and weight loss.    2. Essential hypertension  This may improve with healthy habits and weight loss.    3. Migraine without status migrainosus, not intractable, unspecified migraine type  This may improve with healthy habits and weight loss.    Follow up in 3 months with myself           INTERIM HISTORY  Patient is taking metformin and . She had a TKA in February. She is recovering well but her activity is fairly limited. She really struggled with her depression over the winter and was started on Seroquel. She sometimes notices an increased appetite on this. She has noted increased weight.     DIETARY HISTORY  Meals Per Day: 3  Eating Protein First?: sometimes  Food Diary: B:cereal or toast or egg L:tuna or fruit, sometimes 1/2 sandwich D:grilled  meat or fish, starch, sometimes vegetable  Snacks Per Day: occasional  Typical Snack: nuts or ceci  Fluid Intake: not enough  Portion Control: working on it  Calorie Containing Beverages: soda once a week or so    Positive Changes Since Last Visit: walks with a group at her [employer] twice a week  Struggling With: exercise, water intake, stress eating over the winter,     Knowledgeable in Reading Food Labels: yes  Getting Adequate Protein: sometimes  Sleeping 7-8 hours/day yes  Stress management some walking    PHYSICAL ACTIVITY PATTERNS:  PT exercises    REVIEW OF SYSTEMS  GENERAL/CONSTITUTIONAL:  Fatigue: sometimes  CARDIOVASCULAR:  History of heart disease: yes  PULMONARY:  Dyspnea on exertion: sometimes  PSYCHIATRIC:  Moods: slightly improved  MUSCULOSKELETAL/RHEUMATOLOGIC  Arthralgias: yes  Myalgias: yes  ENDOCRINE:  Monitoring Blood Sugars: no  Sugars Well Controlled: na  No personal or family history of medullary thyroid cancer not discussed  :  Birth control: menopause       Patient Profile   Social History     Social History Narrative    7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          Past Medical History   Past Medical History:   Diagnosis Date     Abnormal mammogram, unspecified     Created by Conversion      Acute sinusitis, unspecified     Created by Conversion      Asymptomatic varicose veins     Created by Conversion      Breast cyst      Cellulitis and abscess of unspecified site     Created by Conversion      Circumscribed scleroderma     Created by Conversion      Contusion of unspecified site     Created by Conversion      Disorder of bone and cartilage, unspecified     Created by Conversion      Dysthymic disorder     Created by Conversion      Esophageal reflux     Created by Conversion      Insomnia, unspecified     Created by Conversion      Lumbago     Created by Conversion      Mammogram - Abnormal     Created by Conversion Replacement Utility updated for latest IMO load      Migraine, unspecified,  without mention of intractable migraine without mention of status migrainosus     Created by Conversion      Myalgia and myositis, unspecified     Created by Conversion      Myocardial infarction (H)     per EKG's since 2015     Obesity, unspecified     Created by Conversion      Open wound of lip, without mention of complication     Created by Conversion      Other primary cardiomyopathies     Created by Conversion      Other seborrheic keratosis     Created by Conversion      Pain in joint, lower leg     Created by Conversion      Pain in limb     Created by Conversion      Sebaceous cyst     Created by Conversion      Shortness of breath     Created by Conversion      Shoulder and upper arm, insect bite, nonvenomous, without mention of infection(912.4)     Created by Conversion      Unspecified disease of sebaceous glands     Created by Conversion      Unspecified essential hypertension     Created by Conversion      Unspecified venous (peripheral) insufficiency     Created by Conversion      Unspecified vitamin D deficiency     Created by Conversion      Patient Active Problem List   Diagnosis     Osteopenia     Insomnia     Vitamin D Deficiency     Lower Back Pain     Fibromyalgia     Essential Hypertension     Seborrheic Keratosis     Lichen Sclerosus Et Atrophicus     Migraine Headache     Esophageal Reflux     Joint Pain, Localized In The Knee     Varicose Veins     Chronic Cutaneous Ulcer Venous Stasis     Chronic venous insufficiency     Foot pain (Soft Tissue)     Female stress incontinence     Calculus of ureter     Hydronephrosis with urinary obstruction due to ureteral calculus     Other cardiomyopathy (H)     Obesity (BMI 30-39.9)     Chronic fatigue     Moderate episode of recurrent major depressive disorder (H)     Anxiety     Pain in joint, multiple sites     Osteoarthritis of lumbar spine, unspecified spinal osteoarthritis complication status     Pain in both lower legs       Past Surgical  "History  She has a past surgical history that includes appendectomy; Temporomandibular Joint Surgery (Bilateral); Cataract Extraction, Bilateral; Hysterectomy; Oophorectomy; Biopsy breast (Left); Breast Cyst Aspiration (Left); and tubal ligation.     Examination   Ht 1.575 m (5' 2\")   Wt 83 kg (183 lb)   BMI 33.47 kg/m       Wt Readings from Last 4 Encounters:   05/22/23 83 kg (183 lb)   04/25/23 83.3 kg (183 lb 9.6 oz)   02/08/23 81.6 kg (180 lb)   11/21/22 78.9 kg (174 lb)     General:  Alert, NAD  Pscyh/Mood: stable         Counseling:   We reviewed the important post op bariatric recommendations:  -eating 3 meals daily  -eating protein first, getting >60gm protein daily  -eating slowly, chewing food well  -avoiding/limiting calorie containing beverages  -limiting starchy vegetables and carbohydrates, choosing wheat, not white with breads,   crackers, pastas, bear, bagels, tortillas, rice  -limiting restaurant or cafeteria eating to twice a week or less    We discussed the importance of restorative sleep and stress management in maintaining a healthy weight.  We discussed the National Weight Control Registry healthy weight maintenance strategies and ways to optimize metabolism.  We discussed the importance of physical activity including cardiovascular and strength training in maintaining a healthier weight.    Total time spent on the date of this encounter doing: chart review, review of test results, patient visit, physical exam, education, counseling, developing plan of care and documenting = 34 minutes.         LATISHA Rossi MD  [facility]               The patient has been notified of following:     \"This telephone visit will be conducted via a call between you and your physician/provider. We have found that certain health care needs can be provided without the need for a physical exam.  This service lets us provide the care you need with a short phone conversation.  If a " "prescription is necessary we can send it directly to your pharmacy.  If lab work is needed we can place an order for that and you can then stop by our lab to have the test done at a later time.    Telephone visits are billed at different rates depending on your insurance coverage. During this emergency period, for some insurers they may be billed the same as an in-person visit.  Please reach out to your insurance provider with any questions.    If during the course of the call the physician/provider feels a telephone visit is not appropriate, you will not be charged for this service.\"    Patient has given verbal consent to a Telephone visit? Yes    What phone number would you like to be contacted at? [phone]     Patient would like to receive their AVS by [employer]    Are there any specific questions or needs that you would like addressed at your visit today? No    Distant Location (provider location):  Off-site    Phone call duration: 12 minutes        Again, thank you for allowing me to participate in the care of your patient.        Sincerely,        LATISHA Rossi MD    "

## 2023-05-22 NOTE — PROGRESS NOTES
"  The patient has been notified of following:     \"This telephone visit will be conducted via a call between you and your physician/provider. We have found that certain health care needs can be provided without the need for a physical exam.  This service lets us provide the care you need with a short phone conversation.  If a prescription is necessary we can send it directly to your pharmacy.  If lab work is needed we can place an order for that and you can then stop by our lab to have the test done at a later time.    Telephone visits are billed at different rates depending on your insurance coverage. During this emergency period, for some insurers they may be billed the same as an in-person visit.  Please reach out to your insurance provider with any questions.    If during the course of the call the physician/provider feels a telephone visit is not appropriate, you will not be charged for this service.\"    Patient has given verbal consent to a Telephone visit? Yes    What phone number would you like to be contacted at? [phone]     Patient would [facility] AVS [facility]    Are there any specific questions or needs that you would like addressed at your visit today? No    Distant Location (provider location):  Off-site    Phone call duration: 12 minutes    " Statement Selected

## 2023-05-24 DIAGNOSIS — R09.81 NASAL CONGESTION: ICD-10-CM

## 2023-05-24 RX ORDER — MOMETASONE FUROATE MONOHYDRATE 50 UG/1
SPRAY, METERED NASAL
Qty: 17 G | Refills: 1 | OUTPATIENT
Start: 2023-05-24

## 2023-05-25 NOTE — TELEPHONE ENCOUNTER
Rx filled 5/1/23 with 1 refill.    Nicole Gordon RN, BSN  5/24/2023 at 9:48 PM  [facility]

## 2023-06-14 ENCOUNTER — LAB (OUTPATIENT)
Dept: LAB | Facility: CLINIC | Age: 78
End: 2023-06-14
Payer: COMMERCIAL

## 2023-06-14 DIAGNOSIS — M79.7 FIBROMYALGIA: Primary | ICD-10-CM

## 2023-06-14 DIAGNOSIS — F33.0 MAJOR DEPRESSIVE DISORDER, RECURRENT EPISODE, MILD (H): ICD-10-CM

## 2023-06-14 DIAGNOSIS — M25.569 ARTHRALGIA OF LOWER LEG, UNSPECIFIED LATERALITY: ICD-10-CM

## 2023-06-14 DIAGNOSIS — E66.9 OBESITY (BMI 30-39.9): ICD-10-CM

## 2023-06-14 DIAGNOSIS — F41.1 GENERALIZED ANXIETY DISORDER: ICD-10-CM

## 2023-06-14 DIAGNOSIS — R53.82 CHRONIC FATIGUE: ICD-10-CM

## 2023-06-14 DIAGNOSIS — R79.89 OTHER SPECIFIED ABNORMAL FINDINGS OF BLOOD CHEMISTRY: ICD-10-CM

## 2023-06-14 LAB
ALBUMIN SERPL BCG-MCNC: 3.8 G/DL (ref 3.5–5.2)
ALP SERPL-CCNC: 76 U/L (ref 35–104)
ALT SERPL W P-5'-P-CCNC: 12 U/L (ref 0–50)
ANION GAP SERPL CALCULATED.3IONS-SCNC: 8 MMOL/L (ref 7–15)
AST SERPL W P-5'-P-CCNC: 19 U/L (ref 0–45)
BASOPHILS # BLD AUTO: 0 10E3/UL (ref 0–0.2)
BASOPHILS NFR BLD AUTO: 1 %
BILIRUB SERPL-MCNC: 0.4 MG/DL
BUN SERPL-MCNC: 15.8 MG/DL (ref 8–23)
CALCIUM SERPL-MCNC: 9.4 MG/DL (ref 8.8–10.2)
CHLORIDE SERPL-SCNC: 106 MMOL/L (ref 98–107)
CHOLEST SERPL-MCNC: 147 MG/DL
CREAT SERPL-MCNC: 0.98 MG/DL (ref 0.51–0.95)
DEPRECATED HCO3 PLAS-SCNC: 29 MMOL/L (ref 22–29)
EOSINOPHIL # BLD AUTO: 0.2 10E3/UL (ref 0–0.7)
EOSINOPHIL NFR BLD AUTO: 3 %
ERYTHROCYTE [DISTWIDTH] IN BLOOD BY AUTOMATED COUNT: 13.4 % (ref 10–15)
FASTING STATUS PATIENT QL REPORTED: YES
GFR SERPL CREATININE-BSD FRML MDRD: 60 ML/MIN/1.73M2
GLUCOSE SERPL-MCNC: 95 MG/DL (ref 70–99)
GLUCOSE SERPL-MCNC: 95 MG/DL (ref 70–99)
HBA1C MFR BLD: 5.5 %
HCT VFR BLD AUTO: 38.5 % (ref 35–47)
HDLC SERPL-MCNC: 64 MG/DL
HGB BLD-MCNC: 12.1 G/DL (ref 11.7–15.7)
IMM GRANULOCYTES # BLD: 0 10E3/UL
IMM GRANULOCYTES NFR BLD: 0 %
LDLC SERPL CALC-MCNC: 66 MG/DL
LYMPHOCYTES # BLD AUTO: 2 10E3/UL (ref 0.8–5.3)
LYMPHOCYTES NFR BLD AUTO: 43 %
MCH RBC QN AUTO: 29.4 PG (ref 26.5–33)
MCHC RBC AUTO-ENTMCNC: 31.4 G/DL (ref 31.5–36.5)
MCV RBC AUTO: 94 FL (ref 78–100)
MONOCYTES # BLD AUTO: 0.5 10E3/UL (ref 0–1.3)
MONOCYTES NFR BLD AUTO: 11 %
NEUTROPHILS # BLD AUTO: 2 10E3/UL (ref 1.6–8.3)
NEUTROPHILS NFR BLD AUTO: 42 %
NONHDLC SERPL-MCNC: 83 MG/DL
PLATELET # BLD AUTO: 192 10E3/UL (ref 150–450)
POTASSIUM SERPL-SCNC: 4 MMOL/L (ref 3.4–5.3)
PROT SERPL-MCNC: 6.3 G/DL (ref 6.4–8.3)
RBC # BLD AUTO: 4.11 10E6/UL (ref 3.8–5.2)
SODIUM SERPL-SCNC: 143 MMOL/L (ref 136–145)
TRIGL SERPL-MCNC: 87 MG/DL
TSH SERPL DL<=0.005 MIU/L-ACNC: 2.57 UIU/ML (ref 0.3–4.2)
WBC # BLD AUTO: 4.7 10E3/UL (ref 4–11)

## 2023-06-14 PROCEDURE — 85025 COMPLETE CBC W/AUTO DIFF WBC: CPT

## 2023-06-14 PROCEDURE — 80061 LIPID PANEL: CPT

## 2023-06-14 PROCEDURE — 83036 HEMOGLOBIN GLYCOSYLATED A1C: CPT

## 2023-06-14 PROCEDURE — 80053 COMPREHEN METABOLIC PANEL: CPT

## 2023-06-14 PROCEDURE — 36415 COLL VENOUS BLD VENIPUNCTURE: CPT

## 2023-06-14 PROCEDURE — 84443 ASSAY THYROID STIM HORMONE: CPT

## 2023-06-19 ENCOUNTER — TRANSFERRED RECORDS (OUTPATIENT)
Dept: HEALTH INFORMATION MANAGEMENT | Facility: CLINIC | Age: 78
End: 2023-06-19
Payer: COMMERCIAL

## 2023-06-27 DIAGNOSIS — G43.909 MIGRAINE, UNSPECIFIED, NOT INTRACTABLE, WITHOUT STATUS MIGRAINOSUS: ICD-10-CM

## 2023-06-28 NOTE — TELEPHONE ENCOUNTER
"Routing refill request to provider for review/approval because:  Medication is reported/historical  SA review    Last Written Prescription Date:  5/22/23  Last Fill Quantity: ,  # refills:    Last office visit provider:  4/25/23     Requested Prescriptions   Pending Prescriptions Disp Refills     rizatriptan (MAXALT-MLT) 10 MG ODT [Pharmacy Med Name: RIZATRIPTAN 10 MG ODT] 30 tablet 3     Sig: TAKE 1 TABLET BY MOUTH AT ONSET OF HEADACHE FOR MIGRAINE MAY REPEAT IN 2 HOURS. MAX 3 TABS/24 HOURS       Serotonin Agonists Failed - 6/27/2023  6:47 PM        Failed - Serotonin Agonist request needs review.     Please review patient's record. If patient has had 8 or more treatments in the past month, please forward to provider.          Passed - Blood pressure under 140/90 in past 12 months     BP Readings from Last 3 Encounters:   04/25/23 128/86   02/08/23 120/80   11/11/22 (!) 171/87                 Passed - Recent (12 mo) or future (30 days) visit within the authorizing provider's specialty     Patient has had an office visit with the authorizing provider or a provider within the authorizing providers department within the previous 12 mos or has a future within next 30 days. See \"Patient Info\" tab in inbasket, or \"Choose Columns\" in Meds & Orders section of the refill encounter.              Passed - Medication is active on med list        Passed - Patient is age 18 or older        Passed - No active pregnancy on record        Passed - No positive pregnancy test in past 12 months             Kianna Dorantes RN 06/28/23 12:20 PM  "

## 2023-06-29 RX ORDER — RIZATRIPTAN BENZOATE 10 MG/1
TABLET, ORALLY DISINTEGRATING ORAL
Qty: 30 TABLET | Refills: 3 | Status: SHIPPED | OUTPATIENT
Start: 2023-06-29

## 2023-08-12 DIAGNOSIS — R09.81 NASAL CONGESTION: ICD-10-CM

## 2023-08-12 RX ORDER — MOMETASONE FUROATE MONOHYDRATE 50 UG/1
2 SPRAY, METERED NASAL DAILY
Qty: 17 G | Refills: 1 | Status: SHIPPED | OUTPATIENT
Start: 2023-08-12 | End: 2023-10-17

## 2023-08-12 NOTE — TELEPHONE ENCOUNTER
"Last Written Prescription Date:  5/1/23  Last Fill Quantity: 17 g,  # refills: 1   Last office visit provider:  4/25/23     Requested Prescriptions   Pending Prescriptions Disp Refills    mometasone (NASONEX) 50 MCG/ACT nasal spray [Pharmacy Med Name: MOMETASONE FUROATE 50 MCG SPRY]  1     Sig: INSTILL 2 SPRAYS INTO BOTH NOSTRILS DAILY       Nasal Allergy Protocol Passed - 8/12/2023 12:43 PM        Passed - Patient is age 12 or older        Passed - Recent (12 mo) or future (30 days) visit within the authorizing provider's specialty     Patient has had an office visit with the authorizing provider or a provider within the authorizing providers department within the previous 12 mos or has a future within next 30 days. See \"Patient Info\" tab in inbasket, or \"Choose Columns\" in Meds & Orders section of the refill encounter.              Passed - Medication is active on med list             Juany Fabian RN 08/12/23 12:44 PM  "

## 2023-09-13 ENCOUNTER — OFFICE VISIT (OUTPATIENT)
Dept: CARDIOLOGY | Facility: CLINIC | Age: 78
End: 2023-09-13
Payer: COMMERCIAL

## 2023-09-13 VITALS
BODY MASS INDEX: 32.28 KG/M2 | DIASTOLIC BLOOD PRESSURE: 78 MMHG | RESPIRATION RATE: 20 BRPM | SYSTOLIC BLOOD PRESSURE: 132 MMHG | HEART RATE: 72 BPM | OXYGEN SATURATION: 95 % | WEIGHT: 176.5 LBS

## 2023-09-13 DIAGNOSIS — I42.9 SECONDARY CARDIOMYOPATHY (H): Primary | ICD-10-CM

## 2023-09-13 DIAGNOSIS — I10 ESSENTIAL HYPERTENSION, BENIGN: ICD-10-CM

## 2023-09-13 PROCEDURE — 99214 OFFICE O/P EST MOD 30 MIN: CPT | Performed by: INTERNAL MEDICINE

## 2023-09-13 RX ORDER — CELECOXIB 200 MG/1
200 CAPSULE ORAL DAILY PRN
COMMUNITY
Start: 2023-07-17 | End: 2024-05-21

## 2023-09-13 RX ORDER — LOSARTAN POTASSIUM 25 MG/1
25 TABLET ORAL DAILY
Qty: 90 TABLET | Refills: 3 | Status: SHIPPED | OUTPATIENT
Start: 2023-09-13 | End: 2024-09-23

## 2023-09-13 NOTE — PROGRESS NOTES
HEART CARE ENCOUNTER CONSULTATON NOTE      [facility]  [phone]      Assessment/Recommendations   Assessment:    1.  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  Mild ANDRADE  3.  Hypertension: Well-controlled  4.  Nonobstructive coronary artery disease        Plan:  1.  Continue aspirin and statin therapy  2.  Continue Toprol-XL and losartan  3.  Echocardiogram to reevaluate left ventricular ejection fraction       Follow-up in 1 year.         History of Present Illness/Subjective    HPI: Katy Hong is a 78 year old female with history of mild nonischemic cardiomyopathy, mild obstructive sleep apnea and hypertension who is here for follow-up.  She underwent a CTA coronary with calcium score in 2017 which showed a calcium score of 14 with no significant stenosis.  She has been doing well since I last saw her.  No problems with worsening edema, chest pain or breathing difficulty.  Her son who is  recently moved to [place].  She takes her as needed Lasix couple times a week and this has not changed in frequency.         Physical Examination  Review of Systems   Vitals: /78 (BP Location: Left arm, Patient Position: Sitting, Cuff Size: Adult Regular)   Pulse 72   Resp 20   Wt 80.1 kg (176 lb 8 oz)   SpO2 95%   BMI 32.28 kg/m    BMI= Body mass index is 32.28 kg/m .  Wt Readings from Last 3 Encounters:   09/13/23 80.1 kg (176 lb 8 oz)   05/22/23 83 kg (183 lb)   04/25/23 83.3 kg (183 lb 9.6 oz)       General Appearance:   no distress, normal body habitus   ENT/Mouth: membranes moist, no oral lesions or bleeding gums.      EYES:  no scleral icterus, normal conjunctivae   Neck: no carotid bruits or thyromegaly   Chest/Lungs:   lungs are clear to auscultation   Cardiovascular:   Regular. Normal first and second heart sounds with no murmur  no edema bilaterally    Abdomen:   bowel sounds are present   Extremities: no cyanosis or clubbing   Skin:  no xanthelasma, warm.    Neurologic: normal  bilateral, no tremors     Psychiatric: alert and oriented x3, calm        Please refer above for cardiac ROS details.        Medical History  Surgical History Family History Social History   Past Medical History:   Diagnosis Date     Abnormal mammogram, unspecified     Created by Conversion      Acute sinusitis, unspecified     Created by Conversion      Asymptomatic varicose veins     Created by Conversion      Breast cyst      Cellulitis and abscess of unspecified site     Created by Conversion      Circumscribed scleroderma     Created by Conversion      Contusion of unspecified site     Created by Conversion      Disorder of bone and cartilage, unspecified     Created by Conversion      Dysthymic disorder     Created by Conversion      Esophageal reflux     Created by Conversion      Insomnia, unspecified     Created by Conversion      Lumbago     Created by Conversion      Mammogram - Abnormal     Created by Conversion Replacement Utility updated for latest IMO load      Migraine, unspecified, without mention of intractable migraine without mention of status migrainosus     Created by Conversion      Myalgia and myositis, unspecified     Created by Conversion      Myocardial infarction (H)     per EKG's since 2015     Obesity, unspecified     Created by Conversion      Open wound of lip, without mention of complication     Created by Conversion      Other primary cardiomyopathies     Created by Conversion      Other seborrheic keratosis     Created by Conversion      Pain in joint, lower leg     Created by Conversion      Pain in limb     Created by Conversion      Sebaceous cyst     Created by Conversion      Shortness of breath     Created by Conversion      Shoulder and upper arm, insect bite, nonvenomous, without mention of infection(912.4)     Created by Conversion      Unspecified disease of sebaceous glands     Created by Conversion      Unspecified  essential hypertension     Created by Conversion      Unspecified venous (peripheral) insufficiency     Created by Conversion      Unspecified vitamin D deficiency     Created by Conversion      Past Surgical History:   Procedure Laterality Date     APPENDECTOMY       BIOPSY BREAST Left      BREAST CYST ASPIRATION Left      CATARACT EXTRACTION, BILATERAL       HYSTERECTOMY       OOPHORECTOMY       TEMPOROMANDIBULAR JOINT SURGERY Bilateral      TUBAL LIGATION       Family History   Problem Relation Age of Onset     Hypertension Mother      Arthritis Mother      Alzheimer Disease Father         d. 76     Diabetes Father      Hypertension Father      Breast Cancer Maternal Aunt 60.00     Obesity Maternal Aunt      Hypertension Sister      Osteopenia Sister      Varicose Veins Brother      Obesity Brother      Breast Cancer Cousin      Obesity Maternal Uncle      Arthritis Maternal Grandmother         Social History     Socioeconomic History     Marital status:      Spouse name: Not on file     Number of children: 2     Years of education: Not on file     Highest education level: Not on file   Occupational History     Not on file   Tobacco Use     Smoking status: Never     Passive exposure: Past     Smokeless tobacco: Never   Vaping Use     Vaping Use: Never used   Substance and Sexual Activity     Alcohol use: Yes     Alcohol/week: 0.0 standard drinks of alcohol     Comment: Alcoholic Drinks/day: occasional     Drug use: No     Sexual activity: Not on file   Other Topics Concern     Not on file   Social History Narrative    7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       Social Determinants of Health     Financial Resource Strain: Not on file   Food Insecurity: Not on file   Transportation Needs: Not on file   Physical Activity: Not on file   Stress: Not on file   Social Connections: Not on file   Intimate Partner Violence: Not on file   Housing Stability: Not on file           Medications  Allergies   Current Outpatient  Medications   Medication Sig Dispense Refill     aspirin 81 MG EC tablet [ASPIRIN 81 MG EC TABLET] Take 81 mg by mouth daily.       busPIRone (BUSPAR) 15 MG tablet TAKE 1 TABLET BY MOUTH TWICE A  tablet 1     calcium carbonate 750 MG CHEW Take 750 mg by mouth daily       celecoxib (CELEBREX) 200 MG capsule Take 200 mg by mouth daily as needed for moderate pain       cholecalciferol, vitamin D3, 5,000 unit Tab [CHOLECALCIFEROL, VITAMIN D3, 5,000 UNIT TAB] Take by mouth.       DULoxetine (CYMBALTA) 60 MG capsule TAKE 1 CAPSULE BY MOUTH TWICE A  capsule 1     famotidine (PEPCID) 20 MG tablet Take 1 tablet (20 mg) by mouth 2 times daily 180 tablet 3     furosemide (LASIX) 20 MG tablet Take 1-2 tablets (20-40 mg) by mouth every 48 hours as needed (elevated BP, LE swelling) 90 tablet 1     gabapentin (NEURONTIN) 300 MG capsule TAKE 1 CAPSULE BY MOUTH DAILY IN THE MORNING, 1 CAPSULES IN THE AFTERNOON, & 2 CAPSULES AT BEDTIME 360 capsule 2     Levomefolate Glucosamine (METHYLFOLATE PO)        losartan (COZAAR) 25 MG tablet Take 1 tablet (25 mg) by mouth daily 90 tablet 3     metFORMIN (GLUCOPHAGE XR) 500 MG 24 hr tablet Take 2 tablets daily with a meal 180 tablet 0     metoprolol succinate ER (TOPROL XL) 50 MG 24 hr tablet TAKE 1 TABLET BY MOUTH EVERY DAY 90 tablet 2     mometasone (NASONEX) 50 MCG/ACT nasal spray INSTILL 2 SPRAYS INTO BOTH NOSTRILS DAILY 17 g 1     QUEtiapine (SEROQUEL) 100 MG tablet Take 100 mg by mouth At Bedtime       rizatriptan (MAXALT-MLT) 10 MG ODT TAKE 1 TABLET BY MOUTH AT ONSET OF HEADACHE FOR MIGRAINE MAY REPEAT IN 2 HOURS. MAX 3 TABS/24 HOURS 30 tablet 3     traZODone (DESYREL) 150 MG tablet TAKE 2 TABLETS (300 MG) BY MOUTH AT BEDTIME 180 tablet 3     triamcinolone (KENALOG) 0.1 % external ointment APPLY TO AFFECTED AREA TWICE A DAY 30 g 2       Allergies   Allergen Reactions     Fluoxetine Cough and Rash     Pt thinks it was cough but not totally sure.     Latex Rash      Levothyroxine Rash     Lisinopril Cough and Rash     Sulfa (Sulfonamide Antibiotics) [Sulfa Antibiotics] Itching and Rash          Lab Results    Chemistry/lipid CBC Cardiac Enzymes/BNP/TSH/INR   Recent Labs   Lab Test 06/14/23  1002   CHOL 147   HDL 64   LDL 66   TRIG 87     Recent Labs   Lab Test 06/14/23  1002 10/07/22  1153 09/02/21  0956   LDL 66 78 85     Recent Labs   Lab Test 06/14/23  1002      POTASSIUM 4.0   CHLORIDE 106   CO2 29   GLC 95  95   BUN 15.8   CR 0.98*   GFRESTIMATED 60*   MONIQUE 9.4     Recent Labs   Lab Test 06/14/23  1002 02/08/23  0937 10/07/22  1153   CR 0.98* 0.95 0.82     Recent Labs   Lab Test 06/14/23  1002   A1C 5.5          Recent Labs   Lab Test 06/14/23  1002   WBC 4.7   HGB 12.1   HCT 38.5   MCV 94        Recent Labs   Lab Test 06/14/23  1002 02/08/23  0937 10/07/22  1153   HGB 12.1 12.4 12.9    No results for input(s): TROPONINI in the last 47758 hours.  No results for input(s): BNP, NTBNPI, NTBNP in the last 15646 hours.  Recent Labs   Lab Test 06/14/23  1002   TSH 2.57     No results for input(s): INR in the last 10419 hours.     Cyn Gay MD

## 2023-09-13 NOTE — LETTER
9/13/2023    Skyler Wiggins, NP  [address] [place] [facility] [address]    RE: Katy Hong       Dear Colleague,     I had the pleasure of seeing Katy Hong in the [facility].    HEART CARE ENCOUNTER CONSULTATON NOTE      [facility]  [phone]      Assessment/Recommendations   Assessment:    1.  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  Mild ANDRADE  3.  Hypertension: Well-controlled  4.  Nonobstructive coronary artery disease        Plan:  1.  Continue aspirin and statin therapy  2.  Continue Toprol-XL and losartan  3.  Echocardiogram to reevaluate left ventricular ejection fraction       Follow-up in 1 year.         History of Present Illness/Subjective    HPI: Katy Hong is a 78 year old female with history of mild nonischemic cardiomyopathy, mild obstructive sleep apnea and hypertension who is here for follow-up.  She underwent a CTA coronary with calcium score in 2017 which showed a calcium score of 14 with no significant stenosis.  She has been doing well since I last saw her.  No problems with worsening edema, chest pain or breathing difficulty.  Her son who is  recently moved to [place].  She takes her as needed Lasix couple times a week and this has not changed in frequency.         Physical Examination  Review of Systems   Vitals: /78 (BP Location: Left arm, Patient Position: Sitting, Cuff Size: Adult Regular)   Pulse 72   Resp 20   Wt 80.1 kg (176 lb 8 oz)   SpO2 95%   BMI 32.28 kg/m    BMI= Body mass index is 32.28 kg/m .  Wt Readings from Last 3 Encounters:   09/13/23 80.1 kg (176 lb 8 oz)   05/22/23 83 kg (183 lb)   04/25/23 83.3 kg (183 lb 9.6 oz)       General Appearance:   no distress, normal body habitus   ENT/Mouth: membranes moist, no oral lesions or bleeding gums.      EYES:  no scleral icterus, normal conjunctivae   Neck: no carotid bruits or thyromegaly   Chest/Lungs:    lungs are clear to auscultation   Cardiovascular:   Regular. Normal first and second heart sounds with no murmur  no edema bilaterally    Abdomen:   bowel sounds are present   Extremities: no cyanosis or clubbing   Skin: no xanthelasma, warm.    Neurologic: normal  bilateral, no tremors     Psychiatric: alert and oriented x3, calm        Please refer above for cardiac ROS details.        Medical History  Surgical History Family History Social History   Past Medical History:   Diagnosis Date    Abnormal mammogram, unspecified     Created by Conversion     Acute sinusitis, unspecified     Created by Conversion     Asymptomatic varicose veins     Created by Conversion     Breast cyst     Cellulitis and abscess of unspecified site     Created by Conversion     Circumscribed scleroderma     Created by Conversion     Contusion of unspecified site     Created by Conversion     Disorder of bone and cartilage, unspecified     Created by Conversion     Dysthymic disorder     Created by Conversion     Esophageal reflux     Created by Conversion     Insomnia, unspecified     Created by Conversion     Lumbago     Created by Conversion     Mammogram - Abnormal     Created by Conversion Replacement Utility updated for latest IMO load     Migraine, unspecified, without mention of intractable migraine without mention of status migrainosus     Created by Conversion     Myalgia and myositis, unspecified     Created by Conversion     Myocardial infarction (H)     per EKG's since 2015    Obesity, unspecified     Created by Conversion     Open wound of lip, without mention of complication     Created by Conversion     Other primary cardiomyopathies     Created by Conversion     Other seborrheic keratosis     Created by Conversion     Pain in joint, lower leg     Created by Conversion     Pain in limb     Created by Conversion     Sebaceous cyst     Created by Conversion     Shortness of breath     Created by Conversion     Shoulder  and upper arm, insect bite, nonvenomous, without mention of infection(912.4)     Created by Conversion     Unspecified disease of sebaceous glands     Created by Conversion     Unspecified essential hypertension     Created by Conversion     Unspecified venous (peripheral) insufficiency     Created by Conversion     Unspecified vitamin D deficiency     Created by Conversion      Past Surgical History:   Procedure Laterality Date    APPENDECTOMY      BIOPSY BREAST Left     BREAST CYST ASPIRATION Left     CATARACT EXTRACTION, BILATERAL      HYSTERECTOMY      OOPHORECTOMY      TEMPOROMANDIBULAR JOINT SURGERY Bilateral     TUBAL LIGATION       Family History   Problem Relation Age of Onset    Hypertension Mother     Arthritis Mother     Alzheimer Disease Father         d. 76    Diabetes Father     Hypertension Father     Breast Cancer Maternal Aunt 60.00    Obesity Maternal Aunt     Hypertension Sister     Osteopenia Sister     Varicose Veins Brother     Obesity Brother     Breast Cancer Cousin     Obesity Maternal Uncle     Arthritis Maternal Grandmother         Social History     Socioeconomic History    Marital status:      Spouse name: Not on file    Number of children: 2    Years of education: Not on file    Highest education level: Not on file   Occupational History    Not on file   Tobacco Use    Smoking status: Never     Passive exposure: Past    Smokeless tobacco: Never   Vaping Use    Vaping Use: Never used   Substance and Sexual Activity    Alcohol use: Yes     Alcohol/week: 0.0 standard drinks of alcohol     Comment: Alcoholic Drinks/day: occasional    Drug use: No    Sexual activity: Not on file   Other Topics Concern    Not on file   Social History Narrative    5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       Social Determinants of Health     Financial Resource Strain: Not on file   Food Insecurity: Not on file   Transportation Needs: Not on file   Physical Activity: Not on file   Stress: Not on file   Social  Connections: Not on file   Intimate Partner Violence: Not on file   Housing Stability: Not on file           Medications  Allergies   Current Outpatient Medications   Medication Sig Dispense Refill    aspirin 81 MG EC tablet [ASPIRIN 81 MG EC TABLET] Take 81 mg by mouth daily.      busPIRone (BUSPAR) 15 MG tablet TAKE 1 TABLET BY MOUTH TWICE A  tablet 1    calcium carbonate 750 MG CHEW Take 750 mg by mouth daily      celecoxib (CELEBREX) 200 MG capsule Take 200 mg by mouth daily as needed for moderate pain      cholecalciferol, vitamin D3, 5,000 unit Tab [CHOLECALCIFEROL, VITAMIN D3, 5,000 UNIT TAB] Take by mouth.      DULoxetine (CYMBALTA) 60 MG capsule TAKE 1 CAPSULE BY MOUTH TWICE A  capsule 1    famotidine (PEPCID) 20 MG tablet Take 1 tablet (20 mg) by mouth 2 times daily 180 tablet 3    furosemide (LASIX) 20 MG tablet Take 1-2 tablets (20-40 mg) by mouth every 48 hours as needed (elevated BP, LE swelling) 90 tablet 1    gabapentin (NEURONTIN) 300 MG capsule TAKE 1 CAPSULE BY MOUTH DAILY IN THE MORNING, 1 CAPSULES IN THE AFTERNOON, & 2 CAPSULES AT BEDTIME 360 capsule 2    Levomefolate Glucosamine (METHYLFOLATE PO)       losartan (COZAAR) 25 MG tablet Take 1 tablet (25 mg) by mouth daily 90 tablet 3    metFORMIN (GLUCOPHAGE XR) 500 MG 24 hr tablet Take 2 tablets daily with a meal 180 tablet 0    metoprolol succinate ER (TOPROL XL) 50 MG 24 hr tablet TAKE 1 TABLET BY MOUTH EVERY DAY 90 tablet 2    mometasone (NASONEX) 50 MCG/ACT nasal spray INSTILL 2 SPRAYS INTO BOTH NOSTRILS DAILY 17 g 1    QUEtiapine (SEROQUEL) 100 MG tablet Take 100 mg by mouth At Bedtime      rizatriptan (MAXALT-MLT) 10 MG ODT TAKE 1 TABLET BY MOUTH AT ONSET OF HEADACHE FOR MIGRAINE MAY REPEAT IN 2 HOURS. MAX 3 TABS/24 HOURS 30 tablet 3    traZODone (DESYREL) 150 MG tablet TAKE 2 TABLETS (300 MG) BY MOUTH AT BEDTIME 180 tablet 3    triamcinolone (KENALOG) 0.1 % external ointment APPLY TO AFFECTED AREA TWICE A DAY 30 g 2        Allergies   Allergen Reactions    Fluoxetine Cough and Rash     Pt thinks it was cough but not totally sure.    Latex Rash    Levothyroxine Rash    Lisinopril Cough and Rash    Sulfa (Sulfonamide Antibiotics) [Sulfa Antibiotics] Itching and Rash          Lab Results    Chemistry/lipid CBC Cardiac Enzymes/BNP/TSH/INR   Recent Labs   Lab Test 06/14/23  1002   CHOL 147   HDL 64   LDL 66   TRIG 87     Recent Labs   Lab Test 06/14/23  1002 10/07/22  1153 09/02/21  0956   LDL 66 78 85     Recent Labs   Lab Test 06/14/23  1002      POTASSIUM 4.0   CHLORIDE 106   CO2 29   GLC 95  95   BUN 15.8   CR 0.98*   GFRESTIMATED 60*   MONIQUE 9.4     Recent Labs   Lab Test 06/14/23  1002 02/08/23  0937 10/07/22  1153   CR 0.98* 0.95 0.82     Recent Labs   Lab Test 06/14/23  1002   A1C 5.5          Recent Labs   Lab Test 06/14/23  1002   WBC 4.7   HGB 12.1   HCT 38.5   MCV 94        Recent Labs   Lab Test 06/14/23  1002 02/08/23  0937 10/07/22  1153   HGB 12.1 12.4 12.9    No results for input(s): TROPONINI in the last 60617 hours.  No results for input(s): BNP, NTBNPI, NTBNP in the last 88541 hours.  Recent Labs   Lab Test 06/14/23  1002   TSH 2.57     No results for input(s): INR in the last 18909 hours.     Cyn Gay MD      Thank you for allowing me to participate in the care of your patient.      Sincerely,     Cyn Gay MD     [facility]  cc:   No referring provider defined for this encounter.

## 2023-10-08 DIAGNOSIS — M47.816 OSTEOARTHRITIS OF LUMBAR SPINE, UNSPECIFIED SPINAL OSTEOARTHRITIS COMPLICATION STATUS: ICD-10-CM

## 2023-10-08 DIAGNOSIS — M79.661 PAIN IN BOTH LOWER LEGS: ICD-10-CM

## 2023-10-08 DIAGNOSIS — M79.7 FIBROMYALGIA: ICD-10-CM

## 2023-10-08 DIAGNOSIS — M79.662 PAIN IN BOTH LOWER LEGS: ICD-10-CM

## 2023-10-10 ENCOUNTER — OFFICE VISIT (OUTPATIENT)
Dept: FAMILY MEDICINE | Facility: CLINIC | Age: 78
End: 2023-10-10
Payer: COMMERCIAL

## 2023-10-10 VITALS
DIASTOLIC BLOOD PRESSURE: 100 MMHG | RESPIRATION RATE: 16 BRPM | SYSTOLIC BLOOD PRESSURE: 158 MMHG | BODY MASS INDEX: 33.82 KG/M2 | HEIGHT: 62 IN | HEART RATE: 68 BPM | TEMPERATURE: 97.8 F | WEIGHT: 183.8 LBS | OXYGEN SATURATION: 96 %

## 2023-10-10 DIAGNOSIS — Z00.00 ENCOUNTER FOR MEDICARE ANNUAL WELLNESS EXAM: Primary | ICD-10-CM

## 2023-10-10 DIAGNOSIS — N39.3 FEMALE STRESS INCONTINENCE: ICD-10-CM

## 2023-10-10 DIAGNOSIS — F41.9 ANXIETY: ICD-10-CM

## 2023-10-10 DIAGNOSIS — M79.661 PAIN IN BOTH LOWER LEGS: ICD-10-CM

## 2023-10-10 DIAGNOSIS — E66.9 OBESITY (BMI 30-39.9): ICD-10-CM

## 2023-10-10 DIAGNOSIS — M85.80 OSTEOPENIA, UNSPECIFIED LOCATION: ICD-10-CM

## 2023-10-10 DIAGNOSIS — M79.7 FIBROMYALGIA: ICD-10-CM

## 2023-10-10 DIAGNOSIS — F33.1 MODERATE EPISODE OF RECURRENT MAJOR DEPRESSIVE DISORDER (H): ICD-10-CM

## 2023-10-10 DIAGNOSIS — M79.662 PAIN IN BOTH LOWER LEGS: ICD-10-CM

## 2023-10-10 DIAGNOSIS — M47.816 OSTEOARTHRITIS OF LUMBAR SPINE, UNSPECIFIED SPINAL OSTEOARTHRITIS COMPLICATION STATUS: ICD-10-CM

## 2023-10-10 DIAGNOSIS — I10 ESSENTIAL HYPERTENSION: ICD-10-CM

## 2023-10-10 DIAGNOSIS — Z78.0 POST-MENOPAUSAL: ICD-10-CM

## 2023-10-10 DIAGNOSIS — I42.8 OTHER CARDIOMYOPATHY (H): ICD-10-CM

## 2023-10-10 PROCEDURE — G0439 PPPS, SUBSEQ VISIT: HCPCS | Performed by: NURSE PRACTITIONER

## 2023-10-10 PROCEDURE — 99214 OFFICE O/P EST MOD 30 MIN: CPT | Mod: 25 | Performed by: NURSE PRACTITIONER

## 2023-10-10 RX ORDER — DULOXETIN HYDROCHLORIDE 60 MG/1
CAPSULE, DELAYED RELEASE ORAL
Qty: 180 CAPSULE | Refills: 1 | OUTPATIENT
Start: 2023-10-10

## 2023-10-10 RX ORDER — DULOXETIN HYDROCHLORIDE 60 MG/1
60 CAPSULE, DELAYED RELEASE ORAL 2 TIMES DAILY
Qty: 180 CAPSULE | Refills: 1 | Status: SHIPPED | OUTPATIENT
Start: 2023-10-10 | End: 2024-03-06

## 2023-10-10 RX ORDER — BUSPIRONE HYDROCHLORIDE 15 MG/1
15 TABLET ORAL 2 TIMES DAILY
Qty: 180 TABLET | Refills: 1 | Status: SHIPPED | OUTPATIENT
Start: 2023-10-10

## 2023-10-10 RX ORDER — VIT C/B6/B5/MAGNESIUM/HERB 173 50-5-6-5MG
CAPSULE ORAL
COMMUNITY

## 2023-10-10 RX ORDER — METFORMIN HCL 500 MG
TABLET, EXTENDED RELEASE 24 HR ORAL
Qty: 180 TABLET | Refills: 0 | Status: CANCELLED | OUTPATIENT
Start: 2023-10-10

## 2023-10-10 RX ORDER — FUROSEMIDE 20 MG
20-40 TABLET ORAL
Qty: 90 TABLET | Refills: 1 | Status: CANCELLED | OUTPATIENT
Start: 2023-10-10

## 2023-10-10 RX ORDER — METOPROLOL SUCCINATE 50 MG/1
50 TABLET, EXTENDED RELEASE ORAL DAILY
Qty: 90 TABLET | Refills: 3 | Status: SHIPPED | OUTPATIENT
Start: 2023-10-10

## 2023-10-10 ASSESSMENT — ACTIVITIES OF DAILY LIVING (ADL): CURRENT_FUNCTION: NO ASSISTANCE NEEDED

## 2023-10-10 ASSESSMENT — ANXIETY QUESTIONNAIRES
2. NOT BEING ABLE TO STOP OR CONTROL WORRYING: SEVERAL DAYS
GAD7 TOTAL SCORE: 7
6. BECOMING EASILY ANNOYED OR IRRITABLE: SEVERAL DAYS
5. BEING SO RESTLESS THAT IT IS HARD TO SIT STILL: NOT AT ALL
IF YOU CHECKED OFF ANY PROBLEMS ON THIS QUESTIONNAIRE, HOW DIFFICULT HAVE THESE PROBLEMS MADE IT FOR YOU TO DO YOUR WORK, TAKE CARE OF THINGS AT HOME, OR GET ALONG WITH OTHER PEOPLE: SOMEWHAT DIFFICULT
GAD7 TOTAL SCORE: 7
3. WORRYING TOO MUCH ABOUT DIFFERENT THINGS: SEVERAL DAYS
4. TROUBLE RELAXING: SEVERAL DAYS
7. FEELING AFRAID AS IF SOMETHING AWFUL MIGHT HAPPEN: MORE THAN HALF THE DAYS
1. FEELING NERVOUS, ANXIOUS, OR ON EDGE: SEVERAL DAYS

## 2023-10-10 ASSESSMENT — ENCOUNTER SYMPTOMS
ABDOMINAL PAIN: 0
HEMATOCHEZIA: 0
CONSTIPATION: 1
MYALGIAS: 1
DIZZINESS: 0
PARESTHESIAS: 0
HEADACHES: 1
CHILLS: 1
ARTHRALGIAS: 1
SHORTNESS OF BREATH: 1
JOINT SWELLING: 1
HEMATURIA: 0
COUGH: 1
FEVER: 0
WEAKNESS: 1
EYE PAIN: 0
SORE THROAT: 1
BREAST MASS: 0
NERVOUS/ANXIOUS: 1
DIARRHEA: 1
FREQUENCY: 1
DYSURIA: 0
HEARTBURN: 0
NAUSEA: 0
PALPITATIONS: 0

## 2023-10-10 ASSESSMENT — PATIENT HEALTH QUESTIONNAIRE - PHQ9
10. IF YOU CHECKED OFF ANY PROBLEMS, HOW DIFFICULT HAVE THESE PROBLEMS MADE IT FOR YOU TO DO YOUR WORK, TAKE CARE OF THINGS AT HOME, OR GET ALONG WITH OTHER PEOPLE: SOMEWHAT DIFFICULT
SUM OF ALL RESPONSES TO PHQ QUESTIONS 1-9: 8
SUM OF ALL RESPONSES TO PHQ QUESTIONS 1-9: 8

## 2023-10-10 ASSESSMENT — PAIN SCALES - GENERAL: PAINLEVEL: NO PAIN (0)

## 2023-10-10 NOTE — PATIENT INSTRUCTIONS
When you come to get your shots, ask them to check your blood pressure.  If still elevated we can make adjustments to your medications.    Bone density/DEXA scan ordered.  Contact number for scheduling is in your paperwork.    Refills of the medication I take care of sent to the pharmacy.    If interested in meeting with physical therapy to work on pelvic floor strengthening, let me know        Patient Education   Personalized Prevention Plan  You are due for the preventive services outlined below.  Your care team is available to assist you in scheduling these services.  If you have already completed any of these items, please share that information with your care team to update in your medical record.  Health Maintenance Due   Topic Date Due    Flu Vaccine (1) 09/01/2023    COVID-19 Vaccine (6 - 2023-24 season) 09/01/2023    ANNUAL REVIEW OF HM ORDERS  10/07/2023    Annual Wellness Visit  10/07/2023    Diptheria Tetanus Pertussis (DTAP/TDAP/TD) Vaccine (2 - Td or Tdap) 10/17/2023     Your Health Risk Assessment indicates you feel you are not in good health    A healthy lifestyle helps keep the body fit and the mind alert. It helps protect you from disease, helps you fight disease, and helps prevent chronic disease (disease that doesn't go away) from getting worse. This is important as you get older and begin to notice twinges in muscles and joints and a decline in the strength and stamina you once took for granted. A healthy lifestyle includes good healthcare, good nutrition, weight control, recreation, and regular exercise. Avoid harmful substances and do what you can to keep safe. Another part of a healthy lifestyle is stay mentally active and socially involved.    Good healthcare   Have a wellness visit every year.   If you have new symptoms, let us know right away. Don't wait until the next checkup.   Take medicines exactly as prescribed and keep your medicines in a safe place. Tell us if your medicine causes  problems.   Healthy diet and weight control   Eat 3 or 4 small, nutritious, low-fat, high-fiber meals a day. Include a variety of fruits, vegetables, and whole-grain foods.   Make sure you get enough calcium in your diet. Calcium, vitamin D, and exercise help prevent osteoporosis (bone thinning).   If you live alone, try eating with others when you can. That way you get a good meal and have company while you eat it.   Try to keep a healthy weight. If you eat more calories than your body uses for energy, it will be stored as fat and you will gain weight.     Recreation   Recreation is not limited to sports and team events. It includes any activity that provides relaxation, interest, enjoyment, and exercise. Recreation provides an outlet for physical, mental, and social energy. It can give a sense of worth and achievement. It can help you stay healthy.    Mental Exercise and Social Involvement  Mental and emotional health is as important as physical health. Keep in touch with friends and family. Stay as active as possible. Continue to learn and challenge yourself.   Things you can do to stay mentally active are:  Learn something new, like a foreign language or musical instrument.   Play SCRABBLE or do crossword puzzles. If you cannot find people to play these games with you at home, you can play them with others on your computer through the Internet.   Join a games club--anything from card games to chess or checkers or lawn bowling.   Start a new hobby.   Go back to school.   Volunteer.   Read.   Keep up with world events.  Bladder Training: Care Instructions  Your Care Instructions     Bladder training is used to treat urge incontinence and stress incontinence. Urge incontinence means that the need to urinate comes on so fast that you can't get to a toilet in time. Stress incontinence means that you leak urine because of pressure on your bladder. For example, it may happen when you laugh, cough, or lift something  heavy.  Bladder training can increase how long you can wait before you have to urinate. It can also help your bladder hold more urine. And it can give you better control over the urge to urinate.  It is important to remember that bladder training takes a few weeks to a few months to make a difference. You may not see results right away, but don't give up.  Follow-up care is a key part of your treatment and safety. Be sure to make and go to all appointments, and call your doctor if you are having problems. It's also a good idea to know your test results and keep a list of the medicines you take.  How can you care for yourself at home?  Work with your doctor to come up with a bladder training program that is right for you. You may use one or more of the following methods.  Delayed urination  In the beginning, try to keep from urinating for 5 minutes after you first feel the need to go.  While you wait, take deep, slow breaths to relax. Kegel exercises can also help you delay the need to go to the bathroom.  After some practice, when you can easily wait 5 minutes to urinate, try to wait 10 minutes before you urinate.  Slowly increase the waiting period until you are able to control when you have to urinate.  Scheduled urination  Empty your bladder when you first wake up in the morning.  Schedule times throughout the day when you will urinate.  Start by going to the bathroom every hour, even if you don't need to go.  Slowly increase the time between trips to the bathroom.  When you have found a schedule that works well for you, keep doing it.  If you wake up during the night and have to urinate, do it. Apply your schedule to waking hours only.  Kegel exercises  These tighten and strengthen pelvic muscles, which can help you control the flow of urine. (If doing these exercises causes pain, stop doing them and talk with your doctor.) To do Kegel exercises:  Squeeze your muscles as if you were trying not to pass gas. Or  "squeeze your muscles as if you were stopping the flow of urine. Your belly, legs, and buttocks shouldn't move.  Hold the squeeze for 3 seconds, then relax for 5 to 10 seconds.  Start with 3 seconds, then add 1 second each week until you are able to squeeze for 10 seconds.  Repeat the exercise 10 times a session. Do 3 to 8 sessions a day.  When should you call for help?  Watch closely for changes in your health, and be sure to contact your doctor if:    Your incontinence is getting worse.     You do not get better as expected.   Where can you learn more?  Go to https://www.[employer].net/patiented  Enter V684 in the search box to learn more about \"Bladder Training: Care Instructions.\"  Current as of: March 1, 2023               Content Version: 13.7    9336-2283 [employer]   Care instructions adapted under license by your healthcare professional. If you have questions about a medical condition or this instruction, always ask your healthcare professional. [employer] disclaims any warranty or liability for your use of this information.      Your Health Risk Assessment indicates you feel you are not in good emotional health.    Recreation   Recreation is not limited to sports and team events. It includes any activity that provides relaxation, interest, enjoyment, and exercise. Recreation provides an outlet for physical, mental, and social energy. It can give a sense of worth and achievement. It can help you stay healthy.    Mental Exercise and Social Involvement  Mental and emotional health is as important as physical health. Keep in touch with friends and family. Stay as active as possible. Continue to learn and challenge yourself.   Things you can do to stay mentally active are:  Learn something new, like a foreign language or musical instrument.   Play SCRABBLE or do crossword puzzles. If you cannot find people to play these games with you at home, you can play them with others on your " computer through the Internet.   Join a games club--anything from card games to chess or checkers or lawn bowling.   Start a new hobby.   Go back to school.   Volunteer.   Read.   Keep up with world events.  Learning About Depression Screening  What is depression screening?  Depression screening is a way to see if you have depression symptoms. It may be done by a doctor or counselor. It's often part of a routine checkup. That's because your mental health is just as important as your physical health.  Depression is a mental health condition that affects how you feel, think, and act. You may:  Have less energy.  Lose interest in your daily activities.  Feel sad and grouchy for a long time.  Depression is very common. It affects people of all ages.  Many things can lead to depression. Some people become depressed after they have a stroke or find out they have a major illness like cancer or heart disease. The death of a loved one or a breakup may lead to depression. It can run in families. Most experts believe that a combination of inherited genes and stressful life events can cause it.  What happens during screening?  You may be asked to fill out a form about your depression symptoms. You and the doctor will discuss your answers. The doctor may ask you more questions to learn more about how you think, act, and feel.  What happens after screening?  If you have symptoms of depression, your doctor will talk to you about your options.  Doctors usually treat depression with medicines or counseling. Often, combining the two works best. Many people don't get help because they think that they'll get over the depression on their own. But people with depression may not get better unless they get treatment.  The cause of depression is not well understood. There may be many factors involved. But if you have depression, it's not your fault.  A serious symptom of depression is thinking about death or suicide. If you or someone you  "care about talks about this or about feeling hopeless, get help right away.  It's important to know that depression can be treated. Medicine, counseling, and self-care may help.  Where can you learn more?  Go to https://www.[employer].net/patiented  Enter T185 in the search box to learn more about \"Learning About Depression Screening.\"  Current as of: October 20, 2022               Content Version: 13.7    8795-1825 [employer]   Care instructions adapted under license by your healthcare professional. If you have questions about a medical condition or this instruction, always ask your healthcare professional. [employer] disclaims any warranty or liability for your use of this information.         "

## 2023-10-10 NOTE — PROGRESS NOTES
"SUBJECTIVE:   Katy is a 78 year old who presents for Preventive Visit.      10/10/2023     9:43 AM   Additional Questions   Roomed by Smitha Miller CMA     Uncontrolled hypertension in the setting of cardiomyopathy.  Asymptomatic.  Continues with losartan 25 mg, metoprolol succinate 50 mg.  Has not really been having any lower extremity swelling issues so she has not been taking the furosemide for quite some time.    Mood is up and down.  Continues to see Steve.      Blood pressure a little high today.      Osteopenia-on calcium and viatmin D. Last DEXA showed osteopenia.  Unsure if interested in oral treatment but may be interested in updating imaging.     Are you in the first 12 months of your Medicare coverage?  No    Healthy Habits:     In general, how would you rate your overall health?  Fair    Frequency of exercise:  2-3 days/week    Duration of exercise:  15-30 minutes    Do you usually eat at least 4 servings of fruit and vegetables a day, include whole grains    & fiber and avoid regularly eating high fat or \"junk\" foods?  Yes    Taking medications regularly:  Yes    Medication side effects:  Not applicable    Ability to successfully perform activities of daily living:  No assistance needed    Home Safety:  No safety concerns identified    Hearing Impairment:  No hearing concerns    In the past 6 months, have you been bothered by leaking of urine? Yes    In general, how would you rate your overall mental or emotional health?  Fair    Additional concerns today:  Yes      Today's PHQ-9 Score:       10/10/2023     9:41 AM   PHQ-9 SCORE   PHQ-9 Total Score MyChart 8 (Mild depression)   PHQ-9 Total Score 8           Have you ever done Advance Care Planning? (For example, a Health Directive, POLST, or a discussion with a medical provider or your loved ones about your wishes): Yes, patient states has an Advance Care Planning document and will bring a copy to the clinic.       Fall risk  Fallen 2 or more times in " the past year?: No  Any fall with injury in the past year?: No    Cognitive Screening   1) Repeat 3 items (Leader, Season, Table)    2) Clock draw: NORMAL  3) 3 item recall: Recalls 3 objects  Results: 3 items recalled: COGNITIVE IMPAIRMENT LESS LIKELY    Mini-CogTM Copyright RAIZA Raymond. Licensed by the author for use in [facility]; reprinted with permission (alonso@[facility]). All rights reserved.      Reviewed and updated as needed this visit by clinical staff   Tobacco  Allergies  Meds              Reviewed and updated as needed this visit by Provider                 Social History     Tobacco Use    Smoking status: Never     Passive exposure: Past    Smokeless tobacco: Never   Substance Use Topics    Alcohol use: Yes     Alcohol/week: 0.0 standard drinks of alcohol     Comment: Alcoholic Drinks/day: occasional             10/10/2023     9:50 AM   Alcohol Use   Prescreen: >3 drinks/day or >7 drinks/week? No     Do you have a current opioid prescription? No  Do you use any other controlled substances or medications that are not prescribed by a provider? None              Current providers sharing in care for this patient include:   Patient Care Team:  Skyler Wiggins NP as PCP - General  Cyn Gay MD as Assigned Heart and Vascular Provider  Leonora Kerr NP as Assigned PCP    The following health maintenance items are reviewed in Epic and correct as of today:  Health Maintenance   Topic Date Due    INFLUENZA VACCINE (1) 09/01/2023    COVID-19 Vaccine (6 - 2023-24 season) 09/01/2023    ANNUAL REVIEW OF HM ORDERS  10/07/2023    MEDICARE ANNUAL WELLNESS VISIT  10/07/2023    DTAP/TDAP/TD IMMUNIZATION (2 - Td or Tdap) 10/17/2023    PHQ-9  04/10/2024    FALL RISK ASSESSMENT  10/10/2024    ADVANCE CARE PLANNING  10/07/2027    LIPID  06/14/2028    DEXA  06/12/2032    HEPATITIS C SCREENING  Completed    DEPRESSION ACTION PLAN  Completed    Pneumococcal Vaccine: 65+ Years  Completed    ZOSTER  "IMMUNIZATION  Completed    IPV IMMUNIZATION  Aged Out    HPV IMMUNIZATION  Aged Out    MENINGITIS IMMUNIZATION  Aged Out    MAMMO SCREENING  Discontinued    COLORECTAL CANCER SCREENING  Discontinued       Pertinent mammograms are reviewed under the imaging tab.    Review of Systems   Constitutional:  Positive for chills. Negative for fever.   HENT:  Positive for congestion and sore throat. Negative for ear pain and hearing loss.    Eyes:  Positive for visual disturbance. Negative for pain.   Respiratory:  Positive for cough and shortness of breath.    Cardiovascular:  Negative for chest pain, palpitations and peripheral edema.   Gastrointestinal:  Positive for constipation and diarrhea. Negative for abdominal pain, heartburn, hematochezia and nausea.   Breasts:  Positive for tenderness. Negative for breast mass and discharge.   Genitourinary:  Positive for frequency and urgency. Negative for dysuria, genital sores, hematuria, pelvic pain, vaginal bleeding and vaginal discharge.   Musculoskeletal:  Positive for arthralgias, joint swelling and myalgias.   Skin:  Negative for rash.   Neurological:  Positive for weakness and headaches. Negative for dizziness and paresthesias.   Psychiatric/Behavioral:  Positive for mood changes. The patient is nervous/anxious.          OBJECTIVE:   BP (!) 158/100 (BP Location: Right arm, Patient Position: Sitting, Cuff Size: Adult Regular)   Pulse 68   Temp 97.8  F (36.6  C) (Oral)   Resp 16   Ht 1.575 m (5' 2\")   Wt 83.4 kg (183 lb 12.8 oz)   SpO2 96%   BMI 33.62 kg/m   Estimated body mass index is 33.62 kg/m  as calculated from the following:    Height as of this encounter: 1.575 m (5' 2\").    Weight as of this encounter: 83.4 kg (183 lb 12.8 oz).  Physical Exam  GENERAL: healthy, alert and no distress  EYES: Eyes grossly normal to inspection, PERRL and conjunctivae and sclerae normal  HENT: ear canals and TM's normal, nose and mouth without ulcers or lesions  NECK: no " adenopathy, no asymmetry, masses, or scars and thyroid normal to palpation  RESP: lungs clear to auscultation - no rales, rhonchi or wheezes  CV: regular rate and rhythm, normal S1 S2, no S3 or S4, no murmur, click or rub, no peripheral edema and peripheral pulses strong  ABDOMEN: soft, nontender, no hepatosplenomegaly, no masses and bowel sounds normal  MS: no gross musculoskeletal defects noted, no edema  SKIN: no suspicious lesions or rashes  NEURO: Normal strength and tone, mentation intact and speech normal  PSYCH: mentation appears normal, affect normal/bright    Diagnostic Test Results:  Labs reviewed in Epic    ASSESSMENT / PLAN:       ICD-10-CM    1. Encounter for Medicare annual wellness exam  Z00.00       2. Anxiety  F41.9 busPIRone (BUSPAR) 15 MG tablet      3. Pain in both lower legs  M79.661 DULoxetine (CYMBALTA) 60 MG capsule    M79.662       4. Osteoarthritis of lumbar spine, unspecified spinal osteoarthritis complication status  M47.816 DULoxetine (CYMBALTA) 60 MG capsule      5. Fibromyalgia  M79.7 DULoxetine (CYMBALTA) 60 MG capsule      6. Essential hypertension  I10 metoprolol succinate ER (TOPROL XL) 50 MG 24 hr tablet      7. Obesity (BMI 30-39.9)  E66.9       8. Osteopenia, unspecified location  M85.80       9. Post-menopausal  Z78.0 DX Hip/Pelvis/Spine      10. Female stress incontinence  N39.3       11. Moderate episode of recurrent major depressive disorder (H)  F33.1       12. Other cardiomyopathy (H)  I42.8         Continue working with psychiatry for management of depression and anxiety symptoms.  We discussed the opportunity for pelvic floor therapy for stress incontinence.  She will let me know if interested.  Update DEXA scan.  Continue working on staying active and mild weight loss.  Blood pressure uncontrolled.  She plans on scheduling a shot only visit in the next couple weeks.  Recheck blood pressure at that time and if still elevated increase losartan.  Labs recently done  through psychiatry so we can hold off today    Reviewed metabolic panel x1, TSH x1, lipids x1, cardiology note x1    COUNSELING:  Reviewed preventive health counseling, as reflected in patient instructions        She reports that she has never smoked. She has been exposed to tobacco smoke. She has never used smokeless tobacco.      Appropriate preventive services were discussed with this patient, including applicable screening as appropriate for fall prevention, nutrition, physical activity, Tobacco-use cessation, weight loss and cognition.  Checklist reviewing preventive services available has been given to the patient.    Reviewed patients plan of care and provided an AVS. The Basic Care Plan (routine screening as documented in Health Maintenance) for Katy meets the Care Plan requirement. This Care Plan has been established and reviewed with the Patient.          Skyler Wiggins NP  [facility]    Identified Health Risks:  I have reviewed Opioid Use Disorder and Substance Use Disorder risk factors and made any needed referrals. Answers submitted by the patient for this visit:  Patient Health Questionnaire (Submitted on 10/10/2023)  If you checked off any problems, how difficult have these problems made it for you to do your work, take care of things at home, or get along with other people?: Somewhat difficult  PHQ9 TOTAL SCORE: 8  MARGARETTE-7 (Submitted on 10/10/2023)  MARGARETTE 7 TOTAL SCORE: 7  The patient was provided with suggestions to help her develop a healthy physical lifestyle.  Information on urinary incontinence and treatment options given to patient.  The patient was provided with suggestions to help her develop a healthy emotional lifestyle.  The patient's PHQ-9 score is consistent with mild depression. She was provided with information regarding depression and was advised to schedule a follow up appointment in 26 weeks to further address this issue.

## 2023-10-11 ENCOUNTER — HOSPITAL ENCOUNTER (OUTPATIENT)
Dept: CARDIOLOGY | Facility: CLINIC | Age: 78
Discharge: HOME | End: 2023-10-11
Attending: INTERNAL MEDICINE | Admitting: INTERNAL MEDICINE
Payer: COMMERCIAL

## 2023-10-11 DIAGNOSIS — I42.9 SECONDARY CARDIOMYOPATHY (H): ICD-10-CM

## 2023-10-11 LAB — LVEF ECHO: NORMAL

## 2023-10-11 PROCEDURE — 93306 TTE W/DOPPLER COMPLETE: CPT

## 2023-10-11 PROCEDURE — 93306 TTE W/DOPPLER COMPLETE: CPT | Mod: 26 | Performed by: INTERNAL MEDICINE

## 2023-10-12 ENCOUNTER — TELEPHONE (OUTPATIENT)
Dept: CARDIOLOGY | Facility: CLINIC | Age: 78
End: 2023-10-12
Payer: COMMERCIAL

## 2023-10-12 NOTE — TELEPHONE ENCOUNTER
----- Message from Kirstin Alarcon sent at 10/3/2023 10:37 AM CDT -----  General phone call:  PATIENT DROPPED OFF FORM FOR DR BONDS TO SIGN.   TO BE MAILED OUT IN ENVELOPE PROVIDED AFTER SIGNED.  I AM SENDING THIS VIA INNER OFFICE MAIL. THANKS    Caller: PATIENT  Primary cardiologist: DR BONDS  Detailed reason for call: SEE ABOVE  New or active symptoms? NO  Best phone number: [phone]  Best time to contact: ANY TIME  Ok to leave a detailedmessage? YES  Device? NO    Additional Info:

## 2023-10-12 NOTE — TELEPHONE ENCOUNTER
Left detailed msg for patient informing her that [employer] form was signed by Dr. Gay and would be mailed back to them as requested - requested call back to nurse if she would like copy also mailed to her.  mg

## 2023-10-13 ENCOUNTER — MYC MEDICAL ADVICE (OUTPATIENT)
Dept: FAMILY MEDICINE | Facility: CLINIC | Age: 78
End: 2023-10-13
Payer: COMMERCIAL

## 2023-10-13 ENCOUNTER — NURSE TRIAGE (OUTPATIENT)
Dept: FAMILY MEDICINE | Facility: CLINIC | Age: 78
End: 2023-10-13
Payer: COMMERCIAL

## 2023-10-13 NOTE — TELEPHONE ENCOUNTER
"Called pt to check in after hospital discharge, pt and baby doing well. Minimal bleeding, pumping and feeding formula. Pt without complaints. Had f/u with Dr. Corea today for BP check, pt states it was \"good\" today. Will f/u with Dr. Corea for routine PP visit. Pt wonders if peds genetics referral was placed, writer will f/u with Evert Obrien on status.   " Nurse Triage SBAR    Is this a 2nd Level Triage? NO    Situation: Nonproductive cough, sweating, chills, temp 99.9, runny nose.    Background: Negative COVID test at home 10/12/2023.    Assessment: Nonproductive cough started on Wednesday. Yesterday started to have sweating, chills. Runny nose has been ongoing issue since before this acute illness started. Last temp at 9:30a this mornin.9. Denies chest pain.     Patient reports that during some coughing spells, can't catch breath at times. Coughing keeps her up at night.     Negative COVID test at home yesterday. No known COVID exposures.    Patient has been taking OTC cough medicine and tylenol. Pushing fluids. She would like an rx for cough medicine with codeine.     Protocol Recommended Disposition:   Home Care    Recommendation: Encouraged patient repeat a COVID test tomorrow, 48 hours after first test since symptoms have continued and first test yesterday was negative. Advised patient to complete an eVisit for provider evaluation of medication request. Patient states she was just seen for her annual recently and everything checked out. Advised patient that since these are new symptoms, we would need to document an assessment for provider evaluation and treatment if she would like to pursue an rx. Advised patient to call back if symptoms worsen or questions.    Does the patient meet one of the following criteria for ADS visit consideration? 16+ years old, with an [facility] PCP     TIP  Providers, please consider if this condition is appropriate for management at one of our Acute and Diagnostic Services sites.     If patient is a good candidate, please use dotphrase <dot>triageresponse and select Refer to ADS to document.        Reason for Disposition   Cough with cold symptoms (e.g., runny nose, postnasal drip, throat clearing)    Additional Information   Negative: SEVERE difficulty breathing (e.g., struggling for each breath, speaks in single words)    Negative: Bluish (or gray) lips or face now   Negative: [1] Rapid onset of cough AND [2] has hives   Negative: Coughing started suddenly after medicine, an allergic food or bee sting   Negative: [1] Difficulty breathing AND [2] exposure to flames, smoke, or fumes   Negative: [1] Stridor AND [2] difficulty breathing   Negative: Sounds like a life-threatening emergency to the triager   Negative: Choked on object of food that could be caught in the throat   Negative: Chest pain is main symptom   Negative: [1] Previous asthma attacks AND [2] this feels like asthma attack   Negative: Cough lasts > 3 weeks   Negative: Wet cough (productive; white-yellow, yellow, green, or mya colored sputum)   Negative: [1] Dry cough (non-productive;  no sputum or minimal clear sputum) AND [2] within 14 days of COVID-19 Exposure   Negative: [1] MODERATE difficulty breathing (e.g., speaks in phrases, SOB even at rest, pulse 100-120) AND [2] still present when not coughing   Negative: Chest pain  (Exception: MILD central chest pain, present only when coughing.)   Negative: Patient sounds very sick or weak to the triager   Negative: [1] MILD difficulty breathing (e.g., minimal/no SOB at rest, SOB with walking, pulse <100) AND [2] still present when not coughing   Negative: [1] Coughed up blood AND [2] > 1 tablespoon (15 ml)   (Exception: Blood-tinged sputum.)   Negative: Fever > 103 F (39.4 C)   Negative: [1] Fever > 101 F (38.3 C) AND [2] age > 60 years   Negative: [1] Fever > 100.0 F (37.8 C) AND [2] bedridden (e.g., CVA, chronic illness, recovering from surgery)   Negative: [1] Fever > 100.0 F (37.8 C) AND [2] diabetes mellitus or weak immune system (e.g., HIV positive, cancer chemo, splenectomy, organ transplant, chronic steroids)   Negative: Wheezing is present   Negative: [1] Ankle swelling AND [2] swelling is increasing   Negative: SEVERE coughing spells (e.g., whooping sound after coughing, vomiting after coughing)   Negative: [1]  Continuous (nonstop) coughing interferes with work or school AND [2] no improvement using cough treatment per Care Advice   Negative: Fever present > 3 days (72 hours)   Negative: [1] Fever returns after gone for over 24 hours AND [2] symptoms worse or not improved   Negative: [1] Using nasal washes and pain medicine > 24 hours AND [2] sinus pain (around cheekbone or eye) persists   Negative: Earache is present   Negative: Exposure to TB (Tuberculosis)   Negative: Cough has been present for > 3 weeks   Negative: [1] Nasal discharge AND [2] present > 10 days   Negative: [1] Coughed up blood-tinged sputum AND [2] more than once   Negative: [1] Patient also has allergy symptoms (e.g., itchy eyes, clear nasal discharge, postnasal drip) AND [2] they are acting up   Negative: Taking an ACE Inhibitor medicine (e.g., benazepril / LOTENSIN, captopril / CAPOTEN, enalapril / VASOTEC, lisinopril / ZESTRIL)    Protocols used: Cough - Acute Non-Productive-A-      Venessa NIXON, RN, PHN  [facility]

## 2023-10-16 DIAGNOSIS — R09.81 NASAL CONGESTION: ICD-10-CM

## 2023-10-17 RX ORDER — MOMETASONE FUROATE MONOHYDRATE 50 UG/1
2 SPRAY, METERED NASAL DAILY
Qty: 25.5 G | Refills: 2 | Status: SHIPPED | OUTPATIENT
Start: 2023-10-17

## 2023-11-07 NOTE — PROGRESS NOTES
"Virtual Visit Details    Type of service:  Telephone Visit   Phone call duration: 18 minutes     [facility] Non Surgical Follow up Visit   Date of visit: 11/13/2023  Physician: LATISHA Rossi MD, MD  Primary Care is Skyler Wiggins LUCY Gely   78 year old  female     Initial Weight: 201#  Initial BMI: 37.4  Today's Weight:   Wt Readings from Last 1 Encounters:   11/13/23 83.5 kg (184 lb)     Body mass index is 33.65 kg/m .           Assessment and Plan   Assessment: Katy is a 78 year old year old female who presents for medical weight management.      Plan:    1. Obesity (BMI 30-39.9)  Patient was congratulated on her success thus far. Healthy habits to assist with further weight loss were discussed. She will try doing some chair exercises and increase her walking. She will stop the metformin due to diarrhea. We discussed the use of naltexone but will hold off for now. We discussed the patient's co-morbid conditions including hypertension and fibromyalgia. . These likely will improve with healthy habits and weight loss.     2. Essential hypertension  This may improve with healthy habits and weight loss.      Follow up in 3 months with myself           INTERIM HISTORY  Patient is taking metformin for appetite and craving control and she is not sure it is helping. She sometimes struggles with cravings for salty foods. She does get occasional diarrhea (once a week).     DIETARY HISTORY  Meals Per Day: 3  Eating Protein First?: usually  Food Diary: B:toast and egg L:sandwich (one slice of bread with lunch meat and cheese or tuna) and fruit and water D:meat and vegetable and potato  Snacks Per Day: 2  Typical Snack: nuts or fruit  Fluid Intake: \"not enough\"  Portion Control: improved  Calorie Containing Beverages: small bottle of soda over 2-3 days per week      Positive Changes Since Last Visit: some walking  Struggling With: soda, cravings, water intake    Knowledgeable in Reading Food Labels: " yes  Getting Adequate Protein: sometimes  Sleeping 7-8 hours/day not discussed  Stress management not discussed    PHYSICAL ACTIVITY PATTERNS:  Walking at her [employer], 2000 steps 2 days per week    REVIEW OF SYSTEMS  GENERAL/CONSTITUTIONAL:  Fatigue: sometimes  CARDIOVASCULAR:  History of heart disease: no  GI:  Pancreatitis: not discussed  NEUROLOGIC:  History of migraine headaches: yes  PSYCHIATRIC:  Moods: fairly stable  MUSCULOSKELETAL/RHEUMATOLOGIC  Arthralgias: yes  Myalgias: yes  ENDOCRINE:  Monitoring Blood Sugars: no  Sugars Well Controlled: na  :  Birth control: menopause  History of kidney stones: yes     Patient Profile   Social History     Social History Narrative    5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          Past Medical History   Past Medical History:   Diagnosis Date    Abnormal mammogram, unspecified     Created by Conversion     Acute sinusitis, unspecified     Created by Conversion     Asymptomatic varicose veins     Created by Conversion     Breast cyst     Cellulitis and abscess of unspecified site     Created by Conversion     Circumscribed scleroderma     Created by Conversion     Contusion of unspecified site     Created by Conversion     Disorder of bone and cartilage, unspecified     Created by Conversion     Dysthymic disorder     Created by Conversion     Esophageal reflux     Created by Conversion     Insomnia, unspecified     Created by Conversion     Lumbago     Created by Conversion     Mammogram - Abnormal     Created by Conversion Replacement Utility updated for latest IMO load     Migraine, unspecified, without mention of intractable migraine without mention of status migrainosus     Created by Conversion     Myalgia and myositis, unspecified     Created by Conversion     Myocardial infarction (H)     per EKG's since     Obesity, unspecified     Created by Conversion     Open wound of lip, without mention of complication     Created by Conversion     Other primary cardiomyopathies      Created by Conversion     Other seborrheic keratosis     Created by Conversion     Pain in joint, lower leg     Created by Conversion     Pain in limb     Created by Conversion     Sebaceous cyst     Created by Conversion     Shortness of breath     Created by Conversion     Shoulder and upper arm, insect bite, nonvenomous, without mention of infection(912.4)     Created by Conversion     Unspecified disease of sebaceous glands     Created by Conversion     Unspecified essential hypertension     Created by Conversion     Unspecified venous (peripheral) insufficiency     Created by Conversion     Unspecified vitamin D deficiency     Created by Conversion      Patient Active Problem List   Diagnosis    Osteopenia    Insomnia    Vitamin D Deficiency    Lower Back Pain    Fibromyalgia    Essential Hypertension    Seborrheic Keratosis    Lichen Sclerosus Et Atrophicus    Migraine Headache    Esophageal Reflux    Joint Pain, Localized In The Knee    Varicose Veins    Chronic Cutaneous Ulcer Venous Stasis    Chronic venous insufficiency    Foot pain (Soft Tissue)    Female stress incontinence    Calculus of ureter    Hydronephrosis with urinary obstruction due to ureteral calculus    Other cardiomyopathy (H)    Obesity (BMI 30-39.9)    Chronic fatigue    Moderate episode of recurrent major depressive disorder (H)    Anxiety    Pain in joint, multiple sites    Osteoarthritis of lumbar spine, unspecified spinal osteoarthritis complication status    Pain in both lower legs       Past Surgical History  She has a past surgical history that includes appendectomy; Temporomandibular Joint Surgery (Bilateral); Cataract Extraction, Bilateral; Hysterectomy; Oophorectomy; Biopsy breast (Left); Breast Cyst Aspiration (Left); and tubal ligation.     Examination   Wt 83.5 kg (184 lb)   BMI 33.65 kg/m    Wt Readings from Last 4 Encounters:   11/13/23 83.5 kg (184 lb)   10/10/23 83.4 kg (183 lb 12.8 oz)   09/13/23 80.1 kg (176 lb 8 oz)    05/22/23 83 kg (183 lb)      General:  Alert and ambulatory, NAD  Pscyh/Mood: stable         Counseling:   We reviewed the important post op bariatric recommendations:  -eating 3 meals daily  -eating protein first, getting >60gm protein daily  -eating slowly, chewing food well  -avoiding/limiting calorie containing beverages  -limiting starchy vegetables and carbohydrates, choosing wheat, not white with breads,   crackers, pastas, bear, bagels, tortillas, rice  -limiting restaurant or cafeteria eating to twice a week or less    We discussed the importance of restorative sleep and stress management in maintaining a healthy weight.  We discussed the National Weight Control Registry healthy weight maintenance strategies and ways to optimize metabolism.  We discussed the importance of physical activity including cardiovascular and strength training in maintaining a healthier weight.    Total time spent on the date of this encounter doing: chart review, review of test results, patient visit, physical exam, education, counseling, developing plan of care and documenting = 33 minutes.         LATISHA Rossi MD  [facility]

## 2023-11-13 ENCOUNTER — TELEPHONE (OUTPATIENT)
Dept: SURGERY | Facility: CLINIC | Age: 78
End: 2023-11-13

## 2023-11-13 ENCOUNTER — VIRTUAL VISIT (OUTPATIENT)
Dept: SURGERY | Facility: CLINIC | Age: 78
End: 2023-11-13
Payer: COMMERCIAL

## 2023-11-13 VITALS — BODY MASS INDEX: 33.65 KG/M2 | WEIGHT: 184 LBS

## 2023-11-13 DIAGNOSIS — I10 ESSENTIAL HYPERTENSION: ICD-10-CM

## 2023-11-13 DIAGNOSIS — E66.9 OBESITY (BMI 30-39.9): Primary | ICD-10-CM

## 2023-11-13 PROCEDURE — 99442 PR PHYSICIAN TELEPHONE EVALUATION 11-20 MIN: CPT | Mod: 95 | Performed by: FAMILY MEDICINE

## 2023-11-13 ASSESSMENT — PAIN SCALES - GENERAL: PAINLEVEL: NO PAIN (0)

## 2023-11-13 NOTE — LETTER
11/13/2023         RE: Katy Hong  [address] [facility] [address]        Dear Colleague,    Thank you for referring your patient, Katy Hong, to the [facility] SURGERY CL[facility] AND BARIATRICS CARE [place]. Please see a copy of my visit note below.    Virtual Visit Details    Type of service:  Telephone Visit   Phone call duration: 18 minutes     Bariatric Care Clinic Non Surgical Follow up Visit   Date of visit: 11/13/2023  Physician: LATISHA Rossi MD, MD  Primary Care is Skyler Wiggins  Katy Hong   78 year old  female     Initial Weight: 201#  Initial BMI: 37.4  Today's Weight:   Wt Readings from Last 1 Encounters:   11/13/23 83.5 kg (184 lb)     Body mass index is 33.65 kg/m .           Assessment and Plan   Assessment: Katy is a 78 year old year old female who presents for medical weight management.      Plan:    1. Obesity (BMI 30-39.9)  Patient was congratulated on her success thus far. Healthy habits to assist with further weight loss were discussed. She will try doing some chair exercises and increase her walking. She will stop the metformin due to diarrhea. We discussed the use of naltexone but will hold off for now. We discussed the patient's co-morbid conditions including hypertension and fibromyalgia. . These likely will improve with healthy habits and weight loss.     2. Essential hypertension  This may improve with healthy habits and weight loss.      Follow up in 3 months with myself           INTERIM HISTORY  Patient is taking metformin for appetite and craving control and she is not sure it is helping. She sometimes struggles with cravings for salty foods. She does get occasional diarrhea (once a week).     DIETARY HISTORY  Meals Per Day: 3  Eating Protein First?: usually  Food Diary: B:toast and egg L:sandwich (one slice of bread with lunch meat and cheese or tuna) and fruit and water D:meat and vegetable and potato  Snacks Per Day: 2  Typical Snack:  "nuts or fruit  Fluid Intake: \"not enough\"  Portion Control: improved  Calorie Containing Beverages: small bottle of soda over 2-3 days per week      Positive Changes Since Last Visit: some walking  Struggling With: soda, cravings, water intake    Knowledgeable in Reading Food Labels: yes  Getting Adequate Protein: sometimes  Sleeping 7-8 hours/day not discussed  Stress management not discussed    PHYSICAL ACTIVITY PATTERNS:  Walking at her [employer], 2000 steps 2 days per week    REVIEW OF SYSTEMS  GENERAL/CONSTITUTIONAL:  Fatigue: sometimes  CARDIOVASCULAR:  History of heart disease: no  GI:  Pancreatitis: not discussed  NEUROLOGIC:  History of migraine headaches: yes  PSYCHIATRIC:  Moods: fairly stable  MUSCULOSKELETAL/RHEUMATOLOGIC  Arthralgias: yes  Myalgias: yes  ENDOCRINE:  Monitoring Blood Sugars: no  Sugars Well Controlled: na  :  Birth control: menopause  History of kidney stones: yes     Patient Profile   Social History     Social History Narrative    5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          Past Medical History   Past Medical History:   Diagnosis Date     Abnormal mammogram, unspecified     Created by Conversion      Acute sinusitis, unspecified     Created by Conversion      Asymptomatic varicose veins     Created by Conversion      Breast cyst      Cellulitis and abscess of unspecified site     Created by Conversion      Circumscribed scleroderma     Created by Conversion      Contusion of unspecified site     Created by Conversion      Disorder of bone and cartilage, unspecified     Created by Conversion      Dysthymic disorder     Created by Conversion      Esophageal reflux     Created by Conversion      Insomnia, unspecified     Created by Conversion      Lumbago     Created by Conversion      Mammogram - Abnormal     Created by Conversion Replacement Utility updated for latest IMO load      Migraine, unspecified, without mention of intractable migraine without mention of status migrainosus     Created by " Conversion      Myalgia and myositis, unspecified     Created by Conversion      Myocardial infarction (H)     per EKG's since 2015     Obesity, unspecified     Created by Conversion      Open wound of lip, without mention of complication     Created by Conversion      Other primary cardiomyopathies     Created by Conversion      Other seborrheic keratosis     Created by Conversion      Pain in joint, lower leg     Created by Conversion      Pain in limb     Created by Conversion      Sebaceous cyst     Created by Conversion      Shortness of breath     Created by Conversion      Shoulder and upper arm, insect bite, nonvenomous, without mention of infection(912.4)     Created by Conversion      Unspecified disease of sebaceous glands     Created by Conversion      Unspecified essential hypertension     Created by Conversion      Unspecified venous (peripheral) insufficiency     Created by Conversion      Unspecified vitamin D deficiency     Created by Conversion      Patient Active Problem List   Diagnosis     Osteopenia     Insomnia     Vitamin D Deficiency     Lower Back Pain     Fibromyalgia     Essential Hypertension     Seborrheic Keratosis     Lichen Sclerosus Et Atrophicus     Migraine Headache     Esophageal Reflux     Joint Pain, Localized In The Knee     Varicose Veins     Chronic Cutaneous Ulcer Venous Stasis     Chronic venous insufficiency     Foot pain (Soft Tissue)     Female stress incontinence     Calculus of ureter     Hydronephrosis with urinary obstruction due to ureteral calculus     Other cardiomyopathy (H)     Obesity (BMI 30-39.9)     Chronic fatigue     Moderate episode of recurrent major depressive disorder (H)     Anxiety     Pain in joint, multiple sites     Osteoarthritis of lumbar spine, unspecified spinal osteoarthritis complication status     Pain in both lower legs       Past Surgical History  She has a past surgical history that includes appendectomy; Temporomandibular Joint Surgery  (Bilateral); Cataract Extraction, Bilateral; Hysterectomy; Oophorectomy; Biopsy breast (Left); Breast Cyst Aspiration (Left); and tubal ligation.     Examination   Wt 83.5 kg (184 lb)   BMI 33.65 kg/m    Wt Readings from Last 4 Encounters:   11/13/23 83.5 kg (184 lb)   10/10/23 83.4 kg (183 lb 12.8 oz)   09/13/23 80.1 kg (176 lb 8 oz)   05/22/23 83 kg (183 lb)      General:  Alert and ambulatory, NAD  Pscyh/Mood: stable         Counseling:   We reviewed the important post op bariatric recommendations:  -eating 3 meals daily  -eating protein first, getting >60gm protein daily  -eating slowly, chewing food well  -avoiding/limiting calorie containing beverages  -limiting starchy vegetables and carbohydrates, choosing wheat, not white with breads,   crackers, pastas, bear, bagels, tortillas, rice  -limiting restaurant or cafeteria eating to twice a week or less    We discussed the importance of restorative sleep and stress management in maintaining a healthy weight.  We discussed the National Weight Control Registry healthy weight maintenance strategies and ways to optimize metabolism.  We discussed the importance of physical activity including cardiovascular and strength training in maintaining a healthier weight.    Total time spent on the date of this encounter doing: chart review, review of test results, patient visit, physical exam, education, counseling, developing plan of care and documenting = 33 minutes.         LATISHA Rossi MD  [facility]               Again, thank you for allowing me to participate in the care of your patient.        Sincerely,        LATISHA Rossi MD

## 2023-11-13 NOTE — NURSING NOTE
Is the patient currently in the state of [place]? YES    Visit mode:TELEPHONE    If the visit is dropped, the patient can be reconnected by: TELEPHONE VISIT: Phone number: [phone]    Will anyone else be joining the visit? NO  (If patient encounters technical issues they should call [phone])    How would you like to obtain your AVS? MyChart    Are changes needed to the allergy or medication list? No    Reason for visit: VINNY STEELE

## 2023-11-13 NOTE — TELEPHONE ENCOUNTER
NERISSA for pt to steffen 3 month follow-up with Dr. Rossi.    Casper Brito  [facility]  Surgery Clinic [facility]  Weight Management Clinic - [address] [address]  Office: [phone]  Fax: [phone]

## 2023-11-20 NOTE — TELEPHONE ENCOUNTER
Furosemide requesting 20mg tablets 1-2 tablets by mouth every other day PRN.     [facility]   
Refill Approved    Rx renewed per Medication Renewal Policy. Medication was last renewed on 6/5/20.    iKanna Dorantes, [facility] Triage/Med Refill 8/13/2020     Requested Prescriptions   Pending Prescriptions Disp Refills     furosemide (LASIX) 20 MG tablet 60 tablet 0     Sig: Take 1-2 tablets (20-40 mg total) by mouth every other day as needed.       Diuretics/Combination Diuretics Refill Protocol  Passed - 8/13/2020  7:41 AM        Passed - Visit with PCP or prescribing provider visit in past 12 months     Last office visit with prescriber/PCP: 1/27/2020 Leonora Kerr NP OR same dept: 1/27/2020 Leonora Kerr NP OR same specialty: 1/27/2020 Leonora Kerr NP  Last physical: Visit date not found Last MTM visit: Visit date not found   Next visit within 3 mo: Visit date not found  Next physical within 3 mo: Visit date not found  Prescriber OR PCP: Leonora Kerr NP  Last diagnosis associated with med order: 1. Chronic venous insufficiency  - furosemide (LASIX) 20 MG tablet; Take 1-2 tablets (20-40 mg total) by mouth every other day as needed.  Dispense: 60 tablet; Refill: 0    If protocol passes may refill for 12 months if within 3 months of last provider visit (or a total of 15 months).             Passed - Serum Potassium in past 12 months      Lab Results   Component Value Date    Potassium 3.5 07/23/2020             Passed - Serum Sodium in past 12 months      Lab Results   Component Value Date    Sodium 140 07/23/2020             Passed - Blood pressure on file in past 12 months     BP Readings from Last 1 Encounters:   07/23/20 122/64             Passed - Serum Creatinine in past 12 months      Creatinine   Date Value Ref Range Status   07/23/2020 0.78 0.60 - 1.10 mg/dL Final                            
Clinic Physician

## 2023-12-05 ENCOUNTER — ANCILLARY PROCEDURE (OUTPATIENT)
Dept: MAMMOGRAPHY | Facility: CLINIC | Age: 78
End: 2023-12-05
Attending: NURSE PRACTITIONER
Payer: COMMERCIAL

## 2023-12-05 DIAGNOSIS — Z12.31 SCREENING MAMMOGRAM, ENCOUNTER FOR: ICD-10-CM

## 2023-12-05 PROCEDURE — 77067 SCR MAMMO BI INCL CAD: CPT

## 2024-02-05 ENCOUNTER — TRANSFERRED RECORDS (OUTPATIENT)
Dept: HEALTH INFORMATION MANAGEMENT | Facility: CLINIC | Age: 79
End: 2024-02-05
Payer: COMMERCIAL

## 2024-02-28 ENCOUNTER — VIRTUAL VISIT (OUTPATIENT)
Dept: SURGERY | Facility: CLINIC | Age: 79
End: 2024-02-28
Payer: COMMERCIAL

## 2024-02-28 VITALS — BODY MASS INDEX: 35.33 KG/M2 | HEIGHT: 62 IN | WEIGHT: 192 LBS

## 2024-02-28 DIAGNOSIS — E66.9 OBESITY (BMI 30-39.9): Primary | ICD-10-CM

## 2024-02-28 DIAGNOSIS — I10 ESSENTIAL HYPERTENSION: ICD-10-CM

## 2024-02-28 DIAGNOSIS — G43.909 MIGRAINE WITHOUT STATUS MIGRAINOSUS, NOT INTRACTABLE, UNSPECIFIED MIGRAINE TYPE: ICD-10-CM

## 2024-02-28 PROCEDURE — 99442 PR PHYSICIAN TELEPHONE EVALUATION 11-20 MIN: CPT | Mod: 93 | Performed by: FAMILY MEDICINE

## 2024-02-28 RX ORDER — TRAZODONE HYDROCHLORIDE 150 MG/1
TABLET ORAL
COMMUNITY
Start: 2024-01-18

## 2024-02-28 RX ORDER — NALTREXONE HYDROCHLORIDE 50 MG/1
TABLET, FILM COATED ORAL
Qty: 90 TABLET | Refills: 1 | Status: SHIPPED | OUTPATIENT
Start: 2024-02-28 | End: 2024-06-11

## 2024-02-28 ASSESSMENT — PAIN SCALES - GENERAL: PAINLEVEL: NO PAIN (0)

## 2024-02-28 ASSESSMENT — PATIENT HEALTH QUESTIONNAIRE - PHQ9: SUM OF ALL RESPONSES TO PHQ QUESTIONS 1-9: 8

## 2024-02-28 NOTE — PATIENT INSTRUCTIONS
Here are the [employer] exercise sites:    Yoga-https://www.[employer].com/user/[email or web address]    Body strength activities, dance, high intensity interval training- https://www.[employer].com/user/popsugartvfit    Strength training- https://www.[employer].Resolver/user/KozakSportsPerform    Walking- https://www.[employer].com/user/walkathomemedia    Sit and Be Fit- https://www.[employer].Resolver/channel/[email or web address]_g    Low impact cardio- Carlie Roman- https://www.[employer].com/channel/VUgiRTxUyznShhxU8fepxbfI    Cardio Rina Bishop- https://www.[employer].com/channel/[email or web address]    30 Min low impact cardio- https://www.ScoreStreakube.com/watch?v=gC_L9qAHVJ8    Body Project- https://www.[employer].Resolver/channel/[email or web address]

## 2024-02-28 NOTE — PROGRESS NOTES
Bariatric Care Clinic Non Surgical Follow up Visit   Date of visit: 2/28/2024  Physician: LATISHA Rossi MD, MD  Primary Care is Skyler Wiggins  Katy Hong   78 year old  female     Initial Weight: 201#  Initial BMI: 37.4  Today's Weight:   Wt Readings from Last 1 Encounters:   02/28/24 87.1 kg (192 lb)     Body mass index is 35.12 kg/m .           Assessment and Plan   Assessment: Katy is a 78 year old year old female who presents for medical weight management.      Plan:    1. Obesity (BMI 30-39.9)  Patient was congratulated on her success thus far. Healthy habits to assist with further weight loss were discussed. She will try the sit and be fit exercise video on youtube. She will continue to make healthy food choices. We discussed the patient's co-morbid conditions including hypertension. These likely will improve with healthy habits and weight loss.     2. Essential hypertension  This may improve with healthy habits and weight loss.         Follow up in 3 months with myself           INTERIM HISTORY  Patient is working on healthy habits for weight loss. She states that she has been eating smaller portions and walking more yet is gaining weight. She stopped metformin after her last visit because she got diarrhea from it. She has noticed an increased appetite since she stopped it.     DIETARY HISTORY  Meals Per Day: 3  Eating Protein First?: usually  Food Diary: B:fried egg and toast L:ham and cheese on a tortilla and a piece of fruit D:pasta and meatballs and vegetables  Snacks Per Day: 4 days per week  Typical Snack: fruit or a few pistaceos  Fluid Intake: working on it, difficulty with incontinence  Portion Control: improved  Calorie Containing Beverages: not discussed  Choosing Whole Grains: sometimes  Meals at Restaurant per week:not discussed    Positive Changes Since Last Visit: smaller portions, more walking  Struggling With: frustration    Knowledgeable in Reading Food Labels: yes  Getting  Adequate Protein: sometimes    PHYSICAL ACTIVITY PATTERNS:  Walking at her [employer] 30 minutes 2 days per week    REVIEW OF SYSTEMS  GENERAL/CONSTITUTIONAL:  Fatigue: yes  HEENT:   glaucoma: no  CARDIOVASCULAR:  History of heart disease: no  GI:  Pancreatitis: no  PSYCHIATRIC:  Moods: struggling, seeing her psychiatrist   MUSCULOSKELETAL/RHEUMATOLOGIC  Arthralgias: no  Myalgias: fibromyalgia  ENDOCRINE:  Monitoring Blood Sugars: no  Sugars Well Controlled: na  No personal or family history of medullary thyroid cancer no  :  Birth control: menopause  History of kidney stones: yes     Patient Profile   Social History     Social History Narrative    7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          Past Medical History   Past Medical History:   Diagnosis Date    Abnormal mammogram, unspecified     Created by Conversion     Acute sinusitis, unspecified     Created by Conversion     Asymptomatic varicose veins     Created by Conversion     Breast cyst     Cellulitis and abscess of unspecified site     Created by Conversion     Circumscribed scleroderma     Created by Conversion     Contusion of unspecified site     Created by Conversion     Disorder of bone and cartilage, unspecified     Created by Conversion     Dysthymic disorder     Created by Conversion     Esophageal reflux     Created by Conversion     Insomnia, unspecified     Created by Conversion     Lumbago     Created by Conversion     Mammogram - Abnormal     Created by Conversion Replacement Utility updated for latest IMO load     Migraine, unspecified, without mention of intractable migraine without mention of status migrainosus     Created by Conversion     Myalgia and myositis, unspecified     Created by Conversion     Myocardial infarction (H)     per EKG's since     Obesity, unspecified     Created by Conversion     Open wound of lip, without mention of complication     Created by Conversion     Other primary cardiomyopathies     Created by Conversion     Other  "seborrheic keratosis     Created by Conversion     Pain in joint, lower leg     Created by Conversion     Pain in limb     Created by Conversion     Sebaceous cyst     Created by Conversion     Shortness of breath     Created by Conversion     Shoulder and upper arm, insect bite, nonvenomous, without mention of infection(912.4)     Created by Conversion     Unspecified disease of sebaceous glands     Created by Conversion     Unspecified essential hypertension     Created by Conversion     Unspecified venous (peripheral) insufficiency     Created by Conversion     Unspecified vitamin D deficiency     Created by Conversion      Patient Active Problem List   Diagnosis    Osteopenia    Insomnia    Vitamin D Deficiency    Lower Back Pain    Fibromyalgia    Essential Hypertension    Seborrheic Keratosis    Lichen Sclerosus Et Atrophicus    Migraine Headache    Esophageal Reflux    Joint Pain, Localized In The Knee    Varicose Veins    Chronic Cutaneous Ulcer Venous Stasis    Chronic venous insufficiency    Foot pain (Soft Tissue)    Female stress incontinence    Calculus of ureter    Hydronephrosis with urinary obstruction due to ureteral calculus    Other cardiomyopathy (H)    Obesity (BMI 30-39.9)    Chronic fatigue    Moderate episode of recurrent major depressive disorder (H)    Anxiety    Pain in joint, multiple sites    Osteoarthritis of lumbar spine, unspecified spinal osteoarthritis complication status    Pain in both lower legs       Past Surgical History  She has a past surgical history that includes appendectomy; Temporomandibular Joint Surgery (Bilateral); Cataract Extraction, Bilateral; Hysterectomy; Oophorectomy; Biopsy breast (Left); Breast Cyst Aspiration (Left); and tubal ligation.     Examination   Ht 1.575 m (5' 2\")   Wt 87.1 kg (192 lb)   BMI 35.12 kg/m    Wt Readings from Last 4 Encounters:   02/28/24 87.1 kg (192 lb)   11/13/23 83.5 kg (184 lb)   10/10/23 83.4 kg (183 lb 12.8 oz)   09/13/23 80.1 " kg (176 lb 8 oz)      General:  Alert and ambulatory, NAD  Pscyh/Mood: stable         Counseling:   We reviewed the important post op bariatric recommendations:  -eating 3 meals daily  -eating protein first, getting >60gm protein daily  -eating slowly, chewing food well  -avoiding/limiting calorie containing beverages  -limiting starchy vegetables and carbohydrates, choosing wheat, not white with breads,   crackers, pastas, bear, bagels, tortillas, rice  -limiting restaurant or cafeteria eating to twice a week or less    We discussed the importance of restorative sleep and stress management in maintaining a healthy weight.  We discussed the National Weight Control Registry healthy weight maintenance strategies and ways to optimize metabolism.  We discussed the importance of physical activity including cardiovascular and strength training in maintaining a healthier weight.    Total time spent on the date of this encounter doing: chart review, review of test results, patient visit, physical exam, education, counseling, developing plan of care and documenting = 35 minutes.         LATISHA Rossi MD  [facility]

## 2024-02-28 NOTE — PROGRESS NOTES
Virtual Visit Details    Type of service:  Telephone Visit   Phone call duration: 17 minutes   Originating Location (pt. Location): Home    Distant Location (provider location):  Off-site

## 2024-02-28 NOTE — NURSING NOTE
Is the patient currently in the state of [place]? YES    Visit mode:TELEPHONE    If the visit is dropped, the patient can be reconnected by: TELEPHONE VISIT: Phone number: [phone]    Will anyone else be joining the visit? NO  (If patient encounters technical issues they should call [phone] :[id number])    How would you like to obtain your AVS? MyChart    Are changes needed to the allergy or medication list? Pt stated no changes to allergies and Pt stated no med changes    Reason for visit: Follow Up    Adele Bright VVF    High PHQ2&9, decline triage, pt state pt is seeing someone for depression already

## 2024-02-28 NOTE — LETTER
2/28/2024         RE: Katy Hong  [address] [facility] [address]        Dear Colleague,    Thank you for referring your patient, Katy Hong, to the [facility] AND [facility] [place]. Please see a copy of my visit note below.    Bariatric Care Clinic Non Surgical Follow up Visit   Date of visit: 2/28/2024  Physician: LATISHA Rossi MD, MD  Primary Care is Skyler Wiggins  Katy Hong   78 year old  female     Initial Weight: 201#  Initial BMI: 37.4  Today's Weight:   Wt Readings from Last 1 Encounters:   02/28/24 87.1 kg (192 lb)     Body mass index is 35.12 kg/m .           Assessment and Plan   Assessment: Katy is a 78 year old year old female who presents for medical weight management.      Plan:    1. Obesity (BMI 30-39.9)  Patient was congratulated on her success thus far. Healthy habits to assist with further weight loss were discussed. She will try the sit and be fit exercise video on youtube. She will continue to make healthy food choices. We discussed the patient's co-morbid conditions including hypertension. These likely will improve with healthy habits and weight loss.     2. Essential hypertension  This may improve with healthy habits and weight loss.         Follow up in 3 months with myself           INTERIM HISTORY  Patient is working on healthy habits for weight loss. She states that she has been eating smaller portions and walking more yet is gaining weight. She stopped metformin after her last visit because she got diarrhea from it. She has noticed an increased appetite since she stopped it.     DIETARY HISTORY  Meals Per Day: 3  Eating Protein First?: usually  Food Diary: B:fried egg and toast L:ham and cheese on a tortilla and a piece of fruit D:pasta and meatballs and vegetables  Snacks Per Day: 4 days per week  Typical Snack: fruit or a few pistaceos  Fluid Intake: working on it, difficulty with incontinence  Portion Control:  improved  Calorie Containing Beverages: not discussed  Choosing Whole Grains: sometimes  Meals at Restaurant per week:not discussed    Positive Changes Since Last Visit: smaller portions, more walking  Struggling With: frustration    Knowledgeable in Reading Food Labels: yes  Getting Adequate Protein: sometimes    PHYSICAL ACTIVITY PATTERNS:  Walking at her [employer] 30 minutes 2 days per week    REVIEW OF SYSTEMS  GENERAL/CONSTITUTIONAL:  Fatigue: yes  HEENT:   glaucoma: no  CARDIOVASCULAR:  History of heart disease: no  GI:  Pancreatitis: no  PSYCHIATRIC:  Moods: struggling, seeing her psychiatrist   MUSCULOSKELETAL/RHEUMATOLOGIC  Arthralgias: no  Myalgias: fibromyalgia  ENDOCRINE:  Monitoring Blood Sugars: no  Sugars Well Controlled: na  No personal or family history of medullary thyroid cancer no  :  Birth control: menopause  History of kidney stones: yes     Patient Profile   Social History     Social History Narrative    7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          Past Medical History   Past Medical History:   Diagnosis Date     Abnormal mammogram, unspecified     Created by Conversion      Acute sinusitis, unspecified     Created by Conversion      Asymptomatic varicose veins     Created by Conversion      Breast cyst      Cellulitis and abscess of unspecified site     Created by Conversion      Circumscribed scleroderma     Created by Conversion      Contusion of unspecified site     Created by Conversion      Disorder of bone and cartilage, unspecified     Created by Conversion      Dysthymic disorder     Created by Conversion      Esophageal reflux     Created by Conversion      Insomnia, unspecified     Created by Conversion      Lumbago     Created by Conversion      Mammogram - Abnormal     Created by Conversion Replacement Utility updated for latest IMO load      Migraine, unspecified, without mention of intractable migraine without mention of status migrainosus     Created by Conversion      Myalgia and  myositis, unspecified     Created by Conversion      Myocardial infarction (H)     per EKG's since 2015     Obesity, unspecified     Created by Conversion      Open wound of lip, without mention of complication     Created by Conversion      Other primary cardiomyopathies     Created by Conversion      Other seborrheic keratosis     Created by Conversion      Pain in joint, lower leg     Created by Conversion      Pain in limb     Created by Conversion      Sebaceous cyst     Created by Conversion      Shortness of breath     Created by Conversion      Shoulder and upper arm, insect bite, nonvenomous, without mention of infection(912.4)     Created by Conversion      Unspecified disease of sebaceous glands     Created by Conversion      Unspecified essential hypertension     Created by Conversion      Unspecified venous (peripheral) insufficiency     Created by Conversion      Unspecified vitamin D deficiency     Created by Conversion      Patient Active Problem List   Diagnosis     Osteopenia     Insomnia     Vitamin D Deficiency     Lower Back Pain     Fibromyalgia     Essential Hypertension     Seborrheic Keratosis     Lichen Sclerosus Et Atrophicus     Migraine Headache     Esophageal Reflux     Joint Pain, Localized In The Knee     Varicose Veins     Chronic Cutaneous Ulcer Venous Stasis     Chronic venous insufficiency     Foot pain (Soft Tissue)     Female stress incontinence     Calculus of ureter     Hydronephrosis with urinary obstruction due to ureteral calculus     Other cardiomyopathy (H)     Obesity (BMI 30-39.9)     Chronic fatigue     Moderate episode of recurrent major depressive disorder (H)     Anxiety     Pain in joint, multiple sites     Osteoarthritis of lumbar spine, unspecified spinal osteoarthritis complication status     Pain in both lower legs       Past Surgical History  She has a past surgical history that includes appendectomy; Temporomandibular Joint Surgery (Bilateral); Cataract  "Extraction, Bilateral; Hysterectomy; Oophorectomy; Biopsy breast (Left); Breast Cyst Aspiration (Left); and tubal ligation.     Examination   Ht 1.575 m (5' 2\")   Wt 87.1 kg (192 lb)   BMI 35.12 kg/m    Wt Readings from Last 4 Encounters:   02/28/24 87.1 kg (192 lb)   11/13/23 83.5 kg (184 lb)   10/10/23 83.4 kg (183 lb 12.8 oz)   09/13/23 80.1 kg (176 lb 8 oz)      General:  Alert and ambulatory, NAD  Pscyh/Mood: stable         Counseling:   We reviewed the important post op bariatric recommendations:  -eating 3 meals daily  -eating protein first, getting >60gm protein daily  -eating slowly, chewing food well  -avoiding/limiting calorie containing beverages  -limiting starchy vegetables and carbohydrates, choosing wheat, not white with breads,   crackers, pastas, bear, bagels, tortillas, rice  -limiting restaurant or cafeteria eating to twice a week or less    We discussed the importance of restorative sleep and stress management in maintaining a healthy weight.  We discussed the National Weight Control Registry healthy weight maintenance strategies and ways to optimize metabolism.  We discussed the importance of physical activity including cardiovascular and strength training in maintaining a healthier weight.    Total time spent on the date of this encounter doing: chart review, review of test results, patient visit, physical exam, education, counseling, developing plan of care and documenting = 35 minutes.         LATISHA Rossi MD  [facility]           Virtual Visit Details    Type of service:  Telephone Visit   Phone call duration: 17 minutes   Originating Location (pt. Location): Home    Distant Location (provider location):  Off-site      Again, thank you for allowing me to participate in the care of your patient.        Sincerely,        LATISHA Rossi MD  "

## 2024-03-06 ENCOUNTER — OFFICE VISIT (OUTPATIENT)
Dept: FAMILY MEDICINE | Facility: CLINIC | Age: 79
End: 2024-03-06
Payer: COMMERCIAL

## 2024-03-06 ENCOUNTER — ANCILLARY PROCEDURE (OUTPATIENT)
Dept: GENERAL RADIOLOGY | Facility: CLINIC | Age: 79
End: 2024-03-06
Attending: NURSE PRACTITIONER
Payer: COMMERCIAL

## 2024-03-06 VITALS
HEART RATE: 81 BPM | TEMPERATURE: 97.5 F | RESPIRATION RATE: 16 BRPM | DIASTOLIC BLOOD PRESSURE: 79 MMHG | WEIGHT: 190.7 LBS | SYSTOLIC BLOOD PRESSURE: 125 MMHG | HEIGHT: 62 IN | BODY MASS INDEX: 35.09 KG/M2 | OXYGEN SATURATION: 96 %

## 2024-03-06 DIAGNOSIS — R05.3 CHRONIC COUGH: ICD-10-CM

## 2024-03-06 DIAGNOSIS — N64.4 BREAST PAIN, LEFT: ICD-10-CM

## 2024-03-06 DIAGNOSIS — R09.81 NASAL CONGESTION: ICD-10-CM

## 2024-03-06 DIAGNOSIS — M54.2 NECK PAIN: Primary | ICD-10-CM

## 2024-03-06 DIAGNOSIS — M54.12 CERVICAL RADICULOPATHY: ICD-10-CM

## 2024-03-06 DIAGNOSIS — M54.2 NECK PAIN: ICD-10-CM

## 2024-03-06 PROCEDURE — 99214 OFFICE O/P EST MOD 30 MIN: CPT | Performed by: NURSE PRACTITIONER

## 2024-03-06 PROCEDURE — 72040 X-RAY EXAM NECK SPINE 2-3 VW: CPT | Mod: TC | Performed by: RADIOLOGY

## 2024-03-06 RX ORDER — RESPIRATORY SYNCYTIAL VIRUS VACCINE 120MCG/0.5
0.5 KIT INTRAMUSCULAR ONCE
Qty: 1 EACH | Refills: 0 | Status: CANCELLED | OUTPATIENT
Start: 2024-03-06 | End: 2024-03-06

## 2024-03-06 RX ORDER — COVID-19 VACCINE, MRNA 50 UG/.5ML
INJECTION, SUSPENSION INTRAMUSCULAR
COMMUNITY
Start: 2024-01-07 | End: 2024-08-02

## 2024-03-06 RX ORDER — A/SINGAPORE/GP1908/2015 IVR-180 (AN A/MICHIGAN/45/2015 (H1N1)PDM09-LIKE VIRUS, A/HONG KONG/4801/2014, NYMC X-263B (H3N2) (AN A/HONG KONG/4801/2014-LIKE VIRUS), AND B/BRISBANE/60/2008, WILD TYPE (A B/BRISBANE/60/2008-LIKE VIRUS) 15; 15; 15 UG/.5ML; UG/.5ML; UG/.5ML
INJECTION, SUSPENSION INTRAMUSCULAR
COMMUNITY
Start: 2024-01-07 | End: 2024-03-06

## 2024-03-06 ASSESSMENT — PATIENT HEALTH QUESTIONNAIRE - PHQ9
SUM OF ALL RESPONSES TO PHQ QUESTIONS 1-9: 11
10. IF YOU CHECKED OFF ANY PROBLEMS, HOW DIFFICULT HAVE THESE PROBLEMS MADE IT FOR YOU TO DO YOUR WORK, TAKE CARE OF THINGS AT HOME, OR GET ALONG WITH OTHER PEOPLE: SOMEWHAT DIFFICULT
SUM OF ALL RESPONSES TO PHQ QUESTIONS 1-9: 11

## 2024-03-06 ASSESSMENT — PAIN SCALES - GENERAL: PAINLEVEL: MODERATE PAIN (4)

## 2024-03-06 NOTE — PROGRESS NOTES
"  Benjamin Burns is a 78 year old, presenting for the following health issues:  Breast Pain (Left Breast pain outside below armpit. For 3 weeks) and Musculoskeletal Problem (Neck pain left side that goes down into back.  Cough and running nose is continuing for 1 year now, have been seen in past Skyler Wiggins)      3/6/2024    10:35 AM   Additional Questions   Roomed by  LPN     History of Present Illness       Reason for visit:  Breast pain  Symptom onset:  1-2 weeks ago  Symptoms include:  Pain Lt. Breast  Symptom intensity:  Severe  Symptom progression:  Staying the same  Had these symptoms before:  Yes  Has tried/received treatment for these symptoms:  No  What makes it worse:  When the sharp pains appear  What makes it better:  No    She eats 2-3 servings of fruits and vegetables daily.She consumes 0 sweetened beverage(s) daily.She exercises with enough effort to increase her heart rate 20 to 29 minutes per day.  She exercises with enough effort to increase her heart rate 3 or less days per week.   She is taking medications regularly.     Review of Systems  Constitutional, neuro, ENT, endocrine, pulmonary, cardiac, gastrointestinal, genitourinary, musculoskeletal, integument and psychiatric systems are negative, except as otherwise noted.    Neck pain x 6+ months, gradually worsening.  Sharp, intermittent pain.  Pain in left breast x 3 weeks- burning pain.  No rashes.  Chronic cough and nasal drainage.  Has tried nasal sprays and antihistamines without relief.      Objective    /79 (BP Location: Left arm, Patient Position: Sitting, Cuff Size: Adult Regular)   Pulse 81   Temp 97.5  F (36.4  C) (Oral)   Resp 16   Ht 1.575 m (5' 2\")   Wt 86.5 kg (190 lb 11.2 oz)   SpO2 96%   Breastfeeding No   BMI 34.88 kg/m    Body mass index is 34.88 kg/m .  Physical Exam   GENERAL: alert and no distress  NECK: no adenopathy, no asymmetry, masses, or scars.  Tenderness to palpation on left side of neck. "   RESP: lungs clear to auscultation - no rales, rhonchi or wheezes  BREAST: normal without masses, tenderness or nipple discharge and no palpable axillary masses or adenopathy  CV: regular rate and rhythm, normal S1 S2, no S3 or S4, no murmur, click or rub, no peripheral edema   MS: no gross musculoskeletal defects noted, no edema  SKIN: no suspicious lesions or rashes  NEURO: Normal strength and tone, mentation intact and speech normal  PSYCH: mentation appears normal, affect normal/bright    Normal mammogram 12/2023    A/P:  1. Neck pain  - XR Cervical Spine 2/3 Views; Future  - MR Cervical Spine w/o Contrast; Future    2. Nasal congestion  - Adult Allergy/Asthma  Referral; Future    3. Chronic cough  - Adult Allergy/Asthma  Referral; Future    4. Breast pain, left  - US Breast Left Limited 1-3 Quadrants; Future    5. Cervical radiculopathy  - MR Cervical Spine w/o Contrast; Future          Signed Electronically by: Angela Esparza CNP

## 2024-03-20 ENCOUNTER — HOSPITAL ENCOUNTER (OUTPATIENT)
Dept: MAMMOGRAPHY | Facility: CLINIC | Age: 79
Discharge: HOME | End: 2024-03-20
Attending: NURSE PRACTITIONER
Payer: COMMERCIAL

## 2024-03-20 ENCOUNTER — HOSPITAL ENCOUNTER (OUTPATIENT)
Dept: MRI IMAGING | Facility: CLINIC | Age: 79
Discharge: HOME | End: 2024-03-20
Attending: NURSE PRACTITIONER
Payer: COMMERCIAL

## 2024-03-20 DIAGNOSIS — N64.4 BREAST PAIN, LEFT: ICD-10-CM

## 2024-03-20 DIAGNOSIS — M54.12 CERVICAL RADICULOPATHY: ICD-10-CM

## 2024-03-20 DIAGNOSIS — M54.2 NECK PAIN: ICD-10-CM

## 2024-03-20 PROCEDURE — 76642 ULTRASOUND BREAST LIMITED: CPT | Mod: LT

## 2024-03-20 PROCEDURE — 72141 MRI NECK SPINE W/O DYE: CPT

## 2024-03-27 ENCOUNTER — OFFICE VISIT (OUTPATIENT)
Dept: PHYSICAL MEDICINE AND REHAB | Facility: CLINIC | Age: 79
End: 2024-03-27
Attending: NURSE PRACTITIONER
Payer: COMMERCIAL

## 2024-03-27 VITALS — OXYGEN SATURATION: 95 % | DIASTOLIC BLOOD PRESSURE: 66 MMHG | HEART RATE: 81 BPM | SYSTOLIC BLOOD PRESSURE: 123 MMHG

## 2024-03-27 DIAGNOSIS — M54.2 NECK PAIN: ICD-10-CM

## 2024-03-27 DIAGNOSIS — M54.12 CERVICAL RADICULOPATHY: ICD-10-CM

## 2024-03-27 PROCEDURE — 99214 OFFICE O/P EST MOD 30 MIN: CPT | Performed by: PAIN MEDICINE

## 2024-03-27 NOTE — PATIENT INSTRUCTIONS
Please stop your aspirin for 6 days prior to your injection.    [facility] Injection Requirements    A  is required for all fluoroscopically-guided injections.  Injection appointments may be cancelled if there are signs/symptoms of an active infection or if the patient is being actively treated with antibiotics for a diagnosed infection.  Patients may have their steroid injection cancelled if they have had another steroid injection within 2 weeks.  Diabetic patients will have their blood glucose levels checked the day of their injection and the appointment will be rescheduled if the blood glucose level is 300 or higher.  Patients with allergies to cortisone, local anesthetics, iodine, or contrast dye should contact the Spine Center to further discuss these considerations.  Patients scheduled for medial branch block diagnostic injections should refrain from taking pain medication the day of the procedure.  The medial branch block injection appointment will be rescheduled if the patient's pain rating is not 5/10 or greater at the time of the procedure.  Patients taking warfarin/Coumadin will have their INR checked the day of the procedure and the procedure may be rescheduled if the INR is greater than 3.0.  Please contact the Spine Center [phone]) if you are taking any prescription blood-thinning medications (aspirin, warfarin, Plavix, Lovenox, Eliquis, Brilinta, Effient, etc.) as special dosing adjustments may need to be made depending on the type of injection you are scheduled to receive.  It is recommended that you delay having your steroid injection if you have received any vaccines within 2 weeks.    Discussed the importance of core strengthening, ROM, stretching exercises with the patient and how each of these entities is important in decreasing pain.  Explained to the patient that the purpose of physical therapy is to teach the patient a home exercise program.  These  exercises need to be performed every day in order to decrease pain and prevent future occurrences of pain.        ~Please call Nurse Navigation line [phone] with any questions or concerns about your treatment plan, if symptoms worsen and you would like to be seen urgently, or if you have problems controlling bladder and bowel function.

## 2024-03-27 NOTE — PROGRESS NOTES
Assessment:     Diagnoses and all orders for this visit:  Neck pain  -     Spine  Referral  -     Physical Therapy  Referral; Future  -     PAIN Translaminar Epidural Steroid Injection Cervical; Future  Cervical radiculopathy  -     Spine  Referral  -     Physical Therapy  Referral; Future  -     PAIN Translaminar Epidural Steroid Injection Cervical; Future     Katy Hong is a 78 year old y.o. female with past medical history significant for hypertension, cardiomyopathy, kidney stone, fibromyalgia, depression with anxiety, migraine headache, reflux, cellulitis, chronic venous insufficiency with venous stasis ulcer, female stress incontinence who presents today for follow-up regarding low back and right lower extremity pain and new neck and left upper extremity pain:    -Overall patient's physical exam is reassuring that she has normal strength and reflexes in her upper extremities.  She does have some giveaway weakness in the left shoulder abduction.  Back and leg pain continue to be improved.    PSP: Teresa Mas     Plan:     A shared decision making plan was used. The patient's values and choices were respected. Prior medical records from Teresa Mas's last note on 10/13/2021 were reviewed today. The following represents what was discussed and decided upon by the provider and the patient.        -DIAGNOSTIC TESTS: Images were personally reviewed and interpreted.   --Lumbar spine MRI 9/28/2021 with moderate spinal stenosis at L3-4 and L4-5.  Mild bilateral foraminal stenosis L5-S1 right greater than left with disc bulging greater on the right.  Mild right greater than left lateral recess stenosis L3-4.  --Lumbar spine x-ray 9/2/2021 with 2 mm L4-5 spondylolisthesis.  Significant L4-5 and L5-S1 degenerative disc height loss.  Mild left curvature L4-5.  Facet arthropathy greatest at L5-S1.  --Bilateral knee x-ray 7/6/2021 with moderate medial and mild lateral  degenerative arthritis with small effusion on the right.  Left mild medial osteoarthritis with small to moderate effusion.  --Bilateral upper extremity EMG 12/11/2019 shows mild ulnar neuropathy, no cervical radiculopathy or median neuropathy noted.    --Cervical spine MRI 10/15/2019 shows severe foraminal stenosis on the right at C5-6, moderate to severe in the right at C6-7.  --Cervical spine MRI 11/1/2017 again shows C5-6 disc osteophyte complex with moderate central canal and severe bilateral foraminal stenosis.  C6-7, mild to moderate spinal canal stenosis with moderate to severe bilateral foraminal stenosis.  No cord signal changes noted.  --Thoracic MRI 2017 shows T10-11 right 8 mm enhancement dorsal lateral margin of the spinal cord.  -- MRI of the cervical spine dated 3/20/2024 is personally reviewed images interpreted and discussed with the patient.  Does show multilevel foraminal stenosis with moderate right C4-5 and severe bilateral C5-6 foraminal stenosis.  There is moderate right C6-7 foraminal stenosis.  At C2-3 there is ankylosing bilateral facet joint.  There is mild bilateral facet arthropathy at C3-4.  There is moderate left and mild right facet arthropathy at C4-5.  There is mild facet arthropathy throughout the rest of the cervical spine.    -INTERVENTIONS: I ordered a C7-T1 interlaminar epidural steroid injection.  She will stop her aspirin 6 days prior to injection.    -MEDICATIONS: No changes to medications.  -  Discussed side effects of medications and proper use. Patient verbalized understanding.    -PHYSICAL THERAPY: I ordered physical therapy for the patient.  Discussed the importance of core strengthening, ROM, stretching exercises with the patient and how each of these entities is important in decreasing pain.  Explained to the patient that the purpose of physical therapy is to teach the patient a home exercise program.  These exercises need to be performed every day in order to  decrease pain and prevent future occurrences of pain.        -PATIENT EDUCATION: We discussed pain management in a multiple fashion including physical therapy, medication management, reasoning for injection.    -FOLLOW UP: Patient will follow-up with myself or Teresa Mas in 2 to 4 weeks after injection.  Advised to contact clinic if symptoms worsen or change.    Subjective:     Katy Hong is a 78 year old female who presents today for follow-up regarding new neck and left upper extremity pain.  Patient notes for the last 6 months she has had intermittent neck and left upper extremity pain that is moderate to severe in nature.  Her pain is worse especially if she looks back into the side.  Lifting her arm is also painful.  Ice is sometimes helpful as well as not moving her arm and neck.  Pain today is 4/10 its worst is 10/10.  She has not had physical therapy for this yet.  She denies any bowel or bladder changes, fevers, chills, unintentional weight loss.  Pain is in her left neck and down the entirety of her left upper extremity to the wrist.    -Treatment to Date: No prior spinal surgery.     Right L4-5 JOANNA in the past with Dr. Sipple.  Right L5-S1 TFESI 10/6/2021 with significant relief.  Preprocedure pain 9/10, post 4/10.  Left L5-S1 transforaminal epidural steroid injection done on 10/28/2021.  -Medications:  Gabapentin 300 mg 2 tablets at nighttime which is long-term for fibromyalgia  OTC Aleve with no benefit       Patient Active Problem List   Diagnosis    Osteopenia    Insomnia    Vitamin D Deficiency    Lower Back Pain    Fibromyalgia    Essential Hypertension    Seborrheic Keratosis    Lichen Sclerosus Et Atrophicus    Migraine Headache    Esophageal Reflux    Joint Pain, Localized In The Knee    Varicose Veins    Chronic Cutaneous Ulcer Venous Stasis    Chronic venous insufficiency    Foot pain (Soft Tissue)    Female stress incontinence    Calculus of ureter    Hydronephrosis with urinary  obstruction due to ureteral calculus    Other cardiomyopathy (H)    Obesity (BMI 30-39.9)    Chronic fatigue    Moderate episode of recurrent major depressive disorder (H)    Anxiety    Pain in joint, multiple sites    Osteoarthritis of lumbar spine, unspecified spinal osteoarthritis complication status    Pain in both lower legs       Current Outpatient Medications   Medication    aspirin 81 MG EC tablet    busPIRone (BUSPAR) 15 MG tablet    calcium carbonate 750 MG CHEW    celecoxib (CELEBREX) 200 MG capsule    cholecalciferol, vitamin D3, 5,000 unit Tab    famotidine (PEPCID) 20 MG tablet    gabapentin (NEURONTIN) 300 MG capsule    Levomefolate Glucosamine (METHYLFOLATE PO)    losartan (COZAAR) 25 MG tablet    metoprolol succinate ER (TOPROL XL) 50 MG 24 hr tablet    mometasone (NASONEX) 50 MCG/ACT nasal spray    naltrexone (DEPADE/REVIA) 50 MG tablet    QUEtiapine (SEROQUEL) 100 MG tablet    rizatriptan (MAXALT-MLT) 10 MG ODT    SPIKEVAX 50 MCG/0.5ML injection    traZODone (DESYREL) 150 MG tablet    triamcinolone (KENALOG) 0.1 % external ointment    Turmeric 500 MG CAPS     No current facility-administered medications for this visit.       Allergies   Allergen Reactions    Fluoxetine Cough and Rash     Pt thinks it was cough but not totally sure.    Latex Rash    Levothyroxine Rash    Lisinopril Cough and Rash    Sulfa (Sulfonamide Antibiotics) [Sulfa Antibiotics] Itching and Rash       Past Medical History:   Diagnosis Date    Abnormal mammogram, unspecified     Created by Conversion     Acute sinusitis, unspecified     Created by Conversion     Asymptomatic varicose veins     Created by Conversion     Breast cyst     Cellulitis and abscess of unspecified site     Created by Conversion     Circumscribed scleroderma     Created by Conversion     Contusion of unspecified site     Created by Conversion     Disorder of bone and cartilage, unspecified     Created by Conversion     Dysthymic disorder     Created by  Conversion     Esophageal reflux     Created by Conversion     Insomnia, unspecified     Created by Conversion     Lumbago     Created by Conversion     Mammogram - Abnormal     Created by Conversion Replacement Utility updated for latest IMO load     Migraine, unspecified, without mention of intractable migraine without mention of status migrainosus     Created by Conversion     Myalgia and myositis, unspecified     Created by Conversion     Myocardial infarction (H)     per EKG's since 2015    Obesity, unspecified     Created by Conversion     Open wound of lip, without mention of complication     Created by Conversion     Other primary cardiomyopathies     Created by Conversion     Other seborrheic keratosis     Created by Conversion     Pain in joint, lower leg     Created by Conversion     Pain in limb     Created by Conversion     Sebaceous cyst     Created by Conversion     Shortness of breath     Created by Conversion     Shoulder and upper arm, insect bite, nonvenomous, without mention of infection(912.4)     Created by Conversion     Unspecified disease of sebaceous glands     Created by Conversion     Unspecified essential hypertension     Created by Conversion     Unspecified venous (peripheral) insufficiency     Created by Conversion     Unspecified vitamin D deficiency     Created by Conversion         Review of Systems  ROS:  Specifically negative for bowel/bladder dysfunction, balance changes, headache, dizziness, foot drop, fevers, chills, appetite changes, nausea/vomiting, unexplained weight loss. Otherwise 13 systems reviewed are negative. Please see the patient's intake questionnaire from today for details.    Reviewed Social, Family, Past Medical and Past Surgical history with patient, no significant changes noted since prior visit.     Objective:     /66   Pulse 81   SpO2 95%     CERVICAL:   --HEENT:  Sclera are non-injected.  Extraocular muscles are intact.  Thyroid moves easily upon  swallowing.  Moist oral mucosa.  --SKIN:  Skin over the face is clean, dry, intact without rashes.  --UPPER EXTREMITY MOTOR TESTING:  Wrist flexion left 5/5, right 5/5  Wrist extension left 5/5, right 5/5  Pronators left 5/5, right 5/5  Biceps left 5/5, right 5/5   Triceps left 5/5, right 5/5   Shoulder abduction left 5/5, right 5/5   left 5/5, right 5/5  --NEUROLOGIC:   2/4 symmetric biceps, brachioradialis, triceps reflexes bilaterally.  Sensation to upper extremities is intact.  Negative Meyer's bilaterally.  Spurling's is negative.  --CERVICAL SPINE: Inspection reveals no evidence of deformity. Range of motion is mildly limited in cervical flexion, extension, or lateral rotation.  Tenderness to palpation of the left cervical paraspinal muscles.  --SHOULDERS: Full range of motion bilaterally. Negative empty can.    RESULTS:   Imaging: Lumbar spine imaging was reviewed today. The images were shown to the patient and the findings were explained using a spine model.    MR Cervical Spine w/o Contrast    Result Date: 3/20/2024  EXAM: MR CERVICAL SPINE W/O CONTRAST LOCATION: [facility] DATE: 3/20/2024 INDICATION: Neck pain. COMPARISON: None. TECHNIQUE: MRI Cervical Spine without IV contrast. FINDINGS: Cervical vertebral body heights are maintained. Grade I anterolisthesis of C3 on C4 and C7 on T1. Partial fusion across C2-C3 disc space. Ankylosis of the bilateral C2-C3 facet joints. No abnormal cord signal. No extraspinal abnormality. Craniovertebral junction and C1-C2: Mild degenerative changes anteriorly. C2-C3: Partial fusion across the disc space. Ankylosis of the bilateral facet joints. No spinal canal stenosis or neural foraminal narrowing. C3-C4: Normal disc space height. Minimal annular bulge. Mild left and minimal right facet arthropathy. No spinal canal stenosis. No right and mild left neural foraminal stenosis. C4-C5: Mild loss of disc space height. Circumferential disc  osteophyte complex. Moderate left and mild right degenerative facet changes. No spinal canal stenosis. Moderate right and mild left neural foraminal stenosis. C5-C6: Moderate loss of disc space height. Circumferential disc osteophyte complex. Mild degenerative facet changes. No spinal canal stenosis. Severe bilateral neural foraminal stenosis. C6-C7: Disc desiccation and moderate loss of disc space height. Circumferential disc osteophyte complex. Mild degenerative facet changes. No spinal canal stenosis. Moderate right and mild left neural foraminal stenosis. C7-T1: Normal disc space height. No disc herniation. Moderate right and mild left degenerative facet changes. No spinal canal stenosis or neural foraminal narrowing.     IMPRESSION: 1.  Mild to moderate cervical spondylosis without high-grade cervical spinal canal narrowing. 2.  Multilevel neural foraminal stenosis moderate on the right at C4-C5, severe bilaterally at C5-C6, and moderate on the right at C6-C7.     US Breast Left Limited 1-3 Quadrants    Result Date: 3/20/2024  EXAM: US BREAST LEFT LIMITED 1-3 QUADRANTS, 3/20/2024 10:44 AM COMPARISONS: 12/5/2023, 12/1/2022, 6/1/2022, 5/18/2022, 2/17/2021, 12/5/2019, 10/7/2018, 9/25/2017, 9/21/2016 HISTORY: 78-year-old female presents with intermittent, nonfocal pain involving the left lateral breast. Patient denies any associated palpable lumps. A recent bilateral screening mammogram performed on 12/5/2023 was negative. FINDINGS: Sonographic evaluation of the left lateral breast at the area of intermittent, nonfocal pain was performed with representative images demonstrated at the 3:00 position, 4, 10, and 20 cm from the nipple. Normal-appearing fibroglandular tissue is demonstrated without underlying suspicious mass or other focal sonographic abnormality. Incidentally, a benign intramammary lymph node is visualized at the 3:00 position, 20 cm from the nipple.     IMPRESSION: BI-RADS CATEGORY: 1 -  Negative.  No  imaging correlate to the reported symptom of intermittent, nonfocal left lateral breast pain, for which management should be based clinically. RECOMMENDED FOLLOW-UP: Routine yearly mammography beginning at age 40 or as discussed with your provider (due in December 2024). I personally scanned and discussed the findings and recommendations with the patient at the conclusion of the examination. FERCHO OCHOA MD   SYSTEM ID:  [id number]     XR Cervical Spine 2/3 Views    Result Date: 3/6/2024  EXAM: XR CERVICAL SPINE 2/3 VIEWS LOCATION: [facility] DATE: 3/6/2024 INDICATION: Neck pain. COMPARISON: None.     IMPRESSION: Reversal of the usual cervical lordosis with the apex at C5. Cervical vertebral body heights are maintained. Grade 1 anterolisthesis C3 on C4 and C4 on C5. Moderate loss of disc space height at C5-C6 and C6-C7. No prevertebral soft tissue swelling. Mild to moderate multilevel degenerative facet changes. Visualized portions of the lungs are clear.

## 2024-03-27 NOTE — LETTER
3/27/2024         RE: Katy Hong  [address] [place] [id number]        Dear Colleague,    Thank you for referring your patient, Katy Hong, to the [facility]. Please see a copy of my visit note below.      Assessment:     Diagnoses and all orders for this visit:  Neck pain  -     Spine  Referral  -     Physical Therapy  Referral; Future  -     PAIN Translaminar Epidural Steroid Injection Cervical; Future  Cervical radiculopathy  -     Spine  Referral  -     Physical Therapy  Referral; Future  -     PAIN Translaminar Epidural Steroid Injection Cervical; Future     Katy Hong is a 78 year old y.o. female with past medical history significant for hypertension, cardiomyopathy, kidney stone, fibromyalgia, depression with anxiety, migraine headache, reflux, cellulitis, chronic venous insufficiency with venous stasis ulcer, female stress incontinence who presents today for follow-up regarding low back and right lower extremity pain and new neck and left upper extremity pain:    -Overall patient's physical exam is reassuring that she has normal strength and reflexes in her upper extremities.  She does have some giveaway weakness in the left shoulder abduction.  Back and leg pain continue to be improved.    PSP: Teresa Mas     Plan:     A shared decision making plan was used. The patient's values and choices were respected. Prior medical records from Teresa Mas's last note on 10/13/2021 were reviewed today. The following represents what was discussed and decided upon by the provider and the patient.        -DIAGNOSTIC TESTS: Images were personally reviewed and interpreted.   --Lumbar spine MRI 9/28/2021 with moderate spinal stenosis at L3-4 and L4-5.  Mild bilateral foraminal stenosis L5-S1 right greater than left with disc bulging greater on the right.  Mild right greater than left lateral recess stenosis  L3-4.  --Lumbar spine x-ray 9/2/2021 with 2 mm L4-5 spondylolisthesis.  Significant L4-5 and L5-S1 degenerative disc height loss.  Mild left curvature L4-5.  Facet arthropathy greatest at L5-S1.  --Bilateral knee x-ray 7/6/2021 with moderate medial and mild lateral degenerative arthritis with small effusion on the right.  Left mild medial osteoarthritis with small to moderate effusion.  --Bilateral upper extremity EMG 12/11/2019 shows mild ulnar neuropathy, no cervical radiculopathy or median neuropathy noted.    --Cervical spine MRI 10/15/2019 shows severe foraminal stenosis on the right at C5-6, moderate to severe in the right at C6-7.  --Cervical spine MRI 11/1/2017 again shows C5-6 disc osteophyte complex with moderate central canal and severe bilateral foraminal stenosis.  C6-7, mild to moderate spinal canal stenosis with moderate to severe bilateral foraminal stenosis.  No cord signal changes noted.  --Thoracic MRI 2017 shows T10-11 right 8 mm enhancement dorsal lateral margin of the spinal cord.  -- MRI of the cervical spine dated 3/20/2024 is personally reviewed images interpreted and discussed with the patient.  Does show multilevel foraminal stenosis with moderate right C4-5 and severe bilateral C5-6 foraminal stenosis.  There is moderate right C6-7 foraminal stenosis.  At C2-3 there is ankylosing bilateral facet joint.  There is mild bilateral facet arthropathy at C3-4.  There is moderate left and mild right facet arthropathy at C4-5.  There is mild facet arthropathy throughout the rest of the cervical spine.    -INTERVENTIONS: I ordered a C7-T1 interlaminar epidural steroid injection.  She will stop her aspirin 6 days prior to injection.    -MEDICATIONS: No changes to medications.  -  Discussed side effects of medications and proper use. Patient verbalized understanding.    -PHYSICAL THERAPY: I ordered physical therapy for the patient.  Discussed the importance of core strengthening, ROM, stretching  exercises with the patient and how each of these entities is important in decreasing pain.  Explained to the patient that the purpose of physical therapy is to teach the patient a home exercise program.  These exercises need to be performed every day in order to decrease pain and prevent future occurrences of pain.        -PATIENT EDUCATION: We discussed pain management in a multiple fashion including physical therapy, medication management, reasoning for injection.    -FOLLOW UP: Patient will follow-up with myself or Teresa Mas in 2 to 4 weeks after injection.  Advised to contact clinic if symptoms worsen or change.    Subjective:     Katy Hong is a 78 year old female who presents today for follow-up regarding new neck and left upper extremity pain.  Patient notes for the last 6 months she has had intermittent neck and left upper extremity pain that is moderate to severe in nature.  Her pain is worse especially if she looks back into the side.  Lifting her arm is also painful.  Ice is sometimes helpful as well as not moving her arm and neck.  Pain today is 4/10 its worst is 10/10.  She has not had physical therapy for this yet.  She denies any bowel or bladder changes, fevers, chills, unintentional weight loss.  Pain is in her left neck and down the entirety of her left upper extremity to the wrist.    -Treatment to Date: No prior spinal surgery.     Right L4-5 JOANNA in the past with Dr. Sipple.  Right L5-S1 TFESI 10/6/2021 with significant relief.  Preprocedure pain 9/10, post 4/10.  Left L5-S1 transforaminal epidural steroid injection done on 10/28/2021.  -Medications:  Gabapentin 300 mg 2 tablets at nighttime which is long-term for fibromyalgia  OTC Aleve with no benefit       Patient Active Problem List   Diagnosis     Osteopenia     Insomnia     Vitamin D Deficiency     Lower Back Pain     Fibromyalgia     Essential Hypertension     Seborrheic Keratosis     Lichen Sclerosus Et Atrophicus      Migraine Headache     Esophageal Reflux     Joint Pain, Localized In The Knee     Varicose Veins     Chronic Cutaneous Ulcer Venous Stasis     Chronic venous insufficiency     Foot pain (Soft Tissue)     Female stress incontinence     Calculus of ureter     Hydronephrosis with urinary obstruction due to ureteral calculus     Other cardiomyopathy (H)     Obesity (BMI 30-39.9)     Chronic fatigue     Moderate episode of recurrent major depressive disorder (H)     Anxiety     Pain in joint, multiple sites     Osteoarthritis of lumbar spine, unspecified spinal osteoarthritis complication status     Pain in both lower legs       Current Outpatient Medications   Medication     aspirin 81 MG EC tablet     busPIRone (BUSPAR) 15 MG tablet     calcium carbonate 750 MG CHEW     celecoxib (CELEBREX) 200 MG capsule     cholecalciferol, vitamin D3, 5,000 unit Tab     famotidine (PEPCID) 20 MG tablet     gabapentin (NEURONTIN) 300 MG capsule     Levomefolate Glucosamine (METHYLFOLATE PO)     losartan (COZAAR) 25 MG tablet     metoprolol succinate ER (TOPROL XL) 50 MG 24 hr tablet     mometasone (NASONEX) 50 MCG/ACT nasal spray     naltrexone (DEPADE/REVIA) 50 MG tablet     QUEtiapine (SEROQUEL) 100 MG tablet     rizatriptan (MAXALT-MLT) 10 MG ODT     SPIKEVAX 50 MCG/0.5ML injection     traZODone (DESYREL) 150 MG tablet     triamcinolone (KENALOG) 0.1 % external ointment     Turmeric 500 MG CAPS     No current facility-administered medications for this visit.       Allergies   Allergen Reactions     Fluoxetine Cough and Rash     Pt thinks it was cough but not totally sure.     Latex Rash     Levothyroxine Rash     Lisinopril Cough and Rash     Sulfa (Sulfonamide Antibiotics) [Sulfa Antibiotics] Itching and Rash       Past Medical History:   Diagnosis Date     Abnormal mammogram, unspecified     Created by Conversion      Acute sinusitis, unspecified     Created by Conversion      Asymptomatic varicose veins     Created by  Conversion      Breast cyst      Cellulitis and abscess of unspecified site     Created by Conversion      Circumscribed scleroderma     Created by Conversion      Contusion of unspecified site     Created by Conversion      Disorder of bone and cartilage, unspecified     Created by Conversion      Dysthymic disorder     Created by Conversion      Esophageal reflux     Created by Conversion      Insomnia, unspecified     Created by Conversion      Lumbago     Created by Conversion      Mammogram - Abnormal     Created by Conversion Replacement Utility updated for latest IMO load      Migraine, unspecified, without mention of intractable migraine without mention of status migrainosus     Created by Conversion      Myalgia and myositis, unspecified     Created by Conversion      Myocardial infarction (H)     per EKG's since 2015     Obesity, unspecified     Created by Conversion      Open wound of lip, without mention of complication     Created by Conversion      Other primary cardiomyopathies     Created by Conversion      Other seborrheic keratosis     Created by Conversion      Pain in joint, lower leg     Created by Conversion      Pain in limb     Created by Conversion      Sebaceous cyst     Created by Conversion      Shortness of breath     Created by Conversion      Shoulder and upper arm, insect bite, nonvenomous, without mention of infection(912.4)     Created by Conversion      Unspecified disease of sebaceous glands     Created by Conversion      Unspecified essential hypertension     Created by Conversion      Unspecified venous (peripheral) insufficiency     Created by Conversion      Unspecified vitamin D deficiency     Created by Conversion         Review of Systems  ROS:  Specifically negative for bowel/bladder dysfunction, balance changes, headache, dizziness, foot drop, fevers, chills, appetite changes, nausea/vomiting, unexplained weight loss. Otherwise 13 systems reviewed are negative. Please see  the patient's intake questionnaire from today for details.    Reviewed Social, Family, Past Medical and Past Surgical history with patient, no significant changes noted since prior visit.     Objective:     /66   Pulse 81   SpO2 95%     CERVICAL:   --HEENT:  Sclera are non-injected.  Extraocular muscles are intact.  Thyroid moves easily upon swallowing.  Moist oral mucosa.  --SKIN:  Skin over the face is clean, dry, intact without rashes.  --UPPER EXTREMITY MOTOR TESTING:  Wrist flexion left 5/5, right 5/5  Wrist extension left 5/5, right 5/5  Pronators left 5/5, right 5/5  Biceps left 5/5, right 5/5   Triceps left 5/5, right 5/5   Shoulder abduction left 5/5, right 5/5   left 5/5, right 5/5  --NEUROLOGIC:   2/4 symmetric biceps, brachioradialis, triceps reflexes bilaterally.  Sensation to upper extremities is intact.  Negative Meyer's bilaterally.  Spurling's is negative.  --CERVICAL SPINE: Inspection reveals no evidence of deformity. Range of motion is mildly limited in cervical flexion, extension, or lateral rotation.  Tenderness to palpation of the left cervical paraspinal muscles.  --SHOULDERS: Full range of motion bilaterally. Negative empty can.    RESULTS:   Imaging: Lumbar spine imaging was reviewed today. The images were shown to the patient and the findings were explained using a spine model.    MR Cervical Spine w/o Contrast    Result Date: 3/20/2024  EXAM: MR CERVICAL SPINE W/O CONTRAST LOCATION: [facility] DATE: 3/20/2024 INDICATION: Neck pain. COMPARISON: None. TECHNIQUE: MRI Cervical Spine without IV contrast. FINDINGS: Cervical vertebral body heights are maintained. Grade I anterolisthesis of C3 on C4 and C7 on T1. Partial fusion across C2-C3 disc space. Ankylosis of the bilateral C2-C3 facet joints. No abnormal cord signal. No extraspinal abnormality. Craniovertebral junction and C1-C2: Mild degenerative changes anteriorly. C2-C3: Partial fusion across the  disc space. Ankylosis of the bilateral facet joints. No spinal canal stenosis or neural foraminal narrowing. C3-C4: Normal disc space height. Minimal annular bulge. Mild left and minimal right facet arthropathy. No spinal canal stenosis. No right and mild left neural foraminal stenosis. C4-C5: Mild loss of disc space height. Circumferential disc osteophyte complex. Moderate left and mild right degenerative facet changes. No spinal canal stenosis. Moderate right and mild left neural foraminal stenosis. C5-C6: Moderate loss of disc space height. Circumferential disc osteophyte complex. Mild degenerative facet changes. No spinal canal stenosis. Severe bilateral neural foraminal stenosis. C6-C7: Disc desiccation and moderate loss of disc space height. Circumferential disc osteophyte complex. Mild degenerative facet changes. No spinal canal stenosis. Moderate right and mild left neural foraminal stenosis. C7-T1: Normal disc space height. No disc herniation. Moderate right and mild left degenerative facet changes. No spinal canal stenosis or neural foraminal narrowing.     IMPRESSION: 1.  Mild to moderate cervical spondylosis without high-grade cervical spinal canal narrowing. 2.  Multilevel neural foraminal stenosis moderate on the right at C4-C5, severe bilaterally at C5-C6, and moderate on the right at C6-C7.     US Breast Left Limited 1-3 Quadrants    Result Date: 3/20/2024  EXAM: US BREAST LEFT LIMITED 1-3 QUADRANTS, 3/20/2024 10:44 AM COMPARISONS: 12/5/2023, 12/1/2022, 6/1/2022, 5/18/2022, 2/17/2021, 12/5/2019, 10/7/2018, 9/25/2017, 9/21/2016 HISTORY: 78-year-old female presents with intermittent, nonfocal pain involving the left lateral breast. Patient denies any associated palpable lumps. A recent bilateral screening mammogram performed on 12/5/2023 was negative. FINDINGS: Sonographic evaluation of the left lateral breast at the area of intermittent, nonfocal pain was performed with representative images  demonstrated at the 3:00 position, 4, 10, and 20 cm from the nipple. Normal-appearing fibroglandular tissue is demonstrated without underlying suspicious mass or other focal sonographic abnormality. Incidentally, a benign intramammary lymph node is visualized at the 3:00 position, 20 cm from the nipple.     IMPRESSION: BI-RADS CATEGORY: 1 -  Negative.  No imaging correlate to the reported symptom of intermittent, nonfocal left lateral breast pain, for which management should be based clinically. RECOMMENDED FOLLOW-UP: Routine yearly mammography beginning at age 40 or as discussed with your provider (due in December 2024). I personally scanned and discussed the findings and recommendations with the patient at the conclusion of the examination. FERCHO OCHOA MD   SYSTEM ID:  [id number]     XR Cervical Spine 2/3 Views    Result Date: 3/6/2024  EXAM: XR CERVICAL SPINE 2/3 VIEWS LOCATION: [facility] DATE: 3/6/2024 INDICATION: Neck pain. COMPARISON: None.     IMPRESSION: Reversal of the usual cervical lordosis with the apex at C5. Cervical vertebral body heights are maintained. Grade 1 anterolisthesis C3 on C4 and C4 on C5. Moderate loss of disc space height at C5-C6 and C6-C7. No prevertebral soft tissue swelling. Mild to moderate multilevel degenerative facet changes. Visualized portions of the lungs are clear.                             Again, thank you for allowing me to participate in the care of your patient.        Sincerely,        Sergio Lay, DO

## 2024-04-03 DIAGNOSIS — K21.9 GASTROESOPHAGEAL REFLUX DISEASE WITHOUT ESOPHAGITIS: ICD-10-CM

## 2024-04-03 RX ORDER — FAMOTIDINE 20 MG/1
20 TABLET, FILM COATED ORAL 2 TIMES DAILY
Qty: 180 TABLET | Refills: 0 | Status: SHIPPED | OUTPATIENT
Start: 2024-04-03 | End: 2024-04-30

## 2024-04-12 ENCOUNTER — RADIOLOGY INJECTION OFFICE VISIT (OUTPATIENT)
Dept: PHYSICAL MEDICINE AND REHAB | Facility: CLINIC | Age: 79
End: 2024-04-12
Attending: PAIN MEDICINE
Payer: COMMERCIAL

## 2024-04-12 VITALS
DIASTOLIC BLOOD PRESSURE: 88 MMHG | SYSTOLIC BLOOD PRESSURE: 142 MMHG | RESPIRATION RATE: 16 BRPM | OXYGEN SATURATION: 96 % | HEART RATE: 70 BPM | TEMPERATURE: 97 F

## 2024-04-12 DIAGNOSIS — M54.2 NECK PAIN: ICD-10-CM

## 2024-04-12 DIAGNOSIS — M54.12 CERVICAL RADICULOPATHY: ICD-10-CM

## 2024-04-12 PROCEDURE — 62321 NJX INTERLAMINAR CRV/THRC: CPT | Performed by: PAIN MEDICINE

## 2024-04-12 RX ORDER — DEXAMETHASONE SODIUM PHOSPHATE 10 MG/ML
INJECTION, SOLUTION INTRAMUSCULAR; INTRAVENOUS
Status: COMPLETED | OUTPATIENT
Start: 2024-04-12 | End: 2024-04-12

## 2024-04-12 RX ORDER — LIDOCAINE HYDROCHLORIDE 10 MG/ML
INJECTION, SOLUTION EPIDURAL; INFILTRATION; INTRACAUDAL; PERINEURAL
Status: COMPLETED | OUTPATIENT
Start: 2024-04-12 | End: 2024-04-12

## 2024-04-12 RX ADMIN — DEXAMETHASONE SODIUM PHOSPHATE 10 MG: 10 INJECTION, SOLUTION INTRAMUSCULAR; INTRAVENOUS at 09:07

## 2024-04-12 RX ADMIN — LIDOCAINE HYDROCHLORIDE 2 ML: 10 INJECTION, SOLUTION EPIDURAL; INFILTRATION; INTRACAUDAL; PERINEURAL at 09:06

## 2024-04-12 ASSESSMENT — PAIN SCALES - GENERAL
PAINLEVEL: SEVERE PAIN (6)
PAINLEVEL: SEVERE PAIN (6)

## 2024-04-12 NOTE — PATIENT INSTRUCTIONS
DISCHARGE INSTRUCTIONS    During office hours (8:00 a.m.- 4:00 p.m.) questions or concerns may be answered  by calling [facility] Navigation Nurses at  [phone].  Messages received after hours will be returned the following business day.      In the case of an emergency, please dial 911 or seek assistance at the nearest Emergency Room/Urgent Care facility.     All Patients:    You may experience an increase in your symptoms for the first 2 days (It may take anywhere between 2 days- 2 weeks for the steroid to have maximum effect).    You may use ice on the injection site, as frequently as 20 minutes each hour if needed.    You may take your pain medicine.    You may continue taking your regular medication after your injection. If you have had a Medial Branch Block you may resume pain medication once your pain diary is completed.    You may shower. No swimming, tub bath or hot tub for 48 hours.  You may remove your bandaid/bandage as soon as you are home.    You may resume light activities, as tolerated.    Resume your usual diet as tolerated.    It is strongly advised that you do not drive for 1-3 hours post injection.    If you have had oral sedation:  Do not drive for 8 hours post injection.      If you have had IV sedation:  Do not drive for 24 hours post injection.  Do not operate hazardous machinery or make important personal/business decisions for 24 hours.      POSSIBLE STEROID SIDE EFFECTS (If steroid/cortisone was used for your procedure)    -If you experience these symptoms, it should only last for a short period    Swelling of the legs              Skin redness (flushing)     Mouth (oral) irritation   Blood sugar (glucose) levels            Sweats                    Mood changes  Headache  Sleeplessness  Weakened immune system for up to 14 days, which could increase the risk of nakul the COVID-19 virus and/or experiencing more severe symptoms of the disease, if exposed.  Decreased  effectiveness of the flu vaccine if given within 2 weeks of the steroid.         POSSIBLE PROCEDURE SIDE EFFECTS  -Call the Spine Center if you are concerned  Increased Pain           Increased numbness/tingling      Nausea/Vomiting          Bruising/bleeding at site      Redness or swelling                                              Difficulty walking      Weakness           Fever greater than 100.5    *In the event of a severe headache after an epidural steroid injection that is relieved by lying down, please call the [facility] to speak with a clinical staff member*

## 2024-04-21 NOTE — TELEPHONE ENCOUNTER
Phone call to patient to review results and provider's recommendations. Unable to connect to a voicemail as the calls are screened. Call was disconnected on their end.   yes

## 2024-04-23 ENCOUNTER — TRANSFERRED RECORDS (OUTPATIENT)
Dept: HEALTH INFORMATION MANAGEMENT | Facility: CLINIC | Age: 79
End: 2024-04-23
Payer: COMMERCIAL

## 2024-04-24 ENCOUNTER — THERAPY VISIT (OUTPATIENT)
Dept: PHYSICAL THERAPY | Facility: REHABILITATION | Age: 79
End: 2024-04-24
Attending: PAIN MEDICINE
Payer: COMMERCIAL

## 2024-04-24 DIAGNOSIS — M54.2 NECK PAIN: ICD-10-CM

## 2024-04-24 DIAGNOSIS — M54.12 CERVICAL RADICULOPATHY: ICD-10-CM

## 2024-04-24 PROCEDURE — 97161 PT EVAL LOW COMPLEX 20 MIN: CPT | Mod: GP

## 2024-04-24 PROCEDURE — 97110 THERAPEUTIC EXERCISES: CPT | Mod: GP

## 2024-04-24 NOTE — PROGRESS NOTES
"PHYSICAL THERAPY EVALUATION  Type of Visit: Evaluation    See electronic medical record for Abuse and Falls Screening details.    Subjective       Presenting condition or subjective complaint: \"pain in neck and left shoulder blade  - resolved at present\" Pt presents to PT for Left  sided neck and scapular pain. This pain started around 12/23. She had an  injection on 4/2/24 and since then has had 0-1/10  pain.  Date of onset: 12/01/23    Relevant medical history: Arthritis; Depression; Fibromyalgia; High blood pressure; Migraines or headaches; Multiple Sclerosis; Overweight; Pregnant or breastfeeding   Dates & types of surgery:  Tubal 1976, appendectomy, hysterectomy 1996, TMJ 1982    Prior diagnostic imaging/testing results: X-ray     Prior therapy history for the same diagnosis, illness or injury: Yes      Prior Level of Function  Transfers: Independent  Ambulation: Independent  ADL: Independent  IADL:     Living Environment  Social support: With a significant other or spouse   Type of home: Multi-level   Stairs to enter the home: Yes       Ramp: No   Stairs inside the home: Yes   Is there a railing: Yes   Help at home: None  Equipment owned:       Employment: No    Hobbies/Interests: card design, sewing    Patient goals for therapy:      Pain assessment: Pain denied     Objective   CERVICAL SPINE EVALUATION  PAIN: Pain Level at Rest: 1/10  INTEGUMENTARY (edema, incisions):   POSTURE: Sitting Posture: Rounded shoulders, Forward head, Lordosis increased  GAIT:   Weightbearing Status:   Assistive Device(s):   Gait Deviations:   BALANCE/PROPRIOCEPTION:   WEIGHTBEARING ALIGNMENT:   ROM:   (Degrees) Left AROM Right AROM    Cervical Flexion 2.1 cm    Cervical Extension 13.5 cm, slight pulling on L shoulder blade -  but  no pain    Cervical Side bend 12.9 cm 12.0 cm    Cervical Rotation 37 degrees 54 degrees    Cervical Protrusion     Cervical Retraction     Thoracic Flexion     Thoracic Extension     Thoracic Rotation   "     Left AROM Left PROM Right AROM Right PROM   Shoulder Flexion wnl  wnl    Shoulder Extension       Shoulder Abduction wnl  wnl    Shoulder Adduction       Shoulder IR       Shoulder ER       Shoulder Horiz Abduction       Shoulder Horiz Adduction       Pain: no  pain with  motion, pt reports feeling a pull with stretches  End Feel: wnl    MYOTOMES:    Left Right   C1-2 (Neck Flexion) 5, no pain 5, no pain   C3 (Neck Side Bend)  5, no pain 5, no pain   C4 (Shrug) 5 5   C5 (Deltoid) 4+ 5   C6 (Biceps) 4+ 5   C7 (Triceps) 4+ 5   C8 (Thumb Ext) 5- 5-   T1 (Intrinsics)         DERMATOMES: WNL  NEURAL TENSION:    Left Right   SLR     SLR with DF     Femoral Nerve     Slump Negative  Negative    Elpidio (Lumbar)     Elpidio (Thoracic)     Elpidio (Cervical)     Median     Ulnar     Radial        FLEXIBILITY:    SPECIAL TESTS:    Left Right   Alar Ligament    Cervical Flexion-Rotation     Cervical Rot/Lateral Flex     Compression     Distraction     Spurling s Negative  Negative    Thoracic Outlet Screen (Gisell, Adson)     Transverse Ligament     Vertebral Artery     Cotton Roll Test     Craniocervical Flexor Endurance Test     Mannheimer Test            PALPATION:   + Tenderness At Location Left Right   Sternocleidomastoid -    Scalenes -    Rhomboids -    Facet     Upper Trap -, pt tight, but not painful    Levator -, pt tight, but not painful    Erector Spinae -    Suboccipitals -          Assessment & Plan   CLINICAL IMPRESSIONS  Medical Diagnosis: Neck pain, Cervical radiculopathy    Treatment Diagnosis: Left sided neck pain, weakness, ROM deficit   Impression/Assessment: Patient is a 78 year old female with Left sided neck and scapular  pain complaints.  The following significant findings have been identified: Pain, Decreased ROM/flexibility, Decreased joint mobility, Decreased strength, Impaired muscle performance, Decreased activity tolerance, and Impaired posture. These impairments interfere with their ability to perform  self care tasks, work tasks, recreational activities, and household chores as compared to previous level of function. Of note, pt feels that her injection has continued to provide good relief of symptoms and will  trial HEP  and independent management of symptoms. If she does not schedule within  the next 30 days, PT will close her chart.    Clinical Decision Making (Complexity):  Clinical Presentation: Stable/Uncomplicated  Clinical Presentation Rationale: based on medical and personal factors listed in PT evaluation  Clinical Decision Making (Complexity): Low complexity    PLAN OF CARE  Treatment Interventions:  Interventions: Manual Therapy, Neuromuscular Re-education, Therapeutic Activity, Therapeutic Exercise, Self-Care/Home Management    Long Term Goals     PT Goal 1  Goal Identifier: HEP  Goal Description: Pt will demonstrate understanding and independece of HEP in 30 days.  Rationale: to maximize safety and independence with performance of ADLs and functional tasks  Goal Progress: initial  Target Date: 05/24/24  PT Goal 2  Goal Identifier: NDI  Goal Description: Pt will improve NDI by 5.5  points by the end of therapy in order to demonstrate MCID in score to demonstrate improved functional mobility and improved ADLs.  Goal Progress: initial  Target Date: 07/23/24  PT Goal 3  Goal Identifier: ADL  Goal Description: Pt will report 0/10 symptoms by the end of therapy in  order to complete ADLs  with  increased ease.  Goal Progress: initial  Target Date: 07/23/24      Frequency of Treatment: 1 x / week  Duration of Treatment: 90 days    Recommended Referrals to Other Professionals:   Education Assessment:   Learner/Method: Patient;Demonstration    Risks and benefits of evaluation/treatment have been explained.   Patient/Family/caregiver agrees with Plan of Care.     Evaluation Time:     PT Eval, Low Complexity Minutes (75925): 15       Signing Clinician: Alessandra Lamb, PT      [facility]  Services                                                                                   OUTPATIENT PHYSICAL THERAPY      PLAN OF TREATMENT FOR OUTPATIENT REHABILITATION   Patient's Last Name, First Name, Katy Pittman YOB: 1945   Provider's Name   [facility]   Medical Record No.  [id number]     Onset Date: 12/01/23  Start of Care Date: 04/24/24     Medical Diagnosis:  Neck pain, Cervical radiculopathy      PT Treatment Diagnosis:  Left sided neck pain, weakness, ROM deficit Plan of Treatment  Frequency/Duration: 1 x / week/ 90 days    Certification date from 04/24/24 to 07/23/24         See note for plan of treatment details and functional goals     Alessandra Lamb, PT                         I CERTIFY THE NEED FOR THESE SERVICES FURNISHED UNDER        THIS PLAN OF TREATMENT AND WHILE UNDER MY CARE .             Physician Signature               Date    X_____________________________________________________                  Referring Provider:  Sergio Lay    Initial Assessment  See Epic Evaluation- Start of Care Date: 04/24/24

## 2024-04-29 DIAGNOSIS — N95.2 ATROPHIC VAGINITIS: ICD-10-CM

## 2024-04-29 DIAGNOSIS — M79.7 FIBROMYALGIA: ICD-10-CM

## 2024-04-29 DIAGNOSIS — K21.9 GASTROESOPHAGEAL REFLUX DISEASE WITHOUT ESOPHAGITIS: ICD-10-CM

## 2024-04-30 RX ORDER — TRIAMCINOLONE ACETONIDE 1 MG/G
OINTMENT TOPICAL
Qty: 30 G | Refills: 2 | Status: SHIPPED | OUTPATIENT
Start: 2024-04-30

## 2024-04-30 RX ORDER — FAMOTIDINE 20 MG/1
20 TABLET, FILM COATED ORAL 2 TIMES DAILY
Qty: 180 TABLET | Refills: 0 | Status: SHIPPED | OUTPATIENT
Start: 2024-04-30

## 2024-05-01 RX ORDER — GABAPENTIN 300 MG/1
CAPSULE ORAL
Qty: 360 CAPSULE | Refills: 2 | Status: SHIPPED | OUTPATIENT
Start: 2024-05-01

## 2024-05-03 ENCOUNTER — TELEPHONE (OUTPATIENT)
Dept: FAMILY MEDICINE | Facility: CLINIC | Age: 79
End: 2024-05-03
Payer: COMMERCIAL

## 2024-05-03 DIAGNOSIS — M79.7 FIBROMYALGIA: Primary | ICD-10-CM

## 2024-05-03 NOTE — TELEPHONE ENCOUNTER
General Call    Reason for Call:  message from pharmacy    What are your questions or concerns:  message from pharmacy:  Patient requesting new rx for duloxetine. It was cancelled, but patient unaware it was suppose to be cancelled.    Per office visit on 3/6/24 Duloxetine was discontinued and reason: none    Please advise and send in new Rx as appropriate.    [facility] [id number] [place] [facility] [address] [place]

## 2024-05-06 DIAGNOSIS — E66.9 OBESITY (BMI 30-39.9): Primary | ICD-10-CM

## 2024-05-06 RX ORDER — NALTREXONE HYDROCHLORIDE 50 MG/1
TABLET, FILM COATED ORAL
Qty: 180 TABLET | Refills: 1 | Status: SHIPPED | OUTPATIENT
Start: 2024-05-06 | End: 2024-07-23

## 2024-05-06 RX ORDER — DULOXETIN HYDROCHLORIDE 60 MG/1
60 CAPSULE, DELAYED RELEASE ORAL 2 TIMES DAILY
Qty: 180 CAPSULE | Refills: 0 | Status: SHIPPED | OUTPATIENT
Start: 2024-05-06 | End: 2024-07-22

## 2024-05-06 NOTE — TELEPHONE ENCOUNTER
Pt contacted. 3/6/24 visit- pt reported not taking medication. Which then canceled rx.  Pt confirmed she is taking this medication and would like order sent to pharmacy.     Order pending.

## 2024-05-20 NOTE — PROGRESS NOTES
DISCHARGE  Reason for Discharge: Patient has not made expected progress due to interrupted treatment attendance.  Patient has failed to schedule further appointments.    Equipment Issued: NA    Discharge Plan: Patient to continue home program.    Referring Provider:  Sergio Lay        04/24/24 0500   Appointment Info   Signing clinician's name / credentials Alessandra Lamb PT   Total/Authorized Visits 12   Visits Used 1   Medical Diagnosis Neck pain, Cervical radiculopathy   PT Tx Diagnosis Left sided neck pain, weakness, ROM deficit   Quick Adds Certification   Progress Note/Certification   Start of Care Date 04/24/24   Onset of illness/injury or Date of Surgery 12/01/23   Therapy Frequency 1 x / week   Predicted Duration 90 days   Certification date from 04/24/24   Certification date to 07/23/24   Progress Note Due Date 06/26/24   Progress Note Completed Date 04/24/24   GOALS   PT Goals 2;3   PT Goal 1   Goal Identifier HEP   Goal Description Pt will demonstrate understanding and independece of HEP in 30 days.   Rationale to maximize safety and independence with performance of ADLs and functional tasks   Goal Progress initial   Target Date 05/24/24   PT Goal 2   Goal Identifier NDI   Goal Description Pt will improve NDI by 5.5  points by the end of therapy in order to demonstrate MCID in score to demonstrate improved functional mobility and improved ADLs.   Goal Progress initial   Target Date 07/23/24   PT Goal 3   Goal Identifier ADL   Goal Description Pt will report 0/10 symptoms by the end of therapy in  order to complete ADLs  with  increased ease.   Goal Progress initial   Target Date 07/23/24   Subjective Report   Subjective Report see initial eval   Objective Measures   Objective Measures Objective Measure 1   Objective Measure 1   Objective Measure NDI   Details 22% on 4/24/24   Treatment Interventions (PT)   Interventions Therapeutic Procedure/Exercise   Therapeutic Procedure/Exercise    Therapeutic Procedures: strength, endurance, ROM, flexibility minutes (09938) 15   Skilled Intervention Exercises to help improve posture and neck ROM, discussed therapy POC, demonstration & cueing provided   Patient Response/Progress pt demonstrated understanding   PTRx Ther Proc 1 Supine Cervical Retraction   PTRx Ther Proc 1 - Details x 10 reps   PTRx Ther Proc 2 Cervical Retraction   PTRx Ther Proc 2 - Details x 10 reps   PTRx Ther Proc 3 Scapular Retraction/Depression   PTRx Ther Proc 3 - Details x 10 reps   PTRx Ther Proc 4 SCM stretch: Contralateral SB/ipsilateral rotation   PTRx Ther Proc 4 - Details x 10 reps   PTRx Ther Proc 5 Upper Trapezius Stretch   PTRx Ther Proc 5 - Details x 10 reps   PTRx Ther Proc 6 Cervical ROM Side Bending   PTRx Ther Proc 6 - Details x 10 reps   PTRx Ther Proc 7 Cervical ROM Rotation   PTRx Ther Proc 7 - Details x 10 reps   PTRx Ther Proc 8 Pec Stretch Doorway   PTRx Ther Proc 8 - Details x 20 seconds   PTRx Ther Proc 9 Shoulder Theraband Rows   PTRx Ther Proc 9 - Details x10 reps   Eval/Assessments   PT Eval, Low Complexity Minutes (11267) 15   Education   Learner/Method Patient;Demonstration   Plan   Home program PTRx   Plan for next session review HEP,  pt will trial  HEP and independent pain management for the next 30 days if pt does not schedule in between now and then PT will close chart.   Total Session Time   Timed Code Treatment Minutes 15   Total Treatment Time (sum of timed and untimed services) 30

## 2024-05-20 NOTE — PROGRESS NOTES
Assessment:     Diagnoses and all orders for this visit:  Neck pain  Cervical radiculopathy  Spinal stenosis in cervical region     Katy Hong is a 78 year old y.o. female with past medical history significant for hypertension, cardiomyopathy, kidney stone, fibromyalgia, depression with anxiety, migraine headache, reflux, cellulitis, chronic venous insufficiency with venous stasis ulcer, female stress incontinence who presents today for follow-up regarding:    -Neck and left upper extremity pain with 99% relief post C7-T1 IL JOANNA 4/12/2024.  Ordered by AGUSTO Lay.     Plan:     A shared decision making plan was used. The patient's values and choices were respected. Prior medical records were reviewed today. The following represents what was discussed and decided upon by the provider and the patient.        -DIAGNOSTIC TESTS: Images were personally reviewed and interpreted.   --Cervical spine MRI 3/20/2024 with mild to moderate cervical spondylosis.  Multilevel foraminal stenosis, moderate on the right at C4-5, severe bilaterally at C5-6, moderate on the right at C6-7.  --Lumbar spine MRI 9/28/2021 with moderate spinal stenosis at L3-4 and L4-5.  Mild bilateral foraminal stenosis L5-S1 right greater than left with disc bulging greater on the right.  Mild right greater than left lateral recess stenosis L3-4.  --Lumbar spine x-ray 9/2/2021 with 2 mm L4-5 spondylolisthesis.  Significant L4-5 and L5-S1 degenerative disc height loss.  Mild left curvature L4-5.  Facet arthropathy greatest at L5-S1.  --Bilateral knee x-ray 7/6/2021 with moderate medial and mild lateral degenerative arthritis with small effusion on the right.  Left mild medial osteoarthritis with small to moderate effusion.  --Bilateral upper extremity EMG 12/11/2019 shows mild ulnar neuropathy, no cervical radiculopathy or median neuropathy noted.    --Cervical spine MRI 10/15/2019 shows severe foraminal stenosis on the right at C5-6,  moderate to severe in the right at C6-7.  --Cervical spine MRI 11/1/2017 again shows C5-6 disc osteophyte complex with moderate central canal and severe bilateral foraminal stenosis.  C6-7, mild to moderate spinal canal stenosis with moderate to severe bilateral foraminal stenosis.  No cord signal changes noted.  --Thoracic MRI 2017 shows T10-11 right 8 mm enhancement dorsal lateral margin of the spinal cord.    -INTERVENTIONS: No further injection recommendations at this time.  Did discuss that if symptoms recur/worsen in over 3 months timeframe we can repeat C7-T1 interlaminar epidural steroid injection.  If symptoms worsen prior to 3 months we could consider left C5-6 TFESI.    -MEDICATIONS: No changes in medications at this time advised patient continue with acetaminophen as needed.  Discussed side effects of medications and proper use. Patient verbalized understanding.    -PHYSICAL THERAPY: Encourage patient to continue with home exercises from recent physical therapy sessions which she is tolerating.  Discussed the importance of core strengthening, ROM, stretching exercises with the patient and how each of these entities is important in decreasing pain.  Explained to the patient that the purpose of physical therapy is to teach the patient a home exercise program.  These exercises need to be performed every day in order to decrease pain and prevent future occurrences of pain.        -PATIENT EDUCATION:  Total time of 28 minutes, on the day of service, spent with the patient, reviewing the chart, placing orders, and documenting.     -FOLLOW UP: Follow-up as needed  Advised to contact clinic if symptoms worsen or change.    Subjective:     Katy Hong is a 78 year old female who presents today for follow-up regarding generalized neck pain and left post scapular pain and left shoulder pain that she received 99% relief of symptoms with recent C7-T1 interlaminar epidural steroid injection within a week or so  the injection.  Currently pain is very minimal and tolerable and she is doing well at this time and happy with the results.  She is doing her home physical therapy exercises from recent sessions and tolerating this as well.  Denies any new symptoms, denies any recent trips or falls or balance changes.  Denies bowel or bladder loss control, denies saddle anesthesia.    -Treatment to Date: No prior spinal surgery.  Physical therapy April 2020 for neck pain     Right L4-5 JOANNA in the past with Dr. Sipple.  Right L5-S1 TFESI 10/6/2021 with significant relief.  Preprocedure pain 9/10, post 4/10.  Left L5-S1 TFESI 10/28/2021 with significant relief.  C7-T1 IL JOANNA 4/12/2024 with 99% relief.     -Medications:  Gabapentin 300 mg 2 tablets at nighttime which is long-term for fibromyalgia  OTC Aleve with no benefit    Patient Active Problem List   Diagnosis    Osteopenia    Insomnia    Vitamin D Deficiency    Lower Back Pain    Fibromyalgia    Essential Hypertension    Seborrheic Keratosis    Lichen Sclerosus Et Atrophicus    Migraine Headache    Esophageal Reflux    Joint Pain, Localized In The Knee    Varicose Veins    Chronic Cutaneous Ulcer Venous Stasis    Chronic venous insufficiency    Foot pain (Soft Tissue)    Female stress incontinence    Calculus of ureter    Hydronephrosis with urinary obstruction due to ureteral calculus    Other cardiomyopathy (H)    Obesity (BMI 30-39.9)    Chronic fatigue    Moderate episode of recurrent major depressive disorder (H)    Anxiety    Pain in joint, multiple sites    Osteoarthritis of lumbar spine, unspecified spinal osteoarthritis complication status    Pain in both lower legs       Current Outpatient Medications   Medication Sig Dispense Refill    aspirin 81 MG EC tablet [ASPIRIN 81 MG EC TABLET] Take 81 mg by mouth daily.      DULoxetine (CYMBALTA) 60 MG capsule Take 1 capsule (60 mg) by mouth 2 times daily 180 capsule 0    gabapentin (NEURONTIN) 300 MG capsule TAKE 1 CAPSULE BY  MOUTH DAILY IN THE MORNING, 1 CAPSULE IN THE AFTERNOON, & 2 CAPSULES AT BEDTIME 360 capsule 2    busPIRone (BUSPAR) 15 MG tablet Take 1 tablet (15 mg) by mouth 2 times daily 180 tablet 1    cholecalciferol, vitamin D3, 5,000 unit Tab [CHOLECALCIFEROL, VITAMIN D3, 5,000 UNIT TAB] Take by mouth.      famotidine (PEPCID) 20 MG tablet TAKE 1 TABLET BY MOUTH TWICE A  tablet 0    Levomefolate Glucosamine (METHYLFOLATE PO)       losartan (COZAAR) 25 MG tablet Take 1 tablet (25 mg) by mouth daily 90 tablet 3    metoprolol succinate ER (TOPROL XL) 50 MG 24 hr tablet Take 1 tablet (50 mg) by mouth daily 90 tablet 3    mometasone (NASONEX) 50 MCG/ACT nasal spray INSTILL 2 SPRAYS INTO BOTH NOSTRILS DAILY. 25.5 g 2    naltrexone (DEPADE/REVIA) 50 MG tablet Take 1 tablet in the morning for 1 week then 1 tablet twice a day. No not take with opiods. 180 tablet 1    naltrexone (DEPADE/REVIA) 50 MG tablet Take 1/2 tablet in the morning for 1 week then 1/2 tablet twice a day. No not take with opiods. 90 tablet 1    QUEtiapine (SEROQUEL) 100 MG tablet Take 100 mg by mouth At Bedtime      rizatriptan (MAXALT-MLT) 10 MG ODT TAKE 1 TABLET BY MOUTH AT ONSET OF HEADACHE FOR MIGRAINE MAY REPEAT IN 2 HOURS. MAX 3 TABS/24 HOURS 30 tablet 3    SPIKEVAX 50 MCG/0.5ML injection       traZODone (DESYREL) 150 MG tablet TAKE 1/2 TABLET BY MOUTH AT BEDTIME AS NEEDED      triamcinolone (KENALOG) 0.1 % external ointment APPLY TO AFFECTED AREA TWICE A DAY 30 g 2    Turmeric 500 MG CAPS        No current facility-administered medications for this visit.       Allergies   Allergen Reactions    Fluoxetine Cough and Rash     Pt thinks it was cough but not totally sure.    Latex Rash    Levothyroxine Rash    Lisinopril Cough and Rash    Sulfa (Sulfonamide Antibiotics) [Sulfa Antibiotics] Itching and Rash       Past Medical History:   Diagnosis Date    Abnormal mammogram, unspecified     Created by Conversion     Acute sinusitis, unspecified     Created  by Conversion     Asymptomatic varicose veins     Created by Conversion     Breast cyst     Cellulitis and abscess of unspecified site     Created by Conversion     Circumscribed scleroderma     Created by Conversion     Contusion of unspecified site     Created by Conversion     Disorder of bone and cartilage, unspecified     Created by Conversion     Dysthymic disorder     Created by Conversion     Esophageal reflux     Created by Conversion     Insomnia, unspecified     Created by Conversion     Lumbago     Created by Conversion     Mammogram - Abnormal     Created by Conversion Replacement Utility updated for latest IMO load     Migraine, unspecified, without mention of intractable migraine without mention of status migrainosus     Created by Conversion     Myalgia and myositis, unspecified     Created by Conversion     Myocardial infarction (H)     per EKG's since 2015    Obesity, unspecified     Created by Conversion     Open wound of lip, without mention of complication     Created by Conversion     Other primary cardiomyopathies     Created by Conversion     Other seborrheic keratosis     Created by Conversion     Pain in joint, lower leg     Created by Conversion     Pain in limb     Created by Conversion     Sebaceous cyst     Created by Conversion     Shortness of breath     Created by Conversion     Shoulder and upper arm, insect bite, nonvenomous, without mention of infection(912.4)     Created by Conversion     Unspecified disease of sebaceous glands     Created by Conversion     Unspecified essential hypertension     Created by Conversion     Unspecified venous (peripheral) insufficiency     Created by Conversion     Unspecified vitamin D deficiency     Created by Conversion         Review of Systems  ROS:  Specifically negative for bowel/bladder dysfunction, balance changes, headache, dizziness, foot drop, fevers, chills, appetite changes, nausea/vomiting, unexplained weight loss. Otherwise 13  "systems reviewed are negative. Please see the patient's intake questionnaire from today for details.    Reviewed Social, Family, Past Medical and Past Surgical history with patient, no significant changes noted since prior visit.     Objective:     /88 (BP Location: Left arm, Patient Position: Sitting, Cuff Size: Adult Regular)   Pulse 71   Ht 5' 2\" (1.575 m)   Wt 190 lb (86.2 kg)   BMI 34.75 kg/m      PHYSICAL EXAMINATION:    --CONSTITUTIONAL: Well developed, well nourished, healthy appearing individual.  --PSYCHIATRIC: Appropriate mood and affect. No difficulty interacting due to temper, social withdrawal, or memory issues.      RESULTS:   Imaging: Spine imaging was reviewed today. The images were shown to the patient and the findings were explained using a spine model.      Cervical and lumbar spine MRI reviewed                      "

## 2024-05-21 ENCOUNTER — OFFICE VISIT (OUTPATIENT)
Dept: NEUROSURGERY | Facility: CLINIC | Age: 79
End: 2024-05-21
Payer: COMMERCIAL

## 2024-05-21 VITALS
HEIGHT: 62 IN | HEART RATE: 71 BPM | BODY MASS INDEX: 34.96 KG/M2 | WEIGHT: 190 LBS | SYSTOLIC BLOOD PRESSURE: 139 MMHG | DIASTOLIC BLOOD PRESSURE: 88 MMHG

## 2024-05-21 DIAGNOSIS — M54.12 CERVICAL RADICULOPATHY: ICD-10-CM

## 2024-05-21 DIAGNOSIS — M54.2 NECK PAIN: Primary | ICD-10-CM

## 2024-05-21 DIAGNOSIS — M48.02 SPINAL STENOSIS IN CERVICAL REGION: ICD-10-CM

## 2024-05-21 PROCEDURE — 99213 OFFICE O/P EST LOW 20 MIN: CPT | Performed by: NURSE PRACTITIONER

## 2024-05-21 ASSESSMENT — PAIN SCALES - GENERAL: PAINLEVEL: MILD PAIN (2)

## 2024-05-21 NOTE — PATIENT INSTRUCTIONS
Follow up as needed.       ~Spine Center Scheduling #[phone].  ~Please call our [facility] Spine Nurse Navigation #[phone] with any questions or concerns about your treatment plan, if symptoms worsen and you would like to be seen urgently, or if you have problems controlling bladder and bowel function.  ~For any future flareups or new symptoms, recommend follow-up in clinic or contact the nurse navigator line.  ~Please note that any My Chart messages may take multiple days for a response due to the high volume of patients seen in clinic.  Anything sent Thursday night or after will be answered the following week when able, as Teresa Mas CNP does not work in clinic on Fridays.   ~Teresa Mas CNP is at the [facility] on Tuesdays, otherwise primarily at the [facility].        Importance of specialized Physical Therapy: Discussed the importance of core strengthening, ROM, stretching exercises with the patient and how each of these entities is important in decreasing pain.  Explained to the patient that the purpose of physical therapy is to teach the patient a home exercise program individualized to them and their specific health concerns.  These exercises need to be performed every day in order to decrease pain and prevent future occurrences of pain.

## 2024-05-21 NOTE — LETTER
5/21/2024         RE: Katy Hong  [address] [place] [id number]        Dear Colleague,    Thank you for referring your patient, Katy Hong, to the [facility]. Please see a copy of my visit note below.      Assessment:     Diagnoses and all orders for this visit:  Neck pain  Cervical radiculopathy  Spinal stenosis in cervical region     Katy Hong is a 78 year old y.o. female with past medical history significant for hypertension, cardiomyopathy, kidney stone, fibromyalgia, depression with anxiety, migraine headache, reflux, cellulitis, chronic venous insufficiency with venous stasis ulcer, female stress incontinence who presents today for follow-up regarding:    -Neck and left upper extremity pain with 99% relief post C7-T1 IL JOANNA 4/12/2024.  Ordered by AGUSTO Lay.     Plan:     A shared decision making plan was used. The patient's values and choices were respected. Prior medical records were reviewed today. The following represents what was discussed and decided upon by the provider and the patient.        -DIAGNOSTIC TESTS: Images were personally reviewed and interpreted.   --Cervical spine MRI 3/20/2024 with mild to moderate cervical spondylosis.  Multilevel foraminal stenosis, moderate on the right at C4-5, severe bilaterally at C5-6, moderate on the right at C6-7.  --Lumbar spine MRI 9/28/2021 with moderate spinal stenosis at L3-4 and L4-5.  Mild bilateral foraminal stenosis L5-S1 right greater than left with disc bulging greater on the right.  Mild right greater than left lateral recess stenosis L3-4.  --Lumbar spine x-ray 9/2/2021 with 2 mm L4-5 spondylolisthesis.  Significant L4-5 and L5-S1 degenerative disc height loss.  Mild left curvature L4-5.  Facet arthropathy greatest at L5-S1.  --Bilateral knee x-ray 7/6/2021 with moderate medial and mild lateral degenerative arthritis with small effusion on the right.  Left mild medial  osteoarthritis with small to moderate effusion.  --Bilateral upper extremity EMG 12/11/2019 shows mild ulnar neuropathy, no cervical radiculopathy or median neuropathy noted.    --Cervical spine MRI 10/15/2019 shows severe foraminal stenosis on the right at C5-6, moderate to severe in the right at C6-7.  --Cervical spine MRI 11/1/2017 again shows C5-6 disc osteophyte complex with moderate central canal and severe bilateral foraminal stenosis.  C6-7, mild to moderate spinal canal stenosis with moderate to severe bilateral foraminal stenosis.  No cord signal changes noted.  --Thoracic MRI 2017 shows T10-11 right 8 mm enhancement dorsal lateral margin of the spinal cord.    -INTERVENTIONS: No further injection recommendations at this time.  Did discuss that if symptoms recur/worsen in over 3 months timeframe we can repeat C7-T1 interlaminar epidural steroid injection.  If symptoms worsen prior to 3 months we could consider left C5-6 TFESI.    -MEDICATIONS: No changes in medications at this time advised patient continue with acetaminophen as needed.  Discussed side effects of medications and proper use. Patient verbalized understanding.    -PHYSICAL THERAPY: Encourage patient to continue with home exercises from recent physical therapy sessions which she is tolerating.  Discussed the importance of core strengthening, ROM, stretching exercises with the patient and how each of these entities is important in decreasing pain.  Explained to the patient that the purpose of physical therapy is to teach the patient a home exercise program.  These exercises need to be performed every day in order to decrease pain and prevent future occurrences of pain.        -PATIENT EDUCATION:  Total time of 28 minutes, on the day of service, spent with the patient, reviewing the chart, placing orders, and documenting.     -FOLLOW UP: Follow-up as needed  Advised to contact clinic if symptoms worsen or change.    Subjective:     Katy AYALA  Gely is a 78 year old female who presents today for follow-up regarding generalized neck pain and left post scapular pain and left shoulder pain that she received 99% relief of symptoms with recent C7-T1 interlaminar epidural steroid injection within a week or so the injection.  Currently pain is very minimal and tolerable and she is doing well at this time and happy with the results.  She is doing her home physical therapy exercises from recent sessions and tolerating this as well.  Denies any new symptoms, denies any recent trips or falls or balance changes.  Denies bowel or bladder loss control, denies saddle anesthesia.    -Treatment to Date: No prior spinal surgery.  Physical therapy April 2020 for neck pain     Right L4-5 JOANNA in the past with Dr. Sipple.  Right L5-S1 TFESI 10/6/2021 with significant relief.  Preprocedure pain 9/10, post 4/10.  Left L5-S1 TFESI 10/28/2021 with significant relief.  C7-T1 IL JOANNA 4/12/2024 with 99% relief.     -Medications:  Gabapentin 300 mg 2 tablets at nighttime which is long-term for fibromyalgia  OTC Aleve with no benefit    Patient Active Problem List   Diagnosis     Osteopenia     Insomnia     Vitamin D Deficiency     Lower Back Pain     Fibromyalgia     Essential Hypertension     Seborrheic Keratosis     Lichen Sclerosus Et Atrophicus     Migraine Headache     Esophageal Reflux     Joint Pain, Localized In The Knee     Varicose Veins     Chronic Cutaneous Ulcer Venous Stasis     Chronic venous insufficiency     Foot pain (Soft Tissue)     Female stress incontinence     Calculus of ureter     Hydronephrosis with urinary obstruction due to ureteral calculus     Other cardiomyopathy (H)     Obesity (BMI 30-39.9)     Chronic fatigue     Moderate episode of recurrent major depressive disorder (H)     Anxiety     Pain in joint, multiple sites     Osteoarthritis of lumbar spine, unspecified spinal osteoarthritis complication status     Pain in both lower legs       Current  Outpatient Medications   Medication Sig Dispense Refill     aspirin 81 MG EC tablet [ASPIRIN 81 MG EC TABLET] Take 81 mg by mouth daily.       DULoxetine (CYMBALTA) 60 MG capsule Take 1 capsule (60 mg) by mouth 2 times daily 180 capsule 0     gabapentin (NEURONTIN) 300 MG capsule TAKE 1 CAPSULE BY MOUTH DAILY IN THE MORNING, 1 CAPSULE IN THE AFTERNOON, & 2 CAPSULES AT BEDTIME 360 capsule 2     busPIRone (BUSPAR) 15 MG tablet Take 1 tablet (15 mg) by mouth 2 times daily 180 tablet 1     cholecalciferol, vitamin D3, 5,000 unit Tab [CHOLECALCIFEROL, VITAMIN D3, 5,000 UNIT TAB] Take by mouth.       famotidine (PEPCID) 20 MG tablet TAKE 1 TABLET BY MOUTH TWICE A  tablet 0     Levomefolate Glucosamine (METHYLFOLATE PO)        losartan (COZAAR) 25 MG tablet Take 1 tablet (25 mg) by mouth daily 90 tablet 3     metoprolol succinate ER (TOPROL XL) 50 MG 24 hr tablet Take 1 tablet (50 mg) by mouth daily 90 tablet 3     mometasone (NASONEX) 50 MCG/ACT nasal spray INSTILL 2 SPRAYS INTO BOTH NOSTRILS DAILY. 25.5 g 2     naltrexone (DEPADE/REVIA) 50 MG tablet Take 1 tablet in the morning for 1 week then 1 tablet twice a day. No not take with opiods. 180 tablet 1     naltrexone (DEPADE/REVIA) 50 MG tablet Take 1/2 tablet in the morning for 1 week then 1/2 tablet twice a day. No not take with opiods. 90 tablet 1     QUEtiapine (SEROQUEL) 100 MG tablet Take 100 mg by mouth At Bedtime       rizatriptan (MAXALT-MLT) 10 MG ODT TAKE 1 TABLET BY MOUTH AT ONSET OF HEADACHE FOR MIGRAINE MAY REPEAT IN 2 HOURS. MAX 3 TABS/24 HOURS 30 tablet 3     SPIKEVAX 50 MCG/0.5ML injection        traZODone (DESYREL) 150 MG tablet TAKE 1/2 TABLET BY MOUTH AT BEDTIME AS NEEDED       triamcinolone (KENALOG) 0.1 % external ointment APPLY TO AFFECTED AREA TWICE A DAY 30 g 2     Turmeric 500 MG CAPS        No current facility-administered medications for this visit.       Allergies   Allergen Reactions     Fluoxetine Cough and Rash     Pt thinks it was  cough but not totally sure.     Latex Rash     Levothyroxine Rash     Lisinopril Cough and Rash     Sulfa (Sulfonamide Antibiotics) [Sulfa Antibiotics] Itching and Rash       Past Medical History:   Diagnosis Date     Abnormal mammogram, unspecified     Created by Conversion      Acute sinusitis, unspecified     Created by Conversion      Asymptomatic varicose veins     Created by Conversion      Breast cyst      Cellulitis and abscess of unspecified site     Created by Conversion      Circumscribed scleroderma     Created by Conversion      Contusion of unspecified site     Created by Conversion      Disorder of bone and cartilage, unspecified     Created by Conversion      Dysthymic disorder     Created by Conversion      Esophageal reflux     Created by Conversion      Insomnia, unspecified     Created by Conversion      Lumbago     Created by Conversion      Mammogram - Abnormal     Created by Conversion Replacement Utility updated for latest IMO load      Migraine, unspecified, without mention of intractable migraine without mention of status migrainosus     Created by Conversion      Myalgia and myositis, unspecified     Created by Conversion      Myocardial infarction (H)     per EKG's since 2015     Obesity, unspecified     Created by Conversion      Open wound of lip, without mention of complication     Created by Conversion      Other primary cardiomyopathies     Created by Conversion      Other seborrheic keratosis     Created by Conversion      Pain in joint, lower leg     Created by Conversion      Pain in limb     Created by Conversion      Sebaceous cyst     Created by Conversion      Shortness of breath     Created by Conversion      Shoulder and upper arm, insect bite, nonvenomous, without mention of infection(912.4)     Created by Conversion      Unspecified disease of sebaceous glands     Created by Conversion      Unspecified essential hypertension     Created by Conversion      Unspecified  "venous (peripheral) insufficiency     Created by Conversion      Unspecified vitamin D deficiency     Created by Conversion         Review of Systems  ROS:  Specifically negative for bowel/bladder dysfunction, balance changes, headache, dizziness, foot drop, fevers, chills, appetite changes, nausea/vomiting, unexplained weight loss. Otherwise 13 systems reviewed are negative. Please see the patient's intake questionnaire from today for details.    Reviewed Social, Family, Past Medical and Past Surgical history with patient, no significant changes noted since prior visit.     Objective:     /88 (BP Location: Left arm, Patient Position: Sitting, Cuff Size: Adult Regular)   Pulse 71   Ht 5' 2\" (1.575 m)   Wt 190 lb (86.2 kg)   BMI 34.75 kg/m      PHYSICAL EXAMINATION:    --CONSTITUTIONAL: Well developed, well nourished, healthy appearing individual.  --PSYCHIATRIC: Appropriate mood and affect. No difficulty interacting due to temper, social withdrawal, or memory issues.      RESULTS:   Imaging: Spine imaging was reviewed today. The images were shown to the patient and the findings were explained using a spine model.      Cervical and lumbar spine MRI reviewed                        Again, thank you for allowing me to participate in the care of your patient.        Sincerely,        Teresa Mas, CNP  "

## 2024-06-10 NOTE — PROGRESS NOTES
[facility] Surgical Follow up Visit   Date of visit: 6/11/2024  Physician: LATISHA Rossi MD, MD  Primary Care is Skyler Wiggins  Katy Hong   78 year old  female     Initial Weight: 201#  Initial BMI: 37.4  Today's Weight:   Wt Readings from Last 1 Encounters:   06/11/24 87.5 kg (193 lb)     Body mass index is 35.3 kg/m .           Assessment and Plan   Assessment: Katy is a 78 year old year old female who presents for medical weight management.      Plan:    1. Obesity (BMI 30-39.9)  Patient was congratulated on her success thus far. Healthy habits to assist with further weight loss were discussed. She will try to increase her chair exercises to 5 days per week. She will continue the naltrexone for now. We will try to get wegovy approved for her.  Risks, benefits and possible side effects discussed . We discussed the patient's co-morbid conditions including hypertension and CAD. These likely will improve with healthy habits and weight loss.     2. Essential hypertension  This may improve with healthy habits and weight loss.     3. Coronary artery disease involving native coronary artery of native heart without angina pectoris  Risk for further disease will decrease with healthy habits and weight loss    4. Ischemic cardiomyopathy  Risk for further decline will decrease with healthy habits and weight loss.     Follow up In 3 months with myself           INTERIM HISTORY  Patient has been working on healthy habits for weight loss and she is doing pretty well. She is also taking naltrexone 1 tablet twice a day. She thinks that it is helping to cut back on cravings.     DIETARY HISTORY  Meals Per Day: 3  Eating Protein First?: usually  Food Diary: B:toast or cereal and egg and coffee L:fruit and sometimes sandwich D:meat and vegetables and occasional potato  Snacks Per Day: one  Typical Snack: fruit  Fluid Intake: struggles, incontinence  Portion Control: sometimes larger at dinner  Calorie  Containing Beverages: occasional iced tea or lemonade     Meals at Restaurant per week:0-1    Positive Changes Since Last Visit: portion control, healthy food choices  Struggling With: water intake    Knowledgeable in Reading Food Labels: yes  Getting Adequate Protein: sometimes      PHYSICAL ACTIVITY PATTERNS:  Chair exercises 3 days a week    REVIEW OF SYSTEMS  GENERAL/CONSTITUTIONAL:  Fatigue: sometimes  HEENT:   glaucoma: no  CARDIOVASCULAR:  History of heart disease:yes  GI:  PSYCHIATRIC:  Moods: fairly stable  MUSCULOSKELETAL/RHEUMATOLOGIC  Arthralgias: yes  Myalgias: yes  ENDOCRINE:  Monitoring Blood Sugars: no  Sugars Well Controlled: na  No personal or family history of medullary thyroid cancer no  No personal or family history of MEN2   :  Birth control: menopause  History of kidney stones: yes     Patient Profile   Social History     Social History Narrative    7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          Past Medical History   Past Medical History:   Diagnosis Date    Abnormal mammogram, unspecified     Created by Conversion     Acute sinusitis, unspecified     Created by Conversion     Asymptomatic varicose veins     Created by Conversion     Breast cyst     Cellulitis and abscess of unspecified site     Created by Conversion     Circumscribed scleroderma     Created by Conversion     Contusion of unspecified site     Created by Conversion     Disorder of bone and cartilage, unspecified     Created by Conversion     Dysthymic disorder     Created by Conversion     Esophageal reflux     Created by Conversion     Insomnia, unspecified     Created by Conversion     Lumbago     Created by Conversion     Mammogram - Abnormal     Created by Conversion Replacement Utility updated for latest IMO load     Migraine, unspecified, without mention of intractable migraine without mention of status migrainosus     Created by Conversion     Myalgia and myositis, unspecified     Created by Conversion     Myocardial infarction  (H)     per EKG's since 2015    Obesity, unspecified     Created by Conversion     Open wound of lip, without mention of complication     Created by Conversion     Other primary cardiomyopathies     Created by Conversion     Other seborrheic keratosis     Created by Conversion     Pain in joint, lower leg     Created by Conversion     Pain in limb     Created by Conversion     Sebaceous cyst     Created by Conversion     Shortness of breath     Created by Conversion     Shoulder and upper arm, insect bite, nonvenomous, without mention of infection(912.4)     Created by Conversion     Unspecified disease of sebaceous glands     Created by Conversion     Unspecified essential hypertension     Created by Conversion     Unspecified venous (peripheral) insufficiency     Created by Conversion     Unspecified vitamin D deficiency     Created by Conversion      Patient Active Problem List   Diagnosis    Osteopenia    Insomnia    Vitamin D Deficiency    Lower Back Pain    Fibromyalgia    Essential Hypertension    Seborrheic Keratosis    Lichen Sclerosus Et Atrophicus    Migraine Headache    Esophageal Reflux    Joint Pain, Localized In The Knee    Varicose Veins    Chronic Cutaneous Ulcer Venous Stasis    Chronic venous insufficiency    Foot pain (Soft Tissue)    Female stress incontinence    Calculus of ureter    Hydronephrosis with urinary obstruction due to ureteral calculus    Other cardiomyopathy (H)    Obesity (BMI 30-39.9)    Chronic fatigue    Moderate episode of recurrent major depressive disorder (H)    Anxiety    Pain in joint, multiple sites    Osteoarthritis of lumbar spine, unspecified spinal osteoarthritis complication status    Pain in both lower legs       Past Surgical History  She has a past surgical history that includes appendectomy; Temporomandibular Joint Surgery (Bilateral); Cataract Extraction, Bilateral; Hysterectomy; Oophorectomy; Biopsy breast (Left); Breast Cyst Aspiration (Left); and tubal  "ligation.     Examination   /74 (BP Location: Right arm, Patient Position: Sitting, Cuff Size: Adult Small)   Ht 1.575 m (5' 2\")   Wt 87.5 kg (193 lb)   BMI 35.30 kg/m    Wt Readings from Last 4 Encounters:   06/11/24 87.5 kg (193 lb)   05/21/24 86.2 kg (190 lb)   03/06/24 86.5 kg (190 lb 11.2 oz)   02/28/24 87.1 kg (192 lb)      BP Readings from Last 3 Encounters:   06/11/24 136/74   05/21/24 139/88   04/12/24 (!) 142/88          GEN: Alert and oriented in no acute distress.   HEENT: mucous membranes moist  ABDOMEN: mild protuberance        Counseling:   We reviewed the important post op bariatric recommendations:  -eating 3 meals daily  -eating protein first, getting >60gm protein daily  -eating slowly, chewing food well  -avoiding/limiting calorie containing beverages  -limiting starchy vegetables and carbohydrates, choosing wheat, not white with breads,   crackers, pastas, bear, bagels, tortillas, rice  -limiting restaurant or cafeteria eating to twice a week or less    We discussed the importance of restorative sleep and stress management in maintaining a healthy weight.  We discussed the National Weight Control Registry healthy weight maintenance strategies and ways to optimize metabolism.  We discussed the importance of physical activity including cardiovascular and strength training in maintaining a healthier weight.    Total time spent on the date of this encounter doing: chart review, review of test results, patient visit, physical exam, education, counseling, developing plan of care and documenting = 36 minutes.         LATISHA Rossi MD  [facility]           "

## 2024-06-11 ENCOUNTER — OFFICE VISIT (OUTPATIENT)
Dept: SURGERY | Facility: CLINIC | Age: 79
End: 2024-06-11
Payer: COMMERCIAL

## 2024-06-11 VITALS
WEIGHT: 193 LBS | BODY MASS INDEX: 35.51 KG/M2 | DIASTOLIC BLOOD PRESSURE: 74 MMHG | HEIGHT: 62 IN | SYSTOLIC BLOOD PRESSURE: 136 MMHG

## 2024-06-11 DIAGNOSIS — E66.9 OBESITY (BMI 30-39.9): Primary | ICD-10-CM

## 2024-06-11 DIAGNOSIS — I25.5 ISCHEMIC CARDIOMYOPATHY: ICD-10-CM

## 2024-06-11 DIAGNOSIS — I10 ESSENTIAL HYPERTENSION: ICD-10-CM

## 2024-06-11 DIAGNOSIS — I25.10 CORONARY ARTERY DISEASE INVOLVING NATIVE CORONARY ARTERY OF NATIVE HEART WITHOUT ANGINA PECTORIS: ICD-10-CM

## 2024-06-11 PROCEDURE — 99214 OFFICE O/P EST MOD 30 MIN: CPT | Performed by: FAMILY MEDICINE

## 2024-06-11 RX ORDER — SEMAGLUTIDE 0.5 MG/.5ML
0.5 INJECTION, SOLUTION SUBCUTANEOUS WEEKLY
Qty: 2 ML | Refills: 1 | Status: SHIPPED | OUTPATIENT
Start: 2024-06-11 | End: 2024-07-09

## 2024-06-11 RX ORDER — SEMAGLUTIDE 0.25 MG/.5ML
0.25 INJECTION, SOLUTION SUBCUTANEOUS WEEKLY
Qty: 2 ML | Refills: 1 | Status: SHIPPED | OUTPATIENT
Start: 2024-06-11 | End: 2024-07-09

## 2024-06-11 NOTE — LETTER
6/11/2024      Katy Hong  [address] [place] [id number]      Dear Colleague,    Thank you for referring your patient, Katy Hong, to the [facility]. Please see a copy of my visit note below.    Bariatric Care Clinic Non Surgical Follow up Visit   Date of visit: 6/11/2024  Physician: LATISHA Rossi MD, MD  Primary Care is Skyler Wiggins  Katy Hong   78 year old  female     Initial Weight: 201#  Initial BMI: 37.4  Today's Weight:   Wt Readings from Last 1 Encounters:   06/11/24 87.5 kg (193 lb)     Body mass index is 35.3 kg/m .           Assessment and Plan   Assessment: Katy is a 78 year old year old female who presents for medical weight management.      Plan:    1. Obesity (BMI 30-39.9)  Patient was congratulated on her success thus far. Healthy habits to assist with further weight loss were discussed. She will try to increase her chair exercises to 5 days per week. She will continue the naltrexone for now. We will try to get wegoy approved for her.  Risks, benefits and possible side effects discussed . We discussed the patient's co-morbid conditions including hypertension and CAD. These likely will improve with healthy habits and weight loss.     2. Essential hypertension  This may improve with healthy habits and weight loss.     3. Coronary artery disease involving native coronary artery of native heart without angina pectoris  Risk for further disease will decrease with healthy habits and weight loss    4. Ischemic cardiomyopathy  Risk for further decline will decrease with healthy habits and weight loss.     Follow up In 3 months with myself           INTERIM HISTORY  Patient has been working on healthy habits for weight loss and she is doing pretty well. She is also taking naltrexone 1 tablet twice a day. She thinks that it is helping to cut back on cravings.     DIETARY HISTORY  Meals Per Day: 3  Eating Protein  First?: usually  Food Diary: B:toast or cereal and egg and coffee L:fruit and sometimes sandwich D:meat and vegetables and occasional potato  Snacks Per Day: one  Typical Snack: fruit  Fluid Intake: struggles, incontinence  Portion Control: sometimes larger at dinner  Calorie Containing Beverages: occasional iced tea or lemonade     Meals at Restaurant per week:0-1    Positive Changes Since Last Visit: portion control, healthy food choices  Struggling With: water intake    Knowledgeable in Reading Food Labels: yes  Getting Adequate Protein: sometimes      PHYSICAL ACTIVITY PATTERNS:  Chair exercises 3 days a week    REVIEW OF SYSTEMS  GENERAL/CONSTITUTIONAL:  Fatigue: sometimes  HEENT:   glaucoma: no  CARDIOVASCULAR:  History of heart disease:yes  GI:  PSYCHIATRIC:  Moods: fairly stable  MUSCULOSKELETAL/RHEUMATOLOGIC  Arthralgias: yes  Myalgias: yes  ENDOCRINE:  Monitoring Blood Sugars: no  Sugars Well Controlled: na  No personal or family history of medullary thyroid cancer no  No personal or family history of MEN2   :  Birth control: menopause  History of kidney stones: yes     Patient Profile   Social History     Social History Narrative    5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          Past Medical History   Past Medical History:   Diagnosis Date     Abnormal mammogram, unspecified     Created by Conversion      Acute sinusitis, unspecified     Created by Conversion      Asymptomatic varicose veins     Created by Conversion      Breast cyst      Cellulitis and abscess of unspecified site     Created by Conversion      Circumscribed scleroderma     Created by Conversion      Contusion of unspecified site     Created by Conversion      Disorder of bone and cartilage, unspecified     Created by Conversion      Dysthymic disorder     Created by Conversion      Esophageal reflux     Created by Conversion      Insomnia, unspecified     Created by Conversion      Lumbago     Created by Conversion      Mammogram - Abnormal      Created by Conversion Replacement Utility updated for latest IMO load      Migraine, unspecified, without mention of intractable migraine without mention of status migrainosus     Created by Conversion      Myalgia and myositis, unspecified     Created by Conversion      Myocardial infarction (H)     per EKG's since 2015     Obesity, unspecified     Created by Conversion      Open wound of lip, without mention of complication     Created by Conversion      Other primary cardiomyopathies     Created by Conversion      Other seborrheic keratosis     Created by Conversion      Pain in joint, lower leg     Created by Conversion      Pain in limb     Created by Conversion      Sebaceous cyst     Created by Conversion      Shortness of breath     Created by Conversion      Shoulder and upper arm, insect bite, nonvenomous, without mention of infection(912.4)     Created by Conversion      Unspecified disease of sebaceous glands     Created by Conversion      Unspecified essential hypertension     Created by Conversion      Unspecified venous (peripheral) insufficiency     Created by Conversion      Unspecified vitamin D deficiency     Created by Conversion      Patient Active Problem List   Diagnosis     Osteopenia     Insomnia     Vitamin D Deficiency     Lower Back Pain     Fibromyalgia     Essential Hypertension     Seborrheic Keratosis     Lichen Sclerosus Et Atrophicus     Migraine Headache     Esophageal Reflux     Joint Pain, Localized In The Knee     Varicose Veins     Chronic Cutaneous Ulcer Venous Stasis     Chronic venous insufficiency     Foot pain (Soft Tissue)     Female stress incontinence     Calculus of ureter     Hydronephrosis with urinary obstruction due to ureteral calculus     Other cardiomyopathy (H)     Obesity (BMI 30-39.9)     Chronic fatigue     Moderate episode of recurrent major depressive disorder (H)     Anxiety     Pain in joint, multiple sites     Osteoarthritis of lumbar spine, unspecified  "spinal osteoarthritis complication status     Pain in both lower legs       Past Surgical History  She has a past surgical history that includes appendectomy; Temporomandibular Joint Surgery (Bilateral); Cataract Extraction, Bilateral; Hysterectomy; Oophorectomy; Biopsy breast (Left); Breast Cyst Aspiration (Left); and tubal ligation.     Examination   /74 (BP Location: Right arm, Patient Position: Sitting, Cuff Size: Adult Small)   Ht 1.575 m (5' 2\")   Wt 87.5 kg (193 lb)   BMI 35.30 kg/m    Wt Readings from Last 4 Encounters:   06/11/24 87.5 kg (193 lb)   05/21/24 86.2 kg (190 lb)   03/06/24 86.5 kg (190 lb 11.2 oz)   02/28/24 87.1 kg (192 lb)      BP Readings from Last 3 Encounters:   06/11/24 136/74   05/21/24 139/88   04/12/24 (!) 142/88          GEN: Alert and oriented in no acute distress.   HEENT: mucous membranes moist  ABDOMEN: mild protuberance        Counseling:   We reviewed the important post op bariatric recommendations:  -eating 3 meals daily  -eating protein first, getting >60gm protein daily  -eating slowly, chewing food well  -avoiding/limiting calorie containing beverages  -limiting starchy vegetables and carbohydrates, choosing wheat, not white with breads,   crackers, pastas, bear, bagels, tortillas, rice  -limiting restaurant or cafeteria eating to twice a week or less    We discussed the importance of restorative sleep and stress management in maintaining a healthy weight.  We discussed the National Weight Control Registry healthy weight maintenance strategies and ways to optimize metabolism.  We discussed the importance of physical activity including cardiovascular and strength training in maintaining a healthier weight.    Total time spent on the date of this encounter doing: chart review, review of test results, patient visit, physical exam, education, counseling, developing plan of care and documenting = 36 minutes.         LATISHA Rossi MD  [facility]           I, Katy Hong, give verbal consent for a resident to be present in today's visit.       Again, thank you for allowing me to participate in the care of your patient.        Sincerely,        LATISHA Rossi MD

## 2024-06-11 NOTE — PATIENT INSTRUCTIONS
Your provider prescribed wegovy weekly injections to help assist you with your weight loss efforts. Please follow the instructions on your prescription as the dose will be gradually tapered up. Do not use if you have a personal or family history of medullary thyroid carcinoma or a personal history of Multiple Endocrine Neoplasia syndrome type 2. Pancreatitis has occurred in some clinical trials. If you develop abdominal pain and fever please stop this medication and call your provider.     1.  Start Wegovy (semaglutide) 0.25 mg once weekly for 4 weeks, then if tolerating increase to 0.5 mg weekly for 4 weeks, then if tolerating increase to 1 mg weekly for 4 weeks, then if tolerating increase to 1.7 mg weekly for 4 weeks, then if tolerating increase to 2.4 mg weekly thereafter.   -Each Wegovy pen is a different color to help identify the different dose strengths   -Each Wegovy pen is a once weekly single-dose prefilled pen with a pen injector already built within the pen. Discard the Wegovy pen after use in sharps container.     If you are struggling with side effects you can taper the dose up more slowly.    2. Storage: make sure that when you get the prescription that you store the prescription in the refrigerator until it is time to use the Wegovy pen.  Once it is time to use the Wegovy pen, you can keep the pen at room temperature and it is good for up to 28 days at room temperature. D    3.  Potential common side effects: nausea, headache, diarrhea, stomach upset.  If these become unmanageable or concerning symptoms, please make sure to call or mychart.        Using Your Wegovy Pen  Medicine (semaglutide)    Storing your pens  Store your pens in the refrigerator until you are ready to use them. Don't let them freeze.  Your pen may be stored at room temperature for 28 days or fewer. Just make sure the temperature doesn't get higher than 86 or lower than 46 degrees Fahrenheit.   Protect the pens from  light.  Never use any pens that have .    Check your pen before use:  The liquid in the pen window should be clear and colorless. Bubbles are okay to see.   Do not use the pen if you can see the window contains any specks or it is cloudy or has changed color.  Make sure you have the medication and dose your health care provider prescribed.    Getting ready to inject:   1. Wash and dry your hands well.  2. Make sure the counter you use to place your supplies on is clean.  3. Make sure your injection time will not be interrupted by children or pets.  4. Have alcohol wipes or alcohol and cotton balls available to clean the injection site.   5. Choose your injection site. It can be on your stomach, back of upper arms, or upper legs. Remember to change your injection site each time you inject. Try to be at least 1 inch away from the previous one. Stay at least 1 inch away from your belly button.     Inject your dose:  1. Pull off the grey cap off the pen. Throw it in the trash. Do not put the cap back on the pen.   2. Clean the injection site with alcohol.   3. Push the grey part of the pen firmly against your skin. This will start the injection.  4. When the pen is injecting, you will hear 2 clicks. The first click tells you the injection has started. The second one tells you the injection is continuing.   5. The injection is done when the yellow bar in the glass window at the bottom of the pen has stopped moving.   6. This injection is given 1 day a week. The pens come in  5 doses ranging from 0.25 mg to 2.4 mg. Each dose comes in a different color pen.  7. If you miss a dose, take is as soon as you remember. Do not take a missed dose, if it is within 2 days of the next dose.    8. Your injection may be best tolerated if given at night.     Disposing of your pens:  Dispose of your pens in a puncture-resistant container (hard plastic bottle) or Sharps container.  Check with the [facility] you live in on how to  dispose of the container.  Do not recycle the container with used pens.     Of note, you may not be able to  your medication right away. It may require a prior authorization from your insurance company. This may take a week or more.

## 2024-06-17 ENCOUNTER — TELEPHONE (OUTPATIENT)
Dept: SURGERY | Facility: CLINIC | Age: 79
End: 2024-06-17
Payer: COMMERCIAL

## 2024-06-17 DIAGNOSIS — M79.7 FIBROMYALGIA: ICD-10-CM

## 2024-06-17 RX ORDER — DULOXETIN HYDROCHLORIDE 60 MG/1
60 CAPSULE, DELAYED RELEASE ORAL 2 TIMES DAILY
Qty: 180 CAPSULE | Refills: 0 | OUTPATIENT
Start: 2024-06-17

## 2024-06-17 NOTE — TELEPHONE ENCOUNTER
Prior Authorization Retail Medication Request    Medication/Dose: Wegovy titrating dose, starting at 0.25mg injections weekly for a month and then increasing monthly if tolerated to a max dose of 2.4mg (0.25mg, 0.5mg, 1mg, 1.7mg, 2.4mg)    Rationale:  Wegovy along with medical nutrition therapy is reasonable for Katy Hong as a means of weight reduction.   New/renewal/insurance change PA/secondary ins. PA: New  Previously Tried and Failed:  naltrexone for cravings  Rationale:  Will try adding the Wegovy    Insurance   Primary: [facility]  Insurance ID:  [id number]    Pharmacy Information (if different than what is on RX)  Name:  [facility]  Phone:  [phone]  Fax:[phone]

## 2024-06-23 NOTE — TELEPHONE ENCOUNTER
PA Initiation    Medication: WEGOVY 0.25 MG/0.5ML SC SOAJ  Insurance Company: [employer] - Phone [phone] Fax [phone]  Ph[facility]illing [id number] [place] [facility] - [place], [place] - [address]  Filling Pharmacy Phone: [phone]  Filling Pharmacy Fax:    Start Date: 6/23/2024

## 2024-06-24 NOTE — TELEPHONE ENCOUNTER
Prior Authorization Approval    Medication: WEGOVY 0.25 MG/0.5ML SC SOAJ  Authorization Effective Date: 6/23/2024  Authorization Expiration Date: 6/23/2025  Approved Dose/Quantity: 2/28  Reference #: [id number]   Insurance Company: [employer] - Phone [phone] Fax [phone]  Expected CoPay: $    CoPay Card Available:      Financial Assistance Needed:   Which [facility] is filling the [id number] [address]  Pharmacy Notified: Yes  Patient Notified: Yes

## 2024-07-11 ENCOUNTER — ALLIED HEALTH/NURSE VISIT (OUTPATIENT)
Dept: FAMILY MEDICINE | Facility: CLINIC | Age: 79
End: 2024-07-11
Payer: COMMERCIAL

## 2024-07-11 DIAGNOSIS — Z51.81 ENCOUNTER FOR THERAPEUTIC DRUG MONITORING: Primary | ICD-10-CM

## 2024-07-11 LAB
ATRIAL RATE - MUSE: 71 BPM
DIASTOLIC BLOOD PRESSURE - MUSE: NORMAL MMHG
INTERPRETATION ECG - MUSE: NORMAL
P AXIS - MUSE: 55 DEGREES
PR INTERVAL - MUSE: 160 MS
QRS DURATION - MUSE: 80 MS
QT - MUSE: 406 MS
QTC - MUSE: 441 MS
R AXIS - MUSE: -44 DEGREES
SYSTOLIC BLOOD PRESSURE - MUSE: NORMAL MMHG
T AXIS - MUSE: 25 DEGREES
VENTRICULAR RATE- MUSE: 71 BPM

## 2024-07-11 PROCEDURE — 93005 ELECTROCARDIOGRAM TRACING: CPT

## 2024-07-11 PROCEDURE — 99207 PR NO CHARGE NURSE ONLY: CPT

## 2024-07-11 PROCEDURE — 93010 ELECTROCARDIOGRAM REPORT: CPT | Performed by: INTERNAL MEDICINE

## 2024-07-20 DIAGNOSIS — M79.7 FIBROMYALGIA: ICD-10-CM

## 2024-07-22 ENCOUNTER — NURSE TRIAGE (OUTPATIENT)
Dept: FAMILY MEDICINE | Facility: CLINIC | Age: 79
End: 2024-07-22
Payer: COMMERCIAL

## 2024-07-22 RX ORDER — DULOXETIN HYDROCHLORIDE 60 MG/1
60 CAPSULE, DELAYED RELEASE ORAL 2 TIMES DAILY
Qty: 180 CAPSULE | Refills: 2 | Status: SHIPPED | OUTPATIENT
Start: 2024-07-22

## 2024-07-22 NOTE — TELEPHONE ENCOUNTER
Reason for Call:  Appointment Request    Patient requesting this type of appt:  Pt has had a cough for almost a month, OTC medications did not work    Requested provider: Skyler Wiggins    Reason patient unable to be scheduled: Not within requested timeframe    When does patient want to be seen/preferred time: 1-2 days    Comments: Pt does not have a fever, in home Covid test was done twice, both negative     Could we send this information to you in [facility] or would you prefer to receive a phone call?:   Patient would prefer a phone call   Okay to leave a detailed message?: Yes at Cell number on file:    Telephone Information:   Mobile [phone]       Call taken on 7/22/2024 at 4:48 PM by Loraine Simmons

## 2024-07-23 ENCOUNTER — OFFICE VISIT (OUTPATIENT)
Dept: PEDIATRICS | Facility: CLINIC | Age: 79
End: 2024-07-23
Payer: COMMERCIAL

## 2024-07-23 ENCOUNTER — ANCILLARY PROCEDURE (OUTPATIENT)
Dept: GENERAL RADIOLOGY | Facility: CLINIC | Age: 79
End: 2024-07-23
Attending: STUDENT IN AN ORGANIZED HEALTH CARE EDUCATION/TRAINING PROGRAM
Payer: COMMERCIAL

## 2024-07-23 ENCOUNTER — OFFICE VISIT (OUTPATIENT)
Dept: FAMILY MEDICINE | Facility: CLINIC | Age: 79
End: 2024-07-23
Payer: COMMERCIAL

## 2024-07-23 ENCOUNTER — HOSPITAL ENCOUNTER (OUTPATIENT)
Dept: CT IMAGING | Facility: HOSPITAL | Age: 79
Discharge: HOME | End: 2024-07-23
Attending: EMERGENCY MEDICINE | Admitting: EMERGENCY MEDICINE
Payer: COMMERCIAL

## 2024-07-23 VITALS
RESPIRATION RATE: 18 BRPM | TEMPERATURE: 97.6 F | DIASTOLIC BLOOD PRESSURE: 91 MMHG | SYSTOLIC BLOOD PRESSURE: 168 MMHG | OXYGEN SATURATION: 98 % | HEART RATE: 98 BPM

## 2024-07-23 VITALS
TEMPERATURE: 97.8 F | OXYGEN SATURATION: 96 % | RESPIRATION RATE: 22 BRPM | DIASTOLIC BLOOD PRESSURE: 85 MMHG | HEART RATE: 114 BPM | SYSTOLIC BLOOD PRESSURE: 138 MMHG

## 2024-07-23 DIAGNOSIS — R05.1 ACUTE COUGH: ICD-10-CM

## 2024-07-23 DIAGNOSIS — R07.9 CHEST PAIN, UNSPECIFIED TYPE: ICD-10-CM

## 2024-07-23 DIAGNOSIS — R06.02 SHORTNESS OF BREATH: ICD-10-CM

## 2024-07-23 DIAGNOSIS — R79.89 ELEVATED D-DIMER: ICD-10-CM

## 2024-07-23 DIAGNOSIS — B35.4 TINEA CORPORIS: ICD-10-CM

## 2024-07-23 DIAGNOSIS — R09.1 PLEURISY: Primary | ICD-10-CM

## 2024-07-23 DIAGNOSIS — R05.1 ACUTE COUGH: Primary | ICD-10-CM

## 2024-07-23 LAB
ALBUMIN SERPL-MCNC: 3.9 G/DL (ref 3.4–5)
ALP SERPL-CCNC: 76 U/L (ref 40–150)
ALT SERPL W P-5'-P-CCNC: 14 U/L (ref 0–50)
ANION GAP SERPL CALCULATED.3IONS-SCNC: 13 MMOL/L (ref 3–14)
AST SERPL W P-5'-P-CCNC: 21 U/L (ref 0–45)
BASOPHILS # BLD AUTO: 0 10E3/UL (ref 0–0.2)
BASOPHILS # BLD AUTO: 0 10E3/UL (ref 0–0.2)
BASOPHILS NFR BLD AUTO: 0 %
BASOPHILS NFR BLD AUTO: 0 %
BILIRUB SERPL-MCNC: 0.7 MG/DL (ref 0.2–1.3)
BUN SERPL-MCNC: 9 MG/DL (ref 7–30)
CALCIUM SERPL-MCNC: 9.6 MG/DL (ref 8.5–10.1)
CHLORIDE BLD-SCNC: 104 MMOL/L (ref 94–109)
CO2 SERPL-SCNC: 28 MMOL/L (ref 20–32)
CREAT BLD-MCNC: 1 MG/DL (ref 0.6–1.1)
CREAT SERPL-MCNC: 1 MG/DL (ref 0.52–1.04)
D DIMER PPP FEU-MCNC: 3.4 UG/ML FEU (ref 0–0.5)
EGFRCR SERPLBLD CKD-EPI 2021: 58 ML/MIN/1.73M2
EGFRCR SERPLBLD CKD-EPI 2021: 58 ML/MIN/1.73M2
EOSINOPHIL # BLD AUTO: 0.1 10E3/UL (ref 0–0.7)
EOSINOPHIL # BLD AUTO: 0.1 10E3/UL (ref 0–0.7)
EOSINOPHIL NFR BLD AUTO: 1 %
EOSINOPHIL NFR BLD AUTO: 1 %
ERYTHROCYTE [DISTWIDTH] IN BLOOD BY AUTOMATED COUNT: 13.7 % (ref 10–15)
ERYTHROCYTE [DISTWIDTH] IN BLOOD BY AUTOMATED COUNT: 13.9 % (ref 10–15)
FLUAV RNA SPEC QL NAA+PROBE: NEGATIVE
FLUBV RNA RESP QL NAA+PROBE: NEGATIVE
GLUCOSE BLD-MCNC: 98 MG/DL (ref 70–99)
HCT VFR BLD AUTO: 39.3 % (ref 35–47)
HCT VFR BLD AUTO: 40.6 % (ref 35–47)
HGB BLD-MCNC: 12.9 G/DL (ref 11.7–15.7)
HGB BLD-MCNC: 13 G/DL (ref 11.7–15.7)
IMM GRANULOCYTES # BLD: 0 10E3/UL
IMM GRANULOCYTES # BLD: 0 10E3/UL
IMM GRANULOCYTES NFR BLD: 0 %
IMM GRANULOCYTES NFR BLD: 0 %
LYMPHOCYTES # BLD AUTO: 1.6 10E3/UL (ref 0.8–5.3)
LYMPHOCYTES # BLD AUTO: 1.8 10E3/UL (ref 0.8–5.3)
LYMPHOCYTES NFR BLD AUTO: 19 %
LYMPHOCYTES NFR BLD AUTO: 23 %
MCH RBC QN AUTO: 30.1 PG (ref 26.5–33)
MCH RBC QN AUTO: 30.4 PG (ref 26.5–33)
MCHC RBC AUTO-ENTMCNC: 31.8 G/DL (ref 31.5–36.5)
MCHC RBC AUTO-ENTMCNC: 33.1 G/DL (ref 31.5–36.5)
MCV RBC AUTO: 92 FL (ref 78–100)
MCV RBC AUTO: 95 FL (ref 78–100)
MONOCYTES # BLD AUTO: 0.8 10E3/UL (ref 0–1.3)
MONOCYTES # BLD AUTO: 0.9 10E3/UL (ref 0–1.3)
MONOCYTES NFR BLD AUTO: 10 %
MONOCYTES NFR BLD AUTO: 11 %
NEUTROPHILS # BLD AUTO: 5.3 10E3/UL (ref 1.6–8.3)
NEUTROPHILS # BLD AUTO: 5.8 10E3/UL (ref 1.6–8.3)
NEUTROPHILS NFR BLD AUTO: 66 %
NEUTROPHILS NFR BLD AUTO: 70 %
PLATELET # BLD AUTO: 243 10E3/UL (ref 150–450)
PLATELET # BLD AUTO: 267 10E3/UL (ref 150–450)
POTASSIUM BLD-SCNC: 3.3 MMOL/L (ref 3.4–5.3)
PROT SERPL-MCNC: 6.9 G/DL (ref 6.8–8.8)
RBC # BLD AUTO: 4.28 10E6/UL (ref 3.8–5.2)
RBC # BLD AUTO: 4.29 10E6/UL (ref 3.8–5.2)
RSV RNA SPEC NAA+PROBE: NEGATIVE
SARS-COV-2 RNA RESP QL NAA+PROBE: POSITIVE
SODIUM SERPL-SCNC: 145 MMOL/L (ref 135–145)
TROPONIN T SERPL HS-MCNC: 16 NG/L
WBC # BLD AUTO: 8.1 10E3/UL (ref 4–11)
WBC # BLD AUTO: 8.3 10E3/UL (ref 4–11)

## 2024-07-23 PROCEDURE — 250N000011 HC RX IP 250 OP 636: Performed by: EMERGENCY MEDICINE

## 2024-07-23 PROCEDURE — 85025 COMPLETE CBC W/AUTO DIFF WBC: CPT | Performed by: STUDENT IN AN ORGANIZED HEALTH CARE EDUCATION/TRAINING PROGRAM

## 2024-07-23 PROCEDURE — 93005 ELECTROCARDIOGRAM TRACING: CPT | Performed by: EMERGENCY MEDICINE

## 2024-07-23 PROCEDURE — 80053 COMPREHEN METABOLIC PANEL: CPT | Performed by: EMERGENCY MEDICINE

## 2024-07-23 PROCEDURE — 85025 COMPLETE CBC W/AUTO DIFF WBC: CPT | Performed by: EMERGENCY MEDICINE

## 2024-07-23 PROCEDURE — 84484 ASSAY OF TROPONIN QUANT: CPT | Performed by: EMERGENCY MEDICINE

## 2024-07-23 PROCEDURE — 99215 OFFICE O/P EST HI 40 MIN: CPT | Mod: 25 | Performed by: EMERGENCY MEDICINE

## 2024-07-23 PROCEDURE — 99207 REFERRAL TO ACUTE AND DIAGNOSTIC SERVICES: CPT | Performed by: STUDENT IN AN ORGANIZED HEALTH CARE EDUCATION/TRAINING PROGRAM

## 2024-07-23 PROCEDURE — 93010 ELECTROCARDIOGRAM REPORT: CPT | Performed by: INTERNAL MEDICINE

## 2024-07-23 PROCEDURE — 87637 SARSCOV2&INF A&B&RSV AMP PRB: CPT | Performed by: EMERGENCY MEDICINE

## 2024-07-23 PROCEDURE — 71275 CT ANGIOGRAPHY CHEST: CPT

## 2024-07-23 PROCEDURE — 85379 FIBRIN DEGRADATION QUANT: CPT | Performed by: STUDENT IN AN ORGANIZED HEALTH CARE EDUCATION/TRAINING PROGRAM

## 2024-07-23 PROCEDURE — 71046 X-RAY EXAM CHEST 2 VIEWS: CPT | Mod: TC | Performed by: STUDENT IN AN ORGANIZED HEALTH CARE EDUCATION/TRAINING PROGRAM

## 2024-07-23 PROCEDURE — 250N000009 HC RX 250: Performed by: EMERGENCY MEDICINE

## 2024-07-23 PROCEDURE — 82565 ASSAY OF CREATININE: CPT

## 2024-07-23 PROCEDURE — 36415 COLL VENOUS BLD VENIPUNCTURE: CPT | Performed by: STUDENT IN AN ORGANIZED HEALTH CARE EDUCATION/TRAINING PROGRAM

## 2024-07-23 RX ORDER — OXYCODONE HYDROCHLORIDE 5 MG/1
5 TABLET ORAL EVERY 6 HOURS PRN
Qty: 30 TABLET | Refills: 0 | Status: SHIPPED | OUTPATIENT
Start: 2024-07-23

## 2024-07-23 RX ORDER — NYSTATIN 100000 [USP'U]/G
POWDER TOPICAL 2 TIMES DAILY
Qty: 60 G | Refills: 0 | Status: SHIPPED | OUTPATIENT
Start: 2024-07-23 | End: 2024-08-06

## 2024-07-23 RX ORDER — KETOROLAC TROMETHAMINE 30 MG/ML
15 INJECTION, SOLUTION INTRAMUSCULAR; INTRAVENOUS ONCE
Status: COMPLETED | OUTPATIENT
Start: 2024-07-23 | End: 2024-07-23

## 2024-07-23 RX ORDER — OXYCODONE HYDROCHLORIDE 5 MG/1
5 TABLET ORAL EVERY 6 HOURS PRN
Qty: 12 TABLET | Refills: 0 | Status: SHIPPED | OUTPATIENT
Start: 2024-07-23 | End: 2024-07-23

## 2024-07-23 RX ORDER — APIXABAN 5 MG (74)
KIT ORAL
Qty: 74 EACH | Refills: 0 | Status: SHIPPED | OUTPATIENT
Start: 2024-07-23 | End: 2024-08-22

## 2024-07-23 RX ORDER — PREDNISONE 20 MG/1
20 TABLET ORAL DAILY
Qty: 5 TABLET | Refills: 0 | Status: SHIPPED | OUTPATIENT
Start: 2024-07-23 | End: 2024-07-28

## 2024-07-23 RX ORDER — BENZONATATE 100 MG/1
100 CAPSULE ORAL 3 TIMES DAILY PRN
Qty: 15 CAPSULE | Refills: 0 | Status: SHIPPED | OUTPATIENT
Start: 2024-07-23 | End: 2024-07-28

## 2024-07-23 RX ORDER — ACETAMINOPHEN 500 MG
1000 TABLET ORAL ONCE
Status: COMPLETED | OUTPATIENT
Start: 2024-07-23 | End: 2024-07-23

## 2024-07-23 RX ORDER — IOPAMIDOL 755 MG/ML
67 INJECTION, SOLUTION INTRAVASCULAR ONCE
Status: COMPLETED | OUTPATIENT
Start: 2024-07-23 | End: 2024-07-23

## 2024-07-23 RX ADMIN — Medication 1000 MG: at 16:44

## 2024-07-23 RX ADMIN — KETOROLAC TROMETHAMINE 15 MG: 30 INJECTION, SOLUTION INTRAMUSCULAR; INTRAVENOUS at 15:13

## 2024-07-23 RX ADMIN — IOPAMIDOL 67 ML: 755 INJECTION, SOLUTION INTRAVENOUS at 16:12

## 2024-07-23 RX ADMIN — SODIUM CHLORIDE 90 ML: 9 INJECTION, SOLUTION INTRAVENOUS at 16:28

## 2024-07-23 NOTE — PROGRESS NOTES
[facility] Clinic Visit    Nursing Triage Note    Subjective   Katy is a 78 year old, presenting for the following health issues:  Shortness of Breath    Shortness of Breath/Breathing Problem  Onset/Duration: last week   Progression of symptoms: worsening  Accompanying signs and symptoms:       SOB at rest: YES       SOB with activity: YES       Pain with inspiration: YES       Cough: YES       Pink tinged sputum: No       Sweating: No       Nausea/vomiting: No       Lightheadedness: YES       Palpitations: No       Fever/chills: No       Heartburn: No  History   Family history of coagulation disorders: No  Tobacco use: No  Previous similar symptoms: no   Precipitating factors:  Related to eating: No  Better with burping: No  Therapies tried and outcome: N/A    Objective    /85 (BP Location: Right arm, Patient Position: Sitting, Cuff Size: Adult Large)   Pulse 114   Temp 97.8  F (36.6  C) (Oral)   Resp 22   SpO2 96%   There is no height or weight on file to calculate BMI.          [facility] PROVIDER NOTE  [facility]      History     Chief Complaint   Patient presents with    Shortness of Breath     HPI  Katy Hong is a 78 year old female who got off a plane on June 28 and developed a cough that was nonproductive in nature along with her  and child.  Patient states that she tested herself twice for COVID at home and both were negative.  Patient states that she now has developed pleuritic chest pain with breathing and coughing.  Patient was seen yesterday by her psychiatrist whom she sees for depression, and it was decided that she should be seen in the clinic this morning.  Patient had a chest x-ray which was unremarkable but a D-dimer that was 3.4.  Patient has no historical D-dimers.  Patient denies any leg swelling or pain and presents to the [facility] center for further evaluation.  Patient states her pain is sharp in nature and exacerbated by deep breath.  Patient  complains of shortness of breath and presents to the [facility] hyperventilating.    Patient denies any previous heart history as far as ACS, stents, angiograms, previous stress test.  Patient states she does have a history of hypertension but no diabetes no hypercholesterolemia or other risk factors for cardiac disease.  Previous echocardiogram in epic from September of last year reveals an ejection fraction of 55 to 60% with mild concentric LVH; there were no wall motion normalities.  Patient did have a CT CORONARY angiogram in 2017 which revealed no significant coronary disease.      CXR from Clinic:  EXAM: XR CHEST 2 VIEWS  LOCATION: [facility]  DATE: 7/23/2024     INDICATION: bilateral rhonchi, SOB for 1mo, eval for pneumonia or effusion  COMPARISON: None.                                                              IMPRESSION: Lungs are clear without focal airspace opacity. No pleural effusion or pneumothorax. Cardiac silhouette and mediastinal contours are normal.      I have reviewed the Medications, Allergies, Past Medical and Surgical History, and Social History in the [facility] system.    Past Medical History:   Diagnosis Date    Abnormal mammogram, unspecified     Created by Conversion     Acute sinusitis, unspecified     Created by Conversion     Asymptomatic varicose veins     Created by Conversion     Breast cyst     Cellulitis and abscess of unspecified site     Created by Conversion     Circumscribed scleroderma     Created by Conversion     Contusion of unspecified site     Created by Conversion     Disorder of bone and cartilage, unspecified     Created by Conversion     Dysthymic disorder     Created by Conversion     Esophageal reflux     Created by Conversion     Insomnia, unspecified     Created by Conversion     Lumbago     Created by Conversion     Mammogram - Abnormal     Created by Conversion Replacement Utility updated for latest IMO load     Migraine, unspecified, without  mention of intractable migraine without mention of status migrainosus     Created by Conversion     Myalgia and myositis, unspecified     Created by Conversion     Myocardial infarction (H)     per EKG's since 2015    Obesity, unspecified     Created by Conversion     Open wound of lip, without mention of complication     Created by Conversion     Other primary cardiomyopathies     Created by Conversion     Other seborrheic keratosis     Created by Conversion     Pain in joint, lower leg     Created by Conversion     Pain in limb     Created by Conversion     Sebaceous cyst     Created by Conversion     Shortness of breath     Created by Conversion     Shoulder and upper arm, insect bite, nonvenomous, without mention of infection(912.4)     Created by Conversion     Unspecified disease of sebaceous glands     Created by Conversion     Unspecified essential hypertension     Created by Conversion     Unspecified venous (peripheral) insufficiency     Created by Conversion     Unspecified vitamin D deficiency     Created by Conversion        Past Surgical History:   Procedure Laterality Date    APPENDECTOMY      BIOPSY BREAST Left     BREAST CYST ASPIRATION Left     CATARACT EXTRACTION, BILATERAL      HYSTERECTOMY      OOPHORECTOMY      TEMPOROMANDIBULAR JOINT SURGERY Bilateral     TUBAL LIGATION         Dose / Directions   benzonatate 100 MG capsule  Commonly known as: TESSALON  Used for: Acute cough  Started by: Mary Anne Copelas      Dose: 100 mg  Take 1 capsule (100 mg) by mouth 3 times daily as needed for cough  Quantity: 15 capsule  Refills: 0     predniSONE 20 MG tablet  Commonly known as: DELTASONE  Used for: Acute cough  Started by: Mary Anne Copelas      Dose: 20 mg  Take 1 tablet (20 mg) by mouth daily for 5 days  Quantity: 5 tablet  Refills: 0     sodium chloride 0.65 % nasal spray  Commonly known as: OCEAN  Used for: Acute cough  Started by: Mary Anne Copelas      Dose: 1 spray  Spray 1 spray in nostril 3 times daily  as needed for congestion  Quantity: 30 mL  Refills: 0            CONTINUE these medicines which have NOT CHANGED        Dose / Directions   aspirin 81 MG EC tablet  Commonly known as: ASA      Dose: 81 mg  [ASPIRIN 81 MG EC TABLET] Take 81 mg by mouth daily.  Refills: 0     busPIRone 15 MG tablet  Commonly known as: BUSPAR  Used for: Anxiety      Dose: 15 mg  Take 1 tablet (15 mg) by mouth 2 times daily  Quantity: 180 tablet  Refills: 1     DULoxetine 60 MG capsule  Commonly known as: CYMBALTA  Used for: Fibromyalgia      Dose: 60 mg  TAKE 1 CAPSULE BY MOUTH 2 TIMES DAILY.  Quantity: 180 capsule  Refills: 2     famotidine 20 MG tablet  Commonly known as: PEPCID  Used for: Gastroesophageal reflux disease without esophagitis      Dose: 20 mg  TAKE 1 TABLET BY MOUTH TWICE A DAY  Quantity: 180 tablet  Refills: 0     gabapentin 300 MG capsule  Commonly known as: NEURONTIN  Used for: Fibromyalgia      TAKE 1 CAPSULE BY MOUTH DAILY IN THE MORNING, 1 CAPSULE IN THE AFTERNOON, & 2 CAPSULES AT BEDTIME  Quantity: 360 capsule  Refills: 2     losartan 25 MG tablet  Commonly known as: COZAAR  Used for: Essential hypertension, benign      Dose: 25 mg  Take 1 tablet (25 mg) by mouth daily  Quantity: 90 tablet  Refills: 3     METHYLFOLATE PO      Refills: 0     metoprolol succinate ER 50 MG 24 hr tablet  Commonly known as: TOPROL XL  Used for: Essential hypertension      Dose: 50 mg  Take 1 tablet (50 mg) by mouth daily  Quantity: 90 tablet  Refills: 3     mometasone 50 MCG/ACT nasal spray  Commonly known as: NASONEX  Used for: Nasal congestion      Dose: 2 spray  INSTILL 2 SPRAYS INTO BOTH NOSTRILS DAILY.  Quantity: 25.5 g  Refills: 2     naltrexone 50 MG tablet  Commonly known as: DEPADE/REVIA  Used for: Obesity (BMI 30-39.9)      Take 1 tablet in the morning for 1 week then 1 tablet twice a day. No not take with opiods.  Quantity: 180 tablet  Refills: 1     QUEtiapine 100 MG tablet  Commonly known as: SEROquel      Dose: 100  mg  Take 100 mg by mouth At Bedtime  Refills: 0     rizatriptan 10 MG ODT  Commonly known as: MAXALT-MLT  Used for: Migraine, unspecified, not intractable, without status migrainosus      TAKE 1 TABLET BY MOUTH AT ONSET OF HEADACHE FOR MIGRAINE MAY REPEAT IN 2 HOURS. MAX 3 TABS/24 HOURS  Quantity: 30 tablet  Refills: 3     Spikevax 50 MCG/0.5ML injection  Generic drug: COVID-19 mRNA Vacc      Refills: 0     traZODone 150 MG tablet  Commonly known as: DESYREL      TAKE 1/2 TABLET BY MOUTH AT BEDTIME AS NEEDED  Refills: 0     triamcinolone 0.1 % external ointment  Commonly known as: KENALOG  Used for: Atrophic vaginitis      APPLY TO AFFECTED AREA TWICE A DAY  Quantity: 30 g  Refills: 2     Turmeric 500 MG Caps      Refills: 0     vitamin D3 125 MCG (5000 UT) tablet  Commonly known as: CHOLECALCIFEROL      [CHOLECALCIFEROL, VITAMIN D3, 5,000 UNIT TAB] Take by mouth.  Refills: 0         Past medical history, past surgical history, medications, and allergies were reviewed with the patient. Additional pertinent items: None    Family History   Problem Relation Age of Onset    Hypertension Mother     Arthritis Mother     Alzheimer Disease Father         d. 76    Diabetes Father     Hypertension Father     Breast Cancer Maternal Aunt 60.00    Obesity Maternal Aunt     Hypertension Sister     Osteopenia Sister     Varicose Veins Brother     Obesity Brother     Breast Cancer Cousin     Obesity Maternal Uncle     Arthritis Maternal Grandmother        Social History     Tobacco Use    Smoking status: Never     Passive exposure: Past    Smokeless tobacco: Never   Substance Use Topics    Alcohol use: Yes     Alcohol/week: 0.0 standard drinks of alcohol     Comment: Alcoholic Drinks/day: occasional     Social history was reviewed with the patient. Additional pertinent items: None    Allergies   Allergen Reactions    Fluoxetine Cough and Rash     Pt thinks it was cough but not totally sure.    Latex Rash    Levothyroxine Rash     Lisinopril Cough and Rash    Sulfa (Sulfonamide Antibiotics) [Sulfa Antibiotics] Itching and Rash       Review of Systems  A medically appropriate review of systems was performed with pertinent positives and negatives noted in the HPI, and all other systems negative.    Physical Exam   BP: 138/85  Pulse: 114  Temp: 97.8  F (36.6  C)  Resp: 22  SpO2: 96 %      Physical Exam  Vitals and nursing note reviewed.   Constitutional:       Comments: Elderly holding her chest and hyperventilating   HENT:      Head: Atraumatic.   Eyes:      Extraocular Movements: Extraocular movements intact.      Pupils: Pupils are equal, round, and reactive to light.   Cardiovascular:      Rate and Rhythm: Regular rhythm. Tachycardia present.   Pulmonary:      Breath sounds: Normal breath sounds.      Comments: No wheezes rales or rhonchi  Chest:      Chest wall: No tenderness.   Abdominal:      Palpations: Abdomen is soft.      Tenderness: There is no abdominal tenderness.   Musculoskeletal:         General: No swelling, tenderness or deformity.      Cervical back: Neck supple.   Skin:     Comments: Rash under breasts bilaterally consistent with tinea as reported by nursing personnel   Neurological:      General: No focal deficit present.      Mental Status: She is alert and oriented to person, place, and time.   Psychiatric:         Mood and Affect: Mood normal.         ED Course     Orders Placed This Encounter   Procedures    CT Chest Pulmonary Embolism w Contrast    Symptomatic Influenza A/B, RSV, & SARS-CoV2 PCR (COVID-19)    Troponin T, High Sensitivity    Comprehensive metabolic panel    CBC with platelets and differential    EKG 12-lead, tracing only    IV access    CBC with platelets differential       Procedures    EKG performed by nursing personnel reveal a normal sinus rhythm at a rate of 108 with a WY interval of 0.122 and a QRS duration of 0.074.  The patient has a leftward axis with no acute ST or T wave changes significant  for ischemia.  This was read by me personally.    Administrations This Visit       acetaminophen (TYLENOL) tablet 1,000 mg       Admin Date  07/23/2024 Action  $Given Dose  1,000 mg Route  Oral Documented By  Jaycee Kirk RN              ketorolac (TORADOL) injection 15 mg       Admin Date  07/23/2024 Action  $Given Dose  15 mg Route  Intravenous Documented By  Jaycee Kirk RN              predniSONE (DELTASONE) tablet 60 mg       Admin Date  07/23/2024 Action  $Given Dose  60 mg Route  Oral Documented By  Jaycee Kirk RN                  Patient states she has not been taking her naltrexone for greater than 3 days so therefore a prescription was sent to the pharmacy for oxycodone while in the [facility], so the patient's  could fill the prescription and the patient could take 2 tablets here in [facility].      Results for orders placed or performed in visit on 07/23/24 (from the past 24 hour(s))   Comprehensive metabolic panel   Result Value Ref Range    Sodium 145 135 - 145 mmol/L    Potassium 3.3 (L) 3.4 - 5.3 mmol/L    Chloride 104 94 - 109 mmol/L    Carbon Dioxide (CO2) 28 20 - 32 mmol/L    Anion Gap 13 3 - 14 mmol/L    Urea Nitrogen 9 7 - 30 mg/dL    Creatinine 1.00 0.52 - 1.04 mg/dL    GFR Estimate 58 (L) >60 mL/min/1.73m2    Calcium 9.6 8.5 - 10.1 mg/dL    Glucose 98 70 - 99 mg/dL    Alkaline Phosphatase 76 40 - 150 U/L    AST 21 0 - 45 U/L    ALT 14 0 - 50 U/L    Protein Total 6.9 6.8 - 8.8 g/dL    Albumin 3.9 3.4 - 5.0 g/dL    Bilirubin Total 0.7 0.2 - 1.3 mg/dL   CBC with platelets differential    Narrative    The following orders were created for panel order CBC with platelets differential.  Procedure                               Abnormality         Status                     ---------                               -----------         ------                     CBC with platelets and d...[[id number]]                      Final result                 Please view results for these tests on  the individual orders.   Troponin T, High Sensitivity   Result Value Ref Range    Troponin T, High Sensitivity 16 (H) <=14 ng/L   CBC with platelets and differential   Result Value Ref Range    WBC Count 8.1 4.0 - 11.0 10e3/uL    RBC Count 4.28 3.80 - 5.20 10e6/uL    Hemoglobin 13.0 11.7 - 15.7 g/dL    Hematocrit 39.3 35.0 - 47.0 %    MCV 92 78 - 100 fL    MCH 30.4 26.5 - 33.0 pg    MCHC 33.1 31.5 - 36.5 g/dL    RDW 13.7 10.0 - 15.0 %    Platelet Count 267 150 - 450 10e3/uL    % Neutrophils 66 %    % Lymphocytes 23 %    % Monocytes 10 %    % Eosinophils 1 %    % Basophils 0 %    % Immature Granulocytes 0 %    Absolute Neutrophils 5.3 1.6 - 8.3 10e3/uL    Absolute Lymphocytes 1.8 0.8 - 5.3 10e3/uL    Absolute Monocytes 0.8 0.0 - 1.3 10e3/uL    Absolute Eosinophils 0.1 0.0 - 0.7 10e3/uL    Absolute Basophils 0.0 0.0 - 0.2 10e3/uL    Absolute Immature Granulocytes 0.0 <=0.4 10e3/uL   EKG 12-lead, tracing only   Result Value Ref Range    Systolic Blood Pressure  mmHg    Diastolic Blood Pressure  mmHg    Ventricular Rate 108 BPM    Atrial Rate 108 BPM    KS Interval 122 ms    QRS Duration 74 ms     ms    QTc 479 ms    P Axis 28 degrees    R AXIS -42 degrees    T Axis 52 degrees    Interpretation ECG       Sinus tachycardia  Left axis deviation  Septal infarct (cited on or before 11-AUG-2015)  Inferior infarct (cited on or before 08-FEB-2023)  Abnormal ECG  When compared with ECG of 11-JUL-2024 13:01,  Vent. rate has increased BY  37 BPM  Questionable change in initial forces of Septal leads       EXAM: CT CHEST PULMONARY EMBOLISM W CONTRAST  LOCATION: [facility]  DATE: 7/23/2024     INDICATION: Coughing x2 weeks following airplane flight. Elevated d-dimer.  COMPARISON: None.  TECHNIQUE: CT chest pulmonary angiogram during arterial phase injection of IV contrast. Multiplanar reformats and MIP reconstructions were performed. Dose reduction techniques were used.   CONTRAST: 67ml IV ISOVUE  370     FINDINGS: Significant respiratory motion artifact.  ANGIOGRAM CHEST: Normal caliber central pulmonary arteries. Rounded low density within the subsegmental branch of the posterior basal branch of the right lower lobe pulmonary artery (image 226 series 4). This may represent a small nonocclusive embolus   versus artifact secondary to the respiratory motion. Poor visualization of the left lower lobe pulmonary arteries and anterior right lower lobe pulmonary artery secondary to the motion artifact.. Mild calcified atherosclerotic changes of the thoracic   aorta. No dissection. 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 No CT evidence of right heart strain.     LUNGS AND PLEURA: No pneumothorax. No consolidation. No pleural effusions.     MEDIASTINUM/AXILLAE: 2 x 1.2 cm well-defined fluid attenuation structure within the anterior paratracheal region, posterior to the bifurcation of the common trunk of the bilateral common carotid arteries. No lymphadenopathy. Small to moderate-sized   hiatal hernia.     CORONARY ARTERY CALCIFICATION: Cannot evaluate.     UPPER ABDOMEN: Normal.     MUSCULOSKELETAL: Mild degenerative changes of the lower thoracic spine.                                                                      IMPRESSION:  1.  Limited exam secondary to significant respiratory motion artifact, with limited visualization of the lower lobe pulmonary arteries, as described above..  2.  Indeterminate low-attenuation within a subsegmental branch of the posterior basal branch of the right lower lobe pulmonary artery. This may represent a small embolus versus flow artifact.  3.  2 cm well-defined fluid attenuation structure within the upper paratracheal region. This may represent pericardial cyst versus a contained fluid within the superior pericardial recess.        Critical care was not performed.     Medical Decision Making  The patient's presentation was of moderate complexity (an acute  illness with systemic symptoms).    The patient's evaluation involved:  review of external note(s) from 1 sources (clinic note from today)  review of 3+ test result(s) ordered prior to this encounter (chest x-ray from earlier today along with epic studies listed above in the H&P)  ordering and/or review of 3+ test(s) in this encounter (see above)    The patient's management necessitated high risk (decision regarding hospitalization and a decision regarding Eliquis therapy).      Assessments & Plan (with Medical Decision Making)     I have reviewed the nursing notes.    Patient presents to the [facility] with complaints of pleuritic type pain on naltrexone she states she stopped more than 3 days ago.  Patient has a significantly elevated D-dimer but was unable to pause her breathing enough to get an adequate chest CT pulmonary angiogram done.  The end result was that the radiologist could not tell whether or not there was a small pulmonary embolus and therefore the patient be placed on Eliquis.  Patient will be placed on oxycodone for pain and Eliquis for her blood thinning agent.  It was discussed with radiology that should the patient's pain resolve enough so that she can breathe normally and hold her breath during the chest CT that at that point the chest CT should be repeated instead of waiting 3 months so that if the chest CT is negative, Eliquis can be stopped.    Patient has no leg symptoms currently to warrant bilateral lower extremity ultrasound however this could be considered as an outpatient as well.    I have reviewed the findings, diagnosis, plan and need for follow up with the patient.    TOTAL PATIENT CARE TIME INCLUDING DOCUMENTATION, FAMILY COMMUNICATION AND CARE COORDINATION WAS 45 MINUTES.        Review of your medicines            Accurate as of July 23, 2024  5:23 PM. If you have any questions, ask your nurse or doctor.                START taking        Dose / Directions   benzonatate 100 MG  capsule  Commonly known as: TESSALON  Used for: Acute cough  Started by: Mary Anne Diamond      Dose: 100 mg  Take 1 capsule (100 mg) by mouth 3 times daily as needed for cough  Quantity: 15 capsule  Refills: 0     Eliquis DVT/PE Starter Pack 5 MG Tbpk  Used for: Pleurisy  Generic drug: Apixaban Starter Pack  Started by: Sammy Pennington MD      Start taking on: July 23, 2024  Take 10 mg by mouth 2 times daily for 7 days, THEN 5 mg 2 times daily for 23 days.  Quantity: 74 each  Refills: 0     nystatin 094455 UNIT/GM external powder  Commonly known as: MYCOSTATIN  Used for: Tinea corporis  Started by: Sammy Pennington MD      Apply topically 2 times daily for 14 days Apply to rash  Quantity: 60 g  Refills: 0     oxyCODONE 5 MG tablet  Commonly known as: ROXICODONE  Used for: Pleurisy  Started by: Sammy Pennington MD      Dose: 5 mg  Take 1 tablet (5 mg) by mouth every 6 hours as needed for pain  Quantity: 30 tablet  Refills: 0     predniSONE 20 MG tablet  Commonly known as: DELTASONE  Used for: Acute cough  Started by: Mary Anne Diamond      Dose: 20 mg  Take 1 tablet (20 mg) by mouth daily for 5 days  Quantity: 5 tablet  Refills: 0     sodium chloride 0.65 % nasal spray  Commonly known as: OCEAN  Used for: Acute cough  Started by: Mary Anne Diamond      Dose: 1 spray  Spray 1 spray in nostril 3 times daily as needed for congestion  Quantity: 30 mL  Refills: 0            CONTINUE these medicines which have NOT CHANGED        Dose / Directions   busPIRone 15 MG tablet  Commonly known as: BUSPAR  Used for: Anxiety      Dose: 15 mg  Take 1 tablet (15 mg) by mouth 2 times daily  Quantity: 180 tablet  Refills: 1     DULoxetine 60 MG capsule  Commonly known as: CYMBALTA  Used for: Fibromyalgia      Dose: 60 mg  TAKE 1 CAPSULE BY MOUTH 2 TIMES DAILY.  Quantity: 180 capsule  Refills: 2     famotidine 20 MG tablet  Commonly known as: PEPCID  Used for: Gastroesophageal reflux disease without esophagitis      Dose:  20 mg  TAKE 1 TABLET BY MOUTH TWICE A DAY  Quantity: 180 tablet  Refills: 0     gabapentin 300 MG capsule  Commonly known as: NEURONTIN  Used for: Fibromyalgia      TAKE 1 CAPSULE BY MOUTH DAILY IN THE MORNING, 1 CAPSULE IN THE AFTERNOON, & 2 CAPSULES AT BEDTIME  Quantity: 360 capsule  Refills: 2     losartan 25 MG tablet  Commonly known as: COZAAR  Used for: Essential hypertension, benign      Dose: 25 mg  Take 1 tablet (25 mg) by mouth daily  Quantity: 90 tablet  Refills: 3     METHYLFOLATE PO      Refills: 0     metoprolol succinate ER 50 MG 24 hr tablet  Commonly known as: TOPROL XL  Used for: Essential hypertension      Dose: 50 mg  Take 1 tablet (50 mg) by mouth daily  Quantity: 90 tablet  Refills: 3     mometasone 50 MCG/ACT nasal spray  Commonly known as: NASONEX  Used for: Nasal congestion      Dose: 2 spray  INSTILL 2 SPRAYS INTO BOTH NOSTRILS DAILY.  Quantity: 25.5 g  Refills: 2     QUEtiapine 100 MG tablet  Commonly known as: SEROquel      Dose: 100 mg  Take 100 mg by mouth At Bedtime  Refills: 0     rizatriptan 10 MG ODT  Commonly known as: MAXALT-MLT  Used for: Migraine, unspecified, not intractable, without status migrainosus      TAKE 1 TABLET BY MOUTH AT ONSET OF HEADACHE FOR MIGRAINE MAY REPEAT IN 2 HOURS. MAX 3 TABS/24 HOURS  Quantity: 30 tablet  Refills: 3     Spikevax 50 MCG/0.5ML injection  Generic drug: COVID-19 mRNA Vacc      Refills: 0     traZODone 150 MG tablet  Commonly known as: DESYREL      TAKE 1/2 TABLET BY MOUTH AT BEDTIME AS NEEDED  Refills: 0     triamcinolone 0.1 % external ointment  Commonly known as: KENALOG  Used for: Atrophic vaginitis      APPLY TO AFFECTED AREA TWICE A DAY  Quantity: 30 g  Refills: 2     Turmeric 500 MG Caps      Refills: 0     vitamin D3 125 MCG (5000 UT) tablet  Commonly known as: CHOLECALCIFEROL      [CHOLECALCIFEROL, VITAMIN D3, 5,000 UNIT TAB] Take by mouth.  Refills: 0            STOP taking      aspirin 81 MG EC tablet  Commonly known as: ASA  Stopped  by: Sona Meeks MD                  Where to get your medicines        These medications were sent to [facility] [id number] [facility] - [place], [place] - [address]  [address], [facility] [address]      Phone: [phone]   benzonatate 100 MG capsule  predniSONE 20 MG tablet  sodium chloride 0.65 % nasal spray       These medications were sent to [place] [id number]  [address] [address]      Phone: [phone]   Eliquis DVT/PE Starter Pack 5 MG Tbpk  nystatin 713421 UNIT/GM external powder  oxyCODONE 5 MG tablet           Final diagnoses:   Pleurisy   Tinea corporis     Your chest CT scan was indeterminant for a small blood clot in your lung.  Therefore we will treat you with blood thinners until we can prove 1 way or the other with a better quality scan that it is really there or not.    Please fill your prescriptions and take as directed    Go to the ER for any worsening.    Follow-up with your primary care in 1 week for recheck    SONA MEEKS MD    7/23/2024   [facility]

## 2024-07-23 NOTE — PROGRESS NOTES
Assessment & Plan     Acute cough  Elevated d-dimer    - XR Chest 2 Views  - D dimer, quantitative  - CBC with platelets and differential  - D dimer, quantitative  - CBC with platelets and differential  - predniSONE (DELTASONE) 20 MG tablet  Dispense: 5 tablet; Refill: 0  - benzonatate (TESSALON) 100 MG capsule  Dispense: 15 capsule; Refill: 0  - sodium chloride (OCEAN) 0.65 % nasal spray  Dispense: 30 mL; Refill: 0  - Referral to Acute and Diagnostic Services (Day of diagnostic / First order acute)    Katy is a 78yr old female presenting with 3 weeks non productive cough and SOB following flight from [place]. On exam, she is mildly hypertensive but 02 98% with increased work of breathing while sitting but able to speak in full sentences. CBC wnl, CXR without consolidation or effusion. Initially thought to be acute bronchitis and sent with Prednisone, Tessalon, and nasal saline. However, age adjusted D dimer returned elevated, indicating possible PE. I called Katy and discussed results and next steps. Reviewed options of [facility] vs ED. She will go to the [facility] and they are expecting her this afternoon.     Return for to to [facility].    Mary Anne Diamond, DO  she/her  [facility]    Subjective     Katy Hong is a 78 year old female who presents to clinic today for the following health issues:    HPI    Flew back from [place] 28h of June (no compression socks)  June 29th, developed a non-productive cough and had a fever for 1 week  Cough is worse at night  Sleeping in a recliner at night, having trouble catching her breath and maybe a little more short of breath than normal  No vomiting or coughing up blood  + runny nose for 1 year  + diarrhea but thinks it's related to nerves  Chronic migraines  Son and  both got sick after flying    has not been seen in clinic yet for this  No h/o asthma, COPD, tobacco use or vaping    Referral to Acute and Diagnostic Services    [phone]  ([place]) [id number] [facility], [address], [place]  [address]    Transition to Acute & Diagnostic Services Clinic has been discussed with patient, and she agrees with next level of care.   Patient understands that evaluation/treatment at [facility] typically takes significantly longer than in clinic/urgent care (>2 hours).  The [facility] has been contacted by provider/staff to confirm patient acceptance.         Special issues:      None    Past Medical History:   Diagnosis Date    Abnormal mammogram, unspecified     Created by Conversion     Acute sinusitis, unspecified     Created by Conversion     Asymptomatic varicose veins     Created by Conversion     Breast cyst     Cellulitis and abscess of unspecified site     Created by Conversion     Circumscribed scleroderma     Created by Conversion     Contusion of unspecified site     Created by Conversion     Disorder of bone and cartilage, unspecified     Created by Conversion     Dysthymic disorder     Created by Conversion     Esophageal reflux     Created by Conversion     Insomnia, unspecified     Created by Conversion     Lumbago     Created by Conversion     Mammogram - Abnormal     Created by Conversion Replacement Utility updated for latest IMO load     Migraine, unspecified, without mention of intractable migraine without mention of status migrainosus     Created by Conversion     Myalgia and myositis, unspecified     Created by Conversion     Myocardial infarction (H)     per EKG's since 2015    Obesity, unspecified     Created by Conversion     Open wound of lip, without mention of complication     Created by Conversion     Other primary cardiomyopathies     Created by Conversion     Other seborrheic keratosis     Created by Conversion     Pain in joint, lower leg     Created by Conversion     Pain in limb     Created by Conversion     Sebaceous cyst     Created by Conversion     Shortness of breath      Created by Conversion     Shoulder and upper arm, insect bite, nonvenomous, without mention of infection(912.4)     Created by Conversion     Unspecified disease of sebaceous glands     Created by Conversion     Unspecified essential hypertension     Created by Conversion     Unspecified venous (peripheral) insufficiency     Created by Conversion     Unspecified vitamin D deficiency     Created by Conversion        Allergies   Allergen Reactions    Fluoxetine Cough and Rash     Pt thinks it was cough but not totally sure.    Latex Rash    Levothyroxine Rash    Lisinopril Cough and Rash    Sulfa (Sulfonamide Antibiotics) [Sulfa Antibiotics] Itching and Rash     Current Outpatient Medications   Medication Sig Dispense Refill    aspirin 81 MG EC tablet [ASPIRIN 81 MG EC TABLET] Take 81 mg by mouth daily.      benzonatate (TESSALON) 100 MG capsule Take 1 capsule (100 mg) by mouth 3 times daily as needed for cough 15 capsule 0    busPIRone (BUSPAR) 15 MG tablet Take 1 tablet (15 mg) by mouth 2 times daily 180 tablet 1    cholecalciferol, vitamin D3, 5,000 unit Tab [CHOLECALCIFEROL, VITAMIN D3, 5,000 UNIT TAB] Take by mouth.      DULoxetine (CYMBALTA) 60 MG capsule TAKE 1 CAPSULE BY MOUTH 2 TIMES DAILY. 180 capsule 2    famotidine (PEPCID) 20 MG tablet TAKE 1 TABLET BY MOUTH TWICE A  tablet 0    gabapentin (NEURONTIN) 300 MG capsule TAKE 1 CAPSULE BY MOUTH DAILY IN THE MORNING, 1 CAPSULE IN THE AFTERNOON, & 2 CAPSULES AT BEDTIME 360 capsule 2    Levomefolate Glucosamine (METHYLFOLATE PO)       losartan (COZAAR) 25 MG tablet Take 1 tablet (25 mg) by mouth daily 90 tablet 3    metoprolol succinate ER (TOPROL XL) 50 MG 24 hr tablet Take 1 tablet (50 mg) by mouth daily 90 tablet 3    mometasone (NASONEX) 50 MCG/ACT nasal spray INSTILL 2 SPRAYS INTO BOTH NOSTRILS DAILY. 25.5 g 2    naltrexone (DEPADE/REVIA) 50 MG tablet Take 1 tablet in the morning for 1 week then 1 tablet twice a day. No not take with opiods. 180  tablet 1    predniSONE (DELTASONE) 20 MG tablet Take 1 tablet (20 mg) by mouth daily for 5 days 5 tablet 0    QUEtiapine (SEROQUEL) 100 MG tablet Take 100 mg by mouth At Bedtime      rizatriptan (MAXALT-MLT) 10 MG ODT TAKE 1 TABLET BY MOUTH AT ONSET OF HEADACHE FOR MIGRAINE MAY REPEAT IN 2 HOURS. MAX 3 TABS/24 HOURS 30 tablet 3    sodium chloride (OCEAN) 0.65 % nasal spray Spray 1 spray in nostril 3 times daily as needed for congestion 30 mL 0    SPIKEVAX 50 MCG/0.5ML injection       traZODone (DESYREL) 150 MG tablet TAKE 1/2 TABLET BY MOUTH AT BEDTIME AS NEEDED      triamcinolone (KENALOG) 0.1 % external ointment APPLY TO AFFECTED AREA TWICE A DAY 30 g 2    Turmeric 500 MG CAPS        No current facility-administered medications for this visit.          Review of Systems  Constitutional, HEENT, cardiovascular, pulmonary, gi and gu systems are negative, except as otherwise noted.      Objective    BP (!) 168/91   Pulse 98   Temp 97.6  F (36.4  C) (Oral)   Resp 18   SpO2 98%     Physical Exam  Vitals reviewed.   Constitutional:       Appearance: She is not ill-appearing.   HENT:      Right Ear: No middle ear effusion. Tympanic membrane is not perforated.      Left Ear:  No middle ear effusion. Tympanic membrane is not perforated.      Mouth/Throat:      Lips: Pink.      Pharynx: Oropharynx is clear. No oropharyngeal exudate or posterior oropharyngeal erythema.   Cardiovascular:      Rate and Rhythm: Normal rate and regular rhythm.   Pulmonary:      Effort: No respiratory distress.      Breath sounds: Transmitted upper airway sounds present. Rhonchi present. No wheezing or rales.      Comments: increased work of breathing while sitting, able to speak in full sentences  Neurological:      Mental Status: She is alert.        Results for orders placed or performed in visit on 07/23/24   XR Chest 2 Views     Status: None    Narrative    EXAM: XR CHEST 2 VIEWS  LOCATION: [facility]  DATE:  7/23/2024    INDICATION: bilateral rhonchi, SOB for 1mo, eval for pneumonia or effusion  COMPARISON: None.      Impression    IMPRESSION: Lungs are clear without focal airspace opacity. No pleural effusion or pneumothorax. Cardiac silhouette and mediastinal contours are normal.   Results for orders placed or performed in visit on 07/23/24   D dimer, quantitative     Status: Abnormal   Result Value Ref Range    D-Dimer Quantitative 3.40 (H) 0.00 - 0.50 ug/mL FEU    Narrative    This D-dimer assay is intended for use in conjunction with a clinical pretest probability assessment model to exclude pulmonary embolism (PE) and deep venous thrombosis (DVT) in outpatients suspected of PE or DVT. The cut-off value is 0.50 ug/mL FEU.    For patients 50 years of age or older, the application of age-adjusted cut-off values for D-Dimer may increase the specificity without significant effect on sensitivity. The literature suggested calculation age adjusted cut-off in ug/L = age in years x 10 ug/L. The results in this laboratory are reported as ug/mL rather than ug/L. The calculation for age adjusted cut off in ug/mL= age in years x 0.01 ug/mL. For example, the cut off for a 76 year old male is 76 x 0.01 ug/mL = 0.76 ug/mL (760 ug/L).    M Galina et al. Age adjusted D-dimer cut-off levels to rule out pulmonary embolism: The ADJUST-PE Study. DARLENE 2014;311:1878-3302.; HJ Clif et al. Diagnostic accuracy of conventional or age adjusted D-dimer cutoff values in older patients with suspected venous thromboembolism. Systemic review and meta-analysis. BMJ 2013:346:f2492.   CBC with platelets and differential     Status: None   Result Value Ref Range    WBC Count 8.3 4.0 - 11.0 10e3/uL    RBC Count 4.29 3.80 - 5.20 10e6/uL    Hemoglobin 12.9 11.7 - 15.7 g/dL    Hematocrit 40.6 35.0 - 47.0 %    MCV 95 78 - 100 fL    MCH 30.1 26.5 - 33.0 pg    MCHC 31.8 31.5 - 36.5 g/dL    RDW 13.9 10.0 - 15.0 %    Platelet Count 243 150 - 450 10e3/uL     % Neutrophils 70 %    % Lymphocytes 19 %    % Monocytes 11 %    % Eosinophils 1 %    % Basophils 0 %    % Immature Granulocytes 0 %    Absolute Neutrophils 5.8 1.6 - 8.3 10e3/uL    Absolute Lymphocytes 1.6 0.8 - 5.3 10e3/uL    Absolute Monocytes 0.9 0.0 - 1.3 10e3/uL    Absolute Eosinophils 0.1 0.0 - 0.7 10e3/uL    Absolute Basophils 0.0 0.0 - 0.2 10e3/uL    Absolute Immature Granulocytes 0.0 <=0.4 10e3/uL   CBC with platelets and differential     Status: None    Narrative    The following orders were created for panel order CBC with platelets and differential.  Procedure                               Abnormality         Status                     ---------                               -----------         ------                     CBC with platelets and d...[[id number]]                      Final result                 Please view results for these tests on the individual orders.     XR Chest 2 Views    Result Date: 7/23/2024  EXAM: XR CHEST 2 VIEWS LOCATION: [facility] DATE: 7/23/2024 INDICATION: bilateral rhonchi, SOB for 1mo, eval for pneumonia or effusion COMPARISON: None.     IMPRESSION: Lungs are clear without focal airspace opacity. No pleural effusion or pneumothorax. Cardiac silhouette and mediastinal contours are normal.         The use of [employer] dictation services may have been used to construct the content in this note; any grammatical or spelling errors are non-intentional. Please contact the author of this note directly if you are in need of any clarification.

## 2024-07-23 NOTE — TELEPHONE ENCOUNTER
Spoke to patient and she reports that she is at Dr. Duarte's office (psych) presently and he recommends her to be seen since he listened to her lungs there in clinic.  No availability that she is able to get to so recommended going to walk in clinic.  Patient verbalized understanding.    ALANA SoodN RN  [facility]

## 2024-07-23 NOTE — PATIENT INSTRUCTIONS
Your chest CT scan was indeterminant for a small blood clot in your lung.  Therefore we will treat you with blood thinners until we can prove 1 way or the other with a better quality scan that it is really there or not.    Please fill your prescriptions and take as directed    Go to the ER for any worsening.

## 2024-07-24 ENCOUNTER — TELEPHONE (OUTPATIENT)
Dept: FAMILY MEDICINE | Facility: CLINIC | Age: 79
End: 2024-07-24
Payer: COMMERCIAL

## 2024-07-24 LAB
ATRIAL RATE - MUSE: 108 BPM
DIASTOLIC BLOOD PRESSURE - MUSE: NORMAL MMHG
INTERPRETATION ECG - MUSE: NORMAL
P AXIS - MUSE: 28 DEGREES
PR INTERVAL - MUSE: 122 MS
QRS DURATION - MUSE: 74 MS
QT - MUSE: 358 MS
QTC - MUSE: 479 MS
R AXIS - MUSE: -42 DEGREES
SYSTOLIC BLOOD PRESSURE - MUSE: NORMAL MMHG
T AXIS - MUSE: 52 DEGREES
VENTRICULAR RATE- MUSE: 108 BPM

## 2024-07-24 NOTE — TELEPHONE ENCOUNTER
S-(situation): Patient tested positive for covid yesterday    B-(background): Symptoms started 6/29/24    A-(assessment): Patient was tested yesterday for Covid and it came back positive. The patient is not eligible for Paxlovid at this point and has been taking OTC remedies to help treat symptoms listed in previous message. She is wondering if there are any other alternatives she can get prescribed to help alleviate some of the symptoms.     R-(recommendations): Please review information and send back suggestions.    Ann Mcnamara RN  [facility]

## 2024-07-24 NOTE — TELEPHONE ENCOUNTER
Spoke with patient it relay provider message. She at this time declines the codeine syrup as she got oxycodone yesterday. She was told she could call back if she changes her mind and she verbalized understanding.    Ann Mcnamara RN  [facility]

## 2024-07-24 NOTE — TELEPHONE ENCOUNTER
COVID Positive/Requesting COVID treatment    Patient is positive for COVID and requesting treatment options.    Date of positive COVID test (PCR or at home)? AT [facility] YESTERDAY  Current COVID symptoms: fever or chills, cough, shortness of breath or difficulty breathing, fatigue, muscle or body aches, headache, congestion or runny nose, and diarrhea  Date COVID symptoms began: 6/29/24    Message should be routed to clinic RN pool. Best phone number to use for call back: [phone]

## 2024-07-24 NOTE — TELEPHONE ENCOUNTER
Not much beyond Imodium for diarrhea and acetaminophen for fever/chills in addition to the prednisone and Tessalon Perles she received.  Could do a refill of that codeine cough syrup she received several years ago but since that has a narcotic we would have to do a quick phone visit so I can document us talking about risks and benefits.  If interested, you can throw her on the schedule today and I can call her whenever I get time between patients.    Skyler Wiggins, CNP

## 2024-07-25 ENCOUNTER — VIRTUAL VISIT (OUTPATIENT)
Dept: FAMILY MEDICINE | Facility: CLINIC | Age: 79
End: 2024-07-25
Payer: COMMERCIAL

## 2024-07-25 DIAGNOSIS — R05.1 ACUTE COUGH: Primary | ICD-10-CM

## 2024-07-25 PROCEDURE — 99441 PR PHYSICIAN TELEPHONE EVALUATION 5-10 MIN: CPT | Mod: 93 | Performed by: NURSE PRACTITIONER

## 2024-07-25 RX ORDER — CODEINE PHOSPHATE/GUAIFENESIN 10-100MG/5
5 LIQUID (ML) ORAL EVERY 4 HOURS PRN
Qty: 237 ML | Refills: 0 | Status: SHIPPED | OUTPATIENT
Start: 2024-07-25

## 2024-07-25 NOTE — PROGRESS NOTES
"Katy is a 78 year old who is being evaluated via a billable telephone visit.    What phone number would you like to be contacted at?   How would you like to obtain your AVS? MyChart  Originating Location (pt. Location): Home    Distant Location (provider location):  On-site    Assessment & Plan       ICD-10-CM    1. Acute cough  R05.1 guaiFENesin-codeine (GUAIFENESIN AC) 100-10 MG/5ML syrup        Continue same medications provided from [facility].  She is interested in a refill of the cough syrup which she used several years ago.  Reviewed potential side effects including sedation, dizziness, respiratory depression.  Do not use at the same time as other sedating medications and we reviewed which ones that includes.  Continue supportive measures.          BMI  Estimated body mass index is 35.3 kg/m  as calculated from the following:    Height as of 6/11/24: 1.575 m (5' 2\").    Weight as of 6/11/24: 87.5 kg (193 lb).         Subjective   Katy is a 78 year old, presenting for the following health issues:  No chief complaint on file.    HPI     Cough is non productive and all day. Getting enough air in.  No current fever or chills.  Sleep is broken up because of the cough. Using cough drops and tea throat spray, and tessalon perlse. Started predisone as well.  Using oxycodone prn. Has used only 2 tablets so far.  Makes her sleepy.        Review of Systems  Constitutional, HEENT, cardiovascular, pulmonary, gi and gu systems are negative, except as otherwise noted.      Objective           Vitals:  No vitals were obtained today due to virtual visit.    Physical Exam   General: Alert and no distress //Respiratory: Occasional coughing spells.  No audible wheeze, or shortness of breath // Psychiatric:  Appropriate affect, tone, and pace of words        Phone call duration: 6 minutes  Signed Electronically by: Skyler Wiggins NP  "

## 2024-07-25 NOTE — TELEPHONE ENCOUNTER
FYI - Status Update    Who is Calling: patient    Update: patient calling  and is requesting the codeine syrup at this time. Patient would like a call once sent to pharmacy.    Preferred pharmacy: [facility] [id number] [place] [facility] - [address]     Does caller want a call/response back: Yes     Could we send this information to you in [facility] or would you prefer to receive a phone call?:   Patient would prefer a phone call   Okay to leave a detailed message?: Yes at Cell number on file:    Telephone Information:   Mobile [phone]

## 2024-07-25 NOTE — TELEPHONE ENCOUNTER
Okay.  See previous message.  Throw her on my schedule for a phone visit and I will call her in between patients    Skyler Wiggins, CNP

## 2024-08-02 ENCOUNTER — OFFICE VISIT (OUTPATIENT)
Dept: FAMILY MEDICINE | Facility: CLINIC | Age: 79
End: 2024-08-02
Payer: COMMERCIAL

## 2024-08-02 VITALS
TEMPERATURE: 98.5 F | HEART RATE: 80 BPM | WEIGHT: 184.1 LBS | OXYGEN SATURATION: 98 % | BODY MASS INDEX: 33.88 KG/M2 | HEIGHT: 62 IN | DIASTOLIC BLOOD PRESSURE: 78 MMHG | SYSTOLIC BLOOD PRESSURE: 130 MMHG | RESPIRATION RATE: 18 BRPM

## 2024-08-02 DIAGNOSIS — I42.8 OTHER CARDIOMYOPATHY (H): ICD-10-CM

## 2024-08-02 DIAGNOSIS — U07.1 INFECTION DUE TO 2019 NOVEL CORONAVIRUS: ICD-10-CM

## 2024-08-02 DIAGNOSIS — E66.01 MORBID OBESITY (H): ICD-10-CM

## 2024-08-02 DIAGNOSIS — I26.99 ACUTE PULMONARY EMBOLISM WITHOUT ACUTE COR PULMONALE, UNSPECIFIED PULMONARY EMBOLISM TYPE (H): Primary | ICD-10-CM

## 2024-08-02 DIAGNOSIS — F33.1 MODERATE EPISODE OF RECURRENT MAJOR DEPRESSIVE DISORDER (H): ICD-10-CM

## 2024-08-02 PROCEDURE — 99214 OFFICE O/P EST MOD 30 MIN: CPT | Performed by: NURSE PRACTITIONER

## 2024-08-02 PROCEDURE — G2211 COMPLEX E/M VISIT ADD ON: HCPCS | Performed by: NURSE PRACTITIONER

## 2024-08-02 ASSESSMENT — PATIENT HEALTH QUESTIONNAIRE - PHQ9
SUM OF ALL RESPONSES TO PHQ QUESTIONS 1-9: 17
SUM OF ALL RESPONSES TO PHQ QUESTIONS 1-9: 17
10. IF YOU CHECKED OFF ANY PROBLEMS, HOW DIFFICULT HAVE THESE PROBLEMS MADE IT FOR YOU TO DO YOUR WORK, TAKE CARE OF THINGS AT HOME, OR GET ALONG WITH OTHER PEOPLE: VERY DIFFICULT

## 2024-08-02 ASSESSMENT — PAIN SCALES - GENERAL: PAINLEVEL: NO PAIN (0)

## 2024-08-02 NOTE — PATIENT INSTRUCTIONS
You look good today!  I anticipate that you will continue to slowly improve over the next several weeks.    If you start to feel more ill again, let me know    You received that prescription assistance card.  The prescription for the weight loss medicine may still be expensive despite this.

## 2024-08-02 NOTE — PROGRESS NOTES
"Assessment & Plan     ICD-10-CM    1. Acute pulmonary embolism without acute cor pulmonale, unspecified pulmonary embolism type (H)  I26.99       2. Moderate episode of recurrent major depressive disorder (H)  F33.1       3. Other cardiomyopathy (H)  I42.8       4. Infection due to 2019 novel coronavirus  U07.1       5. Morbid obesity (H)  E66.01         Improving as anticipated.  Continue to increase activity as tolerated.  Finish off Eliquis prescription.  Prescription card for Wegovy given to patient.  Discussed it may still be expensive.  Continue to monitor for signs and symptoms of worsening cardiomyopathy.  History of MDD stable with current medications.  Working with weight loss clinic for obesity management.    Reviewed internal medicine note x 1, [facility] note x 1, chest x-ray x 1, CT PE study x 1, CBC x 1, D-dimer x 1, CBC x 1, CMP x 1    The longitudinal plan of care for the diagnosis(es)/condition(s) as documented were addressed during this visit. Due to the added complexity in care, I will continue to support Katy in the subsequent management and with ongoing continuity of care.      Subjective     HPI     Follows up from that visit at [facility].  Had COVID-19.  Suspected PE.  Continues with Eliquis.  A little bit of bruising on the skin but no oral eating or rectal bleeding.  Slowly feeling better.  Breathing well.  Sleeping better.    Wants to lose weight.  Received prescription for GLP-1.  Not covered by insurance.  Wonders about insurance prescription programs.    Review of Systems - negative except for what's listed in the HPI      Objective    /78 (BP Location: Right arm, Patient Position: Sitting, Cuff Size: Adult Large)   Pulse 80   Temp 98.5  F (36.9  C) (Oral)   Resp 18   Ht 1.575 m (5' 2\")   Wt 83.5 kg (184 lb 1.6 oz)   SpO2 98%   BMI 33.67 kg/m    Physical Exam   General appearance - alert, well appearing, and in no distress  Mental status - alert, oriented to person, place, and " time  Eyes -PERRLA  Ears - bilateral TM's and external ear canals normal  Mouth - mucous membranes moist. No oral lesions.  Lymphatics - no palpable lymphadenopathy  Chest - clear to auscultation, no wheezes, rales or rhonchi, symmetric air entry  Heart - normal rate and regular rhythm, S1 and S2 normal, no murmurs noted  Abdomen - soft, nontender, nondistended, no masses or organomegaly  Neurological -grossly intact  Extremities -  peripheral pulses normal.  +1 lower extremity edema.  Lower extremity varicose veins-baseline  Skin - normal coloration and turgor.    Skyler Wiggins, CNP    This note has been dictated using voice recognition software. Any grammatical or context distortions are unintentional and inherent to the software.

## 2024-08-02 NOTE — PROGRESS NOTES
"  {PROVIDER CHARTING PREFERENCE:650533}    Subjective   Katy is a 78 year old, presenting for the following health issues:  Follow Up (PT reports improve but does still have a slight cough) and Consult (Pt is requesting a Glp1 for weight loss and has questions about savings cards)      8/2/2024     9:09 AM   Additional Questions   Roomed by Smitha Miller CMA     HPI     {MA/LPN/RN Pre-Provider Visit Orders- hCG/UA/Strep (Optional):734060}  {SUPERLIST (Optional):364671}  {additonal problems for provider to add (Optional):730088}    {ROS Picklists (Optional):251973}      Objective    /78 (BP Location: Right arm, Patient Position: Sitting, Cuff Size: Adult Large)   Pulse 80   Temp 98.5  F (36.9  C) (Oral)   Resp 18   Ht 1.575 m (5' 2\")   Wt 83.5 kg (184 lb 1.6 oz)   SpO2 98%   BMI 33.67 kg/m    Body mass index is 33.67 kg/m .  Physical Exam   {Exam List (Optional):529450}    {Diagnostic Test Results (Optional):851705}        Signed Electronically by: Skyler Wiggins NP  {Email feedback regarding this note to [email or web address]   :388268}  Answers submitted by the patient for this visit:  Patient Health Questionnaire (Submitted on 8/2/2024)  If you checked off any problems, how difficult have these problems made it for you to do your work, take care of things at home, or get along with other people?: Very difficult  PHQ9 TOTAL SCORE: 17    "

## 2024-09-10 ENCOUNTER — PATIENT OUTREACH (OUTPATIENT)
Dept: CARE COORDINATION | Facility: CLINIC | Age: 79
End: 2024-09-10
Payer: COMMERCIAL

## 2024-09-23 DIAGNOSIS — I10 ESSENTIAL HYPERTENSION, BENIGN: ICD-10-CM

## 2024-09-23 RX ORDER — LOSARTAN POTASSIUM 25 MG/1
25 TABLET ORAL DAILY
Qty: 90 TABLET | Refills: 0 | Status: SHIPPED | OUTPATIENT
Start: 2024-09-23

## 2024-10-04 ENCOUNTER — TELEPHONE (OUTPATIENT)
Dept: FAMILY MEDICINE | Facility: CLINIC | Age: 79
End: 2024-10-04
Payer: COMMERCIAL

## 2024-10-04 NOTE — TELEPHONE ENCOUNTER
Order/Referral Request    [facility]    Orders being requested: neurology     Reason service is needed/diagnosis: [facility] called and asked for a referral to be placed for pt to go see a neurology for dementia because pt is having some memory concerns. Please advise    When are orders needed by: asap    Has this been discussed with Provider: No    Does patient have a preference on a Group/Provider/Facility? No preference    Does patient have an appointment scheduled?: No    Where to send orders: Place orders within Epic    Could we send this information to you in [facility] or would you prefer to receive a phone call?:   Patient would prefer a phone call   Okay to leave a detailed message?: Yes at Home number on file [phone] (home)

## 2024-10-04 NOTE — TELEPHONE ENCOUNTER
General Call    Contacts       Contact Date/Time Type Contact Phone/Fax    10/04/2024 04:18 PM CDT Phone (Incoming) [facility] [phone]          Reason for Call:  talk about GLP-1    What are your questions or concerns:  [facility] and asked for  to talk to pt about a GLP-1 because pt is gaining weight on the Seroquel, and wanted to see if that would be better for the pt.       Could we send this information to you in 9+ or would you prefer to receive a phone call?:   Patient would prefer a phone call   Okay to leave a detailed message?: Yes at Other phone number: [phone]

## 2024-10-04 NOTE — TELEPHONE ENCOUNTER
Medication Question or Refill    Contacts       Contact Date/Time Type Contact Phone/Fax    10/04/2024 04:22 PM CDT Phone (Incoming) [facility] [phone]            What medication are you calling about (include dose and sig)?: gabapentin (NEURONTIN) 300 MG capsule     Preferred Pharmacy:  [facility] [address] [facility] - [place], [place] - [address]  [address]  [facility] [address]      Controlled Substance Agreement on file:   CSA -- Patient Level:     [Media Unavailable] Controlled Substance Agreement - Opioid - Scan on 9/10/2020   [Media Unavailable] Controlled Substance Agreement - Opioid - Scan on 3/9/2018       Who prescribed the medication?: Rosalie    Do you need a refill? No    When did you use the medication last? N/a    Patient offered an appointment? No    Do you have any questions or concerns?  Yes[facility] and asked if  could decrease the gabapentin in the morning or get rid of the AM dose due to pt having some memory fog. Please advise      Could we send this information to you [email or web address] or would you prefer to receive a phone call?:   Patient would prefer a phone call   Okay to leave a detailed message?: Yes at Home number on file [phone] (home)

## 2024-10-08 DIAGNOSIS — K21.9 GASTROESOPHAGEAL REFLUX DISEASE WITHOUT ESOPHAGITIS: ICD-10-CM

## 2024-10-08 NOTE — TELEPHONE ENCOUNTER
No referral.  Additional workup is needed here in clinic.  If they would like to refer to neurology they can, but we need more data first.    Skyler Wiggins NP

## 2024-10-08 NOTE — TELEPHONE ENCOUNTER
Can hold morning dose, but will likely have increased pain.  She and psychiatry will have to weigh pros and cons of grogginess and pain management.    Skyler Wiggins, CNP     Stable

## 2024-10-08 NOTE — TELEPHONE ENCOUNTER
Patient is currently working with the bariatric clinic on weight loss.  Since they are managing her weight issue, best to direct questions to them.    Skyler Wiggins NP

## 2024-10-09 ENCOUNTER — VIRTUAL VISIT (OUTPATIENT)
Dept: SURGERY | Facility: CLINIC | Age: 79
End: 2024-10-09
Payer: COMMERCIAL

## 2024-10-09 VITALS — WEIGHT: 186 LBS | BODY MASS INDEX: 34.02 KG/M2

## 2024-10-09 DIAGNOSIS — E66.9 OBESITY (BMI 30-39.9): Primary | ICD-10-CM

## 2024-10-09 DIAGNOSIS — I25.10 CORONARY ARTERY DISEASE INVOLVING NATIVE CORONARY ARTERY OF NATIVE HEART WITHOUT ANGINA PECTORIS: ICD-10-CM

## 2024-10-09 DIAGNOSIS — I25.5 ISCHEMIC CARDIOMYOPATHY: ICD-10-CM

## 2024-10-09 DIAGNOSIS — I10 ESSENTIAL HYPERTENSION: ICD-10-CM

## 2024-10-09 PROCEDURE — 99442 PR PHYSICIAN TELEPHONE EVALUATION 11-20 MIN: CPT | Mod: 93 | Performed by: FAMILY MEDICINE

## 2024-10-09 RX ORDER — FAMOTIDINE 20 MG/1
20 TABLET, FILM COATED ORAL 2 TIMES DAILY
Qty: 180 TABLET | Refills: 0 | Status: SHIPPED | OUTPATIENT
Start: 2024-10-09

## 2024-10-09 NOTE — PROGRESS NOTES
Virtual Visit Details    Type of service:  Telephone Visit   Call start time: 10:42 am  Call end time: 10:56 am  Phone call duration: 12 minutes   Originating Location (pt. Location): Home    Distant Location (provider location):  Off-site    Bariatric Care Clinic Non Surgical Follow up Visit   Date of visit: 10/9/2024  Physician: LATISHA Rossi MD, MD  Primary Care is Skyler Wiggins   79 year old  female     Initial Weight: 201#  Initial BMI: 37.4  Today's Weight:   Wt Readings from Last 1 Encounters:   08/02/24 83.5 kg (184 lb 1.6 oz)     There is no height or weight on file to calculate BMI.           Assessment and Plan   Assessment: Katy is a 79 year old year old female who presents for medical weight management.      Plan:    1. Obesity (BMI 30-39.9)  Patient was congratulated on her success thus far. Healthy habits to assist with further weight loss were discussed. She is on track but is frustrated with her weight loss plateau. She will continue the naltrexone for now. We discussed the use of compounded semaglutide as although the wegovy was covered it was too expensive for her.  Risks, benefits and possible side effects discussed . Written information was given.We discussed the patient's co-morbid conditions including CAD, cardiomyopathy and hypertension. These likely will improve with healthy habits and weight loss.     2. Coronary artery disease involving native coronary artery of native heart without angina pectoris  This may improve with healthy habits and weight loss.     3. Essential hypertension  This may improve with healthy habits and weight loss.     4. Ischemic cardiomyopathy  This may improve with healthy habits and weight loss.      Follow up in 4 months with myself           INTERIM HISTORY  Patient struggled with pleuritic chest pain in July with indeterminate CT pulmonary angiogram and elevated D-dimer (presumed PE) and started on Eliquis. She had stopped her  naltrexone. Wegovy was covered by her insurance but it still cost her several hundred dollars per injection. She is now back on naltrexone.    DIETARY HISTORY  Meals Per Day: 3  Eating Protein First?: yes  Food Diary: B:toast and hard boiled egg L:sandwich on tortilla, tuna or egg salad D:meat and vegetable and sometimes potatoes   Snacks Per Day: occasional  Typical Snack: fruit  Fluid Intake: water 24 oz  Portion Control: yes  Calorie Containing Beverages: soda one every 2 weeks      Positive Changes Since Last Visit: generally healthy meal choices, healthy snacks, started chair exercises  Struggling With: she is unable to do a large amount of exercise    Knowledgeable in Reading Food Labels: yes  Getting Adequate Protein: working on it      PHYSICAL ACTIVITY PATTERNS:  Chair exercises    REVIEW OF SYSTEMS  GENERAL/CONSTITUTIONAL:  Fatigue: yes  HEENT:   glaucoma: no  CARDIOVASCULAR:  History of heart disease: yes  GI:  Pancreatitis: no  ENDOCRINE:  Monitoring Blood Sugars: no  Sugars Well Controlled: na  No personal or family history of medullary thyroid cancer no  No personal or family history of MEN2   :  Birth control: menopause  History of kidney stones: yes     Patient Profile   Social History     Social History Narrative    7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          Past Medical History   Past Medical History:   Diagnosis Date    Abnormal mammogram, unspecified     Created by Conversion     Acute sinusitis, unspecified     Created by Conversion     Asymptomatic varicose veins     Created by Conversion     Breast cyst     Cellulitis and abscess of unspecified site     Created by Conversion     Circumscribed scleroderma     Created by Conversion     Contusion of unspecified site     Created by Conversion     Disorder of bone and cartilage, unspecified     Created by Conversion     Dysthymic disorder     Created by Conversion     Esophageal reflux     Created by Conversion     Insomnia, unspecified     Created by  Conversion     Lumbago     Created by Conversion     Mammogram - Abnormal     Created by Conversion Replacement Utility updated for latest IMO load     Migraine, unspecified, without mention of intractable migraine without mention of status migrainosus     Created by Conversion     Myalgia and myositis, unspecified     Created by Conversion     Myocardial infarction (H)     per EKG's since 2015    Obesity, unspecified     Created by Conversion     Open wound of lip, without mention of complication     Created by Conversion     Other primary cardiomyopathies     Created by Conversion     Other seborrheic keratosis     Created by Conversion     Pain in joint, lower leg     Created by Conversion     Pain in limb     Created by Conversion     Sebaceous cyst     Created by Conversion     Shortness of breath     Created by Conversion     Shoulder and upper arm, insect bite, nonvenomous, without mention of infection(912.4)     Created by Conversion     Unspecified disease of sebaceous glands     Created by Conversion     Unspecified essential hypertension     Created by Conversion     Unspecified venous (peripheral) insufficiency     Created by Conversion     Unspecified vitamin D deficiency     Created by Conversion      Patient Active Problem List   Diagnosis    Osteopenia    Insomnia    Vitamin D Deficiency    Lower Back Pain    Fibromyalgia    Essential Hypertension    Seborrheic Keratosis    Lichen Sclerosus Et Atrophicus    Migraine Headache    Esophageal Reflux    Joint Pain, Localized In The Knee    Varicose Veins    Chronic Cutaneous Ulcer Venous Stasis    Chronic venous insufficiency    Foot pain (Soft Tissue)    Female stress incontinence    Calculus of ureter    Hydronephrosis with urinary obstruction due to ureteral calculus    Other cardiomyopathy (H)    Obesity (BMI 30-39.9)    Chronic fatigue    Moderate episode of recurrent major depressive disorder (H)    Anxiety    Pain in joint, multiple sites     Osteoarthritis of lumbar spine, unspecified spinal osteoarthritis complication status    Pain in both lower legs       Past Surgical History  She has a past surgical history that includes appendectomy; Temporomandibular Joint Surgery (Bilateral); Cataract Extraction, Bilateral; Hysterectomy; Oophorectomy; Biopsy breast (Left); Breast Cyst Aspiration (Left); and tubal ligation.     Examination   There were no vitals taken for this visit.    Wt Readings from Last 4 Encounters:   10/09/24 84.4 kg (186 lb)   08/02/24 83.5 kg (184 lb 1.6 oz)   06/11/24 87.5 kg (193 lb)   05/21/24 86.2 kg (190 lb)      BP Readings from Last 3 Encounters:   08/02/24 130/78   07/23/24 138/85   07/23/24 (!) 168/91      General:  Alert, NAD       Counseling:   We reviewed the important post op bariatric recommendations:  -eating 3 meals daily  -eating protein first, getting >60gm protein daily  -eating slowly, chewing food well  -avoiding/limiting calorie containing beverages  -limiting starchy vegetables and carbohydrates, choosing wheat, not white with breads,   crackers, pastas, bear, bagels, tortillas, rice  -limiting restaurant or cafeteria eating to twice a week or less    We discussed the importance of restorative sleep and stress management in maintaining a healthy weight.  We discussed the National Weight Control Registry healthy weight maintenance strategies and ways to optimize metabolism.  We discussed the importance of physical activity including cardiovascular and strength training in maintaining a healthier weight.    Total time spent on the date of this encounter doing: chart review, review of test results, patient visit, physical exam, education, counseling, developing plan of care and documenting = 12 minutes.         LATISHA Rossi MD  [facility]

## 2024-10-09 NOTE — LETTER
10/9/2024      Katy Hong  [address] [place] [id number]      Dear Colleague,    Thank you for referring your patient, Katy Hong, to the [facility] [place]. Please see a copy of my visit note below.    Virtual Visit Details    Type of service:  Telephone Visit   Call start time: 10:42 am  Call end time: 10:56 am  Phone call duration: 12 minutes   Originating Location (pt. Location): Home    Distant Location (provider location):  Off-site    Bariatric Care Clinic Non Surgical Follow up Visit   Date of visit: 10/9/2024  Physician: LATISHA Rossi MD, MD  Primary Care is Skyler Wiggins  Katy Hong   79 year old  female     Initial Weight: 201#  Initial BMI: 37.4  Today's Weight:   Wt Readings from Last 1 Encounters:   08/02/24 83.5 kg (184 lb 1.6 oz)     There is no height or weight on file to calculate BMI.           Assessment and Plan   Assessment: Katy is a 79 year old year old female who presents for medical weight management.      Plan:    1. Obesity (BMI 30-39.9)  Patient was congratulated on her success thus far. Healthy habits to assist with further weight loss were discussed. She is on track but is frustrated with her weight loss plateau. She will continue the naltrexone for now. We discussed the use of compounded semaglutide as although the wegovy was covered it was too expensive for her.  Risks, benefits and possible side effects discussed . Written information was given.We discussed the patient's co-morbid conditions including CAD, cardiomyopathy and hypertension. These likely will improve with healthy habits and weight loss.     2. Coronary artery disease involving native coronary artery of native heart without angina pectoris  This may improve with healthy habits and weight loss.     3. Essential hypertension  This may improve with healthy habits and weight loss.     4. Ischemic cardiomyopathy  This may improve with healthy  habits and weight loss.      Follow up in 4 months with myself           INTERIM HISTORY  Patient struggled with pleuritic chest pain in July with indeterminate CT pulmonary angiogram and elevated D-dimer (presumed PE) and started on Eliquis. She had stopped her naltrexone. Wegovy was covered by her insurance but it still cost her several hundred dollars per injection. She is now back on naltrexone.    DIETARY HISTORY  Meals Per Day: 3  Eating Protein First?: yes  Food Diary: B:toast and hard boiled egg L:sandwich on tortilla, tuna or egg salad D:meat and vegetable and sometimes potatoes   Snacks Per Day: occasional  Typical Snack: fruit  Fluid Intake: water 24 oz  Portion Control: yes  Calorie Containing Beverages: soda one every 2 weeks      Positive Changes Since Last Visit: generally healthy meal choices, healthy snacks, started chair exercises  Struggling With: she is unable to do a large amount of exercise    Knowledgeable in Reading Food Labels: yes  Getting Adequate Protein: working on it      PHYSICAL ACTIVITY PATTERNS:  Chair exercises    REVIEW OF SYSTEMS  GENERAL/CONSTITUTIONAL:  Fatigue: yes  HEENT:   glaucoma: no  CARDIOVASCULAR:  History of heart disease: yes  GI:  Pancreatitis: no  ENDOCRINE:  Monitoring Blood Sugars: no  Sugars Well Controlled: na  No personal or family history of medullary thyroid cancer no  No personal or family history of MEN2   :  Birth control: menopause  History of kidney stones: yes     Patient Profile   Social History     Social History Narrative    7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          Past Medical History   Past Medical History:   Diagnosis Date     Abnormal mammogram, unspecified     Created by Conversion      Acute sinusitis, unspecified     Created by Conversion      Asymptomatic varicose veins     Created by Conversion      Breast cyst      Cellulitis and abscess of unspecified site     Created by Conversion      Circumscribed scleroderma     Created by Conversion       Contusion of unspecified site     Created by Conversion      Disorder of bone and cartilage, unspecified     Created by Conversion      Dysthymic disorder     Created by Conversion      Esophageal reflux     Created by Conversion      Insomnia, unspecified     Created by Conversion      Lumbago     Created by Conversion      Mammogram - Abnormal     Created by Conversion Replacement Utility updated for latest IMO load      Migraine, unspecified, without mention of intractable migraine without mention of status migrainosus     Created by Conversion      Myalgia and myositis, unspecified     Created by Conversion      Myocardial infarction (H)     per EKG's since 2015     Obesity, unspecified     Created by Conversion      Open wound of lip, without mention of complication     Created by Conversion      Other primary cardiomyopathies     Created by Conversion      Other seborrheic keratosis     Created by Conversion      Pain in joint, lower leg     Created by Conversion      Pain in limb     Created by Conversion      Sebaceous cyst     Created by Conversion      Shortness of breath     Created by Conversion      Shoulder and upper arm, insect bite, nonvenomous, without mention of infection(912.4)     Created by Conversion      Unspecified disease of sebaceous glands     Created by Conversion      Unspecified essential hypertension     Created by Conversion      Unspecified venous (peripheral) insufficiency     Created by Conversion      Unspecified vitamin D deficiency     Created by Conversion      Patient Active Problem List   Diagnosis     Osteopenia     Insomnia     Vitamin D Deficiency     Lower Back Pain     Fibromyalgia     Essential Hypertension     Seborrheic Keratosis     Lichen Sclerosus Et Atrophicus     Migraine Headache     Esophageal Reflux     Joint Pain, Localized In The Knee     Varicose Veins     Chronic Cutaneous Ulcer Venous Stasis     Chronic venous insufficiency     Foot pain (Soft Tissue)      Female stress incontinence     Calculus of ureter     Hydronephrosis with urinary obstruction due to ureteral calculus     Other cardiomyopathy (H)     Obesity (BMI 30-39.9)     Chronic fatigue     Moderate episode of recurrent major depressive disorder (H)     Anxiety     Pain in joint, multiple sites     Osteoarthritis of lumbar spine, unspecified spinal osteoarthritis complication status     Pain in both lower legs       Past Surgical History  She has a past surgical history that includes appendectomy; Temporomandibular Joint Surgery (Bilateral); Cataract Extraction, Bilateral; Hysterectomy; Oophorectomy; Biopsy breast (Left); Breast Cyst Aspiration (Left); and tubal ligation.     Examination   There were no vitals taken for this visit.    Wt Readings from Last 4 Encounters:   10/09/24 84.4 kg (186 lb)   08/02/24 83.5 kg (184 lb 1.6 oz)   06/11/24 87.5 kg (193 lb)   05/21/24 86.2 kg (190 lb)      BP Readings from Last 3 Encounters:   08/02/24 130/78   07/23/24 138/85   07/23/24 (!) 168/91      General:  Alert, NAD       Counseling:   We reviewed the important post op bariatric recommendations:  -eating 3 meals daily  -eating protein first, getting >60gm protein daily  -eating slowly, chewing food well  -avoiding/limiting calorie containing beverages  -limiting starchy vegetables and carbohydrates, choosing wheat, not white with breads,   crackers, pastas, bear, bagels, tortillas, rice  -limiting restaurant or cafeteria eating to twice a week or less    We discussed the importance of restorative sleep and stress management in maintaining a healthy weight.  We discussed the National Weight Control Registry healthy weight maintenance strategies and ways to optimize metabolism.  We discussed the importance of physical activity including cardiovascular and strength training in maintaining a healthier weight.    Total time spent on the date of this encounter doing: chart review, review of test results, patient visit,  physical exam, education, counseling, developing plan of care and documenting = 12 minutes.         LATISHA Rossi MD  [facility]               Again, thank you for allowing me to participate in the care of your patient.        Sincerely,        LATISHA Rossi MD

## 2024-10-09 NOTE — NURSING NOTE
Is the patient currently in the state of [place]? YES    Location: home    Visit mode:TELEPHONE    If the visit is dropped, the patient can be reconnected by: TELEPHONE VISIT: Phone number:   Telephone Information:   Mobile [phone]       Will anyone else be joining the visit? NO  (If patient encounters technical issues they should call [phone] :[phone])    How would you like to obtain your AVS? MyChart    Are changes needed to the allergy or medication list? No    Are refills needed on medications prescribed by this physician? NO    Reason for visit: RECHECK    Linda STEELE    QNR Status: complete

## 2024-10-09 NOTE — PATIENT INSTRUCTIONS
Compound Semaglutide at [facility]  [facility] is now offering compounded semaglutide during the time of Wegovy national shortage/limited supply. Semaglutide is the generic name of Wegovy. [facility] is following the highest standards for sterility and compounding practices. Not all compounding practices are equal. Therefore, [facility] will not be prescribing compounded semaglutide outside of the [facility]. Compounding of semaglutide is legal for as long as Wegovy is on the FDA's national shortage list. When/if Wegovy is taken off the FDA's shortage list, compounded semaglutide will no longer be legal to manufacture. When this occurs, patients will have to turn to acquiring Wegovy via its available manufactured pen, look into alternative weight loss medication(s), or stop the medication. Compound semaglutide will be available as a pre-filled syringe. Due to high demand of compounded semaglutide, orders may take 1-2 weeks to obtain from time of prescribing. Each dose of the medication will require a separate prescription.     As with any weight loss medication(s), there is a risk of weight regain should you stop semaglutide. It is important to be aware of this risk should you stop compounded semaglutide with no plans to transition back to an alternative injectable option as the use of semaglutide is intended for long term weight management with the intention of remaining on this injectable long term.        Obtaining Medication and Storage:   The pharmacy must speak to the patient directly prior to shipping medication to walk through administration, shipping and cost. Vials of compounded semaglutide and unit syringes will be mailed from the compounding pharmacy to your home in a refrigerated box. Keep your medication stored in the refrigerator. The vials are to be discarded 28 days after opening (even if there is remaining medication  in the vial).     Common Side Effects:  Side effect profile is the same as Wegovy. Monitor for nausea, diarrhea, constipation, headache, indigestion, tiredness (fatigue), stomach upset or abdominal pain. Less commonly, semaglutide can cause low blood sugar (symptoms: shaky, dizzy, sweaty, agitation). Please reach out to the care team should you feel like this is occurring. It is important to ensure that you are eating consistent meals and not skipping meals. Ensure you are getting at least 64 oz water daily. If any side effects become unmanageable, contact the care team immediately.    Dosing:  Each week you will have the opportunity/option to increase your dose. However, therapy is individualized for each patient. The below is a general guide should someone increase dosage of compound semaglutide each month.   Week 1-4: Inject 0.25 mg subcutaneously once weekly  Week 5-8: If tolerating, increase to 0.5 mg once weekly  Week 9-12: If tolerating, increase to 1 mg once weekly  Week 13-15: If tolerating, increase to 1.5 or 1.7 mg once weekly (dosing depending on what was prescribed by provider)  Week 16-19: If tolerating, increase to 2 mg once weekly  Week 20 & on: If tolerating, increase to 2.4 or 2.5 mg once weekly (dosing depending on what was prescribed by provider)  *If you are having some nausea or other side effects to where you are hesitant to move up to the next dose, stay at the same dose you are on for an additional 4 weeks to see if side effect(s) improves/resolves. Make sure to take this time to hydrate and utilizing smaller more consistent meals, such as 4-6 small meals per day.  It may be advantageous to stay at the same dose if you are seeing good efficacy (both on and off the scale) and having minimal/manageable side effects. If you do not have additional refills on that dose's prescription, please reach out to the clinic.    Mg to Unit Conversion Chart for Reference:         Administration:  Follow the  instructions to fill the syringe with medicine. Instructions can be accessed at [email or web address] or by scanning this QR code-    Follow the instructions to inject your medication. Instructions can be accessed at [email or web address]  or by scanning this QR code-      Syringe Disposal:   Place the used syringe in a sharps container. You can buy a sharps container at your local pharmacy.   If you don't have a sharps container, you can use a plastic detergent container with a lid.   Visit Learning About Safe Needle Use and Disposal ([employer]) and safeneedledisposal.org for more information.     Cost:   $230 per month for doses 1 mg or less (one 1 mL vial), $370 per month for doses higher than 1 mg (two 1 mL vials)     [facility] Phone:  [phone]

## 2024-10-16 ENCOUNTER — OFFICE VISIT (OUTPATIENT)
Dept: FAMILY MEDICINE | Facility: CLINIC | Age: 79
End: 2024-10-16
Payer: COMMERCIAL

## 2024-10-16 VITALS
SYSTOLIC BLOOD PRESSURE: 136 MMHG | DIASTOLIC BLOOD PRESSURE: 80 MMHG | HEART RATE: 68 BPM | HEIGHT: 62 IN | OXYGEN SATURATION: 96 % | TEMPERATURE: 98.2 F | BODY MASS INDEX: 35.28 KG/M2 | WEIGHT: 191.7 LBS

## 2024-10-16 DIAGNOSIS — Z78.0 POST-MENOPAUSAL: ICD-10-CM

## 2024-10-16 DIAGNOSIS — E55.9 VITAMIN D DEFICIENCY: ICD-10-CM

## 2024-10-16 DIAGNOSIS — Z00.00 ENCOUNTER FOR MEDICARE ANNUAL WELLNESS EXAM: Primary | ICD-10-CM

## 2024-10-16 DIAGNOSIS — R53.83 OTHER FATIGUE: ICD-10-CM

## 2024-10-16 DIAGNOSIS — E66.812 CLASS 2 SEVERE OBESITY DUE TO EXCESS CALORIES WITH SERIOUS COMORBIDITY AND BODY MASS INDEX (BMI) OF 35.0 TO 35.9 IN ADULT (H): ICD-10-CM

## 2024-10-16 DIAGNOSIS — R09.81 NASAL CONGESTION: ICD-10-CM

## 2024-10-16 DIAGNOSIS — E66.01 CLASS 2 SEVERE OBESITY DUE TO EXCESS CALORIES WITH SERIOUS COMORBIDITY AND BODY MASS INDEX (BMI) OF 35.0 TO 35.9 IN ADULT (H): ICD-10-CM

## 2024-10-16 DIAGNOSIS — I10 ESSENTIAL HYPERTENSION: ICD-10-CM

## 2024-10-16 DIAGNOSIS — F41.9 ANXIETY: ICD-10-CM

## 2024-10-16 DIAGNOSIS — R41.3 MEMORY LOSS: ICD-10-CM

## 2024-10-16 LAB
FOLATE SERPL-MCNC: >40 NG/ML (ref 4.6–34.8)
TSH SERPL DL<=0.005 MIU/L-ACNC: 1.54 UIU/ML (ref 0.3–4.2)
VIT B12 SERPL-MCNC: 193 PG/ML (ref 232–1245)
VIT D+METAB SERPL-MCNC: 63 NG/ML (ref 20–50)

## 2024-10-16 PROCEDURE — 82607 VITAMIN B-12: CPT | Performed by: NURSE PRACTITIONER

## 2024-10-16 PROCEDURE — 91320 SARSCV2 VAC 30MCG TRS-SUC IM: CPT | Performed by: NURSE PRACTITIONER

## 2024-10-16 PROCEDURE — 36415 COLL VENOUS BLD VENIPUNCTURE: CPT | Performed by: NURSE PRACTITIONER

## 2024-10-16 PROCEDURE — G0439 PPPS, SUBSEQ VISIT: HCPCS | Performed by: NURSE PRACTITIONER

## 2024-10-16 PROCEDURE — 82306 VITAMIN D 25 HYDROXY: CPT | Performed by: NURSE PRACTITIONER

## 2024-10-16 PROCEDURE — 82746 ASSAY OF FOLIC ACID SERUM: CPT | Performed by: NURSE PRACTITIONER

## 2024-10-16 PROCEDURE — 99214 OFFICE O/P EST MOD 30 MIN: CPT | Mod: 25 | Performed by: NURSE PRACTITIONER

## 2024-10-16 PROCEDURE — G0008 ADMIN INFLUENZA VIRUS VAC: HCPCS | Performed by: NURSE PRACTITIONER

## 2024-10-16 PROCEDURE — 90662 IIV NO PRSV INCREASED AG IM: CPT | Performed by: NURSE PRACTITIONER

## 2024-10-16 PROCEDURE — 90480 ADMN SARSCOV2 VAC 1/ONLY CMP: CPT | Performed by: NURSE PRACTITIONER

## 2024-10-16 PROCEDURE — 84443 ASSAY THYROID STIM HORMONE: CPT | Performed by: NURSE PRACTITIONER

## 2024-10-16 RX ORDER — NALTREXONE HYDROCHLORIDE 50 MG/1
50 TABLET, FILM COATED ORAL 2 TIMES DAILY
COMMUNITY

## 2024-10-16 RX ORDER — RIZATRIPTAN BENZOATE 10 MG/1
TABLET, ORALLY DISINTEGRATING ORAL
Qty: 30 TABLET | Refills: 3 | Status: CANCELLED | OUTPATIENT
Start: 2024-10-16

## 2024-10-16 RX ORDER — DULOXETIN HYDROCHLORIDE 20 MG/1
20 CAPSULE, DELAYED RELEASE ORAL 2 TIMES DAILY
COMMUNITY

## 2024-10-16 RX ORDER — BUSPIRONE HYDROCHLORIDE 15 MG/1
15 TABLET ORAL 2 TIMES DAILY
Qty: 180 TABLET | Refills: 1 | Status: CANCELLED | OUTPATIENT
Start: 2024-10-16

## 2024-10-16 RX ORDER — METOPROLOL SUCCINATE 50 MG/1
50 TABLET, EXTENDED RELEASE ORAL DAILY
Qty: 90 TABLET | Refills: 3 | Status: SHIPPED | OUTPATIENT
Start: 2024-10-16

## 2024-10-16 RX ORDER — MOMETASONE FUROATE MONOHYDRATE 50 UG/1
2 SPRAY, METERED NASAL DAILY
Qty: 25.5 G | Refills: 2 | Status: SHIPPED | OUTPATIENT
Start: 2024-10-16

## 2024-10-16 SDOH — HEALTH STABILITY: PHYSICAL HEALTH: ON AVERAGE, HOW MANY MINUTES DO YOU ENGAGE IN EXERCISE AT THIS LEVEL?: 30 MIN

## 2024-10-16 SDOH — HEALTH STABILITY: PHYSICAL HEALTH: ON AVERAGE, HOW MANY DAYS PER WEEK DO YOU ENGAGE IN MODERATE TO STRENUOUS EXERCISE (LIKE A BRISK WALK)?: 3 DAYS

## 2024-10-16 ASSESSMENT — ANXIETY QUESTIONNAIRES
7. FEELING AFRAID AS IF SOMETHING AWFUL MIGHT HAPPEN: MORE THAN HALF THE DAYS
IF YOU CHECKED OFF ANY PROBLEMS ON THIS QUESTIONNAIRE, HOW DIFFICULT HAVE THESE PROBLEMS MADE IT FOR YOU TO DO YOUR WORK, TAKE CARE OF THINGS AT HOME, OR GET ALONG WITH OTHER PEOPLE: SOMEWHAT DIFFICULT
GAD7 TOTAL SCORE: 15
8. IF YOU CHECKED OFF ANY PROBLEMS, HOW DIFFICULT HAVE THESE MADE IT FOR YOU TO DO YOUR WORK, TAKE CARE OF THINGS AT HOME, OR GET ALONG WITH OTHER PEOPLE?: SOMEWHAT DIFFICULT
2. NOT BEING ABLE TO STOP OR CONTROL WORRYING: NEARLY EVERY DAY
3. WORRYING TOO MUCH ABOUT DIFFERENT THINGS: NEARLY EVERY DAY
6. BECOMING EASILY ANNOYED OR IRRITABLE: MORE THAN HALF THE DAYS
7. FEELING AFRAID AS IF SOMETHING AWFUL MIGHT HAPPEN: MORE THAN HALF THE DAYS
GAD7 TOTAL SCORE: 15
5. BEING SO RESTLESS THAT IT IS HARD TO SIT STILL: MORE THAN HALF THE DAYS
4. TROUBLE RELAXING: NEARLY EVERY DAY
1. FEELING NERVOUS, ANXIOUS, OR ON EDGE: NOT AT ALL
GAD7 TOTAL SCORE: 15

## 2024-10-16 ASSESSMENT — PATIENT HEALTH QUESTIONNAIRE - PHQ9
SUM OF ALL RESPONSES TO PHQ QUESTIONS 1-9: 14
SUM OF ALL RESPONSES TO PHQ QUESTIONS 1-9: 14
10. IF YOU CHECKED OFF ANY PROBLEMS, HOW DIFFICULT HAVE THESE PROBLEMS MADE IT FOR YOU TO DO YOUR WORK, TAKE CARE OF THINGS AT HOME, OR GET ALONG WITH OTHER PEOPLE: SOMEWHAT DIFFICULT

## 2024-10-16 ASSESSMENT — SOCIAL DETERMINANTS OF HEALTH (SDOH): HOW OFTEN DO YOU GET TOGETHER WITH FRIENDS OR RELATIVES?: ONCE A WEEK

## 2024-10-16 ASSESSMENT — ENCOUNTER SYMPTOMS: FATIGUE: 1

## 2024-10-16 ASSESSMENT — PAIN SCALES - GENERAL: PAINLEVEL: NO PAIN (0)

## 2024-10-16 NOTE — PATIENT INSTRUCTIONS
Flu and COVID shot given today.    We are doing some lab testing to evaluate your memory changes.    I also placed a referral to neuropsych testing.  This is the extensive testing that can help to differentiate dementia versus other causes of memory difficulties.  Scheduling information is highlighted in your paperwork.    I also highlighted the scheduling information to get your DEXA/bone density scan done to evaluate for osteoporosis.    You should be getting a reminder postcard about setting up your mammogram within the next couple months.    If you are ever interested in meeting with dermatology like we talked about, let me know.        Patient Education   Preventive Care Advice   This is general advice given by our system to help you stay healthy. However, your care team may have specific advice just for you. Please talk to your care team about your preventive care needs.  Nutrition  Eat 5 or more servings of fruits and vegetables each day.  Try wheat bread, brown rice and whole grain pasta (instead of white bread, rice, and pasta).  Get enough calcium and vitamin D. Check the label on foods and aim for 100% of the RDA (recommended daily allowance).  Lifestyle  Exercise at least 150 minutes each week  (30 minutes a day, 5 days a week).  Do muscle strengthening activities 2 days a week. These help control your weight and prevent disease.  No smoking.  Wear sunscreen to prevent skin cancer.  Have a dental exam and cleaning every 6 months.  Yearly exams  See your health care team every year to talk about:  Any changes in your health.  Any medicines your care team has prescribed.  Preventive care, family planning, and ways to prevent chronic diseases.  Shots (vaccines)   HPV shots (up to age 26), if you've never had them before.  Hepatitis B shots (up to age 59), if you've never had them before.  COVID-19 shot: Get this shot when it's due.  Flu shot: Get a flu shot every year.  Tetanus shot: Get a tetanus shot  every 10 years.  Pneumococcal, hepatitis A, and RSV shots: Ask your care team if you need these based on your risk.  Shingles shot (for age 50 and up)  General health tests  Diabetes screening:  Starting at age 35, Get screened for diabetes at least every 3 years.  If you are younger than age 35, ask your care team if you should be screened for diabetes.  Cholesterol test: At age 39, start having a cholesterol test every 5 years, or more often if advised.  Bone density scan (DEXA): At age 50, ask your care team if you should have this scan for osteoporosis (brittle bones).  Hepatitis C: Get tested at least once in your life.  STIs (sexually transmitted infections)  Before age 24: Ask your care team if you should be screened for STIs.  After age 24: Get screened for STIs if you're at risk. You are at risk for STIs (including HIV) if:  You are sexually active with more than one person.  You don't use condoms every time.  You or a partner was diagnosed with a sexually transmitted infection.  If you are at risk for HIV, ask about PrEP medicine to prevent HIV.  Get tested for HIV at least once in your life, whether you are at risk for HIV or not.  Cancer screening tests  Cervical cancer screening: If you have a cervix, begin getting regular cervical cancer screening tests starting at age 21.  Breast cancer scan (mammogram): If you've ever had breasts, begin having regular mammograms starting at age 40. This is a scan to check for breast cancer.  Colon cancer screening: It is important to start screening for colon cancer at age 45.  Have a colonoscopy test every 10 years (or more often if you're at risk) Or, ask your provider about stool tests like a FIT test every year or Cologuard test every 3 years.  To learn more about your testing options, visit:   .  For help making a decision, visit:   https://bit.ly/mn17210.  Prostate cancer screening test: If you have a prostate, ask your care team if a prostate cancer screening  test (PSA) at age 55 is right for you.  Lung cancer screening: If you are a current or former smoker ages 50 to 80, ask your care team if ongoing lung cancer screenings are right for you.  For informational purposes only. Not to replace the advice of your health care provider. Copyright   2023 [employer]. All rights reserved. Clinically reviewed by the [facility]. [employer] [id number] - REV 01/24.  Learning About Sleeping Well  What does sleeping well mean?     Sleeping well means getting enough sleep to feel good and stay healthy. How much sleep is enough varies among people.  The number of hours you sleep and how you feel when you wake up are both important. If you do not feel refreshed, you probably need more sleep. Another sign of not getting enough sleep is feeling tired during the day.  Experts recommend that adults get at least 7 or more hours of sleep per day. Children and older adults need more sleep.  Why is getting enough sleep important?  Getting enough quality sleep is a basic part of good health. When your sleep suffers, your physical health, mood, and your thoughts can suffer too. You may find yourself feeling more grumpy or stressed. Not getting enough sleep also can lead to serious problems, including injury, accidents, anxiety, and depression.  What might cause poor sleeping?  Many things can cause sleep problems, including:  Changes to your sleep schedule.  Stress. Stress can be caused by fear about a single event, such as giving a speech. Or you may have ongoing stress, such as worry about work or school.  Depression, anxiety, and other mental or emotional conditions.  Changes in your sleep habits or surroundings. This includes changes that happen where you sleep, such as noise, light, or sleeping in a different bed. It also includes changes in your sleep pattern, such as having jet lag or working a late shift.  Health problems, such as pain, breathing  "problems, and restless legs syndrome.  Lack of regular exercise.  Using alcohol, nicotine, or caffeine before bed.  How can you help yourself?  Here are some tips that may help you sleep more soundly and wake up feeling more refreshed.  Your sleeping area   Use your bedroom only for sleeping and sex. A bit of light reading may help you fall asleep. But if it doesn't, do your reading elsewhere in the house. Try not to use your TV, computer, smartphone, or tablet while you are in bed.  Be sure your bed is big enough to stretch out comfortably, especially if you have a sleep partner.  Keep your bedroom quiet, dark, and cool. Use curtains, blinds, or a sleep mask to block out light. To block out noise, use earplugs, soothing music, or a \"white noise\" machine.  Your evening and bedtime routine   Create a relaxing bedtime routine. You might want to take a warm shower or bath, or listen to soothing music.  Go to bed at the same time every night. And get up at the same time every morning, even if you feel tired.  What to avoid   Limit caffeine (coffee, tea, caffeinated sodas) during the day, and don't have any for at least 6 hours before bedtime.  Avoid drinking alcohol before bedtime. Alcohol can cause you to wake up more often during the night.  Try not to smoke or use tobacco, especially in the evening. Nicotine can keep you awake.  Limit naps during the day, especially close to bedtime.  Avoid lying in bed awake for too long. If you can't fall asleep or if you wake up in the middle of the night and can't get back to sleep within about 20 minutes, get out of bed and go to another room until you feel sleepy.  Avoid taking medicine right before bed that may keep you awake or make you feel hyper or energized. Your doctor can tell you if your medicine may do this and if you can take it earlier in the day.  If you can't sleep   Imagine yourself in a peaceful, pleasant scene. Focus on the details and feelings of being in a " "place that is relaxing.  Get up and do a quiet or boring activity until you feel sleepy.  Avoid drinking any liquids before going to bed to help prevent waking up often to use the bathroom.  Where can you learn more?  Go to https://www.[employer].net/patiented  Enter J942 in the search box to learn more about \"Learning About Sleeping Well.\"  Current as of: July 10, 2023  Content Version: 14.2 2024 [employer].   Care instructions adapted under license by your healthcare professional. If you have questions about a medical condition or this instruction, always ask your healthcare professional. Health[employer], [employer] disclaims any warranty or liability for your use of this information.    Bladder Training: Care Instructions  Your Care Instructions     Bladder training is used to treat urge incontinence and stress incontinence. Urge incontinence means that the need to urinate comes on so fast that you can't get to a toilet in time. Stress incontinence means that you leak urine because of pressure on your bladder. For example, it may happen when you laugh, cough, or lift something heavy.  Bladder training can increase how long you can wait before you have to urinate. It can also help your bladder hold more urine. And it can give you better control over the urge to urinate.  It is important to remember that bladder training takes a few weeks to a few months to make a difference. You may not see results right away, but don't give up.  Follow-up care is a key part of your treatment and safety. Be sure to make and go to all appointments, and call your doctor if you are having problems. It's also a good idea to know your test results and keep a list of the medicines you take.  How can you care for yourself at home?  Work with your doctor to come up with a bladder training program that is right for you. You may use one or more of the following methods.  Delayed urination  In the beginning, try to keep from " "urinating for 5 minutes after you first feel the need to go.  While you wait, take deep, slow breaths to relax. Kegel exercises can also help you delay the need to go to the bathroom.  After some practice, when you can easily wait 5 minutes to urinate, try to wait 10 minutes before you urinate.  Slowly increase the waiting period until you are able to control when you have to urinate.  Scheduled urination  Empty your bladder when you first wake up in the morning.  Schedule times throughout the day when you will urinate.  Start by going to the bathroom every hour, even if you don't need to go.  Slowly increase the time between trips to the bathroom.  When you have found a schedule that works well for you, keep doing it.  If you wake up during the night and have to urinate, do it. Apply your schedule to waking hours only.  Kegel exercises  These tighten and strengthen pelvic muscles, which can help you control the flow of urine. (If doing these exercises causes pain, stop doing them and talk with your doctor.) To do Kegel exercises:  Squeeze your muscles as if you were trying not to pass gas. Or squeeze your muscles as if you were stopping the flow of urine. Your belly, legs, and buttocks shouldn't move.  Hold the squeeze for 3 seconds, then relax for 5 to 10 seconds.  Start with 3 seconds, then add 1 second each week until you are able to squeeze for 10 seconds.  Repeat the exercise 10 times a session. Do 3 to 8 sessions a day.  When should you call for help?  Watch closely for changes in your health, and be sure to contact your doctor if:    Your incontinence is getting worse.     You do not get better as expected.   Where can you learn more?  Go to https://www.[employer].net/patiented  Enter V684 in the search box to learn more about \"Bladder Training: Care Instructions.\"  Current as of: November 15, 2023  Content Version: 14.2 2024 [employer].   Care instructions adapted under license by your " healthcare professional. If you have questions about a medical condition or this instruction, always ask your healthcare professional. Healthwise, [facility] disclaims any warranty or liability for your use of this information.    Learning About Depression Screening  What is depression screening?  Depression screening is a way to see if you have depression symptoms. It may be done by a doctor or counselor. It's often part of a routine checkup. That's because your mental health is just as important as your physical health.  Depression is a mental health condition that affects how you feel, think, and act. You may:  Have less energy.  Lose interest in your daily activities.  Feel sad and grouchy for a long time.  Depression is very common. It affects people of all ages.  Many things can lead to depression. Some people become depressed after they have a stroke or find out they have a major illness like cancer or heart disease. The death of a loved one or a breakup may lead to depression. It can run in families. Most experts believe that a combination of inherited genes and stressful life events can cause it.  What happens during screening?  You may be asked to fill out a form about your depression symptoms. You and the doctor will discuss your answers. The doctor may ask you more questions to learn more about how you think, act, and feel.  What happens after screening?  If you have symptoms of depression, your doctor will talk to you about your options.  Doctors usually treat depression with medicines or counseling. Often, combining the two works best. Many people don't get help because they think that they'll get over the depression on their own. But people with depression may not get better unless they get treatment.  The cause of depression is not well understood. There may be many factors involved. But if you have depression, it's not your fault.  A serious symptom of depression is thinking about death or  "suicide. If you or someone you care about talks about this or about feeling hopeless, get help right away.  It's important to know that depression can be treated. Medicine, counseling, and self-care may help.  Where can you learn more?  Go to https://www.[employer].net/patiented  Enter T185 in the search box to learn more about \"Learning About Depression Screening.\"  Current as of: June 24, 2023  Content Version: 14.2 2024 [facility].   Care instructions adapted under license by your healthcare professional. If you have questions about a medical condition or this instruction, always ask your healthcare professional. Healthwise[employer] disclaims any warranty or liability for your use of this information.       "

## 2024-10-16 NOTE — PROGRESS NOTES
"Preventive Care Visit  [facility]  Skyler Wiggins NP,    Oct 16, 2024      Assessment & Plan       ICD-10-CM    1. Encounter for Medicare annual wellness exam  Z00.00       2. Anxiety  F41.9       3. Essential hypertension  I10 metoprolol succinate ER (TOPROL XL) 50 MG 24 hr tablet      4. Nasal congestion  R09.81 mometasone (NASONEX) 50 MCG/ACT nasal spray      5. Post-menopausal  Z78.0 DX Bone Density      6. Memory loss  R41.3 Vitamin B12     Folate     Adult Neuropsychology  Referral      7. Vitamin D deficiency  E55.9 Vitamin D Deficiency      8. Other fatigue  R53.83 TSH     Vitamin B12      9. Class 2 severe obesity due to excess calories with serious comorbidity and body mass index (BMI) of 35.0 to 35.9 in adult (H)  E66.812     E66.01     Z68.35         Rule out vitamin deficiency as cause of cognitive changes.  Did well on mini cog.  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  Neuropsych eval ordered.  Update bone density scan.  Mammogram and colonoscopy up-to-date.  Flu and COVID shot given.  Keep working with bariatric care for weight loss.          BMI  Estimated body mass index is 35.06 kg/m  as calculated from the following:    Height as of this encounter: 1.575 m (5' 2\").    Weight as of this encounter: 87 kg (191 lb 11.2 oz).   Weight management plan: Discussed healthy diet and exercise guidelines    Counseling  Appropriate preventive services were addressed with this patient via screening, questionnaire, or discussion as appropriate for fall prevention, nutrition, physical activity, Tobacco-use cessation, social engagement, weight loss and cognition.  Checklist reviewing preventive services available has been given to the patient.  Reviewed patient's diet, addressing concerns and/or questions.   She is at risk for lack of exercise and has been provided with information to increase physical activity for the benefit of her well-being.   The patient " was instructed to see the dentist every 6 months.   Discussed possible causes of fatigue. Information on urinary incontinence and treatment options given to patient.   The patient's PHQ-9 score is consistent with moderate depression. She was provided with information regarding depression.         Benjamin Burns is a 79 year old, presenting for the following:  Annual Visit (Fasting), Fatigue (Chronic issue would like thyroid checked), Consult (Nona wants her to see neuro for brain fog), and Arthritis (Wondering about braces to straighten finger )        10/16/2024     9:36 AM   Additional Questions   Roomed by Smitha Miller [facility]     Health Care Directive  Patient does not have a Health Care Directive or Living Will: Previously reviewed    Fatigue  Associated symptoms include fatigue.   Arthritis  Associated symptoms include fatigue.     At [facility] for therapy only now. Followed psych np to his own private practice [facility]) Doing chair excercises. Knee wont allow her outdoor activities.  Psych has concerns about cognitive changes.  Did well on mini cog today.    Patient has generalized fatigue.  Has mild ANDRADE.  Untreated.  Wonders if she has thyroid dysfunction.          10/16/2024   General Health   How would you rate your overall physical health? Good   Feel stress (tense, anxious, or unable to sleep) To some extent      (!) STRESS CONCERN      10/16/2024   Nutrition   Diet: Regular (no restrictions)            10/16/2024   Exercise   Days per week of moderate/strenous exercise 3 days   Average minutes spent exercising at this level 30 min            10/16/2024   Social Factors   Frequency of gathering with friends or relatives Once a week   Worry food won't last until get money to buy more No   Food not last or not have enough money for food? No   Do you have housing? (Housing is defined as stable permanent housing and does not include staying ouside in a car, in a tent, in an abandoned building, in an  overnight shelter, or couch-surfing.) Yes   Are you worried about losing your housing? No   Lack of transportation? No   Unable to get utilities (heat,electricity)? No            10/16/2024   Fall Risk   Fallen 2 or more times in the past year? No   Trouble with walking or balance? No             10/16/2024   Activities of Daily Living- Home Safety   Needs help with the following daily activites None of the above   Safety concerns in the home None of the above            10/16/2024   Dental   Dentist two times every year? (!) NO            10/16/2024   Hearing Screening   Hearing concerns? None of the above            10/16/2024   Driving Risk Screening   Patient/family members have concerns about driving No            10/16/2024   General Alertness/Fatigue Screening   Have you been more tired than usual lately? (!) YES            10/16/2024   Urinary Incontinence Screening   Bothered by leaking urine in past 6 months Yes            10/16/2024   TB Screening   Were you born outside of the [place]? No          Today's PHQ-9 Score:       10/16/2024     9:38 AM   PHQ-9 SCORE   PHQ-9 Total Score MyChart 14 (Moderate depression)   PHQ-9 Total Score 14         10/16/2024   Substance Use   Alcohol more than 3/day or more than 7/wk No   Do you have a current opioid prescription? No   How severe/bad is pain from 1 to 10? 5/10   Do you use any other substances recreationally? No        Social History     Tobacco Use    Smoking status: Never     Passive exposure: Past    Smokeless tobacco: Never   Vaping Use    Vaping status: Never Used   Substance Use Topics    Alcohol use: Yes     Alcohol/week: 0.0 standard drinks of alcohol     Comment: Alcoholic Drinks/day: occasional    Drug use: No           3/20/2024   LAST FHS-7 RESULTS   1st degree relative breast or ovarian cancer No   Any relative bilateral breast cancer No   Any male have breast cancer No   Any ONE woman have BOTH breast AND ovarian cancer No   Any woman with breast  cancer before 50yrs No   2 or more relatives with breast AND/OR ovarian cancer Yes   2 or more relatives with breast AND/OR bowel cancer No           Mammogram Screening - Mammogram every 1-2 years updated in Health Maintenance based on mutual decision making    ASCVD Risk   The 10-year ASCVD risk score (Rosemary BOLAÑOS, et al., 2019) is: 32.1%    Values used to calculate the score:      Age: 79 years      Sex: Female      Is Non- : No      Diabetic: No      Tobacco smoker: No      Systolic Blood Pressure: 136 mmHg      Is BP treated: Yes      HDL Cholesterol: 64 mg/dL      Total Cholesterol: 147 mg/dL      Reviewed and updated as needed this visit by Provider                    Past Medical History:   Diagnosis Date    Abnormal mammogram, unspecified     Created by Conversion     Acute sinusitis, unspecified     Created by Conversion     Asymptomatic varicose veins     Created by Conversion     Breast cyst     Cellulitis and abscess of unspecified site     Created by Conversion     Circumscribed scleroderma     Created by Conversion     Contusion of unspecified site     Created by Conversion     Disorder of bone and cartilage, unspecified     Created by Conversion     Dysthymic disorder     Created by Conversion     Esophageal reflux     Created by Conversion     Insomnia, unspecified     Created by Conversion     Lumbago     Created by Conversion     Mammogram - Abnormal     Created by Conversion Replacement Utility updated for latest IMO load     Migraine, unspecified, without mention of intractable migraine without mention of status migrainosus     Created by Conversion     Myalgia and myositis, unspecified     Created by Conversion     Myocardial infarction (H)     per EKG's since 2015    Obesity, unspecified     Created by Conversion     Open wound of lip, without mention of complication     Created by Conversion     Other primary cardiomyopathies     Created by Conversion     Other  seborrheic keratosis     Created by Conversion     Pain in joint, lower leg     Created by Conversion     Pain in limb     Created by Conversion     Sebaceous cyst     Created by Conversion     Shortness of breath     Created by Conversion     Shoulder and upper arm, insect bite, nonvenomous, without mention of infection(912.4)     Created by Conversion     Unspecified disease of sebaceous glands     Created by Conversion     Unspecified essential hypertension     Created by Conversion     Unspecified venous (peripheral) insufficiency     Created by Conversion     Unspecified vitamin D deficiency     Created by Conversion      Past Surgical History:   Procedure Laterality Date    APPENDECTOMY      BIOPSY BREAST Left     BREAST CYST ASPIRATION Left     CATARACT EXTRACTION, BILATERAL      HYSTERECTOMY      OOPHORECTOMY      TEMPOROMANDIBULAR JOINT SURGERY Bilateral     TUBAL LIGATION       Current providers sharing in care for this patient include:  Patient Care Team:  Skyler Wiggins NP as PCP - Cyn Contreras MD as Assigned Heart and Vascular Provider  Skyler Wiggins NP as Assigned PCP  Kesiha Parsons MD as MD (Allergy & Immunology)  Teresa Mas CNP as Assigned Neuroscience Provider  Keisha Parsons MD as MD (Allergy & Immunology)    The following health maintenance items are reviewed in Epic and correct as of today:  Health Maintenance   Topic Date Due    RSV VACCINE (1 - 1-dose 75+ series) Never done    ANNUAL REVIEW OF HM ORDERS  10/07/2023    DTAP/TDAP/TD IMMUNIZATION (2 - Td or Tdap) 10/17/2023    INFLUENZA VACCINE (1) 09/01/2024    COVID-19 Vaccine (7 - 2024-25 season) 09/01/2024    PHQ-9  04/16/2025    BMP  07/23/2025    MEDICARE ANNUAL WELLNESS VISIT  10/16/2025    FALL RISK ASSESSMENT  10/16/2025    GLUCOSE  07/23/2027    LIPID  06/14/2028    ADVANCE CARE PLANNING  10/10/2028    DEXA  06/12/2032    HEPATITIS C SCREENING  Completed    DEPRESSION ACTION PLAN  Completed     "Pneumococcal Vaccine: 65+ Years  Completed    ZOSTER IMMUNIZATION  Completed    HPV IMMUNIZATION  Aged Out    MENINGITIS IMMUNIZATION  Aged Out    RSV MONOCLONAL ANTIBODY  Aged Out    MAMMO SCREENING  Discontinued    COLORECTAL CANCER SCREENING  Discontinued         Review of Systems  Constitutional, HEENT, cardiovascular, pulmonary, gi and gu systems are negative, except as otherwise noted.     Objective    Exam  /80 (BP Location: Left arm, Patient Position: Sitting, Cuff Size: Adult Large)   Pulse 68   Temp 98.2  F (36.8  C) (Oral)   Ht 1.575 m (5' 2\")   Wt 87 kg (191 lb 11.2 oz)   SpO2 96%   BMI 35.06 kg/m     Estimated body mass index is 35.06 kg/m  as calculated from the following:    Height as of this encounter: 1.575 m (5' 2\").    Weight as of this encounter: 87 kg (191 lb 11.2 oz).    Physical Exam  GENERAL: alert and no distress  EYES: Eyes grossly normal to inspection, PERRL and conjunctivae and sclerae normal  HENT: ear canals and TM's normal, nose and mouth without ulcers or lesions  NECK: no adenopathy, no asymmetry, masses, or scars  RESP: lungs clear to auscultation - no rales, rhonchi or wheezes  CV: regular rate and rhythm, normal S1 S2, no S3 or S4, no murmur, click or rub, no peripheral edema  ABDOMEN: soft, nontender, no hepatosplenomegaly, no masses and bowel sounds normal  MS: no gross musculoskeletal defects noted, no edema  SKIN: no suspicious lesions or rashes  NEURO: Normal strength and tone, mentation intact and speech normal  PSYCH: mentation appears normal, affect normal/bright        10/16/2024   Mini Cog   Clock Draw Score 2 Normal   3 Item Recall 3 objects recalled   Mini Cog Total Score 5        Signed Electronically by: Skyler Wiggins NP    Answers submitted by the patient for this visit:  Patient Health Questionnaire (Submitted on 10/16/2024)  If you checked off any problems, how difficult have these problems made it for you to do your work, take care of things at " home, or get along with other people?: Somewhat difficult  PHQ9 TOTAL SCORE: 14  Patient Health Questionnaire (G7) (Submitted on 10/16/2024)  MARGARETTE 7 TOTAL SCORE: 15

## 2024-10-17 ENCOUNTER — PATIENT OUTREACH (OUTPATIENT)
Dept: CARE COORDINATION | Facility: CLINIC | Age: 79
End: 2024-10-17
Payer: COMMERCIAL

## 2024-10-17 ENCOUNTER — MYC MEDICAL ADVICE (OUTPATIENT)
Dept: FAMILY MEDICINE | Facility: CLINIC | Age: 79
End: 2024-10-17
Payer: COMMERCIAL

## 2024-10-17 NOTE — TELEPHONE ENCOUNTER
Called pt for clarification as chart has no lab request and there is nothing in the note indicating anything additional is needed. Also verbally confirmed with provider nothing is needed.    Pt states that she got a message from [facility] requesting a follow up in 2025. Did explain that those are just reminders for when your next physical is do and no further action is needed at this time.    Pt states understanding

## 2024-11-18 ENCOUNTER — TRANSFERRED RECORDS (OUTPATIENT)
Dept: HEALTH INFORMATION MANAGEMENT | Facility: CLINIC | Age: 79
End: 2024-11-18
Payer: COMMERCIAL

## 2024-11-19 ENCOUNTER — ANCILLARY PROCEDURE (OUTPATIENT)
Dept: BONE DENSITY | Facility: CLINIC | Age: 79
End: 2024-11-19
Attending: NURSE PRACTITIONER
Payer: COMMERCIAL

## 2024-11-19 DIAGNOSIS — Z78.0 POST-MENOPAUSAL: ICD-10-CM

## 2024-11-19 PROCEDURE — 77080 DXA BONE DENSITY AXIAL: CPT | Mod: TC | Performed by: PHYSICIAN ASSISTANT

## 2024-11-19 PROCEDURE — 77091 TBS TECHL CALCULATION ONLY: CPT | Performed by: PHYSICIAN ASSISTANT

## 2024-11-20 ENCOUNTER — TELEPHONE (OUTPATIENT)
Dept: FAMILY MEDICINE | Facility: CLINIC | Age: 79
End: 2024-11-20
Payer: COMMERCIAL

## 2024-11-20 NOTE — TELEPHONE ENCOUNTER
Spoke with patient to relay provider message. She states she will start with the Prolia injections.     Ann Mcnamara RN  [facility] [place]

## 2024-11-20 NOTE — TELEPHONE ENCOUNTER
----- Message from Skyler Wiggins sent at 11/20/2024 12:25 PM CST -----  Please contact patient.    Bone density scan does show a bit of decreased strength in the hips and her risk of fracture is high enough where we could do treatment again for thinner bones.  Previously she had been on alendronate which did seem to help.  We could do another 5 years of this.    Alternatively, we could get her set up with Prolia injections which would probably be more beneficial for strengthening the bones but carry more risks of bodyaches side effects and have to be given twice a year.  Also after a few doses, you want to make sure to continue treatment otherwise bone density will drop quickly.    Let me know if she has a preference    Skyler Wiggins, CNP

## 2024-12-03 ENCOUNTER — LAB (OUTPATIENT)
Dept: LAB | Facility: CLINIC | Age: 79
End: 2024-12-03
Payer: COMMERCIAL

## 2024-12-03 DIAGNOSIS — M81.0 AGE-RELATED OSTEOPOROSIS WITHOUT CURRENT PATHOLOGICAL FRACTURE: ICD-10-CM

## 2024-12-03 LAB
PHOSPHATE SERPL-MCNC: 3.1 MG/DL (ref 2.5–4.5)
PTH-INTACT SERPL-MCNC: 56 PG/ML (ref 15–65)

## 2024-12-03 PROCEDURE — 83970 ASSAY OF PARATHORMONE: CPT

## 2024-12-03 PROCEDURE — 84100 ASSAY OF PHOSPHORUS: CPT

## 2024-12-03 PROCEDURE — 36415 COLL VENOUS BLD VENIPUNCTURE: CPT

## 2024-12-17 ENCOUNTER — ALLIED HEALTH/NURSE VISIT (OUTPATIENT)
Dept: FAMILY MEDICINE | Facility: CLINIC | Age: 79
End: 2024-12-17
Payer: COMMERCIAL

## 2024-12-17 DIAGNOSIS — M81.0 AGE-RELATED OSTEOPOROSIS WITHOUT CURRENT PATHOLOGICAL FRACTURE: Primary | ICD-10-CM

## 2024-12-17 NOTE — PROGRESS NOTES
Clinic Administered Medication Documentation      Prolia Documentation    Indication: Prolia  (denosumab) is a prescription medicine used to treat osteoporosis in patients who:   Are at high risk for fracture, meaning patients who have had a fracture related to osteoporosis, or who have multiple risk factors for fracture.  Cannot use another osteoporosis medicine or other osteoporosis medicines did not work well.  The timeline for early/late injections would be 4 weeks early and any time after the 6 month shana. If a patient receives their injection late, then the subsequent injection would be 6 months from the date that they actually received the injection.    When was the last injection?  First initial injection.   Was the last injection at least 6 months ago? Yes  Has the prior authorization been completed?  Yes  Is there an active order (written within the past 365 days, with administrations remaining, not ) in the chart?  Yes   GFR Estimate   Date Value Ref Range Status   2024 58 (L) >60 mL/min/1.73m2 Final   2021 >60 >60 mL/min/1.73m2 Final     GFR, ESTIMATED POCT   Date Value Ref Range Status   2024 58 (L) >60 mL/min/1.73m2 Final     Has patient had a GFR within the last 12 months? Yes   Is GFR under 30, or patient has a diagnosis of CKD4 or CKD5? No   Patient denies gastric bypass or parathyroid surgery in past 6 months? Yes - patient denies.   Patient denies dental work in the past two months involving drilling into the bone, such as implants/extractions, oral surgery or a tooth extraction that has not healed yet?  Yes  Patient denies plans for an emergency tooth extraction within the next week? Yes    The following steps were completed to comply with the REMS program for Prolia:  Reviewed information in the Medication Guide, including the serious risks of Prolia  and the symptoms of each risk.  Advised patient to seek prompt medical attention if they have signs or symptoms of any of  the serious risks.  Provided each patient a copy of the Medication Guide and Patient Guide.    Prior to injection, verified patient identity using patient's name and date of birth. Medication was administered. Please see MAR and medication order for additional information. Patient instructed to remain in clinic for 15 minutes and report any adverse reaction to staff immediately.    Vial/Syringe: Syringe  Was this medication supplied by the patient? No  Verified that the patient has administrations remaining in their prescription.     Charlie GOOD RN

## 2024-12-18 ENCOUNTER — HOSPITAL ENCOUNTER (OUTPATIENT)
Dept: MAMMOGRAPHY | Facility: CLINIC | Age: 79
Discharge: HOME | End: 2024-12-18
Attending: NURSE PRACTITIONER
Payer: COMMERCIAL

## 2024-12-18 DIAGNOSIS — Z12.31 VISIT FOR SCREENING MAMMOGRAM: ICD-10-CM

## 2024-12-18 PROCEDURE — 77063 BREAST TOMOSYNTHESIS BI: CPT

## 2024-12-18 PROCEDURE — 77067 SCR MAMMO BI INCL CAD: CPT

## 2024-12-19 DIAGNOSIS — I10 ESSENTIAL HYPERTENSION, BENIGN: ICD-10-CM

## 2024-12-19 RX ORDER — LOSARTAN POTASSIUM 25 MG/1
25 TABLET ORAL DAILY
Qty: 90 TABLET | Refills: 0 | Status: SHIPPED | OUTPATIENT
Start: 2024-12-19

## 2025-01-12 DIAGNOSIS — R09.81 NASAL CONGESTION: ICD-10-CM

## 2025-01-13 ENCOUNTER — OFFICE VISIT (OUTPATIENT)
Dept: ALLERGY | Facility: CLINIC | Age: 80
End: 2025-01-13
Attending: NURSE PRACTITIONER
Payer: COMMERCIAL

## 2025-01-13 VITALS — HEART RATE: 99 BPM | RESPIRATION RATE: 16 BRPM | OXYGEN SATURATION: 96 %

## 2025-01-13 DIAGNOSIS — J31.0 CHRONIC RHINITIS: Primary | ICD-10-CM

## 2025-01-13 DIAGNOSIS — R09.81 NASAL CONGESTION: ICD-10-CM

## 2025-01-13 DIAGNOSIS — R05.3 CHRONIC COUGH: ICD-10-CM

## 2025-01-13 DIAGNOSIS — M81.0 AGE-RELATED OSTEOPOROSIS WITHOUT CURRENT PATHOLOGICAL FRACTURE: ICD-10-CM

## 2025-01-13 LAB
ANION GAP SERPL CALCULATED.3IONS-SCNC: 11 MMOL/L (ref 7–15)
BUN SERPL-MCNC: 20.8 MG/DL (ref 8–23)
CALCIUM SERPL-MCNC: 9.7 MG/DL (ref 8.8–10.4)
CHLORIDE SERPL-SCNC: 104 MMOL/L (ref 98–107)
CREAT SERPL-MCNC: 0.89 MG/DL (ref 0.51–0.95)
EGFRCR SERPLBLD CKD-EPI 2021: 66 ML/MIN/1.73M2
GLUCOSE SERPL-MCNC: 86 MG/DL (ref 70–99)
HCO3 SERPL-SCNC: 27 MMOL/L (ref 22–29)
POTASSIUM SERPL-SCNC: 4.1 MMOL/L (ref 3.4–5.3)
SODIUM SERPL-SCNC: 142 MMOL/L (ref 135–145)

## 2025-01-13 RX ORDER — MOMETASONE FUROATE MONOHYDRATE 50 UG/1
2 SPRAY, METERED NASAL DAILY
Qty: 51 G | Refills: 1 | Status: SHIPPED | OUTPATIENT
Start: 2025-01-13

## 2025-01-13 NOTE — PROGRESS NOTES
Subjective   Katy is a 79 year old, presenting for the following health issues:  Allergy Consult (Allergies )    HPI     Chief complaint: Allergy concerns    History of present illness: This is a pleasant 79-year-old woman I was asked to see for evaluation of possible allergies by Angela Arriaza.  Patient states that for the last year she has had runny nose and sneezing that occurs throughout the day.  She states it does not seem to be seasonal.  She does have a slight cough but states that is not very bothersome.  The runny nose is her main symptom.  No prior history of allergies no change in her environment of last year.  She has tried over-the-counter allergy medication which have not been effective.  Nasal rinses were not effective.  She uses mometasone nasal spray daily for a while which was not effective either.  No asthma.  No wheezing or shortness of breath.  No prior allergy testing.  She notes no alleviating or aggravating factors.  She states that her runny nose does not vary with position.  No prior surgery on her nose.  Past medical history: Depression, fibromyalgia, migraines, hysterectomy, tubal ligation, appendectomy, knee replacement    Social history: She lives in a home that is 40 years old carpeting, no mold or water damage, no pets, central air, basement, non-smoking Deven, non-smoker    Family history: Positive for allergies and her granddaughter            Objective    Pulse 99   Resp 16   SpO2 96%   There is no height or weight on file to calculate BMI.  Physical Exam   Gen: Pleasant female not in acute distress  HEENT: Eyes no erythema of the bulbar or palpebral conjunctiva, no edema.Nose: No congestion, mucosa normal. Mouth: Throat clear, no lip or tongue edema.     Psych: Alert and oriented times 3    Impression report and plan:  1.  Chronic rhinitis    Differential includes allergic rhinitis versus vasomotor rhinitis versus rhinitis of aging.  Recommend specific IgE testing given  that the patient is on famotidine.  I will contact patient once testing returns.  If negative consider a trial of Astelin or Astepro nasal spray also discussed Atrovent nasal spray.  Could consider referral to ENT if symptoms persist.  I will follow-up with the patient once testing returns.            Signed Electronically by: Keisha Parsons MD

## 2025-01-13 NOTE — LETTER
1/13/2025      Katy Hong  [address] [place] [id number]      Dear Colleague,    Thank you for referring your patient, Katy Hong, to the [facility]. Please see a copy of my visit note below.          Subjective  Katy is a 79 year old, presenting for the following health issues:  Allergy Consult (Allergies )    HPI     Chief complaint: Allergy concerns    History of present illness: This is a pleasant 79-year-old woman I was asked to see for evaluation of possible allergies by Angela Arriaza.  Patient states that for the last year she has had runny nose and sneezing that occurs throughout the day.  She states it does not seem to be seasonal.  She does have a slight cough but states that is not very bothersome.  The runny nose is her main symptom.  No prior history of allergies no change in her environment of last year.  She has tried over-the-counter allergy medication which have not been effective.  Nasal rinses were not effective.  She uses mometasone nasal spray daily for a while which was not effective either.  No asthma.  No wheezing or shortness of breath.  No prior allergy testing.  She notes no alleviating or aggravating factors.  She states that her runny nose does not vary with position.  No prior surgery on her nose.  Past medical history: Depression, fibromyalgia, migraines, hysterectomy, tubal ligation, appendectomy, knee replacement    Social history: She lives in a home that is 40 years old carpeting, no mold or water damage, no pets, central air, basement, non-smoking Deven, non-smoker    Family history: Positive for allergies and her granddaughter            Objective   Pulse 99   Resp 16   SpO2 96%   There is no height or weight on file to calculate BMI.  Physical Exam   Gen: Pleasant female not in acute distress  HEENT: Eyes no erythema of the bulbar or palpebral conjunctiva, no edema.Nose: No congestion, mucosa normal. Mouth: Throat clear, no lip  or tongue edema.     Psych: Alert and oriented times 3    Impression report and plan:  1.  Chronic rhinitis    Differential includes allergic rhinitis versus vasomotor rhinitis versus rhinitis of aging.  Recommend specific IgE testing given that the patient is on famotidine.  I will contact patient once testing returns.  If negative consider a trial of Astelin or Astepro nasal spray also discussed Atrovent nasal spray.  Could consider referral to ENT if symptoms persist.  I will follow-up with the patient once testing returns.            Signed Electronically by: Keisha Parsons MD      Again, thank you for allowing me to participate in the care of your patient.        Sincerely,        Keisha Parsons MD    Electronically signed

## 2025-01-13 NOTE — PATIENT INSTRUCTIONS
Astelin 2 sprays each nostril twice daily versus Atrovent as needed    Allergic rhinitis versus non-allergic rhinitis

## 2025-01-14 DIAGNOSIS — J31.0 NONALLERGIC RHINITIS: Primary | ICD-10-CM

## 2025-01-14 LAB
A ALTERNATA IGE QN: <0.1 KU(A)/L
A FUMIGATUS IGE QN: <0.1 KU(A)/L
BERMUDA GRASS IGE QN: <0.1 KU(A)/L
C HERBARUM IGE QN: <0.1 KU(A)/L
CAT DANDER IGG QN: 0.13 KU(A)/L
CEDAR IGE QN: <0.1 KU(A)/L
COMMON RAGWEED IGE QN: <0.1 KU(A)/L
COTTONWOOD IGE QN: <0.1 KU(A)/L
D FARINAE IGE QN: <0.1 KU(A)/L
D PTERONYSS IGE QN: <0.1 KU(A)/L
DOG DANDER+EPITH IGE QN: <0.1 KU(A)/L
IGE SERPL-ACNC: 175 KU/L (ref 0–114)
MAPLE IGE QN: <0.1 KU(A)/L
MARSH ELDER IGE QN: <0.1 KU(A)/L
MOUSE URINE PROT IGE QN: <0.1 KU(A)/L
NETTLE IGE QN: <0.1 KU(A)/L
P NOTATUM IGE QN: <0.1 KU(A)/L
ROACH IGE QN: <0.1 KU(A)/L
SALTWORT IGE QN: <0.1 KU(A)/L
SILVER BIRCH IGE QN: <0.1 KU(A)/L
TIMOTHY IGE QN: <0.1 KU(A)/L
WHITE ASH IGE QN: <0.1 KU(A)/L
WHITE ELM IGE QN: <0.1 KU(A)/L
WHITE MULBERRY IGE QN: <0.1 KU(A)/L
WHITE OAK IGE QN: <0.1 KU(A)/L

## 2025-01-14 RX ORDER — AZELASTINE 1 MG/ML
1 SPRAY, METERED NASAL 2 TIMES DAILY
Qty: 30 ML | Refills: 3 | Status: SHIPPED | OUTPATIENT
Start: 2025-01-14

## 2025-01-16 ENCOUNTER — MYC MEDICAL ADVICE (OUTPATIENT)
Dept: FAMILY MEDICINE | Facility: CLINIC | Age: 80
End: 2025-01-16
Payer: COMMERCIAL

## 2025-01-20 ENCOUNTER — OFFICE VISIT (OUTPATIENT)
Dept: CARDIOLOGY | Facility: CLINIC | Age: 80
End: 2025-01-20
Payer: COMMERCIAL

## 2025-01-20 VITALS
WEIGHT: 194 LBS | OXYGEN SATURATION: 97 % | HEART RATE: 84 BPM | BODY MASS INDEX: 35.48 KG/M2 | DIASTOLIC BLOOD PRESSURE: 83 MMHG | SYSTOLIC BLOOD PRESSURE: 136 MMHG

## 2025-01-20 DIAGNOSIS — I10 ESSENTIAL HYPERTENSION, BENIGN: ICD-10-CM

## 2025-01-20 DIAGNOSIS — I42.9 SECONDARY CARDIOMYOPATHY (H): Primary | ICD-10-CM

## 2025-01-20 DIAGNOSIS — I25.10 CORONARY ARTERY CALCIFICATION: ICD-10-CM

## 2025-01-20 DIAGNOSIS — R06.02 SHORTNESS OF BREATH: ICD-10-CM

## 2025-01-20 PROCEDURE — 99214 OFFICE O/P EST MOD 30 MIN: CPT | Performed by: STUDENT IN AN ORGANIZED HEALTH CARE EDUCATION/TRAINING PROGRAM

## 2025-01-20 PROCEDURE — G2211 COMPLEX E/M VISIT ADD ON: HCPCS | Performed by: STUDENT IN AN ORGANIZED HEALTH CARE EDUCATION/TRAINING PROGRAM

## 2025-01-20 NOTE — PROGRESS NOTES
HEART CARE ENCOUNTER NOTE      [facility]  [phone]      Assessment/Recommendations   Assessment:   Coronary artery disease: Nonobstructive as seen on CT coronary angiogram in 2017 showing a calcium score of 14.   Hypertension: Fairly well-controlled on losartan 25 mg daily.   Nonischemic cardiomyopathy: Recovered LVEF- echo 10/2023 showed LVEF 55-60%. Previously 50%. Uses lasix as needed.  On losartan 25 mg daily.  Previously was on metoprolol but this was discontinued by another provider as patient notes she was trying to get rid of needed medications.  Will reassess LVEF with echocardiogram and decide if this needs to be restarted.  Obstructive sleep apnea: Patient previously used a CPAP but notes she retested 6 years ago and was told she no longer needed the CPAP.  Patient does not remember feeling any differently from when she used it when she stopped using it.  Patient does note fatigue but notes this was present even when using the CPAP.  Shortness of breath: Patient notes dyspnea on exertion that has been present for years but seems to be slightly worse since summer.  Patient notes she will feel short of breath after walking half a block or climbing her stairs.  Denies any exertional angina.  Uses Lasix as needed which is typically once a week.  Denies any change in shortness of breath after she uses this.  Takes it more for lower extremity edema.  Will further evaluate with echocardiogram.    Plan:   Obtain echocardiogram to further assess shortness of breath and LVEF to see if patient needs to restart metoprolol  Encourage increasing activity to see if shortness of breath is due to deconditioning.  If no improvement and normal echocardiogram could consider stress test  Could consider repeating sleep study as untreated sleep apnea could be because of shortness of breath and fatigue  Continue current medications      Follow up in 5 to 6 months with Dr. Gay    The longitudinal plan  of care for Katy Hong was addressed during this visit. Due to the added complexity in care, I will continue to support Katy in the subsequent management of this condition(s) and with the ongoing continuity of care of this condition(s).      History of Present Illness/Subjective    HPI: Katy Hong is a 79 year old female with PMHx of nonobstructive CAD, HTN, ANDRADE presents for follow up. Patient last saw Dr. Gay 9/13/23 where patient had been doing well. Was taking lasix as needed- couple times a week which had been stable.     Patient notes that she has chronic shortness of breath but it has seemed slightly worse since summertime.  Patient notes after walking half a block she will feel short of breath.  Patient notes a year ago she was able to walk maybe a little bit further though not much.  Patient notes some shortness of breath with climbing stairs at home.  Is still able to do chores and go to the grocery without issue.  Denies any exertional angina.  Patient still uses Lasix as needed which is maybe once a week for lower extremity edema.  Denies any improvement in her breathing after taking Lasix.  Patient notes chronic fatigue as well that has not been different.  Patient stopped taking her metoprolol several months ago as another provider was trying to decrease unnecessary medications.  Patient denied any change in her fatigue when she stopped this.  Patient was previously using a CPAP for her obstructive sleep apnea though notes she retested 60 years ago and was told she no longer needed it.  Has not used it since.  Denies lightheadedness or palpitations.            Echocardiogram 10/11/23 Results:  There is mild concentric left ventricular hypertrophy.  The visual ejection fraction is 55-60%.  No regional wall motion abnormalities noted.  The right ventricle is normal in size and function.  No significant valve disease.  There is no comparison study available.     Physical Examination   Review of Systems   Vitals: /83 (BP Location: Left arm, Patient Position: Sitting, Cuff Size: Adult Large)   Pulse 84   Wt 88 kg (194 lb)   SpO2 97%   BMI 35.48 kg/m    BMI= Body mass index is 35.48 kg/m .  Wt Readings from Last 3 Encounters:   01/20/25 88 kg (194 lb)   10/16/24 87 kg (191 lb 11.2 oz)   10/09/24 84.4 kg (186 lb)           ENT/Mouth: membranes moist, no oral lesions or bleeding gums.      EYES:  no scleral icterus, normal conjunctivae       Chest/Lungs:   lungs are clear to auscultation, no rales or wheezing, equal chest wall expansion    Cardiovascular:   Regular. Normal first and second heart sounds with no murmurs, rubs, or gallops; the carotid, radial and posterior tibial pulses are intact, no edema bilaterally        Extremities: no cyanosis or clubbing   Skin: no xanthelasma, warm.    Neurologic: no tremors     Psychiatric: alert and oriented x3, calm        Please refer above for cardiac ROS details.        Medical History  Surgical History Family History Social History   Past Medical History:   Diagnosis Date    Abnormal mammogram, unspecified     Created by Conversion     Acute sinusitis, unspecified     Created by Conversion     Asymptomatic varicose veins     Created by Conversion     Breast cyst     Cellulitis and abscess of unspecified site     Created by Conversion     Circumscribed scleroderma     Created by Conversion     Contusion of unspecified site     Created by Conversion     Disorder of bone and cartilage, unspecified     Created by Conversion     Dysthymic disorder     Created by Conversion     Esophageal reflux     Created by Conversion     Insomnia, unspecified     Created by Conversion     Lumbago     Created by Conversion     Mammogram - Abnormal     Created by Conversion Replacement Utility updated for latest IMO load     Migraine, unspecified, without mention of intractable migraine without mention of status migrainosus     Created by Conversion     Myalgia and  myositis, unspecified     Created by Conversion     Myocardial infarction (H)     per EKG's since 2015    Obesity, unspecified     Created by Conversion     Open wound of lip, without mention of complication     Created by Conversion     Other primary cardiomyopathies     Created by Conversion     Other seborrheic keratosis     Created by Conversion     Pain in joint, lower leg     Created by Conversion     Pain in limb     Created by Conversion     Sebaceous cyst     Created by Conversion     Shortness of breath     Created by Conversion     Shoulder and upper arm, insect bite, nonvenomous, without mention of infection(912.4)     Created by Conversion     Unspecified disease of sebaceous glands     Created by Conversion     Unspecified essential hypertension     Created by Conversion     Unspecified venous (peripheral) insufficiency     Created by Conversion     Unspecified vitamin D deficiency     Created by Conversion      Past Surgical History:   Procedure Laterality Date    APPENDECTOMY      BIOPSY BREAST Left     BREAST CYST ASPIRATION Left     CATARACT EXTRACTION, BILATERAL      HYSTERECTOMY      OOPHORECTOMY      TEMPOROMANDIBULAR JOINT SURGERY Bilateral     TUBAL LIGATION       Family History   Problem Relation Age of Onset    Hypertension Mother     Arthritis Mother     Alzheimer Disease Father         d. 76    Diabetes Father     Hypertension Father     Breast Cancer Maternal Aunt 60.00    Obesity Maternal Aunt     Hypertension Sister     Osteopenia Sister     Varicose Veins Brother     Obesity Brother     Breast Cancer Cousin     Obesity Maternal Uncle     Arthritis Maternal Grandmother         Social History     Socioeconomic History    Marital status:      Spouse name: Not on file    Number of children: 2    Years of education: Not on file    Highest education level: Not on file   Occupational History    Not on file   Tobacco Use    Smoking status: Never     Passive exposure: Past    Smokeless  tobacco: Never   Vaping Use    Vaping status: Never Used   Substance and Sexual Activity    Alcohol use: Yes     Alcohol/week: 0.0 standard drinks of alcohol     Comment: Alcoholic Drinks/day: occasional    Drug use: No    Sexual activity: Not on file   Other Topics Concern    Not on file   Social History Narrative    5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       Social Drivers of Health     Financial Resource Strain: Low Risk  (10/16/2024)    Financial Resource Strain     Within the past 12 months, have you or your family members you live with been unable to get utilities (heat, electricity) when it was really needed?: No   Food Insecurity: Low Risk  (10/16/2024)    Food Insecurity     Within the past 12 months, did you worry that your food would run out before you got money to buy more?: No     Within the past 12 months, did the food you bought just not last and you didn t have money to get more?: No   Transportation Needs: Low Risk  (10/16/2024)    Transportation Needs     Within the past 12 months, has lack of transportation kept you from medical appointments, getting your medicines, non-medical meetings or appointments, work, or from getting things that you need?: No   Physical Activity: Insufficiently Active (10/16/2024)    Exercise Vital Sign     Days of Exercise per Week: 3 days     Minutes of Exercise per Session: 30 min   Stress: Stress Concern Present (10/16/2024)    [employer] - Occupational Stress Questionnaire     Feeling of Stress : To some extent   Social Connections: Unknown (10/16/2024)    Social Connection and Isolation Panel [NHANES]     Frequency of Communication with Friends and Family: Not on file     Frequency of Social Gatherings with Friends and Family: Once a week     Attends Faith Services: Not on file     Active Member of Clubs or Organizations: Not on file     Attends Club or Organization Meetings: Not on file     Marital Status: Not on file   Interpersonal Safety:  Low Risk  (10/16/2024)    Interpersonal Safety     Do you feel physically and emotionally safe where you currently live?: Yes     Within the past 12 months, have you been hit, slapped, kicked or otherwise physically hurt by someone?: No     Within the past 12 months, have you been humiliated or emotionally abused in other ways by your partner or ex-partner?: No   Housing Stability: Low Risk  (10/16/2024)    Housing Stability     Do you have housing? : Yes     Are you worried about losing your housing?: No           Medications  Allergies   Current Outpatient Medications   Medication Sig Dispense Refill    azelastine (ASTELIN) 0.1 % nasal spray Spray 1 spray into both nostrils 2 times daily. 30 mL 3    cholecalciferol, vitamin D3, 5,000 unit Tab Take by mouth.      DULoxetine (CYMBALTA) 20 MG capsule Take 20 mg by mouth 2 times daily.      famotidine (PEPCID) 20 MG tablet TAKE 1 TABLET BY MOUTH TWICE A  tablet 0    gabapentin (NEURONTIN) 300 MG capsule TAKE 1 CAPSULE BY MOUTH DAILY IN THE MORNING, 1 CAPSULE IN THE AFTERNOON, & 2 CAPSULES AT BEDTIME 360 capsule 2    losartan (COZAAR) 25 MG tablet TAKE 1 TABLET BY MOUTH EVERY DAY 90 tablet 0    mometasone (NASONEX) 50 MCG/ACT nasal spray INSTILL 2 SPRAYS INTO BOTH NOSTRILS DAILY 51 g 1    QUEtiapine (SEROQUEL) 100 MG tablet Take 100 mg by mouth At Bedtime      triamcinolone (KENALOG) 0.1 % external ointment APPLY TO AFFECTED AREA TWICE A DAY 30 g 2    Turmeric 500 MG CAPS       metoprolol succinate ER (TOPROL XL) 50 MG 24 hr tablet Take 1 tablet (50 mg) by mouth daily. (Patient not taking: Reported on 1/20/2025) 90 tablet 3    naltrexone (DEPADE/REVIA) 50 MG tablet Take 50 mg by mouth 2 times daily. (Patient not taking: Reported on 1/20/2025)         Allergies   Allergen Reactions    Fluoxetine Cough and Rash     Pt thinks it was cough but not totally sure.    Latex Rash    Levothyroxine Rash    Lisinopril Cough and Rash    Sulfa (Sulfonamide Antibiotics) [Sulfa  "Antibiotics] Itching and Rash          Lab Results    Chemistry/lipid CBC Cardiac Enzymes/BNP/TSH/INR   Recent Labs   Lab Test 06/14/23  1002   CHOL 147   HDL 64   LDL 66   TRIG 87     Recent Labs   Lab Test 06/14/23  1002 10/07/22  1153 09/02/21  0956   LDL 66 78 85     Recent Labs   Lab Test 01/13/25  1012      POTASSIUM 4.1   CHLORIDE 104   CO2 27   GLC 86   BUN 20.8   CR 0.89   GFRESTIMATED 66   MONIQUE 9.7     Recent Labs   Lab Test 01/13/25  1012 07/23/24  1611 07/23/24  1456   CR 0.89 1.0 1.00     Recent Labs   Lab Test 06/14/23  1002   A1C 5.5          Recent Labs   Lab Test 07/23/24  1456   WBC 8.1   HGB 13.0   HCT 39.3   MCV 92        Recent Labs   Lab Test 07/23/24  1456 07/23/24  1031 06/14/23  1002   HGB 13.0 12.9 12.1    No results for input(s): \"TROPONINI\" in the last 07213 hours.  No results for input(s): \"BNP\", \"NTBNPI\", \"NTBNP\" in the last 34506 hours.  Recent Labs   Lab Test 10/16/24  1043   TSH 1.54     No results for input(s): \"INR\" in the last 74505 hours.       Aline Fletcher PA-C                                       "

## 2025-01-20 NOTE — PATIENT INSTRUCTIONS
Katy Hong,    It was a pleasure to see you today at the [facility].     My recommendations after this visit include:    - Continue current medications.   - Schedule echocardiogram  - If shortness of breath continues and echo looks normal, could consider stress test  - Try to increase walking and see if the shortness of breath improves as we improve stamina  - Follow up with Dr. Gay in 5-6 months, sooner if needed    - Please call [phone], if you have any questions or concerns      Aline Fletcher PA-C

## 2025-01-20 NOTE — LETTER
1/20/2025    Skyler Wiggins, NP  [address] [place] [address]    RE: Katy Hong       Dear Colleague,     I had the pleasure of seeing Katy Hong in the [facility].    HEART CARE ENCOUNTER NOTE      [facility]  [phone]      Assessment/Recommendations   Assessment:   Coronary artery disease: Nonobstructive as seen on CT coronary angiogram in 2017 showing a calcium score of 14.   Hypertension: Fairly well-controlled on losartan 25 mg daily.   Nonischemic cardiomyopathy: Recovered LVEF- echo 10/2023 showed LVEF 55-60%. Previously 50%. Uses lasix as needed.  On losartan 25 mg daily.  Previously was on metoprolol but this was discontinued by another provider as patient notes she was trying to get rid of needed medications.  Will reassess LVEF with echocardiogram and decide if this needs to be restarted.  Obstructive sleep apnea: Patient previously used a CPAP but notes she retested 6 years ago and was told she no longer needed the CPAP.  Patient does not remember feeling any differently from when she used it when she stopped using it.  Patient does note fatigue but notes this was present even when using the CPAP.  Shortness of breath: Patient notes dyspnea on exertion that has been present for years but seems to be slightly worse since summer.  Patient notes she will feel short of breath after walking half a block or climbing her stairs.  Denies any exertional angina.  Uses Lasix as needed which is typically once a week.  Denies any change in shortness of breath after she uses this.  Takes it more for lower extremity edema.  Will further evaluate with echocardiogram.    Plan:   Obtain echocardiogram to further assess shortness of breath and LVEF to see if patient needs to restart metoprolol  Encourage increasing activity to see if shortness of breath is due to deconditioning.  If no improvement and normal echocardiogram could consider stress  test  Could consider repeating sleep study as untreated sleep apnea could be because of shortness of breath and fatigue  Continue current medications      Follow up in 5 to 6 months with Dr. Gay    The longitudinal plan of care for Katy Hong was addressed during this visit. Due to the added complexity in care, I will continue to support Katy in the subsequent management of this condition(s) and with the ongoing continuity of care of this condition(s).      History of Present Illness/Subjective    HPI: Katy Hong is a 79 year old female with PMHx of nonobstructive CAD, HTN, ANDRADE presents for follow up. Patient last saw Dr. Gay 9/13/23 where patient had been doing well. Was taking lasix as needed- couple times a week which had been stable.     Patient notes that she has chronic shortness of breath but it has seemed slightly worse since summertime.  Patient notes after walking half a block she will feel short of breath.  Patient notes a year ago she was able to walk maybe a little bit further though not much.  Patient notes some shortness of breath with climbing stairs at home.  Is still able to do chores and go to the grocery without issue.  Denies any exertional angina.  Patient still uses Lasix as needed which is maybe once a week for lower extremity edema.  Denies any improvement in her breathing after taking Lasix.  Patient notes chronic fatigue as well that has not been different.  Patient stopped taking her metoprolol several months ago as another provider was trying to decrease unnecessary medications.  Patient denied any change in her fatigue when she stopped this.  Patient was previously using a CPAP for her obstructive sleep apnea though notes she retested 60 years ago and was told she no longer needed it.  Has not used it since.  Denies lightheadedness or palpitations.            Echocardiogram 10/11/23 Results:  There is mild concentric left ventricular hypertrophy.  The visual  ejection fraction is 55-60%.  No regional wall motion abnormalities noted.  The right ventricle is normal in size and function.  No significant valve disease.  There is no comparison study available.     Physical Examination  Review of Systems   Vitals: /83 (BP Location: Left arm, Patient Position: Sitting, Cuff Size: Adult Large)   Pulse 84   Wt 88 kg (194 lb)   SpO2 97%   BMI 35.48 kg/m    BMI= Body mass index is 35.48 kg/m .  Wt Readings from Last 3 Encounters:   01/20/25 88 kg (194 lb)   10/16/24 87 kg (191 lb 11.2 oz)   10/09/24 84.4 kg (186 lb)           ENT/Mouth: membranes moist, no oral lesions or bleeding gums.      EYES:  no scleral icterus, normal conjunctivae       Chest/Lungs:   lungs are clear to auscultation, no rales or wheezing, equal chest wall expansion    Cardiovascular:   Regular. Normal first and second heart sounds with no murmurs, rubs, or gallops; the carotid, radial and posterior tibial pulses are intact, no edema bilaterally        Extremities: no cyanosis or clubbing   Skin: no xanthelasma, warm.    Neurologic: no tremors     Psychiatric: alert and oriented x3, calm        Please refer above for cardiac ROS details.        Medical History  Surgical History Family History Social History   Past Medical History:   Diagnosis Date     Abnormal mammogram, unspecified     Created by Conversion      Acute sinusitis, unspecified     Created by Conversion      Asymptomatic varicose veins     Created by Conversion      Breast cyst      Cellulitis and abscess of unspecified site     Created by Conversion      Circumscribed scleroderma     Created by Conversion      Contusion of unspecified site     Created by Conversion      Disorder of bone and cartilage, unspecified     Created by Conversion      Dysthymic disorder     Created by Conversion      Esophageal reflux     Created by Conversion      Insomnia, unspecified     Created by Conversion      Lumbago     Created by Conversion       Mammogram - Abnormal     Created by Conversion Replacement Utility updated for latest IMO load      Migraine, unspecified, without mention of intractable migraine without mention of status migrainosus     Created by Conversion      Myalgia and myositis, unspecified     Created by Conversion      Myocardial infarction (H)     per EKG's since 2015     Obesity, unspecified     Created by Conversion      Open wound of lip, without mention of complication     Created by Conversion      Other primary cardiomyopathies     Created by Conversion      Other seborrheic keratosis     Created by Conversion      Pain in joint, lower leg     Created by Conversion      Pain in limb     Created by Conversion      Sebaceous cyst     Created by Conversion      Shortness of breath     Created by Conversion      Shoulder and upper arm, insect bite, nonvenomous, without mention of infection(912.4)     Created by Conversion      Unspecified disease of sebaceous glands     Created by Conversion      Unspecified essential hypertension     Created by Conversion      Unspecified venous (peripheral) insufficiency     Created by Conversion      Unspecified vitamin D deficiency     Created by Conversion      Past Surgical History:   Procedure Laterality Date     APPENDECTOMY       BIOPSY BREAST Left      BREAST CYST ASPIRATION Left      CATARACT EXTRACTION, BILATERAL       HYSTERECTOMY       OOPHORECTOMY       TEMPOROMANDIBULAR JOINT SURGERY Bilateral      TUBAL LIGATION       Family History   Problem Relation Age of Onset     Hypertension Mother      Arthritis Mother      Alzheimer Disease Father         d. 76     Diabetes Father      Hypertension Father      Breast Cancer Maternal Aunt 60.00     Obesity Maternal Aunt      Hypertension Sister      Osteopenia Sister      Varicose Veins Brother      Obesity Brother      Breast Cancer Cousin      Obesity Maternal Uncle      Arthritis Maternal Grandmother         Social History     Socioeconomic  History     Marital status:      Spouse name: Not on file     Number of children: 2     Years of education: Not on file     Highest education level: Not on file   Occupational History     Not on file   Tobacco Use     Smoking status: Never     Passive exposure: Past     Smokeless tobacco: Never   Vaping Use     Vaping status: Never Used   Substance and Sexual Activity     Alcohol use: Yes     Alcohol/week: 0.0 standard drinks of alcohol     Comment: Alcoholic Drinks/day: occasional     Drug use: No     Sexual activity: Not on file   Other Topics Concern     Not on file   Social History Narrative    7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       Social Drivers of Health     Financial Resource Strain: Low Risk  (10/16/2024)    Financial Resource Strain      Within the past 12 months, have you or your family members you live with been unable to get utilities (heat, electricity) when it was really needed?: No   Food Insecurity: Low Risk  (10/16/2024)    Food Insecurity      Within the past 12 months, did you worry that your food would run out before you got money to buy more?: No      Within the past 12 months, did the food you bought just not last and you didn t have money to get more?: No   Transportation Needs: Low Risk  (10/16/2024)    Transportation Needs      Within the past 12 months, has lack of transportation kept you from medical appointments, getting your medicines, non-medical meetings or appointments, work, or from getting things that you need?: No   Physical Activity: Insufficiently Active (10/16/2024)    Exercise Vital Sign      Days of Exercise per Week: 3 days      Minutes of Exercise per Session: 30 min   Stress: Stress Concern Present (10/16/2024)    [employer] - Occupational Stress Questionnaire      Feeling of Stress : To some extent   Social Connections: Unknown (10/16/2024)    Social Connection and Isolation Panel [NHANES]      Frequency of Communication with Friends and  Family: Not on file      Frequency of Social Gatherings with Friends and Family: Once a week      Attends Denominational Services: Not on file      Active Member of Clubs or Organizations: Not on file      Attends Club or Organization Meetings: Not on file      Marital Status: Not on file   Interpersonal Safety: Low Risk  (10/16/2024)    Interpersonal Safety      Do you feel physically and emotionally safe where you currently live?: Yes      Within the past 12 months, have you been hit, slapped, kicked or otherwise physically hurt by someone?: No      Within the past 12 months, have you been humiliated or emotionally abused in other ways by your partner or ex-partner?: No   Housing Stability: Low Risk  (10/16/2024)    Housing Stability      Do you have housing? : Yes      Are you worried about losing your housing?: No           Medications  Allergies   Current Outpatient Medications   Medication Sig Dispense Refill     azelastine (ASTELIN) 0.1 % nasal spray Spray 1 spray into both nostrils 2 times daily. 30 mL 3     cholecalciferol, vitamin D3, 5,000 unit Tab Take by mouth.       DULoxetine (CYMBALTA) 20 MG capsule Take 20 mg by mouth 2 times daily.       famotidine (PEPCID) 20 MG tablet TAKE 1 TABLET BY MOUTH TWICE A  tablet 0     gabapentin (NEURONTIN) 300 MG capsule TAKE 1 CAPSULE BY MOUTH DAILY IN THE MORNING, 1 CAPSULE IN THE AFTERNOON, & 2 CAPSULES AT BEDTIME 360 capsule 2     losartan (COZAAR) 25 MG tablet TAKE 1 TABLET BY MOUTH EVERY DAY 90 tablet 0     mometasone (NASONEX) 50 MCG/ACT nasal spray INSTILL 2 SPRAYS INTO BOTH NOSTRILS DAILY 51 g 1     QUEtiapine (SEROQUEL) 100 MG tablet Take 100 mg by mouth At Bedtime       triamcinolone (KENALOG) 0.1 % external ointment APPLY TO AFFECTED AREA TWICE A DAY 30 g 2     Turmeric 500 MG CAPS        metoprolol succinate ER (TOPROL XL) 50 MG 24 hr tablet Take 1 tablet (50 mg) by mouth daily. (Patient not taking: Reported on 1/20/2025) 90 tablet 3     naltrexone  "(DEPADE/REVIA) 50 MG tablet Take 50 mg by mouth 2 times daily. (Patient not taking: Reported on 1/20/2025)         Allergies   Allergen Reactions     Fluoxetine Cough and Rash     Pt thinks it was cough but not totally sure.     Latex Rash     Levothyroxine Rash     Lisinopril Cough and Rash     Sulfa (Sulfonamide Antibiotics) [Sulfa Antibiotics] Itching and Rash          Lab Results    Chemistry/lipid CBC Cardiac Enzymes/BNP/TSH/INR   Recent Labs   Lab Test 06/14/23  1002   CHOL 147   HDL 64   LDL 66   TRIG 87     Recent Labs   Lab Test 06/14/23  1002 10/07/22  1153 09/02/21  0956   LDL 66 78 85     Recent Labs   Lab Test 01/13/25  1012      POTASSIUM 4.1   CHLORIDE 104   CO2 27   GLC 86   BUN 20.8   CR 0.89   GFRESTIMATED 66   MONIQUE 9.7     Recent Labs   Lab Test 01/13/25  1012 07/23/24  1611 07/23/24  1456   CR 0.89 1.0 1.00     Recent Labs   Lab Test 06/14/23  1002   A1C 5.5          Recent Labs   Lab Test 07/23/24  1456   WBC 8.1   HGB 13.0   HCT 39.3   MCV 92        Recent Labs   Lab Test 07/23/24  1456 07/23/24  1031 06/14/23  1002   HGB 13.0 12.9 12.1    No results for input(s): \"TROPONINI\" in the last 42866 hours.  No results for input(s): \"BNP\", \"NTBNPI\", \"NTBNP\" in the last 12951 hours.  Recent Labs   Lab Test 10/16/24  1043   TSH 1.54     No results for input(s): \"INR\" in the last 09810 hours.       Aline Fletcher PA-C                                         Thank you for allowing me to participate in the care of your patient.      Sincerely,     Aline Fletcher PA-C     [facility]  cc:   Cyn Gay MD  [address]  [address]  [place] [address]      "

## 2025-02-25 ENCOUNTER — HOSPITAL ENCOUNTER (OUTPATIENT)
Dept: CARDIOLOGY | Facility: CLINIC | Age: 80
Discharge: HOME | End: 2025-02-25
Attending: STUDENT IN AN ORGANIZED HEALTH CARE EDUCATION/TRAINING PROGRAM
Payer: COMMERCIAL

## 2025-02-25 LAB — LVEF ECHO: NORMAL

## 2025-02-25 PROCEDURE — 93306 TTE W/DOPPLER COMPLETE: CPT | Mod: 26 | Performed by: INTERNAL MEDICINE

## 2025-02-25 PROCEDURE — 93306 TTE W/DOPPLER COMPLETE: CPT

## 2025-02-25 NOTE — RESULT ENCOUNTER NOTE
Please let patient know her echocardiogram is normal with normal LVEF and no significant valve abnormalities.  Please inquire how patient and shortness of breath has been and if patient has tried increasing her activity.  If increasing activity and no improvement in breathing we could consider nuclear stress test.

## 2025-03-03 DIAGNOSIS — I10 ESSENTIAL HYPERTENSION, BENIGN: ICD-10-CM

## 2025-03-04 RX ORDER — LOSARTAN POTASSIUM 25 MG/1
25 TABLET ORAL DAILY
Qty: 90 TABLET | Refills: 0 | Status: SHIPPED | OUTPATIENT
Start: 2025-03-04

## 2025-04-05 DIAGNOSIS — K21.9 GASTROESOPHAGEAL REFLUX DISEASE WITHOUT ESOPHAGITIS: ICD-10-CM

## 2025-04-07 RX ORDER — FAMOTIDINE 20 MG/1
20 TABLET, FILM COATED ORAL 2 TIMES DAILY
Qty: 180 TABLET | Refills: 1 | Status: SHIPPED | OUTPATIENT
Start: 2025-04-07

## 2025-04-09 NOTE — PROGRESS NOTES
[facility] Non Surgical Follow up Visit   Date of visit: 4/17/2025  Physician: LATISHA Rossi MD, MD  Primary Care is Skyler Wiggins  Katy Hong   79 year old  female     Initial Weight: 201#  Initial BMI: 37.4  Today's Weight:   Wt Readings from Last 1 Encounters:   04/17/25 86 kg (189 lb 8 oz)     Body mass index is 34.66 kg/m .           Assessment and Plan   Assessment: Katy is a 79 year old year old female who presents for medical weight management.      Plan:    1. Morbid obesity (H) (Primary)  Patient was congratulated on her success thus far. Healthy habits to assist with further weight loss were discussed. She is going to try to increase her exercise. She will avoid the sweets as best she can. Her  is not willing to keep them out of the house. She will continue the metformin.     2. Coronary artery disease involving native coronary artery of native heart without angina pectoris  This may improve with healthy habits and weight loss.     3. Essential hypertension  This may improve with healthy habits and weight loss.      She will talk to her primary provider about prescribing the metformin. She will follow up with me prn.           INTERIM HISTORY  Patient is taking metformin for appetite and craving control and she thinks it might be helpful. She is not losing weight and that is frustrating her.  She was taking naltrexone but stopped it last summer because she was in the UC with pleurisy and was told she couldn't take oxycodone with the naltrexone. She doesn't want to restart it.    DIETARY HISTORY  Meals Per Day: 3  Eating Protein First?: sometimes  Food Diary: B:toast and egg and coffee or sometimes cereal L:fruit and 1/2 sandwich with lunch meat and cheese D:4 oz of meat, sometimes potato, vegetables  Snacks Per Day: craving sweets, trying to avoid  Typical Snack: sweets  Fluid Intake: working on it (48 oz)  Portion Control: yes  Calorie Containing Beverages: soda every 2  weeks    Meals at Restaurant per week:0-1    Positive Changes Since Last Visit: chair exercises, eating less overall  Struggling With: weight loss    Knowledgeable in Reading Food Labels: yes  Getting Adequate Protein: sometimes      PHYSICAL ACTIVITY PATTERNS:  Chair exercises 30 minutes 3 days a week, some walking outside (limited because she is worried about falling)    REVIEW OF SYSTEMS  GENERAL/CONSTITUTIONAL:  Fatigue: sometimes  HEENT:   glaucoma: no  CARDIOVASCULAR:  History of heart disease: yes  GI:  Pancreatitis: no  PSYCHIATRIC:  Moods: frustrated  MUSCULOSKELETAL/RHEUMATOLOGIC  Arthralgias: yes  Myalgias: yes  ENDOCRINE:  Monitoring Blood Sugars: no  Sugars Well Controlled: na  No personal or family history of medullary thyroid cancer no  :  Birth control: menopause  History of kidney stones: yes     Patient Profile   Social History     Social History Narrative    5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          Past Medical History   Past Medical History:   Diagnosis Date    Abnormal mammogram, unspecified     Created by Conversion     Acute sinusitis, unspecified     Created by Conversion     Asymptomatic varicose veins     Created by Conversion     Breast cyst     Cellulitis and abscess of unspecified site     Created by Conversion     Circumscribed scleroderma     Created by Conversion     Contusion of unspecified site     Created by Conversion     Disorder of bone and cartilage, unspecified     Created by Conversion     Dysthymic disorder     Created by Conversion     Esophageal reflux     Created by Conversion     Insomnia, unspecified     Created by Conversion     Lumbago     Created by Conversion     Mammogram - Abnormal     Created by Conversion Replacement Utility updated for latest IMO load     Migraine, unspecified, without mention of intractable migraine without mention of status migrainosus     Created by Conversion     Myalgia and myositis, unspecified     Created by Conversion     Myocardial  infarction (H)     per EKG's since 2015    Obesity, unspecified     Created by Conversion     Open wound of lip, without mention of complication     Created by Conversion     Other primary cardiomyopathies     Created by Conversion     Other seborrheic keratosis     Created by Conversion     Pain in joint, lower leg     Created by Conversion     Pain in limb     Created by Conversion     Sebaceous cyst     Created by Conversion     Shortness of breath     Created by Conversion     Shoulder and upper arm, insect bite, nonvenomous, without mention of infection(912.4)     Created by Conversion     Unspecified disease of sebaceous glands     Created by Conversion     Unspecified essential hypertension     Created by Conversion     Unspecified venous (peripheral) insufficiency     Created by Conversion     Unspecified vitamin D deficiency     Created by Conversion      Patient Active Problem List   Diagnosis    Osteopenia    Insomnia    Vitamin D Deficiency    Lower Back Pain    Fibromyalgia    Essential Hypertension    Seborrheic Keratosis    Lichen Sclerosus Et Atrophicus    Migraine Headache    Esophageal Reflux    Joint Pain, Localized In The Knee    Varicose Veins    Chronic Cutaneous Ulcer Venous Stasis    Chronic venous insufficiency    Foot pain (Soft Tissue)    Female stress incontinence    Calculus of ureter    Hydronephrosis with urinary obstruction due to ureteral calculus    Other cardiomyopathy (H)    Obesity (BMI 30-39.9)    Chronic fatigue    Moderate episode of recurrent major depressive disorder (H)    Anxiety    Pain in joint, multiple sites    Osteoarthritis of lumbar spine, unspecified spinal osteoarthritis complication status    Pain in both lower legs    Class 2 severe obesity due to excess calories with serious comorbidity in adult (H)       Past Surgical History  She has a past surgical history that includes appendectomy; Temporomandibular Joint Surgery (Bilateral); Cataract Extraction,  "Bilateral; Hysterectomy; Oophorectomy; Biopsy breast (Left); Breast Cyst Aspiration (Left); and tubal ligation.     Examination   Ht 1.575 m (5' 2\")   Wt 86 kg (189 lb 8 oz)   BMI 34.66 kg/m    Wt Readings from Last 4 Encounters:   04/17/25 86 kg (189 lb 8 oz)   01/20/25 88 kg (194 lb)   10/16/24 87 kg (191 lb 11.2 oz)   10/09/24 84.4 kg (186 lb)      BP Readings from Last 3 Encounters:   01/20/25 136/83   10/16/24 136/80   08/02/24 130/78      GEN: Alert and oriented in no acute distress.   HEENT: mucous membranes moist         Counseling:   We reviewed the important post op bariatric recommendations:  -eating 3 meals daily  -eating protein first, getting >60gm protein daily  -eating slowly, chewing food well  -avoiding/limiting calorie containing beverages  -limiting starchy vegetables and carbohydrates, choosing wheat, not white with breads,   crackers, pastas, bear, bagels, tortillas, rice  -limiting restaurant or cafeteria eating to twice a week or less    We discussed the importance of restorative sleep and stress management in maintaining a healthy weight.  We discussed the National Weight Control Registry healthy weight maintenance strategies and ways to optimize metabolism.  We discussed the importance of physical activity including cardiovascular and strength training in maintaining a healthier weight.    Total time spent on the date of this encounter doing: chart review, review of test results, patient visit, physical exam, education, counseling, developing plan of care and documenting = 31 minutes.         LATISHA Rossi MD  [facility]           "

## 2025-04-17 ENCOUNTER — OFFICE VISIT (OUTPATIENT)
Dept: SURGERY | Facility: CLINIC | Age: 80
End: 2025-04-17
Payer: COMMERCIAL

## 2025-04-17 VITALS
SYSTOLIC BLOOD PRESSURE: 130 MMHG | HEIGHT: 62 IN | WEIGHT: 189.5 LBS | BODY MASS INDEX: 34.87 KG/M2 | DIASTOLIC BLOOD PRESSURE: 82 MMHG

## 2025-04-17 DIAGNOSIS — E66.01 MORBID OBESITY (H): Primary | ICD-10-CM

## 2025-04-17 DIAGNOSIS — I10 ESSENTIAL HYPERTENSION: ICD-10-CM

## 2025-04-17 DIAGNOSIS — I25.10 CORONARY ARTERY DISEASE INVOLVING NATIVE CORONARY ARTERY OF NATIVE HEART WITHOUT ANGINA PECTORIS: ICD-10-CM

## 2025-04-17 RX ORDER — QUETIAPINE FUMARATE 25 MG/1
TABLET, FILM COATED ORAL
COMMUNITY
Start: 2025-03-11

## 2025-04-17 RX ORDER — METFORMIN HYDROCHLORIDE 500 MG/1
TABLET, EXTENDED RELEASE ORAL
COMMUNITY
Start: 2025-03-18

## 2025-04-17 NOTE — LETTER
4/17/2025      Katy Hong  [address] [place] [id number]      Dear Colleague,    Thank you for referring your patient, Katy Hong, to the [facility] [place]. Please see a copy of my visit note below.    Bariat[facility] Care Clinic Non Surgical Follow up Visit   Date of visit: 4/17/2025  Physician: LATISHA Rossi MD, MD  Primary Care is Skyler Wiggins  Katy Hong   79 year old  female     Initial Weight: 201#  Initial BMI: 37.4  Today's Weight:   Wt Readings from Last 1 Encounters:   04/17/25 86 kg (189 lb 8 oz)     Body mass index is 34.66 kg/m .           Assessment and Plan   Assessment: Katy is a 79 year old year old female who presents for medical weight management.      Plan:    1. Morbid obesity (H) (Primary)  Patient was congratulated on her success thus far. Healthy habits to assist with further weight loss were discussed. She is going to try to increase her exercise. She will avoid the sweets as best she can. Her  is not willing to keep them out of the house. She will continue the metformin.     2. Coronary artery disease involving native coronary artery of native heart without angina pectoris  This may improve with healthy habits and weight loss.     3. Essential hypertension  This may improve with healthy habits and weight loss.      She will talk to her primary provider about prescribing the metformin. She will follow up with me prn.           INTERIM HISTORY  Patient is taking metformin for appetite and craving control and she thinks it might be helpful. She is not losing weight and that is frustrating her.  She was taking naltrexone but stopped it last summer because she was in the UC with pleurisy and was told she couldn't take oxycodone with the naltrexone. She doesn't want to restart it.    DIETARY HISTORY  Meals Per Day: 3  Eating Protein First?: sometimes  Food Diary: B:toast and egg and coffee or sometimes cereal  L:fruit and 1/2 sandwich with lunch meat and cheese D:4 oz of meat, sometimes potato, vegetables  Snacks Per Day: craving sweets, trying to avoid  Typical Snack: sweets  Fluid Intake: working on it (48 oz)  Portion Control: yes  Calorie Containing Beverages: soda every 2 weeks    Meals at Restaurant per week:0-1    Positive Changes Since Last Visit: chair exercises, eating less overall  Struggling With: weight loss    Knowledgeable in Reading Food Labels: yes  Getting Adequate Protein: sometimes      PHYSICAL ACTIVITY PATTERNS:  Chair exercises 30 minutes 3 days a week, some walking outside (limited because she is worried about falling)    REVIEW OF SYSTEMS  GENERAL/CONSTITUTIONAL:  Fatigue: sometimes  HEENT:   glaucoma: no  CARDIOVASCULAR:  History of heart disease: yes  GI:  Pancreatitis: no  PSYCHIATRIC:  Moods: frustrated  MUSCULOSKELETAL/RHEUMATOLOGIC  Arthralgias: yes  Myalgias: yes  ENDOCRINE:  Monitoring Blood Sugars: no  Sugars Well Controlled: na  No personal or family history of medullary thyroid cancer no  :  Birth control: menopause  History of kidney stones: yes     Patient Profile   Social History     Social History Narrative    5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          Past Medical History   Past Medical History:   Diagnosis Date     Abnormal mammogram, unspecified     Created by Conversion      Acute sinusitis, unspecified     Created by Conversion      Asymptomatic varicose veins     Created by Conversion      Breast cyst      Cellulitis and abscess of unspecified site     Created by Conversion      Circumscribed scleroderma     Created by Conversion      Contusion of unspecified site     Created by Conversion      Disorder of bone and cartilage, unspecified     Created by Conversion      Dysthymic disorder     Created by Conversion      Esophageal reflux     Created by Conversion      Insomnia, unspecified     Created by Conversion      Lumbago     Created by Conversion      Mammogram - Abnormal      Created by Conversion Replacement Utility updated for latest IMO load      Migraine, unspecified, without mention of intractable migraine without mention of status migrainosus     Created by Conversion      Myalgia and myositis, unspecified     Created by Conversion      Myocardial infarction (H)     per EKG's since 2015     Obesity, unspecified     Created by Conversion      Open wound of lip, without mention of complication     Created by Conversion      Other primary cardiomyopathies     Created by Conversion      Other seborrheic keratosis     Created by Conversion      Pain in joint, lower leg     Created by Conversion      Pain in limb     Created by Conversion      Sebaceous cyst     Created by Conversion      Shortness of breath     Created by Conversion      Shoulder and upper arm, insect bite, nonvenomous, without mention of infection(912.4)     Created by Conversion      Unspecified disease of sebaceous glands     Created by Conversion      Unspecified essential hypertension     Created by Conversion      Unspecified venous (peripheral) insufficiency     Created by Conversion      Unspecified vitamin D deficiency     Created by Conversion      Patient Active Problem List   Diagnosis     Osteopenia     Insomnia     Vitamin D Deficiency     Lower Back Pain     Fibromyalgia     Essential Hypertension     Seborrheic Keratosis     Lichen Sclerosus Et Atrophicus     Migraine Headache     Esophageal Reflux     Joint Pain, Localized In The Knee     Varicose Veins     Chronic Cutaneous Ulcer Venous Stasis     Chronic venous insufficiency     Foot pain (Soft Tissue)     Female stress incontinence     Calculus of ureter     Hydronephrosis with urinary obstruction due to ureteral calculus     Other cardiomyopathy (H)     Obesity (BMI 30-39.9)     Chronic fatigue     Moderate episode of recurrent major depressive disorder (H)     Anxiety     Pain in joint, multiple sites     Osteoarthritis of lumbar spine, unspecified  "spinal osteoarthritis complication status     Pain in both lower legs     Class 2 severe obesity due to excess calories with serious comorbidity in adult (H)       Past Surgical History  She has a past surgical history that includes appendectomy; Temporomandibular Joint Surgery (Bilateral); Cataract Extraction, Bilateral; Hysterectomy; Oophorectomy; Biopsy breast (Left); Breast Cyst Aspiration (Left); and tubal ligation.     Examination   Ht 1.575 m (5' 2\")   Wt 86 kg (189 lb 8 oz)   BMI 34.66 kg/m    Wt Readings from Last 4 Encounters:   04/17/25 86 kg (189 lb 8 oz)   01/20/25 88 kg (194 lb)   10/16/24 87 kg (191 lb 11.2 oz)   10/09/24 84.4 kg (186 lb)      BP Readings from Last 3 Encounters:   01/20/25 136/83   10/16/24 136/80   08/02/24 130/78      GEN: Alert and oriented in no acute distress.   HEENT: mucous membranes moist         Counseling:   We reviewed the important post op bariatric recommendations:  -eating 3 meals daily  -eating protein first, getting >60gm protein daily  -eating slowly, chewing food well  -avoiding/limiting calorie containing beverages  -limiting starchy vegetables and carbohydrates, choosing wheat, not white with breads,   crackers, pastas, bear, bagels, tortillas, rice  -limiting restaurant or cafeteria eating to twice a week or less    We discussed the importance of restorative sleep and stress management in maintaining a healthy weight.  We discussed the National Weight Control Registry healthy weight maintenance strategies and ways to optimize metabolism.  We discussed the importance of physical activity including cardiovascular and strength training in maintaining a healthier weight.    Total time spent on the date of this encounter doing: chart review, review of test results, patient visit, physical exam, education, counseling, developing plan of care and documenting = 31 minutes.         LATISHA Rossi MD  [facility]               Again, thank you for " allowing me to participate in the care of your patient.        Sincerely,        LATISHA Rossi MD    Electronically signed

## 2025-04-17 NOTE — PATIENT INSTRUCTIONS

## 2025-05-05 DIAGNOSIS — N95.2 ATROPHIC VAGINITIS: ICD-10-CM

## 2025-05-05 DIAGNOSIS — M79.7 FIBROMYALGIA: ICD-10-CM

## 2025-05-05 DIAGNOSIS — I10 ESSENTIAL HYPERTENSION, BENIGN: ICD-10-CM

## 2025-05-06 RX ORDER — TRIAMCINOLONE ACETONIDE 1 MG/G
OINTMENT TOPICAL
Qty: 30 G | Refills: 2 | Status: SHIPPED | OUTPATIENT
Start: 2025-05-06

## 2025-05-06 RX ORDER — LOSARTAN POTASSIUM 25 MG/1
25 TABLET ORAL DAILY
Qty: 90 TABLET | Refills: 0 | Status: SHIPPED | OUTPATIENT
Start: 2025-05-06

## 2025-05-06 RX ORDER — GABAPENTIN 300 MG/1
CAPSULE ORAL
Qty: 360 CAPSULE | Refills: 2 | Status: SHIPPED | OUTPATIENT
Start: 2025-05-06

## 2025-05-12 ENCOUNTER — TELEPHONE (OUTPATIENT)
Dept: FAMILY MEDICINE | Facility: CLINIC | Age: 80
End: 2025-05-12
Payer: COMMERCIAL

## 2025-05-12 NOTE — TELEPHONE ENCOUNTER
"S-(situation): Patient calling in to cancel Prolia injection scheduled at [facility] on 6/17/25.     B-(background): Patient had first Prolia injection on 12/17/24.     MyChart encounter from 1/16/25, last message was read by patient, but not responded to.     \"Katy,     That level of cost that you would be responsible for sounds way higher than anything I have heard of for my patients.  I question if insurance actually ran their part correctly.  If you have not yet I would encourage you to get in contact with the billing department.     Another option could be doing the yearly Reclast infusions.  I haven't had any issues with patients having this covered.  These infusions seem to cause the most amount of bodyaches side effects for people.  Sometimes they are mild and sometimes they can be quite bothersome.     Alternatively, we could do another 5 years of the alendronate pills which you had previously used     Skyler Wiggins, CNP    Last read by Katy Hong at 4:47PM on 1/17/2025\".     A-(assessment): Patient states she is going to be going on oral medication instead.     Patient stated she has a med check office visit scheduled with PCP on 5/15/25.   Upon chart review, no appointment scheduled.     Patient verbalized frustration and states she scheduled an appointment with her PCP \"a couple months ago\" with central scheduling for 5/15/25.     Assisted patient to schedule appointment with PCP on 5/23/25.     R-(recommendations): Assisted patient to schedule appointment with PCP on 5/23/25.     Nurse visit for Prolia cancelled.     Routing to PCP and [facility] RN [place] for awareness.     Lulu Andujar RN  [facility]  "

## 2025-05-26 ENCOUNTER — RESULTS FOLLOW-UP (OUTPATIENT)
Dept: FAMILY MEDICINE | Facility: CLINIC | Age: 80
End: 2025-05-26
Payer: COMMERCIAL

## 2025-05-26 DIAGNOSIS — R39.15 URINARY URGENCY: Primary | ICD-10-CM

## 2025-05-26 RX ORDER — TOLTERODINE 2 MG/1
2 CAPSULE, EXTENDED RELEASE ORAL DAILY
Qty: 90 CAPSULE | Refills: 3 | Status: SHIPPED | OUTPATIENT
Start: 2025-05-26

## 2025-05-28 NOTE — TELEPHONE ENCOUNTER
Татьяна Johnson RN called Katy on 5/28 and left a message to return the call to the clinic.  If patient calls back, please route to [facility].    TC please route to RN.    HUSSAIN Sood RN  [facility]    Please contact patient.     B12 levels remain low.  Last fall I recommended starting 1000 mcg B12 supplement.  Still would recommend starting.     Urine just showed contamination of normal genital bacteria.  No clear sign of UTI.     We had discussed in the visit that she showed signs of both urge and stress incontinence.  Still recommend pelvic floor therapy to help with the stress incontinence.  This was ordered.  For the potential urge incontinence, did send Detrol to the pharmacy.  In the visit we reviewed potential side effects such as dry mouth and lightheadedness, but please review with her again so she is aware to watch out for this.  If intolerable side effects or no benefit, go ahead and stop and let me know in which case we could also try some vaginal estrogen cream.     Skyler Wiggins, CNP

## 2025-05-28 NOTE — TELEPHONE ENCOUNTER
Patient returned call and reviewed recommendations. She verbalized understanding and had no further questions

## 2025-08-27 ENCOUNTER — THERAPY VISIT (OUTPATIENT)
Dept: PHYSICAL THERAPY | Facility: REHABILITATION | Age: 80
End: 2025-08-27
Attending: NURSE PRACTITIONER
Payer: COMMERCIAL

## 2025-08-27 DIAGNOSIS — R39.15 URINARY URGENCY: ICD-10-CM

## 2025-08-27 DIAGNOSIS — N39.3 PRIMARY STRESS URINARY INCONTINENCE: Primary | ICD-10-CM

## 2025-08-27 DIAGNOSIS — R26.89 IMPAIRMENT OF BALANCE: ICD-10-CM

## 2025-08-27 DIAGNOSIS — I10 ESSENTIAL HYPERTENSION, BENIGN: ICD-10-CM

## 2025-08-27 DIAGNOSIS — N81.89 PELVIC FLOOR WEAKNESS IN FEMALE: ICD-10-CM

## 2025-08-27 PROCEDURE — 97110 THERAPEUTIC EXERCISES: CPT | Mod: GP

## 2025-08-27 PROCEDURE — 97161 PT EVAL LOW COMPLEX 20 MIN: CPT | Mod: GP

## 2025-08-27 PROCEDURE — 97112 NEUROMUSCULAR REEDUCATION: CPT | Mod: GP

## 2025-08-27 PROCEDURE — 97535 SELF CARE MNGMENT TRAINING: CPT | Mod: GP

## 2025-08-28 RX ORDER — LOSARTAN POTASSIUM 25 MG/1
25 TABLET ORAL DAILY
Qty: 30 TABLET | Refills: 0 | Status: SHIPPED | OUTPATIENT
Start: 2025-08-28